# Patient Record
Sex: FEMALE | Race: WHITE | NOT HISPANIC OR LATINO | Employment: OTHER | URBAN - METROPOLITAN AREA
[De-identification: names, ages, dates, MRNs, and addresses within clinical notes are randomized per-mention and may not be internally consistent; named-entity substitution may affect disease eponyms.]

---

## 2017-01-04 ENCOUNTER — TELEPHONE (OUTPATIENT)
Dept: NEUROLOGY | Facility: CLINIC | Age: 62
End: 2017-01-04

## 2017-01-04 NOTE — TELEPHONE ENCOUNTER
Returned call and spoke to Chuy and they have the correct dose of the medication which was order by Mary Alice and no further question were answered.

## 2017-01-04 NOTE — TELEPHONE ENCOUNTER
----- Message from Erik Anne sent at 1/3/2017  8:48 AM CST -----  Contact: Self [phone]  Patient is calling to speak to the nurse about medication ( Topamax ), she lives in a nursing facility, she is not getting her correct dosage, pls call or contact Alycia (nurse) @ [phone] to update the medication dosage

## 2017-01-10 ENCOUNTER — SURGERY (OUTPATIENT)
Age: 62
End: 2017-01-10

## 2017-01-10 ENCOUNTER — ANESTHESIA (OUTPATIENT)
Dept: ENDOSCOPY | Facility: HOSPITAL | Age: 62
End: 2017-01-10
Payer: MEDICARE

## 2017-01-10 ENCOUNTER — ANESTHESIA EVENT (OUTPATIENT)
Dept: ENDOSCOPY | Facility: HOSPITAL | Age: 62
End: 2017-01-10
Payer: MEDICARE

## 2017-01-10 VITALS — RESPIRATION RATE: 17 BRPM

## 2017-01-10 PROBLEM — R10.13 EPIGASTRIC PAIN: Status: ACTIVE | Noted: 2017-01-10

## 2017-01-10 PROCEDURE — 63600175 PHARM REV CODE 636 W HCPCS: Performed by: NURSE ANESTHETIST, CERTIFIED REGISTERED

## 2017-01-10 PROCEDURE — D9220A PRA ANESTHESIA: Mod: ANES,,, | Performed by: ANESTHESIOLOGY

## 2017-01-10 PROCEDURE — 25000003 PHARM REV CODE 250: Performed by: NURSE ANESTHETIST, CERTIFIED REGISTERED

## 2017-01-10 PROCEDURE — D9220A PRA ANESTHESIA: Mod: CRNA,,, | Performed by: NURSE ANESTHETIST, CERTIFIED REGISTERED

## 2017-01-10 RX ORDER — PROPOFOL 10 MG/ML
VIAL (ML) INTRAVENOUS
Status: DISCONTINUED | OUTPATIENT
Start: 2017-01-10 | End: 2017-01-10

## 2017-01-10 RX ORDER — LIDOCAINE HCL/PF 100 MG/5ML
SYRINGE (ML) INTRAVENOUS
Status: DISCONTINUED | OUTPATIENT
Start: 2017-01-10 | End: 2017-01-10

## 2017-01-10 RX ORDER — PROPOFOL 10 MG/ML
VIAL (ML) INTRAVENOUS CONTINUOUS PRN
Status: DISCONTINUED | OUTPATIENT
Start: 2017-01-10 | End: 2017-01-10

## 2017-01-10 RX ADMIN — LIDOCAINE HYDROCHLORIDE 50 MG: 20 INJECTION, SOLUTION INTRAVENOUS at 10:01

## 2017-01-10 RX ADMIN — PROPOFOL 80 MG: 10 INJECTION, EMULSION INTRAVENOUS at 10:01

## 2017-01-10 RX ADMIN — PROPOFOL 150 MCG/KG/MIN: 10 INJECTION, EMULSION INTRAVENOUS at 10:01

## 2017-01-10 NOTE — TRANSFER OF CARE
"Anesthesia Transfer of Care Note    Patient: Joseluis Triplett    Procedure(s) Performed: Procedure(s) (LRB):  ESOPHAGOGASTRODUODENOSCOPY (EGD) (N/A)    Patient location: PACU    Anesthesia Type: general    Transport from OR: Transported from OR on 2-3 L/min O2 by NC with adequate spontaneous ventilation    Post pain: adequate analgesia    Post assessment: no apparent anesthetic complications    Post vital signs: stable    Level of consciousness: responds to stimulation and sedated    Nausea/Vomiting: no nausea/vomiting    Complications: none          Last vitals:   Visit Vitals    BP (!) 85/51 (BP Location: Left arm, Patient Position: Lying, BP Method: Automatic)    Pulse (!) 44    Temp 36.7 °C (98 °F) (Oral)    Resp 18    Ht 5' 5" (1.651 m)    Wt 133.4 kg (294 lb)    SpO2 97%    Breastfeeding No    BMI 48.92 kg/m2     "

## 2017-01-10 NOTE — ANESTHESIA POSTPROCEDURE EVALUATION
"Anesthesia Post Evaluation    Patient: Joseluis Triplett    Procedure(s) Performed: Procedure(s) (LRB):  ESOPHAGOGASTRODUODENOSCOPY (EGD) (N/A)    Final Anesthesia Type: general  Patient location during evaluation: PACU  Patient participation: Yes- Able to Participate  Level of consciousness: awake and alert  Post-procedure vital signs: reviewed and stable  Pain management: adequate  Airway patency: patent  PONV status at discharge: No PONV  Anesthetic complications: no      Cardiovascular status: hemodynamically stable  Respiratory status: unassisted  Hydration status: euvolemic  Follow-up not needed.        Visit Vitals    /63 (BP Location: Left arm, Patient Position: Lying, BP Method: Automatic)    Pulse (!) 48    Temp 36.7 °C (98 °F) (Oral)    Resp 18    Ht 5' 5" (1.651 m)    Wt 133.4 kg (294 lb)    SpO2 98%    Breastfeeding No    BMI 48.92 kg/m2       Pain/Genny Score: Pain Assessment Performed: Yes (1/10/2017 11:05 AM)  Presence of Pain: denies (1/10/2017 11:05 AM)  Genny Score: 10 (1/10/2017 11:05 AM)      "

## 2017-01-10 NOTE — ANESTHESIA PREPROCEDURE EVALUATION
01/10/2017  Joseluis Triplett is a 61 y.o., female.    [facility] Anesthesia Evaluation         Review of Systems         Anesthesia Plan  Type of Anesthesia, risks & benefits discussed:  Anesthesia Type:  general  Patient's Preference: General  Intra-op Monitoring Plan: standard ASA monitors  Intra-op Monitoring Plan Comments:   Post Op Pain Control Plan:   Post Op Pain Control Plan Comments: Per primary service.   Induction:   IV  Beta Blocker:  Patient is not currently on a Beta-Blocker (No further documentation required).       Informed Consent: Patient understands risks and agrees with Anesthesia plan.  Questions answered. Anesthesia consent signed with patient.  ASA Score: 3     Day of Surgery Review of History & Physical:    H&P update referred to the surgeon.     Anesthesia Plan Notes: Monitoring and airway management plans reviewed.         Ready For Surgery From Anesthesia Perspective.     Patient Active Problem List   Diagnosis    Schizo affective schizophrenia    Bipolar disorder    Tobacco abuse    Edema    COPD (chronic obstructive pulmonary disease)    Morbid obesity    Hypothyroidism    Hyperlipemia    Seizure disorder    Parkinson disease    History of anorexia nervosa    History of bulimia nervosa    GERD (gastroesophageal reflux disease)    Chronic rhinitis    Chronic constipation    Osteoarthritis    CLARI (obstructive sleep apnea)    Drug-induced tremor    Claudication    Migraine headache    Tension headache    Neuropathy    Hypotension    Status post abdominal surgery, follow-up exam    Perforated duodenal ulcer    Convulsion    Altered mental state    Transient alteration of awareness    Congenital hypothyroidism without goiter    Gastroesophageal reflux disease without esophagitis    Cognitive disorder    C. difficile diarrhea    Elevated transaminase level     Delirium    Clostridium difficile infection    Ventral incisional hernia without obstruction or gangrene

## 2017-01-13 DIAGNOSIS — Z12.31 OTHER SCREENING MAMMOGRAM: ICD-10-CM

## 2017-01-17 ENCOUNTER — TELEPHONE (OUTPATIENT)
Dept: ENDOSCOPY | Facility: HOSPITAL | Age: 62
End: 2017-01-17

## 2017-01-18 ENCOUNTER — OFFICE VISIT (OUTPATIENT)
Dept: NEUROLOGY | Facility: CLINIC | Age: 62
End: 2017-01-18
Payer: MEDICARE

## 2017-01-18 ENCOUNTER — TELEPHONE (OUTPATIENT)
Dept: NEUROLOGY | Facility: CLINIC | Age: 62
End: 2017-01-18

## 2017-01-18 VITALS
DIASTOLIC BLOOD PRESSURE: 66 MMHG | HEART RATE: 61 BPM | WEIGHT: 244 LBS | HEIGHT: 65 IN | BODY MASS INDEX: 40.65 KG/M2 | SYSTOLIC BLOOD PRESSURE: 101 MMHG

## 2017-01-18 DIAGNOSIS — R41.82 ALTERED MENTAL STATUS, UNSPECIFIED ALTERED MENTAL STATUS TYPE: ICD-10-CM

## 2017-01-18 DIAGNOSIS — R27.8 ASTERIXIS: ICD-10-CM

## 2017-01-18 DIAGNOSIS — G25.1 DRUG-INDUCED TREMOR: Primary | ICD-10-CM

## 2017-01-18 PROCEDURE — 3078F DIAST BP <80 MM HG: CPT | Mod: S$GLB,,, | Performed by: PSYCHIATRY & NEUROLOGY

## 2017-01-18 PROCEDURE — 99215 OFFICE O/P EST HI 40 MIN: CPT | Mod: S$GLB,,, | Performed by: PSYCHIATRY & NEUROLOGY

## 2017-01-18 PROCEDURE — 99999 PR PBB SHADOW E&M-EST. PATIENT-LVL V: CPT | Mod: PBBFAC,,, | Performed by: PSYCHIATRY & NEUROLOGY

## 2017-01-18 PROCEDURE — 1159F MED LIST DOCD IN RCRD: CPT | Mod: S$GLB,,, | Performed by: PSYCHIATRY & NEUROLOGY

## 2017-01-18 PROCEDURE — 3074F SYST BP LT 130 MM HG: CPT | Mod: S$GLB,,, | Performed by: PSYCHIATRY & NEUROLOGY

## 2017-01-18 RX ORDER — BUSPIRONE HYDROCHLORIDE 10 MG/1
10 TABLET ORAL 3 TIMES DAILY
COMMUNITY
Start: 2017-01-09 | End: 2018-05-31

## 2017-01-18 RX ORDER — TOPIRAMATE 100 MG/1
100 TABLET, FILM COATED ORAL NIGHTLY
COMMUNITY
End: 2017-01-26 | Stop reason: SDUPTHER

## 2017-01-18 RX ORDER — TOPIRAMATE 25 MG/1
25 TABLET ORAL DAILY
COMMUNITY
Start: 2017-01-16 | End: 2017-01-26 | Stop reason: DRUGHIGH

## 2017-01-18 RX ORDER — LACTULOSE 10 G/15ML
SOLUTION ORAL; RECTAL
COMMUNITY
Start: 2016-12-14 | End: 2017-01-18

## 2017-01-18 RX ORDER — QUETIAPINE FUMARATE 100 MG/1
200 TABLET, FILM COATED ORAL NIGHTLY
COMMUNITY
End: 2017-09-21

## 2017-01-18 RX ORDER — AZITHROMYCIN 250 MG/1
TABLET, FILM COATED ORAL
COMMUNITY
Start: 2016-12-29 | End: 2017-01-18 | Stop reason: ALTCHOICE

## 2017-01-18 RX ORDER — DOXYCYCLINE 100 MG/1
TABLET ORAL
COMMUNITY
Start: 2016-12-13 | End: 2017-01-18

## 2017-01-18 RX ORDER — QUETIAPINE FUMARATE 300 MG/1
TABLET, FILM COATED ORAL
COMMUNITY
Start: 2016-12-06 | End: 2017-01-18
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
## 2017-01-18 NOTE — MR AVS SNAPSHOT
[facility] Neurology  [address] [place] [address]  Phone: [phone]  Fax: [phone]                  Joseluis Triplett   2017 9:20 AM   Office Visit    Description:  Female : 1955   Provider:  Dorothea Sanchez MD   Department:  [facility] - Neurology           Reason for Visit     Consult           Diagnoses this Visit        Comments    Drug-induced tremor    -  Primary     Asterixis         Altered mental status, unspecified altered mental status type                To Do List           Future Appointments        Provider Department Dept Phone    2017 1:30 PM Lyle Briggs PA-C [facility] [phone]    3/28/2017 1:40 PM Dorothea Sanchez MD [facility] [phone]      Goals (5 Years of Data)     Eat more fruits and vegetables       [facility]     [facility] Nurse Care Line - / Assistance  Registered nurses in the [facility] provide clinical advisement, health education, appointment booking, and other advisory services.  Call for this free service at [phone].             Medications           Message regarding Medications     Verify the changes and/or additions to your medication regime listed below are the same as discussed with your clinician today.  If any of these changes or additions are incorrect, please notify your healthcare provider.        STOP taking these medications     azithromycin (Z-MARY) 250 MG tablet     paliperidone (INVEGA) 9 MG TR24 Take 1 tablet (9 mg total) by mouth once daily.    peg 3350-electrolytes-vit C (MOVIPREP) 100-7.5-2.691 gram PwPk Take 1 packet by mouth as directed.    polyethylene glycol (GLYCOLAX) 17 gram PwPk Take 17 g by mouth 2 (two) times daily as needed.    polyethylene glycol (GLYCOLAX) 17 gram/dose powder     nicotine polacrilex (COMMIT) 4 MG Lozg Take 4 mg by mouth as needed.    lactulose (CEPHULAC) 20 gram Pack Take 1 packet (20 g total) by mouth once daily.    lactulose  (CHRONULAC) 10 gram/15 mL solution     doxycycline monohydrate 100 mg Tab            Verify that the below list of medications is an accurate representation of the medications you are currently taking.  If none reported, the list may be blank. If incorrect, please contact your healthcare provider. Carry this list with you in case of emergency.           Current Medications     acetaminophen (TYLENOL) 500 MG tablet Take 500 mg by mouth every 6 (six) hours as needed for Pain (give 2 tabs).    aspirin (ECOTRIN) 81 MG EC tablet Take 81 mg by mouth once daily.    atorvastatin (LIPITOR) 40 MG tablet Take 1 tablet (40 mg total) by mouth once daily.    benzocaine-menthol 15-2.6 mg Lozg 1 capsule by Mucous Membrane route every 6 (six) hours as needed (for cough).    bisacodyl (DULCOLAX) 10 mg Supp Place 10 mg rectally daily as needed.    busPIRone (BUSPAR) 10 MG tablet 10 mg 3 (three) times daily.     chlorhexidine (PERIDEX) 0.12 % solution     clotrimazole-betamethasone 1-0.05% (LOTRISONE) cream Apply to the affected area 2 times per daily    divalproex (DEPAKOTE) 500 MG TbEC 1 in AM and 2 at bedtime    docusate sodium (COLACE) 100 MG capsule Take 1 capsule (100 mg total) by mouth 2 (two) times daily.    esomeprazole (NEXIUM) 40 MG capsule Take 1 capsule (40 mg total) by mouth once daily.    fluoxetine (PROZAC) 20 MG capsule Take 60 mg by mouth once daily.     fluphenazine (PROLIXIN) 5 MG tablet 5 mg every evening.     fluticasone (FLONASE) 50 mcg/actuation nasal spray USE TWO SPRAYS EACH NOSTRIL EVERY DAY    furosemide (LASIX) 20 MG tablet TAKE ONE TABLET BY MOUTH TWICE DAILY    gabapentin (NEURONTIN) 800 MG tablet 800 mg 3 (three) times daily.     levothyroxine (SYNTHROID) 150 MCG tablet Take 1 tablet (150 mcg total) by mouth once daily.    linaclotide (LINZESS) 145 mcg Cap capsule Take 1 capsule (145 mcg total) by mouth once daily.    MAGNESIUM HYDROXIDE (MILK OF MAGNESIA ORAL) Take by mouth once daily.     "nitrofurantoin, macrocrystal-monohydrate, (MACROBID) 100 MG capsule     omega-3 acid ethyl esters (LOVAZA) 1 gram capsule Take 2 capsules (2 g total) by mouth 2 (two) times daily.    potassium chloride SA (K-DUR,KLOR-CON) 20 MEQ tablet TAKE ONE TABLET BY MOUTH EVERY DAY    propranolol (INDERAL) 40 MG tablet Take 1 tablet (40 mg total) by mouth 3 (three) times daily.    psyllium 0.52 gram capsule Take 0.52 g by mouth once daily.    quetiapine (SEROQUEL) 100 MG Tab Take 100 mg by mouth once daily.    quetiapine (SEROQUEL) 400 MG tablet Take 400 mg by mouth every evening.     SPIRIVA WITH HANDIHALER 18 mcg inhalation capsule INHALE CONTENTS ONE CAPSULE BY MOUTH ONCE DAILY    topiramate (TOPAMAX) 100 MG tablet Take 100 mg by mouth every evening.     topiramate (TOPAMAX) 25 MG tablet 25 mg once daily.     VENTOLIN HFA 90 mcg/actuation inhaler INHALE TWO PUFFS BY MOUTH EVERY SIX HOURS AS NEEDED FOR SHORTNESS OF BREATH    quetiapine (SEROQUEL) 50 MG tablet            Clinical Reference Information           Vital Signs - Last Recorded  Most recent update: 1/18/2017  9:09 AM by Fay Abdul MA    BP Pulse Ht Wt BMI    101/66 (BP Location: Left arm, Patient Position: Sitting, BP Method: Automatic) 61 5' 5" (1.651 m) 110.7 kg (244 lb) 40.6 kg/m2      Blood Pressure          Most Recent Value    BP  101/66      Allergies as of 1/18/2017     Nsaids (Non-steroidal Anti-inflammatory Drug)    Penicillins      Immunizations Administered on Date of Encounter - 1/18/2017     None      Orders Placed During Today's Visit     Future Labs/Procedures Expected by Expires    Ammonia  1/18/2017 3/19/2018    Hepatic function panel  1/18/2017 3/19/2018    Valproic Acid  1/18/2017 3/19/2018      [facility] Sign-Up     Activating your [facility] account is as easy as 1-2-3!     1) Visit [email or web address], select Sign Up Now, enter this activation code and your date of birth, then select Next.  [email or web address]  Expires: 2/4/2017 10:23 AM      2) " Create a username and password to use when you visit [facility] in the future and select a security question in case you lose your password and select Next.    3) Enter your e-mail address and click Sign Up!    Additional Information  If you have questions, please e-mail [email or web address] or call [phone] to talk to our [facility] staff. Remember, [facility] is NOT to be used for urgent needs. For medical emergencies, dial 911.

## 2017-01-18 NOTE — LETTER
January 20, 2017      Mary Alice Lynn, NP  [address]  [facility] [address]           [facility]  [address]  [facility] [address]  Phone: [phone]  Fax: [phone]          Patient: Joseluis Triplett   MR Number: [id number]   YOB: 1955   Date of Visit: 1/18/2017       Dear Mary Alice Lynn:    Thank you for referring Joseluis Triplett to me for evaluation. Attached you will find relevant portions of my assessment and plan of care.    If you have questions, please do not hesitate to call me. I look forward to following Joseluis Triplett along with you.    Sincerely,    Dorothea Sanchez MD    Enclosure  CC:  No Recipients    If you would like to receive this communication electronically, please contact [email or web address] or [phone] to request more information on [employer] access.    For providers and/or their staff who would like to refer a patient to [facility], please contact us through our one-stop-shop provider referral line, [facility], at [phone].    If you feel you have received this communication in error or would no longer like to receive these types of communications, please e-mail [email or web address]

## 2017-01-18 NOTE — TELEPHONE ENCOUNTER
Ammonia and VPA levels both a little high.  Possibly causing the asterixis.  Her depakote is 1000mg bid currently.    Unable to reach patient on her cell.    Spoke to nurse at [facility].  Advised to reduce her vpa to 500mg qhs.  Will fax order to [phone]  She'll also need recheck of level and ammonia Friday.  I ordered.  Will route note to Dr. Collins and her psychiatrist.        Lab Visit on 01/18/2017   Component Date Value Ref Range Status    Ammonia 01/18/2017 82* 10 - 50 umol/L Final    Total Protein 01/18/2017 6.6  6.0 - 8.4 g/dL Final    Albumin 01/18/2017 3.2* 3.5 - 5.2 g/dL Final    Total Bilirubin 01/18/2017 0.3  0.1 - 1.0 mg/dL Final    Comment: For infants and newborns, interpretation of results should be based  on gestational age, weight and in agreement with clinical  observations.  Premature Infant recommended reference ranges:  Up to 24 hours.............<8.0 mg/dL  Up to 48 hours............<12.0 mg/dL  3-5 days..................<15.0 mg/dL  6-29 days.................<15.0 mg/dL      Bilirubin, Direct 01/18/2017 0.1  0.1 - 0.3 mg/dL Final    AST 01/18/2017 25  10 - 40 U/L Final    ALT 01/18/2017 33  10 - 44 U/L Final    Alkaline Phosphatase 01/18/2017 84  55 - 135 U/L Final    Valproic Acid Lvl 01/18/2017 114.1* 50.0 - 100.0 ug/mL Final    Comment: Valproic Acid (ug/ml)  Toxic:   >100.0 ug/ml

## 2017-01-19 ENCOUNTER — TELEPHONE (OUTPATIENT)
Dept: NEUROLOGY | Facility: CLINIC | Age: 62
End: 2017-01-19

## 2017-01-19 NOTE — TELEPHONE ENCOUNTER
----- Message from Anjana Browne sent at 1/19/2017  8:37 AM CST -----  Contact: Patient  Patient returning Dr. Sanchez's call from yesterday, [phone]

## 2017-01-19 NOTE — TELEPHONE ENCOUNTER
----- Message from Yenifer Lucas sent at 1/19/2017  8:03 AM CST -----  Contact: pt [phone]   Pt states she is returning Dr.[email or web address] call

## 2017-01-26 ENCOUNTER — OFFICE VISIT (OUTPATIENT)
Dept: NEUROLOGY | Facility: CLINIC | Age: 62
End: 2017-01-26
Payer: MEDICARE

## 2017-01-26 VITALS — HEART RATE: 62 BPM | DIASTOLIC BLOOD PRESSURE: 76 MMHG | SYSTOLIC BLOOD PRESSURE: 116 MMHG | HEIGHT: 65 IN

## 2017-01-26 DIAGNOSIS — R27.8 ASTERIXIS: Primary | ICD-10-CM

## 2017-01-26 PROCEDURE — 3078F DIAST BP <80 MM HG: CPT | Mod: S$GLB,,, | Performed by: PSYCHIATRY & NEUROLOGY

## 2017-01-26 PROCEDURE — 99214 OFFICE O/P EST MOD 30 MIN: CPT | Mod: S$GLB,,, | Performed by: PSYCHIATRY & NEUROLOGY

## 2017-01-26 PROCEDURE — 3074F SYST BP LT 130 MM HG: CPT | Mod: S$GLB,,, | Performed by: PSYCHIATRY & NEUROLOGY

## 2017-01-26 PROCEDURE — 99999 PR PBB SHADOW E&M-EST. PATIENT-LVL II: CPT | Mod: PBBFAC,,, | Performed by: PSYCHIATRY & NEUROLOGY

## 2017-01-26 PROCEDURE — 1159F MED LIST DOCD IN RCRD: CPT | Mod: S$GLB,,, | Performed by: PSYCHIATRY & NEUROLOGY

## 2017-01-26 RX ORDER — TOPIRAMATE 100 MG/1
100 TABLET, FILM COATED ORAL 2 TIMES DAILY
Qty: 60 TABLET | Refills: 11 | Status: SHIPPED | OUTPATIENT
Start: 2017-01-26 | End: 2017-01-26 | Stop reason: SDUPTHER

## 2017-01-26 RX ORDER — DIVALPROEX SODIUM 500 MG/1
500 TABLET, DELAYED RELEASE ORAL EVERY 12 HOURS
Qty: 60 TABLET | Refills: 11 | Status: SHIPPED | OUTPATIENT
Start: 2017-01-26 | End: 2017-01-26 | Stop reason: SDUPTHER
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
## 2017-01-26 NOTE — TELEPHONE ENCOUNTER
----- Message from Yenifer Lucas sent at 1/26/2017  2:39 PM CST -----  Contact: pt [phone]  Pt states she needs a prescription for divalproex (DEPAKOTE) 500 MG TbEC and topiramate (TOPAMAX faxed to her nursing home.pls call       Pt nursing home [phone]

## 2017-01-26 NOTE — TELEPHONE ENCOUNTER
----- Message from Belkis Maciel sent at 1/26/2017  2:13 PM CST -----  Contact: [place] ([facility] ) [phone]  [place] is calling to get a prescription for medication, for the recommendations the doctor made today, during her visit pls fax to [phone]

## 2017-01-26 NOTE — LETTER
January 26, 2017      Jez Worthy MD  [address] [facility]  [facility] [place]  [address]  [facility] [address]           [facility] Neurology  [address]  [place] [address]  Phone: [phone]  Fax: [phone]          Patient: Joseluis Triplett   MR Number: [id number]   YOB: 1955   Date of Visit: 1/26/2017       Dear Dr. Worthy:    I have made a couple changes to patient's depakote and topamax because of recent elevated ammonia levels.  I've reduced her depakote to 500mg bid and increased her topamax to 100mg bid.      Sincerely,     Dorothea Sanchez MD   Director, Movement Disorders and DBS Program  Department of Neurology      Enclosure  CC:  No Recipients    If you would like to receive this communication electronically, please contact [email or web address] or [phone] to request more information on [facility] Link access.    For providers and/or their staff who would like to refer a patient to [facility], please contact us through our one-stop-shop provider referral line, [facility], at [phone].    If you feel you have received this communication in error or would no longer like to receive these types of communications, please e-mail [email or web address]

## 2017-01-27 RX ORDER — DIVALPROEX SODIUM 500 MG/1
500 TABLET, DELAYED RELEASE ORAL EVERY 12 HOURS
Qty: 60 TABLET | Refills: 11 | Status: SHIPPED | OUTPATIENT
Start: 2017-01-27 | End: 2018-05-31 | Stop reason: DRUGHIGH

## 2017-01-27 RX ORDER — TOPIRAMATE 100 MG/1
100 TABLET, FILM COATED ORAL 2 TIMES DAILY
Qty: 60 TABLET | Refills: 11 | Status: ON HOLD | OUTPATIENT
Start: 2017-01-27 | End: 2019-02-06 | Stop reason: HOSPADM

## 2017-02-06 ENCOUNTER — TELEPHONE (OUTPATIENT)
Dept: NEUROLOGY | Facility: CLINIC | Age: 62
End: 2017-02-06

## 2017-02-06 NOTE — TELEPHONE ENCOUNTER
----- Message from Felicita Mujica sent at 2/2/2017  3:44 PM CST -----  Contact: self @ [phone]  Pt is calling to inform dr pradhan that her hands and lower arms are still shaking.  Pt is asking if she should see dr pradhan.  pls call.

## 2017-02-06 NOTE — TELEPHONE ENCOUNTER
----- Message from Franklin Mcelroy sent at 2/2/2017  2:52 PM CST -----  Contact: PT  Would like to know if physician want to see her again.    Call: [phone]

## 2017-02-06 NOTE — TELEPHONE ENCOUNTER
Returned call to pt and she states in the morning just her lower arms and hands are shaking. She wants to know if she should come in for another visit or when should she come back in for a visit.

## 2017-02-14 ENCOUNTER — OFFICE VISIT (OUTPATIENT)
Dept: NEUROLOGY | Facility: CLINIC | Age: 62
End: 2017-02-14
Payer: MEDICARE

## 2017-02-14 VITALS — WEIGHT: 201.25 LBS | BODY MASS INDEX: 33.49 KG/M2

## 2017-02-14 DIAGNOSIS — R51.9 CHRONIC INTRACTABLE HEADACHE, UNSPECIFIED HEADACHE TYPE: Primary | ICD-10-CM

## 2017-02-14 DIAGNOSIS — R51.9 OCCIPITAL PAIN: ICD-10-CM

## 2017-02-14 DIAGNOSIS — G89.29 CHRONIC INTRACTABLE HEADACHE, UNSPECIFIED HEADACHE TYPE: Primary | ICD-10-CM

## 2017-02-14 PROCEDURE — 64400 NJX AA&/STRD TRIGEMINAL NRV: CPT | Mod: 50,S$GLB,, | Performed by: PHYSICIAN ASSISTANT

## 2017-02-14 PROCEDURE — 99999 PR PBB SHADOW E&M-EST. PATIENT-LVL III: CPT | Mod: PBBFAC,,, | Performed by: PHYSICIAN ASSISTANT

## 2017-02-14 PROCEDURE — 99499 UNLISTED E&M SERVICE: CPT | Mod: S$GLB,,, | Performed by: PHYSICIAN ASSISTANT

## 2017-02-14 PROCEDURE — 64405 NJX AA&/STRD GR OCPL NRV: CPT | Mod: 50,51,S$GLB, | Performed by: PHYSICIAN ASSISTANT

## 2017-02-14 RX ORDER — LIDOCAINE HYDROCHLORIDE 20 MG/ML
6 INJECTION, SOLUTION INFILTRATION; PERINEURAL
Status: COMPLETED | OUTPATIENT
Start: 2017-02-14 | End: 2017-02-14

## 2017-02-14 RX ORDER — METHYLPREDNISOLONE ACETATE 40 MG/ML
80 INJECTION, SUSPENSION INTRA-ARTICULAR; INTRALESIONAL; INTRAMUSCULAR; SOFT TISSUE
Status: COMPLETED | OUTPATIENT
Start: 2017-02-14 | End: 2017-02-14

## 2017-02-14 RX ADMIN — LIDOCAINE HYDROCHLORIDE 6 ML: 20 INJECTION, SOLUTION INFILTRATION; PERINEURAL at 01:02

## 2017-02-14 RX ADMIN — METHYLPREDNISOLONE ACETATE 80 MG: 40 INJECTION, SUSPENSION INTRA-ARTICULAR; INTRALESIONAL; INTRAMUSCULAR; SOFT TISSUE at 01:02

## 2017-02-14 NOTE — MR AVS SNAPSHOT
[facility]  [address]  [facility] [address]  Phone: [phone]  Fax: [phone]                  Joseluis Triplett   2017 1:30 PM   Office Visit    Description:  Female : 1955   Provider:  Lyle Briggs PA-C   Department:  [facility]           Reason for Visit     Follow-up                To Do List           Future Appointments        Provider Department Dept Phone    3/28/2017 1:40 PM Dorothea Sanchez MD [facility] [phone]    2017 1:30 PM Lyle Briggs PA-C [facility] [phone]      Goals (5 Years of Data)     Eat more fruits and vegetables       [facility]     [facility] Nurse Care [facility] -  Assistance  Registered nurses in the [facility] provide clinical advisement, health education, appointment booking, and other advisory services.  Call for this free service at [phone].             Medications           Message regarding Medications     Verify the changes and/or additions to your medication regime listed below are the same as discussed with your clinician today.  If any of these changes or additions are incorrect, please notify your healthcare provider.             Verify that the below list of medications is an accurate representation of the medications you are currently taking.  If none reported, the list may be blank. If incorrect, please contact your healthcare provider. Carry this list with you in case of emergency.           Current Medications     acetaminophen (TYLENOL) 500 MG tablet Take 1,000 mg by mouth 3 (three) times daily.     aspirin (ECOTRIN) 81 MG EC tablet Take 81 mg by mouth once daily.    atorvastatin (LIPITOR) 40 MG tablet Take 1 tablet (40 mg total) by mouth once daily.    benzocaine-menthol 15-2.6 mg Lozg 1 capsule by Mucous Membrane route every 6 (six) hours as needed (for cough).    bisacodyl (DULCOLAX) 10 mg Supp Place 10 mg rectally daily as needed.     busPIRone (BUSPAR) 10 MG tablet 10 mg 3 (three) times daily.     chlorhexidine (PERIDEX) 0.12 % solution     clotrimazole-betamethasone 1-0.05% (LOTRISONE) cream Apply to the affected area 2 times per daily    divalproex (DEPAKOTE) 500 MG TbEC Take 1 tablet (500 mg total) by mouth every 12 (twelve) hours.    docusate sodium (COLACE) 100 MG capsule Take 1 capsule (100 mg total) by mouth 2 (two) times daily.    esomeprazole (NEXIUM) 40 MG capsule Take 1 capsule (40 mg total) by mouth once daily.    fluoxetine (PROZAC) 20 MG capsule Take 60 mg by mouth once daily.     fluphenazine (PROLIXIN) 5 MG tablet 5 mg every evening.     fluticasone (FLONASE) 50 mcg/actuation nasal spray USE TWO SPRAYS EACH NOSTRIL EVERY DAY    furosemide (LASIX) 20 MG tablet TAKE ONE TABLET BY MOUTH TWICE DAILY    gabapentin (NEURONTIN) 800 MG tablet 800 mg 3 (three) times daily.     levothyroxine (SYNTHROID) 150 MCG tablet Take 1 tablet (150 mcg total) by mouth once daily.    linaclotide (LINZESS) 145 mcg Cap capsule Take 1 capsule (145 mcg total) by mouth once daily.    MAGNESIUM HYDROXIDE (MILK OF MAGNESIA ORAL) Take by mouth once daily.    nitrofurantoin, macrocrystal-monohydrate, (MACROBID) 100 MG capsule     omega-3 acid ethyl esters (LOVAZA) 1 gram capsule Take 2 capsules (2 g total) by mouth 2 (two) times daily.    potassium chloride SA (K-DUR,KLOR-CON) 20 MEQ tablet TAKE ONE TABLET BY MOUTH EVERY DAY    propranolol (INDERAL) 40 MG tablet Take 1 tablet (40 mg total) by mouth 3 (three) times daily.    psyllium 0.52 gram capsule Take 0.52 g by mouth once daily.    quetiapine (SEROQUEL) 100 MG Tab Take 100 mg by mouth once daily.    quetiapine (SEROQUEL) 400 MG tablet Take 400 mg by mouth every evening.     quetiapine (SEROQUEL) 50 MG tablet     SPIRIVA WITH HANDIHALER 18 mcg inhalation capsule INHALE CONTENTS ONE CAPSULE BY MOUTH ONCE DAILY    topiramate (TOPAMAX) 100 MG tablet Take 1 tablet (100 mg total) by mouth 2 (two) times daily.     VENTOLIN HFA 90 mcg/actuation inhaler INHALE TWO PUFFS BY MOUTH EVERY SIX HOURS AS NEEDED FOR SHORTNESS OF BREATH           Clinical Reference Information           Your Vitals Were     Weight BMI             91.3 kg (201 lb 4.5 oz) 33.49 kg/m2         Allergies as of 2/14/2017     Nsaids (Non-steroidal Anti-inflammatory Drug)    Penicillins      Immunizations Administered on Date of Encounter - 2/14/2017     None      Language Assistance Services     ATTENTION: Language assistance services are available, free of charge. Please call [phone].      ATENCIÓN: Si habla español, tiene a jacobs disposición servicios gratuitos de asistencia lingüística. Llame al [phone].     SMOOTH Ý: N?u b?n nói Ti?ng Vi?t, có các d?ch v? h? tr? ngôn ng? mi?n phí dành cho b?n. G?i s? [phone].         [facility] complies with applicable Federal civil rights laws and does not discriminate on the basis of race, color, national origin, age, disability, or sex.

## 2017-02-14 NOTE — PROGRESS NOTES
"[facility], Department of Neurology   [address] [place] [address]  Phone:[phone]  Fax: [phone]    Chief Complaint   Patient presents with    Follow-up     nerve block    Chaperone: MICHELLE Crenshaw       A/P:      ICD-10-CM ICD-9-CM   1. Chronic intractable headache, unspecified headache type R51 784.0   2. Occipital pain R51 784.0   3. Rebound HA, discussed cutting back on her acetaminophen use   TNB/GONB    Patient agreed to above plan along with voiced understanding of medications.  F/U: 2 months     S/O: presents alone, states blocks worked for 3-5 weeks. States "worst HA ever" currently. Can take up to 3 gram of acetaminophen in a day. Noted smiling, lights on, doesn't appear in distress.   Asks for repeat blocks as she feels this helped.       Procedure note:   GONB (Greater Occipital Nerve Block): After informed consent was obtained (a copy was given to office staff to scan into the EMR), the patient was asked to remain in a sitting position with her head resting prone on her chest. The area was prepped using alcohol swabs. 2% lidocaine (2 mL x2 sides of neck=4 mL total) and 40 mg (1 bottle per sitex2 for total of 80 mg)depomedrol was drawn up utilizing a 21 gauge needle. The occipital trigger points were palpated utilizing latex gloves, a 27 gauge needle and aspiration occured to ensure no medication was introduced into the blood stream during the technique. The medication was delivered bilateral (all of the above was duplicated for the opposite side) in sites 1) midway between the inion and mastoid along the occipital ridge, 2) 2 finger breaths superior lateral to the first injection and 3) 2 finger breaths superior medial to the first injection. The patient tolerated the procedure well with no active bleeding, erythema, or discharge.  The patient was assessed and allowed to leave after ten minutes.      Procedure note:   Nerve Block ( Trigeminal Nerve/Temporal Auricular " Nerve CPT: 39782): After informed consent was obtained (asked office staff to scan into EMR), the patient was asked to remain in a sitting position.The area was prepped using alcohol swabs. 2% lidocaine (2.0 cc)  was drawn up utilizing a 22 gauge needle. A 30 gauge needle was used for the procedure. Three Temperoauricular locations were palpated on the RIGHT side of the head and 0.2 ccs injected into 3 separate sites (1 near the top of the ear and 2 along the parietal region of the head). 2 supraglabellar areas were palpated on the RIGHT, approx 5-6 mm above the orbital ridge and then 5-6 mm lateral along the orbital ridge. 0.2 cc per site for a total of 0.4 cc given. This was repeated all on the LEFT for a total of 1 full cc (5 injections, 0.2 cc a piece). The patient tolerated the procedure well with no active bleeding, erythema, or discharge.  The patient was assessed and allowed to leave after ten minutes.  Findings from repeat exam (10 mins after procedure) showed:    Gen: NAD  Derm: no drainage  Neuro: AOx3, stand without assistance   Attending available         Lyle Briggs MPA PALAYA     02/14/2017 1:47 PM    CC: Clayton Rainey MD

## 2017-02-15 ENCOUNTER — TELEPHONE (OUTPATIENT)
Dept: NEUROLOGY | Facility: CLINIC | Age: 62
End: 2017-02-15

## 2017-02-15 RX ORDER — HYDROXYZINE PAMOATE 25 MG/1
CAPSULE ORAL
Qty: 20 CAPSULE | Refills: 1 | Status: SHIPPED | OUTPATIENT
Start: 2017-02-15 | End: 2017-02-15 | Stop reason: SDUPTHER

## 2017-02-15 RX ORDER — HYDROXYZINE PAMOATE 25 MG/1
CAPSULE ORAL
Qty: 20 CAPSULE | Refills: 1 | Status: ON HOLD | OUTPATIENT
Start: 2017-02-15 | End: 2018-09-14 | Stop reason: HOSPADM

## 2017-02-15 NOTE — TELEPHONE ENCOUNTER
I am writing a short course of vistaril for her, 1 pill 3x a day for 2 days, rest left over every 8 hours as needed (not meant to be taken daily). The goal is to make her sleepy. If she continues to take tylenol daily, I will not be able to help her. Please let her know/document.     PERLA LANCE  02/15/2017

## 2017-02-15 NOTE — TELEPHONE ENCOUNTER
Spoke to Patient and gave her orders from Maximiliano Briggs concerning her Vistaril-and to avoid Tylenol.Instructions given to her directly as provider stated.She then asked if the Vistaril does not work what to do-instructed her to then notify clinic and Provider would be made aware.

## 2017-02-15 NOTE — TELEPHONE ENCOUNTER
----- Message from Elaine Crenshaw RN sent at 2/15/2017 12:43 PM CST -----  Contact: self @ [phone]      ----- Message -----     From: Felicita Mujica     Sent: 2/15/2017  11:23 AM       To: Chester Alva[facility] Staff    Pt says her prescription needs to be sent to the nursing home.  pls fax to [phone].

## 2017-02-15 NOTE — TELEPHONE ENCOUNTER
----- Message from Felicita Mujica sent at 2/15/2017  9:40 AM CST -----  Contact: self @ [phone]  Pt says she was in on yesterday for injections.  Pt says the 1st time she had the injections they worked but the injection from yesterday did not work at all.  Pt says she had to acetaminophen 1000 mg.  Pt says that is bad for her liver and kidneys.  Pt would like an alternative.  pls call to discuss.

## 2017-02-20 ENCOUNTER — TELEPHONE (OUTPATIENT)
Dept: NEUROLOGY | Facility: CLINIC | Age: 62
End: 2017-02-20

## 2017-02-20 NOTE — TELEPHONE ENCOUNTER
Spoke to Pt she states the shots did not work she would like to try something else Pt also states the vistaril is no longer working for her as well Please advise          Message from Erik Anne sent at 2/20/2017  8:41 AM CST -----  Contact: Self [phone]  Patient is returning a missed call about rescheduling the 4-17 appt and the medication is not working, pls call

## 2017-02-21 ENCOUNTER — TELEPHONE (OUTPATIENT)
Dept: NEUROLOGY | Facility: CLINIC | Age: 62
End: 2017-02-21

## 2017-02-21 RX ORDER — TIZANIDINE 2 MG/1
TABLET ORAL
Qty: 60 TABLET | Refills: 5 | Status: SHIPPED | OUTPATIENT
Start: 2017-02-21 | End: 2017-03-28 | Stop reason: SDUPTHER

## 2017-02-21 NOTE — TELEPHONE ENCOUNTER
I am writing her a muscle relaxant. Can you please fax to her nursing home and let her know that I am sending it?  It can make her sleepy/tired.       PERLA LANCE  02/21/2017

## 2017-02-21 NOTE — TELEPHONE ENCOUNTER
Talked to my staff, seemed to have complication gaining fax number, I personally called, spoke to halle, fax number is: [phone]      PERLA LANCE  02/21/2017

## 2017-02-22 ENCOUNTER — TELEPHONE (OUTPATIENT)
Dept: NEUROLOGY | Facility: CLINIC | Age: 62
End: 2017-02-22

## 2017-02-22 NOTE — TELEPHONE ENCOUNTER
The medication I wrote her is a PRN to help with her headaches as the other medications are not helping (hence the phone calls). She has had injections as well, and continues to call to say she is in pain. The tizanadine will not adversely interact with her other medications. Please let them know/document.    PERLA LANCE  02/22/2017

## 2017-02-22 NOTE — TELEPHONE ENCOUNTER
----- Message from Julian Lanza sent at 2/22/2017  2:58 PM CST -----  Contact: pt  The pt called to get an appointment for the first week of March. Please call the pt at [phone]

## 2017-02-22 NOTE — TELEPHONE ENCOUNTER
----- Message from Corky Carrera sent at 2/21/2017 12:18 PM CST -----  Contact: [facility]/Corie Fontenot called in regarding the attached patient.  Corie stated that a Rx was faxed over for Zanaflex and Corie stated that patient is already on a Rx for her headaches (Vistarel and Topamax)     Corie would like to know if patient is changing meds?    Corie's call back number is [phone]

## 2017-02-22 NOTE — TELEPHONE ENCOUNTER
----- Message from Elaine Crenshaw RN sent at 2/21/2017 12:35 PM CST -----  Contact: [facility]/Corie      ----- Message -----     From: Corky Carrera     Sent: 2/21/2017  12:18 PM       To: Chester Alvarenga Staff    Corie called in regarding the attached patient.  Corie stated that a Rx was faxed over for Zanaflex and Corie stated that patient is already on a Rx for her headaches (Vistarel and Topamax)     Corie would like to know if patient is changing meds?    Corie's call back number is [phone]

## 2017-02-22 NOTE — TELEPHONE ENCOUNTER
Spoke with Burton at [facility] concerning meds and she was given message per Maximiliano Briggs concerning her medication regimen.Burton states that Patient does not ask for anything for pain,but states Patient stays out all day smoking and visiting with other Residents.Informed Burton that Patient states that she takes Tylenol 5-6 times daily-and questioned if Patient does not have her own supply.

## 2017-02-23 NOTE — TELEPHONE ENCOUNTER
----- Message from Erik Anne sent at 2/23/2017 11:37 AM CST -----  Contact: [facility] @  [phone]  Patient is calling to get blood work for Kidneys and Liver, pls call

## 2017-02-23 NOTE — TELEPHONE ENCOUNTER
Returned call to pt and would like for Dr. Sanchez to order her a kidney and liver blood. She states she has asked the provider at the nursing home, but they will not order. She would like to know if Dr. Sanchez can order the test for her.

## 2017-02-24 ENCOUNTER — TELEPHONE (OUTPATIENT)
Dept: NEUROLOGY | Facility: CLINIC | Age: 62
End: 2017-02-24

## 2017-02-24 NOTE — TELEPHONE ENCOUNTER
Doesn't look like my staff called her as I requested 2/6.  LM:  Advised simply to f/u in March as planned as last labs ok.

## 2017-02-24 NOTE — TELEPHONE ENCOUNTER
----- Message from Felicita Mujica sent at 2/24/2017 10:10 AM CST -----  Contact: self @ [phone]  Pt says she is waiting on a return call concerning her lab orders being forwarded to there nursing home she is in.  pls call.

## 2017-03-06 ENCOUNTER — TELEPHONE (OUTPATIENT)
Dept: NEUROLOGY | Facility: CLINIC | Age: 62
End: 2017-03-06

## 2017-03-06 NOTE — TELEPHONE ENCOUNTER
Called and spoke to Patient.She states pain is at a 9,10-12 at this time.She is asking about increasing her pill to 3 times daily-that she was prescribed at last visit.Informed her that Maximiliano Briggs will be notified.

## 2017-03-06 NOTE — TELEPHONE ENCOUNTER
----- Message from Yenifer Lucas sent at 3/6/2017 10:01 AM CST -----  Contact: pt [phone]  Pt is calling to speak with nurse regarding her medication she is taking for her head pain.pls call

## 2017-03-06 NOTE — TELEPHONE ENCOUNTER
----- Message from Franklin Mcelroy sent at 3/6/2017 10:10 AM CST -----  Contact: PT  Would like someone to call her, regarding her blood levels.     Call: [phone]

## 2017-03-06 NOTE — TELEPHONE ENCOUNTER
Spoke to Maximiliano Briggs PA-C who states if Patient rates pain as a 10 plus that she needs to go to the ED to be evaluated.Message left for Patient with these instructions.

## 2017-03-07 ENCOUNTER — TELEPHONE (OUTPATIENT)
Dept: NEUROLOGY | Facility: CLINIC | Age: 62
End: 2017-03-07

## 2017-03-07 NOTE — TELEPHONE ENCOUNTER
Pt called and gave information below per Dr. Sanchez     Note      Doesn't look like my staff called her as I requested 2/6.  LM: Advised simply to f/u in March as planned as last labs ok.        She still want to know if you liver and kidneys were tested.

## 2017-03-08 NOTE — TELEPHONE ENCOUNTER
I did not test kidney as not effected by depakote and it was fine when tested by Dr. Moraes in December.    Her depakote level, ammonia level and liver enzymes were all fine on re-check 1/26.  See me 3/28

## 2017-03-09 ENCOUNTER — CLINICAL SUPPORT (OUTPATIENT)
Dept: SMOKING CESSATION | Facility: CLINIC | Age: 62
End: 2017-03-09
Payer: COMMERCIAL

## 2017-03-09 VITALS — DIASTOLIC BLOOD PRESSURE: 72 MMHG | HEART RATE: 64 BPM | SYSTOLIC BLOOD PRESSURE: 118 MMHG

## 2017-03-09 DIAGNOSIS — F17.200 SMOKES AND MOTIVATED TO QUIT: Primary | ICD-10-CM

## 2017-03-09 PROCEDURE — 99404 PREV MED CNSL INDIV APPRX 60: CPT | Mod: S$GLB,,,

## 2017-03-09 RX ORDER — ALBUTEROL SULFATE 2.5 MG/.5ML
2.5 SOLUTION RESPIRATORY (INHALATION) EVERY 6 HOURS PRN
COMMUNITY
End: 2017-08-30 | Stop reason: SDUPTHER

## 2017-03-10 NOTE — PROGRESS NOTES
3/9/17    See Smoking Cessation Smart Form    Additional Interventions:  · Recommended patient participate in Smoking Cessation Group .  · Discussed triggers and planning for quit date.  · Given patient education handouts from [employer] Tool Kit #3  · Educated patient about and gave patient education handouts from  [employer] Drug Information on: NRT  · Provided phone number to reach [facility] CTTS (Certified Tobacco Treatment Specialist) for future assistance and numbers to 24/7 Quit lines.    Patient is to sign a release of information for [facility] in [place] and have her  Miryam Mathias fax this to Smoking [facility]essation [facility] so I can talk with the nursing staff and  about recommendations, pharmacy related issues given she is in a nursing home and how to apply the Trust benefits appropriately according to nursing home protocols.  She will need support from the nursing home staff to help her quit.

## 2017-03-13 DIAGNOSIS — F17.200 SMOKES AND MOTIVATED TO QUIT: ICD-10-CM

## 2017-03-13 DIAGNOSIS — F17.200 SMOKES AND MOTIVATED TO QUIT: Primary | ICD-10-CM

## 2017-03-13 RX ORDER — ASPIRIN/CALCIUM CARB/MAGNESIUM 325 MG
TABLET ORAL
Qty: 168 LOZENGE | Refills: 0 | Status: SHIPPED | OUTPATIENT
Start: 2017-03-13 | End: 2017-04-03 | Stop reason: SDUPTHER

## 2017-03-13 RX ORDER — IBUPROFEN 200 MG
1 TABLET ORAL DAILY
Qty: 28 PATCH | Refills: 0 | Status: SHIPPED | OUTPATIENT
Start: 2017-03-13 | End: 2017-04-03 | Stop reason: SDUPTHER

## 2017-03-15 ENCOUNTER — TELEPHONE (OUTPATIENT)
Dept: NEUROLOGY | Facility: CLINIC | Age: 62
End: 2017-03-15

## 2017-03-15 NOTE — TELEPHONE ENCOUNTER
----- Message from Deloris Morfin sent at 3/8/2017  4:22 PM CST -----  Contact: Babar Thornton    Pt is requesting to speak with you regarding her liver test results    Pt contact number [phone]  Thanks

## 2017-03-15 NOTE — TELEPHONE ENCOUNTER
Returned call to pt and gave information below per Dr. Sanchez. She had no further questions    Dorothea Sanchez MD        8:19 AM   Note      I did not test kidney as not effected by depakote and it was fine when tested by Dr. Moraes in December.   Her depakote level, ammonia level and liver enzymes were all fine on re-check 1/26.

## 2017-03-16 ENCOUNTER — CLINICAL SUPPORT (OUTPATIENT)
Dept: SMOKING CESSATION | Facility: CLINIC | Age: 62
End: 2017-03-16
Payer: COMMERCIAL

## 2017-03-16 DIAGNOSIS — F17.200 SMOKES AND MOTIVATED TO QUIT: Primary | ICD-10-CM

## 2017-03-16 PROCEDURE — 99404 PREV MED CNSL INDIV APPRX 60: CPT | Mod: S$GLB,,,

## 2017-03-23 ENCOUNTER — CLINICAL SUPPORT (OUTPATIENT)
Dept: SMOKING CESSATION | Facility: CLINIC | Age: 62
End: 2017-03-23
Payer: COMMERCIAL

## 2017-03-23 DIAGNOSIS — F17.200 SMOKES AND MOTIVATED TO QUIT: Primary | ICD-10-CM

## 2017-03-23 PROCEDURE — 99403 PREV MED CNSL INDIV APPRX 45: CPT | Mod: S$GLB,,,

## 2017-03-24 NOTE — PROGRESS NOTES
"Individual Follow-Up Form    3/23/2017    Quit Date:  Patient said today was her quit date since she had not had any cigarettes since she got up this a.m.    Clinical Status of Patient: Outpatient    Length of Service:   45 min    Continuing Medication:   Nicotine patch & lozenges  Target Symptoms: Withdrawal and medication side effects. The following were  rated moderate (3) to severe (4) on TCRS:  · Moderate (3):  Urges/cravings, anxiety, restlessness  · Severe (4): none    Comments:   Patient reportedly struggled during the past week and wasn't able to reduce number of cigarettes smoked per day to under 10.  In fact, she said at time she was "forcing" herself to smoke as an intentional self-sabotage.  Now, she said she has a sore throat and respiratory infection so she decided to use this as an opportunity to start her quit process.  She still has some cigarettes but she was at least refusing to go out with others from the nursing home when they would go out to smoke.  She demonstrated mood swings while in visit and said her mental health medication was being adjusted.  She continued to voice a strong desire to quit.  I validated her struggle during the last week and helped her focus on the actions she was taking that were likely to help her quit.  We discussed stress management and I provided a guided imagery CD to help her with stress.  She is using her nicotine patches and lozenges as directed.    Diagnosis: F17.200    Next Visit: 1 week  "

## 2017-03-28 ENCOUNTER — OFFICE VISIT (OUTPATIENT)
Dept: NEUROLOGY | Facility: CLINIC | Age: 62
End: 2017-03-28
Payer: MEDICARE

## 2017-03-28 VITALS
DIASTOLIC BLOOD PRESSURE: 65 MMHG | BODY MASS INDEX: 33.16 KG/M2 | HEART RATE: 57 BPM | HEIGHT: 65 IN | SYSTOLIC BLOOD PRESSURE: 93 MMHG | WEIGHT: 199.06 LBS

## 2017-03-28 DIAGNOSIS — Z79.899 POLYPHARMACY: ICD-10-CM

## 2017-03-28 DIAGNOSIS — R56.9 CONVULSIONS, UNSPECIFIED CONVULSION TYPE: ICD-10-CM

## 2017-03-28 DIAGNOSIS — M62.830 BACK MUSCLE SPASM: ICD-10-CM

## 2017-03-28 DIAGNOSIS — G43.109 MIGRAINE WITH AURA AND WITHOUT STATUS MIGRAINOSUS, NOT INTRACTABLE: Primary | ICD-10-CM

## 2017-03-28 DIAGNOSIS — G25.1 DRUG-INDUCED TREMOR: ICD-10-CM

## 2017-03-28 PROCEDURE — 99999 PR PBB SHADOW E&M-EST. PATIENT-LVL IV: CPT | Mod: PBBFAC,,, | Performed by: PSYCHIATRY & NEUROLOGY

## 2017-03-28 PROCEDURE — 99214 OFFICE O/P EST MOD 30 MIN: CPT | Mod: S$GLB,,, | Performed by: PSYCHIATRY & NEUROLOGY

## 2017-03-28 PROCEDURE — 3074F SYST BP LT 130 MM HG: CPT | Mod: S$GLB,,, | Performed by: PSYCHIATRY & NEUROLOGY

## 2017-03-28 PROCEDURE — 3078F DIAST BP <80 MM HG: CPT | Mod: S$GLB,,, | Performed by: PSYCHIATRY & NEUROLOGY

## 2017-03-28 PROCEDURE — 1160F RVW MEDS BY RX/DR IN RCRD: CPT | Mod: S$GLB,,, | Performed by: PSYCHIATRY & NEUROLOGY

## 2017-03-28 RX ORDER — TIZANIDINE 4 MG/1
TABLET ORAL
Qty: 60 TABLET | Refills: 11 | Status: SHIPPED | OUTPATIENT
Start: 2017-03-28 | End: 2017-03-28 | Stop reason: SDUPTHER

## 2017-03-28 RX ORDER — TIZANIDINE 4 MG/1
TABLET ORAL
Qty: 60 TABLET | Refills: 11 | Status: SHIPPED | OUTPATIENT
Start: 2017-03-28 | End: 2017-07-24
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
## 2017-03-28 NOTE — ASSESSMENT & PLAN NOTE
Doing better in this regard.  I'd like to go up on her topamax, but she really wants to increase the zanaflex today.  She still has some negative myoclonus (asterixis) on exam that I suspect may be from polypharmacy.   -> consider next visit up on topamax.

## 2017-03-28 NOTE — ASSESSMENT & PLAN NOTE
She is on SO MANY medications, but continues to ask for more.  I am sure that some of her cognition and tremor are from interactions.   -> I declined increasing topamax AND tizanidine today for these reasons.  May need better effort in future to reduce any meds that are not providing benefit.

## 2017-03-28 NOTE — ASSESSMENT & PLAN NOTE
Seizure history not discussed today, but she is on topamax and depakote for tremor, migraines and would also treat seizure.   -> no changes.

## 2017-03-28 NOTE — LETTER
March 28, 2017      Mary Alice Lynn, NP  [address]  [facility] [address]           [facility]  [address]  [facility] [address]  Phone: [phone]  Fax: [phone]          Patient: Joseluis Triplett   MR Number: [id number]   YOB: 1955   Date of Visit: 3/28/2017       Dear Mary Alice Lynn:    Thank you for referring Joseluis Triplett to me for evaluation. Attached you will find relevant portions of my assessment and plan of care.    If you have questions, please do not hesitate to call me. I look forward to following Joseluis Triplett along with you.    Sincerely,    Dorothea Sanchez MD    Enclosure  CC:  No Recipients    If you would like to receive this communication electronically, please contact [email or web address] or [phone] to request more information on [employer] Link access.    For providers and/or their staff who would like to refer a patient to [facility], please contact us through our one-stop-shop provider referral line, [facility] [place], at [phone].    If you feel you have received this communication in error or would no longer like to receive these types of communications, please e-mail [email or web address]

## 2017-03-30 ENCOUNTER — CLINICAL SUPPORT (OUTPATIENT)
Dept: SMOKING CESSATION | Facility: CLINIC | Age: 62
End: 2017-03-30
Payer: COMMERCIAL

## 2017-03-30 DIAGNOSIS — F17.200 SMOKES AND MOTIVATED TO QUIT: Primary | ICD-10-CM

## 2017-03-30 PROCEDURE — 99402 PREV MED CNSL INDIV APPRX 30: CPT | Mod: S$GLB,,,

## 2017-03-30 NOTE — PROGRESS NOTES
Individual Follow-Up Form    3/30/2017    Quit Date:  Not completely quit yet.    Clinical Status of Patient: Outpatient    Length of Service: 30 minutes    Continuing Medication:   Nicotine patch and lozenges    Target Symptoms: Withdrawal and medication side effects. The following were  rated moderate (3) to severe (4) on TCRS:  · Moderate (3): urges  · Severe (4): none    Comments:   Patient still smoking 1 -2 cigarettes/day and has significantly cut down on cigarettes/day.  She was proud of her accomplishment and determined to quit completely quit.   We processed what was happening at the times she was experiencing urges.  I assisted patient to re-frame thoughts associated with urges, to accept them and move past them.  We made a plan that she will use to completely stop smoking during the next week.     Diagnosis: F17.200    Next Visit: 1 week

## 2017-04-03 DIAGNOSIS — F17.200 SMOKES AND MOTIVATED TO QUIT: ICD-10-CM

## 2017-04-04 ENCOUNTER — TELEPHONE (OUTPATIENT)
Dept: SMOKING CESSATION | Facility: CLINIC | Age: 62
End: 2017-04-04

## 2017-04-04 RX ORDER — IBUPROFEN 200 MG
1 TABLET ORAL DAILY
Qty: 28 PATCH | Refills: 0 | Status: SHIPPED | OUTPATIENT
Start: 2017-04-04 | End: 2017-05-01 | Stop reason: SDUPTHER

## 2017-04-04 RX ORDER — ASPIRIN/CALCIUM CARB/MAGNESIUM 325 MG
TABLET ORAL
Qty: 168 LOZENGE | Refills: 0 | Status: SHIPPED | OUTPATIENT
Start: 2017-04-04 | End: 2017-05-02 | Stop reason: SDUPTHER

## 2017-04-04 NOTE — TELEPHONE ENCOUNTER
4/4/17   9:05 am    Returned patient's voice mail message regarding re-order of NRT.  Left a message for patient to leave a message on my voice mail as to the pharmacy she would like to get the Rx filled at.  Will await this information.

## 2017-04-06 ENCOUNTER — CLINICAL SUPPORT (OUTPATIENT)
Dept: SMOKING CESSATION | Facility: CLINIC | Age: 62
End: 2017-04-06
Payer: COMMERCIAL

## 2017-04-06 DIAGNOSIS — F17.200 SMOKES AND MOTIVATED TO QUIT: Primary | ICD-10-CM

## 2017-04-06 PROCEDURE — 99402 PREV MED CNSL INDIV APPRX 30: CPT | Mod: S$GLB,,,

## 2017-04-06 NOTE — PROGRESS NOTES
Individual Follow-Up Form    4/6/2017    Quit Date:  No quit date yet    Clinical Status of Patient: Outpatient    Length of Service: 30 minutes    Continuing Medication:   Nicotine patches and lozenges    Target Symptoms: Withdrawal and medication side effects. The following were  rated moderate (3) to severe (4) on TCRS:  · Moderate (3): urges/cravings  · Severe (4): none    Comments:   Patient reported she is still smoking 4 cigarettes a day and doesn't think she is ready to completely quit.  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  The only reason she could identify for wanting to quit right now was that she wanted to be able to sing which is problematic when she smokes.  We processed her readiness and what she wanted to do.  She would like to continue to take the medication to keep her from increasing the number of cigarettes she is smoking and eventually start working on a complete quit again.    Diagnosis: F17.200    Next Visit:   2 weeks on 4/20/17

## 2017-04-12 ENCOUNTER — CLINICAL SUPPORT (OUTPATIENT)
Dept: SMOKING CESSATION | Facility: CLINIC | Age: 62
End: 2017-04-12
Payer: MEDICARE

## 2017-04-12 DIAGNOSIS — F17.210 CIGARETTE NICOTINE DEPENDENCE WITHOUT COMPLICATION: Primary | ICD-10-CM

## 2017-04-12 PROCEDURE — 99407 BEHAV CHNG SMOKING > 10 MIN: CPT | Mod: S$GLB,,,

## 2017-04-18 ENCOUNTER — TELEPHONE (OUTPATIENT)
Dept: NEUROLOGY | Facility: CLINIC | Age: 62
End: 2017-04-18

## 2017-04-18 ENCOUNTER — OFFICE VISIT (OUTPATIENT)
Dept: NEUROLOGY | Facility: CLINIC | Age: 62
End: 2017-04-18
Payer: MEDICARE

## 2017-04-18 VITALS
SYSTOLIC BLOOD PRESSURE: 110 MMHG | BODY MASS INDEX: 32.51 KG/M2 | DIASTOLIC BLOOD PRESSURE: 77 MMHG | HEIGHT: 65 IN | HEART RATE: 78 BPM | WEIGHT: 195.13 LBS

## 2017-04-18 DIAGNOSIS — G43.109 MIGRAINE WITH AURA AND WITHOUT STATUS MIGRAINOSUS, NOT INTRACTABLE: Primary | ICD-10-CM

## 2017-04-18 DIAGNOSIS — M54.2 NECK PAIN: ICD-10-CM

## 2017-04-18 DIAGNOSIS — G44.40 MEDICATION OVERUSE HEADACHE: ICD-10-CM

## 2017-04-18 PROCEDURE — 99999 PR PBB SHADOW E&M-EST. PATIENT-LVL V: CPT | Mod: PBBFAC,,, | Performed by: PHYSICIAN ASSISTANT

## 2017-04-18 PROCEDURE — 3074F SYST BP LT 130 MM HG: CPT | Mod: S$GLB,,, | Performed by: PHYSICIAN ASSISTANT

## 2017-04-18 PROCEDURE — 99213 OFFICE O/P EST LOW 20 MIN: CPT | Mod: S$GLB,,, | Performed by: PHYSICIAN ASSISTANT

## 2017-04-18 PROCEDURE — 3078F DIAST BP <80 MM HG: CPT | Mod: S$GLB,,, | Performed by: PHYSICIAN ASSISTANT

## 2017-04-18 PROCEDURE — 1160F RVW MEDS BY RX/DR IN RCRD: CPT | Mod: S$GLB,,, | Performed by: PHYSICIAN ASSISTANT

## 2017-04-18 NOTE — PROGRESS NOTES
"[facility], Department of Neurology   [address] [place] [address]  Phone:[phone]  Fax: [phone]    Chief Complaint   Patient presents with    Headache   Chaperone: MICHELLE Crenshaw       A/P:        ICD-10-CM ICD-9-CM   1. Migraine with aura and without status migrainosus, not intractable G43.109 346.00   2. Neck pain M54.2 723.1   3. Medication overuse headache G44.40 339.3     She presents today to discuss arm pain (no weakness). She has been followed by my colleagues for movement disorders. Reaffirmed this was HA clinic to which she states, "my HA are doing okay, its my arm and neck. It hurts." First points to the RIGHT arm, at times tremor present, states "my pain goes from the back of my head, down my shoulders, into my arm and hand, just on the RIGHT." Discussed I didn't have a good explanation for that...  (I also examined her, noted some inconsistencies, however, no stroke symptoms mentioned nor did my exam show any concerning findings). She then complained that she needed PT (I gave her a hand written order at her request)< and she wasn't getting the tizanadine that my colleague wrote her, since the pharmacy is "taking too long." I discussed multiple times she needs to see a pain management specialist as she is taking acetaminophen 4+ times daily. I discussed this is NOT a safe practice. I suggested she call her insurance company and find the names of local providers that accept her insurance, if she is able to get names, we can certainly put in a referral. Noted, it seemed she did not understand this direction. I told her I would put it in her notes, as asked. I did write a compounding pain cream to help with her neck (which could factor into some of her HA---?), and gave her lit to read about it. If her insurance doesn't cover/cost is too high, would suggest NOT getting it.     Noted at first tells my staff, "I am 100 pain"---when staff stated "its only out of 10, and if " "you are in that much pain, we will bring you to the ED" she then stated "8/10" (noted in office, smiling, not appearing in distress.     Noted repeating most of what she discussed, despite redirection that she was indeed in HA clinic that I do not treat arm pain. She stated "my headaches are fine."     Discussed my departure from the practice, if her HA return, she can see my colleagues in HA clinic. She agreed.     Patient agreed to above plan along with voiced understanding of medications.  F/U:in HA clinic, PRN          Exam:    /77  Pulse 78  Ht 5' 5" (1.651 m)  Wt 88.5 kg (195 lb 1.7 oz)  BMI 32.47 kg/m2   GEN:  NAD  HEENT: NC/AT  EXTREM:  2+ radial pulse bilat, no edema present.    NEURO:  Mental Status:  Awake, alert and appropriately oriented to time, place, and person.  Decreased attention and concentration, has to be redirected at times.  Speech is fluent and appropriate language function.    Cranial Nerves:     Extraocular movements are intact and without nystagmus.  Visual fields are full to confrontation testing.  Facial movement is symmetric, no facial weakness or flattening of NLF  Facial sensation is intact.  Hearing is normal.     Motor:  RUE: 5-/5, perceived embellishment at times               LUE: 5-/5   strong bilat  Tremor at times during rest then on motion RUE, present with distraction   Rapid alternating movements including hand wave, movement of fingers (Pinching), WNL  Opponens strength strong bilat   Mild weakness finger extension RUE, however, inconsistent   Sensory:  RUE  intact light touch, proprioception and temperature  LUE intact light touch, proprioception and temperature  DTR's:                                            R              L  biceps 2+ 2+        brachioradialis 2+ 2+     Bergman was negative bilat      Coordination:  FTN-WNL.      Gait and Stance: ambulates with rollator     CC:  Clayton Rainey MD      This document has been electronically signed by " Lyle Briggs MPA, PA-C on 4/18/2017, 2:58 PM.  I have personally typed this message using the EMR.      Attending, Dr. Harini MD was available during today's encounter. Any change to plan along with cosign to appear in the EMR.         Lyle Briggs MPA, PA-C     04/18/2017 2:04 PM    CC: Clayton Rainey MD    Approx 23 minutes was spent face to face with patient to discuss treatment options, give guidance and formulate treatment plan.

## 2017-04-18 NOTE — LETTER
April 18, 2017      Clayton Rainey MD  [address]  [address]  [place] [address]           [facility]  [address] [address]  Phone: [phone]  Fax: [phone]          Patient: Joseluis Triplett   MR Number: [id number]   YOB: 1955   Date of Visit: 4/18/2017       Dear Dr. Clayton Rainey:    Thank you for referring Joseluis Triplett to me for evaluation. Attached you will find relevant portions of my assessment and plan of care.    If you have questions, please do not hesitate to call me. I look forward to following Joseluis Triplett along with you.    Sincerely,    Lyle Briggs PA-C    Enclosure  CC:  No Recipients    If you would like to receive this communication electronically, please contact [email or web address] or [phone] to request more information on [facility] Link access.    For providers and/or their staff who would like to refer a patient to [facility], please contact us through our one-stop-shop provider referral line, [facility], at [phone].    If you feel you have received this communication in error or would no longer like to receive these types of communications, please e-mail [email or web address]

## 2017-04-19 ENCOUNTER — TELEPHONE (OUTPATIENT)
Dept: NEUROLOGY | Facility: CLINIC | Age: 62
End: 2017-04-19

## 2017-04-19 NOTE — TELEPHONE ENCOUNTER
As we discussed, should not be taking pain medication for her HA more than 3 days use in a week.     PERLA LANCE  04/19/2017

## 2017-04-19 NOTE — TELEPHONE ENCOUNTER
----- Message from Elaine Crenshaw, RN sent at 4/18/2017  3:48 PM CDT -----  Contact: PT      ----- Message -----     From: Franklin Mcelroy     Sent: 4/18/2017   2:59 PM       To: [facility] Staff    Would like a call today, [phone]     She would like to know if she is able to take advil?

## 2017-04-20 ENCOUNTER — TELEPHONE (OUTPATIENT)
Dept: SMOKING CESSATION | Facility: CLINIC | Age: 62
End: 2017-04-20

## 2017-04-21 NOTE — TELEPHONE ENCOUNTER
4/20/17    Telephone call to patient in answer to her voice mail messages.  Unable to reach her.  Left a voice mail message saying I would call her again on Monday, 4/24/17.

## 2017-04-24 ENCOUNTER — TELEPHONE (OUTPATIENT)
Dept: SMOKING CESSATION | Facility: CLINIC | Age: 62
End: 2017-04-24

## 2017-04-24 NOTE — TELEPHONE ENCOUNTER
4/24/17   12:50 pm    Telephone call to patient to follow up on smoking cessation and schedule an appointment.  Left a voice mail message that I would try again later..

## 2017-04-25 ENCOUNTER — TELEPHONE (OUTPATIENT)
Dept: RHEUMATOLOGY | Facility: CLINIC | Age: 62
End: 2017-04-25

## 2017-04-25 ENCOUNTER — TELEPHONE (OUTPATIENT)
Dept: NEUROLOGY | Facility: CLINIC | Age: 62
End: 2017-04-25

## 2017-04-25 DIAGNOSIS — M54.2 NECK PAIN: Primary | ICD-10-CM

## 2017-04-25 NOTE — TELEPHONE ENCOUNTER
----- Message from Nicolas Kumari MA sent at 4/24/2017  2:38 PM CDT -----      ----- Message -----     From: Carolyn Burr     Sent: 4/24/2017   2:27 PM       To: [facility] Staff    Can you please place a internal order in the system for the above mentioned patient. There is a external order in but I can not schedule from an external order.      Thanks

## 2017-05-01 DIAGNOSIS — F17.200 SMOKES AND MOTIVATED TO QUIT: ICD-10-CM

## 2017-05-01 RX ORDER — IBUPROFEN 200 MG
1 TABLET ORAL DAILY
Qty: 28 PATCH | Refills: 0 | Status: SHIPPED | OUTPATIENT
Start: 2017-05-01 | End: 2017-11-12

## 2017-05-02 ENCOUNTER — TELEPHONE (OUTPATIENT)
Dept: SMOKING CESSATION | Facility: CLINIC | Age: 62
End: 2017-05-02

## 2017-05-02 DIAGNOSIS — F17.200 SMOKES AND MOTIVATED TO QUIT: ICD-10-CM

## 2017-05-02 RX ORDER — ASPIRIN/CALCIUM CARB/MAGNESIUM 325 MG
TABLET ORAL
Qty: 168 LOZENGE | Refills: 0 | Status: SHIPPED | OUTPATIENT
Start: 2017-05-02 | End: 2017-05-02 | Stop reason: SDUPTHER

## 2017-05-02 RX ORDER — ASPIRIN/CALCIUM CARB/MAGNESIUM 325 MG
TABLET ORAL
Qty: 168 LOZENGE | Refills: 0 | Status: SHIPPED | OUTPATIENT
Start: 2017-05-02 | End: 2017-11-12

## 2017-05-02 NOTE — TELEPHONE ENCOUNTER
5/2/17    3:40 pm    Returned patient's call.  Patient calls and leaves an excessive number of voice mail messages i.e yesterday afternoon left 7 voice mail messages for me.  I left a message reminding her I requested a re-order on her nicotine patches, verifying our appointment for 5/9/17,  and requested she refrain from leaving more than one message in a day that I will call her back as soon as possible.

## 2017-05-03 ENCOUNTER — CLINICAL SUPPORT (OUTPATIENT)
Dept: REHABILITATION | Facility: HOSPITAL | Age: 62
End: 2017-05-03
Attending: PSYCHIATRY & NEUROLOGY
Payer: MEDICARE

## 2017-05-03 DIAGNOSIS — R52 PAIN: ICD-10-CM

## 2017-05-03 PROCEDURE — G8978 MOBILITY CURRENT STATUS: HCPCS | Mod: CK | Performed by: PHYSICAL THERAPIST

## 2017-05-03 PROCEDURE — G8979 MOBILITY GOAL STATUS: HCPCS | Mod: CK | Performed by: PHYSICAL THERAPIST

## 2017-05-03 PROCEDURE — 97162 PT EVAL MOD COMPLEX 30 MIN: CPT | Performed by: PHYSICAL THERAPIST

## 2017-05-03 NOTE — PROGRESS NOTES
Physician:Gabriel Schuler MD  Diagnosis:  Neck pain  Encounter Diagnosis   Name Primary?    Pain       Orders:  Physical Therapy evaluate and treat  Treatment start time: 10:00  Treatment end time: 10:50    Subjective:  Pt c/o R sided neck/upper trap pain that began 2 months ago after receiving pain injections for chronic HA.  She rates pain as 6/10.  States R UE sensation feels different from the L.  States having resting hand tremors secondary to MEDS for other health issues.  States no prior PT.  States she lives in a nursing home.    Chief complaint:  Neck pain  Radicular symptoms:  R UE  Aggravating factors:   movement  Easing factors:  rest     Current functional status:   Independent with ADL    Patients structured exercise routine:    none  Exercise routine prior to onset :     none    Special tests:    MRI:    none  Xray:    none    Past treatment includes:  none    Work:     na                             Pts goals:  Decrease pain    OBJECTIVE:  Postural examination:  FH     Functional assessment:   - walking:   Ambulates with a rolling walker             AROM:  WNL B UE; neck flex/ext WNL, B rotation WFL, B SB decreased approximately 60% with pain     MMT:   Grossly 4+/5 neck    Tone:  normal    Flexibility testing:   Tight upper traps B    Special tests:   Neg C-compression/distraction    Palpation:   TTP R upper trap    Joint mobility: fair    Swelling:  Mild distal-medial humerus from IV per patient     Other: sensation decreased to light touch R UE vs L      Patient History Examination Clinical Presentation Clinical Decision Making   Comorbidities:  Schizophrenia  Bipolar disorder    Personal Factors:  Transportation issues       Activity and Participation Restriction:      Body Systems:  musculoskeletal      Body Regions: neck     Evolving clinical presentation with changing clinical characteristics        Mod       FOTO:  59% limitation (CK)         TREATMENT:    Today's treatment:   HEP    Pt was provided with a written copy of exercises to perform as tolerated, including: scap retractions, chin tucks and upper trap stretch.    Exercises were reviewed and pt was able to demonstrate them prior to the end of the session.     Pt was provided educational information, including: correct posture.    Discussed insurance limitations with pt.     ASSESSMENT:  PT diagnosis: neck/UE pain with weakness and tightness.  Difficult to EVAL as pt having problems relaying hx and sx areas.   Patient can benefit from outpatient physical therapy and a home program  Prognosis is Guarded.    No cultural, environmental or spiritual barriers identified to treatment or learning.     Medical necessity is demonstrated by the following  IMPAIRMENTS:  Pain limits function of effected part for some activities, Unable to participate fully in daily activities and Weakness    GOALS:    4_   weeks. Pt agrees with goals set.  1. Independent with HEP.  2. Report decreased    Neck     pain  <   / =  4/10 with adls such as self-care  3. Increased MMT  for  Neck to 5/5 with ADL    4. Increased arom  for  Neck to WFL-WNL with ADL    PLAN:  Outpatient physical therapy 1-2 times weekly to include: pt ed, hep, therapeutic exercises, neuromuscular re-education/ balance exercises, joint mobilizations, modalities prn, and aquatic therapy.    Cont PT for  4         weeks.   I certify the need for these services   furnished under this plan of treatment and while under my   care.____________________________________ Physician/Referring Practitioner Date   of Signature

## 2017-05-10 ENCOUNTER — CLINICAL SUPPORT (OUTPATIENT)
Dept: SMOKING CESSATION | Facility: CLINIC | Age: 62
End: 2017-05-10
Payer: MEDICARE

## 2017-05-10 DIAGNOSIS — F17.200 SMOKES AND MOTIVATED TO QUIT: Primary | ICD-10-CM

## 2017-05-10 PROCEDURE — 99407 BEHAV CHNG SMOKING > 10 MIN: CPT | Mod: S$GLB,,,

## 2017-05-17 ENCOUNTER — TELEPHONE (OUTPATIENT)
Dept: NEUROLOGY | Facility: CLINIC | Age: 62
End: 2017-05-17

## 2017-05-17 NOTE — TELEPHONE ENCOUNTER
Returned call to pt and she does not want me to call home to confirmed appointment. She is very adman about me calling. She took appointment for July and she will let them know.

## 2017-05-17 NOTE — TELEPHONE ENCOUNTER
----- Message from Erik Anne sent at 5/17/2017  3:49 PM CDT -----  Contact: Self [phone]  Patient is calling to schedule a f/u visit, pls call

## 2017-05-22 ENCOUNTER — TELEPHONE (OUTPATIENT)
Dept: SMOKING CESSATION | Facility: CLINIC | Age: 62
End: 2017-05-22

## 2017-05-22 NOTE — TELEPHONE ENCOUNTER
"5/22/17     2:25 pm    Patient left several messages saying her nicotine patches and lozenges were taken away.  I called nurse "Corie" at [facility] to find out what was needed.  Corie said they needed an order that patient could self-administer her nicotine patches and lozenges..  There was already an order written 3/23/17 by Dr. Garner for this but I Fax'ed over another order written today from Suresh Garcaí DNP.  "

## 2017-05-23 ENCOUNTER — TELEPHONE (OUTPATIENT)
Dept: SMOKING CESSATION | Facility: CLINIC | Age: 62
End: 2017-05-23

## 2017-05-23 NOTE — TELEPHONE ENCOUNTER
"5/23/17   2:25 pm    Attempted multiple times to reach the nurse working with patient as I received a message saying they needed clarification of order that patient could self-administer nicotine replacement.  I was put on hold for very lengthy periods of time and no one came to the phone.  I called patient to tell her this..  Patient said a was in today and apparently wrote order to ok patient to self-administer but patient still has not been given access to her patches reportedly because she has to wait until the nurse "puts it in the computer and they are taking their sweet time."  "

## 2017-05-25 ENCOUNTER — CLINICAL SUPPORT (OUTPATIENT)
Dept: SMOKING CESSATION | Facility: CLINIC | Age: 62
End: 2017-05-25
Payer: COMMERCIAL

## 2017-05-25 DIAGNOSIS — F17.210 NICOTINE DEPENDENCE, CIGARETTES, UNCOMPLICATED: Primary | ICD-10-CM

## 2017-05-25 PROCEDURE — 99402 PREV MED CNSL INDIV APPRX 30: CPT | Mod: S$GLB,,,

## 2017-05-25 NOTE — PROGRESS NOTES
Individual Follow-Up Form    5/25/2017    Quit Date: 5/22/17    Clinical Status of Patient: Outpatient    Length of Service: 30 minutes    Continuing Medication:   Nicotine patches & lozenges    Target Symptoms: Withdrawal and medication side effects. The following were  rated moderate (3) to severe (4) on TCRS:  · Moderate (3): urges  · Severe (4): none    Comments:   Patient proudly reported she has been smoke free since her quit date without any slips. One thing that helped was that the lady she bought cigarettes from is no longer there.  Also, her headache pain is more under control. She is avoiding the other smokers and going outside with them.  Her ernst is a strong support and she tries to keep busy.  We talked about potential risky situations for relapse and what she might do to avoid this.  She has made a new friend who does not smoke and is supportive of her.    Diagnosis: F17.210    Next Visit: 2 weeks

## 2017-05-29 ENCOUNTER — TELEPHONE (OUTPATIENT)
Dept: NEUROLOGY | Facility: CLINIC | Age: 62
End: 2017-05-29

## 2017-05-29 ENCOUNTER — HOSPITAL ENCOUNTER (EMERGENCY)
Facility: HOSPITAL | Age: 62
Discharge: PSYCHIATRIC OR SUBSTANCE USE FACILITY | End: 2017-05-29
Attending: EMERGENCY MEDICINE | Admitting: EMERGENCY MEDICINE
Payer: MEDICARE

## 2017-05-29 VITALS
WEIGHT: 200 LBS | DIASTOLIC BLOOD PRESSURE: 83 MMHG | SYSTOLIC BLOOD PRESSURE: 157 MMHG | OXYGEN SATURATION: 98 % | BODY MASS INDEX: 33.28 KG/M2 | TEMPERATURE: 99 F | RESPIRATION RATE: 18 BRPM | HEART RATE: 69 BPM

## 2017-05-29 DIAGNOSIS — F22 DELUSIONS: Primary | ICD-10-CM

## 2017-05-29 DIAGNOSIS — F29 PSYCHOSIS, UNSPECIFIED PSYCHOSIS TYPE: ICD-10-CM

## 2017-05-29 DIAGNOSIS — R52 PAIN: ICD-10-CM

## 2017-05-29 LAB
ALBUMIN SERPL BCP-MCNC: 3.4 G/DL
ALP SERPL-CCNC: 79 U/L
ALT SERPL W/O P-5'-P-CCNC: 26 U/L
AMPHET+METHAMPHET UR QL: NEGATIVE
ANION GAP SERPL CALC-SCNC: 10 MMOL/L
APAP SERPL-MCNC: <3 UG/ML
AST SERPL-CCNC: 19 U/L
BACTERIA #/AREA URNS AUTO: NORMAL /HPF
BARBITURATES UR QL SCN>200 NG/ML: NEGATIVE
BASOPHILS # BLD AUTO: 0.02 K/UL
BASOPHILS NFR BLD: 0.3 %
BENZODIAZ UR QL SCN>200 NG/ML: NEGATIVE
BILIRUB SERPL-MCNC: 0.2 MG/DL
BILIRUB UR QL STRIP: NEGATIVE
BUN SERPL-MCNC: 25 MG/DL
BZE UR QL SCN: NEGATIVE
CALCIUM SERPL-MCNC: 9 MG/DL
CANNABINOIDS UR QL SCN: NEGATIVE
CHLORIDE SERPL-SCNC: 106 MMOL/L
CLARITY UR REFRACT.AUTO: ABNORMAL
CO2 SERPL-SCNC: 25 MMOL/L
COLOR UR AUTO: YELLOW
CREAT SERPL-MCNC: 1 MG/DL
CREAT UR-MCNC: 102 MG/DL
DIFFERENTIAL METHOD: ABNORMAL
EOSINOPHIL # BLD AUTO: 0.2 K/UL
EOSINOPHIL NFR BLD: 2.2 %
ERYTHROCYTE [DISTWIDTH] IN BLOOD BY AUTOMATED COUNT: 13.3 %
EST. GFR  (AFRICAN AMERICAN): >60 ML/MIN/1.73 M^2
EST. GFR  (NON AFRICAN AMERICAN): >60 ML/MIN/1.73 M^2
ETHANOL SERPL-MCNC: <10 MG/DL
GLUCOSE SERPL-MCNC: 89 MG/DL
GLUCOSE UR QL STRIP: NEGATIVE
HCT VFR BLD AUTO: 39.9 %
HGB BLD-MCNC: 13.6 G/DL
HGB UR QL STRIP: NEGATIVE
KETONES UR QL STRIP: NEGATIVE
LEUKOCYTE ESTERASE UR QL STRIP: ABNORMAL
LYMPHOCYTES # BLD AUTO: 4.7 K/UL
LYMPHOCYTES NFR BLD: 60.6 %
MCH RBC QN AUTO: 30.6 PG
MCHC RBC AUTO-ENTMCNC: 34.1 %
MCV RBC AUTO: 90 FL
METHADONE UR QL SCN>300 NG/ML: NEGATIVE
MICROSCOPIC COMMENT: NORMAL
MONOCYTES # BLD AUTO: 0.6 K/UL
MONOCYTES NFR BLD: 8.3 %
NEUTROPHILS # BLD AUTO: 2.2 K/UL
NEUTROPHILS NFR BLD: 28.5 %
NITRITE UR QL STRIP: NEGATIVE
OPIATES UR QL SCN: NEGATIVE
PCP UR QL SCN>25 NG/ML: NEGATIVE
PH UR STRIP: 6 [PH] (ref 5–8)
PLATELET # BLD AUTO: 181 K/UL
PMV BLD AUTO: 9.7 FL
POTASSIUM SERPL-SCNC: 3.8 MMOL/L
PROT SERPL-MCNC: 6.7 G/DL
PROT UR QL STRIP: NEGATIVE
RBC # BLD AUTO: 4.44 M/UL
SODIUM SERPL-SCNC: 141 MMOL/L
SP GR UR STRIP: 1.02 (ref 1–1.03)
SQUAMOUS #/AREA URNS AUTO: 8 /HPF
T4 FREE SERPL-MCNC: 0.52 NG/DL
TOXICOLOGY INFORMATION: NORMAL
TSH SERPL DL<=0.005 MIU/L-ACNC: 5.63 UIU/ML
URN SPEC COLLECT METH UR: ABNORMAL
UROBILINOGEN UR STRIP-ACNC: NEGATIVE EU/DL
WBC # BLD AUTO: 7.67 K/UL
WBC #/AREA URNS AUTO: 3 /HPF (ref 0–5)

## 2017-05-29 PROCEDURE — 99285 EMERGENCY DEPT VISIT HI MDM: CPT | Mod: ,,, | Performed by: EMERGENCY MEDICINE

## 2017-05-29 PROCEDURE — 84439 ASSAY OF FREE THYROXINE: CPT

## 2017-05-29 PROCEDURE — 80307 DRUG TEST PRSMV CHEM ANLYZR: CPT

## 2017-05-29 PROCEDURE — 85025 COMPLETE CBC W/AUTO DIFF WBC: CPT

## 2017-05-29 PROCEDURE — 99285 EMERGENCY DEPT VISIT HI MDM: CPT

## 2017-05-29 PROCEDURE — 80053 COMPREHEN METABOLIC PANEL: CPT

## 2017-05-29 PROCEDURE — 80329 ANALGESICS NON-OPIOID 1 OR 2: CPT

## 2017-05-29 PROCEDURE — 84443 ASSAY THYROID STIM HORMONE: CPT

## 2017-05-29 PROCEDURE — 81001 URINALYSIS AUTO W/SCOPE: CPT

## 2017-05-29 PROCEDURE — 80320 DRUG SCREEN QUANTALCOHOLS: CPT

## 2017-05-29 NOTE — ED NOTES
Patient changed into paper scrubs, personal belongings removed from room, labeled and locked in EMS room. All cords, wires, and garbage cans have been removed from room for patient safety. Patient's mental health rights have been signed and placed in chart. Sitter at bedside to monitor and record patient activities at least every 15 minutes, while maintaining direct visual contact with patient. The sitter has no personal belongings inside the room.

## 2017-05-29 NOTE — ED NOTES
Patient accepted to [facility] in [place]. Accepting Md is Dr. Cherry. Call report to [phone].

## 2017-05-29 NOTE — ED PROVIDER NOTES
Encounter Date: 5/28/2017    SCRIBE #1 NOTE: I, Ana Lange, am scribing for, and in the presence of, Dr. Mackenzie.       History     Chief Complaint   Patient presents with    Psychiatric Evaluation     pt thinks people are out to get her      Review of patient's allergies indicates:   Allergen Reactions    Nsaids (non-steroidal anti-inflammatory drug) Other (See Comments)     History of perforated duodenal ulcer    Penicillins Rash and Other (See Comments)     Yeast infections     60 yo F with hx of schizophrenia presents with hallucinations and delusions. Pt endorses worsening hallucinations of people trying to stab her and concern that people are out to get her for the past several days. Denies HI, SI. Pt is compliant with medications but feels she requires psych admission for management given these worsening symptoms. No headahce, vision change, weakness, or paresthesia. No other complaints. Symptoms similar to prior episodes of symptoms related to schizophrenia.       The history is provided by the patient.     Past Medical History:   Diagnosis Date    Anxiety     Asthma     Bipolar disorder     Chronic constipation     Chronic kidney disease     History of dialysis secondary to overdose    COPD (chronic obstructive pulmonary disease)     Depression     DVT (deep venous thrombosis)     Dysphagia     GERD (gastroesophageal reflux disease)     Hyperlipidemia     Migraine headache 8/26/2014    Neuropathy 8/26/2014    CLARI (obstructive sleep apnea) 11/11/2013    Parkinson disease     Perforated duodenal ulcer 5/28/2015    Recurrent upper respiratory infection (URI)     Schizo affective schizophrenia     Seizures     Thyroid disease      Past Surgical History:   Procedure Laterality Date    COLONOSCOPY N/A 5/17/2016    Procedure: COLONOSCOPY;  Surgeon: Akbar Tsang MD;  Location: [address];  Service: Endoscopy;  Laterality: N/A;    TONSILLECTOMY      TYMPANOSTOMY TUBE  PLACEMENT       Family History   Problem Relation Age of Onset    Alcohol abuse Mother     Bipolar disorder Mother     Dementia Mother     Cancer Maternal Grandmother 60     colon ca    Allergic rhinitis Neg Hx     Allergies Neg Hx     Angioedema Neg Hx     Asthma Neg Hx     Atopy Neg Hx     Eczema Neg Hx     Immunodeficiency Neg Hx     Rhinitis Neg Hx     Urticaria Neg Hx      Social History   Substance Use Topics    Smoking status: Former Smoker     Packs/day: 1.50     Years: 36.00     Types: Cigarettes     Quit date: 5/22/2017    Smokeless tobacco: Former User     Quit date: 1/3/2013    Alcohol use No     Review of Systems   Constitutional: Negative for fever.   HENT: Negative for nosebleeds.    Eyes: Negative for visual disturbance.   Respiratory: Negative for shortness of breath.    Cardiovascular: Negative for chest pain.   Gastrointestinal: Negative for abdominal pain.   Genitourinary: Negative for hematuria.   Musculoskeletal: Negative for neck stiffness.   Skin: Negative for rash.   Neurological: Negative for weakness and headaches.   Psychiatric/Behavioral: Positive for hallucinations. Negative for suicidal ideas.        Delusions. No HI       Physical Exam     Initial Vitals [05/29/17 0013]   BP Pulse Resp Temp SpO2   122/64 75 18 98.7 °F (37.1 °C) 100 %     Physical Exam    Nursing note and vitals reviewed.  Constitutional: She appears well-developed and well-nourished. She is not diaphoretic. No distress.   HENT:   Head: Normocephalic and atraumatic.   Eyes: EOM are normal.   Neck: Normal range of motion. Neck supple.   Cardiovascular: Normal rate, regular rhythm, normal heart sounds and intact distal pulses.   Pulmonary/Chest: Breath sounds normal. No stridor. No respiratory distress. She has no wheezes. She has no rhonchi. She has no rales.   Abdominal: Soft. Bowel sounds are normal. There is no tenderness. There is no rebound and no guarding.   Well healed laparotomy scar w/o obvious  sign of hernia around the incision   Musculoskeletal: Normal range of motion. She exhibits no edema.   Neurological: She is alert and oriented to person, place, and time.   Skin: Skin is warm and dry.   Psychiatric:   Abnormal thought content and judgement         ED Course   Procedures  Labs Reviewed   CBC W/ AUTO DIFFERENTIAL - Abnormal; Notable for the following:        Result Value    Gran% 28.5 (*)     Lymph% 60.6 (*)     All other components within normal limits   COMPREHENSIVE METABOLIC PANEL - Abnormal; Notable for the following:     BUN, Bld 25 (*)     Albumin 3.4 (*)     All other components within normal limits   TSH - Abnormal; Notable for the following:     TSH 5.629 (*)     All other components within normal limits   URINALYSIS - Abnormal; Notable for the following:     Appearance, UA Hazy (*)     Leukocytes, UA Trace (*)     All other components within normal limits   ACETAMINOPHEN LEVEL - Abnormal; Notable for the following:     Acetaminophen (Tylenol), Serum <3.0 (*)     All other components within normal limits   T4, FREE - Abnormal; Notable for the following:     Free T4 0.52 (*)     All other components within normal limits   DRUG SCREEN PANEL, URINE EMERGENCY   ALCOHOL,MEDICAL (ETHANOL)   URINALYSIS MICROSCOPIC          X-Rays:   Independently Interpreted Readings:   Other Readings:  KUB-No acute process    Medical Decision Making:   History:   Old Medical Records: I decided to obtain old medical records.  Initial Assessment:   The pt is a 61 y.o. female that presents with delusional thoughts and hallucinations. Pt is gravely disabled, will PEC. My initial differential diagnoses include, but are not limited to: psychosis, depression, drug induced mood disorder, thyroid dysfunction. Will medically screen and consult psych when medically cleared.    Pt asking if she can get a plain film of her abdomen as she feels she has incisional hernia. This was not appreciated on exam. She has no tenderness  or complaint of abdominal pain. Will obtain plain film per her request.  Independently Interpreted Test(s):   I have ordered and independently interpreted X-rays - see prior notes.  Clinical Tests:   Lab Tests: Ordered and Reviewed  Radiological Study: Ordered and Reviewed            Scribe Attestation:   Scribe #1: I performed the above scribed service and the documentation accurately describes the services I performed. I attest to the accuracy of the note.    Attending Attestation:           Physician Attestation for Scribe:  Physician Attestation Statement for Scribe #1: I, Dr. Mackenzie, reviewed documentation, as scribed by Ana Lange in my presence, and it is both accurate and complete.         Attending ED Notes:   Pt is medically cleared for psychiatric placement. Will consult psych and plan to transfer to other psych facility given lack of bed availability here.          ED Course     Clinical Impression:   The primary encounter diagnosis was Delusions. Diagnoses of Pain and Psychosis, unspecified psychosis type were also pertinent to this visit.    Disposition:   Disposition: Transferred       Regan Mackenzie MD  05/29/17 2055

## 2017-05-29 NOTE — ED NOTES
Packet faxed to psychiatric hospitals for placement. Awaiting call back will continue to seek placement.

## 2017-05-29 NOTE — ED NOTES
Pt remains in paper scrubs, resting in stretcher comfortably. No signs of distress noted. Sitter remains at bedside in direct visual contact, charting per protocol every 15 minutes. No equipment or belongings are in the patients room. Will continue to monitor.

## 2017-05-29 NOTE — TELEPHONE ENCOUNTER
Called patient in regards to 5/31 appointment with Mary Alice Lynn, which states that patient is being seen for follow up and headaches.  Need to ask patient if she is being seen for headaches; if so, needs to be seen in headache clinic. Left voicemail instructing to call back.

## 2017-05-29 NOTE — ED TRIAGE NOTES
Pt presents to ED c/o hallucinations that started tonight. Pt stated that she keeps seeing a dagger coming towards her and she is afraid that it will hurt her. Pt denies SI/HI at this time but stated that she has a hx of SI.     LOC: Patient name and date of birth verified.  The patient is awake, alert and aware of environment with an appropriate affect, the patient is oriented x 3 and speaking appropriately.  Pt in NAD.    APPEARANCE: Patient resting comfortably and in no acute distress, patient is clean and well groomed, patient's clothing is properly fastened.  SKIN: The skin is warm and dry, color consistent with ethnicity, patient has normal skin turgor and moist mucus membranes, skin intact, no breakdown or brusing noted.  MUSCULOSKELETAL: Patient moving all extremities well, no obvious swelling or deformities noted.  RESPIRATORY: Airway is open and patent, respirations are spontaneous, patient has a normal effort and rate, no accessory muscle use noted.  CARDIAC: Patient has a normal rate and rhythm, no periphreal edema noted, capillary refill < 3 seconds.  ABDOMEN: Soft and non tender to palpation, no distention noted. Bowel sounds present in all four quadrants.  NEUROLOGIC: Eyes open spontaneously, behavior appropriate to situation, follows commands, facial expression symmetrical, bilateral hand grasp equal and even, purposeful motor response noted, normal sensation in all extremities when touched with a finger.

## 2017-05-30 ENCOUNTER — TELEPHONE (OUTPATIENT)
Dept: NEUROLOGY | Facility: CLINIC | Age: 62
End: 2017-05-30

## 2017-05-30 ENCOUNTER — TELEPHONE (OUTPATIENT)
Dept: SMOKING CESSATION | Facility: CLINIC | Age: 62
End: 2017-05-30

## 2017-05-30 NOTE — TELEPHONE ENCOUNTER
Called patient for second time to clarify if appointment is for Headaches or Tremors.  Left voicemail instructing to call back

## 2017-05-31 ENCOUNTER — TELEPHONE (OUTPATIENT)
Dept: SMOKING CESSATION | Facility: CLINIC | Age: 62
End: 2017-05-31

## 2017-05-31 NOTE — TELEPHONE ENCOUNTER
5/31/17   9:05 a.m.    Telephone call to patient to schedule follow up.  Left message for a return call and I will try again at another time.

## 2017-06-14 ENCOUNTER — TELEPHONE (OUTPATIENT)
Dept: SMOKING CESSATION | Facility: CLINIC | Age: 62
End: 2017-06-14

## 2017-06-14 NOTE — TELEPHONE ENCOUNTER
Called pt back, after pt had left a message for MORAIMA Hermosillo. Explained to pt Deborah was out the office this week, but that my name was Rosa and I was covering for Jacque. Pt stated she is tobacco free, and doesn't want to continue with the smoking cessation program at this time. She stated she is busy now that she is in a choir and is using that to keep her distracted and busy. She is still using patches and lozenges, but plans to wean herself off them. I told her I would let Deborah know as soon as she returns to work, but to also call us if she changes her minds and wants to continue with program.

## 2017-07-24 ENCOUNTER — OFFICE VISIT (OUTPATIENT)
Dept: NEUROLOGY | Facility: CLINIC | Age: 62
End: 2017-07-24
Payer: MEDICARE

## 2017-07-24 VITALS
SYSTOLIC BLOOD PRESSURE: 100 MMHG | DIASTOLIC BLOOD PRESSURE: 78 MMHG | HEIGHT: 65 IN | WEIGHT: 196 LBS | BODY MASS INDEX: 32.65 KG/M2 | HEART RATE: 79 BPM

## 2017-07-24 DIAGNOSIS — Z79.899 POLYPHARMACY: ICD-10-CM

## 2017-07-24 DIAGNOSIS — G21.19 DRUG-INDUCED PARKINSONISM: Primary | ICD-10-CM

## 2017-07-24 DIAGNOSIS — F25.9 SCHIZO AFFECTIVE SCHIZOPHRENIA: ICD-10-CM

## 2017-07-24 DIAGNOSIS — G43.109 MIGRAINE WITH AURA AND WITHOUT STATUS MIGRAINOSUS, NOT INTRACTABLE: ICD-10-CM

## 2017-07-24 PROCEDURE — 99999 PR PBB SHADOW E&M-EST. PATIENT-LVL III: CPT | Mod: PBBFAC,,, | Performed by: PSYCHIATRY & NEUROLOGY

## 2017-07-24 PROCEDURE — 99214 OFFICE O/P EST MOD 30 MIN: CPT | Mod: S$GLB,,, | Performed by: PSYCHIATRY & NEUROLOGY

## 2017-07-24 RX ORDER — GABAPENTIN 600 MG/1
600 TABLET ORAL 3 TIMES DAILY
Qty: 90 TABLET | Refills: 4 | Status: SHIPPED | OUTPATIENT
Start: 2017-07-24 | End: 2021-09-07 | Stop reason: SDUPTHER

## 2017-07-24 RX ORDER — CARBIDOPA AND LEVODOPA 25; 100 MG/1; MG/1
0.5 TABLET ORAL 3 TIMES DAILY
Qty: 135 TABLET | Refills: 3 | Status: ON HOLD | OUTPATIENT
Start: 2017-07-24 | End: 2018-11-05 | Stop reason: HOSPADM

## 2017-07-24 NOTE — LETTER
July 24, 2017      Jez Worthy MD  [address]  [address]  [place] [facility] [place] [address]           [facility] - Neurology  [address]  [place] [address]  Phone: [phone]  Fax: [phone]          Patient: Joseluis Triplett   MR Number: [id number]   YOB: 1955   Date of Visit: 7/24/2017       Dear  Dr. Worthy    Ms. Triplett continues to have some myoclonic jerks, tremors and muscle tightness.  I suspect some (or all) of this is from her polypharmacy.  I am not sure if there is anything you can do about this, but wanted to bring it to your attention.    Sincerely,       Dorothea Sanchez MD    Enclosure  CC:  Clayton Rainey MD    If you would like to receive this communication electronically, please contact [email or web address] or [phone] to request more information on [facility] Link access.    For providers and/or their staff who would like to refer a patient to [facility], please contact us through our one-stop-shop provider referral line, [facility] [place], at [phone].    If you feel you have received this communication in error or would no longer like to receive these types of communications, please e-mail [email or web address]
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
## 2017-07-24 NOTE — ASSESSMENT & PLAN NOTE
She is on SO MANY medications and gives confusing accounts of her doses.  [facility] administers these, so an actual MAR is required on future visits.

## 2017-07-28 NOTE — ASSESSMENT & PLAN NOTE
Patient told us she was on lower dose of seroquel (MA asked her and I asked her twice during my visit) than in chart, but when she was walking out, she asked me why I had lowered her seroquel.  I was, needless to say, VERY confused and re-iterated that I was not changing her seroquel in any way.  I told her that we were trying to document what she tells US she takes.  She could not confirm how much she takes and seemed to perseverate on the printout that we handed her.   -> patient advised to speak with her psychiatrist if any questions about her seroquel dosing and NOT do rely on [facility] record as this is only based on what she tells us.

## 2017-08-11 ENCOUNTER — TELEPHONE (OUTPATIENT)
Dept: NEUROLOGY | Facility: CLINIC | Age: 62
End: 2017-08-11

## 2017-08-11 NOTE — TELEPHONE ENCOUNTER
Called and left a message for  regarding her parkinson. I stated to give our office a call back regarding this matter

## 2017-08-11 NOTE — TELEPHONE ENCOUNTER
----- Message from Ade Regan sent at 8/11/2017  1:07 PM CDT -----  Contact: self  Pt is calling in regards to her Parkinson's is acting up and she is experiencing severe headaches.    Pt can be reached at [phone].    Thank you.

## 2017-08-23 ENCOUNTER — DOCUMENTATION ONLY (OUTPATIENT)
Dept: REHABILITATION | Facility: HOSPITAL | Age: 62
End: 2017-08-23

## 2017-08-30 ENCOUNTER — OFFICE VISIT (OUTPATIENT)
Dept: PULMONOLOGY | Facility: CLINIC | Age: 62
End: 2017-08-30
Payer: MEDICARE

## 2017-08-30 ENCOUNTER — TELEPHONE (OUTPATIENT)
Dept: SURGERY | Facility: CLINIC | Age: 62
End: 2017-08-30

## 2017-08-30 ENCOUNTER — HOSPITAL ENCOUNTER (OUTPATIENT)
Dept: RADIOLOGY | Facility: HOSPITAL | Age: 62
Discharge: HOME | End: 2017-08-30
Attending: INTERNAL MEDICINE
Payer: MEDICARE

## 2017-08-30 VITALS
BODY MASS INDEX: 34.23 KG/M2 | OXYGEN SATURATION: 98 % | SYSTOLIC BLOOD PRESSURE: 114 MMHG | HEART RATE: 81 BPM | DIASTOLIC BLOOD PRESSURE: 68 MMHG | WEIGHT: 205.69 LBS

## 2017-08-30 DIAGNOSIS — J44.0 COPD WITH ACUTE BRONCHITIS: Primary | ICD-10-CM

## 2017-08-30 DIAGNOSIS — R05.9 COUGH: Primary | ICD-10-CM

## 2017-08-30 DIAGNOSIS — J20.9 COPD WITH ACUTE BRONCHITIS: Primary | ICD-10-CM

## 2017-08-30 DIAGNOSIS — R05.9 COUGH: ICD-10-CM

## 2017-08-30 PROCEDURE — 99204 OFFICE O/P NEW MOD 45 MIN: CPT | Mod: 25,S$GLB,, | Performed by: INTERNAL MEDICINE

## 2017-08-30 PROCEDURE — 3074F SYST BP LT 130 MM HG: CPT | Mod: S$GLB,,, | Performed by: INTERNAL MEDICINE

## 2017-08-30 PROCEDURE — 3078F DIAST BP <80 MM HG: CPT | Mod: S$GLB,,, | Performed by: INTERNAL MEDICINE

## 2017-08-30 PROCEDURE — 99999 PR PBB SHADOW E&M-EST. PATIENT-LVL IV: CPT | Mod: PBBFAC,,, | Performed by: INTERNAL MEDICINE

## 2017-08-30 PROCEDURE — 71020 XR CHEST PA AND LATERAL: CPT | Mod: TC

## 2017-08-30 PROCEDURE — 3008F BODY MASS INDEX DOCD: CPT | Mod: S$GLB,,, | Performed by: INTERNAL MEDICINE

## 2017-08-30 PROCEDURE — 71020 XR CHEST PA AND LATERAL: CPT | Mod: 26,,, | Performed by: RADIOLOGY

## 2017-08-30 RX ORDER — IBUPROFEN 400 MG/1
TABLET ORAL
Status: ON HOLD | COMMUNITY
Start: 2017-08-14 | End: 2018-09-14 | Stop reason: HOSPADM

## 2017-08-30 RX ORDER — QUETIAPINE FUMARATE 300 MG/1
TABLET, FILM COATED ORAL
COMMUNITY
Start: 2017-08-17 | End: 2018-04-24 | Stop reason: DRUGHIGH

## 2017-08-30 RX ORDER — ALBUTEROL SULFATE 2.5 MG/.5ML
2.5 SOLUTION RESPIRATORY (INHALATION) EVERY 6 HOURS PRN
Qty: 30 EACH | Refills: 3 | Status: SHIPPED | OUTPATIENT
Start: 2017-08-30 | End: 2017-11-12

## 2017-08-30 RX ORDER — DOXYCYCLINE 100 MG/1
100 CAPSULE ORAL 2 TIMES DAILY
Qty: 14 CAPSULE | Refills: 0 | Status: SHIPPED | OUTPATIENT
Start: 2017-08-30 | End: 2017-11-12

## 2017-08-30 RX ORDER — ALBUTEROL SULFATE 2.5 MG/.5ML
2.5 SOLUTION RESPIRATORY (INHALATION) EVERY 6 HOURS PRN
Qty: 30 EACH | Refills: 3 | Status: SHIPPED | OUTPATIENT
Start: 2017-08-30 | End: 2017-08-30 | Stop reason: SDUPTHER

## 2017-08-30 RX ORDER — TRAZODONE HYDROCHLORIDE 50 MG/1
TABLET ORAL
COMMUNITY
Start: 2017-08-04 | End: 2018-04-24 | Stop reason: DRUGHIGH

## 2017-08-30 NOTE — PROGRESS NOTES
"Subjective:       Patient ID: Joseluis Triplett is a 62 y.o. female.    Chief Complaint: No chief complaint on file.    62 year old female with depression, anxiety and bipolar disorder who resides at [facility]. Patient has had sore throat, ear aches and cough for several months. Patient has brown phlegm. Patient states her PCP gave her Abx. Patient states she has COPD.Patient relates Spiriva doesn't work. She has been on "a couple" of Abx but doesn't remember the names. Denies fever, chills, night sweats.   Patient is a current smoker. Down to 3 cigs a day.       Review of Systems   Constitutional: Negative for chills, activity change, appetite change, fatigue and weakness.   HENT: Negative for postnasal drip and congestion.    Eyes: Negative for itching.   Respiratory: Positive for cough and sputum production. Negative for asthma nighttime symptoms and dyspnea on extertion.    Cardiovascular: Negative for chest pain and leg swelling.   Endocrine: Negative for cold intolerance.    Musculoskeletal: Negative for back pain and gait problem.   Skin: Negative for rash.   Neurological: Negative for dizziness.   Psychiatric/Behavioral: Negative for confusion. The patient is nervous/anxious.        Past Medical History:   Diagnosis Date    Anxiety     Asthma     Bipolar disorder     Chronic constipation     Chronic kidney disease     History of dialysis secondary to overdose    COPD (chronic obstructive pulmonary disease)     Depression     DVT (deep venous thrombosis)     Dysphagia     GERD (gastroesophageal reflux disease)     Hyperlipidemia     Migraine headache 8/26/2014    Neuropathy 8/26/2014    CLARI (obstructive sleep apnea) 11/11/2013    Parkinson disease     Perforated duodenal ulcer 5/28/2015    Recurrent upper respiratory infection (URI)     Schizo affective schizophrenia     Seizures     Thyroid disease      Past Surgical History:   Procedure Laterality Date    COLONOSCOPY N/A " 5/17/2016    Procedure: COLONOSCOPY;  Surgeon: Akbar Tsang MD;  Location: [facility] ([address] [place] [address]);  Service: Endoscopy;  Laterality: N/A;    TONSILLECTOMY      TYMPANOSTOMY TUBE PLACEMENT       Social History     Social History    Marital status: Single     Spouse name: N/A    Number of children: N/A    Years of education: N/A     Occupational History    Disabled      Social History Main Topics    Smoking status: Former Smoker     Packs/day: 1.50     Years: 36.00     Types: Cigarettes     Quit date: 5/22/2017    Smokeless tobacco: Former User     Quit date: 1/3/2013    Alcohol use No    Drug use: No    Sexual activity: No     Other Topics Concern    Not on file     Social History Narrative    No narrative on file     Family History   Problem Relation Age of Onset    Alcohol abuse Mother     Bipolar disorder Mother     Dementia Mother     Cancer Maternal Grandmother 60     colon ca    Allergic rhinitis Neg Hx     Allergies Neg Hx     Angioedema Neg Hx     Asthma Neg Hx     Atopy Neg Hx     Eczema Neg Hx     Immunodeficiency Neg Hx     Rhinitis Neg Hx     Urticaria Neg Hx        Objective:      Physical Exam   Constitutional: She is oriented to person, place, and time. She appears well-developed and well-nourished. No distress.   HENT:   Head: Normocephalic.   Nose: Nose normal.   Pulmonary/Chest: Normal expansion. She has wheezes. She has no rhonchi.   Abdominal: Soft. Bowel sounds are normal.   Musculoskeletal: Normal range of motion.   Neurological: She is alert and oriented to person, place, and time.   Skin: Skin is warm and dry. She is not diaphoretic.   Psychiatric: She has a normal mood and affect. Her behavior is normal.   Nursing note and vitals reviewed.    Personal Diagnostic Review  none pertinent  No flowsheet data found.      Assessment:       1. COPD with acute bronchitis        Outpatient Encounter Prescriptions as of 8/30/2017   Medication Sig Dispense Refill     acetaminophen (TYLENOL) 500 MG tablet Take 1,000 mg by mouth 3 (three) times daily.       albuterol sulfate 2.5 mg/0.5 mL Nebu Take 2.5 mg by nebulization every 6 (six) hours as needed. Rescue 30 each 3    aspirin (ECOTRIN) 81 MG EC tablet Take 81 mg by mouth once daily.      atorvastatin (LIPITOR) 40 MG tablet Take 1 tablet (40 mg total) by mouth once daily. 90 tablet 3    benzocaine-menthol 15-2.6 mg Lozg 1 capsule by Mucous Membrane route every 6 (six) hours as needed (for cough).      bisacodyl (DULCOLAX) 10 mg Supp Place 10 mg rectally daily as needed.      busPIRone (BUSPAR) 10 MG tablet 10 mg 3 (three) times daily.       carbidopa-levodopa  mg (SINEMET)  mg per tablet Take 0.5 tablets by mouth 3 (three) times daily. 135 tablet 3    chlorhexidine (PERIDEX) 0.12 % solution       clotrimazole-betamethasone 1-0.05% (LOTRISONE) cream Apply to the affected area 2 times per daily 45 g 3    divalproex (DEPAKOTE) 500 MG TbEC Take 1 tablet (500 mg total) by mouth every 12 (twelve) hours. 60 tablet 11    docusate sodium (COLACE) 100 MG capsule Take 1 capsule (100 mg total) by mouth 2 (two) times daily.  0    esomeprazole (NEXIUM) 40 MG capsule Take 1 capsule (40 mg total) by mouth once daily. 30 capsule 11    fluoxetine (PROZAC) 20 MG capsule Take 60 mg by mouth once daily.       fluphenazine (PROLIXIN) 5 MG tablet 5 mg every evening.       fluticasone (FLONASE) 50 mcg/actuation nasal spray USE TWO SPRAYS EACH NOSTRIL EVERY DAY 16 g 9    furosemide (LASIX) 20 MG tablet TAKE ONE TABLET BY MOUTH TWICE DAILY 60 tablet 11    gabapentin (NEURONTIN) 600 MG tablet Take 1 tablet (600 mg total) by mouth 3 (three) times daily. 90 tablet 4    hydrOXYzine pamoate (VISTARIL) 25 MG Cap 1 pill 3x a day for 2 days, rest left over every 8 hours as needed for headaches 20 capsule 1    ibuprofen (ADVIL,MOTRIN) 400 MG tablet       levothyroxine (SYNTHROID) 150 MCG tablet Take 1 tablet (150 mcg total) by  mouth once daily. (Patient taking differently: Take 175 mcg by mouth once daily. ) 90 tablet 3    linaclotide (LINZESS) 145 mcg Cap capsule Take 1 capsule (145 mcg total) by mouth once daily. 30 capsule 3    MAGNESIUM HYDROXIDE (MILK OF MAGNESIA ORAL) Take by mouth once daily.      nicotine (NICODERM CQ) 21 mg/24 hr Place 1 patch onto the skin once daily. Generic preferred. 28 patch 0    nicotine polacrilex 4 MG Lozg Nicorette Lozenge -One 4 mg lozenge by mouth as needed, max 5 in 6 hour period. Generic preferred. 168 lozenge 0    nitrofurantoin, macrocrystal-monohydrate, (MACROBID) 100 MG capsule       omega-3 acid ethyl esters (LOVAZA) 1 gram capsule Take 2 capsules (2 g total) by mouth 2 (two) times daily. 120 capsule 11    potassium chloride SA (K-DUR,KLOR-CON) 20 MEQ tablet TAKE ONE TABLET BY MOUTH EVERY DAY 30 tablet 9    propranolol (INDERAL) 40 MG tablet Take 1 tablet (40 mg total) by mouth 3 (three) times daily. 90 tablet 1    psyllium 0.52 gram capsule Take 0.52 g by mouth once daily.      quetiapine (SEROQUEL) 100 MG Tab Take 200 mg by mouth every evening.       quetiapine (SEROQUEL) 300 MG Tab       topiramate (TOPAMAX) 100 MG tablet Take 1 tablet (100 mg total) by mouth 2 (two) times daily. 60 tablet 11    trazodone (DESYREL) 50 MG tablet       VENTOLIN HFA 90 mcg/actuation inhaler INHALE TWO PUFFS BY MOUTH EVERY SIX HOURS AS NEEDED FOR SHORTNESS OF BREATH 18 g 10    [DISCONTINUED] albuterol sulfate 2.5 mg/0.5 mL Nebu Take 2.5 mg by nebulization every 6 (six) hours as needed. Rescue      [DISCONTINUED] albuterol sulfate 2.5 mg/0.5 mL Nebu Take 2.5 mg by nebulization every 6 (six) hours as needed. Rescue 30 each 3    [DISCONTINUED] SPIRIVA WITH HANDIHALER 18 mcg inhalation capsule INHALE CONTENTS ONE CAPSULE BY MOUTH ONCE DAILY 30 capsule 11    doxycycline (MONODOX) 100 MG capsule Take 1 capsule (100 mg total) by mouth 2 (two) times daily. 14 capsule 0    quetiapine (SEROQUEL) 50 MG  tablet 50 mg once daily.       umeclidinium (INCRUSE ELLIPTA) 62.5 mcg/actuation DsDv Inhale 62.5 mcg into the lungs once daily. Controller 1 each 5    [DISCONTINUED] umeclidinium (INCRUSE ELLIPTA) 62.5 mcg/actuation DsDv Inhale 62.5 mcg into the lungs once daily. Controller 1 each 5     No facility-administered encounter medications on file as of 8/30/2017.      No orders of the defined types were placed in this encounter.    Plan:       62 year old female with    1) Acute/chronic bronchitis- patient had PFTs performed several years ago that were not consistent with COPD. However, has been given that diagnosis. I suspect she has acute on chronic bronchitis vs COPD with chronic bronchitis.   -Doxycycline for 1 week.   -Albuterol vials for nebulizer  -Change Spiriva to incruse  2)COPD- PFTs to be ordered after patient's acute illness. PFTs several years ago did not show obstruction.   -PFTs with next visit  3)Tobacco abuse- Smoking cessation.     Follow up in 3 months.     Jez Worthy MD

## 2017-09-01 ENCOUNTER — OFFICE VISIT (OUTPATIENT)
Dept: NEUROLOGY | Facility: CLINIC | Age: 62
End: 2017-09-01
Payer: MEDICARE

## 2017-09-01 VITALS
BODY MASS INDEX: 33.79 KG/M2 | HEIGHT: 65 IN | SYSTOLIC BLOOD PRESSURE: 116 MMHG | DIASTOLIC BLOOD PRESSURE: 67 MMHG | HEART RATE: 58 BPM | WEIGHT: 202.81 LBS

## 2017-09-01 DIAGNOSIS — G44.86 CERVICOGENIC HEADACHE: ICD-10-CM

## 2017-09-01 DIAGNOSIS — R51.9 CHRONIC INTRACTABLE HEADACHE, UNSPECIFIED HEADACHE TYPE: ICD-10-CM

## 2017-09-01 DIAGNOSIS — R51.9 CHRONIC DAILY HEADACHE: Primary | ICD-10-CM

## 2017-09-01 DIAGNOSIS — G89.29 CHRONIC INTRACTABLE HEADACHE, UNSPECIFIED HEADACHE TYPE: ICD-10-CM

## 2017-09-01 PROCEDURE — 64400 NJX AA&/STRD TRIGEMINAL NRV: CPT | Mod: 50,S$GLB,, | Performed by: NURSE PRACTITIONER

## 2017-09-01 PROCEDURE — 3074F SYST BP LT 130 MM HG: CPT | Mod: S$GLB,,, | Performed by: NURSE PRACTITIONER

## 2017-09-01 PROCEDURE — 99999 PR PBB SHADOW E&M-EST. PATIENT-LVL III: CPT | Mod: PBBFAC,,, | Performed by: NURSE PRACTITIONER

## 2017-09-01 PROCEDURE — 99214 OFFICE O/P EST MOD 30 MIN: CPT | Mod: 25,S$GLB,, | Performed by: NURSE PRACTITIONER

## 2017-09-01 PROCEDURE — 64405 NJX AA&/STRD GR OCPL NRV: CPT | Mod: 50,51,S$GLB, | Performed by: NURSE PRACTITIONER

## 2017-09-01 PROCEDURE — 3008F BODY MASS INDEX DOCD: CPT | Mod: S$GLB,,, | Performed by: NURSE PRACTITIONER

## 2017-09-01 PROCEDURE — 3078F DIAST BP <80 MM HG: CPT | Mod: S$GLB,,, | Performed by: NURSE PRACTITIONER

## 2017-09-01 RX ORDER — METHYLPREDNISOLONE ACETATE 40 MG/ML
40 INJECTION, SUSPENSION INTRA-ARTICULAR; INTRALESIONAL; INTRAMUSCULAR; SOFT TISSUE
Status: COMPLETED | OUTPATIENT
Start: 2017-09-01 | End: 2017-09-01

## 2017-09-01 RX ORDER — LIDOCAINE HYDROCHLORIDE 20 MG/ML
6 INJECTION, SOLUTION INFILTRATION; PERINEURAL
Status: COMPLETED | OUTPATIENT
Start: 2017-09-01 | End: 2017-09-01

## 2017-09-01 RX ADMIN — LIDOCAINE HYDROCHLORIDE 6 ML: 20 INJECTION, SOLUTION INFILTRATION; PERINEURAL at 03:09

## 2017-09-01 RX ADMIN — METHYLPREDNISOLONE ACETATE 40 MG: 40 INJECTION, SUSPENSION INTRA-ARTICULAR; INTRALESIONAL; INTRAMUSCULAR; SOFT TISSUE at 03:09
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
## 2017-09-05 ENCOUNTER — TELEPHONE (OUTPATIENT)
Dept: PULMONOLOGY | Facility: CLINIC | Age: 62
End: 2017-09-05

## 2017-09-05 NOTE — TELEPHONE ENCOUNTER
----- Message from Joselyn Carter sent at 9/5/2017  3:00 PM CDT -----  Contact: Self [phone]  PT called for results from XRAY

## 2017-09-06 ENCOUNTER — TELEPHONE (OUTPATIENT)
Dept: VASCULAR SURGERY | Facility: CLINIC | Age: 62
End: 2017-09-06

## 2017-09-06 NOTE — TELEPHONE ENCOUNTER
----- Message from Erik Basilio sent at 9/6/2017  8:30 AM CDT -----  Patient states that (s)he needs to speak with nurse in ref to scheduling an appt for some problems she's having with her stomach//please call back at [phone]//thank you

## 2017-09-06 NOTE — TELEPHONE ENCOUNTER
Patient called at the request of Dr Jez Worthy.  And I had to leave a message on answering machine stating that her xray report from Dr Worthy was normal, and if she had any question to please call our office back and I gave her our number [phone], stating my name Danyell with Dr Jez Worthy in Pulmonary at [facility].

## 2017-09-11 ENCOUNTER — TELEPHONE (OUTPATIENT)
Dept: PULMONOLOGY | Facility: CLINIC | Age: 62
End: 2017-09-11

## 2017-09-11 NOTE — TELEPHONE ENCOUNTER
----- Message from Joselyn Carter sent at 9/8/2017  8:53 AM CDT -----  Contact: Self [phone]  PT is requesting a prescription/ fax for mucinex regular.

## 2017-09-12 DIAGNOSIS — J43.0 UNILATERAL EMPHYSEMA: Primary | ICD-10-CM

## 2017-09-12 RX ORDER — GUAIFENESIN 600 MG/1
600 TABLET, EXTENDED RELEASE ORAL 2 TIMES DAILY
Qty: 40 TABLET | Refills: 0 | Status: SHIPPED | OUTPATIENT
Start: 2017-09-12 | End: 2017-10-02

## 2017-09-18 ENCOUNTER — OFFICE VISIT (OUTPATIENT)
Dept: NEUROLOGY | Facility: CLINIC | Age: 62
End: 2017-09-18
Payer: MEDICARE

## 2017-09-18 VITALS
HEIGHT: 65 IN | SYSTOLIC BLOOD PRESSURE: 122 MMHG | HEART RATE: 86 BPM | DIASTOLIC BLOOD PRESSURE: 79 MMHG | BODY MASS INDEX: 33.82 KG/M2 | WEIGHT: 203 LBS

## 2017-09-18 DIAGNOSIS — G47.33 OSA (OBSTRUCTIVE SLEEP APNEA): ICD-10-CM

## 2017-09-18 DIAGNOSIS — M54.2 CERVICALGIA: ICD-10-CM

## 2017-09-18 DIAGNOSIS — G89.29 CHRONIC INTRACTABLE HEADACHE, UNSPECIFIED HEADACHE TYPE: ICD-10-CM

## 2017-09-18 DIAGNOSIS — G47.9 TROUBLE IN SLEEPING: ICD-10-CM

## 2017-09-18 DIAGNOSIS — R51.9 CHRONIC INTRACTABLE HEADACHE, UNSPECIFIED HEADACHE TYPE: ICD-10-CM

## 2017-09-18 DIAGNOSIS — G44.86 CERVICOGENIC HEADACHE: ICD-10-CM

## 2017-09-18 DIAGNOSIS — E66.9 OBESITY, UNSPECIFIED OBESITY SEVERITY, UNSPECIFIED OBESITY TYPE: ICD-10-CM

## 2017-09-18 DIAGNOSIS — M79.18 MYOFASCIAL PAIN: ICD-10-CM

## 2017-09-18 DIAGNOSIS — R51.9 CHRONIC DAILY HEADACHE: Primary | ICD-10-CM

## 2017-09-18 PROCEDURE — 99999 PR PBB SHADOW E&M-EST. PATIENT-LVL III: CPT | Mod: PBBFAC,,, | Performed by: NURSE PRACTITIONER

## 2017-09-18 PROCEDURE — 3074F SYST BP LT 130 MM HG: CPT | Mod: S$GLB,,, | Performed by: NURSE PRACTITIONER

## 2017-09-18 PROCEDURE — 3078F DIAST BP <80 MM HG: CPT | Mod: S$GLB,,, | Performed by: NURSE PRACTITIONER

## 2017-09-18 PROCEDURE — 3008F BODY MASS INDEX DOCD: CPT | Mod: S$GLB,,, | Performed by: NURSE PRACTITIONER

## 2017-09-18 PROCEDURE — 99214 OFFICE O/P EST MOD 30 MIN: CPT | Mod: S$GLB,,, | Performed by: NURSE PRACTITIONER

## 2017-09-18 RX ORDER — TIZANIDINE 2 MG/1
2 TABLET ORAL 2 TIMES DAILY
Qty: 60 TABLET | Refills: 5 | Status: ON HOLD | OUTPATIENT
Start: 2017-09-18 | End: 2018-09-14 | Stop reason: HOSPADM

## 2017-09-18 NOTE — LETTER
September 19, 2017      Clayton aRiney MD  [address]  [address]  [facility] [address]           [facility]  [address]  [place] [address]  Phone: [phone]  Fax: [phone]          Patient: Joseluis Triplett   MR Number: [id number]   YOB: 1955   Date of Visit: 9/18/2017       Dear Dr. Clayton Rainey:    Thank you for referring Joseluis Triplett to me for evaluation. Attached you will find relevant portions of my assessment and plan of care.    If you have questions, please do not hesitate to call me. I look forward to following Joseluis Triplett along with you.    Sincerely,    Tanisha Weiss, [facility]    Enclosure  CC:  No Recipients    If you would like to receive this communication electronically, please contact [email or web address] or [phone] to request more information on [employer] access.    For providers and/or their staff who would like to refer a patient to [facility], please contact us through our one-stop-shop provider referral line, [facility], at [phone].    If you feel you have received this communication in error or would no longer like to receive these types of communications, please e-mail [email or web address]
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
## 2017-09-21 ENCOUNTER — OFFICE VISIT (OUTPATIENT)
Dept: GASTROENTEROLOGY | Facility: CLINIC | Age: 62
End: 2017-09-21
Payer: MEDICAID

## 2017-09-21 VITALS
BODY MASS INDEX: 33.79 KG/M2 | HEIGHT: 65 IN | DIASTOLIC BLOOD PRESSURE: 72 MMHG | HEART RATE: 61 BPM | SYSTOLIC BLOOD PRESSURE: 132 MMHG | WEIGHT: 202.81 LBS

## 2017-09-21 DIAGNOSIS — R10.84 GENERALIZED ABDOMINAL PAIN: Primary | ICD-10-CM

## 2017-09-21 PROCEDURE — 99214 OFFICE O/P EST MOD 30 MIN: CPT | Mod: S$GLB,,, | Performed by: NURSE PRACTITIONER

## 2017-09-21 PROCEDURE — 99999 PR PBB SHADOW E&M-EST. PATIENT-LVL V: CPT | Mod: PBBFAC,,, | Performed by: NURSE PRACTITIONER

## 2017-09-21 PROCEDURE — 3078F DIAST BP <80 MM HG: CPT | Mod: S$GLB,,, | Performed by: NURSE PRACTITIONER

## 2017-09-21 PROCEDURE — 3008F BODY MASS INDEX DOCD: CPT | Mod: S$GLB,,, | Performed by: NURSE PRACTITIONER

## 2017-09-21 PROCEDURE — 3075F SYST BP GE 130 - 139MM HG: CPT | Mod: S$GLB,,, | Performed by: NURSE PRACTITIONER

## 2017-09-22 ENCOUNTER — TELEPHONE (OUTPATIENT)
Dept: NEUROLOGY | Facility: CLINIC | Age: 62
End: 2017-09-22

## 2017-09-22 NOTE — TELEPHONE ENCOUNTER
----- Message from Avinash PABON Haley sent at 9/22/2017  3:51 PM CDT -----  Contact: Self @ cell: [phone]  Pt states she's having a bad bout of parkinson's and would like to schedule an appt. Pt states after taking carbidopa-levodopa  mg (SINEMET)  mg per tablet, her condition became worse--pt states that she can barely walk and today she fell (and pt is repeating herself throughout the conversation, as though she was confused). Pt also said she's unable to eat food, as well. Pls call asap.

## 2017-09-23 ENCOUNTER — HOSPITAL ENCOUNTER (EMERGENCY)
Facility: HOSPITAL | Age: 62
Discharge: HOME | End: 2017-09-23
Attending: EMERGENCY MEDICINE
Payer: MEDICARE

## 2017-09-23 VITALS
HEART RATE: 47 BPM | DIASTOLIC BLOOD PRESSURE: 61 MMHG | SYSTOLIC BLOOD PRESSURE: 124 MMHG | OXYGEN SATURATION: 100 % | BODY MASS INDEX: 26.63 KG/M2 | WEIGHT: 160 LBS | TEMPERATURE: 98 F | RESPIRATION RATE: 14 BRPM

## 2017-09-23 DIAGNOSIS — M54.2 NECK PAIN: ICD-10-CM

## 2017-09-23 DIAGNOSIS — M54.9 BACK PAIN: ICD-10-CM
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
## 2017-09-23 LAB
ALBUMIN SERPL BCP-MCNC: 3 G/DL
ALP SERPL-CCNC: 70 U/L
ALT SERPL W/O P-5'-P-CCNC: 15 U/L
AMORPH CRY UR QL COMP ASSIST: NORMAL
AMPHET+METHAMPHET UR QL: NEGATIVE
ANION GAP SERPL CALC-SCNC: 7 MMOL/L
AST SERPL-CCNC: 16 U/L
BACTERIA #/AREA URNS AUTO: NORMAL /HPF
BARBITURATES UR QL SCN>200 NG/ML: NEGATIVE
BASOPHILS # BLD AUTO: 0.02 K/UL
BASOPHILS NFR BLD: 0.3 %
BENZODIAZ UR QL SCN>200 NG/ML: NEGATIVE
BILIRUB SERPL-MCNC: 0.2 MG/DL
BILIRUB UR QL STRIP: NEGATIVE
BNP SERPL-MCNC: 144 PG/ML
BUN SERPL-MCNC: 20 MG/DL
BZE UR QL SCN: NEGATIVE
CALCIUM SERPL-MCNC: 8.7 MG/DL
CANNABINOIDS UR QL SCN: NEGATIVE
CHLORIDE SERPL-SCNC: 107 MMOL/L
CLARITY UR REFRACT.AUTO: ABNORMAL
CO2 SERPL-SCNC: 27 MMOL/L
COLOR UR AUTO: YELLOW
CREAT SERPL-MCNC: 1.1 MG/DL
CREAT UR-MCNC: 64 MG/DL
DIFFERENTIAL METHOD: ABNORMAL
EOSINOPHIL # BLD AUTO: 0.2 K/UL
EOSINOPHIL NFR BLD: 2.6 %
ERYTHROCYTE [DISTWIDTH] IN BLOOD BY AUTOMATED COUNT: 12.8 %
EST. GFR  (AFRICAN AMERICAN): >60 ML/MIN/1.73 M^2
EST. GFR  (NON AFRICAN AMERICAN): 54.3 ML/MIN/1.73 M^2
ETHANOL SERPL-MCNC: <10 MG/DL
GLUCOSE SERPL-MCNC: 94 MG/DL
GLUCOSE UR QL STRIP: NEGATIVE
HCT VFR BLD AUTO: 39.1 %
HGB BLD-MCNC: 12.8 G/DL
HGB UR QL STRIP: NEGATIVE
KETONES UR QL STRIP: NEGATIVE
LEUKOCYTE ESTERASE UR QL STRIP: NEGATIVE
LYMPHOCYTES # BLD AUTO: 3.6 K/UL
LYMPHOCYTES NFR BLD: 56.3 %
MCH RBC QN AUTO: 29.6 PG
MCHC RBC AUTO-ENTMCNC: 32.7 G/DL
MCV RBC AUTO: 90 FL
METHADONE UR QL SCN>300 NG/ML: NEGATIVE
MICROSCOPIC COMMENT: NORMAL
MONOCYTES # BLD AUTO: 0.5 K/UL
MONOCYTES NFR BLD: 7.8 %
NEUTROPHILS # BLD AUTO: 2.1 K/UL
NEUTROPHILS NFR BLD: 32.7 %
NITRITE UR QL STRIP: NEGATIVE
OPIATES UR QL SCN: NEGATIVE
PCP UR QL SCN>25 NG/ML: NEGATIVE
PH UR STRIP: 7 [PH] (ref 5–8)
PLATELET # BLD AUTO: 175 K/UL
PMV BLD AUTO: 9.8 FL
POTASSIUM SERPL-SCNC: 4 MMOL/L
PROT SERPL-MCNC: 6 G/DL
PROT UR QL STRIP: NEGATIVE
RBC # BLD AUTO: 4.33 M/UL
SODIUM SERPL-SCNC: 141 MMOL/L
SP GR UR STRIP: 1.01 (ref 1–1.03)
SQUAMOUS #/AREA URNS AUTO: 1 /HPF
TOXICOLOGY INFORMATION: NORMAL
TROPONIN I SERPL DL<=0.01 NG/ML-MCNC: <0.006 NG/ML
URN SPEC COLLECT METH UR: ABNORMAL
UROBILINOGEN UR STRIP-ACNC: NEGATIVE EU/DL
WBC # BLD AUTO: 6.45 K/UL
WBC #/AREA URNS AUTO: 1 /HPF (ref 0–5)

## 2017-09-23 PROCEDURE — 93010 ELECTROCARDIOGRAM REPORT: CPT | Mod: ,,, | Performed by: INTERNAL MEDICINE

## 2017-09-23 PROCEDURE — 83880 ASSAY OF NATRIURETIC PEPTIDE: CPT

## 2017-09-23 PROCEDURE — 80053 COMPREHEN METABOLIC PANEL: CPT

## 2017-09-23 PROCEDURE — 81001 URINALYSIS AUTO W/SCOPE: CPT

## 2017-09-23 PROCEDURE — 80307 DRUG TEST PRSMV CHEM ANLYZR: CPT

## 2017-09-23 PROCEDURE — 99284 EMERGENCY DEPT VISIT MOD MDM: CPT | Mod: ,,, | Performed by: EMERGENCY MEDICINE

## 2017-09-23 PROCEDURE — 96360 HYDRATION IV INFUSION INIT: CPT

## 2017-09-23 PROCEDURE — 93005 ELECTROCARDIOGRAM TRACING: CPT

## 2017-09-23 PROCEDURE — 84484 ASSAY OF TROPONIN QUANT: CPT

## 2017-09-23 PROCEDURE — 80320 DRUG SCREEN QUANTALCOHOLS: CPT

## 2017-09-23 PROCEDURE — 99285 EMERGENCY DEPT VISIT HI MDM: CPT | Mod: 25

## 2017-09-23 PROCEDURE — 25000003 PHARM REV CODE 250: Performed by: EMERGENCY MEDICINE

## 2017-09-23 PROCEDURE — 85025 COMPLETE CBC W/AUTO DIFF WBC: CPT

## 2017-09-23 RX ADMIN — SODIUM CHLORIDE 500 ML: 900 INJECTION, SOLUTION INTRAVENOUS at 03:09

## 2017-09-23 NOTE — PROVIDER PROGRESS NOTES - EMERGENCY DEPT.
Encounter Date: 9/23/2017    ED Physician Progress Notes       SCRIBE NOTE: I, Alley Dick, am scribing for, and in the presence of,  Dr. Reed.  Physician Statement: I, Dr. Reed, personally performed the services described in this documentation as scribed by Alley Dick in my presence, and it is both accurate and complete.      EKG - STEMI Decision  Initial Reading: No STEMI present.

## 2017-09-23 NOTE — ED PROVIDER NOTES
"Encounter Date: 9/23/2017    SCRIBE #1 NOTE: I, Alley Dick, am scribing for, and in the presence of, Dr. Reed.       History     Chief Complaint   Patient presents with    Fall     pt reports she lost her balance fell backward and hit her head. pt denies loc but reports neck pain. pt states " i always have neck pain and i dont know if its worse."        09/23/2017 3:31 AM     Chief complaint: Fall      Joseluis Triplett is a 62 y.o. female with a history of schizophrenia, bipolar disorder, COPD, and HLD who presents to the ED with complaints of neck pain and head pain secondary to a recent fall 3 hours ago. She endorses typically having neck pain, but reports taking a multitude of psychiatric medications and is unsure if neck pain is worse than baseline. She denies LOC, dizziness, chest tightness, abdominal pain, back pain, and n/v/d. She has no other medical concerns or complaints at the moment. NSAIDS and penicillin allergy noted.       The history is provided by the patient.     Review of patient's allergies indicates:   Allergen Reactions    Nsaids (non-steroidal anti-inflammatory drug) Other (See Comments)     History of perforated duodenal ulcer    Penicillins Rash and Other (See Comments)     Yeast infections     Past Medical History:   Diagnosis Date    Anxiety     Asthma     Bipolar disorder     Chronic constipation     Chronic kidney disease     History of dialysis secondary to overdose    COPD (chronic obstructive pulmonary disease)     Depression     DVT (deep venous thrombosis)     Dysphagia     GERD (gastroesophageal reflux disease)     Hyperlipidemia     Migraine headache 8/26/2014    Neuropathy 8/26/2014    CLARI (obstructive sleep apnea) 11/11/2013    Parkinson disease     Perforated duodenal ulcer 5/28/2015    Recurrent upper respiratory infection (URI)     Schizo affective schizophrenia     Seizures     Thyroid disease      Past Surgical History:   Procedure Laterality Date "    COLONOSCOPY N/A 5/17/2016    Procedure: COLONOSCOPY;  Surgeon: Akbar Tsang MD;  Location: [facility] ([address] [place] [address]);  Service: Endoscopy;  Laterality: N/A;    TONSILLECTOMY      TYMPANOSTOMY TUBE PLACEMENT       Family History   Problem Relation Age of Onset    Alcohol abuse Mother     Bipolar disorder Mother     Dementia Mother     Cancer Maternal Grandmother 60     colon ca    Allergic rhinitis Neg Hx     Allergies Neg Hx     Angioedema Neg Hx     Asthma Neg Hx     Atopy Neg Hx     Eczema Neg Hx     Immunodeficiency Neg Hx     Rhinitis Neg Hx     Urticaria Neg Hx      Social History   Substance Use Topics    Smoking status: Former Smoker     Packs/day: 1.50     Years: 36.00     Types: Cigarettes     Quit date: 5/22/2017    Smokeless tobacco: Former User     Quit date: 1/3/2013    Alcohol use No     Review of Systems   Constitutional: Negative for diaphoresis, fatigue and fever.   HENT: Negative for facial swelling and sore throat.    Respiratory: Negative for cough, shortness of breath and wheezing.    Cardiovascular: Negative for chest pain and palpitations.   Gastrointestinal: Negative for abdominal distention, abdominal pain, diarrhea, nausea and vomiting.   Genitourinary: Negative for difficulty urinating, dysuria, hematuria and urgency.   Musculoskeletal: Positive for neck pain. Negative for back pain and myalgias.   Skin: Negative for rash.   Neurological: Positive for headaches. Negative for dizziness, syncope and weakness.   Hematological: Does not bruise/bleed easily.     All systems were reviewed/examined and were negative except as noted in the HPI.    Physical Exam     Initial Vitals [09/23/17 0249]   BP Pulse Resp Temp SpO2   101/60 (!) 48 18 98.2 °F (36.8 °C) 100 %      MAP       73.67         Physical Exam    Nursing note and vitals reviewed.  Constitutional: She appears well-developed and well-nourished. She is not diaphoretic. No distress.   HENT:   Head: Normocephalic and  atraumatic.   Mouth/Throat: Oropharynx is clear and moist.   Eyes: Conjunctivae and EOM are normal. Pupils are equal, round, and reactive to light.   Neck: Normal range of motion. Neck supple.   Cardiovascular: Normal rate, regular rhythm, normal heart sounds and intact distal pulses. Exam reveals no gallop and no friction rub.    No murmur heard.  Pulses:       Dorsalis pedis pulses are 2+ on the right side, and 2+ on the left side.        Posterior tibial pulses are 2+ on the right side, and 2+ on the left side.   Pulmonary/Chest: Breath sounds normal. No respiratory distress. She has no wheezes. She has no rhonchi. She has no rales.   Abdominal: Soft. She exhibits no distension.   Musculoskeletal: Normal range of motion. She exhibits no edema or tenderness.   Neurological: She is alert and oriented to person, place, and time. She has normal strength. She displays normal reflexes. No cranial nerve deficit or sensory deficit.   Skin: Capillary refill takes less than 2 seconds. No rash noted. No erythema.   Psychiatric: She has a normal mood and affect. Her speech is normal and behavior is normal. Judgment and thought content normal.         ED Course   Procedures  Labs Reviewed - No data to display     Imaging reviewed by me personally and prelims if available.  No acute/emergent findings noted on radiologic studies ordered.        Medical Decision Making:    This is an emergent evaluation of a patient presenting to the ED.  Nursing notes were reviewed.  I personally reviewed, read, and interpreted the ECG and any monitoring strips.  I reviewed radiology images personally along with interpretations.  I personally reviewed and interpreted the laboratory results.  Shahriar Reed MD, LEA    Medical Decision Making:   Clinical Tests:   Lab Tests: Ordered and Reviewed  Radiological Study: Ordered and Reviewed  Medical Tests: Ordered and Reviewed  Other:   I have discussed this case with another health care provider.             Scribe Attestation:   Scribe #1: I performed the above scribed service and the documentation accurately describes the services I performed. I attest to the accuracy of the note.    Attending Attestation:           Physician Attestation for Scribe:  Physician Attestation Statement for Scribe #1: I, Dr. Reed, reviewed documentation, as scribed by Alley Dick in my presence, and it is both accurate and complete.                 ED Course      Clinical Impression:   There were no encounter diagnoses.          Discharged to home in stable condition, return to ED warnings given, follow up and patient care instructions given.      hSahriar Reed MD, LEA, CPE, FACEP  Department of Emergency Medicine  , [employer]                     Yogehs Reed MD  09/26/17 1019       Yogesh Reed MD  09/26/17 1020

## 2017-10-06 ENCOUNTER — HOSPITAL ENCOUNTER (EMERGENCY)
Facility: HOSPITAL | Age: 62
Discharge: PSYCHIATRIC OR SUBSTANCE USE FACILITY | End: 2017-10-06
Attending: EMERGENCY MEDICINE
Payer: MEDICARE

## 2017-10-06 ENCOUNTER — TELEPHONE (OUTPATIENT)
Dept: NEUROLOGY | Facility: CLINIC | Age: 62
End: 2017-10-06

## 2017-10-06 VITALS
TEMPERATURE: 98 F | DIASTOLIC BLOOD PRESSURE: 72 MMHG | BODY MASS INDEX: 33.66 KG/M2 | OXYGEN SATURATION: 98 % | SYSTOLIC BLOOD PRESSURE: 116 MMHG | HEIGHT: 65 IN | HEART RATE: 62 BPM | RESPIRATION RATE: 20 BRPM | WEIGHT: 202 LBS

## 2017-10-06 DIAGNOSIS — R41.0 CONFUSION: ICD-10-CM

## 2017-10-06 DIAGNOSIS — R44.3 HALLUCINATIONS: Primary | ICD-10-CM

## 2017-10-06 LAB
ALBUMIN SERPL BCP-MCNC: 2.9 G/DL
ALP SERPL-CCNC: 102 U/L
ALT SERPL W/O P-5'-P-CCNC: 9 U/L
AMPHET+METHAMPHET UR QL: NEGATIVE
ANION GAP SERPL CALC-SCNC: 8 MMOL/L
APAP SERPL-MCNC: <3 UG/ML
AST SERPL-CCNC: 15 U/L
BARBITURATES UR QL SCN>200 NG/ML: NEGATIVE
BASOPHILS # BLD AUTO: 0.01 K/UL
BASOPHILS NFR BLD: 0.2 %
BENZODIAZ UR QL SCN>200 NG/ML: NEGATIVE
BILIRUB SERPL-MCNC: 0.2 MG/DL
BILIRUB UR QL STRIP: NEGATIVE
BNP SERPL-MCNC: 64 PG/ML
BUN SERPL-MCNC: 22 MG/DL
BZE UR QL SCN: NEGATIVE
CALCIUM SERPL-MCNC: 9 MG/DL
CANNABINOIDS UR QL SCN: NEGATIVE
CHLORIDE SERPL-SCNC: 106 MMOL/L
CLARITY UR REFRACT.AUTO: CLEAR
CO2 SERPL-SCNC: 25 MMOL/L
COLOR UR AUTO: NORMAL
CREAT SERPL-MCNC: 1 MG/DL
CREAT UR-MCNC: 42 MG/DL
DIFFERENTIAL METHOD: ABNORMAL
EOSINOPHIL # BLD AUTO: 0.2 K/UL
EOSINOPHIL NFR BLD: 2.9 %
ERYTHROCYTE [DISTWIDTH] IN BLOOD BY AUTOMATED COUNT: 13.2 %
EST. GFR  (AFRICAN AMERICAN): >60 ML/MIN/1.73 M^2
EST. GFR  (NON AFRICAN AMERICAN): >60 ML/MIN/1.73 M^2
ETHANOL SERPL-MCNC: <10 MG/DL
GLUCOSE SERPL-MCNC: 98 MG/DL
GLUCOSE UR QL STRIP: NEGATIVE
HCT VFR BLD AUTO: 37.8 %
HGB BLD-MCNC: 12.4 G/DL
HGB UR QL STRIP: NEGATIVE
KETONES UR QL STRIP: NEGATIVE
LEUKOCYTE ESTERASE UR QL STRIP: NEGATIVE
LYMPHOCYTES # BLD AUTO: 3.1 K/UL
LYMPHOCYTES NFR BLD: 53.6 %
MCH RBC QN AUTO: 29.7 PG
MCHC RBC AUTO-ENTMCNC: 32.8 G/DL
MCV RBC AUTO: 91 FL
METHADONE UR QL SCN>300 NG/ML: NEGATIVE
MONOCYTES # BLD AUTO: 0.6 K/UL
MONOCYTES NFR BLD: 11.1 %
NEUTROPHILS # BLD AUTO: 1.8 K/UL
NEUTROPHILS NFR BLD: 32.2 %
NITRITE UR QL STRIP: NEGATIVE
OPIATES UR QL SCN: NEGATIVE
PCP UR QL SCN>25 NG/ML: NEGATIVE
PH UR STRIP: 7 [PH] (ref 5–8)
PLATELET # BLD AUTO: 226 K/UL
PMV BLD AUTO: 9.6 FL
POTASSIUM SERPL-SCNC: 4.1 MMOL/L
PROT SERPL-MCNC: 6.2 G/DL
PROT UR QL STRIP: NEGATIVE
RBC # BLD AUTO: 4.17 M/UL
SALICYLATES SERPL-MCNC: <5 MG/DL
SODIUM SERPL-SCNC: 139 MMOL/L
SP GR UR STRIP: 1.01 (ref 1–1.03)
TOXICOLOGY INFORMATION: NORMAL
TROPONIN I SERPL DL<=0.01 NG/ML-MCNC: <0.006 NG/ML
TSH SERPL DL<=0.005 MIU/L-ACNC: 1.41 UIU/ML
URN SPEC COLLECT METH UR: NORMAL
UROBILINOGEN UR STRIP-ACNC: NEGATIVE EU/DL
WBC # BLD AUTO: 5.78 K/UL

## 2017-10-06 PROCEDURE — 80307 DRUG TEST PRSMV CHEM ANLYZR: CPT

## 2017-10-06 PROCEDURE — 80329 ANALGESICS NON-OPIOID 1 OR 2: CPT

## 2017-10-06 PROCEDURE — 85025 COMPLETE CBC W/AUTO DIFF WBC: CPT

## 2017-10-06 PROCEDURE — 84443 ASSAY THYROID STIM HORMONE: CPT

## 2017-10-06 PROCEDURE — 93010 ELECTROCARDIOGRAM REPORT: CPT | Mod: ,,, | Performed by: INTERNAL MEDICINE

## 2017-10-06 PROCEDURE — 99285 EMERGENCY DEPT VISIT HI MDM: CPT | Mod: 25

## 2017-10-06 PROCEDURE — 80320 DRUG SCREEN QUANTALCOHOLS: CPT

## 2017-10-06 PROCEDURE — 93005 ELECTROCARDIOGRAM TRACING: CPT

## 2017-10-06 PROCEDURE — 81003 URINALYSIS AUTO W/O SCOPE: CPT

## 2017-10-06 PROCEDURE — 83880 ASSAY OF NATRIURETIC PEPTIDE: CPT

## 2017-10-06 PROCEDURE — 99285 EMERGENCY DEPT VISIT HI MDM: CPT | Mod: ,,, | Performed by: PHYSICIAN ASSISTANT

## 2017-10-06 PROCEDURE — 84484 ASSAY OF TROPONIN QUANT: CPT

## 2017-10-06 PROCEDURE — 80053 COMPREHEN METABOLIC PANEL: CPT

## 2017-10-06 NOTE — TELEPHONE ENCOUNTER
----- Message from Avinash Haley sent at 10/5/2017 11:34 AM CDT -----  Contact: Self @ [phone]  Pt is requesting to reschedule appt from no show on 09/28. Pt states she was doing better but now she's doing worse. Pls call.

## 2017-10-06 NOTE — ED NOTES
Bed: [facility] 01  Expected date: 10/6/17  Expected time: 8:50 AM  Means of arrival:   Comments:

## 2017-10-06 NOTE — ED TRIAGE NOTES
Pt sent to ER by Octavio for evaluation of confusion, shaking and crying.  Pt to be medically cleared before they will admit her to the psychiatric unit there.

## 2017-10-06 NOTE — ED NOTES
Pt placed in psych safe room 20/sitter (Ed tech [facility]) with q15 min visual checks.

## 2017-10-06 NOTE — ED NOTES
Spoke to Stacie at [facility] who reports that she will call Isabel to ensure that a room is still available for pt.

## 2017-10-06 NOTE — ED PROVIDER NOTES
"Encounter Date: 10/6/2017       History     Chief Complaint   Patient presents with    Psychiatric Evaluation     brought in by [place] [employer] with PEC     62-year-old white female with past medical history of bipolar disorder, schizophrenia, COPD, depression, hyperlipidemia, asthma, migraines, GERD, seizures presents to the ED from a her psychiatrist's office at [facility] with a PEC for medical clearance.  The patient reports that she has been having a lot of falls at home with head trauma.  She is unable to say when her last fall was but states that it was "recently".  She ambulates with a walker at baseline and is currently living at [facility].  She reports chest pain and shortness of breath for "a long time".  She endorses constipation and is unsure when her last bowel movement was.  She denies any suicidal or homicidal ideation.  She reports hallucinations. She denies nausea, vomiting, dysuria.  She denies tobacco, alcohol, or drug use.       The history is provided by the patient.     Review of patient's allergies indicates:   Allergen Reactions    Nsaids (non-steroidal anti-inflammatory drug) Other (See Comments)     History of perforated duodenal ulcer    Penicillins Rash and Other (See Comments)     Yeast infections     Past Medical History:   Diagnosis Date    Anxiety     Asthma     Bipolar disorder     Chronic constipation     Chronic kidney disease     History of dialysis secondary to overdose    COPD (chronic obstructive pulmonary disease)     Depression     DVT (deep venous thrombosis)     Dysphagia     GERD (gastroesophageal reflux disease)     Hyperlipidemia     Migraine headache 8/26/2014    Neuropathy 8/26/2014    CLARI (obstructive sleep apnea) 11/11/2013    Parkinson disease     Perforated duodenal ulcer 5/28/2015    Recurrent upper respiratory infection (URI)     Schizo affective schizophrenia     Seizures     Thyroid disease      Past Surgical History:   Procedure " Laterality Date    COLONOSCOPY N/A 5/17/2016    Procedure: COLONOSCOPY;  Surgeon: Akbar Tsang MD;  [facility] ([address] [place] [address]);  Service: Endoscopy;  Laterality: N/A;    TONSILLECTOMY      TYMPANOSTOMY TUBE PLACEMENT       Family History   Problem Relation Age of Onset    Alcohol abuse Mother     Bipolar disorder Mother     Dementia Mother     Cancer Maternal Grandmother 60     colon ca    Allergic rhinitis Neg Hx     Allergies Neg Hx     Angioedema Neg Hx     Asthma Neg Hx     Atopy Neg Hx     Eczema Neg Hx     Immunodeficiency Neg Hx     Rhinitis Neg Hx     Urticaria Neg Hx      Social History   Substance Use Topics    Smoking status: Former Smoker     Packs/day: 1.50     Years: 36.00     Types: Cigarettes     Quit date: 5/22/2017    Smokeless tobacco: Former User     Quit date: 1/3/2013    Alcohol use No     Review of Systems   Constitutional: Negative for chills and fever.   HENT: Negative for congestion, rhinorrhea and sore throat.    Eyes: Negative for photophobia and visual disturbance.   Respiratory: Positive for shortness of breath. Negative for cough and wheezing.    Cardiovascular: Positive for chest pain. Negative for palpitations.   Gastrointestinal: Positive for abdominal pain and constipation. Negative for diarrhea, nausea and vomiting.   Genitourinary: Negative for dysuria, hematuria and vaginal discharge.   Musculoskeletal: Negative for back pain and myalgias.   Skin: Negative for rash and wound.   Neurological: Negative for weakness, numbness and headaches.   Psychiatric/Behavioral: Positive for hallucinations. Negative for confusion.       Physical Exam     Initial Vitals [10/06/17 1403]   BP Pulse Resp Temp SpO2   (!) 105/56 71 16 99.3 °F (37.4 °C) 95 %      MAP       72.33         Physical Exam    Nursing note and vitals reviewed.  Constitutional: She appears well-developed and well-nourished. She is not diaphoretic. No distress.   HENT:   Head: Normocephalic and  atraumatic.   Neck: Normal range of motion. Neck supple.   Cardiovascular: Normal rate, regular rhythm, normal heart sounds and intact distal pulses. Exam reveals no gallop and no friction rub.    No murmur heard.  Pulmonary/Chest: Breath sounds normal. She has no wheezes. She has no rhonchi. She has no rales.   Abdominal: Soft. Bowel sounds are normal. There is tenderness. There is no rebound (generalized) and no guarding.   Musculoskeletal: Normal range of motion.   Neurological: She is alert and oriented to person, place, and time.   Skin: Skin is warm and dry. No rash noted. No erythema.   Psychiatric: She has a normal mood and affect. Her affect is not angry. She is slowed. She is not actively hallucinating. She expresses no homicidal and no suicidal ideation. She expresses no suicidal plans and no homicidal plans.   Speech slowed She is attentive.         ED Course   Procedures  Labs Reviewed   CBC W/ AUTO DIFFERENTIAL - Abnormal; Notable for the following:        Result Value    Gran% 32.2 (*)     Lymph% 53.6 (*)     All other components within normal limits    Narrative:     YELENA SHARED   COMPREHENSIVE METABOLIC PANEL - Abnormal; Notable for the following:     Albumin 2.9 (*)     ALT 9 (*)     All other components within normal limits    Narrative:     LAVENDER SHARED   ACETAMINOPHEN LEVEL - Abnormal; Notable for the following:     Acetaminophen (Tylenol), Serum <3.0 (*)     All other components within normal limits    Narrative:     LAVENDER SHARED   SALICYLATE LEVEL - Abnormal; Notable for the following:     Salicylate Lvl <5.0 (*)     All other components within normal limits    Narrative:     LAVENDER SHARED   TSH    Narrative:     LAVENDER SHARED   URINALYSIS   DRUG SCREEN PANEL, URINE EMERGENCY   ALCOHOL,MEDICAL (ETHANOL)    Narrative:     LAVENDER SHARED   TROPONIN I    Narrative:     LAVENDER SHARED   B-TYPE NATRIURETIC PEPTIDE    Narrative:     LAVENDER SHARED        Imaging Results           X-Ray Abdomen Flat And Erect (Final result)  Result time 10/06/17 16:24:22    Final result by Selvin Brown III, MD (10/06/17 16:24:22)                 Narrative:    2 views: No obstruction, ileus, or perforation seen.      Electronically signed by: SELVIN BROWN  Date:     10/06/17  Time:    16:24                              X-Ray Chest 1 View (Final result)  Result time 10/06/17 16:37:00   Procedure changed from X-Ray Chest PA And Lateral     Final result by Izabella Beckman MD (10/06/17 16:37:00)                 Impression:      No acute disease identified in this patient with a history of chest pain.      Electronically signed by: Izabella Beckman MD  Date:     10/06/17  Time:    16:37              Narrative:    Time of Procedure: 10/06/17 16:20:00  Accession # [id number]    Comparison: 9/23/2017.    Number of views: 1.    Findings:  Lung volumes are normal and symmetric.  Mediastinal structures are midline.    I detect no convincing evidence of pulmonary disease, pleural fluid, cardiac decompensation, pneumothorax, pneumomediastinum, pneumoperitoneum or significant osseous abnormality.                             CT Head Without Contrast (Final result)  Result time 10/06/17 16:00:38    Final result by Lolita Nicole MD (10/06/17 16:00:38)                 Impression:         Stable exam. No evidence of acute intracranial pathology.      Electronically signed by: LOLITA NICOLE MD  Date:     10/06/17  Time:    16:00              Narrative:    CT head without contrast    10/06/17 15:45:35    Accession# [id number]    CLINICAL INDICATION: 62 year old F with Altered Mental Status     TECHNIQUE: Axial CT images obtained throughout the region of the head without the use of intravenous contrast. Axial, sagittal and coronal reconstructions were performed.    COMPARISON: 09/23/2017    FINDINGS:    The ventricles are normal in size without evidence of hydrocephalus.    The brain appears unchanged. No parenchymal mass,  hemorrhage, edema or major vascular distribution infarct.    No new extra-axial blood or fluid collections.    The cranium appears intact. Mastoid air cells and paranasal sinuses are essentially clear.                                 Medical Decision Making:   History:   Old Medical Records: I decided to obtain old medical records.  Clinical Tests:   Lab Tests: Ordered and Reviewed  Radiological Study: Ordered and Reviewed  Medical Tests: Ordered and Reviewed       APC / Resident Notes:   62-year-old white female with past medical history of bipolar disorder, schizophrenia, COPD, depression, hyperlipidemia, asthma, migraines, GERD, seizures presents to the ED from a her psychiatrist's office at [facility] with a PEC for medical clearance.  Vital signs stable.  Regular rate and rhythm.  Lungs are clear.  Abdomen is soft with generalized tenderness.  Patient has slowed speech but answers all questions appropriately.  Alert and oriented.  Patient arrived from outpatient [facility] with a PEC from her psychiatrist. She was sent to the ED for medical clearance and has a bed at [facility]. Labs, CXR, abdominal xray, CT head ordered.    CBC, CMP, TSH unremarkable. Ethanol <10. Troponin and BNP WNL. Drug screen negative. UA with no infection.     Xray abdomen with no obstruction, ileus, perforation. CXR with no acute disease.    CT head with no acute intracranial pathology.    Patient is medically cleared for inpatient psych evaluation.          Attending Attestation:     Physician Attestation Statement for NP/PA:   I discussed this assessment and plan of this patient with the NP/PA, but I did not personally examine the patient. The face to face encounter was performed by the NP/PA.                  ED Course      Clinical Impression:   The primary encounter diagnosis was Hallucinations. A diagnosis of Confusion was also pertinent to this visit.    Disposition:   Disposition: Transferred  Condition:  Fair  Psych                        Jacquie Mendoza PA-C  10/06/17 1658       Rodney Wright MD  10/07/17 0000

## 2017-10-06 NOTE — ED NOTES
LOC: The patient is awake, alert and aware of environment with an appropriate affect, the patient is oriented to  and speaking appropriately.  APPEARANCE: Patient resting comfortably and in no acute distress, patient is clean and well groomed, patient's clothing is properly fastened.  SKIN: The skin is warm and dry, color consistent with ethnicity, patient has normal skin turgor and moist mucus membranes, skin intact, no breakdown.  Bruise noted to Left forearm  MUSCULOSKELETAL: Patient moving all extremities spontaneously, + swelling to bilat lower extremities .  RESPIRATORY: Airway is open and patent, respirations are spontaneous, patient has a normal effort and rate, no accessory muscle use noted.  ABDOMEN: Soft and non tender to palpation, no distention noted.  NEUROLOGIC:  facial expression is symmetrical, patient moving all extremities spontaneously, normal sensation in all extremities when touched with a finger.  Follows all commands appropriately.

## 2017-10-06 NOTE — ED NOTES
Attempted to call [facility] with report, unable to reach nurse.  ere nurseStacie reports that she will call the nurse and transfer the call to me.

## 2017-10-06 NOTE — TELEPHONE ENCOUNTER
Attempted callback but only rang at this time. No show appt r/s for 10/16/17 @ 0920am. Appt letter to be mailed out today.

## 2017-10-06 NOTE — ED NOTES
Pt has been accepted to [facility] via [facility]. Accepting MD is Dr. Valdez. Call report to [phone]. Ask to speak to charge nurse.

## 2017-10-07 NOTE — ED NOTES
Ekron and drink provided per pt request.  Pt calm and cooperative.  Sitter remains at bedside with q 15 min checks

## 2017-10-12 ENCOUNTER — TELEPHONE (OUTPATIENT)
Dept: NEUROLOGY | Facility: CLINIC | Age: 62
End: 2017-10-12

## 2017-10-12 NOTE — TELEPHONE ENCOUNTER
Called and left a message for Ms. Triplett regardingher appt with  on Oct 16 which is next Tuesday. I stated that  will not be able to see her that day so we have to move her appt to the next Tuesday after that which is Oct 24 at 10AM. I stated to give us a call back.

## 2017-11-12 ENCOUNTER — HOSPITAL ENCOUNTER (EMERGENCY)
Facility: HOSPITAL | Age: 62
End: 2017-11-12
Attending: FAMILY MEDICINE
Payer: MEDICARE

## 2017-11-12 VITALS
SYSTOLIC BLOOD PRESSURE: 115 MMHG | OXYGEN SATURATION: 100 % | TEMPERATURE: 99 F | BODY MASS INDEX: 31.34 KG/M2 | HEIGHT: 66 IN | WEIGHT: 195 LBS | HEART RATE: 70 BPM | DIASTOLIC BLOOD PRESSURE: 72 MMHG | RESPIRATION RATE: 18 BRPM

## 2017-11-12 DIAGNOSIS — L01.00 IMPETIGO: Primary | ICD-10-CM

## 2017-11-12 PROCEDURE — 99283 EMERGENCY DEPT VISIT LOW MDM: CPT

## 2017-11-12 PROCEDURE — 99283 EMERGENCY DEPT VISIT LOW MDM: CPT | Mod: ,,, | Performed by: FAMILY MEDICINE

## 2017-11-12 RX ORDER — MUPIROCIN 20 MG/G
OINTMENT TOPICAL 2 TIMES DAILY
Qty: 30 G | Refills: 1 | Status: SHIPPED | OUTPATIENT
Start: 2017-11-12 | End: 2018-04-06 | Stop reason: SDUPTHER

## 2017-11-12 RX ORDER — CEPHALEXIN 500 MG/1
500 CAPSULE ORAL EVERY 12 HOURS
Qty: 20 CAPSULE | Refills: 0 | Status: SHIPPED | OUTPATIENT
Start: 2017-11-12 | End: 2017-11-22

## 2017-11-12 NOTE — ED NOTES
LOC: The patient is awake, alert, and oriented to place, time, situation. Affect is appropriate.  Speech is appropriate and clear.     APPEARANCE: Patient resting comfortably in no acute distress.  Patient is clean and well groomed.    SKIN: The skin is warm and dry; color consistent with ethnicity.  Patient has normal skin turgor and moist mucus membranes.  Skin on hands and lower arms with scabs from scratching insect bites.     MUSCULOSKELETAL: Patient moving upper and lower extremities without difficulty.  Denies.weakness. Ambulates with walker at nursing home.    RESPIRATORY: Airway is open and patent. Respirations spontaneous, even, easy, and non-labored.  Patient has a normal effort and rate.  No accessory muscle use noted. Denies cough.     CARDIAC.  No peripheral edema noted. No complaints of chest pain.      ABDOMEN: Soft and non tender to palpation.  No distention noted.     NEUROLOGIC: Eyes open spontaneously.  Behavior appropriate to situation.  Follows commands; facial expression symmetrical.  Purposeful motor response noted; normal sensation in all extremities.

## 2017-11-12 NOTE — DISCHARGE INSTRUCTIONS
Use Mupirocin twice daily x 10 days and take Cephalexin twice daily x 10 days.     Follow up w/ your PCP if infections not resolving.

## 2017-11-12 NOTE — ED NOTES
Hand off given to Sophie Rn and transportation needs to be arranged for patient back to nursing home.

## 2017-11-12 NOTE — ED PROVIDER NOTES
Encounter Date: 11/12/2017    SCRIBE #1 NOTE: I, Tarsha Preston, am scribing for, and in the presence of, Dr. Leslie. Other sections scribed: HPI, ROS, PE.       History     Chief Complaint   Patient presents with    hand sores     Sores to hands x 6 months.  Pt c/o itching      62 y.o. female with PMHx of schizophrenia, bipolar disorder, anxiety, and depression presents to the ED complaining of possibly infected mosquitoes and spider bites to the dorsum of both hands. She endorses swelling, pain, and erythema surrounding the bites which is worsened by scratching. Although she has applied various topicals, her symptoms are not improved. In addition to her chief complaint, she reports a possible nasal infection with scabbing and swelling of her gland/nodes on the left side of her neck.        The history is provided by medical records and the patient.     Review of patient's allergies indicates:   Allergen Reactions    Nsaids (non-steroidal anti-inflammatory drug) Other (See Comments)     History of perforated duodenal ulcer    Penicillins Rash and Other (See Comments)     Yeast infections     Past Medical History:   Diagnosis Date    Anxiety     Asthma     Bipolar disorder     Chronic constipation     Chronic kidney disease     History of dialysis secondary to overdose    COPD (chronic obstructive pulmonary disease)     Depression     DVT (deep venous thrombosis)     Dysphagia     GERD (gastroesophageal reflux disease)     Hyperlipidemia     Migraine headache 8/26/2014    Neuropathy 8/26/2014    CLARI (obstructive sleep apnea) 11/11/2013    Parkinson disease     Perforated duodenal ulcer 5/28/2015    Recurrent upper respiratory infection (URI)     Schizo affective schizophrenia     Seizures     Thyroid disease      Past Surgical History:   Procedure Laterality Date    COLONOSCOPY N/A 5/17/2016    Procedure: COLONOSCOPY;  Surgeon: Akbar Tsang MD;  Location: [facility] ([address] [place] [address]);  Service:  Endoscopy;  Laterality: N/A;    TONSILLECTOMY      TYMPANOSTOMY TUBE PLACEMENT       Family History   Problem Relation Age of Onset    Alcohol abuse Mother     Bipolar disorder Mother     Dementia Mother     Cancer Maternal Grandmother 60     colon ca    Allergic rhinitis Neg Hx     Allergies Neg Hx     Angioedema Neg Hx     Asthma Neg Hx     Atopy Neg Hx     Eczema Neg Hx     Immunodeficiency Neg Hx     Rhinitis Neg Hx     Urticaria Neg Hx      Social History   Substance Use Topics    Smoking status: Former Smoker     Packs/day: 1.50     Years: 36.00     Types: Cigarettes     Quit date: 5/22/2017    Smokeless tobacco: Former User     Quit date: 1/3/2013    Alcohol use No     Review of Systems   HENT: Positive for facial swelling (Under left mandible.) .         (+) Crusting inside nostrils.   Skin: Positive for wound (Pest bites to dorsa of both hands.).   Hematological: Positive for adenopathy (Left side of throat.).   Psychiatric/Behavioral: Positive for agitation.       Physical Exam     Initial Vitals [11/12/17 1356]   BP Pulse Resp Temp SpO2   115/63 80 16 98.8 °F (37.1 °C) 96 %      MAP       80.33         Physical Exam    Nursing note and vitals reviewed.  Constitutional: No distress.   HENT:   Head: Atraumatic.   Crusts in nostril consistent with skin infection.    Cardiovascular:   Normal cardiovascular exam.   Neurological: She is alert and oriented to person, place, and time.   No acute focal deficits.  Normal neurological exam.    Skin:   Mild induration and inflammation under left mandible and chin without fluctuance. Erythematous legions to the dorsum of both hands consistent with strep impetigo.          ED Course   Procedures  Labs Reviewed - No data to display          Medical Decision Making:   History:   Old Medical Records: I decided to obtain old medical records.  Patient's original rash appears to be due to the current scratching.  However, she appears to have some  erythematous areas consistent with impetigo secondarily.  We will place her on mupirocin topically cephalexin orally and have her PCP recheck the areas in 5-7 days            Scribe Attestation:   Scribe #1: I performed the above scribed service and the documentation accurately describes the services I performed. I attest to the accuracy of the note.            ED Course      Clinical Impression:   There were no encounter diagnoses.    Disposition:   Disposition: Discharged  Condition: Stable                        Steve Leslie MD  11/12/17 0528

## 2017-11-12 NOTE — ED TRIAGE NOTES
Comes to the ED from [facility] for c/o bites on her hands that are scabbed, infected and itchy.  Swelling to left jaw.

## 2018-01-16 ENCOUNTER — TELEPHONE (OUTPATIENT)
Dept: PULMONOLOGY | Facility: CLINIC | Age: 63
End: 2018-01-16

## 2018-01-16 NOTE — TELEPHONE ENCOUNTER
----- Message from Joselyn Carter sent at 1/16/2018  2:00 PM CST -----  Contact: Self [phone]  PT called to check status of request for inhalers. She is requesting a call at [phone].

## 2018-01-16 NOTE — TELEPHONE ENCOUNTER
----- Message from Jennifer Robles sent at 1/16/2018  2:58 PM CST -----  Contact: Miss. Huggins tel:   [phone]  Returning your call. Pls. Call again.

## 2018-01-19 RX ORDER — FLUTICASONE FUROATE AND VILANTEROL 200; 25 UG/1; UG/1
1 POWDER RESPIRATORY (INHALATION) DAILY
Qty: 1 EACH | Refills: 5 | Status: SHIPPED | OUTPATIENT
Start: 2018-01-19 | End: 2018-01-22 | Stop reason: SDUPTHER

## 2018-01-19 RX ORDER — ALBUTEROL SULFATE 90 UG/1
2 AEROSOL, METERED RESPIRATORY (INHALATION) EVERY 6 HOURS PRN
Qty: 18 G | Refills: 0 | Status: SHIPPED | OUTPATIENT
Start: 2018-01-19 | End: 2018-01-22 | Stop reason: SDUPTHER

## 2018-01-19 NOTE — TELEPHONE ENCOUNTER
----- Message from Joselyn Carter sent at 1/19/2018  3:52 PM CST -----  Contact: Self [phone]  PT returned call.

## 2018-01-21 ENCOUNTER — NURSE TRIAGE (OUTPATIENT)
Dept: ADMINISTRATIVE | Facility: CLINIC | Age: 63
End: 2018-01-21

## 2018-01-22 ENCOUNTER — TELEPHONE (OUTPATIENT)
Dept: GASTROENTEROLOGY | Facility: CLINIC | Age: 63
End: 2018-01-22

## 2018-01-22 ENCOUNTER — TELEPHONE (OUTPATIENT)
Dept: PULMONOLOGY | Facility: CLINIC | Age: 63
End: 2018-01-22

## 2018-01-22 RX ORDER — FLUTICASONE FUROATE AND VILANTEROL 200; 25 UG/1; UG/1
1 POWDER RESPIRATORY (INHALATION) DAILY
Qty: 1 EACH | Refills: 5 | Status: SHIPPED | OUTPATIENT
Start: 2018-01-22 | End: 2019-05-09

## 2018-01-22 RX ORDER — ALBUTEROL SULFATE 90 UG/1
2 AEROSOL, METERED RESPIRATORY (INHALATION) EVERY 6 HOURS PRN
Qty: 18 G | Refills: 0 | Status: SHIPPED | OUTPATIENT
Start: 2018-01-22 | End: 2019-01-22

## 2018-01-22 RX ORDER — FLUTICASONE FUROATE AND VILANTEROL 200; 25 UG/1; UG/1
1 POWDER RESPIRATORY (INHALATION) DAILY
Qty: 1 EACH | Refills: 5 | Status: SHIPPED | OUTPATIENT
Start: 2018-01-22 | End: 2018-01-22 | Stop reason: SDUPTHER

## 2018-01-22 RX ORDER — ALBUTEROL SULFATE 90 UG/1
2 AEROSOL, METERED RESPIRATORY (INHALATION) EVERY 6 HOURS PRN
Qty: 18 G | Refills: 0 | Status: SHIPPED | OUTPATIENT
Start: 2018-01-22 | End: 2018-01-22 | Stop reason: SDUPTHER

## 2018-01-22 NOTE — TELEPHONE ENCOUNTER
"Pt called re saw GI- was dr liu. Had stomach operation- pt at [facility]. On seroquel. Hx schizo paranoid. Pt told that colon weak and not working properly. Stool gets stuck midway. Had a lot of burning. Using stool softener/MOM.     Reason for Disposition   Patient sounds very sick or weak to the triager    Answer Assessment - Initial Assessment Questions  1. STOOL PATTERN OR FREQUENCY: "How often do you pass bowel movements (BMs)?"  (Normal range: tid to q 3 days)  "When was the last BM passed?"      BM today hard , hard this week   2. STRAINING: "Do you have to strain to have a BM?"      Pt feels like she hasnt completely evacuated bowel. Pt states that cant get the meds she needs at the nursing home. Passing some flatus. Pt states she is feeling like stool is stuck. Using medicated dulcolax. On toilet now. Pt states seroquel causes constipation. Pt states that she has obstruction. Offered to call staff to help pt. Pt requests message to GI MD to call her brian. Call back with questions.    Protocols used:  CONSTIPATION-PEPPER-KAILA  spoke with Izabella at [facility] at 1052pm - staff states that she will go check on pt.     "

## 2018-01-23 ENCOUNTER — TELEPHONE (OUTPATIENT)
Dept: ENDOSCOPY | Facility: HOSPITAL | Age: 63
End: 2018-01-23

## 2018-01-24 ENCOUNTER — HOSPITAL ENCOUNTER (EMERGENCY)
Facility: HOSPITAL | Age: 63
Discharge: PSYCHIATRIC OR SUBSTANCE USE FACILITY | End: 2018-01-25
Attending: EMERGENCY MEDICINE
Payer: MEDICARE

## 2018-01-24 ENCOUNTER — TELEPHONE (OUTPATIENT)
Dept: GASTROENTEROLOGY | Facility: CLINIC | Age: 63
End: 2018-01-24

## 2018-01-24 DIAGNOSIS — F25.9 SCHIZOAFFECTIVE DISORDER, UNSPECIFIED TYPE: Primary | ICD-10-CM

## 2018-01-24 DIAGNOSIS — F23 ACUTE PSYCHOSIS: ICD-10-CM

## 2018-01-24 LAB
BASOPHILS # BLD AUTO: 0.04 K/UL
BASOPHILS NFR BLD: 0.6 %
DIFFERENTIAL METHOD: ABNORMAL
EOSINOPHIL # BLD AUTO: 0.1 K/UL
EOSINOPHIL NFR BLD: 1.6 %
ERYTHROCYTE [DISTWIDTH] IN BLOOD BY AUTOMATED COUNT: 13.1 %
HCT VFR BLD AUTO: 41.2 %
HGB BLD-MCNC: 13.3 G/DL
IMM GRANULOCYTES # BLD AUTO: 0 K/UL
IMM GRANULOCYTES NFR BLD AUTO: 0 %
LYMPHOCYTES # BLD AUTO: 3.6 K/UL
LYMPHOCYTES NFR BLD: 56.9 %
MCH RBC QN AUTO: 29.1 PG
MCHC RBC AUTO-ENTMCNC: 32.3 G/DL
MCV RBC AUTO: 90 FL
MONOCYTES # BLD AUTO: 0.8 K/UL
MONOCYTES NFR BLD: 12.4 %
NEUTROPHILS # BLD AUTO: 1.8 K/UL
NEUTROPHILS NFR BLD: 28.5 %
NRBC BLD-RTO: 0 /100 WBC
PLATELET # BLD AUTO: 245 K/UL
PMV BLD AUTO: 9.9 FL
RBC # BLD AUTO: 4.57 M/UL
WBC # BLD AUTO: 6.38 K/UL

## 2018-01-24 PROCEDURE — 63600175 PHARM REV CODE 636 W HCPCS: Performed by: EMERGENCY MEDICINE

## 2018-01-24 PROCEDURE — 96374 THER/PROPH/DIAG INJ IV PUSH: CPT

## 2018-01-24 PROCEDURE — 96375 TX/PRO/DX INJ NEW DRUG ADDON: CPT

## 2018-01-24 PROCEDURE — 99285 EMERGENCY DEPT VISIT HI MDM: CPT | Mod: 25

## 2018-01-24 PROCEDURE — 85025 COMPLETE CBC W/AUTO DIFF WBC: CPT

## 2018-01-24 RX ORDER — HALOPERIDOL 5 MG/ML
5 INJECTION INTRAMUSCULAR
Status: COMPLETED | OUTPATIENT
Start: 2018-01-24 | End: 2018-01-24

## 2018-01-24 RX ADMIN — HALOPERIDOL LACTATE 5 MG: 5 INJECTION, SOLUTION INTRAMUSCULAR at 11:01

## 2018-01-24 NOTE — TELEPHONE ENCOUNTER
Returned pt's call. Left voicemail to call office to schedule an appointment to further discuss her symptoms

## 2018-01-25 VITALS
RESPIRATION RATE: 16 BRPM | SYSTOLIC BLOOD PRESSURE: 138 MMHG | TEMPERATURE: 99 F | OXYGEN SATURATION: 99 % | DIASTOLIC BLOOD PRESSURE: 67 MMHG | HEART RATE: 68 BPM

## 2018-01-25 LAB
ALBUMIN SERPL BCP-MCNC: 3 G/DL
ALP SERPL-CCNC: 83 U/L
ALT SERPL W/O P-5'-P-CCNC: 16 U/L
AMPHET+METHAMPHET UR QL: NEGATIVE
ANION GAP SERPL CALC-SCNC: 8 MMOL/L
APAP SERPL-MCNC: <3 UG/ML
AST SERPL-CCNC: 22 U/L
BARBITURATES UR QL SCN>200 NG/ML: NEGATIVE
BENZODIAZ UR QL SCN>200 NG/ML: NEGATIVE
BILIRUB SERPL-MCNC: 0.3 MG/DL
BILIRUB UR QL STRIP: NEGATIVE
BUN SERPL-MCNC: 10 MG/DL
BZE UR QL SCN: NEGATIVE
CALCIUM SERPL-MCNC: 9.1 MG/DL
CANNABINOIDS UR QL SCN: NEGATIVE
CHLORIDE SERPL-SCNC: 110 MMOL/L
CLARITY UR REFRACT.AUTO: CLEAR
CO2 SERPL-SCNC: 22 MMOL/L
COLOR UR AUTO: YELLOW
CREAT SERPL-MCNC: 0.8 MG/DL
CREAT UR-MCNC: 57 MG/DL
EST. GFR  (AFRICAN AMERICAN): >60 ML/MIN/1.73 M^2
EST. GFR  (NON AFRICAN AMERICAN): >60 ML/MIN/1.73 M^2
GLUCOSE SERPL-MCNC: 109 MG/DL
GLUCOSE UR QL STRIP: NEGATIVE
HGB UR QL STRIP: NEGATIVE
KETONES UR QL STRIP: NEGATIVE
LEUKOCYTE ESTERASE UR QL STRIP: NEGATIVE
METHADONE UR QL SCN>300 NG/ML: NEGATIVE
NITRITE UR QL STRIP: NEGATIVE
OPIATES UR QL SCN: NEGATIVE
PCP UR QL SCN>25 NG/ML: NEGATIVE
PH UR STRIP: 8 [PH] (ref 5–8)
POTASSIUM SERPL-SCNC: 4 MMOL/L
PROT SERPL-MCNC: 6.2 G/DL
PROT UR QL STRIP: NEGATIVE
SODIUM SERPL-SCNC: 140 MMOL/L
SP GR UR STRIP: 1.01 (ref 1–1.03)
T4 FREE SERPL-MCNC: 0.59 NG/DL
TOXICOLOGY INFORMATION: NORMAL
TSH SERPL DL<=0.005 MIU/L-ACNC: 16.07 UIU/ML
URN SPEC COLLECT METH UR: NORMAL
UROBILINOGEN UR STRIP-ACNC: NEGATIVE EU/DL

## 2018-01-25 PROCEDURE — 84443 ASSAY THYROID STIM HORMONE: CPT

## 2018-01-25 PROCEDURE — 81003 URINALYSIS AUTO W/O SCOPE: CPT

## 2018-01-25 PROCEDURE — 80307 DRUG TEST PRSMV CHEM ANLYZR: CPT

## 2018-01-25 PROCEDURE — 80329 ANALGESICS NON-OPIOID 1 OR 2: CPT

## 2018-01-25 PROCEDURE — 63600175 PHARM REV CODE 636 W HCPCS: Performed by: EMERGENCY MEDICINE

## 2018-01-25 PROCEDURE — 80053 COMPREHEN METABOLIC PANEL: CPT

## 2018-01-25 PROCEDURE — 84439 ASSAY OF FREE THYROXINE: CPT

## 2018-01-25 RX ORDER — LEVOTHYROXINE SODIUM 50 UG/1
50 TABLET ORAL
Status: DISCONTINUED | OUTPATIENT
Start: 2018-01-25 | End: 2018-01-25 | Stop reason: HOSPADM

## 2018-01-25 RX ORDER — LORAZEPAM 2 MG/ML
2 INJECTION INTRAMUSCULAR
Status: COMPLETED | OUTPATIENT
Start: 2018-01-25 | End: 2018-01-25

## 2018-01-25 RX ADMIN — LORAZEPAM 2 MG: 2 INJECTION, SOLUTION INTRAMUSCULAR; INTRAVENOUS at 12:01

## 2018-01-25 NOTE — ED NOTES
Pt resting comfortably and independently repositioned in stretcher with bed locked in lowest position for safety. NAD noted at this time. Respirations even and unlabored and visible chest rise noted.  Patient offered bathroom assistance and denies need at this time. Pt instructed to call if assistance is needed. Informed we are still waiting for a room to place her for MD to see her. Verbalized understanding. No needs at this time. Will continue to monitor.

## 2018-01-25 NOTE — ED NOTES
Patient accepted by Stacie at [facility] ([address], [place]) for the service of Dr. Renteria.  Report to be called to [phone].

## 2018-01-25 NOTE — ED PROVIDER NOTES
This patient was signed out to me by Dr. Medley.   This patient is medically cleared for placement to a psych facility.   She has known hypothyroidism and is supposed to be on Synthroid but compliance is unknown.   TSH is elevated, free T4 is low.   We will start the patient on 50 mg of Synthroid daily.  This can be followed up with routine testing.  The patient is medically cleared and can be sent to a psych facility     Tavon Moreno PA-C  01/25/18 0431

## 2018-01-25 NOTE — ED PROVIDER NOTES
"Encounter Date: 1/24/2018    SCRIBE #1 NOTE: I, Dulce Burch, am scribing for, and in the presence of, Henrik Medley MD.       History     Chief Complaint   Patient presents with    Aggressive Behavior     Sent from [facility] for combative behavior and being uncooperative. Arrives per [facility]- EMS states pt uncooperative with them also. Appears emotional on ems hallway. Pt cooperative with me on hallway. Denies SI or HI. Reports needs to see her psychiatrist.      Time patient was seen by the provider: 10:43 PM      The patient is a 62 y.o. female with co-morbidities including: schizo affective schizophrenia, bipolar disorder, depression, COPD, and kidney disease who was sent to the ED from [facility] for evaluation of combative behavior. The nursing staff reports that the pt was screaming and using profane language.  Pt claims that she was, "left in the alberts wall for too long" and says she "feels like she's dying". Pt denies any SI/HI, hallucinations, and hearing voices. Pt insists on being admitted to [facility].        The history is provided by the patient and medical records.     Review of patient's allergies indicates:   Allergen Reactions    Nsaids (non-steroidal anti-inflammatory drug) Other (See Comments)     History of perforated duodenal ulcer    Penicillins Rash and Other (See Comments)     Yeast infections     Past Medical History:   Diagnosis Date    Anxiety     Asthma     Bipolar disorder     Chronic constipation     Chronic kidney disease     History of dialysis secondary to overdose    COPD (chronic obstructive pulmonary disease)     Depression     DVT (deep venous thrombosis)     Dysphagia     GERD (gastroesophageal reflux disease)     Hyperlipidemia     Migraine headache 8/26/2014    Neuropathy 8/26/2014    CLARI (obstructive sleep apnea) 11/11/2013    Parkinson disease     Perforated duodenal ulcer 5/28/2015    Recurrent upper respiratory infection (URI)  "    Schizo affective schizophrenia     Seizures     Thyroid disease      Past Surgical History:   Procedure Laterality Date    COLONOSCOPY N/A 5/17/2016    Procedure: COLONOSCOPY;  Surgeon: Akbar Tsang MD;  Location: [facility] ([address] [place] [address]);  Service: Endoscopy;  Laterality: N/A;    TONSILLECTOMY      TYMPANOSTOMY TUBE PLACEMENT       Family History   Problem Relation Age of Onset    Alcohol abuse Mother     Bipolar disorder Mother     Dementia Mother     Cancer Maternal Grandmother 60     colon ca    Allergic rhinitis Neg Hx     Allergies Neg Hx     Angioedema Neg Hx     Asthma Neg Hx     Atopy Neg Hx     Eczema Neg Hx     Immunodeficiency Neg Hx     Rhinitis Neg Hx     Urticaria Neg Hx      Social History   Substance Use Topics    Smoking status: Former Smoker     Packs/day: 1.50     Years: 36.00     Types: Cigarettes     Quit date: 5/22/2017    Smokeless tobacco: Former User     Quit date: 1/3/2013    Alcohol use No     Review of Systems   Unable to perform ROS: Other (acute psychosis)   Psychiatric/Behavioral: Negative for hallucinations and suicidal ideas.       Physical Exam     Initial Vitals [01/24/18 1746]   BP Pulse Resp Temp SpO2   (!) 186/89 82 18 98.5 °F (36.9 °C) 95 %      MAP       121.33         Physical Exam    Nursing note and vitals reviewed.  Constitutional: She appears well-developed and well-nourished.   HENT:   Head: Normocephalic and atraumatic.   Eyes: EOM are normal. Pupils are equal, round, and reactive to light.   Neck: Normal range of motion.   Pulmonary/Chest: Breath sounds normal. No respiratory distress.   Abdominal: There is no tenderness. There is no rebound and no guarding.   Musculoskeletal: Normal range of motion. She exhibits no edema.   Skin: Skin is warm and dry.   Psychiatric:   Acutely psychotic. Flight of ideas. Hyperreligious, denies si/hi or hallucinations           ED Course   Procedures  Labs Reviewed   CBC W/ AUTO DIFFERENTIAL - Abnormal; Notable  for the following:        Result Value    Gran% 28.5 (*)     Lymph% 56.9 (*)     All other components within normal limits   COMPREHENSIVE METABOLIC PANEL - Abnormal; Notable for the following:     CO2 22 (*)     Albumin 3.0 (*)     All other components within normal limits   TSH - Abnormal; Notable for the following:     TSH 16.074 (*)     All other components within normal limits   URINALYSIS   DRUG SCREEN PANEL, URINE EMERGENCY   ACETAMINOPHEN LEVEL   T4, FREE        Imaging Results          CT Head Without Contrast (Final result)  Result time 01/25/18 00:19:45    Final result by Gato Walters MD (01/25/18 00:19:45)                 Impression:         No acute intracranial findings.  ______________________________________     Electronically signed by resident: MYRNA MA MD  Date:     01/25/18  Time:    00:07            As the supervising and teaching physician, I personally reviewed the images and resident's interpretation and I agree with the findings.          Electronically signed by: Gato Walters  Date:     01/25/18  Time:    00:19              Narrative:    CT head without contrast    01/24/18 23:46:13    Accession# [id number]    CLINICAL INDICATION: 62 year old F with ams     TECHNIQUE: Axial CT images obtained throughout the region of the head without the use of intravenous contrast. Axial, sagittal and coronal reconstructions were performed.    COMPARISON: CT head 10/6/2017.    FINDINGS:    The ventricles are normal in size without evidence of hydrocephalus.    The brain appears within normal limits. No parenchymal mass, hemorrhage, edema or major vascular distribution infarct.    No extra-axial blood or fluid collections.    The cranium appears intact. Mastoid air cells and paranasal sinuses are essentially clear.                                 Medical Decision Making:   History:   Old Medical Records: I decided to obtain old medical records.  Initial Assessment:   62 Y.O. female pt with acute  psychosis and a hx of schizo affective disorder. Pt will be placed on PEC.  I spoke with the psychiatry resident on call and there. No inpatient beds available. Requesting pt be transferred to another facility. Will perform a full workup and evaluation including CT head and labs. Apparently, the pt was here yesterday and received a full workup but was sent back to nursing home.   Clinical Tests:   Lab Tests: Ordered and Reviewed  Radiological Study: Ordered and Reviewed  ED Management:  Patient here is moderately ER.  She received some Haldol and Ativan much more calm and cooperative here to the ER at this time.  Initial set of blood work was hemolyzed and had to be redrawn.  Lab work is currently pending at this time.  Care be transferred over to Tavon KRAUSE and also Dr. Natacha Tsang follow-up final disposition of this patient's.  She still pending medical clearance as were waiting for labs to come back.  Case was discussed and they accepted this transfer care.  Other:   I have discussed this case with another health care provider.       <> Summary of the Discussion: Psychiatry            Scribe Attestation:   Scribe #1: I performed the above scribed service and the documentation accurately describes the services I performed. I attest to the accuracy of the note.            ED Course      Clinical Impression:   The primary encounter diagnosis was Schizoaffective disorder, unspecified type. A diagnosis of Acute psychosis was also pertinent to this visit.                I, Henrik Medley MD, personally performed the services described in this documentation. All medical record entries made by the scribe were at my direction and in my presence.   I have reviewed the chart and agree that the record reflects my personal performance and is accurate and complete.     Henrik Medley MD  01/25/18 0300

## 2018-01-25 NOTE — ED NOTES
[facility] called to arrange patient to be transferred to [facility] - spoke with Rand

## 2018-02-21 ENCOUNTER — TELEPHONE (OUTPATIENT)
Dept: INTERNAL MEDICINE | Facility: CLINIC | Age: 63
End: 2018-02-21

## 2018-02-21 NOTE — TELEPHONE ENCOUNTER
----- Message from Courtney Shaver sent at 2/21/2018 10:38 AM CST -----  Contact: patient on cell [phone]  Patient called to ask if  would accept her back as a patient . She is living at [facility] on [address] and said that the van can bring her to her appointments. Pt doesn't feel she is getting enough attention and has only seen a doctor once in the last 5 years. I wasn't sure if you discharged her as a patient or not.

## 2018-02-21 NOTE — TELEPHONE ENCOUNTER
----- Message from Claudia Pereira sent at 2/21/2018 12:55 PM CST -----  Lab Orders Needed    I have scheduled the above patients annual physical and lab appointments. Lab orders need to be placed and linked.    Date of Annual Physical: 03/27/2018    Date of Lab appt: 03/22/2018    Thank You

## 2018-02-21 NOTE — TELEPHONE ENCOUNTER
I have cancelled physical & lab appt bc pt is in a nursing home & needs to be seen by those Dr's.  Pt has already been informed.

## 2018-02-21 NOTE — TELEPHONE ENCOUNTER
Patient is in the nursing home which is covered by Dr. Rainey and the [facility] group. MD sees patient regularly at the nursing home.  Thank you.

## 2018-02-21 NOTE — TELEPHONE ENCOUNTER
Spoke with the pt re: seeing a dr outside of the nursing home.   I explained to the pt that she needs to see the Dr. Group that goes to that nursing home. Told pt to check with the nurse if she can go to an outside Dr.     Pt verbalized understanding

## 2018-03-06 ENCOUNTER — TELEPHONE (OUTPATIENT)
Dept: SMOKING CESSATION | Facility: CLINIC | Age: 63
End: 2018-03-06

## 2018-03-06 DIAGNOSIS — R52 PAIN: ICD-10-CM

## 2018-03-13 ENCOUNTER — TELEPHONE (OUTPATIENT)
Dept: SMOKING CESSATION | Facility: CLINIC | Age: 63
End: 2018-03-13

## 2018-03-14 ENCOUNTER — TELEPHONE (OUTPATIENT)
Dept: SMOKING CESSATION | Facility: CLINIC | Age: 63
End: 2018-03-14

## 2018-03-19 ENCOUNTER — TELEPHONE (OUTPATIENT)
Dept: SMOKING CESSATION | Facility: CLINIC | Age: 63
End: 2018-03-19

## 2018-03-29 ENCOUNTER — NURSE TRIAGE (OUTPATIENT)
Dept: ADMINISTRATIVE | Facility: CLINIC | Age: 63
End: 2018-03-29
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
## 2018-04-04 ENCOUNTER — TELEPHONE (OUTPATIENT)
Dept: INTERNAL MEDICINE | Facility: CLINIC | Age: 63
End: 2018-04-04

## 2018-04-04 NOTE — TELEPHONE ENCOUNTER
Attempted to call pt , phone number given is non working. After further investigation noted that pt is in [facility] and is under phychiatric care and often calls different MD offices for appts

## 2018-04-04 NOTE — TELEPHONE ENCOUNTER
Can we check with this pt and determine if ok to wait until Friday? I see she was a Spider Bite with infection and is also new to me. I know I have spots today and I am sure many people have UC spots sooner than Friday.   Her PCP is not even at our clinic. That seems too long to wait.

## 2018-04-06 ENCOUNTER — OFFICE VISIT (OUTPATIENT)
Dept: INTERNAL MEDICINE | Facility: CLINIC | Age: 63
End: 2018-04-06
Payer: MEDICARE

## 2018-04-06 VITALS
BODY MASS INDEX: 30.37 KG/M2 | SYSTOLIC BLOOD PRESSURE: 108 MMHG | DIASTOLIC BLOOD PRESSURE: 56 MMHG | HEART RATE: 79 BPM | WEIGHT: 211.63 LBS | OXYGEN SATURATION: 97 %

## 2018-04-06 DIAGNOSIS — R21 RASH: Primary | ICD-10-CM

## 2018-04-06 DIAGNOSIS — J44.9 CHRONIC OBSTRUCTIVE PULMONARY DISEASE, UNSPECIFIED COPD TYPE: ICD-10-CM

## 2018-04-06 DIAGNOSIS — F31.62 BIPOLAR DISORDER, CURRENT EPISODE MIXED, MODERATE: ICD-10-CM

## 2018-04-06 DIAGNOSIS — F25.9 SCHIZO AFFECTIVE SCHIZOPHRENIA: ICD-10-CM
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
## 2018-04-06 PROCEDURE — 3074F SYST BP LT 130 MM HG: CPT | Mod: CPTII,S$GLB,, | Performed by: INTERNAL MEDICINE

## 2018-04-06 PROCEDURE — 3078F DIAST BP <80 MM HG: CPT | Mod: CPTII,S$GLB,, | Performed by: INTERNAL MEDICINE

## 2018-04-06 PROCEDURE — 99213 OFFICE O/P EST LOW 20 MIN: CPT | Mod: S$GLB,,, | Performed by: INTERNAL MEDICINE

## 2018-04-06 PROCEDURE — 99999 PR PBB SHADOW E&M-EST. PATIENT-LVL V: CPT | Mod: PBBFAC,,, | Performed by: INTERNAL MEDICINE

## 2018-04-06 RX ORDER — MUPIROCIN 20 MG/G
OINTMENT TOPICAL 2 TIMES DAILY
Qty: 30 G | Refills: 0 | Status: ON HOLD | OUTPATIENT
Start: 2018-04-06 | End: 2018-09-14 | Stop reason: HOSPADM

## 2018-04-06 NOTE — PROGRESS NOTES
Subjective:       Patient ID: Joseluis Triplett is a 62 y.o. female.    Chief Complaint: Insect Bite     HPI: 62-year-old female who says she lives in a nursing home.  She complains of spider bites and states she can no longer see her dermatologist.  She claims that nursing home room had insects.  She says it has been cleaned fumigated.  She essentially shows me for a 5 small crusted lesions on areas including the right hand, the forehead, left upper chest and left thigh.  They all seem to be in late stages of healing.  They have dry crust but no erythema.  She does seem to scratch them regularly even during the visit.  She said she could not afford to see her dermatologist anymore and she is establishing with a new PCP.  She apparently has tried mupirocin in the past and would like a new prescription.  None of these have fluid filled blisters or drainage or tenderness.  According to the records she has a history of schizophrenia and bipolar disorder.      Insect Bite   Associated symptoms include a rash. Pertinent negatives include no fever.     Review of Systems   Constitutional: Negative for fever.   Skin: Positive for color change, rash and wound.        Spider bites per patient   Psychiatric/Behavioral: The patient is nervous/anxious.        Objective:      Physical Exam   Constitutional: She appears well-developed and well-nourished.   HENT:   Head: Normocephalic and atraumatic.   Mouth/Throat: Oropharynx is clear and moist.   Cardiovascular: Normal rate and regular rhythm.    Pulmonary/Chest: Effort normal and breath sounds normal.   Skin: Rash (see photos) noted. No erythema.   Several small crusted lesions, possibly insect bites including right hand left upper chest left thigh and right side of forehead.  Non or draining or tender or fluid-filled.   Psychiatric:   Patient appears to be scratching the crusted lesions on a regular basis             Forehead      Right hand  Assessment:       1. Rash    2.  Insect bite, initial encounter    3. Schizo affective schizophrenia    4. Chronic obstructive pulmonary disease, unspecified COPD type    5. Bipolar disorder, current episode mixed, moderate        Plan:       Joseluis was seen today for insect bite.    Diagnoses and all orders for this visit:    Rash    Insect bite, initial encounter    Schizo affective schizophrenia  Comments:  Continue medications, continue to see PCP, Psychiatry as needed.     Chronic obstructive pulmonary disease, unspecified COPD type  Comments:  Continue inhaler.     Bipolar disorder, current episode mixed, moderate  Comments:  Continue medications, continue to see PCP, Psychiatry as needed.     Other orders  -     mupirocin (BACTROBAN) 2 % ointment; Apply topically 2 (two) times daily.        Follow up with PCP, monitor for fever

## 2018-04-20 ENCOUNTER — OFFICE VISIT (OUTPATIENT)
Dept: URGENT CARE | Facility: CLINIC | Age: 63
End: 2018-04-20
Payer: MEDICARE

## 2018-04-20 VITALS
WEIGHT: 200 LBS | HEART RATE: 78 BPM | RESPIRATION RATE: 20 BRPM | TEMPERATURE: 99 F | DIASTOLIC BLOOD PRESSURE: 77 MMHG | HEIGHT: 65 IN | OXYGEN SATURATION: 96 % | BODY MASS INDEX: 33.32 KG/M2 | SYSTOLIC BLOOD PRESSURE: 136 MMHG

## 2018-04-20 DIAGNOSIS — K21.9 GASTROESOPHAGEAL REFLUX DISEASE, ESOPHAGITIS PRESENCE NOT SPECIFIED: ICD-10-CM

## 2018-04-20 DIAGNOSIS — R10.84 GENERALIZED ABDOMINAL PAIN: Primary | ICD-10-CM

## 2018-04-20 PROCEDURE — 3078F DIAST BP <80 MM HG: CPT | Mod: CPTII,S$GLB,, | Performed by: NURSE PRACTITIONER

## 2018-04-20 PROCEDURE — 3075F SYST BP GE 130 - 139MM HG: CPT | Mod: CPTII,S$GLB,, | Performed by: NURSE PRACTITIONER

## 2018-04-20 PROCEDURE — 99214 OFFICE O/P EST MOD 30 MIN: CPT | Mod: S$GLB,,, | Performed by: NURSE PRACTITIONER

## 2018-04-20 RX ORDER — ONDANSETRON 4 MG/1
4 TABLET, ORALLY DISINTEGRATING ORAL EVERY 8 HOURS PRN
Qty: 12 TABLET | Refills: 0 | Status: SHIPPED | OUTPATIENT
Start: 2018-04-20 | End: 2018-04-20 | Stop reason: SDUPTHER

## 2018-04-20 RX ORDER — ONDANSETRON 4 MG/1
4 TABLET, ORALLY DISINTEGRATING ORAL EVERY 8 HOURS PRN
Qty: 12 TABLET | Refills: 0 | Status: SHIPPED | OUTPATIENT
Start: 2018-04-20 | End: 2018-04-24 | Stop reason: SDUPTHER

## 2018-04-20 RX ORDER — OMEPRAZOLE 20 MG/1
20 CAPSULE, DELAYED RELEASE ORAL DAILY
Qty: 30 CAPSULE | Refills: 0 | Status: SHIPPED | OUTPATIENT
Start: 2018-04-20 | End: 2018-04-24 | Stop reason: SDUPTHER

## 2018-04-20 NOTE — PATIENT INSTRUCTIONS
FOLLOW UP WITH PCP ON 4/24/18 AS SCHEDULED  Abdominal Pain    Abdominal pain is pain in the stomach or belly area. Everyone has this pain from time to time. In many cases it goes away on its own. But abdominal pain can sometimes be due to a serious problem, such as appendicitis. So its important to know when to seek help.  Causes of abdominal pain  There are many possible causes of abdominal pain. Common causes in adults include:  · Constipation, diarrhea, or gas  · Stomach acid flowing back up into the esophagus (acid reflux or heartburn)  · Severe acid reflux, called GERD (gastroesophageal reflux disease)  · A sore in the lining of the stomach or small intestine (peptic ulcer)  · Inflammation of the gallbladder, liver, or pancreas  · Gallstones or kidney stones  · Appendicitis   · Intestinal blockage   · An internal organ pushing through a muscle or other tissue (hernia)  · Urinary tract infections  · In women, menstrual cramps, fibroids, or endometriosis  · Inflammation or infection of the intestines  Diagnosing the cause of abdominal pain  Your healthcare provider will do a physical exam help find the cause of your pain. If needed, tests will be ordered. Belly pain has many possible causes. So it can be hard to find the reason for your pain. Giving details about your pain can help. Tell your provider where and when you feel the pain, and what makes it better or worse. Also let your provider know if you have other symptoms such as:  · Fever  · Tiredness  · Upset stomach (nausea)  · Vomiting  · Changes in bathroom habits  Treating abdominal pain  Some causes of pain need emergency medical treatment right away. These include appendicitis or a bowel blockage. Other problems can be treated with rest, fluids, or medicines. Your healthcare provider can give you specific instructions for treatment or self-care based on what is causing your pain.  If you have vomiting or diarrhea, sip water or other clear fluids. When  you are ready to eat solid foods again, start with small amounts of easy-to-digest, low-fat foods. These include apple sauce, toast, or crackers.   When to seek medical care  Call 911 or go to the hospital right away if you:  · Cant pass stool and are vomiting  · Are vomiting blood or have bloody diarrhea or black, tarry diarrhea  · Have chest, neck, or shoulder pain  · Feel like you might pass out  · Have pain in your shoulder blades with nausea  · Have sudden, severe belly pain  · Have new, severe pain unlike any you have felt before  · Have a belly that is rigid, hard, and tender to touch  Call your healthcare provider if you have:  · Pain for more than 5 days  · Bloating for more than 2 days  · Diarrhea for more than 5 days  · A fever of 100.4°F (38.0°C) or higher, or as directed by your provider  · Pain that gets worse  · Weight loss for no reason  · Continued lack of appetite  · Blood in your stool  How to prevent abdominal pain  Here are some tips to help prevent abdominal pain:  · Eat smaller amounts of food at one time.  · Avoid greasy, fried, or other high-fat foods.  · Avoid foods that give you gas.  · Exercise regularly.  · Drink plenty of fluids.  To help prevent GERD symptoms:  · Quit smoking.  · Reduce alcohol and certain foods that increase stomach acid.  · Avoid aspirin and over-the-counter pain and fever medicines (NSAIDS or nonsteroidal anti-inflammatory drugs), if possible  · Lose extra weight.  · Finish eating at least 2 hours before you go to bed or lie down.  · Raise the head of your bed.  Date Last Reviewed: 7/1/2016  © 3629-2357 [employer]. [address] [place] [address]. All rights reserved. This information is not intended as a substitute for professional medical care. Always follow your healthcare professional's instructions.        GERD (Adult)    The esophagus is a tube that carries food from the mouth to the stomach. A valve at the lower end of the esophagus  "prevents stomach acid from flowing upward. When this valve doesn't work properly, stomach contents may repeatedly flow back up (reflux) into the esophagus. This is called gastroesophageal reflux disease (GERD). GERD can irritate the esophagus. It can cause problems with swallowing or breathing. In severe cases, GERD can cause recurrent pneumonia or other serious problems.  Symptoms of reflux include burning, pressure or sharp pain in the upper abdomen or mid to lower chest. The pain can spread to the neck, back, or shoulder. There may be belching, an acid taste in the back of the throat, chronic cough, or sore throat or hoarseness. GERD symptoms often occur during the day after a big meal. They can also occur at night when lying down.   Home care  Lifestyle changes can help reduce symptoms. If needed, medicines may be prescribed. Symptoms often improve with treatment, but if treatment is stopped, the symptoms often return after a few months. So most persons with GERD will need to continue treatment.  Lifestyle changes  · Limit or avoid fatty, fried, and spicy foods, as well as coffee, chocolate, mint, and foods with high acid content such as tomatoes and citrus fruit and juices (orange, grapefruit, lemon).  · Dont eat large meals, especially at night. Frequent, smaller meals are best. Do not lie down right after eating. And dont eat anything 3 hours before going to bed.  · Avoid drinking alcohol and smoking. As much as possible, stay away from second hand smoke.  · If you are overweight, losing weight will reduce symptoms.   · Avoid wearing tight clothing around your stomach area.  · If your symptoms occur during sleep, use a foam wedge to elevate your upper body (not just your head.) Or, place 4" blocks under the head of your bed.  Medicines  If needed, medicines can help relieve the symptoms of GERD and prevent damage to the esophagus. Discuss a medicine plan with your healthcare provider. This may include one " or more of the following medicines:  · Antacids to help neutralize the normal acids in your stomach.  · Acid blockers (H2 blockers) to decrease acid production.  · Acid inhibitors (PPIs) to decrease acid production in a different way than the blockers. They may work better, but can take a little longer to take effect.  Take an antacid 30-60 minutes after eating and at bedtime, but not at the same time as an acid blocker.  Try not to take medicines such as ibuprofen and aspirin. If you are taking aspirin for your heart or other medical reasons, talk to your healthcare provider about stopping it.  Follow-up care  Follow up with your healthcare provider or as advised by our staff.  When to seek medical advice  Call your healthcare provider if any of the following occur:  · Stomach pain gets worse or moves to the lower right abdomen (appendix area)  · Chest pain appears or gets worse, or spreads to the back, neck, shoulder, or arm  · Frequent vomiting (cant keep down liquids)  · Blood in the stool or vomit (red or black in color)  · Feeling weak or dizzy  · Fever of 100.4ºF (38ºC) or higher, or as directed by your healthcare provider  Date Last Reviewed: 6/23/2015  © 7733-4795 [employer]. [address] [place] [address], [place] [address]. All rights reserved. This information is not intended as a substitute for professional medical care. Always follow your healthcare professional's instructions.

## 2018-04-20 NOTE — PROGRESS NOTES
"Subjective:       Patient ID: Joseluis Triplett is a 62 y.o. female.    Vitals:  height is 5' 5" (1.651 m) and weight is 90.7 kg (200 lb). Her oral temperature is 99 °F (37.2 °C). Her blood pressure is 136/77 and her pulse is 78. Her respiration is 20 and oxygen saturation is 96%.     Chief Complaint: Abdominal Pain (For one month)    PATIENT PRESENTS TO CLINIC TODAY WITH C/O ABDOMINAL PAIN X 1 MONTH. SHE STATES THAT HER PCP GAVE HER 2 ROUNDS OF ANTIBIOTICS AND SHE HAS BEEN HAVING STOMACH PAIN SINCE THEN. PAIN IS DESCRIBED AS BURNING. DENIES N/V/D. SHE STATES THAT THE PAIN IS WORSE SOME DAYS THAN OTHERS. SHE HAD A "GOOD LUNCH" TODAY. HX OF C. DIFF AND PERFORATED DUODENAL ULCER IN 2015. SHE IS HERE FOR AN X-RAY AND STATES "I NEED SURGERY"    PATIENT WITH HX OF SCHIZOPHRENIA, BIPOLAR D/O. PATIENT IS ACCOMPANIED BY HER MOTHER, THEY ARE ARGUING IN THE EXAM ROOM. PATIENT STATED SEVERAL TIMES THAT SHE IS NEGLECTED IN THE NURSING HOME AND THEY DON'T CARE ABOUT HER STOMACH PAIN. SHE STATES THAT SHE HAS BEEN IN THE NURSING HOME FOR 6 YEARS AND HAS ONLY SEEN THE DOCTOR 1 TIME. SHE WAS GIVEN MAALOX AND IT DID NOT HELP HER. SHE STATES THAT "PEOPLE TALKING AND THEIR MOVEMENT" MAKES HER STOMACH HURT WORSE. SHE REPORTS THAT HER SKIN HURTS TO TOUCH. SHE BEGAN CRYING WHEN TALKING ABOUT HER DUODENAL PERFORATION.     PATIENT WALKED TO DESK AND ASKED IF SHE COULD SMOKE A CIGARETTE BECAUSE SHE WAS IN SO MUCH PAIN.       Abdominal Pain   This is a new problem. The current episode started 1 to 4 weeks ago. The problem occurs constantly. Pertinent negatives include no constipation, diarrhea, dysuria, fever, hematochezia, melena, nausea or vomiting.     Review of Systems   Constitution: Negative for chills and fever.   Cardiovascular: Negative for chest pain.   Respiratory: Negative for shortness of breath.    Musculoskeletal: Negative for back pain.   Gastrointestinal: Positive for abdominal pain. Negative for constipation, diarrhea, " hematochezia, melena, nausea and vomiting.   Genitourinary: Negative for dysuria.       Objective:      Physical Exam   Constitutional: She is oriented to person, place, and time. She appears well-developed and well-nourished.   HENT:   Head: Normocephalic and atraumatic.   Right Ear: External ear normal.   Left Ear: External ear normal.   Nose: Nose normal.   Mouth/Throat: Mucous membranes are normal.   Eyes: Conjunctivae and lids are normal.   Neck: Trachea normal and full passive range of motion without pain. Neck supple.   Cardiovascular: Normal rate, regular rhythm and normal heart sounds.    Pulmonary/Chest: Effort normal and breath sounds normal. No respiratory distress.   Abdominal: Soft. Normal appearance and bowel sounds are normal. She exhibits no distension, no abdominal bruit, no pulsatile midline mass and no mass. There is no tenderness.   NO OBVIOUS PAIN ON PALPATION.   Musculoskeletal: Normal range of motion. She exhibits no edema.   Neurological: She is alert and oriented to person, place, and time. She has normal strength.   Skin: Skin is warm, dry and intact. She is not diaphoretic. No pallor.   Psychiatric: Her speech is normal. Judgment and thought content normal. Her affect is inappropriate. She is agitated. Cognition and memory are normal.   Nursing note and vitals reviewed.      X-Ray Abdomen Flat And Erect 04/20/2018 abdominal pain             RESULTS:  EXAMINATION:  XR ABDOMEN FLAT AND ERECT     CLINICAL HISTORY:  abdominal pain;Generalized abdominal pain     TECHNIQUE:  Flat and erect AP views of the abdomen were performed.     COMPARISON:  01/24/2018     FINDINGS:  Scattered gas-filled loops of bowel within the abdomen and pelvis.  No significant air-fluid level or evidence for free air on upright views.  Overall nonspecific bowel gas pattern.  Ovoid opacity in the pelvis likely represents distended urinary bladder.  Continued subcentimeter hyperdensities scattered in the pelvis  suggestive for phleboliths.  Degenerative change in the visualized lumbar spine and hip joints.  Further evaluation as warranted clinically..     IMPRESSION:      Please see above        Electronically signed by: Luis Felipe Almonte DO  Date:                                            04/20/2018  Time:                                           14:17       Assessment:       1. Generalized abdominal pain    2. Gastroesophageal reflux disease, esophagitis presence not specified        Plan:         Generalized abdominal pain  -     X-Ray Abdomen Flat And Erect; Future; Expected date: 04/20/2018  -     Discontinue: ondansetron (ZOFRAN-ODT) 4 MG TbDL; Take 1 tablet (4 mg total) by mouth every 8 (eight) hours as needed (NAUSEA).  Dispense: 12 tablet; Refill: 0  -     ondansetron (ZOFRAN-ODT) 4 MG TbDL; Take 1 tablet (4 mg total) by mouth every 8 (eight) hours as needed (NAUSEA).  Dispense: 12 tablet; Refill: 0    Gastroesophageal reflux disease, esophagitis presence not specified  -     omeprazole (PRILOSEC) 20 MG capsule; Take 1 capsule (20 mg total) by mouth once daily.  Dispense: 30 capsule; Refill: 0      Patient Instructions     FOLLOW UP WITH PCP ON 4/24/18 AS SCHEDULED  Abdominal Pain    Abdominal pain is pain in the stomach or belly area. Everyone has this pain from time to time. In many cases it goes away on its own. But abdominal pain can sometimes be due to a serious problem, such as appendicitis. So its important to know when to seek help.  Causes of abdominal pain  There are many possible causes of abdominal pain. Common causes in adults include:  · Constipation, diarrhea, or gas  · Stomach acid flowing back up into the esophagus (acid reflux or heartburn)  · Severe acid reflux, called GERD (gastroesophageal reflux disease)  · A sore in the lining of the stomach or small intestine (peptic ulcer)  · Inflammation of the gallbladder, liver, or pancreas  · Gallstones or kidney stones  · Appendicitis   · Intestinal  blockage   · An internal organ pushing through a muscle or other tissue (hernia)  · Urinary tract infections  · In women, menstrual cramps, fibroids, or endometriosis  · Inflammation or infection of the intestines  Diagnosing the cause of abdominal pain  Your healthcare provider will do a physical exam help find the cause of your pain. If needed, tests will be ordered. Belly pain has many possible causes. So it can be hard to find the reason for your pain. Giving details about your pain can help. Tell your provider where and when you feel the pain, and what makes it better or worse. Also let your provider know if you have other symptoms such as:  · Fever  · Tiredness  · Upset stomach (nausea)  · Vomiting  · Changes in bathroom habits  Treating abdominal pain  Some causes of pain need emergency medical treatment right away. These include appendicitis or a bowel blockage. Other problems can be treated with rest, fluids, or medicines. Your healthcare provider can give you specific instructions for treatment or self-care based on what is causing your pain.  If you have vomiting or diarrhea, sip water or other clear fluids. When you are ready to eat solid foods again, start with small amounts of easy-to-digest, low-fat foods. These include apple sauce, toast, or crackers.   When to seek medical care  Call 911 or go to the hospital right away if you:  · Cant pass stool and are vomiting  · Are vomiting blood or have bloody diarrhea or black, tarry diarrhea  · Have chest, neck, or shoulder pain  · Feel like you might pass out  · Have pain in your shoulder blades with nausea  · Have sudden, severe belly pain  · Have new, severe pain unlike any you have felt before  · Have a belly that is rigid, hard, and tender to touch  Call your healthcare provider if you have:  · Pain for more than 5 days  · Bloating for more than 2 days  · Diarrhea for more than 5 days  · A fever of 100.4°F (38.0°C) or higher, or as directed by your  provider  · Pain that gets worse  · Weight loss for no reason  · Continued lack of appetite  · Blood in your stool  How to prevent abdominal pain  Here are some tips to help prevent abdominal pain:  · Eat smaller amounts of food at one time.  · Avoid greasy, fried, or other high-fat foods.  · Avoid foods that give you gas.  · Exercise regularly.  · Drink plenty of fluids.  To help prevent GERD symptoms:  · Quit smoking.  · Reduce alcohol and certain foods that increase stomach acid.  · Avoid aspirin and over-the-counter pain and fever medicines (NSAIDS or nonsteroidal anti-inflammatory drugs), if possible  · Lose extra weight.  · Finish eating at least 2 hours before you go to bed or lie down.  · Raise the head of your bed.  Date Last Reviewed: 7/1/2016 © 2000-2017 [employer]. [address] [place] [address], [place] [address]. All rights reserved. This information is not intended as a substitute for professional medical care. Always follow your healthcare professional's instructions.        GERD (Adult)    The esophagus is a tube that carries food from the mouth to the stomach. A valve at the lower end of the esophagus prevents stomach acid from flowing upward. When this valve doesn't work properly, stomach contents may repeatedly flow back up (reflux) into the esophagus. This is called gastroesophageal reflux disease (GERD). GERD can irritate the esophagus. It can cause problems with swallowing or breathing. In severe cases, GERD can cause recurrent pneumonia or other serious problems.  Symptoms of reflux include burning, pressure or sharp pain in the upper abdomen or mid to lower chest. The pain can spread to the neck, back, or shoulder. There may be belching, an acid taste in the back of the throat, chronic cough, or sore throat or hoarseness. GERD symptoms often occur during the day after a big meal. They can also occur at night when lying down.   Home care  Lifestyle changes can help reduce symptoms. If  "needed, medicines may be prescribed. Symptoms often improve with treatment, but if treatment is stopped, the symptoms often return after a few months. So most persons with GERD will need to continue treatment.  Lifestyle changes  · Limit or avoid fatty, fried, and spicy foods, as well as coffee, chocolate, mint, and foods with high acid content such as tomatoes and citrus fruit and juices (orange, grapefruit, lemon).  · Dont eat large meals, especially at night. Frequent, smaller meals are best. Do not lie down right after eating. And dont eat anything 3 hours before going to bed.  · Avoid drinking alcohol and smoking. As much as possible, stay away from second hand smoke.  · If you are overweight, losing weight will reduce symptoms.   · Avoid wearing tight clothing around your stomach area.  · If your symptoms occur during sleep, use a foam wedge to elevate your upper body (not just your head.) Or, place 4" blocks under the head of your bed.  Medicines  If needed, medicines can help relieve the symptoms of GERD and prevent damage to the esophagus. Discuss a medicine plan with your healthcare provider. This may include one or more of the following medicines:  · Antacids to help neutralize the normal acids in your stomach.  · Acid blockers (H2 blockers) to decrease acid production.  · Acid inhibitors (PPIs) to decrease acid production in a different way than the blockers. They may work better, but can take a little longer to take effect.  Take an antacid 30-60 minutes after eating and at bedtime, but not at the same time as an acid blocker.  Try not to take medicines such as ibuprofen and aspirin. If you are taking aspirin for your heart or other medical reasons, talk to your healthcare provider about stopping it.  Follow-up care  Follow up with your healthcare provider or as advised by our staff.  When to seek medical advice  Call your healthcare provider if any of the following occur:  · Stomach pain gets worse " or moves to the lower right abdomen (appendix area)  · Chest pain appears or gets worse, or spreads to the back, neck, shoulder, or arm  · Frequent vomiting (cant keep down liquids)  · Blood in the stool or vomit (red or black in color)  · Feeling weak or dizzy  · Fever of 100.4ºF (38ºC) or higher, or as directed by your healthcare provider  Date Last Reviewed: 6/23/2015  © 2305-5319 [employer]. [address] [place] [address]. All rights reserved. This information is not intended as a substitute for professional medical care. Always follow your healthcare professional's instructions.

## 2018-04-24 ENCOUNTER — LAB VISIT (OUTPATIENT)
Dept: LAB | Facility: HOSPITAL | Age: 63
End: 2018-04-24
Payer: MEDICARE

## 2018-04-24 ENCOUNTER — OFFICE VISIT (OUTPATIENT)
Dept: INTERNAL MEDICINE | Facility: CLINIC | Age: 63
End: 2018-04-24
Payer: MEDICARE

## 2018-04-24 VITALS
SYSTOLIC BLOOD PRESSURE: 112 MMHG | OXYGEN SATURATION: 96 % | HEART RATE: 63 BPM | HEIGHT: 65 IN | BODY MASS INDEX: 35.59 KG/M2 | WEIGHT: 213.63 LBS | DIASTOLIC BLOOD PRESSURE: 68 MMHG

## 2018-04-24 DIAGNOSIS — J31.0 CHRONIC RHINITIS: ICD-10-CM

## 2018-04-24 DIAGNOSIS — E03.1 CONGENITAL HYPOTHYROIDISM WITHOUT GOITER: ICD-10-CM

## 2018-04-24 DIAGNOSIS — E78.5 HYPERLIPIDEMIA, UNSPECIFIED HYPERLIPIDEMIA TYPE: ICD-10-CM

## 2018-04-24 DIAGNOSIS — K43.2 VENTRAL INCISIONAL HERNIA WITHOUT OBSTRUCTION OR GANGRENE: ICD-10-CM

## 2018-04-24 DIAGNOSIS — R10.84 GENERALIZED ABDOMINAL PAIN: ICD-10-CM

## 2018-04-24 DIAGNOSIS — F31.62 BIPOLAR DISORDER, CURRENT EPISODE MIXED, MODERATE: ICD-10-CM

## 2018-04-24 DIAGNOSIS — Z72.0 TOBACCO ABUSE: ICD-10-CM

## 2018-04-24 DIAGNOSIS — G21.19 DRUG-INDUCED PARKINSONISM: ICD-10-CM

## 2018-04-24 DIAGNOSIS — J44.9 CHRONIC OBSTRUCTIVE PULMONARY DISEASE, UNSPECIFIED COPD TYPE: ICD-10-CM

## 2018-04-24 DIAGNOSIS — K21.9 GASTROESOPHAGEAL REFLUX DISEASE, ESOPHAGITIS PRESENCE NOT SPECIFIED: ICD-10-CM

## 2018-04-24 DIAGNOSIS — F25.9 SCHIZO AFFECTIVE SCHIZOPHRENIA: Primary | ICD-10-CM

## 2018-04-24 DIAGNOSIS — G47.33 OSA (OBSTRUCTIVE SLEEP APNEA): ICD-10-CM

## 2018-04-24 DIAGNOSIS — R56.9 CONVULSIONS, UNSPECIFIED CONVULSION TYPE: ICD-10-CM

## 2018-04-24 DIAGNOSIS — G43.109 MIGRAINE WITH AURA AND WITHOUT STATUS MIGRAINOSUS, NOT INTRACTABLE: ICD-10-CM

## 2018-04-24 LAB
ALBUMIN SERPL BCP-MCNC: 3.5 G/DL
ALP SERPL-CCNC: 100 U/L
ALT SERPL W/O P-5'-P-CCNC: 23 U/L
ANION GAP SERPL CALC-SCNC: 8 MMOL/L
AST SERPL-CCNC: 21 U/L
BASOPHILS # BLD AUTO: 0.02 K/UL
BASOPHILS NFR BLD: 0.2 %
BILIRUB SERPL-MCNC: 0.3 MG/DL
BILIRUB UR QL STRIP: NEGATIVE
BUN SERPL-MCNC: 17 MG/DL
CALCIUM SERPL-MCNC: 9.8 MG/DL
CHLORIDE SERPL-SCNC: 97 MMOL/L
CHOLEST SERPL-MCNC: 242 MG/DL
CHOLEST/HDLC SERPL: 4 {RATIO}
CLARITY UR REFRACT.AUTO: CLEAR
CO2 SERPL-SCNC: 28 MMOL/L
COLOR UR AUTO: NORMAL
CREAT SERPL-MCNC: 1 MG/DL
DIFFERENTIAL METHOD: ABNORMAL
EOSINOPHIL # BLD AUTO: 0.2 K/UL
EOSINOPHIL NFR BLD: 1.8 %
ERYTHROCYTE [DISTWIDTH] IN BLOOD BY AUTOMATED COUNT: 14.1 %
EST. GFR  (AFRICAN AMERICAN): >60 ML/MIN/1.73 M^2
EST. GFR  (NON AFRICAN AMERICAN): >60 ML/MIN/1.73 M^2
GLUCOSE SERPL-MCNC: 100 MG/DL
GLUCOSE UR QL STRIP: NEGATIVE
HCT VFR BLD AUTO: 39.4 %
HDLC SERPL-MCNC: 61 MG/DL
HDLC SERPL: 25.2 %
HGB BLD-MCNC: 13.2 G/DL
HGB UR QL STRIP: NEGATIVE
KETONES UR QL STRIP: NEGATIVE
LDLC SERPL CALC-MCNC: 148.6 MG/DL
LEUKOCYTE ESTERASE UR QL STRIP: NEGATIVE
LYMPHOCYTES # BLD AUTO: 1.7 K/UL
LYMPHOCYTES NFR BLD: 19.8 %
MCH RBC QN AUTO: 30.4 PG
MCHC RBC AUTO-ENTMCNC: 33.5 G/DL
MCV RBC AUTO: 91 FL
MONOCYTES # BLD AUTO: 1.3 K/UL
MONOCYTES NFR BLD: 15.1 %
NEUTROPHILS # BLD AUTO: 5.4 K/UL
NEUTROPHILS NFR BLD: 62.6 %
NITRITE UR QL STRIP: NEGATIVE
NONHDLC SERPL-MCNC: 181 MG/DL
PH UR STRIP: 6 [PH] (ref 5–8)
PLATELET # BLD AUTO: 325 K/UL
PMV BLD AUTO: 9.3 FL
POTASSIUM SERPL-SCNC: 5 MMOL/L
PROT SERPL-MCNC: 7.2 G/DL
PROT UR QL STRIP: NEGATIVE
RBC # BLD AUTO: 4.34 M/UL
SODIUM SERPL-SCNC: 133 MMOL/L
SP GR UR STRIP: 1.01 (ref 1–1.03)
T4 FREE SERPL-MCNC: 1.03 NG/DL
TRIGL SERPL-MCNC: 162 MG/DL
TSH SERPL DL<=0.005 MIU/L-ACNC: 1.7 UIU/ML
URN SPEC COLLECT METH UR: NORMAL
UROBILINOGEN UR STRIP-ACNC: NEGATIVE EU/DL
WBC # BLD AUTO: 8.68 K/UL

## 2018-04-24 PROCEDURE — 84443 ASSAY THYROID STIM HORMONE: CPT

## 2018-04-24 PROCEDURE — 81003 URINALYSIS AUTO W/O SCOPE: CPT

## 2018-04-24 PROCEDURE — 99499 UNLISTED E&M SERVICE: CPT | Mod: S$GLB,,, | Performed by: NURSE PRACTITIONER

## 2018-04-24 PROCEDURE — 36415 COLL VENOUS BLD VENIPUNCTURE: CPT

## 2018-04-24 PROCEDURE — 85025 COMPLETE CBC W/AUTO DIFF WBC: CPT

## 2018-04-24 PROCEDURE — 84439 ASSAY OF FREE THYROXINE: CPT

## 2018-04-24 PROCEDURE — 99999 PR PBB SHADOW E&M-EST. PATIENT-LVL V: CPT | Mod: PBBFAC,,, | Performed by: NURSE PRACTITIONER

## 2018-04-24 PROCEDURE — 3078F DIAST BP <80 MM HG: CPT | Mod: CPTII,S$GLB,, | Performed by: NURSE PRACTITIONER

## 2018-04-24 PROCEDURE — 80061 LIPID PANEL: CPT

## 2018-04-24 PROCEDURE — 99214 OFFICE O/P EST MOD 30 MIN: CPT | Mod: S$GLB,,, | Performed by: NURSE PRACTITIONER

## 2018-04-24 PROCEDURE — 3074F SYST BP LT 130 MM HG: CPT | Mod: CPTII,S$GLB,, | Performed by: NURSE PRACTITIONER

## 2018-04-24 PROCEDURE — 80053 COMPREHEN METABOLIC PANEL: CPT

## 2018-04-24 RX ORDER — ONDANSETRON 4 MG/1
4 TABLET, ORALLY DISINTEGRATING ORAL EVERY 8 HOURS PRN
Qty: 30 TABLET | Refills: 0 | Status: SHIPPED | OUTPATIENT
Start: 2018-04-24 | End: 2018-05-02 | Stop reason: SDUPTHER

## 2018-04-24 RX ORDER — OMEPRAZOLE 20 MG/1
20 CAPSULE, DELAYED RELEASE ORAL DAILY
Qty: 30 CAPSULE | Refills: 0 | Status: SHIPPED | OUTPATIENT
Start: 2018-04-24 | End: 2018-05-02 | Stop reason: SDUPTHER

## 2018-04-24 RX ORDER — TRAZODONE HYDROCHLORIDE 100 MG/1
TABLET ORAL
COMMUNITY
Start: 2018-04-16 | End: 2018-05-31 | Stop reason: SDUPTHER

## 2018-04-24 RX ORDER — QUETIAPINE 400 MG/1
TABLET, FILM COATED, EXTENDED RELEASE ORAL
COMMUNITY
Start: 2018-04-17 | End: 2018-05-31 | Stop reason: SDUPTHER

## 2018-04-24 NOTE — PATIENT INSTRUCTIONS
Do not lay down for at least 2 hours after eating. Eat smaller, more frequent meals. Avoid trigger foods such as red sauce, caffeine, fatty foods, carbonated beverages, alcohol. Avoid NSAIDs. Elevated HOB 30 degrees, using books or blocks.

## 2018-04-24 NOTE — PROGRESS NOTES
Patient, Joseluis Triplett (MRN #[id number]), presented with a recorded BMI of 35.55 kg/m^2 and a documented comorbidity(s):  - Obstructive Sleep Apnea  - Hyperlipidemia  to which the severe obesity is a contributing factor. This is consistent with the definition of severe obesity (BMI 35.0-35.9) with comorbidity (ICD-10 E66.01, Z68.35). The patient's severe obesity was monitored, evaluated, addressed and/or treated. This addendum to the medical record is made on 04/24/2018.

## 2018-04-24 NOTE — PROGRESS NOTES
Internal Medicine Annual Exam       CHIEF COMPLAINT     The patient, Joseluis Triplett, who is a 62 y.o. female presents for an annual exam.    HPI   62 year old female with bipolar disease, schizophrenia, HLD, CLARI, COPD, hypothyroidism, migraines, GERD and tobacco use presents for annual physical.     Needs new PCP.     Was seen at [facility]- 4/20/2018 for abd pain mid-epigastric pain. Started on omeprazole 20mg daily and zofran as needed. Feeling better after prilosec x 4 days.     Schizophrenia/bipolar- compliant with depakote, prozac, buspar, trazadone and seroquel. Followed by psychiatry - Dr. Rupert Yañez with [facility].        Drug induced parkinson- compliant with carbidopa-levadopa- follow up with neuro 7/16/2018. Still with shaking and tremors in the morning.     Convulsions- compliant with depakote and topamax. Followed by neurology.     Copd- followed by pulmonary Dr. Gabriel/Deacon. Compliant with breo, floanse and albuterol for rescue.     Hypothyroidism- compliant with synthroid     HTN- compliant with propranolol and lasix     CLARI- followed by sleep medicine.     HLD- has been  Off lipitor x 2 years.     HM-  MMG- 4/2018 repeat in 1 year - done at [facility]   DEXA- osteopenia 4/2018 started vitamin d          Past Medical History:  Past Medical History:   Diagnosis Date    Anxiety     Asthma     Bipolar disorder     Chronic constipation     Chronic kidney disease     History of dialysis secondary to overdose    COPD (chronic obstructive pulmonary disease)     Depression     DVT (deep venous thrombosis)     Dysphagia     GERD (gastroesophageal reflux disease)     Hyperlipidemia     Migraine headache 8/26/2014    Neuropathy 8/26/2014    CLARI (obstructive sleep apnea) 11/11/2013    Parkinson disease     Perforated duodenal ulcer 5/28/2015    Recurrent upper respiratory infection (URI)     Schizo affective schizophrenia     Seizures     Thyroid disease        Past Surgical  History:   Procedure Laterality Date    COLONOSCOPY N/A 5/17/2016    Procedure: COLONOSCOPY;  Surgeon: Akbar Tsang MD;  Location: [facility] ([address] [place] [address]);  Service: Endoscopy;  Laterality: N/A;    TONSILLECTOMY      TYMPANOSTOMY TUBE PLACEMENT          Family History   Problem Relation Age of Onset    Alcohol abuse Mother     Bipolar disorder Mother     Dementia Mother     Cancer Maternal Grandmother 60     colon ca    Allergic rhinitis Neg Hx     Allergies Neg Hx     Angioedema Neg Hx     Asthma Neg Hx     Atopy Neg Hx     Eczema Neg Hx     Immunodeficiency Neg Hx     Rhinitis Neg Hx     Urticaria Neg Hx         Social History     Social History    Marital status: Single     Spouse name: N/A    Number of children: N/A    Years of education: N/A     Occupational History    Disabled      Social History Main Topics    Smoking status: Former Smoker     Packs/day: 1.50     Years: 36.00     Types: Cigarettes     Quit date: 5/22/2017    Smokeless tobacco: Former User     Quit date: 1/3/2013    Alcohol use No    Drug use: No    Sexual activity: No     Other Topics Concern    Not on file     Social History Narrative    No narrative on file        History   Smoking Status    Former Smoker    Packs/day: 1.50    Years: 36.00    Types: Cigarettes    Quit date: 5/22/2017   Smokeless Tobacco    Former User    Quit date: 1/3/2013        Allergies as of 04/24/2018 - Reviewed 04/24/2018   Allergen Reaction Noted    Nsaids (non-steroidal anti-inflammatory drug) Other (See Comments) 06/22/2015    Penicillins Rash and Other (See Comments) 01/17/2013          Home Medications:  Prior to Admission medications    Medication Sig Start Date End Date Taking? Authorizing Provider   acetaminophen (TYLENOL) 500 MG tablet Take 1,000 mg by mouth 3 (three) times daily.    Yes Historical Provider, MD   albuterol 90 mcg/actuation inhaler Inhale 2 puffs into the lungs every 6 (six) hours as needed for Wheezing.  Rescue 1/22/18 1/22/19 Yes Jez Worthy MD   aspirin (ECOTRIN) 81 MG EC tablet Take 81 mg by mouth once daily.   Yes Historical Provider, MD   atorvastatin (LIPITOR) 40 MG tablet Take 1 tablet (40 mg total) by mouth once daily. 9/25/14  Yes Gabriel Collins MD   benzocaine-menthol 15-2.6 mg Lozg 1 capsule by Mucous Membrane route every 6 (six) hours as needed (for cough).   Yes Historical Provider, MD   bisacodyl (DULCOLAX) 10 mg Supp Place 10 mg rectally daily as needed.   Yes Historical Provider, MD   busPIRone (BUSPAR) 10 MG tablet 10 mg 3 (three) times daily.  1/9/17  Yes Historical Provider, MD   carbidopa-levodopa  mg (SINEMET)  mg per tablet Take 0.5 tablets by mouth 3 (three) times daily. 7/24/17  Yes Dorothea Sanchez MD   chlorhexidine (PERIDEX) 0.12 % solution  4/1/16  Yes Historical Provider, MD   clotrimazole-betamethasone 1-0.05% (LOTRISONE) cream Apply to the affected area 2 times per daily 12/19/14  Yes Gabriel Collins MD   divalproex (DEPAKOTE) 500 MG TbEC Take 1 tablet (500 mg total) by mouth every 12 (twelve) hours. 1/27/17  Yes Dorothea Sanchez MD   docusate sodium (COLACE) 100 MG capsule Take 1 capsule (100 mg total) by mouth 2 (two) times daily. 7/30/15  Yes Danielle Aldridge MD   fluoxetine (PROZAC) 20 MG capsule Take 60 mg by mouth once daily.    Yes Historical Provider, MD   fluphenazine (PROLIXIN) 5 MG tablet 5 mg every evening.  11/4/16  Yes Historical Provider, MD   fluticasone-vilanterol (BREO ELLIPTA) 200-25 mcg/dose DsDv diskus inhaler Inhale 1 puff into the lungs once daily. Controller 1/22/18  Yes Jez Worthy MD   furosemide (LASIX) 20 MG tablet TAKE ONE TABLET BY MOUTH TWICE DAILY 11/21/14  Yes Gabriel Collins MD   gabapentin (NEURONTIN) 600 MG tablet Take 1 tablet (600 mg total) by mouth 3 (three) times daily. 7/24/17  Yes Dorothea Sanchez MD   hydrOXYzine pamoate (VISTARIL) 25 MG Cap 1 pill 3x a day for 2 days, rest left over every 8 hours as  needed for headaches 2/15/17  Yes Lyle Briggs PA-C   ibuprofen (ADVIL,MOTRIN) 400 MG tablet  8/14/17  Yes Historical Provider, MD   levothyroxine (SYNTHROID) 150 MCG tablet Take 1 tablet (150 mcg total) by mouth once daily.  Patient taking differently: Take 175 mcg by mouth once daily.  9/25/14  Yes Gabriel Collins MD   MAGNESIUM HYDROXIDE (MILK OF MAGNESIA ORAL) Take by mouth once daily.   Yes Historical Provider, MD   mupirocin (BACTROBAN) 2 % ointment Apply topically 2 (two) times daily. 4/6/18  Yes Corey Castillo MD   nitrofurantoin, macrocrystal-monohydrate, (MACROBID) 100 MG capsule  5/31/16  Yes Historical Provider, MD   omega-3 acid ethyl esters (LOVAZA) 1 gram capsule Take 2 capsules (2 g total) by mouth 2 (two) times daily. 1/20/15  Yes Gabriel Collins MD   omeprazole (PRILOSEC) 20 MG capsule Take 1 capsule (20 mg total) by mouth once daily. 4/20/18  Yes Heena Reed NP   ondansetron (ZOFRAN-ODT) 4 MG TbDL Take 1 tablet (4 mg total) by mouth every 8 (eight) hours as needed (NAUSEA). 4/20/18  Yes Heena Reed NP   potassium chloride SA (K-DUR,KLOR-CON) 20 MEQ tablet TAKE ONE TABLET BY MOUTH EVERY DAY 5/20/15  Yes Gabriel Collins MD   propranolol (INDERAL) 40 MG tablet Take 1 tablet (40 mg total) by mouth 3 (three) times daily. 6/25/13  Yes Curtis Tong MD   psyllium 0.52 gram capsule Take 0.52 g by mouth once daily.   Yes Historical Provider, MD   QUEtiapine (SEROQUEL XR) 400 MG Tb24  4/17/18  Yes Historical Provider, MD   tizanidine (ZANAFLEX) 2 MG tablet Take 1 tablet (2 mg total) by mouth 2 (two) times daily. 9/18/17  Yes ELBA Chaves   topiramate (TOPAMAX) 100 MG tablet Take 1 tablet (100 mg total) by mouth 2 (two) times daily. 1/27/17  Yes Dorothea Sanchez MD   traZODone (DESYREL) 100 MG tablet  4/16/18  Yes Historical Provider, MD   quetiapine (SEROQUEL) 300 MG Tab  8/17/17 4/24/18 Yes Historical Provider, MD   trazodone (DESYREL) 50 MG tablet  8/4/17 4/24/18  "Yes Historical Provider, MD   lactulose (CHRONULAC) 20 gram/30 mL Soln Take 30 mLs (20 g total) by mouth once daily. 1/24/18 2/23/18  Clayton Clarke MD       Review of Systems:  Review of Systems   Constitutional: Negative for chills, fatigue and fever.   HENT: Positive for sore throat and voice change. Negative for congestion, postnasal drip, rhinorrhea, sinus pain and sinus pressure.    Respiratory: Negative for cough, shortness of breath and wheezing.    Cardiovascular: Negative for chest pain and palpitations.   Gastrointestinal: Positive for abdominal distention. Negative for blood in stool, constipation, diarrhea, nausea and vomiting.   Musculoskeletal: Negative for arthralgias and myalgias.   Skin: Negative for rash.   Neurological: Positive for headaches. Negative for dizziness, weakness and light-headedness.   Psychiatric/Behavioral: Negative for agitation, dysphoric mood, self-injury and suicidal ideas. The patient is not nervous/anxious.        Health Maintainence:   Immunizations:  Health Maintenance       Date Due Completion Date    TETANUS VACCINE 07/04/1974 ---    Mammogram 05/14/2016 5/14/2014 (Done)    Override on 5/14/2014: Done    Zoster Vaccine 07/04/2016 ---    Influenza Vaccine 08/01/2017 10/24/2014    Pap Smear with HPV Cotest 11/21/2017 11/21/2014 (Not Clinical)    Override on 11/21/2014: Not Clinically Appropriate    Lipid Panel 02/10/2020 2/10/2015    Colonoscopy 05/17/2026 5/17/2016    Override on 11/11/2008: Done           PHYSICAL EXAM     /68 (BP Location: Left arm, Patient Position: Sitting, BP Method: Large (Manual))   Pulse 63   Ht 5' 5" (1.651 m)   Wt 96.9 kg (213 lb 10 oz)   LMP  (LMP Unknown)   SpO2 96%   BMI 35.55 kg/m²  Body mass index is 35.55 kg/m².    Physical Exam   Constitutional: She is oriented to person, place, and time. She appears well-developed and well-nourished.   HENT:   Head: Normocephalic.   Right Ear: External ear normal. Tympanic membrane is " injected. A middle ear effusion is present.   Left Ear: External ear normal. Tympanic membrane is injected. A middle ear effusion is present.   Nose: Mucosal edema present.   Mouth/Throat: Oropharynx is clear and moist. No oropharyngeal exudate.   Eyes: Pupils are equal, round, and reactive to light.   Neck: Neck supple. No JVD present. No tracheal deviation present. No thyromegaly present.   Cardiovascular: Normal rate, regular rhythm, normal heart sounds and intact distal pulses.  Exam reveals no gallop and no friction rub.    No murmur heard.  Pulmonary/Chest: Effort normal and breath sounds normal. No respiratory distress. She has no decreased breath sounds. She has no wheezes. She has no rales.   Abdominal: Soft. Bowel sounds are normal. She exhibits no distension. There is no tenderness.   Musculoskeletal: Normal range of motion. She exhibits edema (2+ lower legs ). She exhibits no tenderness.   Lymphadenopathy:     She has no cervical adenopathy.   Neurological: She is alert and oriented to person, place, and time.   Skin: Skin is warm and dry. Capillary refill takes less than 2 seconds. No rash noted.   Psychiatric: She has a normal mood and affect. Her behavior is normal.   Vitals reviewed.      LABS     Lab Results   Component Value Date    HGBA1C 6.5 (H) 06/05/2015     CMP  Sodium   Date Value Ref Range Status   01/25/2018 140 136 - 145 mmol/L Final     Potassium   Date Value Ref Range Status   01/25/2018 4.0 3.5 - 5.1 mmol/L Final     Chloride   Date Value Ref Range Status   01/25/2018 110 95 - 110 mmol/L Final     CO2   Date Value Ref Range Status   01/25/2018 22 (L) 23 - 29 mmol/L Final     Glucose   Date Value Ref Range Status   01/25/2018 109 70 - 110 mg/dL Final     BUN, Bld   Date Value Ref Range Status   01/25/2018 10 8 - 23 mg/dL Final     Creatinine   Date Value Ref Range Status   01/25/2018 0.8 0.5 - 1.4 mg/dL Final     Calcium   Date Value Ref Range Status   01/25/2018 9.1 8.7 - 10.5 mg/dL  Final     Total Protein   Date Value Ref Range Status   01/25/2018 6.2 6.0 - 8.4 g/dL Final     Albumin   Date Value Ref Range Status   01/25/2018 3.0 (L) 3.5 - 5.2 g/dL Final     Total Bilirubin   Date Value Ref Range Status   01/25/2018 0.3 0.1 - 1.0 mg/dL Final     Comment:     For infants and newborns, interpretation of results should be based  on gestational age, weight and in agreement with clinical  observations.  Premature Infant recommended reference ranges:  Up to 24 hours.............<8.0 mg/dL  Up to 48 hours............<12.0 mg/dL  3-5 days..................<15.0 mg/dL  6-29 days.................<15.0 mg/dL       Alkaline Phosphatase   Date Value Ref Range Status   01/25/2018 83 55 - 135 U/L Final     AST   Date Value Ref Range Status   01/25/2018 22 10 - 40 U/L Final     Comment:     *Result may be interfered by visible hemolysis     ALT   Date Value Ref Range Status   01/25/2018 16 10 - 44 U/L Final     Anion Gap   Date Value Ref Range Status   01/25/2018 8 8 - 16 mmol/L Final     eGFR if    Date Value Ref Range Status   01/25/2018 >60.0 >60 mL/min/1.73 m^2 Final     eGFR if non    Date Value Ref Range Status   01/25/2018 >60.0 >60 mL/min/1.73 m^2 Final     Comment:     Calculation used to obtain the estimated glomerular filtration  rate (eGFR) is the CKD-EPI equation.        Lab Results   Component Value Date    WBC 6.38 01/24/2018    HGB 13.3 01/24/2018    HCT 41.2 01/24/2018    MCV 90 01/24/2018     01/24/2018     Lab Results   Component Value Date    CHOL 191 02/10/2015    CHOL 213 (H) 01/10/2014    CHOL 217 (H) 01/05/2013     Lab Results   Component Value Date    HDL 52 02/10/2015    HDL 49 01/10/2014    HDL 53 01/05/2013     Lab Results   Component Value Date    LDLCALC 92.0 02/10/2015    LDLCALC 108.8 01/10/2014    LDLCALC 113.0 01/05/2013     Lab Results   Component Value Date    TRIG 235 (H) 02/10/2015    TRIG 276 (H) 01/10/2014    TRIG 256 (H)  01/05/2013     Lab Results   Component Value Date    CHOLHDL 27.2 02/10/2015    CHOLHDL 23.0 01/10/2014    CHOLHDL 24.4 01/05/2013     Lab Results   Component Value Date    TSH 16.074 (H) 01/25/2018    Z6NVYIC 64.65 03/14/2012       ASSESSMENT/PLAN     Joseluis Triplett is a 62 y.o. female with  Past Medical History:   Diagnosis Date    Anxiety     Asthma     Bipolar disorder     Chronic constipation     Chronic kidney disease     History of dialysis secondary to overdose    COPD (chronic obstructive pulmonary disease)     Depression     DVT (deep venous thrombosis)     Dysphagia     GERD (gastroesophageal reflux disease)     Hyperlipidemia     Migraine headache 8/26/2014    Neuropathy 8/26/2014    CLARI (obstructive sleep apnea) 11/11/2013    Parkinson disease     Perforated duodenal ulcer 5/28/2015    Recurrent upper respiratory infection (URI)     Schizo affective schizophrenia     Seizures     Thyroid disease      Schizo affective schizophrenia/Bipolar disorder, current episode mixed, moderate- stable. Cont current meds. Will follow up with psychiatry      Drug-induced Parkinsonism- ill follow up with neurology Dr Villanueva. Cont current meds.     Migraine with aura and without status migrainosus, not intractable- stable. Cont f/u with neurology     Convulsions, unspecified convulsion type- no new seizure like activity     Chronic rhinitis- will cont flonase and start nasal saline rinses.   -     sodium chloride (OCEAN NASAL) 0.65 % nasal spray; 2 sprays by Nasal route 2 (two) times daily.; Refill: 12    Chronic obstructive pulmonary disease, unspecified COPD type- cont breo and albuterol. May use nebs as needed     Hyperlipidemia, unspecified hyperlipidemia type- will check FLP today and restart lipitor based on results.   -     CBC auto differential; Future; Expected date: 04/24/2018  -     Comprehensive metabolic panel; Future; Expected date: 04/24/2018  -     Lipid panel; Future; Expected  date: 04/24/2018    Congenital hypothyroidism without goiter- thyroid studies today. Cont synthroid   -     TSH; Future; Expected date: 04/24/2018  -     T4, free; Future; Expected date: 04/24/2018    Gastroesophageal reflux disease, esophagitis presence not specified- follow up with GI. Cont prilosec daily x 21 days. Zofran as needed   -     omeprazole (PRILOSEC) 20 MG capsule; Take 1 capsule (20 mg total) by mouth once daily.  Dispense: 30 capsule; Refill: 0  -     Urinalysis    Ventral incisional hernia without obstruction or gangrene    Tobacco abuse- advised smoking cessaiton, pt is not ready to quit     CLARI (obstructive sleep apnea)- has f/u apt with Sleep med.             Follow up with New PCP in 3 months     Adriana PEREZ, YONY, FNP-c   Department of Internal Medicine - [facility]  9:22 AM

## 2018-04-25 ENCOUNTER — TELEPHONE (OUTPATIENT)
Dept: INTERNAL MEDICINE | Facility: CLINIC | Age: 63
End: 2018-04-25

## 2018-04-25 DIAGNOSIS — E78.5 HYPERLIPIDEMIA, UNSPECIFIED HYPERLIPIDEMIA TYPE: Primary | ICD-10-CM

## 2018-04-25 RX ORDER — ATORVASTATIN CALCIUM 10 MG/1
10 TABLET, FILM COATED ORAL DAILY
Qty: 90 TABLET | Refills: 3 | Status: SHIPPED | OUTPATIENT
Start: 2018-04-25 | End: 2018-05-01 | Stop reason: SDUPTHER

## 2018-04-25 NOTE — TELEPHONE ENCOUNTER
----- Message from Rajani Tidwell sent at 4/25/2018  1:30 PM CDT -----  Contact: self/  .1 Patient would like to get medical advice.  Symptoms (please be specific): brown fleam, cough got worse  How long has patient had these symptoms: after patient left the clinic  Pharmacy name and phone#:  Any drug allergies: penicillin   Comments: patient stated that she does not have a phone, therefor she will call back. Patient would like to get medical advice.

## 2018-04-26 ENCOUNTER — TELEPHONE (OUTPATIENT)
Dept: INTERNAL MEDICINE | Facility: CLINIC | Age: 63
End: 2018-04-26

## 2018-04-26 DIAGNOSIS — J44.9 CHRONIC OBSTRUCTIVE PULMONARY DISEASE, UNSPECIFIED COPD TYPE: ICD-10-CM

## 2018-04-26 DIAGNOSIS — J44.9 CHRONIC OBSTRUCTIVE PULMONARY DISEASE, UNSPECIFIED COPD TYPE: Primary | ICD-10-CM

## 2018-04-26 RX ORDER — AZITHROMYCIN 250 MG/1
TABLET, FILM COATED ORAL
Qty: 6 TABLET | Refills: 0 | Status: SHIPPED | OUTPATIENT
Start: 2018-04-26 | End: 2018-05-01

## 2018-04-26 RX ORDER — AZITHROMYCIN 250 MG/1
TABLET, FILM COATED ORAL
Qty: 6 TABLET | Refills: 0 | Status: SHIPPED | OUTPATIENT
Start: 2018-04-26 | End: 2018-04-26 | Stop reason: SDUPTHER

## 2018-04-26 NOTE — TELEPHONE ENCOUNTER
----- Message from Justin Vega sent at 4/26/2018  2:14 PM CDT -----  Contact: Patient  She said to fax this New Prescription, azithromycin (Z-MARY) 250 MG tablet, to the nursing home pharmacy at [phone] because, she can't leave the nursing home to get it from [facility]    Thank you

## 2018-04-26 NOTE — TELEPHONE ENCOUNTER
Spoke to Tomasz at the nursing home and informed her that a zpack was sent into the pharmacy for the pt.

## 2018-04-26 NOTE — TELEPHONE ENCOUNTER
Pt called to inform me that her nurse Tomasz stated that it is okay for us to fax over the RX to the nursing home, prescription has been printed and faxed per pt's request.     Called nursing home to get fax number [phone]

## 2018-04-26 NOTE — TELEPHONE ENCOUNTER
----- Message from Justin Vega sent at 4/26/2018 10:34 AM CDT -----  Contact: Patient [phone]  Medical advice    She said you can call her at the nursing home and that her symptoms are getting worse.    Thank you

## 2018-04-26 NOTE — TELEPHONE ENCOUNTER
Called nursing home to get clarification of policy with faxing over prescriptions, called twice to speak with a nurse and was on hold for a long time. Will try again tomorrow

## 2018-04-27 ENCOUNTER — TELEPHONE (OUTPATIENT)
Dept: INTERNAL MEDICINE | Facility: CLINIC | Age: 63
End: 2018-04-27

## 2018-04-27 NOTE — TELEPHONE ENCOUNTER
Pt called to try to get nurse on the line to speak with NP in regards to pt receiving zpack, multiple  Attempts to contact nurse with no success. need clarification on process for pt to receive medications; sending to pharmacy vs faxing over prescription.     Spoke with Coni and she confirmed policy of medication process and that they did not receive the faxed RX, I do have confirmation of fax; verified fax number and prescription re-faxed.

## 2018-04-30 ENCOUNTER — TELEPHONE (OUTPATIENT)
Dept: INTERNAL MEDICINE | Facility: CLINIC | Age: 63
End: 2018-04-30

## 2018-04-30 DIAGNOSIS — E78.5 HYPERLIPIDEMIA, UNSPECIFIED HYPERLIPIDEMIA TYPE: ICD-10-CM

## 2018-04-30 NOTE — LETTER
May 1, 2018    Joseluis Triplett  [address]  [place] [address]             [facility] - Internal Medicine  [address]  [facility] [address]  Phone: [phone]  Fax: [phone] Dear Ms. Triplett:    Below are the results from your recent visit:    Labs stable. Cholesterol at goal. Thyroid, kidney and liver normal your results are acceptable.  Please do not hesitate to call the office if you have any questions or concerns.     Sincerely,        Adriana Ortega NP

## 2018-04-30 NOTE — TELEPHONE ENCOUNTER
----- Message from Courtney Huggins sent at 4/30/2018 12:49 PM CDT -----  Contact: self/[phone] 1 lizzeth/ nurse jaron Eckert called in regards to getting her lab results that was done on last Tuesday.      Please advise

## 2018-05-01 ENCOUNTER — TELEPHONE (OUTPATIENT)
Dept: INTERNAL MEDICINE | Facility: CLINIC | Age: 63
End: 2018-05-01

## 2018-05-01 RX ORDER — ATORVASTATIN CALCIUM 10 MG/1
10 TABLET, FILM COATED ORAL DAILY
Qty: 90 TABLET | Refills: 3 | Status: SHIPPED | OUTPATIENT
Start: 2018-05-01 | End: 2020-05-27

## 2018-05-01 NOTE — TELEPHONE ENCOUNTER
Please print RX for Lipitor so that it can be faxed to nursing home, will send fax showing that ocean nasal spray can be purchased OTC. Will also fax results, multiple attempts to contact nursing home on hold for unreasonable wait.    Spoke to nurse [employer] after 8 minutes of waiting results informed will also send fax

## 2018-05-01 NOTE — TELEPHONE ENCOUNTER
"----- Message from Marielle Uribe sent at 5/1/2018 12:58 PM CDT -----  Contact: Self .... Call [phone]  Patient wanted all of her medications to go to [facility].  Fax  Atorvastatin (LIPITOR) 10 MG tablet to  [phone]. Please add pharmacy to her chart. Only this pharmacy.       ## Call her about labs you never called her about.      RX request - refill or new RX.  Is this a refill or new RX:  Refill  RX name and strength: Sodium chloride (OCEAN NASAL) 0.65 % nasal spray  Directions:   Is this a 30 day or 90 day RX:  30  Pharmacy name and phone # (DON'T enter "on file" or "in chart"): [facility]   Comments:        "

## 2018-05-01 NOTE — TELEPHONE ENCOUNTER
Pt called, informed her that [employer] is aware of everything also a copy was faxed over to the nursing home.

## 2018-05-02 ENCOUNTER — TELEPHONE (OUTPATIENT)
Dept: ENDOSCOPY | Facility: HOSPITAL | Age: 63
End: 2018-05-02

## 2018-05-02 ENCOUNTER — OFFICE VISIT (OUTPATIENT)
Dept: GASTROENTEROLOGY | Facility: CLINIC | Age: 63
End: 2018-05-02
Payer: MEDICARE

## 2018-05-02 VITALS
WEIGHT: 218.94 LBS | SYSTOLIC BLOOD PRESSURE: 139 MMHG | BODY MASS INDEX: 36.48 KG/M2 | DIASTOLIC BLOOD PRESSURE: 83 MMHG | HEIGHT: 65 IN | HEART RATE: 59 BPM

## 2018-05-02 DIAGNOSIS — R11.0 NAUSEA: ICD-10-CM

## 2018-05-02 DIAGNOSIS — R10.13 EPIGASTRIC PAIN: Primary | ICD-10-CM

## 2018-05-02 DIAGNOSIS — K59.09 CHRONIC CONSTIPATION: ICD-10-CM

## 2018-05-02 DIAGNOSIS — K21.9 GASTROESOPHAGEAL REFLUX DISEASE WITHOUT ESOPHAGITIS: ICD-10-CM

## 2018-05-02 PROCEDURE — 3079F DIAST BP 80-89 MM HG: CPT | Mod: CPTII,S$GLB,, | Performed by: NURSE PRACTITIONER

## 2018-05-02 PROCEDURE — 3075F SYST BP GE 130 - 139MM HG: CPT | Mod: CPTII,S$GLB,, | Performed by: NURSE PRACTITIONER

## 2018-05-02 PROCEDURE — 99214 OFFICE O/P EST MOD 30 MIN: CPT | Mod: S$GLB,,, | Performed by: NURSE PRACTITIONER

## 2018-05-02 PROCEDURE — 99999 PR PBB SHADOW E&M-EST. PATIENT-LVL V: CPT | Mod: PBBFAC,,, | Performed by: NURSE PRACTITIONER

## 2018-05-02 RX ORDER — ONDANSETRON 4 MG/1
4 TABLET, ORALLY DISINTEGRATING ORAL EVERY 8 HOURS PRN
Qty: 90 TABLET | Refills: 0 | Status: ON HOLD | OUTPATIENT
Start: 2018-05-02 | End: 2018-09-14 | Stop reason: HOSPADM

## 2018-05-02 RX ORDER — OMEPRAZOLE 40 MG/1
40 CAPSULE, DELAYED RELEASE ORAL DAILY
Qty: 30 CAPSULE | Refills: 2 | Status: SHIPPED | OUTPATIENT
Start: 2018-05-02 | End: 2018-05-02 | Stop reason: SDUPTHER

## 2018-05-02 RX ORDER — OMEPRAZOLE 40 MG/1
40 CAPSULE, DELAYED RELEASE ORAL DAILY
Qty: 30 CAPSULE | Refills: 2 | Status: SHIPPED | OUTPATIENT
Start: 2018-05-02 | End: 2019-05-09

## 2018-05-02 RX ORDER — ONDANSETRON 4 MG/1
4 TABLET, ORALLY DISINTEGRATING ORAL EVERY 8 HOURS PRN
Qty: 90 TABLET | Refills: 0 | Status: SHIPPED | OUTPATIENT
Start: 2018-05-02 | End: 2018-05-02 | Stop reason: SDUPTHER

## 2018-05-02 NOTE — PROGRESS NOTES
[facility] Note    Reason for Visit:  The primary encounter diagnosis was Epigastric pain. Diagnoses of Nausea, Gastroesophageal reflux disease without esophagitis, and Chronic constipation were also pertinent to this visit.    PCP:   Clayton Rainey       Referring MD:  No referring provider defined for this encounter.    HPI:  This is a 62 y.o. female here for evaluation of abdominal pain.  Resident of [facility].  Last visit with me in 9/2017.  Previously seen in GI by NELIDA Carcamo (12/2016) and in fellows clinic (2016) for constipation, abd pain.    Abdominal pain   ONSET:few weeks ago s/p abx use per ENT  LOCATION: epigastric s/p abx use four weeks ago. Some improvement s/p Prilosec once daily   DURATION:daily; intermittent.  CHARACTER: dull burning.  ASSOCIATED/ALLEVIATING/AGGRAVAITING:+ nausea. Some improvement with Zofran PRN. No vomiting. No fevers. Worse with meals  RADIATION:none  TEMPORAL:lasts for few hours  SEVERITY:3/10    Reflux - +hx GERD. Retrosternal pyrosis few days weekly. Currently taking omeprazole 20 mg once daily  Dysphagia/odynophagia - no   Bowel habits - +hx chronic constipation. Currently with 1 loose BM 4-5 days weekly. Takes Miralax once daily, and MOM once daily. H/o  lactulose and colace daily. H/o Dulcolax supp PRN as well. Hx linzess   GI bleeding - denies hematochezia, hematemesis, melena, BRBPR, black/tarry stools, and coffee ground emesis  NSAID usage - 81 mg ASA daily.     ROS:  Constitutional: No fevers, no chills, No unintentional weight loss, no fatigue,   ENT: No allergies  CV: No chest pain, no palpitations, no perif. edema, no sob on exertion  Pulm: No cough, No shortness of breath, no wheezes, no sputum  Ophtho: No vision changes  GI: see HPI; also no nausea, no vomiting, no change in appetite  Derm: No rash  Heme: No lymphadenopathy, No bruising  MSK: No arthritis, no muscle pain, no muscle weakness  : No dysuria, No  hematuria  Endo: No hot or cold intolerance  Neuro: No syncope, No seizure,       Medical History:  has a past medical history of Anxiety; Asthma; Bipolar disorder; Chronic constipation; Chronic kidney disease; COPD (chronic obstructive pulmonary disease); Depression; DVT (deep venous thrombosis); Dysphagia; GERD (gastroesophageal reflux disease); Hyperlipidemia; Migraine headache (8/26/2014); Neuropathy (8/26/2014); CLARI (obstructive sleep apnea) (11/11/2013); Parkinson disease; Perforated duodenal ulcer (5/28/2015); Recurrent upper respiratory infection (URI); Schizo affective schizophrenia; Seizures; and Thyroid disease.    Surgical History:  has a past surgical history that includes Tonsillectomy; Tympanostomy tube placement; and Colonoscopy (N/A, 5/17/2016).    Family History: family history includes Alcohol abuse in her mother; Bipolar disorder in her mother; Cancer (age of onset: 60) in her maternal grandmother; Dementia in her mother..     Social History:  reports that she quit smoking about a year ago. Her smoking use included Cigarettes. She has a 54.00 pack-year smoking history. She quit smokeless tobacco use about 5 years ago. She reports that she does not drink alcohol or use drugs.    Review of patient's allergies indicates:   Allergen Reactions    Nsaids (non-steroidal anti-inflammatory drug) Other (See Comments)     History of perforated duodenal ulcer    Penicillins Rash and Other (See Comments)     Yeast infections       Current Outpatient Prescriptions   Medication Sig    acetaminophen (TYLENOL) 500 MG tablet Take 1,000 mg by mouth 3 (three) times daily.     albuterol 90 mcg/actuation inhaler Inhale 2 puffs into the lungs every 6 (six) hours as needed for Wheezing. Rescue    aspirin (ECOTRIN) 81 MG EC tablet Take 81 mg by mouth once daily.    atorvastatin (LIPITOR) 10 MG tablet Take 1 tablet (10 mg total) by mouth once daily.    benzocaine-menthol 15-2.6 mg Lozg 1 capsule by Mucous Membrane  route every 6 (six) hours as needed (for cough).    bisacodyl (DULCOLAX) 10 mg Supp Place 10 mg rectally daily as needed.    busPIRone (BUSPAR) 10 MG tablet 10 mg 3 (three) times daily.     carbidopa-levodopa  mg (SINEMET)  mg per tablet Take 0.5 tablets by mouth 3 (three) times daily.    chlorhexidine (PERIDEX) 0.12 % solution     clotrimazole-betamethasone 1-0.05% (LOTRISONE) cream Apply to the affected area 2 times per daily    divalproex (DEPAKOTE) 500 MG TbEC Take 1 tablet (500 mg total) by mouth every 12 (twelve) hours.    docusate sodium (COLACE) 100 MG capsule Take 1 capsule (100 mg total) by mouth 2 (two) times daily.    fluoxetine (PROZAC) 20 MG capsule Take 60 mg by mouth once daily.     fluphenazine (PROLIXIN) 5 MG tablet 5 mg every evening.     fluticasone-vilanterol (BREO ELLIPTA) 200-25 mcg/dose DsDv diskus inhaler Inhale 1 puff into the lungs once daily. Controller    furosemide (LASIX) 20 MG tablet TAKE ONE TABLET BY MOUTH TWICE DAILY    gabapentin (NEURONTIN) 600 MG tablet Take 1 tablet (600 mg total) by mouth 3 (three) times daily.    hydrOXYzine pamoate (VISTARIL) 25 MG Cap 1 pill 3x a day for 2 days, rest left over every 8 hours as needed for headaches    levothyroxine (SYNTHROID) 150 MCG tablet Take 1 tablet (150 mcg total) by mouth once daily. (Patient taking differently: Take 175 mcg by mouth once daily. )    MAGNESIUM HYDROXIDE (MILK OF MAGNESIA ORAL) Take by mouth once daily.    mupirocin (BACTROBAN) 2 % ointment Apply topically 2 (two) times daily.    nitrofurantoin, macrocrystal-monohydrate, (MACROBID) 100 MG capsule     omega-3 acid ethyl esters (LOVAZA) 1 gram capsule Take 2 capsules (2 g total) by mouth 2 (two) times daily.    omeprazole (PRILOSEC) 40 MG capsule Take 1 capsule (40 mg total) by mouth once daily.    ondansetron (ZOFRAN-ODT) 4 MG TbDL Take 1 tablet (4 mg total) by mouth every 8 (eight) hours as needed (NAUSEA).    potassium chloride SA  "(K-DUR,KLOR-CON) 20 MEQ tablet TAKE ONE TABLET BY MOUTH EVERY DAY    propranolol (INDERAL) 40 MG tablet Take 1 tablet (40 mg total) by mouth 3 (three) times daily.    psyllium 0.52 gram capsule Take 0.52 g by mouth once daily.    QUEtiapine (SEROQUEL XR) 400 MG Tb24     sodium chloride (OCEAN NASAL) 0.65 % nasal spray 2 sprays by Nasal route 2 (two) times daily.    tizanidine (ZANAFLEX) 2 MG tablet Take 1 tablet (2 mg total) by mouth 2 (two) times daily.    topiramate (TOPAMAX) 100 MG tablet Take 1 tablet (100 mg total) by mouth 2 (two) times daily.    traZODone (DESYREL) 100 MG tablet     ibuprofen (ADVIL,MOTRIN) 400 MG tablet     lactulose (CHRONULAC) 20 gram/30 mL Soln Take 30 mLs (20 g total) by mouth once daily.     No current facility-administered medications for this visit.        Objective Findings:    Vital Signs:  /83   Pulse (!) 59   Ht 5' 5" (1.651 m)   Wt 99.3 kg (218 lb 14.7 oz)   LMP  (LMP Unknown)   BMI 36.43 kg/m²   Body mass index is 36.43 kg/m².    Physical Exam:  General Appearance: Well appearing in no acute distress  Head:   Normocephalic, without obvious abnormality  Eyes:    No scleral icterus, EOMI  ENT: Neck supple, Lips, mucosa, and tongue normal; teeth and gums normal  Lungs: CTA bilaterally in anterior and posterior fields, no wheezes, no crackles.  Heart:  Regular rate and rhythm, S1, S2 normal, no murmurs heard  Abdomen: Soft, non tender, non distended with positive bowel sounds in all four quadrants. No hepatosplenomegaly, ascites, or mass. + rectus diastasis  Extremities: 2+ radial pulses, no clubbing, cyanosis or edema  Skin: No rash to exposed areas  Neurologic: A&Ox4      Labs:  Lab Results   Component Value Date    WBC 8.68 04/24/2018    HGB 13.2 04/24/2018    HCT 39.4 04/24/2018     04/24/2018    CHOL 242 (H) 04/24/2018    TRIG 162 (H) 04/24/2018    HDL 61 04/24/2018    ALT 23 04/24/2018    AST 21 04/24/2018     (L) 04/24/2018    K 5.0 04/24/2018 "    CL 97 2018    CREATININE 1.0 2018    BUN 17 2018    CO2 28 2018    TSH 1.704 2018    INR 1.1 2015    HGBA1C 6.5 (H) 2015       Imagin18 abd xray: scattered gas filled loops of bowel. No significant air-fluid level.  2017 abd xray-NL  2016 BE-redundant colon. No definite mass, fixed filing defect nor stricture.  3/2016 CT abd/pelvis-hepatomegaly w/ fatty liver. Punctate liver and spleen calcifications c/w remote granulomatous process. Small umbilical hernia containg fat and small bowel-no obstruction.   Endoscopy:    1/10/2017 EGD Dr. Jain[facility]. Gastritis. bx-h.pylori negative.  2016 colon Dr. Tsang-colonic looping. Incomplete exam up to transverse colon. BE. Repeat in 3 years.    colon Dr. Tsang-two colon polyps. Complete to cecum. Fair prep. repeat in 1 year.  Assessment:  1. Epigastric pain    2. Nausea    3. Gastroesophageal reflux disease without esophagitis    4. Chronic constipation           Recommendations:  1. Epigastric abd pain-EGD, abd us, lipase. Increase omeprazole to 40 mg daily. Possible CT pending results.   2. Nausea- EGD, abd us, lipase. Continue Zofran 4 mg q8 hrs prn.  3. GERD-PPI as above  4. Constipation, chronic- improved. Continue miralax. Can increase up to QID PRN.     RX printed per pt request.          Order summary:  Orders Placed This Encounter    US Abdomen Complete    Lipase    omeprazole (PRILOSEC) 40 MG capsule    ondansetron (ZOFRAN-ODT) 4 MG TbDL    Case request GI: ESOPHAGOGASTRODUODENOSCOPY (EGD)         Thank you so much for allowing me to participate in the care of Joseluis Dickrodríguez Colvin, APRN, FNP-C

## 2018-05-09 ENCOUNTER — TELEPHONE (OUTPATIENT)
Dept: INTERNAL MEDICINE | Facility: CLINIC | Age: 63
End: 2018-05-09

## 2018-05-09 ENCOUNTER — NURSE TRIAGE (OUTPATIENT)
Dept: ADMINISTRATIVE | Facility: CLINIC | Age: 63
End: 2018-05-09

## 2018-05-09 NOTE — TELEPHONE ENCOUNTER
Reason for Disposition   Nursing judgment    Protocols used: ST NO PROTOCOL CALL: INFORMATION ONLY-[facility]-[place]    Joseluis called scheduling and relayed that she is having difficulty breathing.  Joseluis was transferred to [facility] by Margot from scheduling.  In beginning of call Joseluis did not want to provide spelling of her name and just kept stating that Margot was sending a message to her nurse.  Asked Joseluis if she was having difficulty breathing and would like me to triage her symptoms.  She stated no she just wants a message to the nurse to refill her Z-pack.  Informed her I will send message over.  She then starts listing symptoms.  Asked her again if she wanted triage of her symptoms and she stated she did not but wants a refill of her z-pack.  Informed her I will send message over.  She then stated ok but you need my number.  I provided number we have on file and she said that is not correct and provided [phone].  I then reiterated that message would be sent requesting refill of z-pack and that office staff would contact her.  She then stated you need the fax number to refill the medication.  I advised her I would not refill the medication myself.  Message would be sent to office staff and they would contact her to advise regarding refill.  She then stated I have to go now and hung up the phone.  Please contact Joseluis at [phone].

## 2018-05-09 NOTE — TELEPHONE ENCOUNTER
Spoke to the nurse Layla pt did not complain of any symptoms to nurse at nursing home. Pt informed her around 12 that she was having a pounding feeling in her head, chest congestion and trouble breathing. Layla attempted to assess her symptoms and pt denied stating that she only needs medication

## 2018-05-09 NOTE — TELEPHONE ENCOUNTER
I do not feel comfortable treating with zpack again. If she is having cough, headache, chest congestion she needs to make an urgent care appointment to be evaluated. If she is having SOB, this is a medical emergency - she should notify the staff at the nursing home immediately.

## 2018-05-09 NOTE — TELEPHONE ENCOUNTER
Left message with , unable to speak with the nurse or pt. Informed that NP will not be providing a refill pt needs to see someone for current health concerns, Layla was informed this afternoon as well as information was faxed to number provided by Nurse [phone]

## 2018-05-09 NOTE — TELEPHONE ENCOUNTER
"Pt called to add some more symptoms to her previous message- she refused triage for her symptoms again. States that she spoke with a triage nurse earlier and told her to send a message to her PCP and tell her what symptoms she was having but she wanted to add that she was having "heartbeat bounding in my head".Please contact patient at [phone] and ask for Shell- patient's nurse.     Reason for Disposition   Nursing judgment    Protocols used: ST NO PROTOCOL CALL: INFORMATION ONLY-[facility]-[place]      "

## 2018-05-11 ENCOUNTER — OFFICE VISIT (OUTPATIENT)
Dept: INTERNAL MEDICINE | Facility: CLINIC | Age: 63
End: 2018-05-11
Payer: MEDICARE

## 2018-05-11 VITALS
HEIGHT: 65 IN | SYSTOLIC BLOOD PRESSURE: 118 MMHG | DIASTOLIC BLOOD PRESSURE: 64 MMHG | WEIGHT: 214.5 LBS | OXYGEN SATURATION: 96 % | HEART RATE: 70 BPM | BODY MASS INDEX: 35.74 KG/M2

## 2018-05-11 DIAGNOSIS — J31.0 CHRONIC RHINITIS: ICD-10-CM

## 2018-05-11 DIAGNOSIS — F25.9 SCHIZO AFFECTIVE SCHIZOPHRENIA: ICD-10-CM

## 2018-05-11 DIAGNOSIS — Z72.0 TOBACCO ABUSE: ICD-10-CM

## 2018-05-11 DIAGNOSIS — I73.9 CLAUDICATION: ICD-10-CM

## 2018-05-11 DIAGNOSIS — F31.62 BIPOLAR DISORDER, CURRENT EPISODE MIXED, MODERATE: ICD-10-CM

## 2018-05-11 DIAGNOSIS — J44.9 CHRONIC OBSTRUCTIVE PULMONARY DISEASE, UNSPECIFIED COPD TYPE: Primary | ICD-10-CM

## 2018-05-11 PROCEDURE — 3074F SYST BP LT 130 MM HG: CPT | Mod: CPTII,S$GLB,, | Performed by: NURSE PRACTITIONER

## 2018-05-11 PROCEDURE — 99999 PR PBB SHADOW E&M-EST. PATIENT-LVL III: CPT | Mod: PBBFAC,,, | Performed by: NURSE PRACTITIONER

## 2018-05-11 PROCEDURE — 99214 OFFICE O/P EST MOD 30 MIN: CPT | Mod: S$GLB,,, | Performed by: NURSE PRACTITIONER

## 2018-05-11 PROCEDURE — 3078F DIAST BP <80 MM HG: CPT | Mod: CPTII,S$GLB,, | Performed by: NURSE PRACTITIONER

## 2018-05-11 PROCEDURE — 3008F BODY MASS INDEX DOCD: CPT | Mod: CPTII,S$GLB,, | Performed by: NURSE PRACTITIONER

## 2018-05-11 RX ORDER — BUSPIRONE HYDROCHLORIDE 15 MG/1
TABLET ORAL
COMMUNITY
Start: 2018-05-03 | End: 2018-05-31

## 2018-05-11 RX ORDER — ALBUTEROL SULFATE 1.25 MG/3ML
1.25 SOLUTION RESPIRATORY (INHALATION) EVERY 6 HOURS PRN
Qty: 1 BOX | Refills: 0 | Status: ON HOLD | OUTPATIENT
Start: 2018-05-11 | End: 2019-02-06 | Stop reason: HOSPADM

## 2018-05-11 RX ORDER — DOXYCYCLINE 100 MG/1
100 CAPSULE ORAL 2 TIMES DAILY
Qty: 14 CAPSULE | Refills: 0 | Status: ON HOLD | OUTPATIENT
Start: 2018-05-11 | End: 2018-09-14 | Stop reason: HOSPADM

## 2018-05-11 RX ORDER — DIVALPROEX SODIUM 250 MG/1
TABLET, FILM COATED, EXTENDED RELEASE ORAL
COMMUNITY
Start: 2018-05-02 | End: 2018-05-31 | Stop reason: DRUGHIGH

## 2018-05-11 RX ORDER — BENZONATATE 100 MG/1
100 CAPSULE ORAL 3 TIMES DAILY PRN
Qty: 30 CAPSULE | Refills: 0 | Status: SHIPPED | OUTPATIENT
Start: 2018-05-11 | End: 2018-05-16

## 2018-05-11 RX ORDER — DIVALPROEX SODIUM 500 MG/1
TABLET, FILM COATED, EXTENDED RELEASE ORAL
COMMUNITY
Start: 2018-05-08 | End: 2018-05-31 | Stop reason: DRUGHIGH

## 2018-05-11 RX ORDER — CELECOXIB 200 MG/1
CAPSULE ORAL
Status: ON HOLD | COMMUNITY
Start: 2018-05-04 | End: 2018-09-14 | Stop reason: HOSPADM

## 2018-05-11 NOTE — PATIENT INSTRUCTIONS
COPD exacerbation- will start doxycycline twice daily x 1 week. Continue tessalon pearls as needed. Continue mucinex as needed. Cont breo daily and albuterol inh or nebs every 4-6 hours as needed. Increase fluids. Stop smoking. Stay inside at night until air quality improves.

## 2018-05-11 NOTE — PROGRESS NOTES
"INTERNAL MEDICINE URGENT CARE NOTE    CHIEF COMPLAINT     Chief Complaint   Patient presents with    Chest Congestion     dark green mucus    Cough       HPI     Joseluis Triplett is a 62 y.o. female withAnxiety; Asthma; Bipolar disorder; Chronic constipation; Chronic kidney disease; COPD (chronic obstructive pulmonary disease); Depression; DVT (deep venous thrombosis); Dysphagia; GERD (gastroesophageal reflux disease); Hyperlipidemia; Migraine headache (8/26/2014); Neuropathy (8/26/2014); CLARI (obstructive sleep apnea) (11/11/2013); Parkinson disease; Perforated duodenal ulcer (5/28/2015); Recurrent upper respiratory infection (URI); Schizo affective schizophrenia; Seizures; and Thyroid disease who presents for an urgent visit today.    Here with c/o chest congestion with dark green mucous and cough.   Was treated on 4/26/2018 with azithromycin. Reports s/s worsened since then.   Cough with shortness of breath- worse when going to sleep.   Lymph node swelling.   Runny nose.   Right ear draining- one episode.   Headaches  Subjective fevers       COPD- followed by pulmonary Dr. Gabriel/Deacon. Compliant with breo, floanse and albuterol for rescue.     Bipolar/schitzophrenia- reports "I feel like my sickness is getting worse" and that she has "been in a very bad mood for a very long time". Followed by psychiatry. Recent dose adjustment of depakote     Past Medical History:  Past Medical History:   Diagnosis Date    Anxiety     Asthma     Bipolar disorder     Chronic constipation     Chronic kidney disease     History of dialysis secondary to overdose    COPD (chronic obstructive pulmonary disease)     Depression     DVT (deep venous thrombosis)     Dysphagia     GERD (gastroesophageal reflux disease)     Hyperlipidemia     Migraine headache 8/26/2014    Neuropathy 8/26/2014    CLARI (obstructive sleep apnea) 11/11/2013    Parkinson disease     Perforated duodenal ulcer 5/28/2015    Recurrent upper " "respiratory infection (URI)     Schizo affective schizophrenia     Seizures     last seizure "yesterday"    Thyroid disease        Home Medications:  Prior to Admission medications    Medication Sig Start Date End Date Taking? Authorizing Provider   acetaminophen (TYLENOL) 500 MG tablet Take 1,000 mg by mouth 3 (three) times daily.     Historical Provider, MD   albuterol 90 mcg/actuation inhaler Inhale 2 puffs into the lungs every 6 (six) hours as needed for Wheezing. Rescue 1/22/18 1/22/19  Jez Worthy MD   aspirin (ECOTRIN) 81 MG EC tablet Take 81 mg by mouth once daily.    Historical Provider, MD   atorvastatin (LIPITOR) 10 MG tablet Take 1 tablet (10 mg total) by mouth once daily. 5/1/18 5/1/19  Adriana Ortega NP   benzocaine-menthol 15-2.6 mg Lozg 1 capsule by Mucous Membrane route every 6 (six) hours as needed (for cough).    Historical Provider, MD   bisacodyl (DULCOLAX) 10 mg Supp Place 10 mg rectally daily as needed.    Historical Provider, MD   busPIRone (BUSPAR) 10 MG tablet 10 mg 3 (three) times daily.  1/9/17   Historical Provider, MD   carbidopa-levodopa  mg (SINEMET)  mg per tablet Take 0.5 tablets by mouth 3 (three) times daily. 7/24/17   Dorothea Sanchez MD   chlorhexidine (PERIDEX) 0.12 % solution  4/1/16   Historical Provider, MD   clotrimazole-betamethasone 1-0.05% (LOTRISONE) cream Apply to the affected area 2 times per daily 12/19/14   Gabriel Collins MD   divalproex (DEPAKOTE) 500 MG TbEC Take 1 tablet (500 mg total) by mouth every 12 (twelve) hours. 1/27/17   Dorothea Sanchez MD   docusate sodium (COLACE) 100 MG capsule Take 1 capsule (100 mg total) by mouth 2 (two) times daily. 7/30/15   Danielle Aldridge MD   fluoxetine (PROZAC) 20 MG capsule Take 60 mg by mouth once daily.     Historical Provider, MD   fluphenazine (PROLIXIN) 5 MG tablet 5 mg every evening.  11/4/16   Historical Provider, MD   fluticasone-vilanterol (BREO ELLIPTA) 200-25 mcg/dose DsDv " diskus inhaler Inhale 1 puff into the lungs once daily. Controller 1/22/18   Jez Worthy MD   furosemide (LASIX) 20 MG tablet TAKE ONE TABLET BY MOUTH TWICE DAILY 11/21/14   Gabriel Collins MD   gabapentin (NEURONTIN) 600 MG tablet Take 1 tablet (600 mg total) by mouth 3 (three) times daily. 7/24/17   Dorothea Sanchez MD   hydrOXYzine pamoate (VISTARIL) 25 MG Cap 1 pill 3x a day for 2 days, rest left over every 8 hours as needed for headaches 2/15/17   Lyle Briggs PA-C   ibuprofen (ADVIL,MOTRIN) 400 MG tablet  8/14/17   Historical Provider, MD   lactulose (CHRONULAC) 20 gram/30 mL Soln Take 30 mLs (20 g total) by mouth once daily. 1/24/18 2/23/18  Clayton Clarke MD   levothyroxine (SYNTHROID) 150 MCG tablet Take 1 tablet (150 mcg total) by mouth once daily.  Patient taking differently: Take 175 mcg by mouth once daily.  9/25/14   Gabriel Collins MD   MAGNESIUM HYDROXIDE (MILK OF MAGNESIA ORAL) Take by mouth once daily.    Historical Provider, MD   mupirocin (BACTROBAN) 2 % ointment Apply topically 2 (two) times daily. 4/6/18   Corey Castillo MD   nitrofurantoin, macrocrystal-monohydrate, (MACROBID) 100 MG capsule  5/31/16   Historical Provider, MD   omega-3 acid ethyl esters (LOVAZA) 1 gram capsule Take 2 capsules (2 g total) by mouth 2 (two) times daily. 1/20/15   Gabriel Collins MD   omeprazole (PRILOSEC) 40 MG capsule Take 1 capsule (40 mg total) by mouth once daily. 5/2/18   Ju Colvin NP   ondansetron (ZOFRAN-ODT) 4 MG TbDL Take 1 tablet (4 mg total) by mouth every 8 (eight) hours as needed (NAUSEA). 5/2/18   Ju Colvin NP   potassium chloride SA (K-DUR,KLOR-CON) 20 MEQ tablet TAKE ONE TABLET BY MOUTH EVERY DAY 5/20/15   Gabriel Collins MD   propranolol (INDERAL) 40 MG tablet Take 1 tablet (40 mg total) by mouth 3 (three) times daily. 6/25/13   Curtis Tong MD   psyllium 0.52 gram capsule Take 0.52 g by mouth once daily.    Historical Provider, MD   QUEtiapine  "(SEROQUEL XR) 400 MG Tb24  4/17/18   Historical Provider, MD   sodium chloride (OCEAN NASAL) 0.65 % nasal spray 2 sprays by Nasal route 2 (two) times daily. 4/24/18   Adriana Ortega, NP   tizanidine (ZANAFLEX) 2 MG tablet Take 1 tablet (2 mg total) by mouth 2 (two) times daily. 9/18/17   ELBA Chaves   topiramate (TOPAMAX) 100 MG tablet Take 1 tablet (100 mg total) by mouth 2 (two) times daily. 1/27/17   Dorothea Sanchez MD   traZODone (DESYREL) 100 MG tablet  4/16/18   Historical Provider, MD       Review of Systems:  Review of Systems   Constitutional: Positive for chills and fever (subjective).   HENT: Positive for postnasal drip, rhinorrhea, sinus pain and sinus pressure. Negative for congestion and sore throat.    Respiratory: Positive for cough, shortness of breath and wheezing (worse at night ).    Cardiovascular: Negative for chest pain and palpitations.   Gastrointestinal: Positive for abdominal pain, constipation, diarrhea and nausea.   Skin: Negative for rash.   Neurological: Negative for dizziness, light-headedness and headaches.   Psychiatric/Behavioral: Positive for dysphoric mood and sleep disturbance. The patient is nervous/anxious.        Health Maintainence:   Immunizations:  Health Maintenance       Date Due Completion Date    TETANUS VACCINE 07[birth year]04/1974 ---    Mammogram 05/14/2016 5/14/2014 (Done)    Override on 5/14/2014: Done    Zoster Vaccine 07/04/2016 ---    Pap Smear with HPV Cotest 11/21/2017 11/21/2014 (ClinicallyNA)    Override on 11/21/2014: Not Clinically Appropriate    Influenza Vaccine 08/01/2018 10/24/2014    Lipid Panel 04/24/2023 4/24/2018    Colonoscopy 05/17/2026 5/17/2016    Override on 11/11/2008: Done           PHYSICAL EXAM     Ht 5' 5" (1.651 m)   Wt 97.3 kg (214 lb 8.1 oz)   LMP  (LMP Unknown)   BMI 35.70 kg/m²     Physical Exam   Constitutional: She is oriented to person, place, and time. She appears well-developed and well-nourished.   HENT:   Head: " Normocephalic.   Right Ear: External ear normal.   Left Ear: External ear normal.   Nose: Nose normal.   Mouth/Throat: Oropharynx is clear and moist. No oropharyngeal exudate.   Eyes: Pupils are equal, round, and reactive to light.   Neck: Neck supple. No JVD present. No tracheal deviation present. No thyromegaly present.   Cardiovascular: Normal rate, regular rhythm, normal heart sounds and intact distal pulses.  Exam reveals no gallop and no friction rub.    No murmur heard.  Pulmonary/Chest: Effort normal. No respiratory distress. She has wheezes. She has no rales.   Abdominal: Soft. Bowel sounds are normal. She exhibits no distension. There is no tenderness.   Musculoskeletal: Normal range of motion. She exhibits no edema or tenderness.   Lymphadenopathy:     She has no cervical adenopathy.   Neurological: She is alert and oriented to person, place, and time.   Skin: Skin is warm and dry. No rash noted.   Psychiatric: She has a normal mood and affect. Her behavior is normal.   Vitals reviewed.      LABS     Lab Results   Component Value Date    HGBA1C 6.5 (H) 06/05/2015     CMP  Sodium   Date Value Ref Range Status   04/24/2018 133 (L) 136 - 145 mmol/L Final     Potassium   Date Value Ref Range Status   04/24/2018 5.0 3.5 - 5.1 mmol/L Final     Chloride   Date Value Ref Range Status   04/24/2018 97 95 - 110 mmol/L Final     CO2   Date Value Ref Range Status   04/24/2018 28 23 - 29 mmol/L Final     Glucose   Date Value Ref Range Status   04/24/2018 100 70 - 110 mg/dL Final     BUN, Bld   Date Value Ref Range Status   04/24/2018 17 8 - 23 mg/dL Final     Creatinine   Date Value Ref Range Status   04/24/2018 1.0 0.5 - 1.4 mg/dL Final     Calcium   Date Value Ref Range Status   04/24/2018 9.8 8.7 - 10.5 mg/dL Final     Total Protein   Date Value Ref Range Status   04/24/2018 7.2 6.0 - 8.4 g/dL Final     Albumin   Date Value Ref Range Status   04/24/2018 3.5 3.5 - 5.2 g/dL Final     Total Bilirubin   Date Value Ref  Range Status   04/24/2018 0.3 0.1 - 1.0 mg/dL Final     Comment:     For infants and newborns, interpretation of results should be based  on gestational age, weight and in agreement with clinical  observations.  Premature Infant recommended reference ranges:  Up to 24 hours.............<8.0 mg/dL  Up to 48 hours............<12.0 mg/dL  3-5 days..................<15.0 mg/dL  6-29 days.................<15.0 mg/dL       Alkaline Phosphatase   Date Value Ref Range Status   04/24/2018 100 55 - 135 U/L Final     AST   Date Value Ref Range Status   04/24/2018 21 10 - 40 U/L Final     ALT   Date Value Ref Range Status   04/24/2018 23 10 - 44 U/L Final     Anion Gap   Date Value Ref Range Status   04/24/2018 8 8 - 16 mmol/L Final     eGFR if    Date Value Ref Range Status   04/24/2018 >60 >60 mL/min/1.73 m^2 Final     eGFR if non    Date Value Ref Range Status   04/24/2018 >60 >60 mL/min/1.73 m^2 Final     Comment:     Calculation used to obtain the estimated glomerular filtration  rate (eGFR) is the CKD-EPI equation.        Lab Results   Component Value Date    WBC 8.68 04/24/2018    HGB 13.2 04/24/2018    HCT 39.4 04/24/2018    MCV 91 04/24/2018     04/24/2018     Lab Results   Component Value Date    CHOL 242 (H) 04/24/2018    CHOL 191 02/10/2015    CHOL 213 (H) 01/10/2014     Lab Results   Component Value Date    HDL 61 04/24/2018    HDL 52 02/10/2015    HDL 49 01/10/2014     Lab Results   Component Value Date    LDLCALC 148.6 04/24/2018    LDLCALC 92.0 02/10/2015    LDLCALC 108.8 01/10/2014     Lab Results   Component Value Date    TRIG 162 (H) 04/24/2018    TRIG 235 (H) 02/10/2015    TRIG 276 (H) 01/10/2014     Lab Results   Component Value Date    CHOLHDL 25.2 04/24/2018    CHOLHDL 27.2 02/10/2015    CHOLHDL 23.0 01/10/2014     Lab Results   Component Value Date    TSH 1.704 04/24/2018    V1QHFSM 64.65 03/14/2012       ASSESSMENT/PLAN     Joseluis Triplett is a 62 y.o. female  "with  Past Medical History:   Diagnosis Date    Anxiety     Asthma     Bipolar disorder     Chronic constipation     Chronic kidney disease     History of dialysis secondary to overdose    COPD (chronic obstructive pulmonary disease)     Depression     DVT (deep venous thrombosis)     Dysphagia     GERD (gastroesophageal reflux disease)     Hyperlipidemia     Migraine headache 8/26/2014    Neuropathy 8/26/2014    CLARI (obstructive sleep apnea) 11/11/2013    Parkinson disease     Perforated duodenal ulcer 5/28/2015    Recurrent upper respiratory infection (URI)     Schizo affective schizophrenia     Seizures     last seizure "yesterday"    Thyroid disease      Chronic obstructive pulmonary disease, unspecified COPD type- will start doxycycline as directed. Will cont inhalers and nebulizer. May take mucinex as needed.   -     albuterol (ACCUNEB) 1.25 mg/3 mL Nebu; Take 3 mLs (1.25 mg total) by nebulization every 6 (six) hours as needed. Rescue  Dispense: 1 Box; Refill: 0  -     Discontinue: benzonatate (TESSALON) 100 MG capsule; Take 1 capsule (100 mg total) by mouth 3 (three) times daily as needed.  Dispense: 30 capsule; Refill: 0  -     doxycycline (VIBRAMYCIN) 100 MG Cap; Take 1 capsule (100 mg total) by mouth 2 (two) times daily.  Dispense: 14 capsule; Refill: 0    Chronic rhinitis- saline rinses bid and floanse 2 squirts daily   -     sodium chloride (SALINE NASAL MIST) 0.65 % nasal spray; 1 spray by Nasal route as needed for Congestion.  Dispense: 30 mL; Refill: 12    Schizo affective schizophrenia- stable. F/u with psych.     Bipolar disorder, current episode mixed, moderate- stable. F/u with psych     Tobacco abuse- counseled on need to quit, pt is not ready at this time.     Claudication- on statin and asa.     Obesity, Class II, BMI 35-39.9, with comorbidity- discussed diet and exercise.       Follow up with PCP in 2 months     Patient education provided from Patrick. Patient was counseled " on when and how to seek emergent care.       Adriana PEREZ, YONY, FNP-c   Department of Internal Medicine - [facility]  4:11 PM

## 2018-05-14 ENCOUNTER — CLINICAL SUPPORT (OUTPATIENT)
Dept: SMOKING CESSATION | Facility: CLINIC | Age: 63
End: 2018-05-14
Payer: COMMERCIAL

## 2018-05-14 DIAGNOSIS — F17.200 NICOTINE DEPENDENCE: Primary | ICD-10-CM

## 2018-05-14 PROCEDURE — 99407 BEHAV CHNG SMOKING > 10 MIN: CPT | Mod: S$GLB,,,

## 2018-05-14 NOTE — PROGRESS NOTES
Successful contact with patient regarding quit #1. Pt states, she continues to smoke and unable to schedule an appointment, because she currently have an infection. Pt provided with her benefit status and contact information to schedule an appointment when she's ready. Will resolve this episode.

## 2018-05-15 ENCOUNTER — HOSPITAL ENCOUNTER (OUTPATIENT)
Dept: RADIOLOGY | Facility: HOSPITAL | Age: 63
Discharge: HOME | End: 2018-05-15
Attending: NURSE PRACTITIONER
Payer: MEDICARE

## 2018-05-15 DIAGNOSIS — R10.84 GENERALIZED ABDOMINAL PAIN: ICD-10-CM

## 2018-05-15 PROCEDURE — 76700 US EXAM ABDOM COMPLETE: CPT | Mod: TC

## 2018-05-15 PROCEDURE — 76700 US EXAM ABDOM COMPLETE: CPT | Mod: 26,,, | Performed by: RADIOLOGY

## 2018-05-16 ENCOUNTER — OFFICE VISIT (OUTPATIENT)
Dept: SLEEP MEDICINE | Facility: CLINIC | Age: 63
End: 2018-05-16
Payer: MEDICARE

## 2018-05-16 ENCOUNTER — TELEPHONE (OUTPATIENT)
Dept: INTERNAL MEDICINE | Facility: CLINIC | Age: 63
End: 2018-05-16

## 2018-05-16 ENCOUNTER — DOCUMENTATION ONLY (OUTPATIENT)
Dept: INTERNAL MEDICINE | Facility: CLINIC | Age: 63
End: 2018-05-16

## 2018-05-16 ENCOUNTER — TELEPHONE (OUTPATIENT)
Dept: GASTROENTEROLOGY | Facility: CLINIC | Age: 63
End: 2018-05-16

## 2018-05-16 DIAGNOSIS — R05.9 COUGH: Primary | ICD-10-CM

## 2018-05-16 DIAGNOSIS — G47.33 OSA (OBSTRUCTIVE SLEEP APNEA): Primary | ICD-10-CM

## 2018-05-16 PROCEDURE — 99999 PR PBB SHADOW E&M-EST. PATIENT-LVL IV: CPT | Mod: PBBFAC,,, | Performed by: PSYCHIATRY & NEUROLOGY

## 2018-05-16 PROCEDURE — 99204 OFFICE O/P NEW MOD 45 MIN: CPT | Mod: S$GLB,,, | Performed by: PSYCHIATRY & NEUROLOGY

## 2018-05-16 RX ORDER — PROMETHAZINE HYDROCHLORIDE AND DEXTROMETHORPHAN HYDROBROMIDE 6.25; 15 MG/5ML; MG/5ML
5 SYRUP ORAL EVERY 12 HOURS PRN
Qty: 118 ML | Refills: 0 | Status: SHIPPED | OUTPATIENT
Start: 2018-05-16 | End: 2018-05-26

## 2018-05-16 NOTE — TELEPHONE ENCOUNTER
RX miralax can be sent to pharmacy, but we do not have a pharmacy selected for her. Cannot write RX for milk of magnesia. Recommendations were written and sent for nursing home staff. Please have them refer to that note.    Thanks  Tiff, NP

## 2018-05-16 NOTE — PROGRESS NOTES
Rx for promethazine-dm 6.25-15mg/5 mL Syrp faxed to [facility] ATTN Ioana Harrison

## 2018-05-16 NOTE — TELEPHONE ENCOUNTER
Aspirin and nasal spray on med profile already.   Will stop benzonatate and start phenergan-DM as needed x 1 week

## 2018-05-16 NOTE — PATIENT INSTRUCTIONS
1. BPAP machine    DME (durable medical equipment  ) [facility]:  [phone] - [facility]:  or [phone] (ext [phone])- [place]    They will call you about your new BPAP machine and schedule an appointment to  a new Machine (your choice - [employer] or [facility]).    Please wait till your cough gets better before picking up the machine.  ----------------------------------------------------------------    FOr your insurance to keep paying for the machine, you need to demonstrate 30 days in a row of consistent usage of 4 hrs or more every day. You have 90 days to meet compliance.     BUT for the mask, you only have 30 days from the time you get it to exchange it in case you are uncomfortable with the mask you get. IF YOU NEED TO REPLACE YOUR MASK PLEASE CALL DME [facility]:  [phone] -  [phone] (ext [phone])-       --------------------------------------------------------------------    2. So-Clean machine is the cleaning machine. It is not 100% necessary, but convenient if you are concerned with the cleanliness of the sink at your facility.     You can get it:    1, Either online CPAP.com or Amazon.com   OR    2. You can get Zel[employer] at an actual physical address in [facility] at another durable medical equipment company     Access phone number: [phone]  Access address: [address] [facility] [address]

## 2018-05-16 NOTE — TELEPHONE ENCOUNTER
----- Message from Amee Mayer sent at 5/16/2018 10:42 AM CDT -----  Contact: self - [phone]  ivet - wants her miralax called in up to 4 times a day and milk of magnesia for twice a day - wants you to call her  andie burgess at the home to enforce the order for this -  please call patient at  ask for andie burgess

## 2018-05-16 NOTE — PROGRESS NOTES
"Kimi Vargas:  INTERVAL HISTORY:      2/3/14: Compliance Summary  8/1/2014 - 1/27/2015 (180 days)  Days with Device Usage 95 days  Days without Device Usage 85 days  Percent Days with Device Usage 52.8%  Cumulative Usage 26 days 4 hrs. 48 mins. 44 secs.  Maximum Usage (1 Day) 10 hrs. 54 mins. 43 secs.  Average Usage (All Days) 3 hrs. 29 mins. 36 secs.  Average Usage (Days Used) 6 hrs. 37 mins. 8 secs.  Minimum Usage (1 Day) 21 mins. 57 secs.  Percent of Days with Usage >= 4 Hours 39.4%  Percent of Days with Usage < 4 Hours 60.6%    05/16/2018:  The patient has not presented any new complaints since the previous visit.   Ms. Tamayo returns today regarding the management of obstructive sleep apnea.  Since last seen, she has continued to struggle withusing her cpap. Having chest pain and pressure intolerance. Headaches worsening with use of cpap 15cm, :too forecful, too great". bipap switch was cost-prohibitive. She is motivated to continue using therapy to get better sleep again.  She likes her current F10 mask.  She had acclimated well to it after initial setup. When using therapy, no recurrent snoring, air gasping. She denies oral drying. Denies nasal drying. She does not feel well today    Cardiologist mario suggests MSK in origin.     Interrogation:  30d avg 2:12h/night. 7/30days>4h. F10 mask fits well upon demo "10cm is more comfortable".     Previous ESS=8      05/16/2018: Her old CPAP is now due for replacement - repo. She would like to try BPAP like previously discussed with Kimi. Has [employer] Medicare and Medicaid.Feels like she also needs so CLEAN machine - concerned with cleanliness in her NH.   ESS 11/24. Switched NH since last time. Still reports snoring, interrupted sleep and daytime sleepiness and morning headaches.       PSG 6/13/11:  AHI 4.6, low 85%, wt 236 lbs (did not meet criteria for CLARI at this time).  1/6/14: CLARI, AHI 11.0, low 84%; This study was required to define presence of CLARI, since " "prior testing in 2011 was negative. This most recent study was not split, because patient did not meet criteria for split. (247#)  4/7/14 Titration: effective pressure CPAP 15cm     Past Medical History:   Diagnosis Date    Anxiety     Asthma     Bipolar disorder     Chronic constipation     Chronic kidney disease     History of dialysis secondary to overdose    COPD (chronic obstructive pulmonary disease)     Depression     DVT (deep venous thrombosis)     Dysphagia     GERD (gastroesophageal reflux disease)     Hyperlipidemia     Migraine headache 8/26/2014    Neuropathy 8/26/2014    CLARI (obstructive sleep apnea) 11/11/2013    Parkinson disease     Perforated duodenal ulcer 5/28/2015    Recurrent upper respiratory infection (URI)     Schizo affective schizophrenia     Seizures     last seizure "yesterday"    Thyroid disease        Past Surgical History:   Procedure Laterality Date    COLONOSCOPY N/A 5/17/2016    Procedure: COLONOSCOPY;  Surgeon: Akbar Tsang MD;  Location: [facility] ([address] [place] [address]);  Service: Endoscopy;  Laterality: N/A;    TONSILLECTOMY      TYMPANOSTOMY TUBE PLACEMENT         PHYSICAL EXAM:  LMP  (LMP Unknown)   GENERAL: Well groomed; Normally developed; obese      ASSESSMENT:    1. Obstructive Sleep Apnea, mild, remains CPAP adherent, limited recently due to persistent worsening HA, pressure intolerance.   Medical co-morbidities of hypertension, COPD, and obesity. Prior compliance and has been   benefiting from its use in terms of sleep continuity.         PLAN:    Treatment: prescription  for BPAP 15/8 cm due to prior intolerance of CPAP with the full face mask of a patient's choice was given to the patient thorough DME [facility]:  [phone] - [place]:  or [phone] (ext [phone])- [place]    .  Education: During our discussion today, we talked about the etiology of obstructive sleep apnea as well as the potential ramifications of untreated sleep apnea, which could " include daytime sleepiness, hypertension, heart disease and/or stroke.      We discussed potential treatment options, which could include weight loss, body positioning, continuous positive airway pressure (CPAP), or referral for surgical consideration. The patient preferred CPAP option.     Discussed purpose of PAP therapy, health benefits of CPAP, as well as the potential ramifications of untreated sleep apnea, which could include daytime sleepiness, hypertension, heart disease and/or stroke. An AHI of 15 is associated with increased risk CVD.     The patient should avoid ETOH and sedatives at night, as it tends to aggravate CLARI. Regular replacement of CPAP mask, tubing and filter was recommended.    Precautions: The patient was advised to abstain from driving should he feel sleepy or drowsy.    Follow up: MD/NP after 1 month of PAP use.    Thank you for allowing me the opportunity to participate in the care of your patient.

## 2018-05-16 NOTE — TELEPHONE ENCOUNTER
----- Message from Robyn Puga sent at 5/16/2018 10:50 AM CDT -----  Contact: self/ [phone] [address]  Please add the aspirn 81mg low dose to her med profile and the saline spray for her sinus. Patient states that her cough wont go away. Please call something in for her cough. Please fax to the nursing home.

## 2018-05-17 ENCOUNTER — TELEPHONE (OUTPATIENT)
Dept: SLEEP MEDICINE | Facility: CLINIC | Age: 63
End: 2018-05-17

## 2018-05-17 NOTE — TELEPHONE ENCOUNTER
Spoke with Liana, patient's , who wanted to know the process of getting the machine sent to the patient.   Looked in patient's chart and there was not DME assigned to the order.     Will consult [place], and return call to the .

## 2018-05-18 ENCOUNTER — TELEPHONE (OUTPATIENT)
Dept: GASTROENTEROLOGY | Facility: CLINIC | Age: 63
End: 2018-05-18

## 2018-05-18 ENCOUNTER — TELEPHONE (OUTPATIENT)
Dept: SLEEP MEDICINE | Facility: CLINIC | Age: 63
End: 2018-05-18

## 2018-05-18 NOTE — TELEPHONE ENCOUNTER
----- Message from Ronda Kim sent at 5/18/2018 11:37 AM CDT -----  Needs Medical Advice    Who Called: pt  Symptoms (please be specific):    How long has patient had these symptoms:    Pharmacy name and phone # if needed:    Communication Preference (MyChart response to Pt. (or) Call Back # and timeframe):  Head nurse Mary Ann at [facility] [phone]  Additional Information:  Pt needs a order for mirolax up to 4 times a day. Please call Nurse Reese [phone]

## 2018-05-18 NOTE — TELEPHONE ENCOUNTER
Contacted patient's social on 05/17/18 and was advised that she needed to know the process of getting a machine to the patient. Informed  that I would find out the status of the order and return her call.  Was advised by [place] that the DME is [facility] and they should be giving the patient a call to schedule an appointment to come in for equipment set up.    call

## 2018-05-18 NOTE — TELEPHONE ENCOUNTER
Left message for the  to call back in to the office to discuss process for obtaining machine for patient.     Please advise.

## 2018-05-18 NOTE — TELEPHONE ENCOUNTER
----- Message from Debra Cordero MA sent at 5/17/2018  3:53 PM CDT -----  Contact: [phone] Shaun pineda/ [facility]   Needing to verify how often pt can take Miralax    [phone] Shaun pineda/ [facility]

## 2018-05-18 NOTE — TELEPHONE ENCOUNTER
----- Message from Ijeoma Green sent at 5/18/2018 11:32 AM CDT -----  Contact: pt            Name of Who is Calling: CHINO CAMARILLO [[id number]]    What is the request in detail: pt calling to follow up on call that wasn't returned to .. Please advise      Can the clinic reply by [facility]: no      [facility] [phone]

## 2018-05-18 NOTE — TELEPHONE ENCOUNTER
----- Message from Ijeoma Green sent at 5/18/2018 11:32 AM CDT -----  Contact: pt            Name of Who is Calling: CHINO CAMARILLO [[id number]]    What is the request in detail: pt calling to follow up on call that wasn't returned to .. Please advise      Can the clinic reply by [facility]: no      What Number [facility] Back [facility] [phone]

## 2018-05-18 NOTE — TELEPHONE ENCOUNTER
Spoke with [facility] in regards to the patient's new order for a BIPAP.  Was advised that because the patient is in a skilled service nursing home, the equipment would have to be paid for out of pocket. The patient's insurance will not cover the cost of the equipment.     Will notify  of the status and determination.   Spoke with Tavon

## 2018-05-18 NOTE — TELEPHONE ENCOUNTER
----- Message from Ijeoma Green sent at 5/18/2018 11:32 AM CDT -----  Contact: pt            Name of Who is Calling: CHINO CAMARILLO [[id number]]    What is the request in detail: pt calling to follow up on call that wasn't returned to .. Please advise      Can the clinic reply by [facility]: no      [facility] [phone]

## 2018-05-21 ENCOUNTER — TELEPHONE (OUTPATIENT)
Dept: SLEEP MEDICINE | Facility: CLINIC | Age: 63
End: 2018-05-21

## 2018-05-21 NOTE — TELEPHONE ENCOUNTER
----- Message from Joanne Manriquez sent at 5/21/2018  2:58 PM CDT -----  Name of Who is Calling: Miryam Witt ()      What is the request in detail: Wants to speak with staff about the pts care       Can the clinic reply by [facility]:   No       What Number to Call Back if not in [facility]: [phone]

## 2018-05-21 NOTE — TELEPHONE ENCOUNTER
Returned phone call to patient's , Miryam Witt in reference to the patient's care.   Informed care taker of the status of the order.   Miryam Witt stated that the titration study is ok and they would comply.  Will have India put order for through to the sleep lab.     When lab calls, they should ask for Judi.

## 2018-05-22 ENCOUNTER — TELEPHONE (OUTPATIENT)
Dept: GASTROENTEROLOGY | Facility: CLINIC | Age: 63
End: 2018-05-22

## 2018-05-22 DIAGNOSIS — K59.00 CONSTIPATION, UNSPECIFIED CONSTIPATION TYPE: Primary | ICD-10-CM

## 2018-05-22 NOTE — TELEPHONE ENCOUNTER
----- Message from Amee Mayer sent at 5/22/2018  2:02 PM CDT -----  Contact: self - [phone]  tha/ivet - wants you to call gianna - please call patient at [phone]

## 2018-05-22 NOTE — TELEPHONE ENCOUNTER
----- Message from Amee Mayer sent at 5/22/2018  1:19 PM CDT -----  Contact: gianna brandon/[facility] - [phone]  ivet - calling re her miralax order - please call gianna brandon/[facility] - [phone]

## 2018-05-23 ENCOUNTER — TELEPHONE (OUTPATIENT)
Dept: SLEEP MEDICINE | Facility: CLINIC | Age: 63
End: 2018-05-23

## 2018-05-23 DIAGNOSIS — G47.33 OSA (OBSTRUCTIVE SLEEP APNEA): Primary | ICD-10-CM

## 2018-05-23 RX ORDER — POLYETHYLENE GLYCOL 3350 17 G/17G
17 POWDER, FOR SOLUTION ORAL 4 TIMES DAILY
Qty: 1700 G | Refills: 11 | Status: ON HOLD | OUTPATIENT
Start: 2018-05-23 | End: 2019-02-06 | Stop reason: HOSPADM

## 2018-05-23 NOTE — TELEPHONE ENCOUNTER
Pt state this fax was never received and to please send again.  She did not have the fax number but said you can call Dom at [phone]

## 2018-05-23 NOTE — TELEPHONE ENCOUNTER
----- Message from Vivian Glaser MA sent at 5/22/2018  7:51 AM CDT -----  Well since they are not going through the insurance in order to get the machine, and paying out of pocket instead, they just need to have which ever one will tell them the setting of the machine.  ----- Message -----  From: Chani Osborne MD  Sent: 5/21/2018   5:42 PM  To: India Wilde Staff     Vivian,    Could you please check with NH again - do we just need repeat titration - or also the baseline - since the baseline we have on file was before she got Medicare, so regular (NOT Medicare criteria) were used for scoring.    Please let me know.      Thanks!    ----- Message -----  From: Stacey Vanegas  Sent: 5/21/2018   4:33 PM  To: Chani Osborne MD    We actually can't set her up.  The nursing home has to set her up with one of their companies they are contracted with.  ----- Message -----  From: Chani Osborne MD  Sent: 5/21/2018   4:28 PM  To: Stacey Ibarra,    I referred this patient to you for APAP - my medical assistant (Vivian) told me that a new titration study is required first.    Could you please check if she also needs a new baseline study?    BTW (if it matters) FYI she is a nursing home patient.    Thanks,    L

## 2018-05-23 NOTE — TELEPHONE ENCOUNTER
Left message for caregiver to give the office a call back to be notified of orders sent in by Dr. Osborne for the sleep study.

## 2018-05-23 NOTE — TELEPHONE ENCOUNTER
----- Message from Chani Osborne MD sent at 5/23/2018  3:21 PM CDT -----  Thanks, I will order CPAP titration then.    SLEEP LAB ([facility]) will contact you to schedulethe sleep study. Their number is [phone] (ext 2). Please call them if you do not hear from them in 10 business days from now.  The [facility] Lab is located on 7th floor of the [facility]; [facility] is located in [facility].    SLEEP CLINIC (my assistant) will call you when the sleep study results are ready - if you have not heard from us by 2 weeks from the date of the study, please call [phone] (ext 1) or you can use [facility] to contact me.    You are advised to abstain from driving should you feel sleepy or drowsy.    ----- Message -----  From: Vivian Glaser MA  Sent: 5/22/2018   7:51 AM  To: Chani Osborne MD    Well since they are not going through the insurance in order to get the machine, and paying out of pocket instead, they just need to have which ever one will tell them the setting of the machine.  ----- Message -----  From: Chani Osborne MD  Sent: 5/21/2018   5:42 PM  To: India Wilde Staff     Vivian,    Could you please check with NH again - do we just need repeat titration - or also the baseline - since the baseline we have on file was before she got Medicare, so regular (NOT Medicare criteria) were used for scoring.    Please let me know.      Thanks!    ----- Message -----  From: Stacey Vanegas  Sent: 5/21/2018   4:33 PM  To: Chani Osborne MD    We actually can't set her up.  The nursing home has to set her up with one of their companies they are contracted with.  ----- Message -----  From: Chani Osborne MD  Sent: 5/21/2018   4:28 PM  To: Stacey Ibarra,    I referred this patient to you for APAP - my medical assistant (Vivian) told me that a new titration study is required first.    Could you please check if she also needs a new baseline study?    BTW (if it  matters) TAMIKO she is a nursing home patient.    Thanks,    L

## 2018-05-24 ENCOUNTER — SURGERY (OUTPATIENT)
Age: 63
End: 2018-05-24

## 2018-05-24 ENCOUNTER — ANESTHESIA (OUTPATIENT)
Dept: ENDOSCOPY | Facility: HOSPITAL | Age: 63
End: 2018-05-24
Payer: MEDICARE

## 2018-05-24 ENCOUNTER — ANESTHESIA EVENT (OUTPATIENT)
Dept: ENDOSCOPY | Facility: HOSPITAL | Age: 63
End: 2018-05-24
Payer: MEDICARE

## 2018-05-24 ENCOUNTER — HOSPITAL ENCOUNTER (OUTPATIENT)
Facility: HOSPITAL | Age: 63
Discharge: HOME | End: 2018-05-24
Attending: INTERNAL MEDICINE | Admitting: INTERNAL MEDICINE
Payer: MEDICARE

## 2018-05-24 VITALS
HEART RATE: 53 BPM | SYSTOLIC BLOOD PRESSURE: 140 MMHG | TEMPERATURE: 98 F | RESPIRATION RATE: 22 BRPM | OXYGEN SATURATION: 96 % | DIASTOLIC BLOOD PRESSURE: 67 MMHG | HEIGHT: 65 IN | WEIGHT: 213 LBS | BODY MASS INDEX: 35.49 KG/M2

## 2018-05-24 DIAGNOSIS — K21.9 GASTROESOPHAGEAL REFLUX DISEASE WITHOUT ESOPHAGITIS: ICD-10-CM

## 2018-05-24 DIAGNOSIS — R10.13 EPIGASTRIC PAIN: Primary | ICD-10-CM

## 2018-05-24 DIAGNOSIS — R10.9 ABDOMINAL PAIN, UNSPECIFIED ABDOMINAL LOCATION: ICD-10-CM

## 2018-05-24 PROCEDURE — 88305 TISSUE EXAM BY PATHOLOGIST: CPT | Performed by: PATHOLOGY

## 2018-05-24 PROCEDURE — 88305 TISSUE EXAM BY PATHOLOGIST: CPT | Mod: 26,,, | Performed by: PATHOLOGY

## 2018-05-24 PROCEDURE — 43239 EGD BIOPSY SINGLE/MULTIPLE: CPT | Performed by: INTERNAL MEDICINE

## 2018-05-24 PROCEDURE — 37000009 HC ANESTHESIA EA ADD 15 MINS: Performed by: INTERNAL MEDICINE

## 2018-05-24 PROCEDURE — 63600175 PHARM REV CODE 636 W HCPCS: Performed by: NURSE ANESTHETIST, CERTIFIED REGISTERED

## 2018-05-24 PROCEDURE — 37000008 HC ANESTHESIA 1ST 15 MINUTES: Performed by: INTERNAL MEDICINE

## 2018-05-24 PROCEDURE — 43239 EGD BIOPSY SINGLE/MULTIPLE: CPT | Mod: ,,, | Performed by: INTERNAL MEDICINE

## 2018-05-24 PROCEDURE — E9220 PRA ENDO ANESTHESIA: HCPCS | Mod: ,,, | Performed by: NURSE ANESTHETIST, CERTIFIED REGISTERED

## 2018-05-24 PROCEDURE — 25000003 PHARM REV CODE 250: Performed by: INTERNAL MEDICINE

## 2018-05-24 PROCEDURE — 25000003 PHARM REV CODE 250: Performed by: NURSE ANESTHETIST, CERTIFIED REGISTERED

## 2018-05-24 PROCEDURE — 27201012 HC FORCEPS, HOT/COLD, DISP: Performed by: INTERNAL MEDICINE

## 2018-05-24 RX ORDER — BENZONATATE 100 MG/1
100 CAPSULE ORAL 3 TIMES DAILY PRN
Status: ON HOLD | COMMUNITY
End: 2019-02-06 | Stop reason: HOSPADM

## 2018-05-24 RX ORDER — LIDOCAINE HCL/PF 100 MG/5ML
SYRINGE (ML) INTRAVENOUS
Status: DISCONTINUED | OUTPATIENT
Start: 2018-05-24 | End: 2018-05-24

## 2018-05-24 RX ORDER — PROPOFOL 10 MG/ML
VIAL (ML) INTRAVENOUS
Status: DISCONTINUED | OUTPATIENT
Start: 2018-05-24 | End: 2018-05-24

## 2018-05-24 RX ORDER — PROPOFOL 10 MG/ML
VIAL (ML) INTRAVENOUS CONTINUOUS PRN
Status: DISCONTINUED | OUTPATIENT
Start: 2018-05-24 | End: 2018-05-24

## 2018-05-24 RX ORDER — GUAIFENESIN 600 MG/1
1200 TABLET, EXTENDED RELEASE ORAL 2 TIMES DAILY
Status: ON HOLD | COMMUNITY
End: 2019-02-06 | Stop reason: HOSPADM

## 2018-05-24 RX ORDER — LORATADINE 10 MG/1
10 TABLET ORAL DAILY
Status: ON HOLD | COMMUNITY
End: 2019-02-06 | Stop reason: HOSPADM

## 2018-05-24 RX ORDER — FLUTICASONE PROPIONATE 50 MCG
1 SPRAY, SUSPENSION (ML) NASAL DAILY
Status: ON HOLD | COMMUNITY
End: 2019-02-06 | Stop reason: HOSPADM

## 2018-05-24 RX ORDER — SODIUM CHLORIDE 9 MG/ML
INJECTION, SOLUTION INTRAVENOUS CONTINUOUS
Status: DISCONTINUED | OUTPATIENT
Start: 2018-05-24 | End: 2018-05-24 | Stop reason: HOSPADM

## 2018-05-24 RX ORDER — GLYCOPYRROLATE 0.2 MG/ML
INJECTION INTRAMUSCULAR; INTRAVENOUS
Status: DISCONTINUED | OUTPATIENT
Start: 2018-05-24 | End: 2018-05-24

## 2018-05-24 RX ADMIN — PROPOFOL 150 MCG/KG/MIN: 10 INJECTION, EMULSION INTRAVENOUS at 11:05

## 2018-05-24 RX ADMIN — LIDOCAINE HYDROCHLORIDE 70 MG: 20 INJECTION, SOLUTION INTRAVENOUS at 11:05

## 2018-05-24 RX ADMIN — SODIUM CHLORIDE: 9 INJECTION, SOLUTION INTRAVENOUS at 10:05

## 2018-05-24 RX ADMIN — GLYCOPYRROLATE 0.2 MG: 0.2 INJECTION, SOLUTION INTRAMUSCULAR; INTRAVENOUS at 11:05

## 2018-05-24 RX ADMIN — PROPOFOL 80 MG: 10 INJECTION, EMULSION INTRAVENOUS at 11:05

## 2018-05-24 NOTE — PLAN OF CARE
Per Patients mother, patient took van here from nursing home, but mother driving home. Mother in a wheelchair also , with a cane.

## 2018-05-24 NOTE — [employer]OVATION PATIENT INSTRUCTIONS
Discharge Summary/Instructions after an Endoscopic Procedure  Patient Name: Joseluis Triplett  Patient MRN: [id number]  Patient YOB: 1955  Thursday, May 24, 2018  Hannah Suero MD  RESTRICTIONS:  During your procedure today, you received medications for sedation.  These   medications may affect your judgment, balance and coordination.  Therefore,   for 24 hours, you have the following restrictions:   - DO NOT drive a car, operate machinery, make legal/financial decisions,   sign important papers or drink alcohol.    ACTIVITY:  The following day: return to full activity including work, except no heavy   lifting, straining or running for 3 days if polyps were removed.  DIET:  Eat and drink normally unless instructed otherwise.     TREATMENT FOR COMMON SIDE EFFECTS:  - Mild abdominal pain, nausea, belching, bloating or excessive gas:  rest,   eat lightly and use a heating pad.  - Sore Throat: treat with throat lozenges and/or gargle with warm salt   water.  - Because air was used during the procedure, expelling large amounts of air   from your rectum or belching is normal.  - If a bowel prep was taken, you may not have a bowel movement for 1-3 days.    This is normal.  SYMPTOMS TO WATCH FOR AND REPORT TO YOUR PHYSICIAN:  1. Abdominal pain or bloating, other than gas cramps.  2. Chest pain.  3. Back pain.  4. Signs of infection such as: chills or fever occurring within 24 hours   after the procedure.  5. Rectal bleeding, which would show as bright red, maroon, or black stools.   (A tablespoon of blood from the rectum is not serious, especially if   hemorrhoids are present.)  6. Vomiting.  7. Weakness or dizziness.  GO DIRECTLY TO THE NEAREST EMERGENCY ROOM IF YOU HAVE ANY OF THE FOLLOWING:      Difficulty breathing              Chills and/or fever over 101 F   Persistent vomiting and/or vomiting blood   Severe abdominal pain   Severe chest pain   Black, tarry stools   Bleeding- more than one tablespoon   Any  other symptom or condition that you feel may need urgent attention  Your doctor recommends these additional instructions:  If any biopsies were taken, your doctors clinic will contact you in 1 to 2   weeks with any results.  - Await pathology results.   - Discharge patient to home (with escort).   - Resume previous diet.   - Continue present medications.   - Resume aspirin at prior dose today.   - Return to referring physician as previously scheduled.   - The findings and recommendations were discussed with the patient.   - Patient has a contact number available for emergencies.  The signs and   symptoms of potential delayed complications were discussed with the   patient.  Return to normal activities tomorrow.  Written discharge   instructions were provided to the patient.   For questions, problems or results please call your physician - Hannah Suero MD at Work:  [phone].  [facility], EMERGENCY ROOM PHONE NUMBER: [phone]  IF A COMPLICATION OR EMERGENCY SITUATION ARISES AND YOU ARE UNABLE TO REACH   YOUR PHYSICIAN - GO DIRECTLY TO THE EMERGENCY ROOM.  Hannah Suero MD  5/24/2018 11:32:12 AM  This report has been verified and signed electronically.  [employer]

## 2018-05-24 NOTE — ANESTHESIA POSTPROCEDURE EVALUATION
"Anesthesia Post Evaluation    Patient: Joseluis Triplett    Procedure(s) Performed: Procedure(s) (LRB):  ESOPHAGOGASTRODUODENOSCOPY (EGD) (N/A)    Final Anesthesia Type: general  Patient location during evaluation: GI PACU  Patient participation: Yes- Able to Participate  Level of consciousness: awake and alert and oriented  Post-procedure vital signs: reviewed and stable  Pain management: adequate  Airway patency: patent  PONV status at discharge: No PONV  Anesthetic complications: no      Cardiovascular status: blood pressure returned to baseline and hemodynamically stable  Respiratory status: unassisted, spontaneous ventilation and room air  Hydration status: euvolemic  Follow-up not needed.        Visit Vitals  BP (!) 140/67   Pulse (!) 53   Temp 36.5 °C (97.7 °F) (Temporal)   Resp (!) 22   Ht 5' 5" (1.651 m)   Wt 96.6 kg (213 lb)   LMP  (LMP Unknown)   SpO2 96%   Breastfeeding? No   BMI 35.45 kg/m²       Pain/Genny Score: Pain Assessment Performed: Yes (5/24/2018 12:05 PM)  Presence of Pain: denies (5/24/2018 12:05 PM)  Pain Rating Prior to Med Admin: 0 (5/24/2018 11:35 AM)  Genny Score: 9 (5/24/2018 12:05 PM)      "

## 2018-05-24 NOTE — ANESTHESIA PREPROCEDURE EVALUATION
05/24/2018  Joseluis Triplett is a 62 y.o., female.    Pre-op Assessment    I have reviewed the Patient Summary Reports.      I have reviewed the Medications.     Review of Systems  Anesthesia Hx:  No problems with previous Anesthesia Denies Hx of Anesthetic complications  History of prior surgery of interest to airway management or planning:  Denies Personal Hx of Anesthesia complications.   Social:  Smoker, No Alcohol Use    Hematology/Oncology:  Hematology Normal   Oncology Normal     Cardiovascular:    Denies Angina. hyperlipidemia ECG has been reviewed. H/o DVT   Pulmonary:   COPD Asthma Sleep Apnea    Renal/:   Chronic Renal Disease, CRI    Hepatic/GI:   PUD, Hiatal Hernia, GERD, well controlled    Musculoskeletal:   Arthritis     Neurological:   Headaches Seizures, well controlled    Psych:   Psychiatric History          Physical Exam  General:  Obesity    Airway/Jaw/Neck:  Airway Findings: Mouth Opening: Normal Tongue: Normal  General Airway Assessment: Adult  Mallampati: II  Improves to I with phonation.  TM Distance: Normal, at least 6 cm      Dental:  Dental Findings: In tact   Chest/Lungs:  Chest/Lungs Findings: Normal Respiratory Rate, Expiratory Wheezes, Mod.     Heart/Vascular:  Heart Findings: Rate: Normal  Rhythm: Regular Rhythm  Sounds: Normal        Mental Status:  Mental Status Findings:  Cooperative         Anesthesia Plan  Type of Anesthesia, risks & benefits discussed:  Anesthesia Type:  general  Patient's Preference: General  Intra-op Monitoring Plan: standard ASA monitors  Intra-op Monitoring Plan Comments:   Post Op Pain Control Plan:   Post Op Pain Control Plan Comments:   Induction:   IV  Beta Blocker:  Patient is not currently on a Beta-Blocker (No further documentation required).       Informed Consent: Patient understands risks and agrees with Anesthesia plan.  Questions  answered. Anesthesia consent signed with patient.  ASA Score: 3     Day of Surgery Review of History & Physical:    H&P update referred to the provider.         Ready For Surgery From Anesthesia Perspective.     Patient Active Problem List   Diagnosis    Polypharmacy    Schizo affective schizophrenia    Bipolar disorder    Tobacco abuse    Edema    COPD (chronic obstructive pulmonary disease)    Hypothyroidism    Hyperlipemia    History of anorexia nervosa    History of bulimia nervosa    GERD (gastroesophageal reflux disease)    Chronic rhinitis    Chronic constipation    Osteoarthritis    CLARI (obstructive sleep apnea)    Drug-induced Parkinsonism    Claudication    Migraine headache    Neuropathy    Hypotension    Convulsion    Congenital hypothyroidism without goiter    Gastroesophageal reflux disease without esophagitis    Cognitive disorder    Ventral incisional hernia without obstruction or gangrene    Epigastric pain    Back muscle spasm    Pain

## 2018-05-24 NOTE — H&P (VIEW-ONLY)
[facility] Note    Reason for Visit:  The primary encounter diagnosis was Epigastric pain. Diagnoses of Nausea, Gastroesophageal reflux disease without esophagitis, and Chronic constipation were also pertinent to this visit.    PCP:   Clayton Rainey       Referring MD:  No referring provider defined for this encounter.    HPI:  This is a 62 y.o. female here for evaluation of abdominal pain.  Resident of [facility].  Last visit with me in 9/2017.  Previously seen in GI by NELIDA Carcamo (12/2016) and in fellows clinic (2016) for constipation, abd pain.    Abdominal pain   ONSET:few weeks ago s/p abx use per ENT  LOCATION: epigastric s/p abx use four weeks ago. Some improvement s/p Prilosec once daily   DURATION:daily; intermittent.  CHARACTER: dull burning.  ASSOCIATED/ALLEVIATING/AGGRAVAITING:+ nausea. Some improvement with Zofran PRN. No vomiting. No fevers. Worse with meals  RADIATION:none  TEMPORAL:lasts for few hours  SEVERITY:3/10    Reflux - +hx GERD. Retrosternal pyrosis few days weekly. Currently taking omeprazole 20 mg once daily  Dysphagia/odynophagia - no   Bowel habits - +hx chronic constipation. Currently with 1 loose BM 4-5 days weekly. Takes Miralax once daily, and MOM once daily. H/o  lactulose and colace daily. H/o Dulcolax supp PRN as well. Hx linzess   GI bleeding - denies hematochezia, hematemesis, melena, BRBPR, black/tarry stools, and coffee ground emesis  NSAID usage - 81 mg ASA daily.     ROS:  Constitutional: No fevers, no chills, No unintentional weight loss, no fatigue,   ENT: No allergies  CV: No chest pain, no palpitations, no perif. edema, no sob on exertion  Pulm: No cough, No shortness of breath, no wheezes, no sputum  Ophtho: No vision changes  GI: see HPI; also no nausea, no vomiting, no change in appetite  Derm: No rash  Heme: No lymphadenopathy, No bruising  MSK: No arthritis, no muscle pain, no muscle weakness  : No dysuria, No  hematuria  Endo: No hot or cold intolerance  Neuro: No syncope, No seizure,       Medical History:  has a past medical history of Anxiety; Asthma; Bipolar disorder; Chronic constipation; Chronic kidney disease; COPD (chronic obstructive pulmonary disease); Depression; DVT (deep venous thrombosis); Dysphagia; GERD (gastroesophageal reflux disease); Hyperlipidemia; Migraine headache (8/26/2014); Neuropathy (8/26/2014); CLARI (obstructive sleep apnea) (11/11/2013); Parkinson disease; Perforated duodenal ulcer (5/28/2015); Recurrent upper respiratory infection (URI); Schizo affective schizophrenia; Seizures; and Thyroid disease.    Surgical History:  has a past surgical history that includes Tonsillectomy; Tympanostomy tube placement; and Colonoscopy (N/A, 5/17/2016).    Family History: family history includes Alcohol abuse in her mother; Bipolar disorder in her mother; Cancer (age of onset: 60) in her maternal grandmother; Dementia in her mother..     Social History:  reports that she quit smoking about a year ago. Her smoking use included Cigarettes. She has a 54.00 pack-year smoking history. She quit smokeless tobacco use about 5 years ago. She reports that she does not drink alcohol or use drugs.    Review of patient's allergies indicates:   Allergen Reactions    Nsaids (non-steroidal anti-inflammatory drug) Other (See Comments)     History of perforated duodenal ulcer    Penicillins Rash and Other (See Comments)     Yeast infections       Current Outpatient Prescriptions   Medication Sig    acetaminophen (TYLENOL) 500 MG tablet Take 1,000 mg by mouth 3 (three) times daily.     albuterol 90 mcg/actuation inhaler Inhale 2 puffs into the lungs every 6 (six) hours as needed for Wheezing. Rescue    aspirin (ECOTRIN) 81 MG EC tablet Take 81 mg by mouth once daily.    atorvastatin (LIPITOR) 10 MG tablet Take 1 tablet (10 mg total) by mouth once daily.    benzocaine-menthol 15-2.6 mg Lozg 1 capsule by Mucous Membrane  route every 6 (six) hours as needed (for cough).    bisacodyl (DULCOLAX) 10 mg Supp Place 10 mg rectally daily as needed.    busPIRone (BUSPAR) 10 MG tablet 10 mg 3 (three) times daily.     carbidopa-levodopa  mg (SINEMET)  mg per tablet Take 0.5 tablets by mouth 3 (three) times daily.    chlorhexidine (PERIDEX) 0.12 % solution     clotrimazole-betamethasone 1-0.05% (LOTRISONE) cream Apply to the affected area 2 times per daily    divalproex (DEPAKOTE) 500 MG TbEC Take 1 tablet (500 mg total) by mouth every 12 (twelve) hours.    docusate sodium (COLACE) 100 MG capsule Take 1 capsule (100 mg total) by mouth 2 (two) times daily.    fluoxetine (PROZAC) 20 MG capsule Take 60 mg by mouth once daily.     fluphenazine (PROLIXIN) 5 MG tablet 5 mg every evening.     fluticasone-vilanterol (BREO ELLIPTA) 200-25 mcg/dose DsDv diskus inhaler Inhale 1 puff into the lungs once daily. Controller    furosemide (LASIX) 20 MG tablet TAKE ONE TABLET BY MOUTH TWICE DAILY    gabapentin (NEURONTIN) 600 MG tablet Take 1 tablet (600 mg total) by mouth 3 (three) times daily.    hydrOXYzine pamoate (VISTARIL) 25 MG Cap 1 pill 3x a day for 2 days, rest left over every 8 hours as needed for headaches    levothyroxine (SYNTHROID) 150 MCG tablet Take 1 tablet (150 mcg total) by mouth once daily. (Patient taking differently: Take 175 mcg by mouth once daily. )    MAGNESIUM HYDROXIDE (MILK OF MAGNESIA ORAL) Take by mouth once daily.    mupirocin (BACTROBAN) 2 % ointment Apply topically 2 (two) times daily.    nitrofurantoin, macrocrystal-monohydrate, (MACROBID) 100 MG capsule     omega-3 acid ethyl esters (LOVAZA) 1 gram capsule Take 2 capsules (2 g total) by mouth 2 (two) times daily.    omeprazole (PRILOSEC) 40 MG capsule Take 1 capsule (40 mg total) by mouth once daily.    ondansetron (ZOFRAN-ODT) 4 MG TbDL Take 1 tablet (4 mg total) by mouth every 8 (eight) hours as needed (NAUSEA).    potassium chloride SA  "(K-DUR,KLOR-CON) 20 MEQ tablet TAKE ONE TABLET BY MOUTH EVERY DAY    propranolol (INDERAL) 40 MG tablet Take 1 tablet (40 mg total) by mouth 3 (three) times daily.    psyllium 0.52 gram capsule Take 0.52 g by mouth once daily.    QUEtiapine (SEROQUEL XR) 400 MG Tb24     sodium chloride (OCEAN NASAL) 0.65 % nasal spray 2 sprays by Nasal route 2 (two) times daily.    tizanidine (ZANAFLEX) 2 MG tablet Take 1 tablet (2 mg total) by mouth 2 (two) times daily.    topiramate (TOPAMAX) 100 MG tablet Take 1 tablet (100 mg total) by mouth 2 (two) times daily.    traZODone (DESYREL) 100 MG tablet     ibuprofen (ADVIL,MOTRIN) 400 MG tablet     lactulose (CHRONULAC) 20 gram/30 mL Soln Take 30 mLs (20 g total) by mouth once daily.     No current facility-administered medications for this visit.        Objective Findings:    Vital Signs:  /83   Pulse (!) 59   Ht 5' 5" (1.651 m)   Wt 99.3 kg (218 lb 14.7 oz)   LMP  (LMP Unknown)   BMI 36.43 kg/m²   Body mass index is 36.43 kg/m².    Physical Exam:  General Appearance: Well appearing in no acute distress  Head:   Normocephalic, without obvious abnormality  Eyes:    No scleral icterus, EOMI  ENT: Neck supple, Lips, mucosa, and tongue normal; teeth and gums normal  Lungs: CTA bilaterally in anterior and posterior fields, no wheezes, no crackles.  Heart:  Regular rate and rhythm, S1, S2 normal, no murmurs heard  Abdomen: Soft, non tender, non distended with positive bowel sounds in all four quadrants. No hepatosplenomegaly, ascites, or mass. + rectus diastasis  Extremities: 2+ radial pulses, no clubbing, cyanosis or edema  Skin: No rash to exposed areas  Neurologic: A&Ox4      Labs:  Lab Results   Component Value Date    WBC 8.68 04/24/2018    HGB 13.2 04/24/2018    HCT 39.4 04/24/2018     04/24/2018    CHOL 242 (H) 04/24/2018    TRIG 162 (H) 04/24/2018    HDL 61 04/24/2018    ALT 23 04/24/2018    AST 21 04/24/2018     (L) 04/24/2018    K 5.0 04/24/2018 "    CL 97 2018    CREATININE 1.0 2018    BUN 17 2018    CO2 28 2018    TSH 1.704 2018    INR 1.1 2015    HGBA1C 6.5 (H) 2015       Imagin18 abd xray: scattered gas filled loops of bowel. No significant air-fluid level.  2017 abd xray-NL  2016 BE-redundant colon. No definite mass, fixed filing defect nor stricture.  3/2016 CT abd/pelvis-hepatomegaly w/ fatty liver. Punctate liver and spleen calcifications c/w remote granulomatous process. Small umbilical hernia containg fat and small bowel-no obstruction.   Endoscopy:    1/10/2017 EGD Dr. Jain[facility]. Gastritis. bx-h.pylori negative.  2016 colon Dr. Tsang-colonic looping. Incomplete exam up to transverse colon. BE. Repeat in 3 years.    colon Dr. Tsang-two colon polyps. Complete to cecum. Fair prep. repeat in 1 year.  Assessment:  1. Epigastric pain    2. Nausea    3. Gastroesophageal reflux disease without esophagitis    4. Chronic constipation           Recommendations:  1. Epigastric abd pain-EGD, abd us, lipase. Increase omeprazole to 40 mg daily. Possible CT pending results.   2. Nausea- EGD, abd us, lipase. Continue Zofran 4 mg q8 hrs prn.  3. GERD-PPI as above  4. Constipation, chronic- improved. Continue miralax. Can increase up to QID PRN.     RX printed per pt request.          Order summary:  Orders Placed This Encounter    US Abdomen Complete    Lipase    omeprazole (PRILOSEC) 40 MG capsule    ondansetron (ZOFRAN-ODT) 4 MG TbDL    Case request GI: ESOPHAGOGASTRODUODENOSCOPY (EGD)         Thank you so much for allowing me to participate in the care of Joseluis Dickrodríguez Colvin, APRN, FNP-C

## 2018-05-24 NOTE — INTERVAL H&P NOTE
The patient has been examined and the H&P has been reviewed:    I concur with the findings and no changes have occurred since H&P was written.    Anesthesia/Surgery risks, benefits and alternative options discussed and understood by patient/family.    Pre-Procedure H and P Addendum    Patient seen and examined.  History and exam unchanged from prior history and physical.      Procedure: EGD  Indication: abd pain, GERD, nausea   ASA Class: per anesthesiology  Airway: per anesthesia  Neck Mobility: full range of motion  Mallampatti score: per anesthesia  History of anesthesia problems: no  Family history of anesthesia problems: no  Anesthesia Plan: per anesthesia          Active Hospital Problems    Diagnosis  POA    Epigastric pain [R10.13]  Yes      Resolved Hospital Problems    Diagnosis Date Resolved POA   No resolved problems to display.

## 2018-05-24 NOTE — TRANSFER OF CARE
"Anesthesia Transfer of Care Note    Patient: Joseluis Triplett    Procedure(s) Performed: Procedure(s) (LRB):  ESOPHAGOGASTRODUODENOSCOPY (EGD) (N/A)    Patient location: PACU    Anesthesia Type: general    Transport from OR: Transported from OR on room air with adequate spontaneous ventilation    Post pain: adequate analgesia    Post assessment: no apparent anesthetic complications and tolerated procedure well    Post vital signs: stable    Level of consciousness: awake and alert    Nausea/Vomiting: no nausea/vomiting    Complications: none    Transfer of care protocol was followed      Last vitals:   Visit Vitals  /60 (BP Location: Left arm, Patient Position: Lying)   Pulse (!) 59   Temp 36.5 °C (97.7 °F) (Temporal)   Resp 17   Ht 5' 5" (1.651 m)   Wt 96.6 kg (213 lb)   LMP  (LMP Unknown)   SpO2 (!) 92%   Breastfeeding? No   BMI 35.45 kg/m²     "

## 2018-05-30 ENCOUNTER — TELEPHONE (OUTPATIENT)
Dept: GASTROENTEROLOGY | Facility: CLINIC | Age: 63
End: 2018-05-30

## 2018-05-30 NOTE — TELEPHONE ENCOUNTER
----- Message from Ronda Kim sent at 5/30/2018  3:34 PM CDT -----  Patient Requesting Order    Order Needed: suppositories   Communication Preference: [phone]  Additional Information:  Pt states she does not know the name of her suppositories and is almost out. Pt asked that a order be faxed to her nursing home for them to supply her suppositories. Pt notify pt once request has been completed.    [facility]   [facility]  Pt states Ju already has the fax number.

## 2018-05-31 ENCOUNTER — OFFICE VISIT (OUTPATIENT)
Dept: PSYCHIATRY | Facility: CLINIC | Age: 63
End: 2018-05-31
Payer: MEDICARE

## 2018-05-31 ENCOUNTER — TELEPHONE (OUTPATIENT)
Dept: ENDOSCOPY | Facility: HOSPITAL | Age: 63
End: 2018-05-31

## 2018-05-31 ENCOUNTER — TELEPHONE (OUTPATIENT)
Dept: GASTROENTEROLOGY | Facility: CLINIC | Age: 63
End: 2018-05-31

## 2018-05-31 ENCOUNTER — NURSE TRIAGE (OUTPATIENT)
Dept: ADMINISTRATIVE | Facility: CLINIC | Age: 63
End: 2018-05-31

## 2018-05-31 VITALS
HEIGHT: 65 IN | WEIGHT: 218.06 LBS | BODY MASS INDEX: 36.33 KG/M2 | DIASTOLIC BLOOD PRESSURE: 62 MMHG | SYSTOLIC BLOOD PRESSURE: 120 MMHG | HEART RATE: 55 BPM

## 2018-05-31 DIAGNOSIS — Z62.819 HISTORY OF ABUSE IN CHILDHOOD: ICD-10-CM

## 2018-05-31 DIAGNOSIS — B37.81 CANDIDA ESOPHAGITIS: Primary | ICD-10-CM

## 2018-05-31 DIAGNOSIS — F41.9 ANXIETY: ICD-10-CM

## 2018-05-31 DIAGNOSIS — G47.00 INSOMNIA DISORDER WITH NON-SLEEP DISORDER MENTAL COMORBIDITY: ICD-10-CM

## 2018-05-31 DIAGNOSIS — F25.1 SCHIZOAFFECTIVE DISORDER, DEPRESSIVE TYPE: Primary | ICD-10-CM

## 2018-05-31 PROCEDURE — 99999 PR PBB SHADOW E&M-EST. PATIENT-LVL III: CPT | Mod: PBBFAC,,, | Performed by: NURSE PRACTITIONER

## 2018-05-31 PROCEDURE — 3074F SYST BP LT 130 MM HG: CPT | Mod: CPTII,S$GLB,, | Performed by: NURSE PRACTITIONER

## 2018-05-31 PROCEDURE — 3078F DIAST BP <80 MM HG: CPT | Mod: CPTII,S$GLB,, | Performed by: NURSE PRACTITIONER

## 2018-05-31 PROCEDURE — 3008F BODY MASS INDEX DOCD: CPT | Mod: CPTII,S$GLB,, | Performed by: NURSE PRACTITIONER

## 2018-05-31 PROCEDURE — 99215 OFFICE O/P EST HI 40 MIN: CPT | Mod: S$GLB,,, | Performed by: NURSE PRACTITIONER

## 2018-05-31 RX ORDER — DIVALPROEX SODIUM 500 MG/1
1500 TABLET, FILM COATED, EXTENDED RELEASE ORAL DAILY
Qty: 90 TABLET | Refills: 5 | Status: SHIPPED | OUTPATIENT
Start: 2018-05-31 | End: 2018-07-31 | Stop reason: SDUPTHER

## 2018-05-31 RX ORDER — QUETIAPINE 400 MG/1
800 TABLET, FILM COATED, EXTENDED RELEASE ORAL NIGHTLY
Qty: 30 TABLET | Refills: 5 | Status: ON HOLD | OUTPATIENT
Start: 2018-05-31 | End: 2018-09-14 | Stop reason: HOSPADM

## 2018-05-31 RX ORDER — FLUCONAZOLE 200 MG/1
200 TABLET ORAL DAILY
Qty: 14 TABLET | Refills: 0 | Status: SHIPPED | OUTPATIENT
Start: 2018-05-31 | End: 2018-06-14

## 2018-05-31 RX ORDER — TRAZODONE HYDROCHLORIDE 100 MG/1
100 TABLET ORAL NIGHTLY PRN
Qty: 30 TABLET | Refills: 5 | Status: SHIPPED | OUTPATIENT
Start: 2018-05-31 | End: 2018-07-31 | Stop reason: SDUPTHER

## 2018-05-31 RX ORDER — FLUOXETINE HYDROCHLORIDE 40 MG/1
40 CAPSULE ORAL DAILY
Qty: 30 CAPSULE | Refills: 5 | Status: SHIPPED | OUTPATIENT
Start: 2018-05-31 | End: 2018-07-31 | Stop reason: SDUPTHER

## 2018-05-31 NOTE — TELEPHONE ENCOUNTER
Spoke with patient and let her know the biopsy are still in process.     Patient verbalizes understanding.

## 2018-05-31 NOTE — TELEPHONE ENCOUNTER
PATH:    Please let the pt know that pathology from recent procedure shows:    Candida infection in esophagus. No other significant abnormality    We will need to treat her w diflucan 200mg daily for 14 days      Endoscopy and colonoscopy is not a perfect exam of the colon. Rarely a significant polyp or colon cancer can be missed. He/she should not ignore symptoms such as blood with bowel movements or abdominal pain and report these symptoms to the doctor if they occur.     Should follow up as previously requested

## 2018-05-31 NOTE — TELEPHONE ENCOUNTER
----- Message from Althea Ling sent at 5/30/2018  4:45 PM CDT -----  Contact: Self- [phone]  Nimo- pt called to determine if her biopsy results were in yet- please contact pt at [phone]
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
## 2018-06-01 ENCOUNTER — TELEPHONE (OUTPATIENT)
Dept: PSYCHIATRY | Facility: HOSPITAL | Age: 63
End: 2018-06-01

## 2018-06-01 NOTE — TELEPHONE ENCOUNTER
Dulcolax suppositories not recommended. Can take miralax up to four times daily as prescribed.    Thanks,  JASPER Dodd

## 2018-06-01 NOTE — TELEPHONE ENCOUNTER
Returned pt phone call.  Pt wanted to inform me that her nursing home performed her labs this morning and they will be faxing me her Depakote level. ----- Message from Zia Kim MA sent at 6/1/2018 12:54 PM CDT -----  Contact: [phone]  The above pt is requesting a call back regarding some blood work, please call and advice    Thanks

## 2018-06-02 ENCOUNTER — NURSE TRIAGE (OUTPATIENT)
Dept: ADMINISTRATIVE | Facility: CLINIC | Age: 63
End: 2018-06-02

## 2018-06-03 NOTE — TELEPHONE ENCOUNTER
"  Reason for Disposition   [1] Caller requesting NON-URGENT health information AND [2] PCP's office is the best resource    Answer Assessment - Initial Assessment Questions  1. REASON FOR CALL or QUESTION: "What is your reason for calling today?" or "How can I best help you?" or "What question do you have that I can help answer?"      Wants a message given to Dr. Yañez.    Protocols used:  INFORMATION ONLY CALL-A-    Patient called to state that she is in [facility] and has complaints that she would like Dr. Yañez to be aware of. She states that they have been getting her meds to her late and sometimes like this evening they can't find her meds. Also, they won't assist her with going outside to smoke. Ms. Triplett was informed that she would need to address her concerns with the facility and she said that she needs this message to go to Dr. Yañez. Then the patient stated: "I have to go." The phone call ended.   "

## 2018-06-04 ENCOUNTER — TELEPHONE (OUTPATIENT)
Dept: SLEEP MEDICINE | Facility: CLINIC | Age: 63
End: 2018-06-04

## 2018-06-04 ENCOUNTER — TELEPHONE (OUTPATIENT)
Dept: GASTROENTEROLOGY | Facility: CLINIC | Age: 63
End: 2018-06-04

## 2018-06-04 NOTE — TELEPHONE ENCOUNTER
----- Message from Rajanialesha Balderrama sent at 6/4/2018  4:07 PM CDT -----  Contact: CHINO CAMARILLO [[id number]]            Name of Who is Calling: CHINO CAMARILLO [[id number]]    What is the request in detail: Patient would like to speak with someone about her Bi-pap machine. Please call her.       Can the clinic reply by [facility]: No      What [facility] Back [facility] [phone]

## 2018-06-04 NOTE — TELEPHONE ENCOUNTER
Returned pt phone call and spoke with caregiver. Caregiver stated that she was not aware of any problems with the patient's machine. \  Caregiver informed me that if she found out what the patient called for, she would call back.

## 2018-06-04 NOTE — TELEPHONE ENCOUNTER
----- Message from Erika Tierney MA sent at 6/4/2018  4:15 PM CDT -----  Contact: self  [phone]  Test Results    Type of Test:  Endoscopy proceedure  Date of Test:  5-24-18  Communication Preference:  Phone# above  Additional Information:  na

## 2018-06-05 ENCOUNTER — TELEPHONE (OUTPATIENT)
Dept: GASTROENTEROLOGY | Facility: CLINIC | Age: 63
End: 2018-06-05

## 2018-06-05 NOTE — TELEPHONE ENCOUNTER
----- Message from Debra Cordero MA sent at 6/5/2018  1:24 PM CDT -----  Test Results    Type of Test: Endoscopy procedure  Date of Test: 5/24/18  Communication Preference: Mobile: [phone]   Additional Information: please call w/ test results

## 2018-06-05 NOTE — TELEPHONE ENCOUNTER
Spoke with patient and reviewed the results.    Patient would like more clarification on what infection is it and what is the esophagus.    Spoke with nurse at the nursing home where patient lives and she would need a copy faxed to get the medication filled.

## 2018-06-05 NOTE — TELEPHONE ENCOUNTER
Candida esophagitis.    Please let pt know that she has an fungan infection in the food pipe that connects the mouth with the stomach.  This type of infection is similar to oral thrush or diaper rush that babies often get.  Her case is very mild.      We need to treat it with diflucan 200mg daily for 14 days.      Please radha/fax this note to nursing home w copy of Rx

## 2018-06-06 ENCOUNTER — TELEPHONE (OUTPATIENT)
Dept: SMOKING CESSATION | Facility: CLINIC | Age: 63
End: 2018-06-06

## 2018-06-06 ENCOUNTER — TELEPHONE (OUTPATIENT)
Dept: GASTROENTEROLOGY | Facility: CLINIC | Age: 63
End: 2018-06-06

## 2018-06-06 ENCOUNTER — CLINICAL SUPPORT (OUTPATIENT)
Dept: SMOKING CESSATION | Facility: CLINIC | Age: 63
End: 2018-06-06
Payer: COMMERCIAL

## 2018-06-06 VITALS
WEIGHT: 217.13 LBS | SYSTOLIC BLOOD PRESSURE: 103 MMHG | DIASTOLIC BLOOD PRESSURE: 60 MMHG | HEART RATE: 66 BPM | BODY MASS INDEX: 36.14 KG/M2

## 2018-06-06 DIAGNOSIS — F17.200 SMOKES: Primary | ICD-10-CM

## 2018-06-06 PROCEDURE — 99404 PREV MED CNSL INDIV APPRX 60: CPT | Mod: S$GLB,,,

## 2018-06-06 RX ORDER — IBUPROFEN 200 MG
1 TABLET ORAL DAILY
Qty: 28 PATCH | Refills: 0 | Status: SHIPPED | OUTPATIENT
Start: 2018-06-06 | End: 2018-07-03 | Stop reason: SDUPTHER

## 2018-06-06 RX ORDER — ASPIRIN/CALCIUM CARB/MAGNESIUM 325 MG
TABLET ORAL
Qty: 168 LOZENGE | Refills: 0 | Status: SHIPPED | OUTPATIENT
Start: 2018-06-06 | End: 2018-07-03 | Stop reason: SDUPTHER
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
## 2018-06-06 NOTE — TELEPHONE ENCOUNTER
----- Message from Debra Cordero MA sent at 6/6/2018 11:51 AM CDT -----  Contact: Mobile: [phone]   Needs Advice    Reason for call:    Pt said when woke up she had 2 messages to call Yesika but she does not know what its regarding and it making her worry .       Mobile: [phone]

## 2018-06-06 NOTE — TELEPHONE ENCOUNTER
Spoke with patient.    Let patient know the information about infection and let her know the prescription has been sent to the pharmacy.    Patient reports she over eaten three days ago and took milk of magnesia now she is going to the bathroom once a day for the past 3 days. Patient feels she may have diarrhea.    Before getting more information, she hung up the phone.

## 2018-06-06 NOTE — PROGRESS NOTES
6/6/18     See Smoking Cessation Smart Form    Additional Interventions:  · Discussed triggers and planning for quit date.  · Given patient education handouts from [employer] Tool Kit #3  · Educated patient about and gave patient education handouts from  [employer] Drug Information on: NRT, Wellbutrin, Chantix  · Provided phone number to reach [facility] CTTS (Certified Tobacco Treatment Specialist) for future assistance and numbers to 24/7 Quit lines.

## 2018-06-11 ENCOUNTER — TELEPHONE (OUTPATIENT)
Dept: GASTROENTEROLOGY | Facility: CLINIC | Age: 63
End: 2018-06-11

## 2018-06-11 ENCOUNTER — TELEPHONE (OUTPATIENT)
Dept: SLEEP MEDICINE | Facility: CLINIC | Age: 63
End: 2018-06-11

## 2018-06-11 NOTE — TELEPHONE ENCOUNTER
Escalated call received from phone staff, pt complaining of pain with respirations     Spoke to pt who reports the following:  -4-5/10 chest pain with inspiration that has been going on for over a month  -Pt reports feeling like when she swallows liquids that it is going down the wrong tube and she feels the need to cough it up.  -Pt reports worsening headaches over the past 4 days, but states she has an upcoming appt with neuro to address this.  -Pt denies fever, n/v at this time    Pt currently living in nursing home, but states she does not have MD that rounds on her.     Let pt know my recommendation is for her to be evaluated by urgent care.  Pt states she does not feel like getting out at this moment, but may consider calling her mother later, once she wakes up to take her to urgent care.     Reiterated to pt importance of being evaluated asap.  Pt verbalizes understanding of importance and states she will call her mom in a little bit.      Let pt know I will send message to JASPER Dodd, to see if she has any further recommendations at this time.  Pt verbalizes understanding and appreciates information.

## 2018-06-13 ENCOUNTER — TELEPHONE (OUTPATIENT)
Dept: URGENT CARE | Facility: CLINIC | Age: 63
End: 2018-06-13

## 2018-06-13 ENCOUNTER — CLINICAL SUPPORT (OUTPATIENT)
Dept: SMOKING CESSATION | Facility: CLINIC | Age: 63
End: 2018-06-13
Payer: COMMERCIAL

## 2018-06-13 ENCOUNTER — OFFICE VISIT (OUTPATIENT)
Dept: URGENT CARE | Facility: CLINIC | Age: 63
End: 2018-06-13
Payer: MEDICARE

## 2018-06-13 VITALS
HEART RATE: 58 BPM | SYSTOLIC BLOOD PRESSURE: 113 MMHG | WEIGHT: 217 LBS | HEIGHT: 65 IN | TEMPERATURE: 100 F | DIASTOLIC BLOOD PRESSURE: 72 MMHG | BODY MASS INDEX: 36.15 KG/M2 | RESPIRATION RATE: 20 BRPM | OXYGEN SATURATION: 96 %

## 2018-06-13 DIAGNOSIS — F17.210 NICOTINE DEPENDENCE, CIGARETTES, UNCOMPLICATED: Primary | ICD-10-CM

## 2018-06-13 DIAGNOSIS — N39.0 URINARY TRACT INFECTION WITHOUT HEMATURIA, SITE UNSPECIFIED: Primary | ICD-10-CM

## 2018-06-13 LAB
BILIRUB UR QL STRIP: NEGATIVE
GLUCOSE UR QL STRIP: NEGATIVE
KETONES UR QL STRIP: NEGATIVE
LEUKOCYTE ESTERASE UR QL STRIP: POSITIVE
PH, POC UA: 7 (ref 5–8)
POC BLOOD, URINE: NEGATIVE
POC NITRATES, URINE: NEGATIVE
PROT UR QL STRIP: NEGATIVE
SP GR UR STRIP: 1.01 (ref 1–1.03)
UROBILINOGEN UR STRIP-ACNC: NORMAL (ref 0.1–1.1)

## 2018-06-13 PROCEDURE — 99403 PREV MED CNSL INDIV APPRX 45: CPT | Mod: S$GLB,,,

## 2018-06-13 PROCEDURE — 99213 OFFICE O/P EST LOW 20 MIN: CPT | Mod: 25,S$GLB,, | Performed by: FAMILY MEDICINE

## 2018-06-13 PROCEDURE — 81003 URINALYSIS AUTO W/O SCOPE: CPT | Mod: QW,S$GLB,, | Performed by: FAMILY MEDICINE

## 2018-06-13 PROCEDURE — 3078F DIAST BP <80 MM HG: CPT | Mod: CPTII,S$GLB,, | Performed by: FAMILY MEDICINE

## 2018-06-13 PROCEDURE — 3074F SYST BP LT 130 MM HG: CPT | Mod: CPTII,S$GLB,, | Performed by: FAMILY MEDICINE

## 2018-06-13 RX ORDER — NITROFURANTOIN 25; 75 MG/1; MG/1
100 CAPSULE ORAL 2 TIMES DAILY
Qty: 14 CAPSULE | Refills: 0 | Status: SHIPPED | OUTPATIENT
Start: 2018-06-13 | End: 2018-06-20

## 2018-06-13 RX ORDER — NITROFURANTOIN 25; 75 MG/1; MG/1
100 CAPSULE ORAL 2 TIMES DAILY
Qty: 14 CAPSULE | Refills: 0 | Status: SHIPPED | OUTPATIENT
Start: 2018-06-13 | End: 2018-06-13 | Stop reason: SDUPTHER

## 2018-06-13 RX ORDER — FLUTICASONE FUROATE AND VILANTEROL TRIFENATATE 100; 25 UG/1; UG/1
POWDER RESPIRATORY (INHALATION)
Status: ON HOLD | COMMUNITY
Start: 2018-05-15 | End: 2019-02-06 | Stop reason: HOSPADM

## 2018-06-13 NOTE — PROGRESS NOTES
Individual Follow-Up Form    6/13/2018    Quit Date:   6/7/18    Clinical Status of Patient: Outpatient    Length of Service: 45 minutes    Continuing Medication:   Nicotine patches & lozenges    Target Symptoms: Withdrawal and medication side effects. The following were  rated moderate (3) to severe (4) on TCRS:  · Moderate (3):   irritability  · Severe (4): none    Comments:   Patient proudly reported she quit smoking a week ago.  I congratulated her on this achievement.  She is using the nicotine patches without problems.  She has been permitted to keep the nicotine lozenges with her so she can use them as needed.  She engages in many positive coping activities, journaling, reading, praying etc.  She has been coping with many physical health problems. She talked about feeling hungry a lot and I informed her that this was likely a withdrawal symptom.  We talked about how she might modify her diet so she is less hungry and eating healthier snacks.  I suggested she ask to talk to the dietician to get ideas about healthy snacks for her.  She was using the nicotine lozenges a little too frequently and we talked about what she would notice if she were having an urge, only to use them at that time.  She said she was using them at times when she was hungry too.  I told her that would likely cause her to feel nauseated and sick.  She said she realized now and would cut down the use of the lozenges and increase intake of healthy snacks.  She requires frequent repetition of messages but she was also recording in her notebook important things we were going over.  Apparently special arrangements need to be made for her transportation to these appointments.  With patient present, I spoke with the  to ask why patient was not allowed to request transportation to these appointments for herself.  The coordinator said because the arrangements had to be made a week ahead of time and for other appointments  patient was waiting until the last minute.  The coordinator said she didnt have her book with her to schedule and would call me back.     Diagnosis: F17.210    Next Visit: 1 week

## 2018-06-13 NOTE — PROGRESS NOTES
"Subjective:       Patient ID: Joseluis Triplett is a 62 y.o. female.    Vitals:  height is 5' 5" (1.651 m) and weight is 98.4 kg (217 lb). Her oral temperature is 99.9 °F (37.7 °C). Her blood pressure is 113/72 and her pulse is 58 (abnormal). Her respiration is 20 and oxygen saturation is 96%.     Chief Complaint: Gastroesophageal Reflux    This is a 62 y.o. female with   Past Surgical History:  5/17/2016: COLONOSCOPY N/A      Comment: Procedure: COLONOSCOPY;  Surgeon: Akbar Tsang MD;  [facility]                 Service: Endoscopy;  Laterality: N/A;  No date: DIALYSIS FISTULA CREATION      Comment: right arm, but not used  No date: ESOPHAGOGASTRODUODENOSCOPY  5/24/2018: ESOPHAGOGASTRODUODENOSCOPY N/A      Comment: Procedure: ESOPHAGOGASTRODUODENOSCOPY (EGD);                 Surgeon: Hannah Suero MD;  [facility]  Service: Endoscopy;                 Laterality: N/A;  No date: TONSILLECTOMY  No date: TYMPANOSTOMY TUBE PLACEMEN  who presents today with a chief complaint of esophageal burning since last week.  Patient states she started taking Nicotine Lozenges last Wed.  She takes approx 10 lozenges a day.   She had an EGD done on May 24.   She states she was DX'd with a infection of the esophagus.   She states she was put on Diflucan 200mg once daily for 14 days at that time.   She states when she breathes out she has a "horrible feeling".   She states when he drinks or eats soup she coughs.   Patient states she recd a phone call last Friday from a MA who told her to go to the ER or Urgent Care due to having a "horrible feeling" when she breathes out.  When asked why she didn't come then, she states she could not get a ride and "ambulances upset me".  She states she takes Tylenol 1GM TID.  She started talking about her life in the nursing home, stating "dionicio walks around the nursing home.  They are very prejudiced there".   She said the " aides were prejudiced to her.  She stated no one checks on her at the nursing home.        Gastroesophageal Reflux   She complains of coughing and heartburn. She reports no abdominal pain, no chest pain or no nausea. This is a recurrent problem. The current episode started 1 to 4 weeks ago. The problem has been unchanged. The heartburn duration is less than a minute. The heartburn is of moderate intensity. The heartburn does not wake her from sleep. The heartburn does not limit her activity. The heartburn doesn't change with position. Nothing aggravates the symptoms. Pertinent negatives include no melena. Risk factors include smoking/tobacco exposure.     Review of Systems   Constitution: Negative for chills and fever.   Cardiovascular: Negative for chest pain.   Respiratory: Positive for cough. Negative for shortness of breath.    Musculoskeletal: Negative for back pain.   Gastrointestinal: Positive for heartburn. Negative for abdominal pain, constipation, diarrhea, hematochezia, melena, nausea and vomiting.   Genitourinary: Negative for dysuria.       Objective:      Physical Exam   Constitutional: She appears well-developed and well-nourished.   HENT:   Head: Normocephalic and atraumatic.   Eyes: EOM are normal. Pupils are equal, round, and reactive to light.   Neck: Normal range of motion. Neck supple.   Cardiovascular: Normal rate, regular rhythm and normal heart sounds.    Pulmonary/Chest: Effort normal and breath sounds normal.   Abdominal: Soft. Bowel sounds are normal. She exhibits distension. She exhibits no mass. There is tenderness (mild diffuse tenderness). There is no rebound and no guarding. No hernia.   Nursing note and vitals reviewed.      Assessment:       1. Urinary tract infection without hematuria, site unspecified        Plan:         Urinary tract infection without hematuria, site unspecified  -     POCT Urinalysis, Dipstick, Automated, W/O Scope  -     Discontinue: nitrofurantoin,  macrocrystal-monohydrate, (MACROBID) 100 MG capsule; Take 1 capsule (100 mg total) by mouth 2 (two) times daily.  Dispense: 14 capsule; Refill: 0  -     Urine culture  -     nitrofurantoin, macrocrystal-monohydrate, (MACROBID) 100 MG capsule; Take 1 capsule (100 mg total) by mouth 2 (two) times daily.  Dispense: 14 capsule; Refill: 0

## 2018-06-13 NOTE — PATIENT INSTRUCTIONS

## 2018-06-16 ENCOUNTER — TELEPHONE (OUTPATIENT)
Dept: URGENT CARE | Facility: CLINIC | Age: 63
End: 2018-06-16

## 2018-06-17 LAB
BACTERIA UR CULT: NORMAL
BACTERIA UR CULT: NORMAL

## 2018-06-18 ENCOUNTER — TELEPHONE (OUTPATIENT)
Dept: SMOKING CESSATION | Facility: CLINIC | Age: 63
End: 2018-06-18

## 2018-06-18 ENCOUNTER — TELEPHONE (OUTPATIENT)
Dept: GASTROENTEROLOGY | Facility: CLINIC | Age: 63
End: 2018-06-18

## 2018-06-18 NOTE — TELEPHONE ENCOUNTER
6/18/18   1:25 pm    Returned patient's call and scheduled a follow up appointment for 6/27/18 at 11:30 a.m.  After making this appointment, called , Miryam Mathias at [phone] to request she assist with scheduling patient's ride for this appointment.  She agreed to do this.

## 2018-06-18 NOTE — TELEPHONE ENCOUNTER
----- Message from Erika Tierney MA sent at 6/18/2018  2:32 PM CDT -----  Contact: self  [phone]  call after 4:-00 pm  Needs Advice    Reason for call:    I did go to the urgent care like you told me too (my mother drove me their) and they said I had an uti and I started the antibiotics and stopped they ones you gave me.  Communication Preference:  Phone# above   additional Information:  I fell it in my month, neck and chest.  Laying down last night her chest hurt again.  States she did quit smoking over a week ago and states the symptoms are getting worse

## 2018-06-19 ENCOUNTER — TELEPHONE (OUTPATIENT)
Dept: GASTROENTEROLOGY | Facility: CLINIC | Age: 63
End: 2018-06-19

## 2018-06-19 NOTE — TELEPHONE ENCOUNTER
[phone]     pt had terrible itching all over her body and had a hard time sleeping, she just woke up and saw that  she missed Yesika's call          [phone]

## 2018-06-19 NOTE — TELEPHONE ENCOUNTER
----- Message from Princess Kumari sent at 6/19/2018  2:51 PM CDT -----  Contact: Pt:[phone]  .Patient Returning Call from [facility]    Who Left Message for Patient:Yesika  Communication Preference:Telephone  Additional Information:

## 2018-06-20 ENCOUNTER — NURSE TRIAGE (OUTPATIENT)
Dept: ADMINISTRATIVE | Facility: CLINIC | Age: 63
End: 2018-06-20

## 2018-06-20 NOTE — TELEPHONE ENCOUNTER
Pt states that she is in a nursing home and was advised to consult with nurse at the facility. Call ended call abruptly    Reason for Disposition   Caller hangs up   Caller hangs up    Protocols used: ST NO CONTACT OR DUPLICATE CONTACT CALL-A-AH, ST DIFFICULT CALLER-A-AH

## 2018-06-25 ENCOUNTER — LAB VISIT (OUTPATIENT)
Dept: LAB | Facility: HOSPITAL | Age: 63
End: 2018-06-25
Payer: MEDICARE

## 2018-06-25 DIAGNOSIS — F25.1 SCHIZOAFFECTIVE DISORDER, DEPRESSIVE TYPE: ICD-10-CM

## 2018-06-25 LAB — VALPROATE SERPL-MCNC: 74.3 UG/ML

## 2018-06-25 PROCEDURE — 80164 ASSAY DIPROPYLACETIC ACD TOT: CPT

## 2018-06-25 PROCEDURE — 36415 COLL VENOUS BLD VENIPUNCTURE: CPT

## 2018-06-26 ENCOUNTER — OFFICE VISIT (OUTPATIENT)
Dept: PSYCHIATRY | Facility: CLINIC | Age: 63
End: 2018-06-26
Payer: MEDICARE

## 2018-06-26 DIAGNOSIS — F25.1 SCHIZOAFFECTIVE DISORDER, DEPRESSIVE TYPE: Primary | ICD-10-CM

## 2018-06-26 PROCEDURE — 99999 PR PBB SHADOW E&M-EST. PATIENT-LVL II: CPT | Mod: PBBFAC,,, | Performed by: SOCIAL WORKER

## 2018-06-26 PROCEDURE — 90791 PSYCH DIAGNOSTIC EVALUATION: CPT | Mod: S$GLB,,, | Performed by: SOCIAL WORKER
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
## 2018-06-28 ENCOUNTER — CLINICAL SUPPORT (OUTPATIENT)
Dept: SMOKING CESSATION | Facility: CLINIC | Age: 63
End: 2018-06-28
Payer: COMMERCIAL

## 2018-06-28 DIAGNOSIS — F17.210 NICOTINE DEPENDENCE, CIGARETTES, UNCOMPLICATED: Primary | ICD-10-CM

## 2018-06-28 PROCEDURE — 99406 BEHAV CHNG SMOKING 3-10 MIN: CPT | Mod: S$GLB,,,

## 2018-06-30 ENCOUNTER — NURSE TRIAGE (OUTPATIENT)
Dept: ADMINISTRATIVE | Facility: CLINIC | Age: 63
End: 2018-06-30

## 2018-07-01 NOTE — TELEPHONE ENCOUNTER
LM on pt VM at 804pm at [phone]. Another triager got call coming in     Reason for Disposition   Caller has already spoken with another triager and has no further questions.    Protocols used: ST NO CONTACT OR DUPLICATE CONTACT CALL-A-AH

## 2018-07-01 NOTE — TELEPHONE ENCOUNTER
Reason for Disposition   General information question, no triage required and triager able to answer question    Protocols used: ST INFORMATION ONLY CALL-A-AH    Patient called to leave Mr. Lobo a message and to tell him she does not want him to be her  any more.

## 2018-07-02 ENCOUNTER — TELEPHONE (OUTPATIENT)
Dept: PSYCHIATRY | Facility: CLINIC | Age: 63
End: 2018-07-02

## 2018-07-02 ENCOUNTER — DOCUMENTATION ONLY (OUTPATIENT)
Dept: PSYCHIATRY | Facility: CLINIC | Age: 63
End: 2018-07-02

## 2018-07-02 NOTE — TELEPHONE ENCOUNTER
Patient was calling here several times to the point of being frustrating for the receptionists so they told me what she was doing, I called her back and let her know to please only call once leave a message and one of us would get back to her, and I scheduled her an appointment also. Then she called on 6/30/18 and said she wanted a new . I have canceled all her appointments and asked for her not to be scheduled with me again. Dr. Lobo.

## 2018-07-03 RX ORDER — IBUPROFEN 200 MG
1 TABLET ORAL DAILY
Qty: 28 PATCH | Refills: 0 | Status: SHIPPED | OUTPATIENT
Start: 2018-07-03 | End: 2018-08-06 | Stop reason: SDUPTHER

## 2018-07-03 RX ORDER — ASPIRIN/CALCIUM CARB/MAGNESIUM 325 MG
TABLET ORAL
Qty: 168 LOZENGE | Refills: 0 | Status: SHIPPED | OUTPATIENT
Start: 2018-07-03 | End: 2018-08-06 | Stop reason: SDUPTHER

## 2018-07-05 ENCOUNTER — TELEPHONE (OUTPATIENT)
Dept: SLEEP MEDICINE | Facility: CLINIC | Age: 63
End: 2018-07-05

## 2018-07-10 ENCOUNTER — TELEPHONE (OUTPATIENT)
Dept: GASTROENTEROLOGY | Facility: CLINIC | Age: 63
End: 2018-07-10

## 2018-07-10 ENCOUNTER — OFFICE VISIT (OUTPATIENT)
Dept: PSYCHIATRY | Facility: CLINIC | Age: 63
End: 2018-07-10
Payer: MEDICARE

## 2018-07-10 DIAGNOSIS — F25.1 SCHIZOAFFECTIVE DISORDER, DEPRESSIVE TYPE: Primary | ICD-10-CM

## 2018-07-10 PROCEDURE — 90832 PSYTX W PT 30 MINUTES: CPT | Mod: S$GLB,,, | Performed by: SOCIAL WORKER

## 2018-07-10 NOTE — TELEPHONE ENCOUNTER
----- Message from Amee Mayer sent at 7/10/2018  1:42 PM CDT -----  Contact: [phone]  ivet - has abdominal pain - please call patient at [phone]

## 2018-07-10 NOTE — PROGRESS NOTES
Individual Psychotherapy (PhD/LCSW)    7/10/2018    Site:  [facility]         Therapeutic Intervention: Met with patient.  Outpatient - Insight oriented psychotherapy 30 min - CPT code 86459    Chief complaint/reason for encounter: depression, mood swings, anger, anxiety, sleep, appetite, behavior, cognition, somatic and interpersonal     Interval history and content of current session: discussed issues about not liking her nursing home, [facility] and has been there for three years, and today present with anxiety, moodiness, physical symptoms of stomach, pain and feels like she needs to see her MD Dr. Ju Ash, and see her soon due to the stomach pain, and symptoms, and hard to eat, skips, meals, pain comes and goes in her stomach but consistent for about one week, and having hard time with this today and with her emotions, I called the department of  gastrointology to see about her getting an appointment.; patient has a hard time focusing, keeps sending me cards and apologies, and wants to keep working with me which is fine if she does not act out too much.    Treatment plan:  · Target symptoms: depression, recurrent depression, anxiety , mood swings, mood disorder, adjustment, grief  · Why chosen therapy is appropriate versus another modality: relevant to diagnosis, patient responds to this modality, evidence based practice  · Outcome monitoring methods: self-report, observation  · Therapeutic intervention type: insight oriented psychotherapy, behavior modifying psychotherapy, supportive psychotherapy    Risk parameters:  Patient reports no suicidal ideation  Patient reports no homicidal ideation  Patient reports no self-injurious behavior  Patient reports no violent behavior    Verbal deficits: None    Patient's response to intervention:  The patient's response to intervention is accepting.    Progress toward goals and other mental status changes:  The patient's progress toward goals is  limited.    Diagnosis:     ICD-10-CM ICD-9-CM   1. Schizoaffective disorder, depressive type F25.1 295.70       Plan:  individual psychotherapy, consult psychiatrist for medication evaluation and medication management by physician    Return to clinic: 2 weeks    Length of Service (minutes): 30

## 2018-07-12 ENCOUNTER — CLINICAL SUPPORT (OUTPATIENT)
Dept: SMOKING CESSATION | Facility: CLINIC | Age: 63
End: 2018-07-12
Payer: COMMERCIAL

## 2018-07-12 DIAGNOSIS — F17.210 NICOTINE DEPENDENCE, CIGARETTES, UNCOMPLICATED: Primary | ICD-10-CM

## 2018-07-12 PROCEDURE — 99406 BEHAV CHNG SMOKING 3-10 MIN: CPT | Mod: S$GLB,,,

## 2018-07-16 ENCOUNTER — LAB VISIT (OUTPATIENT)
Dept: LAB | Facility: HOSPITAL | Age: 63
End: 2018-07-16
Payer: MEDICARE

## 2018-07-16 ENCOUNTER — OFFICE VISIT (OUTPATIENT)
Dept: GASTROENTEROLOGY | Facility: CLINIC | Age: 63
End: 2018-07-16
Payer: MEDICARE

## 2018-07-16 VITALS — HEIGHT: 65 IN | BODY MASS INDEX: 36.55 KG/M2 | WEIGHT: 219.38 LBS

## 2018-07-16 DIAGNOSIS — Z12.11 COLON CANCER SCREENING: ICD-10-CM

## 2018-07-16 DIAGNOSIS — R10.9 ABDOMINAL PAIN OF MULTIPLE SITES: ICD-10-CM

## 2018-07-16 DIAGNOSIS — R10.31 RLQ ABDOMINAL PAIN: ICD-10-CM

## 2018-07-16 DIAGNOSIS — K59.01 SLOW TRANSIT CONSTIPATION: ICD-10-CM

## 2018-07-16 DIAGNOSIS — R13.12 OROPHARYNGEAL DYSPHAGIA: Primary | ICD-10-CM

## 2018-07-16 LAB
ANION GAP SERPL CALC-SCNC: 7 MMOL/L
BUN SERPL-MCNC: 25 MG/DL
CALCIUM SERPL-MCNC: 9.7 MG/DL
CHLORIDE SERPL-SCNC: 98 MMOL/L
CO2 SERPL-SCNC: 31 MMOL/L
CREAT SERPL-MCNC: 1.2 MG/DL
EST. GFR  (AFRICAN AMERICAN): 56 ML/MIN/1.73 M^2
EST. GFR  (NON AFRICAN AMERICAN): 48.6 ML/MIN/1.73 M^2
GLUCOSE SERPL-MCNC: 99 MG/DL
POTASSIUM SERPL-SCNC: 5.2 MMOL/L
SODIUM SERPL-SCNC: 136 MMOL/L

## 2018-07-16 PROCEDURE — 80048 BASIC METABOLIC PNL TOTAL CA: CPT

## 2018-07-16 PROCEDURE — 99999 PR PBB SHADOW E&M-EST. PATIENT-LVL V: CPT | Mod: PBBFAC,,, | Performed by: PHYSICIAN ASSISTANT

## 2018-07-16 PROCEDURE — 99214 OFFICE O/P EST MOD 30 MIN: CPT | Mod: S$GLB,,, | Performed by: PHYSICIAN ASSISTANT

## 2018-07-16 PROCEDURE — 36415 COLL VENOUS BLD VENIPUNCTURE: CPT

## 2018-07-16 PROCEDURE — 3008F BODY MASS INDEX DOCD: CPT | Mod: CPTII,S$GLB,, | Performed by: PHYSICIAN ASSISTANT
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
## 2018-07-17 ENCOUNTER — CLINICAL SUPPORT (OUTPATIENT)
Dept: SMOKING CESSATION | Facility: CLINIC | Age: 63
End: 2018-07-17
Payer: COMMERCIAL

## 2018-07-17 DIAGNOSIS — F17.210 NICOTINE DEPENDENCE, CIGARETTES, UNCOMPLICATED: Primary | ICD-10-CM

## 2018-07-17 PROCEDURE — 99402 PREV MED CNSL INDIV APPRX 30: CPT | Mod: S$GLB,,,

## 2018-07-17 NOTE — PROGRESS NOTES
Individual Follow-Up Form    7/17/2018    Quit Date:  6/29/18    Clinical Status of Patient: Outpatient    Length of Service: 30 minutes    Continuing Medication:   Nicotine patch & lozenges    Target Symptoms: Withdrawal and medication side effects. The following were  rated moderate (3) to severe (4) on TCRS:  · Moderate (3): none  · Severe (4): none    Comments:   Patient reported she has not smoked since before her quit date and has not had any slips.  I acknowledged her accomplishment and provided positive reinforcement.  When I asked her how she was managing to stay quit she said she perfected the process with the help of God.  She said she had to work it out for herself and frequently reviewed her reasons for quitting to help stay focused.  She described thinking differently and said that helped.  She seemed more confident and her affect had brightened somewhat. She reported she is sleeping better as well.  She engages in pleasurable activities to distract herself:  loves to sing and play the piano.  I assisted patient to recognize strengths she is using to accomplish her goal.    Diagnosis: F17.210    Next Visit:    7/31/18

## 2018-07-19 ENCOUNTER — TELEPHONE (OUTPATIENT)
Dept: INTERNAL MEDICINE | Facility: CLINIC | Age: 63
End: 2018-07-19

## 2018-07-19 ENCOUNTER — TELEPHONE (OUTPATIENT)
Dept: GASTROENTEROLOGY | Facility: CLINIC | Age: 63
End: 2018-07-19

## 2018-07-19 NOTE — TELEPHONE ENCOUNTER
Spoke with Dawson at [facility] in regards to CT Scan. Appointment scheduled and instructions for patient given to DAWSON.      Also spoke with Marielle in regards to MBSS for patient, she will contact DAWSON to schedule the swallow test.

## 2018-07-19 NOTE — TELEPHONE ENCOUNTER
Advised nursing home to hold potassium x 4 days and resume on Monday. Has f/u with Dr Reddy in 1 week for repeat K+.

## 2018-07-19 NOTE — TELEPHONE ENCOUNTER
Spoke to Iqra Triplett's new nurse, she stated that pt has not been taking potassium for over a year now she double checked the pt's meds.

## 2018-07-19 NOTE — TELEPHONE ENCOUNTER
----- Message from Caren Cuellar sent at 7/19/2018 10:45 AM CDT -----  Contact: Iqra/ Nurse/[facility]/[phone]  Iqra MARTINEZ at [facility] is requesting a call back from Evelina in regard to a order that was faxed over to them about holding potassium for patient.  Please advise.  Thank you.

## 2018-07-20 ENCOUNTER — TELEPHONE (OUTPATIENT)
Dept: GASTROENTEROLOGY | Facility: CLINIC | Age: 63
End: 2018-07-20

## 2018-07-20 NOTE — TELEPHONE ENCOUNTER
Spoke with patients nurse Grace in regards to results.    They will schedule a follow up with patients PCP.

## 2018-07-25 ENCOUNTER — TELEPHONE (OUTPATIENT)
Dept: GASTROENTEROLOGY | Facility: CLINIC | Age: 63
End: 2018-07-25

## 2018-07-25 ENCOUNTER — OFFICE VISIT (OUTPATIENT)
Dept: PSYCHIATRY | Facility: CLINIC | Age: 63
End: 2018-07-25
Payer: MEDICARE

## 2018-07-25 DIAGNOSIS — F25.9 SCHIZOAFFECTIVE DISORDER, CHRONIC CONDITION: Primary | ICD-10-CM

## 2018-07-25 PROCEDURE — 90832 PSYTX W PT 30 MINUTES: CPT | Mod: S$GLB,,, | Performed by: SOCIAL WORKER
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
## 2018-07-26 ENCOUNTER — TELEPHONE (OUTPATIENT)
Dept: GASTROENTEROLOGY | Facility: CLINIC | Age: 63
End: 2018-07-26

## 2018-07-26 NOTE — TELEPHONE ENCOUNTER
Please advise    Attempted to contact patient with no success.  Left message for patient to call clinic.    ----- Message from Althea Ling sent at 7/25/2018  4:22 PM CDT -----  Contact: Self- [phone]  [place]- pt called to speak with staff regarding her being constipated for 6 days- wanted to know if Miralax can be increased- please contact pt at [phone]

## 2018-07-26 NOTE — TELEPHONE ENCOUNTER
Spoke with patient , she states she did an enema yesterday with no relief. She states she is severely bloated, with abdominal pain, and constipation.    Please advise

## 2018-07-26 NOTE — TELEPHONE ENCOUNTER
Spoke with pt. She has been constipated for 4 days. No relief with linzess.     Will advise magnesium citrate and miralax 34g twice daily and MOM 30ml   Twice daily    Gave orders to pt's nurse Yi ordered the above    Also repeated BMP to check on BUN/Cr. May need to lowwer dose of MOM if BUN,Cr still elevated

## 2018-07-27 ENCOUNTER — TELEPHONE (OUTPATIENT)
Dept: GASTROENTEROLOGY | Facility: CLINIC | Age: 63
End: 2018-07-27

## 2018-07-27 NOTE — TELEPHONE ENCOUNTER
----- Message from Althea Sushil sent at 7/27/2018  8:31 AM CDT -----  Contact: Self- [phone]  [place]- pt called to speak with Chasidy regarding her constipation- asks can her nursing home be called [phone] with an update about the Linzess- please contact pt at [phone]

## 2018-07-31 ENCOUNTER — TELEPHONE (OUTPATIENT)
Dept: PSYCHIATRY | Facility: CLINIC | Age: 63
End: 2018-07-31

## 2018-07-31 ENCOUNTER — OFFICE VISIT (OUTPATIENT)
Dept: PSYCHIATRY | Facility: CLINIC | Age: 63
End: 2018-07-31
Payer: MEDICARE

## 2018-07-31 VITALS
SYSTOLIC BLOOD PRESSURE: 135 MMHG | HEART RATE: 62 BPM | WEIGHT: 219.38 LBS | DIASTOLIC BLOOD PRESSURE: 64 MMHG | HEIGHT: 65 IN | BODY MASS INDEX: 36.55 KG/M2

## 2018-07-31 DIAGNOSIS — F41.9 ANXIETY: ICD-10-CM

## 2018-07-31 DIAGNOSIS — Z62.819 HISTORY OF ABUSE IN CHILDHOOD: ICD-10-CM

## 2018-07-31 DIAGNOSIS — F25.1 SCHIZOAFFECTIVE DISORDER, DEPRESSIVE TYPE: Primary | ICD-10-CM

## 2018-07-31 DIAGNOSIS — G47.00 INSOMNIA DISORDER WITH NON-SLEEP DISORDER MENTAL COMORBIDITY: ICD-10-CM

## 2018-07-31 PROCEDURE — 3008F BODY MASS INDEX DOCD: CPT | Mod: CPTII,S$GLB,, | Performed by: NURSE PRACTITIONER

## 2018-07-31 PROCEDURE — 3078F DIAST BP <80 MM HG: CPT | Mod: CPTII,S$GLB,, | Performed by: NURSE PRACTITIONER

## 2018-07-31 PROCEDURE — 99213 OFFICE O/P EST LOW 20 MIN: CPT | Mod: S$GLB,,, | Performed by: NURSE PRACTITIONER

## 2018-07-31 PROCEDURE — 90833 PSYTX W PT W E/M 30 MIN: CPT | Mod: S$GLB,,, | Performed by: NURSE PRACTITIONER

## 2018-07-31 PROCEDURE — 3075F SYST BP GE 130 - 139MM HG: CPT | Mod: CPTII,S$GLB,, | Performed by: NURSE PRACTITIONER

## 2018-07-31 PROCEDURE — 99999 PR PBB SHADOW E&M-EST. PATIENT-LVL III: CPT | Mod: PBBFAC,,, | Performed by: NURSE PRACTITIONER

## 2018-07-31 RX ORDER — FLUOXETINE HYDROCHLORIDE 20 MG/1
20 CAPSULE ORAL DAILY
Qty: 30 CAPSULE | Refills: 5 | Status: SHIPPED | OUTPATIENT
Start: 2018-07-31 | End: 2018-10-25 | Stop reason: SDUPTHER

## 2018-07-31 RX ORDER — DIVALPROEX SODIUM 500 MG/1
2000 TABLET, FILM COATED, EXTENDED RELEASE ORAL DAILY
Qty: 120 TABLET | Refills: 5 | Status: SHIPPED | OUTPATIENT
Start: 2018-07-31 | End: 2018-10-25 | Stop reason: SDUPTHER

## 2018-07-31 RX ORDER — TRAZODONE HYDROCHLORIDE 150 MG/1
150 TABLET ORAL NIGHTLY PRN
Qty: 30 TABLET | Refills: 5 | Status: ON HOLD | OUTPATIENT
Start: 2018-07-31 | End: 2018-09-14 | Stop reason: HOSPADM
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
## 2018-08-06 ENCOUNTER — CLINICAL SUPPORT (OUTPATIENT)
Dept: SMOKING CESSATION | Facility: CLINIC | Age: 63
End: 2018-08-06
Payer: COMMERCIAL

## 2018-08-06 DIAGNOSIS — F17.210 NICOTINE DEPENDENCE, CIGARETTES, UNCOMPLICATED: Primary | ICD-10-CM

## 2018-08-06 PROCEDURE — 99407 BEHAV CHNG SMOKING > 10 MIN: CPT | Mod: S$GLB,,,

## 2018-08-06 RX ORDER — IBUPROFEN 200 MG
1 TABLET ORAL DAILY
Qty: 28 PATCH | Refills: 0 | Status: ON HOLD | OUTPATIENT
Start: 2018-08-06 | End: 2018-09-14 | Stop reason: HOSPADM

## 2018-08-06 RX ORDER — ASPIRIN/CALCIUM CARB/MAGNESIUM 325 MG
TABLET ORAL
Qty: 168 LOZENGE | Refills: 0 | Status: ON HOLD | OUTPATIENT
Start: 2018-08-06 | End: 2018-09-14 | Stop reason: HOSPADM

## 2018-08-07 ENCOUNTER — TELEPHONE (OUTPATIENT)
Dept: GASTROENTEROLOGY | Facility: CLINIC | Age: 63
End: 2018-08-07

## 2018-08-07 ENCOUNTER — HOSPITAL ENCOUNTER (OUTPATIENT)
Dept: RADIOLOGY | Facility: HOSPITAL | Age: 63
Discharge: HOME | End: 2018-08-07
Attending: PHYSICIAN ASSISTANT
Payer: MEDICARE

## 2018-08-07 DIAGNOSIS — R10.9 ABDOMINAL PAIN OF MULTIPLE SITES: ICD-10-CM

## 2018-08-07 LAB
CREAT SERPL-MCNC: 1 MG/DL (ref 0.5–1.4)
SAMPLE: NORMAL

## 2018-08-07 PROCEDURE — 74177 CT ABD & PELVIS W/CONTRAST: CPT | Mod: TC

## 2018-08-07 PROCEDURE — 74177 CT ABD & PELVIS W/CONTRAST: CPT | Mod: 26,,, | Performed by: RADIOLOGY

## 2018-08-07 PROCEDURE — 25500020 PHARM REV CODE 255: Performed by: PHYSICIAN ASSISTANT

## 2018-08-07 RX ADMIN — IOHEXOL 100 ML: 350 INJECTION, SOLUTION INTRAVENOUS at 11:08

## 2018-08-08 ENCOUNTER — TELEPHONE (OUTPATIENT)
Dept: GASTROENTEROLOGY | Facility: CLINIC | Age: 63
End: 2018-08-08

## 2018-08-08 ENCOUNTER — OFFICE VISIT (OUTPATIENT)
Dept: PSYCHIATRY | Facility: CLINIC | Age: 63
End: 2018-08-08
Payer: MEDICARE

## 2018-08-08 DIAGNOSIS — F25.9 SCHIZOAFFECTIVE DISORDER, CHRONIC CONDITION: Primary | ICD-10-CM

## 2018-08-08 DIAGNOSIS — R93.5 ABNORMAL ABDOMINAL CT SCAN: Primary | ICD-10-CM

## 2018-08-08 PROCEDURE — 90834 PSYTX W PT 45 MINUTES: CPT | Mod: S$GLB,,, | Performed by: SOCIAL WORKER
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
## 2018-08-14 ENCOUNTER — TELEPHONE (OUTPATIENT)
Dept: GASTROENTEROLOGY | Facility: CLINIC | Age: 63
End: 2018-08-14

## 2018-08-14 ENCOUNTER — TELEPHONE (OUTPATIENT)
Dept: RADIOLOGY | Facility: HOSPITAL | Age: 63
End: 2018-08-14

## 2018-08-14 NOTE — TELEPHONE ENCOUNTER
----- Message from Althea Ling sent at 8/14/2018  2:18 PM CDT -----  Contact: Self- [phone]  [place]- pt called to speak with Chasidy states she is still waiting on test results- please contact pt at the [facility] [phone]

## 2018-08-15 ENCOUNTER — CLINICAL SUPPORT (OUTPATIENT)
Dept: SPEECH THERAPY | Facility: HOSPITAL | Age: 63
End: 2018-08-15
Payer: MEDICARE

## 2018-08-15 ENCOUNTER — HOSPITAL ENCOUNTER (OUTPATIENT)
Dept: RADIOLOGY | Facility: HOSPITAL | Age: 63
Discharge: HOME | End: 2018-08-15
Attending: PHYSICIAN ASSISTANT
Payer: MEDICARE

## 2018-08-15 ENCOUNTER — TELEPHONE (OUTPATIENT)
Dept: GASTROENTEROLOGY | Facility: CLINIC | Age: 63
End: 2018-08-15

## 2018-08-15 DIAGNOSIS — R13.12 OROPHARYNGEAL DYSPHAGIA: ICD-10-CM

## 2018-08-15 DIAGNOSIS — R13.10 ODYNOPHAGIA: ICD-10-CM

## 2018-08-15 DIAGNOSIS — R13.12 OROPHARYNGEAL DYSPHAGIA: Primary | ICD-10-CM

## 2018-08-15 PROCEDURE — 92611 MOTION FLUOROSCOPY/SWALLOW: CPT | Mod: GN | Performed by: SPEECH-LANGUAGE PATHOLOGIST

## 2018-08-15 PROCEDURE — G8996 SWALLOW CURRENT STATUS: HCPCS | Mod: CH | Performed by: SPEECH-LANGUAGE PATHOLOGIST

## 2018-08-15 PROCEDURE — 74230 X-RAY XM SWLNG FUNCJ C+: CPT | Mod: TC

## 2018-08-15 PROCEDURE — G8997 SWALLOW GOAL STATUS: HCPCS | Mod: CH | Performed by: SPEECH-LANGUAGE PATHOLOGIST

## 2018-08-15 PROCEDURE — 74230 X-RAY XM SWLNG FUNCJ C+: CPT | Mod: 26,,, | Performed by: RADIOLOGY

## 2018-08-15 PROCEDURE — G8998 SWALLOW D/C STATUS: HCPCS | Mod: CH | Performed by: SPEECH-LANGUAGE PATHOLOGIST

## 2018-08-15 NOTE — TELEPHONE ENCOUNTER
Spoke with patients nurse at [facility]. Copy of CT Scan and results faxed over to the office.

## 2018-08-16 NOTE — PLAN OF CARE
IMPRESSIONS:   This 63 y.o. woman appears to present with  1.  Odynophagia with all swallows.  Pain reported at level of cricoid and in ears with swallowing.  2.  Oropharyngeal swallowing functioning appeared within normal limits for thin liquids, all solids, and the barium tablet.  3.  Pt report of swollen gland in R neck/submental area.  4.  Complicated medical history including COPD, GERD, HH, PD, and thyroid disease.  5.  History smoking; may still be smoking currently.  6.  History multiple mental health issues/diagnoses.      Swallowing  Current status:  FCM:  LEVEL 7: The individual's ability to eat independently is not limited by swallow function.  Swallowing would be safe and efficient for all consistencies. Compensatory strategies are effectively used when needed.  [address] [facility]  Projected status:  FCM:   LEVEL 7: The individual's ability to eat independently is not limited by swallow function.  Swallowing would be safe and efficient for all consistencies. Compensatory strategies are effectively used when needed.  8997 -   Discharge status:  FCM:   LEVEL 7: The individual's ability to eat independently is not limited by swallow function.  Swallowing would be safe and efficient for all consistencies. Compensatory strategies are effectively used when needed.  [address] [facility]       RECOMMENDATIONS/PLAN OF CARE:   It is felt that Ms. Triplett would benefit from  1.  Consultation with physician re: odynophagia.  She may f/u with Dr. Winter at  (as she is established with her) or with ENT at [facility] if she wishes to change providers as she mentioned. Suggested beginning with Dr. Rafael Jackson (provided ENT dept contact #) with subsequent referral to an ENT specialist as needed per his (or Dr. Winter's) assessment.  2.  Continuation of her current regular consistency diet with thin liquids using the following strategies and common aspiration precautions, including, but not limited to   A.  Appropriate upright  seating for all eating and drinking.   B.  No straws if she finds coughing/choking is increased with their use.   C.  Monitoring for any signs/symptoms of aspiration (such as wet/gurgly voice that does not clear with coughing, inability to make any voice sounds, any persistent coughing with oral intake, otherwise unexplained fever, unexplained increased or new difficulty or discomfort breathing, unexplained increase in sleepiness/lethargy/significant fatigue, unexplained increase or new onset confusion or change in cognitive functioning, or any other unexplained change in health or well-being that could be related to swallowing).  3.  Repeat MBSS as needed.  4.  Continued f/u with NELIDA Golden as directed.
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
## 2018-08-18 ENCOUNTER — NURSE TRIAGE (OUTPATIENT)
Dept: ADMINISTRATIVE | Facility: CLINIC | Age: 63
End: 2018-08-18

## 2018-08-19 ENCOUNTER — NURSE TRIAGE (OUTPATIENT)
Dept: ADMINISTRATIVE | Facility: CLINIC | Age: 63
End: 2018-08-19

## 2018-08-19 NOTE — TELEPHONE ENCOUNTER
Patient would like to have dosage of seroquel to 800mg at night. She believes she is not getting the correct dose.

## 2018-08-19 NOTE — TELEPHONE ENCOUNTER
"Attempted to reach patient by phone.  Left message on voicemail.    Reason for Disposition   Caller has NON-URGENT medication question about med that PCP prescribed and triager unable to answer question    Answer Assessment - Initial Assessment Questions  1. SYMPTOMS: "Do you have any symptoms?"     Per patient Needs medications are not correct.  2. SEVERITY: If symptoms are present, ask "Are they mild, moderate or severe?"      Patient states she needs her medication  She is being treated unfairly by witholding her medications. She got 150mg Trazodone and 400mg Seroquel.    Protocols used: ST MEDICATION QUESTION CALL-A-AH    "

## 2018-08-19 NOTE — TELEPHONE ENCOUNTER
Reason for Disposition   Caller requesting a NON-URGENT new prescription or refill and triager unable to refill per unit policy    Protocols used: ST MEDICATION QUESTION CALL-A-  pt states that she is concerned that she did not receive her proper dose of seroquel this evening. Pt states that she will not be able to sleep. spoke with crystal at [facility] re pt meds. Please check to see if pt received proper dose of Seroquel.

## 2018-08-21 ENCOUNTER — TELEPHONE (OUTPATIENT)
Dept: PSYCHIATRY | Facility: HOSPITAL | Age: 63
End: 2018-08-21

## 2018-08-21 ENCOUNTER — TELEPHONE (OUTPATIENT)
Dept: SMOKING CESSATION | Facility: CLINIC | Age: 63
End: 2018-08-21

## 2018-08-21 NOTE — TELEPHONE ENCOUNTER
Returned pt phone call and clarified her medications----- Message from Virginia Wang sent at 8/21/2018  4:08 PM CDT -----  Contact: pt  Pt is calling re:her meds  QUEtiapine (SEROQUEL XR) 400 MG Tb24.   She needs your advice.  Pt cb #    [phone]

## 2018-08-21 NOTE — TELEPHONE ENCOUNTER
8/21/18        Received a message from patient that was left on 8/20/18 in which patient says she has decided to go back to smoking.  She stated, Its my decision and I want to smoke.  Feeling too much stress and not feeling good about herself.  She said she would call back whenever she decided she was ready to try to quit again.  She asked me not to call her back after hearing this message.

## 2018-08-23 ENCOUNTER — OFFICE VISIT (OUTPATIENT)
Dept: PSYCHIATRY | Facility: CLINIC | Age: 63
End: 2018-08-23
Payer: MEDICARE

## 2018-08-23 ENCOUNTER — TELEPHONE (OUTPATIENT)
Dept: PSYCHIATRY | Facility: HOSPITAL | Age: 63
End: 2018-08-23

## 2018-08-23 DIAGNOSIS — F31.32 BIPOLAR AFFECTIVE DISORDER, CURRENTLY DEPRESSED, MODERATE: Primary | ICD-10-CM

## 2018-08-23 DIAGNOSIS — F41.9 ANXIETY: Primary | ICD-10-CM

## 2018-08-23 PROCEDURE — 99999 PR PBB SHADOW E&M-EST. PATIENT-LVL I: CPT | Mod: PBBFAC,,, | Performed by: SOCIAL WORKER

## 2018-08-23 PROCEDURE — 90832 PSYTX W PT 30 MINUTES: CPT | Mod: S$GLB,,, | Performed by: SOCIAL WORKER

## 2018-08-23 RX ORDER — CLONAZEPAM 0.5 MG/1
0.5 TABLET ORAL 2 TIMES DAILY PRN
Qty: 60 TABLET | Refills: 3 | Status: ON HOLD | OUTPATIENT
Start: 2018-08-23 | End: 2018-09-14 | Stop reason: HOSPADM

## 2018-08-23 NOTE — TELEPHONE ENCOUNTER
Will send order for Klonopin 0.5 mg po BID PRN----- Message from Brittany Burr MA sent at 8/23/2018 10:52 AM CDT -----  Contact: pt  [phone]    Pt called said she would like to add to her previous mgs she sent.     Also been paranoid lately since I last saw you
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
## 2018-08-30 ENCOUNTER — TELEPHONE (OUTPATIENT)
Dept: GASTROENTEROLOGY | Facility: CLINIC | Age: 63
End: 2018-08-30

## 2018-08-30 NOTE — TELEPHONE ENCOUNTER
Attempted to return patients phone call with no success.    Left message for patient to call clinic.

## 2018-08-30 NOTE — TELEPHONE ENCOUNTER
----- Message from Yenni Reed sent at 8/30/2018 10:22 AM CDT -----  Contact: self  Pt would like to speak with the nurse regarding tests that she is unsure of.    Please call pt at [phone]

## 2018-09-04 ENCOUNTER — TELEPHONE (OUTPATIENT)
Dept: GASTROENTEROLOGY | Facility: CLINIC | Age: 63
End: 2018-09-04

## 2018-09-05 ENCOUNTER — TELEPHONE (OUTPATIENT)
Dept: GASTROENTEROLOGY | Facility: CLINIC | Age: 63
End: 2018-09-05

## 2018-09-05 NOTE — TELEPHONE ENCOUNTER
Appointment slip also faxed to Nursing home. [facility]

## 2018-09-05 NOTE — TELEPHONE ENCOUNTER
----- Message from Erika Dillard sent at 9/5/2018  9:30 AM CDT -----  Contact: self [phone]  Patient Returning Call from [facility]    Who Left Message for Patient: Inez  Communication Preference: self [phone]  Additional Information:

## 2018-09-05 NOTE — TELEPHONE ENCOUNTER
Spoke with patient. She cancelled Ct Scan originally scheduled for 8/20. She states that she is having pain on her left and right side. Also in her stomach near her belly button.    Please advise

## 2018-09-05 NOTE — TELEPHONE ENCOUNTER
Spoke with patient. She tended to be very rude and demanding. Reasoned with patient. Managed to get CT Scan. Appointment slip with instructions mailed out to patient.

## 2018-09-05 NOTE — TELEPHONE ENCOUNTER
Attempted to call patient back with no success.    Left message on patients voicemail for her to call clinic.

## 2018-09-05 NOTE — TELEPHONE ENCOUNTER
----- Message from Althea Ling sent at 9/5/2018 10:15 AM CDT -----  Contact: Self- [phone]  [place]- pt called to speak with staff- please contact pt at [phone]

## 2018-09-06 ENCOUNTER — OFFICE VISIT (OUTPATIENT)
Dept: PSYCHIATRY | Facility: CLINIC | Age: 63
End: 2018-09-06
Payer: MEDICARE

## 2018-09-06 ENCOUNTER — HOSPITAL ENCOUNTER (EMERGENCY)
Facility: HOSPITAL | Age: 63
Discharge: PSYCHIATRIC OR SUBSTANCE USE FACILITY | End: 2018-09-06
Attending: EMERGENCY MEDICINE
Payer: MEDICARE

## 2018-09-06 VITALS
WEIGHT: 225 LBS | BODY MASS INDEX: 37.49 KG/M2 | HEART RATE: 58 BPM | SYSTOLIC BLOOD PRESSURE: 110 MMHG | OXYGEN SATURATION: 98 % | HEIGHT: 65 IN | TEMPERATURE: 97 F | RESPIRATION RATE: 15 BRPM | DIASTOLIC BLOOD PRESSURE: 55 MMHG

## 2018-09-06 DIAGNOSIS — F23 ACUTE PSYCHOSIS: Primary | ICD-10-CM

## 2018-09-06 DIAGNOSIS — R14.0 ABDOMINAL DISTENSION: ICD-10-CM

## 2018-09-06 DIAGNOSIS — F25.9 SCHIZOAFFECTIVE DISORDER, CHRONIC CONDITION: Primary | ICD-10-CM

## 2018-09-06 PROBLEM — F29 PSYCHOSIS: Status: ACTIVE | Noted: 2018-09-06

## 2018-09-06 LAB
ALBUMIN SERPL BCP-MCNC: 3.3 G/DL
ALP SERPL-CCNC: 101 U/L
ALT SERPL W/O P-5'-P-CCNC: 14 U/L
AMPHET+METHAMPHET UR QL: NEGATIVE
ANION GAP SERPL CALC-SCNC: 9 MMOL/L
APAP SERPL-MCNC: 5 UG/ML
AST SERPL-CCNC: 14 U/L
BARBITURATES UR QL SCN>200 NG/ML: NEGATIVE
BASOPHILS # BLD AUTO: 0.03 K/UL
BASOPHILS NFR BLD: 0.5 %
BENZODIAZ UR QL SCN>200 NG/ML: NEGATIVE
BILIRUB SERPL-MCNC: 0.2 MG/DL
BILIRUB UR QL STRIP: NEGATIVE
BUN SERPL-MCNC: 16 MG/DL
BZE UR QL SCN: NEGATIVE
CALCIUM SERPL-MCNC: 9.8 MG/DL
CANNABINOIDS UR QL SCN: NEGATIVE
CHLORIDE SERPL-SCNC: 102 MMOL/L
CLARITY UR REFRACT.AUTO: CLEAR
CO2 SERPL-SCNC: 27 MMOL/L
COLOR UR AUTO: COLORLESS
CREAT SERPL-MCNC: 1.1 MG/DL
CREAT UR-MCNC: 13 MG/DL
DIFFERENTIAL METHOD: ABNORMAL
EOSINOPHIL # BLD AUTO: 0.1 K/UL
EOSINOPHIL NFR BLD: 1.4 %
ERYTHROCYTE [DISTWIDTH] IN BLOOD BY AUTOMATED COUNT: 14.2 %
EST. GFR  (AFRICAN AMERICAN): >60 ML/MIN/1.73 M^2
EST. GFR  (NON AFRICAN AMERICAN): 53.9 ML/MIN/1.73 M^2
ETHANOL SERPL-MCNC: <10 MG/DL
GLUCOSE SERPL-MCNC: 98 MG/DL
GLUCOSE UR QL STRIP: NEGATIVE
HCT VFR BLD AUTO: 38.8 %
HGB BLD-MCNC: 12.5 G/DL
HGB UR QL STRIP: NEGATIVE
IMM GRANULOCYTES # BLD AUTO: 0.06 K/UL
IMM GRANULOCYTES NFR BLD AUTO: 0.9 %
KETONES UR QL STRIP: NEGATIVE
LEUKOCYTE ESTERASE UR QL STRIP: NEGATIVE
LYMPHOCYTES # BLD AUTO: 3.4 K/UL
LYMPHOCYTES NFR BLD: 52 %
MCH RBC QN AUTO: 29.6 PG
MCHC RBC AUTO-ENTMCNC: 32.2 G/DL
MCV RBC AUTO: 92 FL
METHADONE UR QL SCN>300 NG/ML: NEGATIVE
MONOCYTES # BLD AUTO: 0.9 K/UL
MONOCYTES NFR BLD: 14 %
NEUTROPHILS # BLD AUTO: 2.1 K/UL
NEUTROPHILS NFR BLD: 31.2 %
NITRITE UR QL STRIP: NEGATIVE
NRBC BLD-RTO: 0 /100 WBC
OPIATES UR QL SCN: NEGATIVE
PCP UR QL SCN>25 NG/ML: NEGATIVE
PH UR STRIP: 6 [PH] (ref 5–8)
PLATELET # BLD AUTO: 204 K/UL
PMV BLD AUTO: 9.7 FL
POTASSIUM SERPL-SCNC: 4.2 MMOL/L
PROT SERPL-MCNC: 6.9 G/DL
PROT UR QL STRIP: NEGATIVE
RBC # BLD AUTO: 4.23 M/UL
SODIUM SERPL-SCNC: 138 MMOL/L
SP GR UR STRIP: 1 (ref 1–1.03)
TOXICOLOGY INFORMATION: ABNORMAL
TSH SERPL DL<=0.005 MIU/L-ACNC: 2.26 UIU/ML
URN SPEC COLLECT METH UR: ABNORMAL
UROBILINOGEN UR STRIP-ACNC: NEGATIVE EU/DL
WBC # BLD AUTO: 6.58 K/UL

## 2018-09-06 PROCEDURE — 80053 COMPREHEN METABOLIC PANEL: CPT

## 2018-09-06 PROCEDURE — 90832 PSYTX W PT 30 MINUTES: CPT | Mod: S$PBB,,, | Performed by: SOCIAL WORKER

## 2018-09-06 PROCEDURE — 84443 ASSAY THYROID STIM HORMONE: CPT

## 2018-09-06 PROCEDURE — 80329 ANALGESICS NON-OPIOID 1 OR 2: CPT

## 2018-09-06 PROCEDURE — 80307 DRUG TEST PRSMV CHEM ANLYZR: CPT

## 2018-09-06 PROCEDURE — 81003 URINALYSIS AUTO W/O SCOPE: CPT | Mod: 59

## 2018-09-06 PROCEDURE — 85025 COMPLETE CBC W/AUTO DIFF WBC: CPT

## 2018-09-06 PROCEDURE — 99211 OFF/OP EST MAY X REQ PHY/QHP: CPT | Mod: PBBFAC,25 | Performed by: SOCIAL WORKER

## 2018-09-06 PROCEDURE — 90832 PSYTX W PT 30 MINUTES: CPT | Mod: PBBFAC | Performed by: SOCIAL WORKER

## 2018-09-06 PROCEDURE — 99999 PR PBB SHADOW E&M-EST. PATIENT-LVL I: CPT | Mod: PBBFAC,,, | Performed by: SOCIAL WORKER

## 2018-09-06 PROCEDURE — 99284 EMERGENCY DEPT VISIT MOD MDM: CPT | Mod: ,,, | Performed by: EMERGENCY MEDICINE

## 2018-09-06 PROCEDURE — 99285 EMERGENCY DEPT VISIT HI MDM: CPT | Mod: 25,27

## 2018-09-06 PROCEDURE — 80320 DRUG SCREEN QUANTALCOHOLS: CPT

## 2018-09-06 NOTE — ED NOTES
[facility] called [facility]. Informed CPT that the PEC document did not include the physician's name on the top left corner. [facility] contacted staff at [facility] at [facility] [address] and informed them about it. [facility] will get physician to redo the PEC document. [facility] contacted afterwards. Will resend PEC to [facility] again.

## 2018-09-06 NOTE — ED NOTES
Pt sleeping on stretcher in NAD, respirations even and unlabored. Stretcher locked and in lowest position, side rails x 2. Sitter at the bedside, Q 15 minute assessment sheet provided. Will continue to monitor and update pt on plan of care.

## 2018-09-06 NOTE — ED NOTES
Patient has been accepted by [facility] ([address] [facility], [place], [place]) for the services of Lashae Roca NP. Please call report to [phone].

## 2018-09-06 NOTE — ED NOTES
"Pt identifiers checked and accurate with Joseluis Triplett    Pt reports "I am just not in reality right now". Pt reports stressors at nursing home, pt states "I just don't have time or space to myself and it's really crowded". Pt reports auditory hallucinations, pt states "I can see the faces in my head and they are saying things to me but I am not ready to talk about it at this time, I need a psychiatrist". Pt denies SI, HI    LOC: The patient is awake, alert and aware of environment with an appropriate affect, the patient is oriented x 3 and speaking appropriately.  APPEARANCE: Patient resting comfortably and in no acute distress, patient is clean and well groomed  SKIN: The skin is warm and dry, color consistent with ethnicity, skin intact, no breakdown or bruising noted.  MUSCULOSKELETAL: Patient moving all extremities well, no obvious swelling or deformities noted. Pt presents with walker for assistance ambulating.   RESPIRATORY: Airway is open and patent; respirations are spontaneous, patient has a normal effort and rate, no accessory muscle use noted.   ABDOMEN: Soft and non tender to palpation, no distention noted. Bowel sounds present x 4  NEUROLOGIC: PERRL, 3 mm bilaterally, eyes open spontaneously, behavior appropriate to situation, follows commands    "

## 2018-09-06 NOTE — ED NOTES
Admit packet faxed to [facility], [facility], [facility], [facility], [facility], [facility], [facility], and [facility]. Waiting for response.

## 2018-09-06 NOTE — PROGRESS NOTES
Individual Psychotherapy (PhD/LCSW)    9/6/2018    Site:  [facility]         Therapeutic Intervention: Met with patient.  Outpatient - Insight oriented psychotherapy 30 min - CPT code 78546    Chief complaint/reason for encounter: depression, mood swings, anxiety, sleep, appetite, psychosis, behavior, cognition, somatic and interpersonal     Interval history and content of current session: discussed symptoms that feel like psychotic type issues, and paranoid, and overly labile and hallucinations and can not focus and wants to go to the hospital and I agree so I made arrangements for her to go to the ER and get screened for psychiatric admission. And she feel like she will blow up and or burst her words any minute. Sent her to the ER with a attendant for assessment and possible admission.  Treatment plan:  · Target symptoms: depression, recurrent depression, anxiety , mood swings, mood disorder, adjustment, grief  · Why chosen therapy is appropriate versus another modality: relevant to diagnosis, patient responds to this modality, evidence based practice  · Outcome monitoring methods: self-report, observation  · Therapeutic intervention type: insight oriented psychotherapy, behavior modifying psychotherapy, supportive psychotherapy    Risk parameters:  Patient reports no suicidal ideation  Patient reports no homicidal ideation  Patient reports no self-injurious behavior  Patient reports no violent behavior    Verbal deficits: None    Patient's response to intervention:  The patient's response to intervention is accepting, guarded, reluctant.    Progress toward goals and other mental status changes:  The patient's progress toward goals is not progressing.    Diagnosis:     ICD-10-CM ICD-9-CM   1. Schizoaffective disorder, chronic condition F25.8 295.72       Plan:  APU    Return to clinic: as needed    Length of Service (minutes): 30

## 2018-09-06 NOTE — ED NOTES
Pt arrived to unit with RN and two security guards. Pt was checked by RN to make sure nothing was on person. Pt documentation, PEC and pt belongings given to RN. Pt belongings are in 2 bin. Pt hearing aids are in security envelope. Pt is calm and cooperative.  called. Will continue to monitor.

## 2018-09-06 NOTE — ED NOTES
Admit packet faxed to [facility], [facility], [facility], and [facility]. Waiting for response.
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
## 2018-09-07 PROBLEM — G20.A1 PARKINSONS: Status: ACTIVE | Noted: 2018-09-07

## 2018-09-07 NOTE — ED NOTES
Patient escorted to [facility] via security and EMT. Vitals stable at time of transfer (/55; MAP 76; HR 58; respirations 15). Patient escorted to bathroom prior to transfer. Original PEC included in paper work given to [facility] EMT. Family notified of patient's transfer. Belongings sent with patient.

## 2018-09-17 ENCOUNTER — TELEPHONE (OUTPATIENT)
Dept: PULMONOLOGY | Facility: CLINIC | Age: 63
End: 2018-09-17

## 2018-09-17 NOTE — TELEPHONE ENCOUNTER
Patient would like to switch back to Spiriva as she feels it worked better for her than the Breo. Will need written Rx to send to nursing home pharmacy. Thanks

## 2018-09-17 NOTE — TELEPHONE ENCOUNTER
----- Message from Pema Khan sent at 9/17/2018  9:26 AM CDT -----  Contact: Self  .Rx Refill/Request     Is this a Refill or New Rx:  Pt would like to change from Breo to Spiriva  Rx Name and Strength:  Spiriva  Preferred Pharmacy with phone number: [facility] call [phone] to get fax #  Communication Preference: [phone]  Additional Information:  Says it works better for her.

## 2018-09-18 ENCOUNTER — TELEPHONE (OUTPATIENT)
Dept: ENDOSCOPY | Facility: HOSPITAL | Age: 63
End: 2018-09-18

## 2018-09-18 ENCOUNTER — TELEPHONE (OUTPATIENT)
Dept: PULMONOLOGY | Facility: CLINIC | Age: 63
End: 2018-09-18

## 2018-09-18 NOTE — TELEPHONE ENCOUNTER
----- Message from Althea Ling sent at 9/18/2018  8:23 AM CDT -----  Contact: Self- [phone]  Sachin Golden- pt called to speak with Chasidy- states she was admitted to a mental facility for the last 7 days- states all of her medications that helped her have bowel movements were taken away- would like to discuss having them reissued- Miralax and Milk of Magnesia- please contact pt at [phone]

## 2018-09-18 NOTE — TELEPHONE ENCOUNTER
Will you change patient to Spiriva? If so I will need a written rx so that I can fax to the [facility] center. Thanks, Skylar

## 2018-09-18 NOTE — TELEPHONE ENCOUNTER
Spoke with pt who requested order for miralax 34g with dinner and MOM 60ml with dinner. I gave verbal order to  Her nurse at [phone]

## 2018-09-19 ENCOUNTER — OFFICE VISIT (OUTPATIENT)
Dept: PSYCHIATRY | Facility: CLINIC | Age: 63
End: 2018-09-19
Payer: MEDICARE

## 2018-09-19 DIAGNOSIS — F20.89 OTHER SCHIZOPHRENIA: Primary | ICD-10-CM

## 2018-09-19 PROCEDURE — 99211 OFF/OP EST MAY X REQ PHY/QHP: CPT | Mod: PBBFAC | Performed by: SOCIAL WORKER

## 2018-09-19 PROCEDURE — 90834 PSYTX W PT 45 MINUTES: CPT | Mod: PBBFAC | Performed by: SOCIAL WORKER

## 2018-09-19 PROCEDURE — 90834 PSYTX W PT 45 MINUTES: CPT | Mod: S$PBB,,, | Performed by: SOCIAL WORKER

## 2018-09-19 PROCEDURE — 99999 PR PBB SHADOW E&M-EST. PATIENT-LVL I: CPT | Mod: PBBFAC,,, | Performed by: SOCIAL WORKER

## 2018-09-19 NOTE — PROGRESS NOTES
Individual Psychotherapy (PhD/LCSW)    9/19/2018    Site:  [facility]         Therapeutic Intervention: Met with patient.  Outpatient - Insight oriented psychotherapy 45 min - CPT code 69874    Chief complaint/reason for encounter: depression, mood swings, mood elevation, anxiety, sleep, appetite, psychosis, behavior, cognition, somatic and interpersonal     Interval history and content of current session: just got out of the hospital, coping skills, how to calm down, communications, is still hearing voices and overly labile and can not focus, needs a med check, all over the place today, discussed her mother, the past, and thinking and hearing voices, and medications and issues about her hospitalization and she has a vivid memory, also noted. Hard to follow today and she was almost manic today as well.    Treatment plan:  · Target symptoms: depression, recurrent depression, anxiety , mood swings, mood disorder, adjustment  · Why chosen therapy is appropriate versus another modality: relevant to diagnosis, patient responds to this modality, evidence based practice  · Outcome monitoring methods: self-report, observation  · Therapeutic intervention type: insight oriented psychotherapy, behavior modifying psychotherapy, supportive psychotherapy    Risk parameters:  Patient reports no suicidal ideation  Patient reports no homicidal ideation  Patient reports no self-injurious behavior  Patient reports no violent behavior    Verbal deficits: None    Patient's response to intervention:  The patient's response to intervention is accepting.    Progress toward goals and other mental status changes:  The patient's progress toward goals is limited.    Diagnosis:     ICD-10-CM ICD-9-CM   1. Other schizophrenia F20.89 295.80       Plan:  individual psychotherapy, consult psychiatrist for medication evaluation and medication management by physician    Return to clinic: 1 week    Length of Service (minutes): 45

## 2018-09-20 ENCOUNTER — TELEPHONE (OUTPATIENT)
Dept: PULMONOLOGY | Facility: CLINIC | Age: 63
End: 2018-09-20

## 2018-09-20 ENCOUNTER — TELEPHONE (OUTPATIENT)
Dept: PSYCHIATRY | Facility: HOSPITAL | Age: 63
End: 2018-09-20

## 2018-09-20 RX ORDER — TRAZODONE HYDROCHLORIDE 150 MG/1
150 TABLET ORAL NIGHTLY PRN
Qty: 30 TABLET | Refills: 11 | Status: SHIPPED | OUTPATIENT
Start: 2018-09-20 | End: 2018-10-25 | Stop reason: SDUPTHER

## 2018-09-20 RX ORDER — TIOTROPIUM BROMIDE 18 UG/1
18 CAPSULE ORAL; RESPIRATORY (INHALATION) DAILY
Qty: 30 CAPSULE | Refills: 6 | Status: ON HOLD | OUTPATIENT
Start: 2018-09-20 | End: 2019-02-06 | Stop reason: HOSPADM

## 2018-09-20 RX ORDER — TIOTROPIUM BROMIDE 18 UG/1
18 CAPSULE ORAL; RESPIRATORY (INHALATION) DAILY
Qty: 30 CAPSULE | Refills: 6 | OUTPATIENT
Start: 2018-09-20 | End: 2018-09-20

## 2018-09-20 NOTE — TELEPHONE ENCOUNTER
----- Message from Yesi Orona sent at 9/20/2018 12:33 PM CDT -----  Contact: Pt mother Curry Zapien asked if she can please get a return call at [phone] says she just missed a call from office    Thanks

## 2018-09-20 NOTE — TELEPHONE ENCOUNTER
----- Message from Mackenzie Tsang sent at 9/20/2018  9:53 AM CDT -----  Contact:    Pt  [phone]  Needs Advice    Reason for call:   In regards to an fax for medication change         Communication Preference:   Phone     Additional Information:   Fax number [phone]

## 2018-09-20 NOTE — TELEPHONE ENCOUNTER
----- Message from Mackenzie Tsang sent at 9/20/2018  3:08 PM CDT -----  Contact:   Pt   [phone]  Needs Advice    Reason for call:  Pt requesting to speak with the nurse , no additional information was provided         Communication Preference:  Phone     Additional Information:

## 2018-09-23 ENCOUNTER — NURSE TRIAGE (OUTPATIENT)
Dept: ADMINISTRATIVE | Facility: CLINIC | Age: 63
End: 2018-09-23

## 2018-09-23 NOTE — TELEPHONE ENCOUNTER
Reason for Disposition   Caller has medication question, adult has minor symptoms, caller declines triage, and triager answers question    Protocols used:  MEDICATION QUESTION CALL-A-    Patient is concerned because the [facility] nurses will not administer her night time meds if she is asleep when they round. Ms. Triplett states she will get up a little while later and cannot sleep for the remainder of the night. Advised patient to speak with her nurse team to explain the problem to them so that she can get a reminder or awaken when it time for her night meds so that she get the meds and sleep easier at night. Ms. Triplett verbalized understanding.

## 2018-09-24 ENCOUNTER — TELEPHONE (OUTPATIENT)
Dept: GASTROENTEROLOGY | Facility: CLINIC | Age: 63
End: 2018-09-24

## 2018-09-24 NOTE — TELEPHONE ENCOUNTER
----- Message from Althea Ling sent at 9/24/2018  8:38 AM CDT -----  Contact: Self- [phone]  [place]- pt is returning a missed call from staff- please contact pt at [phone]

## 2018-09-24 NOTE — TELEPHONE ENCOUNTER
----- Message from Althea Sushil sent at 9/24/2018  8:04 AM CDT -----  Contact: Self- [phone]  [place]- pt called and stated she has had a decrease in appetite- not feeling well- changes in rxs from mental facility believes that may be the issue- please contact pt at [phone]

## 2018-09-26 ENCOUNTER — TELEPHONE (OUTPATIENT)
Dept: GASTROENTEROLOGY | Facility: CLINIC | Age: 63
End: 2018-09-26

## 2018-09-26 DIAGNOSIS — R68.81 EARLY SATIETY: ICD-10-CM

## 2018-09-26 DIAGNOSIS — R11.0 NAUSEA: ICD-10-CM

## 2018-09-26 DIAGNOSIS — R10.13 EPIGASTRIC PAIN: Primary | ICD-10-CM

## 2018-09-26 NOTE — TELEPHONE ENCOUNTER
Spoke with nurse Argueta at [facility] in regards to patients upcoming appointment on Friday.    Appointment slip faxed to the number provided.

## 2018-09-26 NOTE — TELEPHONE ENCOUNTER
----- Message from Erika Tierney MA sent at 9/25/2018  4:39 PM CDT -----  Contact: self  [phone]  Needs Advice    Reason for call:  I need something due to the stomach ache I am having.            Communication Preference:  Phone# above    Additional Information:  We have a pharmacy here at the facility and you can call the nursing home and they will take care of the script.

## 2018-09-26 NOTE — TELEPHONE ENCOUNTER
Diarrhea and nausea  Having epigastric pain and abdominal pain, poor appetite  Duration 3 weeks  Will have her see family medicine or urgent care

## 2018-09-26 NOTE — TELEPHONE ENCOUNTER
----- Message from Althea Sushil sent at 9/26/2018  8:24 AM CDT -----  Contact: Self- [phone]  [place]- pt called to speak with Chasidy- states she had a cup of coffee and now has abdominal pain- please contact pt at [phone]

## 2018-09-26 NOTE — TELEPHONE ENCOUNTER
Patient is requesting something for stomach pain, states it can be sent to the pharmacy on site at the nursing home.

## 2018-09-26 NOTE — TELEPHONE ENCOUNTER
Appointment scheduled with [facility].    Attempted to contact nursing home, I was placed on hold and no one ever picked up the phone. Will try again later.

## 2018-09-27 ENCOUNTER — TELEPHONE (OUTPATIENT)
Dept: GASTROENTEROLOGY | Facility: CLINIC | Age: 63
End: 2018-09-27

## 2018-09-27 NOTE — TELEPHONE ENCOUNTER
Spoke with patient in regards to medications. Patient states that she needs an antibiotic sent to the pharmacy for her stomach pain.    Please advise.

## 2018-09-28 ENCOUNTER — TELEPHONE (OUTPATIENT)
Dept: GASTROENTEROLOGY | Facility: CLINIC | Age: 63
End: 2018-09-28

## 2018-09-28 NOTE — TELEPHONE ENCOUNTER
----- Message from Althea Ling sent at 9/28/2018 12:56 PM CDT -----  Contact: Self- [phone]  [place]- pt called to speak with Chasidy- trying to determine if the antibiotic has been called in yet- please contact pt at [phone]

## 2018-10-25 ENCOUNTER — OFFICE VISIT (OUTPATIENT)
Dept: PSYCHIATRY | Facility: CLINIC | Age: 63
End: 2018-10-25
Payer: MEDICARE

## 2018-10-25 VITALS
WEIGHT: 215 LBS | HEIGHT: 64 IN | DIASTOLIC BLOOD PRESSURE: 63 MMHG | SYSTOLIC BLOOD PRESSURE: 104 MMHG | HEART RATE: 78 BPM | BODY MASS INDEX: 36.7 KG/M2

## 2018-10-25 DIAGNOSIS — Z62.819 HISTORY OF ABUSE IN CHILDHOOD: ICD-10-CM

## 2018-10-25 DIAGNOSIS — G47.00 INSOMNIA DISORDER WITH NON-SLEEP DISORDER MENTAL COMORBIDITY: ICD-10-CM

## 2018-10-25 DIAGNOSIS — F25.1 SCHIZOAFFECTIVE DISORDER, DEPRESSIVE TYPE: Primary | ICD-10-CM

## 2018-10-25 PROCEDURE — 3074F SYST BP LT 130 MM HG: CPT | Mod: CPTII,,, | Performed by: NURSE PRACTITIONER

## 2018-10-25 PROCEDURE — 3008F BODY MASS INDEX DOCD: CPT | Mod: CPTII,,, | Performed by: NURSE PRACTITIONER

## 2018-10-25 PROCEDURE — 90833 PSYTX W PT W E/M 30 MIN: CPT | Mod: S$PBB,,, | Performed by: NURSE PRACTITIONER

## 2018-10-25 PROCEDURE — 99213 OFFICE O/P EST LOW 20 MIN: CPT | Mod: PBBFAC | Performed by: NURSE PRACTITIONER

## 2018-10-25 PROCEDURE — 99999 PR PBB SHADOW E&M-EST. PATIENT-LVL III: CPT | Mod: PBBFAC,,, | Performed by: NURSE PRACTITIONER

## 2018-10-25 PROCEDURE — 3078F DIAST BP <80 MM HG: CPT | Mod: CPTII,,, | Performed by: NURSE PRACTITIONER

## 2018-10-25 PROCEDURE — 90833 PSYTX W PT W E/M 30 MIN: CPT | Mod: PBBFAC | Performed by: NURSE PRACTITIONER

## 2018-10-25 PROCEDURE — 99213 OFFICE O/P EST LOW 20 MIN: CPT | Mod: S$PBB,,, | Performed by: NURSE PRACTITIONER

## 2018-10-25 RX ORDER — QUETIAPINE FUMARATE 300 MG/1
300 TABLET, FILM COATED ORAL 2 TIMES DAILY
Qty: 60 TABLET | Refills: 5 | Status: SHIPPED | OUTPATIENT
Start: 2018-10-25 | End: 2019-01-04

## 2018-10-25 RX ORDER — CLONAZEPAM 0.5 MG/1
0.5 TABLET ORAL 2 TIMES DAILY PRN
Qty: 60 TABLET | Refills: 5 | Status: ON HOLD | OUTPATIENT
Start: 2018-10-25 | End: 2018-11-05 | Stop reason: HOSPADM

## 2018-10-25 RX ORDER — DIVALPROEX SODIUM 500 MG/1
2000 TABLET, FILM COATED, EXTENDED RELEASE ORAL DAILY
Qty: 120 TABLET | Refills: 5 | Status: ON HOLD | OUTPATIENT
Start: 2018-10-25 | End: 2018-11-05 | Stop reason: HOSPADM

## 2018-10-25 RX ORDER — TRAZODONE HYDROCHLORIDE 100 MG/1
100 TABLET ORAL NIGHTLY PRN
Qty: 30 TABLET | Refills: 5 | Status: SHIPPED | OUTPATIENT
Start: 2018-10-25 | End: 2019-01-04

## 2018-10-25 RX ORDER — PROPRANOLOL HYDROCHLORIDE 40 MG/1
40 TABLET ORAL 2 TIMES DAILY
Qty: 60 TABLET | Refills: 5 | Status: SHIPPED | OUTPATIENT
Start: 2018-10-25 | End: 2019-01-04 | Stop reason: SDUPTHER

## 2018-10-25 RX ORDER — FLUOXETINE HYDROCHLORIDE 20 MG/1
20 CAPSULE ORAL DAILY
Qty: 30 CAPSULE | Refills: 5 | Status: SHIPPED | OUTPATIENT
Start: 2018-10-25 | End: 2019-01-04 | Stop reason: DRUGHIGH
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
## 2018-10-29 ENCOUNTER — TELEPHONE (OUTPATIENT)
Dept: NEUROLOGY | Facility: CLINIC | Age: 63
End: 2018-10-29

## 2018-10-29 ENCOUNTER — TELEPHONE (OUTPATIENT)
Dept: PSYCHIATRY | Facility: CLINIC | Age: 63
End: 2018-10-29

## 2018-10-29 ENCOUNTER — HOSPITAL ENCOUNTER (EMERGENCY)
Facility: HOSPITAL | Age: 63
Discharge: PSYCHIATRIC OR SUBSTANCE USE FACILITY | End: 2018-10-29
Attending: EMERGENCY MEDICINE
Payer: MEDICARE

## 2018-10-29 ENCOUNTER — HOSPITAL ENCOUNTER (INPATIENT)
Facility: HOSPITAL | Age: 63
LOS: 7 days | Discharge: HOME | DRG: 885 | End: 2018-11-05
Attending: PSYCHIATRY & NEUROLOGY | Admitting: PSYCHIATRY & NEUROLOGY
Payer: MEDICARE

## 2018-10-29 VITALS
RESPIRATION RATE: 18 BRPM | HEIGHT: 63 IN | BODY MASS INDEX: 39.87 KG/M2 | OXYGEN SATURATION: 98 % | TEMPERATURE: 98 F | DIASTOLIC BLOOD PRESSURE: 67 MMHG | SYSTOLIC BLOOD PRESSURE: 131 MMHG | WEIGHT: 225 LBS | HEART RATE: 56 BPM

## 2018-10-29 DIAGNOSIS — F25.1 SCHIZOAFFECTIVE DISORDER, DEPRESSIVE TYPE: Primary | ICD-10-CM

## 2018-10-29 DIAGNOSIS — F25.9 SCHIZOAFFECTIVE DISORDER: ICD-10-CM

## 2018-10-29 DIAGNOSIS — K59.09 CHRONIC CONSTIPATION: ICD-10-CM

## 2018-10-29 DIAGNOSIS — E03.4 HYPOTHYROIDISM DUE TO ACQUIRED ATROPHY OF THYROID: ICD-10-CM

## 2018-10-29 DIAGNOSIS — R45.1 AGITATION: Primary | ICD-10-CM
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
## 2018-10-29 LAB
ALBUMIN SERPL BCP-MCNC: 3.5 G/DL
ALP SERPL-CCNC: 85 U/L
ALT SERPL W/O P-5'-P-CCNC: <5 U/L
ANION GAP SERPL CALC-SCNC: 7 MMOL/L
AST SERPL-CCNC: 21 U/L
BASOPHILS # BLD AUTO: 0.03 K/UL
BASOPHILS NFR BLD: 0.5 %
BILIRUB SERPL-MCNC: 0.2 MG/DL
BILIRUB UR QL STRIP: NEGATIVE
BUN SERPL-MCNC: 21 MG/DL
CALCIUM SERPL-MCNC: 9.9 MG/DL
CHLORIDE SERPL-SCNC: 108 MMOL/L
CLARITY UR REFRACT.AUTO: CLEAR
CO2 SERPL-SCNC: 26 MMOL/L
COLOR UR AUTO: COLORLESS
CREAT SERPL-MCNC: 1.2 MG/DL
DIFFERENTIAL METHOD: ABNORMAL
EOSINOPHIL # BLD AUTO: 0.1 K/UL
EOSINOPHIL NFR BLD: 2.2 %
ERYTHROCYTE [DISTWIDTH] IN BLOOD BY AUTOMATED COUNT: 13.6 %
EST. GFR  (AFRICAN AMERICAN): 56 ML/MIN/1.73 M^2
EST. GFR  (NON AFRICAN AMERICAN): 48.6 ML/MIN/1.73 M^2
GLUCOSE SERPL-MCNC: 99 MG/DL
GLUCOSE UR QL STRIP: NEGATIVE
HCT VFR BLD AUTO: 45.5 %
HGB BLD-MCNC: 14.6 G/DL
HGB UR QL STRIP: NEGATIVE
IMM GRANULOCYTES # BLD AUTO: 0.02 K/UL
IMM GRANULOCYTES NFR BLD AUTO: 0.3 %
KETONES UR QL STRIP: NEGATIVE
LACTATE SERPL-SCNC: 1.2 MMOL/L
LEUKOCYTE ESTERASE UR QL STRIP: NEGATIVE
LYMPHOCYTES # BLD AUTO: 3.4 K/UL
LYMPHOCYTES NFR BLD: 57.1 %
MAGNESIUM SERPL-MCNC: 2.4 MG/DL
MCH RBC QN AUTO: 30.8 PG
MCHC RBC AUTO-ENTMCNC: 32.1 G/DL
MCV RBC AUTO: 96 FL
MONOCYTES # BLD AUTO: 0.6 K/UL
MONOCYTES NFR BLD: 10.3 %
NEUTROPHILS # BLD AUTO: 1.8 K/UL
NEUTROPHILS NFR BLD: 29.6 %
NITRITE UR QL STRIP: NEGATIVE
NRBC BLD-RTO: 0 /100 WBC
PH UR STRIP: 7 [PH] (ref 5–8)
PLATELET # BLD AUTO: 211 K/UL
PMV BLD AUTO: 9.9 FL
POTASSIUM SERPL-SCNC: 4.5 MMOL/L
PROT SERPL-MCNC: 6.8 G/DL
PROT UR QL STRIP: NEGATIVE
RBC # BLD AUTO: 4.74 M/UL
SODIUM SERPL-SCNC: 141 MMOL/L
SP GR UR STRIP: 1 (ref 1–1.03)
TSH SERPL DL<=0.005 MIU/L-ACNC: 1.4 UIU/ML
URN SPEC COLLECT METH UR: ABNORMAL
VALPROATE SERPL-MCNC: 78.5 UG/ML
WBC # BLD AUTO: 6.01 K/UL

## 2018-10-29 PROCEDURE — 25000003 PHARM REV CODE 250: Performed by: STUDENT IN AN ORGANIZED HEALTH CARE EDUCATION/TRAINING PROGRAM

## 2018-10-29 PROCEDURE — 99285 EMERGENCY DEPT VISIT HI MDM: CPT

## 2018-10-29 PROCEDURE — 12400001 HC PSYCH SEMI-PRIVATE ROOM

## 2018-10-29 PROCEDURE — 84443 ASSAY THYROID STIM HORMONE: CPT

## 2018-10-29 PROCEDURE — 25000003 PHARM REV CODE 250: Performed by: EMERGENCY MEDICINE

## 2018-10-29 PROCEDURE — 80053 COMPREHEN METABOLIC PANEL: CPT

## 2018-10-29 PROCEDURE — 85025 COMPLETE CBC W/AUTO DIFF WBC: CPT

## 2018-10-29 PROCEDURE — 81003 URINALYSIS AUTO W/O SCOPE: CPT

## 2018-10-29 PROCEDURE — 80164 ASSAY DIPROPYLACETIC ACD TOT: CPT

## 2018-10-29 PROCEDURE — 99284 EMERGENCY DEPT VISIT MOD MDM: CPT | Mod: ,,, | Performed by: EMERGENCY MEDICINE

## 2018-10-29 PROCEDURE — 83735 ASSAY OF MAGNESIUM: CPT

## 2018-10-29 PROCEDURE — 83605 ASSAY OF LACTIC ACID: CPT

## 2018-10-29 RX ORDER — TIOTROPIUM BROMIDE 18 UG/1
18 CAPSULE ORAL; RESPIRATORY (INHALATION) DAILY
Status: DISCONTINUED | OUTPATIENT
Start: 2018-10-30 | End: 2018-11-05 | Stop reason: HOSPADM

## 2018-10-29 RX ORDER — FLUTICASONE FUROATE AND VILANTEROL 200; 25 UG/1; UG/1
1 POWDER RESPIRATORY (INHALATION) DAILY
Status: DISCONTINUED | OUTPATIENT
Start: 2018-10-30 | End: 2018-10-29

## 2018-10-29 RX ORDER — OLANZAPINE 10 MG/2ML
5 INJECTION, POWDER, FOR SOLUTION INTRAMUSCULAR 3 TIMES DAILY PRN
Status: DISCONTINUED | OUTPATIENT
Start: 2018-10-29 | End: 2018-11-05 | Stop reason: HOSPADM

## 2018-10-29 RX ORDER — TOPIRAMATE 25 MG/1
100 TABLET ORAL 2 TIMES DAILY
Status: DISCONTINUED | OUTPATIENT
Start: 2018-10-29 | End: 2018-11-05 | Stop reason: HOSPADM

## 2018-10-29 RX ORDER — GABAPENTIN 300 MG/1
600 CAPSULE ORAL DAILY
Status: DISCONTINUED | OUTPATIENT
Start: 2018-10-30 | End: 2018-11-03

## 2018-10-29 RX ORDER — PROPRANOLOL HYDROCHLORIDE 10 MG/1
40 TABLET ORAL 2 TIMES DAILY
Status: DISCONTINUED | OUTPATIENT
Start: 2018-10-29 | End: 2018-11-05 | Stop reason: HOSPADM

## 2018-10-29 RX ORDER — FLUTICASONE FUROATE AND VILANTEROL 100; 25 UG/1; UG/1
1 POWDER RESPIRATORY (INHALATION) DAILY
Status: DISCONTINUED | OUTPATIENT
Start: 2018-10-30 | End: 2018-11-05 | Stop reason: HOSPADM

## 2018-10-29 RX ORDER — FUROSEMIDE 20 MG/1
20 TABLET ORAL 2 TIMES DAILY
Status: DISCONTINUED | OUTPATIENT
Start: 2018-10-30 | End: 2018-11-05 | Stop reason: HOSPADM

## 2018-10-29 RX ORDER — ACETAMINOPHEN 325 MG/1
650 TABLET ORAL
Status: COMPLETED | OUTPATIENT
Start: 2018-10-29 | End: 2018-10-29

## 2018-10-29 RX ORDER — LOPERAMIDE HYDROCHLORIDE 2 MG/1
2 CAPSULE ORAL
Status: DISCONTINUED | OUTPATIENT
Start: 2018-10-29 | End: 2018-11-05 | Stop reason: HOSPADM

## 2018-10-29 RX ORDER — FLUOXETINE HYDROCHLORIDE 20 MG/1
20 CAPSULE ORAL DAILY
Status: DISCONTINUED | OUTPATIENT
Start: 2018-10-30 | End: 2018-11-05 | Stop reason: HOSPADM

## 2018-10-29 RX ORDER — QUETIAPINE FUMARATE 300 MG/1
300 TABLET, FILM COATED ORAL 2 TIMES DAILY
Status: DISCONTINUED | OUTPATIENT
Start: 2018-10-29 | End: 2018-11-05 | Stop reason: HOSPADM

## 2018-10-29 RX ORDER — PANTOPRAZOLE SODIUM 40 MG/1
40 TABLET, DELAYED RELEASE ORAL DAILY
Status: DISCONTINUED | OUTPATIENT
Start: 2018-10-30 | End: 2018-11-05 | Stop reason: HOSPADM

## 2018-10-29 RX ORDER — DOCUSATE SODIUM 100 MG/1
100 CAPSULE, LIQUID FILLED ORAL DAILY PRN
Status: DISCONTINUED | OUTPATIENT
Start: 2018-10-29 | End: 2018-11-02

## 2018-10-29 RX ORDER — ALBUTEROL SULFATE 90 UG/1
2 AEROSOL, METERED RESPIRATORY (INHALATION) EVERY 4 HOURS PRN
Status: DISCONTINUED | OUTPATIENT
Start: 2018-10-29 | End: 2018-11-05 | Stop reason: HOSPADM

## 2018-10-29 RX ORDER — LORAZEPAM 1 MG/1
1 TABLET ORAL EVERY 4 HOURS PRN
Status: DISCONTINUED | OUTPATIENT
Start: 2018-10-29 | End: 2018-11-05 | Stop reason: HOSPADM

## 2018-10-29 RX ORDER — FOLIC ACID 1 MG/1
1 TABLET ORAL DAILY
Status: DISCONTINUED | OUTPATIENT
Start: 2018-10-30 | End: 2018-11-05 | Stop reason: HOSPADM

## 2018-10-29 RX ORDER — OLANZAPINE 5 MG/1
5 TABLET ORAL 3 TIMES DAILY PRN
Status: DISCONTINUED | OUTPATIENT
Start: 2018-10-29 | End: 2018-11-05 | Stop reason: HOSPADM

## 2018-10-29 RX ORDER — DIVALPROEX SODIUM 500 MG/1
2000 TABLET, FILM COATED, EXTENDED RELEASE ORAL DAILY
Status: DISCONTINUED | OUTPATIENT
Start: 2018-10-30 | End: 2018-10-30

## 2018-10-29 RX ORDER — TRAZODONE HYDROCHLORIDE 100 MG/1
100 TABLET ORAL NIGHTLY PRN
Status: DISCONTINUED | OUTPATIENT
Start: 2018-10-29 | End: 2018-11-05 | Stop reason: HOSPADM

## 2018-10-29 RX ORDER — POTASSIUM CHLORIDE 20 MEQ/1
20 TABLET, EXTENDED RELEASE ORAL DAILY
Status: DISCONTINUED | OUTPATIENT
Start: 2018-10-30 | End: 2018-11-05 | Stop reason: HOSPADM

## 2018-10-29 RX ORDER — ATORVASTATIN CALCIUM 10 MG/1
10 TABLET, FILM COATED ORAL DAILY
Status: DISCONTINUED | OUTPATIENT
Start: 2018-10-30 | End: 2018-11-05 | Stop reason: HOSPADM

## 2018-10-29 RX ADMIN — ACETAMINOPHEN 650 MG: 325 TABLET ORAL at 07:10

## 2018-10-29 NOTE — ED TRIAGE NOTES
Sent from [facility] for psych evaluation; facility reports pt was aggressive with staff; EMS reports pt has been calm and cooperative with staff; pt reports she was upset because foul language was used at the facility; pt A&Ox4; calm and cooperative at this time; pt endorses HA s/p reported fall to ground from recliner; pt denies LOC, change in vision; pt denies CP, SOB, N/V

## 2018-10-29 NOTE — ED NOTES
"Pt growing more agitated, states "You said 15 mins, you lied.  The doctor is never coming!!! I want to go out side and smoke. Why did the other doctor lie??"; primary nurse paged psych resident listed on call; spoke with Dr. Mckeon; was informed she was not on-call any more and directed to speak with a Dr. Tyler; paged; awaiting return call    "

## 2018-10-29 NOTE — ED NOTES
LOC: The patient is awake, alert, and aware of environment. The patient is oriented x 3 and speaking appropriately.   APPEARANCE: No acute distress noted.   PSYCHOSOCIAL: Patient is calm and cooperative.   SKIN: The skin is warm, dry.   RESPIRATORY: Airway is open and patent. Bilateral chest rise and fall. Respirations are spontaneous, even and unlabored. Normal effort and rate noted. No accessory muscle use noted.   CARDIAC: Patient has a normal rate and rhythm. Denies chest pain or SOB.   ABDOMEN: Soft and non tender to palpation. No distention noted.   URINARY:  Voids independently.   EXTREMITIES: WNL  NEUROLOGIC: Eyes open spontaneously. Speech clear. Tolerating saliva secretions well. Able to follow commands, demonstrating ability to actively and appropriately communicate within context of current conversation. Symmetrical facial muscles. Moving all extremities well. Movement is purposeful. Pt reports HA  MUSCULOSKELETAL: No obvious deformities noted.

## 2018-10-29 NOTE — ED NOTES
Spoke with Ward Tyler; was told he will be to see the patient in approx 15mins; pt continues to grow more agitated; security called at this time; awaiting further orders; will continue to monitor and provide care

## 2018-10-29 NOTE — TELEPHONE ENCOUNTER
----- Message from Avinash Haley sent at 10/29/2018  4:29 PM CDT -----  Needs Advice    Reason for call: Pt is asking the staff call [facility] at  to schedule an appt ma[place]         Communication Preference: [phone]    Additional Information:

## 2018-10-29 NOTE — ED NOTES
Psychiatry at bedside to assess pt; awaiting further orders; will continue to monitor and provide care

## 2018-10-29 NOTE — ED NOTES
Pt. Resting in bed in NAD. RR e/u. Continuous cardiac, BP, and O2 monitoring in progress. VS being monitored per MD orders. Pt. Offered bathroom assistance, ambulated with steady gait to bathroom with nurse at pt's side. Explanation of care/wait provided. Bed in low, locked position with rails up and call bell in reach. Will continue to monitor.

## 2018-10-29 NOTE — ED NOTES
Psychology resident, Tanisha Mckeon, at bedside to meet pt; physician informed pt that co-resident will be by to see pt in 15 mins; pt understanding, calm and cooperative at this time; meal provided to pt by nurse; awaiting psych consult and further orders; will continue to monitor and provide care

## 2018-10-29 NOTE — ED NOTES
Pt growing more agitated with staff; refusing to allow nurse to establish PIV, will allow blood draw; ED provider made aware; awaiting further orders; will continue to monitor and provide care
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
## 2018-10-29 NOTE — ED NOTES
Pt refusing to stay in bed, insists on sitting in chair; pt sitting in chair, located against the wall within view of nurses station; provider aware of situation; awaiting psych consult; will continue to monitor and provide care

## 2018-10-30 LAB
FOLATE SERPL-MCNC: >40 NG/ML
T3 SERPL-MCNC: 57 NG/DL
T4 FREE SERPL-MCNC: 0.78 NG/DL
TSH SERPL DL<=0.005 MIU/L-ACNC: 1.11 UIU/ML
VIT B12 SERPL-MCNC: 376 PG/ML

## 2018-10-30 PROCEDURE — 84439 ASSAY OF FREE THYROXINE: CPT

## 2018-10-30 PROCEDURE — 84443 ASSAY THYROID STIM HORMONE: CPT

## 2018-10-30 PROCEDURE — 25000242 PHARM REV CODE 250 ALT 637 W/ HCPCS: Performed by: STUDENT IN AN ORGANIZED HEALTH CARE EDUCATION/TRAINING PROGRAM

## 2018-10-30 PROCEDURE — 25000003 PHARM REV CODE 250: Performed by: PSYCHIATRY & NEUROLOGY

## 2018-10-30 PROCEDURE — 36415 COLL VENOUS BLD VENIPUNCTURE: CPT

## 2018-10-30 PROCEDURE — 84480 ASSAY TRIIODOTHYRONINE (T3): CPT

## 2018-10-30 PROCEDURE — 25000003 PHARM REV CODE 250: Performed by: STUDENT IN AN ORGANIZED HEALTH CARE EDUCATION/TRAINING PROGRAM

## 2018-10-30 PROCEDURE — 12400001 HC PSYCH SEMI-PRIVATE ROOM

## 2018-10-30 PROCEDURE — 84425 ASSAY OF VITAMIN B-1: CPT

## 2018-10-30 PROCEDURE — 82607 VITAMIN B-12: CPT

## 2018-10-30 PROCEDURE — 63600175 PHARM REV CODE 636 W HCPCS: Performed by: PSYCHIATRY & NEUROLOGY

## 2018-10-30 PROCEDURE — 82746 ASSAY OF FOLIC ACID SERUM: CPT

## 2018-10-30 PROCEDURE — 86592 SYPHILIS TEST NON-TREP QUAL: CPT

## 2018-10-30 PROCEDURE — 99223 1ST HOSP IP/OBS HIGH 75: CPT | Mod: AI,GC,, | Performed by: PSYCHIATRY & NEUROLOGY

## 2018-10-30 PROCEDURE — 90853 GROUP PSYCHOTHERAPY: CPT | Mod: ,,, | Performed by: PSYCHOLOGIST

## 2018-10-30 RX ORDER — DIVALPROEX SODIUM 500 MG/1
2000 TABLET, FILM COATED, EXTENDED RELEASE ORAL NIGHTLY
Status: DISCONTINUED | OUTPATIENT
Start: 2018-10-31 | End: 2018-11-05 | Stop reason: HOSPADM

## 2018-10-30 RX ORDER — ADHESIVE BANDAGE
30 BANDAGE TOPICAL DAILY PRN
Status: DISCONTINUED | OUTPATIENT
Start: 2018-10-30 | End: 2018-11-05 | Stop reason: HOSPADM

## 2018-10-30 RX ORDER — ACETAMINOPHEN 325 MG/1
650 TABLET ORAL EVERY 6 HOURS PRN
Status: DISCONTINUED | OUTPATIENT
Start: 2018-10-30 | End: 2018-11-05 | Stop reason: HOSPADM

## 2018-10-30 RX ADMIN — ACETAMINOPHEN 650 MG: 325 TABLET ORAL at 12:10

## 2018-10-30 RX ADMIN — ACETAMINOPHEN 650 MG: 325 TABLET ORAL at 08:10

## 2018-10-30 RX ADMIN — FUROSEMIDE 20 MG: 20 TABLET ORAL at 05:10

## 2018-10-30 RX ADMIN — TOPIRAMATE 100 MG: 25 TABLET, FILM COATED ORAL at 08:10

## 2018-10-30 RX ADMIN — QUETIAPINE FUMARATE 300 MG: 300 TABLET ORAL at 08:10

## 2018-10-30 RX ADMIN — ATORVASTATIN CALCIUM 10 MG: 10 TABLET, FILM COATED ORAL at 08:10

## 2018-10-30 RX ADMIN — PROPRANOLOL HYDROCHLORIDE 40 MG: 10 TABLET ORAL at 08:10

## 2018-10-30 RX ADMIN — POTASSIUM CHLORIDE 20 MEQ: 20 TABLET, EXTENDED RELEASE ORAL at 08:10

## 2018-10-30 RX ADMIN — LEVOTHYROXINE SODIUM 175 MCG: 25 TABLET ORAL at 05:10

## 2018-10-30 RX ADMIN — TIOTROPIUM BROMIDE 18 MCG: 18 CAPSULE ORAL; RESPIRATORY (INHALATION) at 01:10

## 2018-10-30 RX ADMIN — FUROSEMIDE 20 MG: 20 TABLET ORAL at 08:10

## 2018-10-30 RX ADMIN — FOLIC ACID 1 MG: 1 TABLET ORAL at 08:10

## 2018-10-30 RX ADMIN — MAGNESIUM HYDROXIDE 2400 MG: 400 SUSPENSION ORAL at 05:10

## 2018-10-30 RX ADMIN — PSYLLIUM HUSK 1 PACKET: 3.4 POWDER ORAL at 05:10

## 2018-10-30 RX ADMIN — CARBIDOPA AND LEVODOPA 1 SPLIT TABLET: 25; 100 TABLET ORAL at 08:10

## 2018-10-30 RX ADMIN — FLUOXETINE 20 MG: 20 CAPSULE ORAL at 08:10

## 2018-10-30 RX ADMIN — DIVALPROEX SODIUM 2000 MG: 500 TABLET, EXTENDED RELEASE ORAL at 08:10

## 2018-10-30 RX ADMIN — THERA TABS 1 TABLET: TAB at 08:10

## 2018-10-30 RX ADMIN — PANTOPRAZOLE SODIUM 40 MG: 40 TABLET, DELAYED RELEASE ORAL at 08:10

## 2018-10-30 RX ADMIN — GABAPENTIN 600 MG: 300 CAPSULE ORAL at 08:10

## 2018-10-30 RX ADMIN — FLUTICASONE FUROATE AND VILANTEROL TRIFENATATE 1 PUFF: 100; 25 POWDER RESPIRATORY (INHALATION) at 01:10

## 2018-10-30 NOTE — PLAN OF CARE
10/30/18 1330   [facility] Therapy   Group Name Education   Specific Interventions Coping Skills Training   Participation Level Supportive;Appropriate;Attentive;Sharing   Participation Quality Cooperative;Social   Insight/Motivation Good   Affect/Mood Display Appropriate   Cognition Oriented   Psychomotor WNL

## 2018-10-30 NOTE — CONSULTS
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 days.................<15.0 mg/dL       BUN, Bld   21     Calcium   9.9     Chloride   108     CO2   26     Color, UA Colorless       Creatinine   1.2     Differential Method   Automated     eGFR if African American   56.0     eGFR if non    48.6  Comment:  Calculation used to obtain the estimated glomerular filtration  rate (eGFR) is the CKD-EPI equation.        Eos #   0.1     Eosinophil%   2.2     Glucose   99     Glucose, UA Negative       Gran # (ANC)   1.8     Gran%   29.6     Hematocrit   45.5     Hemoglobin   14.6     Ketones, UA Negative       Lactate, Ruiz   1.2  Comment:  Falsely low lactic acid results can be found in samples   containing >=13.0 mg/dL total bilirubin and/or >=3.5 mg/dL   direct bilirubin.       Leukocytes, UA Negative       Lymph #   3.4     Lymph%   57.1     Magnesium   2.4     MCH   30.8     MCHC   32.1     MCV   96     Mono #   0.6     Mono%   10.3     MPV   9.9     Nitrite, UA Negative       nRBC   0     Occult Blood UA Negative       pH, UA 7.0       Platelets   211     Potassium   4.5     Total Protein   6.8     Protein, UA Negative  Comment:  Recommend a 24 hour urine protein or a urine    protein/creatinine ratio if globulin induced proteinuria is  clinically suspected.         RBC   4.74     RDW   13.6     Sodium   141     Specific Gravity, UA 1.005       Specimen UA Urine, Clean Catch       TSH   1.402     Valproic Acid Lvl   78.5  Comment:  Valproic Acid (ug/ml)  Toxic:   >100.0 ug/ml       WBC   6.01           Significant Imaging: CT: I have reviewed all pertinent results/findings within the past 24 hours. CT head WNL, no acute bleeds.    Assessment/Plan:     Insomnia disorder, with non-sleep disorder mental comorbidity    Assessment:  Pt has well documented insomnia, which was exacerbated by the death of her old roommate and the addition of her new roommate.      Plan:  Will continue her Trazodone 100 mg po PRN for insomnia.     Schizoaffective disorder, depressive type    Assessment:  Pt recently lost her roommate and gained a new one, mother reports she has had trouble sleeping.  NH and pt did not mention this information, pt is a poor historian.  Pt has a longtime outpatient psychiatrist who saw her on 10/25/18, I will continue her psychiatric medications as prescribed last week.  Pt will be monitored for aggressive behavior while inpatient, her behavior should improve with sleep and a different roommate.    Plan:  Seroquel 300 mg po BID  Prozac 20 mg po daily  Depakote 2000 mg po daily  Inderal 40 mg po BID  Trazodone 100 po qhs PRN            Total Time:  60 minutes      Ward Tyler MD   Psychiatry  [facility]
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
## 2018-10-30 NOTE — ED NOTES
Pt. Resting in bed in NAD. RR e/u. Continuous cardiac, BP, and O2 monitoring in progress. VS being monitored per MD orders. Pt. Offered bathroom assistance and denies need at this time. Explanation of care/wait provided. Bed in low, locked position with rails up and call bell in reach. Will continue to monitor.
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
## 2018-10-30 NOTE — ASSESSMENT & PLAN NOTE
Assessment:  Pt recently lost her roommate and gained a new one, mother reports she has had trouble sleeping.  NH and pt did not mention this information, pt is a poor historian.  Pt has a longtime outpatient psychiatrist who saw her on 10/25/18, I will continue her psychiatric medications as prescribed last week.  Pt will be monitored for aggressive behavior while inpatient, her behavior should improve with sleep and a different roommate.    Plan:  Seroquel 300 mg po BID  Prozac 20 mg po daily  Depakote 2000 mg po daily  Inderal 40 mg po BID  Trazodone 100 po qhs PRN

## 2018-10-30 NOTE — PLAN OF CARE
10/30/18 1330   [facility] [place] Group Therapy   Group Name Education   Specific Interventions Coping Skills Training   Participation Level None   Participation Quality Refused
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
## 2018-10-30 NOTE — PROGRESS NOTES
"Group Psychotherapy (PhD/LCSW)    Site: [facility]    Clinical status of patient: Inpatient    Date: 10/30/2018    Group Focus: Life Skills    Length of service: [id number] - 35-40 minutes    Number of patients in attendance: 6    Referred by: Acute Psychiatry Unit Treatment Team    Target symptoms: Depression, Mood Disorder and Psychosis    Patient's response to treatment: Active Listening and Self-disclosure    Progress toward goals: Progressing slowly    Interval History: Pt appeared alert and attentive in group. Pt participated when prompted in a group discussion of the way "recovery" can be a more helpful concept than "cure" in coping with mental illness and substance problems.. Pt was at times tangential in her responses but was easily redirectable.  .   Diagnosis: Schizoaffective d/o, depressive type    Plan: Continue treatment on [facility]        "

## 2018-10-30 NOTE — H&P
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     Glucose   99     Glucose, UA Negative       Gran # (ANC)   1.8     Gran%   29.6     Hematocrit   45.5     Hemoglobin   14.6     Ketones, UA Negative       Lactate, Ruiz   1.2  Comment:  Falsely low lactic acid results can be found in samples   containing >=13.0 mg/dL total bilirubin and/or >=3.5 mg/dL   direct bilirubin.       Leukocytes, UA Negative       Lymph #   3.4     Lymph%   57.1     Magnesium   2.4     MCH   30.8     MCHC   32.1     MCV   96     Mono #   0.6     Mono%   10.3     MPV   9.9     Nitrite, UA Negative       nRBC   0     Occult Blood UA Negative       pH, UA 7.0       Platelets   211     Potassium   4.5     Total Protein   6.8     Protein, UA Negative  Comment:  Recommend a 24 hour urine protein or a urine   protein/creatinine ratio if globulin induced proteinuria is  clinically suspected.         RBC   4.74     RDW   13.6     Sodium   141     Specific Gravity, UA 1.005       Specimen UA Urine, Clean Catch       TSH   1.402     Valproic Acid Lvl   78.5  Comment:  Valproic Acid (ug/ml)  Toxic:   >100.0 ug/ml       WBC    6.01           Significant Imaging: I have reviewed all pertinent imaging results/findings within the past 24 hours.    Assessment/Plan:     Insomnia disorder, with non-sleep disorder mental comorbidity    Assessment:  Pt has well documented insomnia, which was exacerbated by the death of her old roommate and the addition of her new roommate.       Plan:  Will continue her Trazodone 100 mg po PRN for insomnia.     Schizoaffective disorder, depressive type    Assessment:  Pt recently lost her roommate and gained a new one, mother reports she has had trouble sleeping.  NH and pt did not mention this information, pt is a poor historian.  Pt has a longtime outpatient psychiatrist who saw her on 10/25/18, I will continue her psychiatric medications as prescribed last week.  Pt will be monitored for aggressive behavior while inpatient, her behavior should improve with sleep and a different roommate.     Plan:  Seroquel 300 mg po BID  Prozac 20 mg po daily  Depakote 2000 mg po daily  Inderal 40 mg po BID  Trazodone 100 po qhs PRN        Will hold off on the klonopin 0.5 mg po BID PRN at this point in light of her mother's concerns for overmedication/falls and I didn't see where Dr. Wills continued it.  Pt does not give a consistent history as to whether she takes it or not.  I will order Ativan 1 mg prn with parameters should she go into withdrawal.        Estimated Discharge Date:   Initial Discharge Plan: Other: back to [facility]    Prognosis: Fair    Need for Continued Hospitalization:   Pt is gravely disabled beyond her baseline.    Psychiatric illness continues to pose a potential threat to life or bodily function, of self or others, thereby requiring the need for continued inpatient psychiatric hospitalization.    Total Time: 70 with greater than 50% of time spent in counseling and/or coordination of care.     Ward Tyler MD   Psychiatry  [facility]

## 2018-10-30 NOTE — ED NOTES
Meal tray provided to pt; pt reports she is not hungry; awaiting further orders and room assignment; will continue to monitor and provide care

## 2018-10-30 NOTE — ASSESSMENT & PLAN NOTE
Assessment:  Pt has well documented insomnia, which was exacerbated by the death of her old roommate and the addition of her new roommate.       Plan:  Will continue her Trazodone 100 mg po PRN for insomnia.

## 2018-10-30 NOTE — PROGRESS NOTES
10/30/18 0900 10/30/18 1000 10/30/18 1100   [facility] Group Therapy   Group Name Community Reintegration Mental Awareness Education   Specific Interventions Current Events Cognitive Stimulation Training Guided Imagery/Relaxation   Participation Level Active;Appropriate Active Active   Participation Quality Cooperative Cooperative Cooperative   Insight/Motivation Limited Limited Limited   Affect/Mood Display Labile Appropriate Appropriate   Cognition Alert Alert Alert       10/30/18 1300   [facility] Group Therapy   Group Name Therapeutic Recreation   Specific Interventions Skilled Activity Crafts   Participation Level Active   Participation Quality Cooperative   Insight/Motivation Limited   Affect/Mood Display Appropriate   Cognition Alert

## 2018-10-30 NOTE — NURSING
Patient walks with a walker and gait is steady.  She reports that she is depressed.  She is tearful when she was talking about it.  She reports that she does not want to go back to the nursing home.  She states they are mean to her.  She is irritable at time and is passive aggressive on the unit.  She is medication compliant.  Attending groups.  Social with select peers.

## 2018-10-30 NOTE — PLAN OF CARE
Problem: Patient Care Overview (Adult)  Goal: Plan of Care Review  Outcome: Ongoing (interventions implemented as appropriate)  Patient is walking with a walker.  Gait steady.  Mood is labile.  Periods of crying.  Reports that she does not want to live at the nursing home.  Reports that they are mean to her.  Patient is irritable and at time passive aggressive on the unit.  She is medication compliant.  Attending some groups.      Problem: Overarching Goals (Adult)  Goal: Adheres to Safety Considerations for Self and Others  Outcome: Ongoing (interventions implemented as appropriate)  Adheres to safety rules and routines.   Goal: Optimized Coping Skills in Response to Life Stressors  Outcome: Ongoing (interventions implemented as appropriate)  Poor understanding of coping skills  Goal: Develops/Participates in Therapeutic [place] to Support Successful Transition  Outcome: Ongoing (interventions implemented as appropriate)  Does not participate in the therapeutic alliance.     Problem: Fall Risk (Adult)  Goal: Absence of Falls  Patient will demonstrate the desired outcomes by discharge/transition of care.  Outcome: Ongoing (interventions implemented as appropriate)  No fall or injuries noted on the unit.     Problem: Mood Impairment (Depressive Signs/Symptoms) (Adult)  Goal: Improved Mood Symptoms  Outcome: Ongoing (interventions implemented as appropriate)  Mood currently labile.  Reports depression crying occasionally.
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
## 2018-10-31 LAB — RPR SER QL: NORMAL

## 2018-10-31 PROCEDURE — 99232 SBSQ HOSP IP/OBS MODERATE 35: CPT | Mod: GC,,, | Performed by: PSYCHIATRY & NEUROLOGY

## 2018-10-31 PROCEDURE — 25000003 PHARM REV CODE 250: Performed by: STUDENT IN AN ORGANIZED HEALTH CARE EDUCATION/TRAINING PROGRAM

## 2018-10-31 PROCEDURE — 97150 GROUP THERAPEUTIC PROCEDURES: CPT

## 2018-10-31 PROCEDURE — 25000242 PHARM REV CODE 250 ALT 637 W/ HCPCS: Performed by: STUDENT IN AN ORGANIZED HEALTH CARE EDUCATION/TRAINING PROGRAM

## 2018-10-31 PROCEDURE — 25000003 PHARM REV CODE 250: Performed by: PSYCHIATRY & NEUROLOGY

## 2018-10-31 PROCEDURE — 97166 OT EVAL MOD COMPLEX 45 MIN: CPT

## 2018-10-31 PROCEDURE — 12400001 HC PSYCH SEMI-PRIVATE ROOM

## 2018-10-31 PROCEDURE — 90853 GROUP PSYCHOTHERAPY: CPT | Mod: ,,, | Performed by: PSYCHOLOGIST

## 2018-10-31 RX ORDER — LIOTHYRONINE SODIUM 5 UG/1
15 TABLET ORAL DAILY
Status: DISCONTINUED | OUTPATIENT
Start: 2018-10-31 | End: 2018-11-05 | Stop reason: HOSPADM

## 2018-10-31 RX ORDER — CYANOCOBALAMIN (VITAMIN B-12) 250 MCG
1000 TABLET ORAL DAILY
Status: DISCONTINUED | OUTPATIENT
Start: 2018-10-31 | End: 2018-11-05 | Stop reason: HOSPADM

## 2018-10-31 RX ORDER — LEVOTHYROXINE SODIUM 50 UG/1
100 TABLET ORAL
Status: DISCONTINUED | OUTPATIENT
Start: 2018-11-01 | End: 2018-11-05 | Stop reason: HOSPADM

## 2018-10-31 RX ADMIN — ATORVASTATIN CALCIUM 10 MG: 10 TABLET, FILM COATED ORAL at 08:10

## 2018-10-31 RX ADMIN — CYANOCOBALAMIN TAB 250 MCG 1000 MCG: 250 TAB at 12:10

## 2018-10-31 RX ADMIN — QUETIAPINE FUMARATE 300 MG: 300 TABLET ORAL at 08:10

## 2018-10-31 RX ADMIN — LIOTHYRONINE SODIUM 15 MCG: 5 TABLET ORAL at 12:10

## 2018-10-31 RX ADMIN — TOPIRAMATE 100 MG: 25 TABLET, FILM COATED ORAL at 08:10

## 2018-10-31 RX ADMIN — GABAPENTIN 600 MG: 300 CAPSULE ORAL at 08:10

## 2018-10-31 RX ADMIN — CARBIDOPA AND LEVODOPA 1 SPLIT TABLET: 25; 100 TABLET ORAL at 08:10

## 2018-10-31 RX ADMIN — POTASSIUM CHLORIDE 20 MEQ: 20 TABLET, EXTENDED RELEASE ORAL at 08:10

## 2018-10-31 RX ADMIN — FOLIC ACID 1 MG: 1 TABLET ORAL at 08:10

## 2018-10-31 RX ADMIN — LEVOTHYROXINE SODIUM 175 MCG: 25 TABLET ORAL at 06:10

## 2018-10-31 RX ADMIN — FUROSEMIDE 20 MG: 20 TABLET ORAL at 08:10

## 2018-10-31 RX ADMIN — PANTOPRAZOLE SODIUM 40 MG: 40 TABLET, DELAYED RELEASE ORAL at 08:10

## 2018-10-31 RX ADMIN — DIVALPROEX SODIUM 2000 MG: 500 TABLET, EXTENDED RELEASE ORAL at 08:10

## 2018-10-31 RX ADMIN — FLUTICASONE FUROATE AND VILANTEROL TRIFENATATE 1 PUFF: 100; 25 POWDER RESPIRATORY (INHALATION) at 08:10

## 2018-10-31 RX ADMIN — THERA TABS 1 TABLET: TAB at 08:10

## 2018-10-31 RX ADMIN — ACETAMINOPHEN 650 MG: 325 TABLET ORAL at 08:10

## 2018-10-31 RX ADMIN — PROPRANOLOL HYDROCHLORIDE 40 MG: 10 TABLET ORAL at 08:10

## 2018-10-31 RX ADMIN — FLUOXETINE 20 MG: 20 CAPSULE ORAL at 08:10

## 2018-10-31 RX ADMIN — TIOTROPIUM BROMIDE 18 MCG: 18 CAPSULE ORAL; RESPIRATORY (INHALATION) at 08:10

## 2018-10-31 NOTE — PROGRESS NOTES
Group Psychotherapy (PhD/LCSW)    Site: [facility]    Clinical status of patient: Inpatient    Date: 10/31/2018    Group Focus: Life Skills    Length of service: [id number] - 35-40 minutes    Number of patients in attendance: 7    Referred by: Acute Psychiatry Unit Treatment Team    Target symptoms: Depression, Mood Disorder and Psychosis    Patient's response to treatment: Active Listening and Self-disclosure    Progress toward goals: Progressing slowly    Interval History: Pt appeared alert and attentive in group. She participated in a group focused on relationship dynamics that emphasized strategies for ameliorating dysfunctional relationship patterns by changing one's own part in the pattern. Pt's affect was depressed. Comments tended to be tangential to subject matter.   .   Diagnosis: Schizoaffective d/o, depressive type    Plan: Continue treatment on [facility]

## 2018-10-31 NOTE — PLAN OF CARE
Problem: Patient Care Overview  Goal: Plan of Care Review  Outcome: Ongoing (interventions implemented as appropriate)  The patient did not participate on the unit this evening. She did not attend group and refused to get up for medications. The patient displayed attention seeking behavior and frequently sought out staff attention. Multiple somatic complaints made (headache, her teeth hurting for months). Patient repeatedly requesting ambien. The patient continues to be irritable with certain staff members and peers. MVC maintained. Frequent safety rounds performed. Will continue to monitor.     Problem: Fall Risk (Adult)  Goal: Absence of Falls  Patient will demonstrate the desired outcomes by discharge/transition of care.  Outcome: Ongoing (interventions implemented as appropriate)  No falls overnight. Patient using walker on the unit. Environmental rounds performed for patient safety.     Problem: Mood Impairment (Depressive Signs/Symptoms) (Adult)  Goal: Improved Mood Symptoms  Outcome: Ongoing (interventions implemented as appropriate)  Patient affect labile and irritable throughout the evening. She is demanding with staff and frequently seeks out staff attention. The patient visibly staff splits and favors certain peers.

## 2018-10-31 NOTE — ASSESSMENT & PLAN NOTE
Assessment:  Pt recently lost her roommate and gained a new one, mother reports she has had trouble sleeping.  NH and pt did not mention this information, pt is a poor historian.  Pt has a longtime outpatient psychiatrist who saw her on 10/25/18.  Pt will be monitored for aggressive behavior while inpatient.       Plan:  Seroquel 300 mg po BID  Prozac 20 mg po daily  Depakote 2000 mg po daily  Inderal 40 mg po BID  Trazodone 100 po qhs PRN    Pt with previous diagnosis of Parkinson's disease, no evidence of Parkinsonian s/sx on interview/exam, no change evident after decreased dose.  Will discontinue Sinemet at this time and monitor closely.

## 2018-10-31 NOTE — PLAN OF CARE
10/30/18 2000   [facility] Therapy   Group Name Community Reintegration   Specific Interventions Other (see comments)   Participation Level None   Participation Quality Refused
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
## 2018-10-31 NOTE — PROGRESS NOTES
10/31/18 0900 10/31/18 1100 10/31/18 1200   [facility] Therapy   Group [facility] Therapeutic [facility]creation   Specific Interventions Current Events Guided Imagery/Relaxation Skilled Activity [employer]   Participation Level Active;Appropriate Active;Minimal Active;Appropriate   Participation Quality Cooperative;Social Cooperative Cooperative   Insight/Motivation Good Good;Limited Limited;Good   Affect/Mood Display Appropriate Appropriate Appropriate   Cognition Alert Alert Alert        10/31/18 0900 10/31/18 1100 10/31/18 1200   [facility]   Group [facility] Therapeutic Recreation   Specific Interventions Current Events Guided Imagery/Relaxation Skilled Activity [employer]   Participation Level Active;Appropriate Active;Minimal Active;Appropriate   Participation Quality Cooperative;Social Cooperative Cooperative   Insight/Motivation Good Good;Limited Limited;Good   Affect/Mood Display Appropriate Appropriate Appropriate   Cognition Alert Alert Alert

## 2018-10-31 NOTE — PROGRESS NOTES
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 Easily distracted   Thought Process:  tangential  Thought Content: +paranoia, denies SI/HI  Orientation: grossly intact  Insight:  poor  Judgment: poor          Significant Labs:   Last 24 Hours:   Recent Lab Results       10/30/18  1028   10/30/18  1027        Folate   >40.0     Free T4 0.78       T3, Total 57       TSH 1.108       Vitamin B-12   376         Significant Imaging: I have reviewed all pertinent imaging results/findings within the past 24 hours.    Assessment/Plan:     * Schizoaffective disorder, depressive type    Assessment:  Pt recently lost her roommate and gained a new one, mother reports she has had trouble sleeping.  NH and pt did not mention this information, pt is a poor historian.  Pt has a longtime outpatient psychiatrist who saw her on 10/25/18.  Pt will be monitored for aggressive behavior while inpatient.       Plan:  Seroquel 300 mg po BID  Prozac 20 mg po daily  Depakote 2000 mg po daily  Inderal 40 mg po BID  Trazodone 100 po qhs PRN    Pt with previous diagnosis of Parkinson's disease, no evidence of " Parkinsonian s/sx on interview/exam, no change evident after decreased dose.  Will discontinue Sinemet at this time and monitor closely.        Insomnia disorder, with non-sleep disorder mental comorbidity    Assessment:  Pt has well documented insomnia, which was exacerbated by the death of her old roommate and the addition of her new roommate.       Plan:  Will continue her Trazodone 100 mg po PRN for insomnia.     Chronic constipation    - Currently receiving miralax, denies benefit  - Will adjust current PRN regimen     GERD (gastroesophageal reflux disease)    - Continue protonix     Hyperlipemia    - Continue atorvastatin     Hypothyroidism    - Decrease synthroid to 100mcg daily  - Add liothyronine 15mg daily     COPD (chronic obstructive pulmonary disease)    - Continue tiotropium at current dose/regimen  - Continue albuterol PRN        Need for Continued Hospitalization:   Psychiatric illness continues to pose a potential threat to life or bodily function, of self or others, thereby requiring the need for continued inpatient psychiatric hospitalization.    Anticipated Disposition: Nursing Facility     Total time:  15 with greater than 50% of this time spent in counseling and/or coordination of care.     Henrik Fields MD   Psychiatry  [facility]
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
## 2018-10-31 NOTE — PLAN OF CARE
10/31/18 1500   [facility] Group Therapy   Group Name Education   Specific Interventions Coping Skills Training   Participation Level None   Participation Quality Refused

## 2018-10-31 NOTE — PROGRESS NOTES
Sw made contact with patient's nursing home.     Sw was informed that the nurse provided the doctors with an account of what transpired on the day of patient's apu admission, and the nurse in question nurse Jeanne works the  7a to  3p shift.      Patient's  was not in ([facility])    Informed Ms. Luna that Ms. Briseno will reach out to the  on 10-.
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
## 2018-10-31 NOTE — PLAN OF CARE
Problem: Patient Care Overview (Adult)  Goal: Plan of Care Review  Outcome: Ongoing (interventions implemented as appropriate)  Patient alert and oriented. Patient ambulating with walker. Patient denies any complaints of pain or discomfort at this time. Safety maintained. Will continue to monitor.

## 2018-11-01 PROCEDURE — 99232 SBSQ HOSP IP/OBS MODERATE 35: CPT | Mod: GC,,, | Performed by: PSYCHIATRY & NEUROLOGY

## 2018-11-01 PROCEDURE — 25000242 PHARM REV CODE 250 ALT 637 W/ HCPCS: Performed by: STUDENT IN AN ORGANIZED HEALTH CARE EDUCATION/TRAINING PROGRAM

## 2018-11-01 PROCEDURE — 25000003 PHARM REV CODE 250: Performed by: PSYCHIATRY & NEUROLOGY

## 2018-11-01 PROCEDURE — 25000003 PHARM REV CODE 250: Performed by: STUDENT IN AN ORGANIZED HEALTH CARE EDUCATION/TRAINING PROGRAM

## 2018-11-01 PROCEDURE — 12400001 HC PSYCH SEMI-PRIVATE ROOM

## 2018-11-01 RX ADMIN — QUETIAPINE FUMARATE 300 MG: 300 TABLET ORAL at 08:11

## 2018-11-01 RX ADMIN — DIVALPROEX SODIUM 2000 MG: 500 TABLET, EXTENDED RELEASE ORAL at 08:11

## 2018-11-01 RX ADMIN — TOPIRAMATE 100 MG: 25 TABLET, FILM COATED ORAL at 08:11

## 2018-11-01 RX ADMIN — PROPRANOLOL HYDROCHLORIDE 40 MG: 10 TABLET ORAL at 08:11

## 2018-11-01 RX ADMIN — CYANOCOBALAMIN TAB 250 MCG 1000 MCG: 250 TAB at 08:11

## 2018-11-01 RX ADMIN — LEVOTHYROXINE SODIUM 100 MCG: 50 TABLET ORAL at 05:11

## 2018-11-01 RX ADMIN — FOLIC ACID 1 MG: 1 TABLET ORAL at 08:11

## 2018-11-01 RX ADMIN — THERA TABS 1 TABLET: TAB at 08:11

## 2018-11-01 RX ADMIN — PANTOPRAZOLE SODIUM 40 MG: 40 TABLET, DELAYED RELEASE ORAL at 08:11

## 2018-11-01 RX ADMIN — FLUOXETINE 20 MG: 20 CAPSULE ORAL at 08:11

## 2018-11-01 RX ADMIN — TIOTROPIUM BROMIDE 18 MCG: 18 CAPSULE ORAL; RESPIRATORY (INHALATION) at 08:11

## 2018-11-01 RX ADMIN — ATORVASTATIN CALCIUM 10 MG: 10 TABLET, FILM COATED ORAL at 08:11

## 2018-11-01 RX ADMIN — POTASSIUM CHLORIDE 20 MEQ: 20 TABLET, EXTENDED RELEASE ORAL at 08:11

## 2018-11-01 RX ADMIN — FUROSEMIDE 20 MG: 20 TABLET ORAL at 08:11

## 2018-11-01 RX ADMIN — FUROSEMIDE 20 MG: 20 TABLET ORAL at 06:11

## 2018-11-01 RX ADMIN — LIOTHYRONINE SODIUM 15 MCG: 5 TABLET ORAL at 08:11

## 2018-11-01 RX ADMIN — GABAPENTIN 600 MG: 300 CAPSULE ORAL at 08:11

## 2018-11-01 RX ADMIN — FLUTICASONE FUROATE AND VILANTEROL TRIFENATATE 1 PUFF: 100; 25 POWDER RESPIRATORY (INHALATION) at 08:11
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
## 2018-11-01 NOTE — PLAN OF CARE
Problem: Patient Care Overview  Goal: Plan of Care Review  Outcome: Ongoing (interventions implemented as appropriate)  No management issues overnight. The patient did not attend group this evening, though she was less irritable and more friendly towards peers and staff. She remains disorganized and distracted in conversation, as well as forgetful. The patient is compliant with her medication regimen at this time. MVC maintained. Frequent safety rounds performed. Will continue to monitor.     Problem: Fall Risk (Adult)  Goal: Absence of Falls  Patient will demonstrate the desired outcomes by discharge/transition of care.  Outcome: Ongoing (interventions implemented as appropriate)  The patient remained free of falls overnight. Walker used when ambulating. Environmental rounds performed for patient safety.     Problem: Mood Impairment (Depressive Signs/Symptoms) (Adult)  Goal: Improved Mood Symptoms  Outcome: Ongoing (interventions implemented as appropriate)  The patient's mood was much more polite, friendly and bright this evening. She was very thankful towards staff and engaging. However she still refused to attend group. The patient displayed concern over her roommate and had no irritable outbursts toward staff. She denies SI/HI at this time.

## 2018-11-01 NOTE — PLAN OF CARE
11/01/18 1400   [facility] Group Therapy   Group Name Education   Specific Interventions Coping Skills Training   Participation Level None   Participation Quality Refused

## 2018-11-01 NOTE — PROGRESS NOTES
11/01/18 0900 11/01/18 1000 11/01/18 1100   [facility] Group Therapy   Group Name Community Reintegration Education Education   Specific Interventions Current Events Relapse Prevention Guided Imagery/Relaxation   Participation Level Minimal;Active Minimal;Active Minimal;Active   Participation Quality Cooperative Cooperative Cooperative   Insight/Motivation Limited Limited Limited   Affect/Mood Display Appropriate Appropriate;Bizarre Appropriate   Cognition Alert Alert Alert       11/01/18 1300   [facility] Group Therapy   Group Name Therapeutic Recreation   Specific Interventions Skilled Activity Crafts   Participation Level --    Participation Quality Refused;Drowsy   Insight/Motivation --    Affect/Mood Display --    Cognition --

## 2018-11-01 NOTE — PLAN OF CARE
Problem: Patient Care Overview (Adult)  Goal: Plan of Care Review  Outcome: Ongoing (interventions implemented as appropriate)  Patient ambulating in hallway with walker. Patient denies any auditory or visual hallucinations. Patient safety maintained. Will continue to monitor.

## 2018-11-01 NOTE — SUBJECTIVE & OBJECTIVE
"Interval History: See hospital course.     Family History     Problem Relation (Age of Onset)    Alcohol abuse Mother    Bipolar disorder Mother    Cancer Maternal Grandmother (60)    Dementia Mother        Tobacco Use    Smoking status: Former Smoker     Packs/day: 1.50     Years: 40.00     Pack years: 60.00     Types: Cigars     Last attempt to quit: 2018     Years since quittin.3    Smokeless tobacco: Former User     Quit date: 1/3/2013    Tobacco comment: stopped smoking    Substance and Sexual Activity    Alcohol use: No    Drug use: No    Sexual activity: No     Psychotherapeutics (From admission, onward)    Start     Stop Route Frequency Ordered    10/30/18 0900  FLUoxetine capsule 20 mg      -- Oral Daily 10/29/18 2241    10/29/18 2345  QUEtiapine tablet 300 mg      -- Oral 2 times daily 10/29/18 2241    10/29/18 2338  traZODone tablet 100 mg      -- Oral Nightly PRN 10/29/18 2241    10/29/18 2322  OLANZapine tablet 5 mg  (Olanzapine)      -- Oral 3 times daily PRN 10/29/18 2322    10/29/18 2322  OLANZapine injection 5 mg  (Olanzapine)      -- IM 3 times daily PRN 10/29/18 2322    10/29/18 2322  LORazepam tablet 1 mg      -- Oral Every 4 hours PRN 10/29/18 2322           Review of Systems  Objective:     Vital Signs (Most Recent):  Temp: 98.8 °F (37.1 °C) (18 0812)  Pulse: 60 (18 0815)  Resp: 17 (18 0815)  BP: (!) 147/82 (18 0812) Vital Signs (24h Range):  Temp:  [98.8 °F (37.1 °C)] 98.8 °F (37.1 °C)  Pulse:  [60] 60  Resp:  [17-18] 17  BP: (127-147)/(68-82) 147/82     Height: 5' 5" (165.1 cm)  Weight: 94.2 kg (207 lb 10.8 oz)  Body mass index is 34.56 kg/m².    No intake or output data in the 24 hours ending 18 1103    Physical Exam      Mental Status Exam:  Appearance: unremarkable, age appropriate  Behavior/Cooperation: limited/ appropriate cooperative  Speech: appropriate rate, volume and tone normal tone, normal rate, normal pitch, normal volume  Language: " uses words appropriately; NO aphasia or dysarthria  Mood: steady  Affect:  congruent with mood and appropriate to situation/content   Thought Process: concrete  Thought Content: poverty of content  Level of Consciousness: Alert and Oriented x3  Memory:  Limited  Attention/concentration: appropriate for age/education.   Fund of Knowledge: appears adequate  Insight:  Impaired  Judgment: Impaired    Significant Labs:   Last 24 Hours:   Recent Lab Results     None          Significant Imaging: I have reviewed all pertinent imaging results/findings within the past 24 hours.

## 2018-11-02 LAB — VIT B1 SERPL-MCNC: 53 UG/L (ref 38–122)

## 2018-11-02 PROCEDURE — 25000003 PHARM REV CODE 250: Performed by: STUDENT IN AN ORGANIZED HEALTH CARE EDUCATION/TRAINING PROGRAM

## 2018-11-02 PROCEDURE — 25000003 PHARM REV CODE 250: Performed by: PSYCHIATRY & NEUROLOGY

## 2018-11-02 PROCEDURE — 12400001 HC PSYCH SEMI-PRIVATE ROOM

## 2018-11-02 PROCEDURE — 99232 SBSQ HOSP IP/OBS MODERATE 35: CPT | Mod: GC,,, | Performed by: PSYCHIATRY & NEUROLOGY

## 2018-11-02 PROCEDURE — 25000242 PHARM REV CODE 250 ALT 637 W/ HCPCS: Performed by: STUDENT IN AN ORGANIZED HEALTH CARE EDUCATION/TRAINING PROGRAM

## 2018-11-02 PROCEDURE — 90853 GROUP PSYCHOTHERAPY: CPT | Mod: ,,, | Performed by: PSYCHOLOGIST

## 2018-11-02 RX ORDER — DOCUSATE SODIUM 100 MG/1
100 CAPSULE, LIQUID FILLED ORAL DAILY
Status: DISCONTINUED | OUTPATIENT
Start: 2018-11-02 | End: 2018-11-03

## 2018-11-02 RX ORDER — SYRING-NEEDL,DISP,INSUL,0.3 ML 29 G X1/2"
296 SYRINGE, EMPTY DISPOSABLE MISCELLANEOUS ONCE AS NEEDED
Status: DISCONTINUED | OUTPATIENT
Start: 2018-11-02 | End: 2018-11-02

## 2018-11-02 RX ORDER — SYRING-NEEDL,DISP,INSUL,0.3 ML 29 G X1/2"
296 SYRINGE, EMPTY DISPOSABLE MISCELLANEOUS DAILY PRN
Status: DISCONTINUED | OUTPATIENT
Start: 2018-11-02 | End: 2018-11-05 | Stop reason: HOSPADM

## 2018-11-02 RX ADMIN — LEVOTHYROXINE SODIUM 100 MCG: 50 TABLET ORAL at 06:11

## 2018-11-02 RX ADMIN — LIOTHYRONINE SODIUM 15 MCG: 5 TABLET ORAL at 08:11

## 2018-11-02 RX ADMIN — DOCUSATE SODIUM 100 MG: 100 CAPSULE, LIQUID FILLED ORAL at 02:11

## 2018-11-02 RX ADMIN — QUETIAPINE FUMARATE 300 MG: 300 TABLET ORAL at 08:11

## 2018-11-02 RX ADMIN — CYANOCOBALAMIN TAB 250 MCG 1000 MCG: 250 TAB at 08:11

## 2018-11-02 RX ADMIN — PROPRANOLOL HYDROCHLORIDE 40 MG: 10 TABLET ORAL at 08:11

## 2018-11-02 RX ADMIN — FUROSEMIDE 20 MG: 20 TABLET ORAL at 08:11

## 2018-11-02 RX ADMIN — FLUTICASONE FUROATE AND VILANTEROL TRIFENATATE 1 PUFF: 100; 25 POWDER RESPIRATORY (INHALATION) at 08:11

## 2018-11-02 RX ADMIN — PANTOPRAZOLE SODIUM 40 MG: 40 TABLET, DELAYED RELEASE ORAL at 08:11

## 2018-11-02 RX ADMIN — DIVALPROEX SODIUM 2000 MG: 500 TABLET, EXTENDED RELEASE ORAL at 08:11

## 2018-11-02 RX ADMIN — FUROSEMIDE 20 MG: 20 TABLET ORAL at 05:11

## 2018-11-02 RX ADMIN — FLUOXETINE 20 MG: 20 CAPSULE ORAL at 08:11

## 2018-11-02 RX ADMIN — GABAPENTIN 600 MG: 300 CAPSULE ORAL at 08:11

## 2018-11-02 RX ADMIN — TOPIRAMATE 100 MG: 25 TABLET, FILM COATED ORAL at 08:11

## 2018-11-02 RX ADMIN — POTASSIUM CHLORIDE 20 MEQ: 20 TABLET, EXTENDED RELEASE ORAL at 08:11

## 2018-11-02 RX ADMIN — ATORVASTATIN CALCIUM 10 MG: 10 TABLET, FILM COATED ORAL at 08:11

## 2018-11-02 RX ADMIN — THERA TABS 1 TABLET: TAB at 08:11

## 2018-11-02 RX ADMIN — TIOTROPIUM BROMIDE 18 MCG: 18 CAPSULE ORAL; RESPIRATORY (INHALATION) at 08:11

## 2018-11-02 RX ADMIN — FOLIC ACID 1 MG: 1 TABLET ORAL at 08:11
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
## 2018-11-02 NOTE — PLAN OF CARE
Problem: Patient Care Overview  Goal: Plan of Care Review  Outcome: Ongoing (interventions implemented as appropriate)  Refused scheduled group and snack; compliant with HS medication with coaxing.  Hyperverbal, asking many questions repeatedly after being answered.  Retired to bed early.  No acute physical distress.  Safety maintained.

## 2018-11-02 NOTE — PROGRESS NOTES
Group Psychotherapy (PhD/LCSW)    Site: [facility]    Clinical status of patient: Inpatient    Date: 11/2/2018    Group Focus: Life Skills    Length of service: 61202 - 35-40 minutes    Number of patients in attendance: 8    Referred by: Acute Psychiatry Unit Treatment Team    Target symptoms: Depression, Mood Disorder and Psychosis    Patient's response to treatment: Active Listening and Self-disclosure    Progress toward goals: Progressing slowly    Interval History: Pt appeared alert and attentive in group. She participated briefly in a group discussion of relaxation strategies.   .   Diagnosis: Schizoaffective d/o, depressive type    Plan: Continue treatment on [facility]

## 2018-11-02 NOTE — ASSESSMENT & PLAN NOTE
Assessment:  Pt recently lost her roommate and gained a new one, mother reports she has had trouble sleeping.  NH and pt did not mention this information, pt is a poor historian.  Pt has a longtime outpatient psychiatrist who saw her on 10/25/18.  Pt will be monitored for aggressive behavior while inpatient.       Plan:  Seroquel 300 mg po BID  Prozac 20 mg po daily  Depakote 2000 mg po daily  Inderal 40 mg po BID  Trazodone 100 po qhs PRN    Pt with previous diagnosis of Parkinson's disease, no evidence of Parkinsonian s/sx on interview/exam, no change evident after decreased dose.  Discontinue Sinemet at this time and monitor closely.

## 2018-11-02 NOTE — PLAN OF CARE
Problem: Patient Care Overview (Adult)  Goal: Plan of Care Review  Outcome: Ongoing (interventions implemented as appropriate)  Patient sitting in day room. Patient alert and oriented. Patient ambulatory with walker. Patient denies any auditory or visual hallucinations. Patient safety maintained. Will continue to monitor.

## 2018-11-02 NOTE — NURSING
Appears to have slept approximately 7 hours thus far this shift.  Safety maintained throughout shift.  See Observation Checklist.  Will continue to monitor.
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
## 2018-11-02 NOTE — SUBJECTIVE & OBJECTIVE
"Interval History: See hospital course     Family History     Problem Relation (Age of Onset)    Alcohol abuse Mother    Bipolar disorder Mother    Cancer Maternal Grandmother (60)    Dementia Mother        Tobacco Use    Smoking status: Former Smoker     Packs/day: 1.50     Years: 40.00     Pack years: 60.00     Types: Cigars     Last attempt to quit: 2018     Years since quittin.3    Smokeless tobacco: Former User     Quit date: 1/3/2013    Tobacco comment: stopped smoking    Substance and Sexual Activity    Alcohol use: No    Drug use: No    Sexual activity: No     Psychotherapeutics (From admission, onward)    Start     Stop Route Frequency Ordered    10/30/18 0900  FLUoxetine capsule 20 mg      -- Oral Daily 10/29/18 2241    10/29/18 2345  QUEtiapine tablet 300 mg      -- Oral 2 times daily 10/29/18 2241    10/29/18 2338  traZODone tablet 100 mg      -- Oral Nightly PRN 10/29/18 2241    10/29/18 2322  OLANZapine tablet 5 mg  (Olanzapine)      -- Oral 3 times daily PRN 10/29/18 2322    10/29/18 2322  OLANZapine injection 5 mg  (Olanzapine)      -- IM 3 times daily PRN 10/29/18 2322    10/29/18 2322  LORazepam tablet 1 mg      -- Oral Every 4 hours PRN 10/29/18 2322           Review of Systems  Objective:     Vital Signs (Most Recent):  Temp: 98.7 °F (37.1 °C) (18 0744)  Pulse: 81 (18 0831)  Resp: 17 (18 0831)  BP: 126/71 (18 0744) Vital Signs (24h Range):  Temp:  [98.5 °F (36.9 °C)-98.7 °F (37.1 °C)] 98.7 °F (37.1 °C)  Pulse:  [50-81] 81  Resp:  [17-20] 17  BP: (126-132)/(60-71) 126/71     Height: 5' 5" (165.1 cm)  Weight: 94.2 kg (207 lb 10.8 oz)  Body mass index is 34.56 kg/m².    No intake or output data in the 24 hours ending 18 1030    Physical Exam      Mental Status Exam:  Appearance: age appropriate  Behavior/Cooperation: limited, cooperative  Speech:normal tone, normal rate, normal pitch, normal volume  Language: uses words appropriately; NO aphasia or " dysarthria  Mood: steady  Affect:  congruent with mood and appropriate to situation/content   Thought Process: illogical  Thought Content: delusions: yes  Level of Consciousness: Alert and Oriented x3  Memory:  Limited   Attention/concentration: appropriate for age/education.   Fund of Knowledge: Limited  Insight:   Limited  Judgment: Limited    Significant Labs:   Last 24 Hours:   Recent Lab Results     None          Significant Imaging: I have reviewed all pertinent imaging results/findings within the past 24 hours.

## 2018-11-03 LAB
ALBUMIN SERPL BCP-MCNC: 3.6 G/DL
ALP SERPL-CCNC: 87 U/L
ALT SERPL W/O P-5'-P-CCNC: 26 U/L
AMYLASE SERPL-CCNC: 33 U/L
ANION GAP SERPL CALC-SCNC: 13 MMOL/L
AST SERPL-CCNC: 21 U/L
BASOPHILS # BLD AUTO: 0.05 K/UL
BASOPHILS NFR BLD: 0.8 %
BILIRUB SERPL-MCNC: 0.4 MG/DL
BUN SERPL-MCNC: 32 MG/DL
CALCIUM SERPL-MCNC: 9.7 MG/DL
CHLORIDE SERPL-SCNC: 104 MMOL/L
CO2 SERPL-SCNC: 22 MMOL/L
CREAT SERPL-MCNC: 1.3 MG/DL
DIFFERENTIAL METHOD: ABNORMAL
EOSINOPHIL # BLD AUTO: 0.1 K/UL
EOSINOPHIL NFR BLD: 2.1 %
ERYTHROCYTE [DISTWIDTH] IN BLOOD BY AUTOMATED COUNT: 13.2 %
EST. GFR  (AFRICAN AMERICAN): 50.8 ML/MIN/1.73 M^2
EST. GFR  (NON AFRICAN AMERICAN): 44.1 ML/MIN/1.73 M^2
GLUCOSE SERPL-MCNC: 163 MG/DL
HCT VFR BLD AUTO: 48 %
HGB BLD-MCNC: 15.1 G/DL
IMM GRANULOCYTES # BLD AUTO: 0.02 K/UL
IMM GRANULOCYTES NFR BLD AUTO: 0.3 %
LIPASE SERPL-CCNC: 33 U/L
LYMPHOCYTES # BLD AUTO: 3.8 K/UL
LYMPHOCYTES NFR BLD: 57.6 %
MCH RBC QN AUTO: 29.3 PG
MCHC RBC AUTO-ENTMCNC: 31.5 G/DL
MCV RBC AUTO: 93 FL
MONOCYTES # BLD AUTO: 0.6 K/UL
MONOCYTES NFR BLD: 9.3 %
NEUTROPHILS # BLD AUTO: 2 K/UL
NEUTROPHILS NFR BLD: 29.9 %
NRBC BLD-RTO: 0 /100 WBC
PLATELET # BLD AUTO: 221 K/UL
PMV BLD AUTO: 10.1 FL
POTASSIUM SERPL-SCNC: 4.1 MMOL/L
PROT SERPL-MCNC: 7.3 G/DL
RBC # BLD AUTO: 5.15 M/UL
SODIUM SERPL-SCNC: 139 MMOL/L
VALPROATE SERPL-MCNC: 97.5 UG/ML
WBC # BLD AUTO: 6.55 K/UL

## 2018-11-03 PROCEDURE — 80053 COMPREHEN METABOLIC PANEL: CPT

## 2018-11-03 PROCEDURE — 12400001 HC PSYCH SEMI-PRIVATE ROOM

## 2018-11-03 PROCEDURE — 36415 COLL VENOUS BLD VENIPUNCTURE: CPT

## 2018-11-03 PROCEDURE — 80164 ASSAY DIPROPYLACETIC ACD TOT: CPT

## 2018-11-03 PROCEDURE — 25000003 PHARM REV CODE 250: Performed by: PSYCHIATRY & NEUROLOGY

## 2018-11-03 PROCEDURE — 85025 COMPLETE CBC W/AUTO DIFF WBC: CPT

## 2018-11-03 PROCEDURE — 99232 SBSQ HOSP IP/OBS MODERATE 35: CPT | Mod: ,,, | Performed by: PSYCHIATRY & NEUROLOGY

## 2018-11-03 PROCEDURE — 82150 ASSAY OF AMYLASE: CPT

## 2018-11-03 PROCEDURE — 83690 ASSAY OF LIPASE: CPT

## 2018-11-03 PROCEDURE — 25000242 PHARM REV CODE 250 ALT 637 W/ HCPCS: Performed by: STUDENT IN AN ORGANIZED HEALTH CARE EDUCATION/TRAINING PROGRAM

## 2018-11-03 PROCEDURE — 25000003 PHARM REV CODE 250: Performed by: STUDENT IN AN ORGANIZED HEALTH CARE EDUCATION/TRAINING PROGRAM

## 2018-11-03 RX ORDER — SYRING-NEEDL,DISP,INSUL,0.3 ML 29 G X1/2"
296 SYRINGE, EMPTY DISPOSABLE MISCELLANEOUS ONCE
Status: COMPLETED | OUTPATIENT
Start: 2018-11-03 | End: 2018-11-03

## 2018-11-03 RX ORDER — DOCUSATE SODIUM 100 MG/1
100 CAPSULE, LIQUID FILLED ORAL 2 TIMES DAILY
Status: DISCONTINUED | OUTPATIENT
Start: 2018-11-03 | End: 2018-11-05 | Stop reason: HOSPADM

## 2018-11-03 RX ORDER — GABAPENTIN 300 MG/1
600 CAPSULE ORAL NIGHTLY
Status: DISCONTINUED | OUTPATIENT
Start: 2018-11-04 | End: 2018-11-05 | Stop reason: HOSPADM

## 2018-11-03 RX ADMIN — DOCUSATE SODIUM 100 MG: 100 CAPSULE, LIQUID FILLED ORAL at 10:11

## 2018-11-03 RX ADMIN — CYANOCOBALAMIN TAB 250 MCG 1000 MCG: 250 TAB at 10:11

## 2018-11-03 RX ADMIN — LIOTHYRONINE SODIUM 15 MCG: 5 TABLET ORAL at 10:11

## 2018-11-03 RX ADMIN — TIOTROPIUM BROMIDE 18 MCG: 18 CAPSULE ORAL; RESPIRATORY (INHALATION) at 10:11

## 2018-11-03 RX ADMIN — POTASSIUM CHLORIDE 20 MEQ: 20 TABLET, EXTENDED RELEASE ORAL at 10:11

## 2018-11-03 RX ADMIN — FOLIC ACID 1 MG: 1 TABLET ORAL at 10:11

## 2018-11-03 RX ADMIN — PANTOPRAZOLE SODIUM 40 MG: 40 TABLET, DELAYED RELEASE ORAL at 10:11

## 2018-11-03 RX ADMIN — LEVOTHYROXINE SODIUM 100 MCG: 50 TABLET ORAL at 06:11

## 2018-11-03 RX ADMIN — ACETAMINOPHEN 650 MG: 325 TABLET ORAL at 08:11

## 2018-11-03 RX ADMIN — QUETIAPINE FUMARATE 300 MG: 300 TABLET ORAL at 10:11

## 2018-11-03 RX ADMIN — TOPIRAMATE 100 MG: 25 TABLET, FILM COATED ORAL at 08:11

## 2018-11-03 RX ADMIN — FUROSEMIDE 20 MG: 20 TABLET ORAL at 05:11

## 2018-11-03 RX ADMIN — FLUOXETINE 20 MG: 20 CAPSULE ORAL at 10:11

## 2018-11-03 RX ADMIN — PROPRANOLOL HYDROCHLORIDE 40 MG: 10 TABLET ORAL at 10:11

## 2018-11-03 RX ADMIN — GABAPENTIN 600 MG: 300 CAPSULE ORAL at 10:11

## 2018-11-03 RX ADMIN — FLUTICASONE FUROATE AND VILANTEROL TRIFENATATE 1 PUFF: 100; 25 POWDER RESPIRATORY (INHALATION) at 10:11

## 2018-11-03 RX ADMIN — PROPRANOLOL HYDROCHLORIDE 40 MG: 10 TABLET ORAL at 08:11

## 2018-11-03 RX ADMIN — DOCUSATE SODIUM 100 MG: 100 CAPSULE, LIQUID FILLED ORAL at 08:11

## 2018-11-03 RX ADMIN — ACETAMINOPHEN 650 MG: 325 TABLET ORAL at 10:11

## 2018-11-03 RX ADMIN — TOPIRAMATE 100 MG: 25 TABLET, FILM COATED ORAL at 10:11

## 2018-11-03 RX ADMIN — FUROSEMIDE 20 MG: 20 TABLET ORAL at 10:11

## 2018-11-03 RX ADMIN — MAGESIUM CITRATE 296 ML: 1.75 LIQUID ORAL at 03:11

## 2018-11-03 RX ADMIN — QUETIAPINE FUMARATE 300 MG: 300 TABLET ORAL at 08:11

## 2018-11-03 RX ADMIN — ATORVASTATIN CALCIUM 10 MG: 10 TABLET, FILM COATED ORAL at 10:11

## 2018-11-03 RX ADMIN — DIVALPROEX SODIUM 2000 MG: 500 TABLET, EXTENDED RELEASE ORAL at 08:11

## 2018-11-03 RX ADMIN — THERA TABS 1 TABLET: TAB at 10:11
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
## 2018-11-03 NOTE — PROGRESS NOTES
11/03/18 0900 11/03/18 1000 11/03/18 1100   [facility] Group Therapy   Group Name Community Reintegration Process Education   Specific Interventions Current Events (on my trip) Other (see comments)  (Insomnia)   Participation Level None None None   Participation Quality Refused Refused Refused       11/03/18 1400   [facility] Group Therapy   Group Name Therapeutic Recreation   Specific Interventions Skilled Activity Mild Exercises  (bean bag toss)   Participation Level None   Participation Quality Refused

## 2018-11-03 NOTE — ASSESSMENT & PLAN NOTE
- Tried miralax, denies benefit  - Started on colace 100mg qdaily, will titrate colace to 100mg BID and offer a one time dose of mag citrate on 11/3  -Continue to monitor
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
## 2018-11-03 NOTE — PLAN OF CARE
Pt isolative and withdrawn throughout most of shift. Pleasant and appreciative upon approach.  Pt often repeats herself. Requires assistance at times with staying focused on a task. Medication compliant. Did not attend unit activities.  Ambulated with walker. Denies SI/HI. Denies AH/VH. Falls precautions maintained. NAD observed. Will cont to monitor.

## 2018-11-03 NOTE — ASSESSMENT & PLAN NOTE
Assessment:  Pt recently lost her roommate and gained a new one, mother reports she has had trouble sleeping.  NH and pt did not mention this information, pt is a poor historian.  Pt has a longtime outpatient psychiatrist who saw her on 10/25/18.  Pt will be monitored for aggressive behavior while inpatient.      Patient continues with thought blocking and disorganized on interview 11/3.      Plan:  Seroquel 300 mg po BID  Prozac 20 mg po daily  Depakote 2000mg qHS  Inderal 40 mg po BID  Trazodone 100 po qhs PRN    -Will offer gabapentin in the evening to help with restlessness at night   -Would consider neurology consult on Monday, will continue to monitor     Pt with previous diagnosis of Parkinson's disease, no evidence of Parkinsonian s/sx on interview/exam, no change evident after decreased dose.  Discontinued Sinemet and continuing to monitor closely.

## 2018-11-03 NOTE — PLAN OF CARE
11/03/18 1100   [facility] Group Therapy   Group Name Education   Specific Interventions Other (see comments)  (Insomnia)   Participation Level None   Participation Quality Refused

## 2018-11-03 NOTE — SUBJECTIVE & OBJECTIVE
"Interval History: See hospital course     Family History     Problem Relation (Age of Onset)    Alcohol abuse Mother    Bipolar disorder Mother    Cancer Maternal Grandmother (60)    Dementia Mother        Tobacco Use    Smoking status: Former Smoker     Packs/day: 1.50     Years: 40.00     Pack years: 60.00     Types: Cigars     Last attempt to quit: 2018     Years since quittin.3    Smokeless tobacco: Former User     Quit date: 1/3/2013    Tobacco comment: stopped smoking    Substance and Sexual Activity    Alcohol use: No    Drug use: No    Sexual activity: No     Psychotherapeutics (From admission, onward)    Start     Stop Route Frequency Ordered    10/30/18 0900  FLUoxetine capsule 20 mg      -- Oral Daily 10/29/18 2241    10/29/18 2345  QUEtiapine tablet 300 mg      -- Oral 2 times daily 10/29/18 2241    10/29/18 2338  traZODone tablet 100 mg      -- Oral Nightly PRN 10/29/18 2241    10/29/18 2322  OLANZapine tablet 5 mg  (Olanzapine)      -- Oral 3 times daily PRN 10/29/18 2322    10/29/18 232  OLANZapine injection 5 mg  (Olanzapine)      -- IM 3 times daily PRN 10/29/18 2322    10/29/18 2322  LORazepam tablet 1 mg      -- Oral Every 4 hours PRN 10/29/18 2322           Review of Systems  Objective:     Vital Signs (Most Recent):  Temp: 98.8 °F (37.1 °C) (18 0740)  Pulse: (!) 53 (18 1014)  Resp: 16 (18 1014)  BP: 116/61 (18 0740) Vital Signs (24h Range):  Temp:  [98.8 °F (37.1 °C)-99.2 °F (37.3 °C)] 98.8 °F (37.1 °C)  Pulse:  [53-63] 53  Resp:  [16] 16  BP: (116-132)/(61-77) 116/61     Height: 5' 5" (165.1 cm)  Weight: 94.2 kg (207 lb 10.8 oz)  Body mass index is 34.56 kg/m².    No intake or output data in the 24 hours ending 18 1138    Physical Exam      Mental Status Exam:  Appearance: age appropriate  Behavior/Cooperation: limited, cooperative   Orientation: Patient oriented to person, date of birth, age, location [facility], disoriented to date, does not " "attempt to guess despite prompting   Speech:normal tone, normal rate, normal pitch, normal volume  Language: uses words appropriately; NO aphasia or dysarthria  Mood: "ok"  Affect:  Depressed, constricted   Thought Process: illogical, disorganized, thought blocking, perseveration  Thought Content: delusions: yes, paranoid; denies SI/HI, denies VH  Level of Consciousness: Alert and Oriented x3  Memory:  Limited   Attention/concentration: appropriate for age/education.   Fund of Knowledge: Did not assess   Insight:   Limited  Judgment: Limited    Significant Labs:   Last 24 Hours:   Recent Lab Results       11/03/18  0859        Immature Granulocytes 0.3     Immature Grans (Abs) 0.02  Comment:  Mild elevation in immature granulocytes is non specific and   can be seen in a variety of conditions including stress response,   acute inflammation, trauma and pregnancy. Correlation with other   laboratory and clinical findings is essential.       Albumin 3.6     Alkaline Phosphatase 87     ALT 26     Amylase 33     Anion Gap 13     AST 21     Baso # 0.05     Basophil% 0.8     Total Bilirubin 0.4  Comment:  For infants and newborns, interpretation of results should be based  on gestational age, weight and in agreement with clinical  observations.  Premature Infant recommended reference ranges:  Up to 24 hours.............<8.0 mg/dL  Up to 48 hours............<12.0 mg/dL  3-5 days..................<15.0 mg/dL  6-29 days.................<15.0 mg/dL       BUN, Bld 32     Calcium 9.7     Chloride 104     CO2 22     Creatinine 1.3     Differential Method Automated     eGFR if African American 50.8     eGFR if non  44.1  Comment:  Calculation used to obtain the estimated glomerular filtration  rate (eGFR) is the CKD-EPI equation.        Eos # 0.1     Eosinophil% 2.1     Glucose 163     Gran # (ANC) 2.0     Gran% 29.9     Hematocrit 48.0     Hemoglobin 15.1     Lipase 33     Lymph # 3.8     Lymph% 57.6     MCH 29.3  "    MCHC 31.5     MCV 93     Mono # 0.6     Mono% 9.3     MPV 10.1     nRBC 0     Platelets 221     Potassium 4.1     Total Protein 7.3     RBC 5.15     RDW 13.2     Sodium 139     Valproic Acid Lvl 97.5  Comment:  Valproic Acid (ug/ml)  Toxic:   >100.0 ug/ml       WBC 6.55           Significant Imaging: I have reviewed all pertinent imaging results/findings within the past 24 hours.

## 2018-11-03 NOTE — PSYCH
The patient is observed in her bed at this time. She appears asleep. Respirations even and unlabored. No distress noted. Will continue to monitor.

## 2018-11-04 PROCEDURE — 25000242 PHARM REV CODE 250 ALT 637 W/ HCPCS: Performed by: STUDENT IN AN ORGANIZED HEALTH CARE EDUCATION/TRAINING PROGRAM

## 2018-11-04 PROCEDURE — 25000003 PHARM REV CODE 250: Performed by: PSYCHIATRY & NEUROLOGY

## 2018-11-04 PROCEDURE — 12400001 HC PSYCH SEMI-PRIVATE ROOM

## 2018-11-04 PROCEDURE — 99232 SBSQ HOSP IP/OBS MODERATE 35: CPT | Mod: ,,, | Performed by: PSYCHIATRY & NEUROLOGY

## 2018-11-04 PROCEDURE — 25000003 PHARM REV CODE 250: Performed by: STUDENT IN AN ORGANIZED HEALTH CARE EDUCATION/TRAINING PROGRAM

## 2018-11-04 RX ADMIN — THERA TABS 1 TABLET: TAB at 10:11

## 2018-11-04 RX ADMIN — FLUOXETINE 20 MG: 20 CAPSULE ORAL at 10:11

## 2018-11-04 RX ADMIN — TOPIRAMATE 100 MG: 25 TABLET, FILM COATED ORAL at 08:11

## 2018-11-04 RX ADMIN — DIVALPROEX SODIUM 2000 MG: 500 TABLET, EXTENDED RELEASE ORAL at 08:11

## 2018-11-04 RX ADMIN — PROPRANOLOL HYDROCHLORIDE 40 MG: 10 TABLET ORAL at 08:11

## 2018-11-04 RX ADMIN — QUETIAPINE FUMARATE 300 MG: 300 TABLET ORAL at 10:11

## 2018-11-04 RX ADMIN — CYANOCOBALAMIN TAB 250 MCG 1000 MCG: 250 TAB at 10:11

## 2018-11-04 RX ADMIN — ATORVASTATIN CALCIUM 10 MG: 10 TABLET, FILM COATED ORAL at 10:11

## 2018-11-04 RX ADMIN — TIOTROPIUM BROMIDE 18 MCG: 18 CAPSULE ORAL; RESPIRATORY (INHALATION) at 09:11

## 2018-11-04 RX ADMIN — ACETAMINOPHEN 650 MG: 325 TABLET ORAL at 10:11

## 2018-11-04 RX ADMIN — FLUTICASONE FUROATE AND VILANTEROL TRIFENATATE 1 PUFF: 100; 25 POWDER RESPIRATORY (INHALATION) at 10:11

## 2018-11-04 RX ADMIN — PANTOPRAZOLE SODIUM 40 MG: 40 TABLET, DELAYED RELEASE ORAL at 10:11

## 2018-11-04 RX ADMIN — LIOTHYRONINE SODIUM 15 MCG: 5 TABLET ORAL at 10:11

## 2018-11-04 RX ADMIN — GABAPENTIN 600 MG: 300 CAPSULE ORAL at 08:11

## 2018-11-04 RX ADMIN — FUROSEMIDE 20 MG: 20 TABLET ORAL at 10:11

## 2018-11-04 RX ADMIN — QUETIAPINE FUMARATE 300 MG: 300 TABLET ORAL at 08:11

## 2018-11-04 RX ADMIN — LEVOTHYROXINE SODIUM 100 MCG: 50 TABLET ORAL at 06:11

## 2018-11-04 RX ADMIN — PROPRANOLOL HYDROCHLORIDE 40 MG: 10 TABLET ORAL at 10:11

## 2018-11-04 RX ADMIN — FUROSEMIDE 20 MG: 20 TABLET ORAL at 05:11

## 2018-11-04 RX ADMIN — DOCUSATE SODIUM 100 MG: 100 CAPSULE, LIQUID FILLED ORAL at 08:11

## 2018-11-04 RX ADMIN — FOLIC ACID 1 MG: 1 TABLET ORAL at 10:11

## 2018-11-04 RX ADMIN — TOPIRAMATE 100 MG: 25 TABLET, FILM COATED ORAL at 10:11

## 2018-11-04 RX ADMIN — POTASSIUM CHLORIDE 20 MEQ: 20 TABLET, EXTENDED RELEASE ORAL at 10:11

## 2018-11-04 NOTE — SUBJECTIVE & OBJECTIVE
"Interval History: see hospital course    Family History     Problem Relation (Age of Onset)    Alcohol abuse Mother    Bipolar disorder Mother    Cancer Maternal Grandmother (60)    Dementia Mother        Tobacco Use    Smoking status: Former Smoker     Packs/day: 1.50     Years: 40.00     Pack years: 60.00     Types: Cigars     Last attempt to quit: 2018     Years since quittin.3    Smokeless tobacco: Former User     Quit date: 1/3/2013    Tobacco comment: stopped smoking    Substance and Sexual Activity    Alcohol use: No    Drug use: No    Sexual activity: No     Psychotherapeutics (From admission, onward)    Start     Stop Route Frequency Ordered    10/30/18 0900  FLUoxetine capsule 20 mg      -- Oral Daily 10/29/18 2241    10/29/18 2345  QUEtiapine tablet 300 mg      -- Oral 2 times daily 10/29/18 2241    10/29/18 2338  traZODone tablet 100 mg      -- Oral Nightly PRN 10/29/18 2241    10/29/18 2322  OLANZapine tablet 5 mg  (Olanzapine)      -- Oral 3 times daily PRN 10/29/18 2322    10/29/18 232  OLANZapine injection 5 mg  (Olanzapine)      -- IM 3 times daily PRN 10/29/18 2322    10/29/18 2322  LORazepam tablet 1 mg      -- Oral Every 4 hours PRN 10/29/18 2322           Review of Systems   Cardiovascular: Negative for chest pain.   Gastrointestinal: Negative for abdominal pain.     Objective:     Vital Signs (Most Recent):  Temp: 98.8 °F (37.1 °C) (18 0858)  Pulse: 61 (18 1023)  Resp: 18 (18 1023)  BP: 135/63 (18 0858)  SpO2: 95 % (18 1023) Vital Signs (24h Range):  Temp:  [98.6 °F (37 °C)-98.8 °F (37.1 °C)] 98.8 °F (37.1 °C)  Pulse:  [61-66] 61  Resp:  [16-18] 18  SpO2:  [95 %-96 %] 95 %  BP: (118-135)/(63-74) 135/63     Height: 5' 5" (165.1 cm)  Weight: 94.2 kg (207 lb 10.8 oz)  Body mass index is 34.56 kg/m².    No intake or output data in the 24 hours ending 18 1210    Physical Exam   Constitutional: She appears well-developed and well-nourished. No " "distress.   HENT:   Head: Normocephalic and atraumatic.   Neck: Normal range of motion.   Pulmonary/Chest: Effort normal. No respiratory distress.   Skin: Skin is dry. She is not diaphoretic.        CONSTITUTIONAL  General Appearance: unremarkable, age appropriate     MUSCULOSKELETAL  Muscle Strength and Tone: WNL  Abnormal Involuntary Movements: none noted  Gait and Station: normal gait     PSYCHIATRIC   Appearance: unremarkable, age appropriate  Grooming: fair  Arousal: alert, awake  Behavior/Cooperation: normal, cooperative, friendly  Speech: monotonous, slow rate, normal pitch, normal volume, spontaneous  Language: appropriate   Mood: "ok"  Affect: flat  Thought Process: disorganized  Thought Content: denies SI/HI/AVH  Associations: some loose associations noted  Orientation: grossly intact  Memory: Grossly intact  Fund of Knowledge: appropriate for education level  Attention Span/Concentration: poor  Cognition: grossly intact  Insight: poor  Judgment: fair        Significant Labs:   Last 24 Hours:   Recent Lab Results     None          Significant Imaging: I have reviewed all pertinent imaging results/findings within the past 24 hours.  "

## 2018-11-04 NOTE — PSYCH
Patient states she feels very happy this evening and is very excited to see her aunt and mother early next week. Patient affect bright and friendly. She continues to frequently repeat herself and struggle with recalling information in conversation. The patient denies SI at this time. MVC maintained. Walker at bedside. Will continue to monitor.

## 2018-11-04 NOTE — PSYCH
The patient appears to be asleep at this time. Respirations even and unlabored. No distress noted. Will continue to monitor.

## 2018-11-04 NOTE — ASSESSMENT & PLAN NOTE
Assessment:  Pt recently lost her roommate and gained a new one, mother reports she has had trouble sleeping.  NH and pt did not mention this information, pt is a poor historian.  Pt has a longtime outpatient psychiatrist who saw her on 10/25/18.  Pt will be monitored for aggressive behavior while inpatient.      Patient continues with thought blocking and disorganized on interview 11/4.      Plan:  Seroquel 300 mg po BID  Prozac 20 mg po daily  Depakote 2000mg qHS  Inderal 40 mg po BID  Trazodone 100 po qhs PRN    -Will offer gabapentin in the evening to help with restlessness at night   -Would consider neurology consult on Monday, will continue to monitor     Pt with previous diagnosis of Parkinson's disease, no evidence of Parkinsonian s/sx on interview/exam, no change evident after decreased dose.  Discontinued Sinemet and continuing to monitor closely.

## 2018-11-04 NOTE — PROGRESS NOTES
11/04/18 0900 11/04/18 1000 11/04/18 1130   [facility] Therapy   Group Name Community Reintegration Mental Awareness Community Reintegration   Specific Interventions Current Events Skilled Activity Self-Expression  (emotion exercise) Social Skills Training   Participation Level None Active;Appropriate Active;Supportive   Participation Quality Refused Cooperative Cooperative   Insight/Motivation --  Good Improved   Affect/Mood Display --  Appropriate Appropriate   Cognition --  Alert;Oriented Alert;Oriented       11/04/18 1330   [facility] Therapy   Group Name Therapeutic Recreation   Specific Interventions Skilled Activity Crafts   Participation Level None   Participation Quality Refused   Insight/Motivation --    Affect/Mood Display --    Cognition --

## 2018-11-04 NOTE — PLAN OF CARE
11/04/18 1130   [facility] Therapy   Group Name Community Reintegration   Specific Interventions Social Skills Training   Participation Level Active;Supportive   Participation Quality Cooperative   Insight/Motivation Improved   Affect/Mood Display Appropriate   Cognition Alert;Oriented
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
## 2018-11-05 VITALS
RESPIRATION RATE: 17 BRPM | OXYGEN SATURATION: 95 % | HEIGHT: 65 IN | HEART RATE: 81 BPM | BODY MASS INDEX: 34.6 KG/M2 | DIASTOLIC BLOOD PRESSURE: 76 MMHG | TEMPERATURE: 98 F | SYSTOLIC BLOOD PRESSURE: 149 MMHG | WEIGHT: 207.69 LBS

## 2018-11-05 PROCEDURE — 90853 GROUP PSYCHOTHERAPY: CPT | Mod: ,,, | Performed by: PSYCHOLOGIST

## 2018-11-05 PROCEDURE — 25000242 PHARM REV CODE 250 ALT 637 W/ HCPCS: Performed by: STUDENT IN AN ORGANIZED HEALTH CARE EDUCATION/TRAINING PROGRAM

## 2018-11-05 PROCEDURE — 25000003 PHARM REV CODE 250: Performed by: PSYCHIATRY & NEUROLOGY

## 2018-11-05 PROCEDURE — 25000003 PHARM REV CODE 250: Performed by: STUDENT IN AN ORGANIZED HEALTH CARE EDUCATION/TRAINING PROGRAM

## 2018-11-05 PROCEDURE — 99239 HOSP IP/OBS DSCHRG MGMT >30: CPT | Mod: GC,,, | Performed by: PSYCHIATRY & NEUROLOGY

## 2018-11-05 RX ORDER — QUETIAPINE FUMARATE 300 MG/1
300 TABLET, FILM COATED ORAL 2 TIMES DAILY
Qty: 60 TABLET | Refills: 0 | Status: SHIPPED | OUTPATIENT
Start: 2018-11-05 | End: 2019-01-04 | Stop reason: SDUPTHER

## 2018-11-05 RX ORDER — LIOTHYRONINE SODIUM 5 UG/1
15 TABLET ORAL DAILY
Qty: 90 TABLET | Refills: 0 | Status: SHIPPED | OUTPATIENT
Start: 2018-11-06 | End: 2019-11-06

## 2018-11-05 RX ORDER — DIVALPROEX SODIUM 500 MG/1
2000 TABLET, FILM COATED, EXTENDED RELEASE ORAL NIGHTLY
Qty: 120 TABLET | Refills: 0 | Status: SHIPPED | OUTPATIENT
Start: 2018-11-05 | End: 2019-01-04 | Stop reason: SDUPTHER

## 2018-11-05 RX ORDER — FLUOXETINE HYDROCHLORIDE 20 MG/1
20 CAPSULE ORAL DAILY
Qty: 30 CAPSULE | Refills: 0 | Status: SHIPPED | OUTPATIENT
Start: 2018-11-06 | End: 2019-01-04 | Stop reason: DRUGHIGH

## 2018-11-05 RX ORDER — TRAZODONE HYDROCHLORIDE 100 MG/1
100 TABLET ORAL NIGHTLY PRN
Qty: 30 TABLET | Refills: 0 | Status: SHIPPED | OUTPATIENT
Start: 2018-11-05 | End: 2019-01-04

## 2018-11-05 RX ADMIN — PROPRANOLOL HYDROCHLORIDE 40 MG: 10 TABLET ORAL at 08:11

## 2018-11-05 RX ADMIN — TIOTROPIUM BROMIDE 18 MCG: 18 CAPSULE ORAL; RESPIRATORY (INHALATION) at 08:11

## 2018-11-05 RX ADMIN — THERA TABS 1 TABLET: TAB at 08:11

## 2018-11-05 RX ADMIN — PANTOPRAZOLE SODIUM 40 MG: 40 TABLET, DELAYED RELEASE ORAL at 08:11

## 2018-11-05 RX ADMIN — POTASSIUM CHLORIDE 20 MEQ: 20 TABLET, EXTENDED RELEASE ORAL at 08:11

## 2018-11-05 RX ADMIN — LIOTHYRONINE SODIUM 15 MCG: 5 TABLET ORAL at 08:11

## 2018-11-05 RX ADMIN — QUETIAPINE FUMARATE 300 MG: 300 TABLET ORAL at 08:11

## 2018-11-05 RX ADMIN — FUROSEMIDE 20 MG: 20 TABLET ORAL at 08:11

## 2018-11-05 RX ADMIN — FLUOXETINE 20 MG: 20 CAPSULE ORAL at 08:11

## 2018-11-05 RX ADMIN — ATORVASTATIN CALCIUM 10 MG: 10 TABLET, FILM COATED ORAL at 08:11

## 2018-11-05 RX ADMIN — FLUTICASONE FUROATE AND VILANTEROL TRIFENATATE 1 PUFF: 100; 25 POWDER RESPIRATORY (INHALATION) at 08:11

## 2018-11-05 RX ADMIN — DOCUSATE SODIUM 100 MG: 100 CAPSULE, LIQUID FILLED ORAL at 08:11

## 2018-11-05 RX ADMIN — CYANOCOBALAMIN TAB 250 MCG 1000 MCG: 250 TAB at 08:11

## 2018-11-05 RX ADMIN — LEVOTHYROXINE SODIUM 100 MCG: 50 TABLET ORAL at 05:11

## 2018-11-05 RX ADMIN — FOLIC ACID 1 MG: 1 TABLET ORAL at 08:11

## 2018-11-05 RX ADMIN — TOPIRAMATE 100 MG: 25 TABLET, FILM COATED ORAL at 08:11
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
## 2018-11-05 NOTE — NURSING
[facility] ([phone])will pick patient up from [facility] and transport patient back to [place].     Per Marielle at [facility], they will be here within 45-1 hour. There was no confirmation number.     will call Mani/RN station upon arrival to hospital/[facility].
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
## 2018-11-05 NOTE — NURSING
Spoke with Yenni in admission. Yenni stated that she need discharge order, H+P, progress notes, current medication list and d/c summary to review before they could tell me who to call report to.    She stated that they used [employer] or [employer] transportation company.
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
## 2018-11-05 NOTE — NURSING
"Faxed information requested. Spoke with Madi in  who told me nurses could call report to [facility]. Was transferred to admission who are "still reviewing discharge". Yenni took my number and stated she would call me back.  "

## 2018-11-05 NOTE — NURSING
Called Miryam Witt at [facility] to let her know that patient was being discharged today and to schedule transport. She transferred me to Brittnee in admission. Brittnee was with a pt's family member, so left message with Ashutosh at [facility] for Brittnee to call back about the readmission. Awaiting call back.

## 2018-11-05 NOTE — PROGRESS NOTES
"Group Psychotherapy (PhD/LCSW)    Site: [facility]    Clinical status of patient: Inpatient    Date: 11/5/2018    Group Focus: Life Skills    Length of service: 29504 - 35-40 minutes    Number of patients in attendance: 10    Referred by: Acute Psychiatry Unit Treatment Team    Target symptoms: Depression, Mood Disorder and Psychosis    Patient's response to treatment: Active Listening     Progress toward goals: Progressing slowly    Interval History Pt appeared alert and attentive in group. Pt responded briefly, appropriately when prompted in a discussion of the danger of negative "self-fulfilling prophecies" and how to cope with them.  .   Diagnosis: Schizoaffective d/o, depressive type    Plan: Continue treatment on [facility]        "

## 2018-11-05 NOTE — PLAN OF CARE
11/05/18 1500   [facility] Group Therapy   Group Name Education   Specific Interventions Coping Skills Training   Participation Level None   Participation Quality Refused

## 2018-11-05 NOTE — NURSING
Spoke with Brittnee in admissions. She stated that nurse should call report and pt would be going to [id number].
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
## 2018-11-05 NOTE — PLAN OF CARE
Problem: Patient Care Overview (Adult)  Goal: Plan of Care Review  Outcome: Ongoing (interventions implemented as appropriate)  Patient ambulating in hallway with walker. Patient alert and oriented. Patient calm and cooperative. Patient denies any complaints of pain or discomfort at this time. Safety maintained. Will continue to monitor.

## 2018-11-08 ENCOUNTER — CLINICAL SUPPORT (OUTPATIENT)
Dept: SMOKING CESSATION | Facility: CLINIC | Age: 63
End: 2018-11-08
Payer: COMMERCIAL

## 2018-11-08 DIAGNOSIS — F17.200 NICOTINE DEPENDENCE: Primary | ICD-10-CM

## 2018-11-08 PROCEDURE — 99406 BEHAV CHNG SMOKING 3-10 MIN: CPT | Mod: S$GLB,,, | Performed by: INTERNAL MEDICINE

## 2018-11-08 NOTE — PROGRESS NOTES
Spoke with patient today in regard to smoking cessation progress for 3/6 month follow up, she states not tobacco free. Patient states not interested at this time. Informed patient of benefit period and contact information if any further help or support is needed. Will complete smart form for 3 and 6 month follow up on Quit attempt #2.

## 2018-11-18 ENCOUNTER — HOSPITAL ENCOUNTER (EMERGENCY)
Facility: HOSPITAL | Age: 63
Discharge: HOME | End: 2018-11-18
Attending: EMERGENCY MEDICINE
Payer: MEDICARE

## 2018-11-18 VITALS
SYSTOLIC BLOOD PRESSURE: 138 MMHG | DIASTOLIC BLOOD PRESSURE: 72 MMHG | OXYGEN SATURATION: 96 % | RESPIRATION RATE: 18 BRPM | WEIGHT: 207 LBS | TEMPERATURE: 99 F | HEART RATE: 53 BPM | BODY MASS INDEX: 34.45 KG/M2

## 2018-11-18 DIAGNOSIS — R07.9 CHEST PAIN: ICD-10-CM
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
## 2018-11-18 LAB
ALBUMIN SERPL BCP-MCNC: 3.3 G/DL
ALP SERPL-CCNC: 73 U/L
ALT SERPL W/O P-5'-P-CCNC: 25 U/L
AMPHET+METHAMPHET UR QL: NEGATIVE
ANION GAP SERPL CALC-SCNC: 10 MMOL/L
AST SERPL-CCNC: 33 U/L
BARBITURATES UR QL SCN>200 NG/ML: NEGATIVE
BASOPHILS # BLD AUTO: 0.02 K/UL
BASOPHILS NFR BLD: 0.4 %
BENZODIAZ UR QL SCN>200 NG/ML: NEGATIVE
BILIRUB SERPL-MCNC: 0.2 MG/DL
BILIRUB UR QL STRIP: NEGATIVE
BUN SERPL-MCNC: 22 MG/DL
BZE UR QL SCN: NEGATIVE
CALCIUM SERPL-MCNC: 9.5 MG/DL
CANNABINOIDS UR QL SCN: NEGATIVE
CHLORIDE SERPL-SCNC: 106 MMOL/L
CLARITY UR REFRACT.AUTO: CLEAR
CO2 SERPL-SCNC: 25 MMOL/L
COLOR UR AUTO: NORMAL
CREAT SERPL-MCNC: 1 MG/DL
CREAT UR-MCNC: 24 MG/DL
DIFFERENTIAL METHOD: ABNORMAL
EOSINOPHIL # BLD AUTO: 0.1 K/UL
EOSINOPHIL NFR BLD: 2 %
ERYTHROCYTE [DISTWIDTH] IN BLOOD BY AUTOMATED COUNT: 13.5 %
EST. GFR  (AFRICAN AMERICAN): >60 ML/MIN/1.73 M^2
EST. GFR  (NON AFRICAN AMERICAN): >60 ML/MIN/1.73 M^2
ETHANOL UR-MCNC: <10 MG/DL
GLUCOSE SERPL-MCNC: 99 MG/DL
GLUCOSE UR QL STRIP: NEGATIVE
HCT VFR BLD AUTO: 42.6 %
HGB BLD-MCNC: 13.7 G/DL
HGB UR QL STRIP: NEGATIVE
IMM GRANULOCYTES # BLD AUTO: 0.01 K/UL
IMM GRANULOCYTES NFR BLD AUTO: 0.2 %
KETONES UR QL STRIP: NEGATIVE
LEUKOCYTE ESTERASE UR QL STRIP: NEGATIVE
LYMPHOCYTES # BLD AUTO: 2.4 K/UL
LYMPHOCYTES NFR BLD: 49.4 %
MAGNESIUM SERPL-MCNC: 3.1 MG/DL
MCH RBC QN AUTO: 30.5 PG
MCHC RBC AUTO-ENTMCNC: 32.2 G/DL
MCV RBC AUTO: 95 FL
METHADONE UR QL SCN>300 NG/ML: NEGATIVE
MONOCYTES # BLD AUTO: 0.8 K/UL
MONOCYTES NFR BLD: 16.8 %
NEUTROPHILS # BLD AUTO: 1.5 K/UL
NEUTROPHILS NFR BLD: 31.2 %
NITRITE UR QL STRIP: NEGATIVE
NRBC BLD-RTO: 0 /100 WBC
OPIATES UR QL SCN: NEGATIVE
PCP UR QL SCN>25 NG/ML: NEGATIVE
PH UR STRIP: 8 [PH] (ref 5–8)
PLATELET # BLD AUTO: 153 K/UL
PMV BLD AUTO: 10.7 FL
POTASSIUM SERPL-SCNC: 4.3 MMOL/L
PROT SERPL-MCNC: 6.5 G/DL
PROT UR QL STRIP: NEGATIVE
RBC # BLD AUTO: 4.49 M/UL
SODIUM SERPL-SCNC: 141 MMOL/L
SP GR UR STRIP: 1 (ref 1–1.03)
TOXICOLOGY INFORMATION: NORMAL
TSH SERPL DL<=0.005 MIU/L-ACNC: 0.8 UIU/ML
URN SPEC COLLECT METH UR: NORMAL
VALPROATE SERPL-MCNC: 106.3 UG/ML
WBC # BLD AUTO: 4.94 K/UL

## 2018-11-18 PROCEDURE — 93010 ELECTROCARDIOGRAM REPORT: CPT | Mod: ,,, | Performed by: INTERNAL MEDICINE

## 2018-11-18 PROCEDURE — 99284 EMERGENCY DEPT VISIT MOD MDM: CPT | Mod: ,,, | Performed by: EMERGENCY MEDICINE

## 2018-11-18 PROCEDURE — 80164 ASSAY DIPROPYLACETIC ACD TOT: CPT

## 2018-11-18 PROCEDURE — 93010 ELECTROCARDIOGRAM REPORT: CPT | Mod: 76,,, | Performed by: INTERNAL MEDICINE

## 2018-11-18 PROCEDURE — 93005 ELECTROCARDIOGRAM TRACING: CPT

## 2018-11-18 PROCEDURE — 80053 COMPREHEN METABOLIC PANEL: CPT

## 2018-11-18 PROCEDURE — 85025 COMPLETE CBC W/AUTO DIFF WBC: CPT

## 2018-11-18 PROCEDURE — 81003 URINALYSIS AUTO W/O SCOPE: CPT

## 2018-11-18 PROCEDURE — 84443 ASSAY THYROID STIM HORMONE: CPT

## 2018-11-18 PROCEDURE — 80307 DRUG TEST PRSMV CHEM ANLYZR: CPT

## 2018-11-18 PROCEDURE — 83735 ASSAY OF MAGNESIUM: CPT

## 2018-11-18 PROCEDURE — 99284 EMERGENCY DEPT VISIT MOD MDM: CPT | Mod: 25

## 2018-11-18 RX ORDER — NAPROXEN 500 MG/1
500 TABLET ORAL 3 TIMES DAILY PRN
Status: ON HOLD | COMMUNITY
End: 2019-02-06 | Stop reason: HOSPADM

## 2018-11-18 RX ORDER — CHOLECALCIFEROL (VITAMIN D3) 25 MCG
1000 TABLET ORAL DAILY
Status: ON HOLD | COMMUNITY
End: 2019-02-06 | Stop reason: HOSPADM

## 2018-11-18 RX ORDER — CLONAZEPAM 0.5 MG/1
0.5 TABLET ORAL 2 TIMES DAILY PRN
COMMUNITY
End: 2019-01-04 | Stop reason: SDUPTHER

## 2018-11-18 NOTE — ED TRIAGE NOTES
Arrives per MED from [facility] after unwitnessed fall this am. Pt c/o neck pain and c/o of left sided  abdomen pain. Staff unsure of LOC. Pt was outside facility smoking  Per report of other patients and pt fell. Arrives in hard cx collar. Moving all extremities. Awake and alert. Staff states pt has been known to fabricate events .  Unsure if pt fell during the night. Pt has not received any meds or eaten today. Pt uses a walker

## 2018-11-18 NOTE — ED NOTES
LOC: The patient is awake and alert; oriented x 3 and speaking appropriately.  APPEARANCE: Patient resting comfortably, patient is clean and well groomed. Wearing a hard cx collar  SKIN: warm and dry, normal skin turgor & moist mucus membranes, skin intact, no breakdown noted.  MUSCULOSKELETAL: Patient moving all extremities well, no obvious swelling or deformities noted, c/o pain in neck and head.  RESPIRATORY: Airway is open and patent,  respirations are spontaneous, normal effort and rate  CARDIAC: Patient has a normal rate,  peripheral edema noted, capillary refill < 3 seconds; No complaints of chest pain   ABDOMEN: Soft and non tender to palpation, no distention noted. Bowel sounds present x 4. C/o lefts soham abd pain w/ bm

## 2018-11-18 NOTE — ED NOTES
Pt expels urine and watery beige stool in bed pan. States she took multiple laxatives last night for constipation. Pt cleansed and fresh underpad applied.
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
## 2018-11-19 NOTE — PROGRESS NOTES
"Name: Joseluis Triplett  MRN: [id number]   CSN: [id number]      Date: 11-20-18      Referring physician:  No referring provider defined for this encounter.    Subjective:      Chief Complaint: PD    History of Present Illness (HPI):    Joseluis Triplett is a 63 y.o.  female who presents today for a follow-up evaluation of drug-indcued parkinsonism wtih schizo affective schizophrenia and is alone. Last seen by Dr. Sanchez 7-24-17. At that time, l-dopa started at 0.5 tabs TID. She has had at least 2 inpatient psychiatric admissions since then. Her med list does not include carbidopa / levodopa. Not sure when or why this was discontinued as she has not been here since July 2017.       She says she is having bleeding in anus and vagina. She says she has notified the nurse.     [facility] asked [facility]   She is noting tremors with eating and drinking.   She says she wishes her family would buy her more soft drinks so she can drink out of can.     She has had some recent falls. She had CT head 11-18-18.   She talks about her sister's (Mray) back issues and need for surgery.   She wonders if her back pain is related to her sister's in some way. She says smoking helps relieve her back pain.   She also feels that cd/ld helped her back pain in the past.         Review of Systems   Constitutional: Positive for appetite change.   HENT: Negative for trouble swallowing.    Gastrointestinal: Positive for constipation.   Musculoskeletal: Positive for back pain and gait problem.   Neurological: Positive for tremors.   Psychiatric/Behavioral: Positive for hallucinations ("few" voices and frog in doorway). Negative for sleep disturbance.        "happy, so happy"       Past Medical History: The patient  has a past medical history of Anxiety, Asthma, Bipolar disorder, Chronic constipation, Chronic kidney disease, COPD (chronic obstructive pulmonary disease), Depression, DVT (deep venous thrombosis), Dysphagia, GERD " (gastroesophageal reflux disease), GERD (gastroesophageal reflux disease), Hard of hearing, Hearing aid worn, Hiatal hernia, History of psychiatric hospitalization, psychiatric care, Hyperlipidemia, Katty, Migraine headache (8/26/2014), Neuropathy (8/26/2014), CLARI (obstructive sleep apnea) (11/11/2013), CLARI (obstructive sleep apnea), Parkinson disease, Perforated duodenal ulcer (5/28/2015), Psychiatric problem, Recurrent upper respiratory infection (URI), Schizo affective schizophrenia, Seizures (2018), Therapy, Thyroid disease, Traumatic injury, and Use of cane as ambulatory aid.    Social History: The patient  reports that she quit smoking about 4 months ago. Her smoking use included cigars. She has a 60.00 pack-year smoking history. She quit smokeless tobacco use about 5 years ago. She reports that she does not drink alcohol or use drugs.    Family History: Their family history includes Alcohol abuse in her mother; Bipolar disorder in her mother; Cancer (age of onset: 60) in her maternal grandmother; Dementia in her mother.    Allergies: Nsaids (non-steroidal anti-inflammatory drug) and Penicillins     Meds:   Current Outpatient Medications on File Prior to Visit   Medication Sig Dispense Refill    albuterol 90 mcg/actuation inhaler Inhale 2 puffs into the lungs every 6 (six) hours as needed for Wheezing. Rescue 18 g 0    aspirin (ECOTRIN) 81 MG EC tablet Take 81 mg by mouth once daily.      atorvastatin (LIPITOR) 10 MG tablet Take 1 tablet (10 mg total) by mouth once daily. 90 tablet 3    clonazePAM (KLONOPIN) 0.5 MG tablet Take 0.5 mg by mouth 2 (two) times daily as needed for Anxiety.      albuterol (ACCUNEB) 1.25 mg/3 mL Nebu Take 3 mLs (1.25 mg total) by nebulization every 6 (six) hours as needed. Rescue 1 Box 0    benzonatate (TESSALON) 100 MG capsule Take 100 mg by mouth 3 (three) times daily as needed for Cough.      BREO ELLIPTA 100-25 mcg/dose diskus inhaler       divalproex ER (DEPAKOTE) 500 MG  Tb24 Take 4 tablets (2,000 mg total) by mouth every evening. 120 tablet 0    docusate sodium (COLACE) 100 MG capsule Take 1 capsule (100 mg total) by mouth 2 (two) times daily.  0    FLUoxetine (PROZAC) 20 MG capsule Take 1 capsule (20 mg total) by mouth once daily. 30 capsule 5    FLUoxetine (PROZAC) 20 MG capsule Take 1 capsule (20 mg total) by mouth once daily. 30 capsule 0    fluticasone (FLONASE) 50 mcg/actuation nasal spray 1 spray by Each Nare route once daily.      fluticasone-vilanterol (BREO ELLIPTA) 200-25 mcg/dose DsDv diskus inhaler Inhale 1 puff into the lungs once daily. Controller 1 each 5    furosemide (LASIX) 20 MG tablet TAKE ONE TABLET BY MOUTH TWICE DAILY 60 tablet 11    gabapentin (NEURONTIN) 600 MG tablet Take 1 tablet (600 mg total) by mouth 3 (three) times daily. 90 tablet 4    guaiFENesin (MUCINEX) 600 mg 12 hr tablet Take 1,200 mg by mouth 2 (two) times daily.      levothyroxine (SYNTHROID) 150 MCG tablet Take 1 tablet (150 mcg total) by mouth once daily. (Patient taking differently: Take 175 mcg by mouth once daily. ) 90 tablet 3    liothyronine (CYTOMEL) 5 MCG Tab Take 3 tablets (15 mcg total) by mouth once daily. 90 tablet 0    loratadine (CLARITIN) 10 mg tablet Take 10 mg by mouth once daily.      naproxen (NAPROSYN) 500 MG tablet Take 500 mg by mouth 3 (three) times daily as needed.      omeprazole (PRILOSEC) 40 MG capsule Take 1 capsule (40 mg total) by mouth once daily. 30 capsule 2    polyethylene glycol (GLYCOLAX) 17 gram/dose powder Take 17 g by mouth 4 (four) times daily. 1-4 times daily as needed for constipation 1700 g 11    potassium chloride SA (K-DUR,KLOR-CON) 20 MEQ tablet TAKE ONE TABLET BY MOUTH EVERY DAY 30 tablet 9    propranolol (INDERAL) 40 MG tablet Take 1 tablet (40 mg total) by mouth 2 (two) times daily. 60 tablet 5    QUEtiapine (SEROQUEL) 300 MG Tab Take 1 tablet (300 mg total) by mouth 2 (two) times daily. 60 tablet 5    QUEtiapine (SEROQUEL)  "300 MG Tab Take 1 tablet (300 mg total) by mouth 2 (two) times daily. 60 tablet 0    tiotropium (SPIRIVA) 18 mcg inhalation capsule Inhale 1 capsule (18 mcg total) into the lungs once daily. Controller 30 capsule 6    topiramate (TOPAMAX) 100 MG tablet Take 1 tablet (100 mg total) by mouth 2 (two) times daily. 60 tablet 11    traZODone (DESYREL) 100 MG tablet Take 1 tablet (100 mg total) by mouth nightly as needed for Insomnia. 30 tablet 5    traZODone (DESYREL) 100 MG tablet Take 1 tablet (100 mg total) by mouth nightly as needed for Insomnia. 30 tablet 0    vitamin D (VITAMIN D3) 1000 units Tab Take 1,000 Units by mouth once daily.      [DISCONTINUED] fluphenazine (PROLIXIN) 5 MG tablet 5 mg every evening.        No current facility-administered medications on file prior to visit.        Objective:     Physical Exam:    Vitals:    11/20/18 1034   BP: 115/72   Pulse: 65   Weight: 94 kg (207 lb 3.7 oz)   Height: 5' 5" (1.651 m)     Body mass index is 34.49 kg/m².    Constitutional  Well-developed, well-nourished, appears stated age   Cardiovascular  Radial pulses 2+ and symmetric, no LE edema bilaterally     ..III.  MOTOR EXAMINATION        Speech  1 - Slight loss of expression, dictation, and/or volumn.   Facial Expression  1 - Minimal Hypomimia, could be normal "Poker Face" but does have times with parted lips, more on concentration   Tremor at Rest:      Face, lips, chin 0 - Absent.    Hands:      right 1 - Slight and infrequently present.    left 1 - Slight and infrequently present.    Feet:      right 0 - Absent.    left 0 - Absent.    Action or Postural Tremor of Hands      right 1 - Slight; present with action.   left 2 - Moderate in amplitude, present with action.   Rigidity      Neck 0 - Absent.   Upper Extremity: Right 0 - Absent.   Upper Extremity: Left 0 - Absent.   Lower Extremity: Right 0 - Absent.   Lower Extremity: Left 0 - Absent.   Finger Taps      right 1 - Mild slowing and/or reduction in " amplitude.   left 2 - Moderately impaired. Definite and early fatiguing. May have occasional arrests in movement.   Hand Movements      right Mild lupis but no hypokinesia   left Mild lupis but no hypokinesia   Rapid Alternating Movements of Hands      right 1 - Mild slowing and/or reduction in amplitude.   left 2 - Moderately impaired. Definite and early fatiguing. May have occasional arrests in movement.   Leg Agility      right 2 - Moderately impaired. Definite and early fatiguing. May have occasional arrests in movement.   left 2+- Moderately impaired. Definite and early fatiguing. May have occasional arrests in movement.   Arising from Chair  WC today   Body Bradykinesia and Hypokinesia (Combining slowness, hesitancy, decreased armswing, small amplitude, and poverty of movement in general)  3 - Moderate slowness, poverty or small amplitude of movement.         Laboratory Results:  Admission on 11/18/2018, Discharged on 11/18/2018   Component Date Value Ref Range Status    WBC 11/18/2018 4.94  3.90 - 12.70 K/uL Final    RBC 11/18/2018 4.49  4.00 - 5.40 M/uL Final    Hemoglobin 11/18/2018 13.7  12.0 - 16.0 g/dL Final    Hematocrit 11/18/2018 42.6  37.0 - 48.5 % Final    MCV 11/18/2018 95  82 - 98 fL Final    MCH 11/18/2018 30.5  27.0 - 31.0 pg Final    MCHC 11/18/2018 32.2  32.0 - 36.0 g/dL Final    RDW 11/18/2018 13.5  11.5 - 14.5 % Final    Platelets 11/18/2018 153  150 - 350 K/uL Final    MPV 11/18/2018 10.7  9.2 - 12.9 fL Final    Immature Granulocytes 11/18/2018 0.2  0.0 - 0.5 % Final    Gran # (ANC) 11/18/2018 1.5* 1.8 - 7.7 K/uL Final    Immature Grans (Abs) 11/18/2018 0.01  0.00 - 0.04 K/uL Final    Lymph # 11/18/2018 2.4  1.0 - 4.8 K/uL Final    Mono # 11/18/2018 0.8  0.3 - 1.0 K/uL Final    Eos # 11/18/2018 0.1  0.0 - 0.5 K/uL Final    Baso # 11/18/2018 0.02  0.00 - 0.20 K/uL Final    nRBC 11/18/2018 0  0 /100 WBC Final    Gran% 11/18/2018 31.2* 38.0 - 73.0 % Final    Lymph%  11/18/2018 49.4* 18.0 - 48.0 % Final    Mono% 11/18/2018 16.8* 4.0 - 15.0 % Final    Eosinophil% 11/18/2018 2.0  0.0 - 8.0 % Final    Basophil% 11/18/2018 0.4  0.0 - 1.9 % Final    Differential Method 11/18/2018 Automated   Final    Sodium 11/18/2018 141  136 - 145 mmol/L Final    Potassium 11/18/2018 4.3  3.5 - 5.1 mmol/L Final    Chloride 11/18/2018 106  95 - 110 mmol/L Final    CO2 11/18/2018 25  23 - 29 mmol/L Final    Glucose 11/18/2018 99  70 - 110 mg/dL Final    BUN, Bld 11/18/2018 22  8 - 23 mg/dL Final    Creatinine 11/18/2018 1.0  0.5 - 1.4 mg/dL Final    Calcium 11/18/2018 9.5  8.7 - 10.5 mg/dL Final    Total Protein 11/18/2018 6.5  6.0 - 8.4 g/dL Final    Albumin 11/18/2018 3.3* 3.5 - 5.2 g/dL Final    Total Bilirubin 11/18/2018 0.2  0.1 - 1.0 mg/dL Final    Alkaline Phosphatase 11/18/2018 73  55 - 135 U/L Final    AST 11/18/2018 33  10 - 40 U/L Final    ALT 11/18/2018 25  10 - 44 U/L Final    Anion Gap 11/18/2018 10  8 - 16 mmol/L Final    eGFR if African American 11/18/2018 >60.0  >60 mL/min/1.73 m^2 Final    eGFR if non African American 11/18/2018 >60.0  >60 mL/min/1.73 m^2 Final    Magnesium 11/18/2018 3.1* 1.6 - 2.6 mg/dL Final    TSH 11/18/2018 0.802  0.400 - 4.000 uIU/mL Final    Alcohol, Urine 11/18/2018 <10  <10 mg/dL Final    Benzodiazepines 11/18/2018 Negative   Final    Methadone metabolites 11/18/2018 Negative   Final    Cocaine (Metab.) 11/18/2018 Negative   Final    Opiate Scrn, Ur 11/18/2018 Negative   Final    Barbiturate Screen, Ur 11/18/2018 Negative   Final    Amphetamine Screen, Ur 11/18/2018 Negative   Final    THC 11/18/2018 Negative   Final    Phencyclidine 11/18/2018 Negative   Final    Creatinine, Random Ur 11/18/2018 24.0  15.0 - 325.0 mg/dL Final    Toxicology Information 11/18/2018 SEE COMMENT   Final    Specimen UA 11/18/2018 Urine, Clean Catch   Final    Color, UA 11/18/2018 Straw  Yellow, Straw, Jaimee Final    Appearance, UA 11/18/2018  Clear  Clear Final    pH, UA 11/18/2018 8.0  5.0 - 8.0 Final    Specific Gravity, UA 11/18/2018 1.005  1.005 - 1.030 Final    Protein, UA 11/18/2018 Negative  Negative Final    Glucose, UA 11/18/2018 Negative  Negative Final    Ketones, UA 11/18/2018 Negative  Negative Final    Bilirubin (UA) 11/18/2018 Negative  Negative Final    Occult Blood UA 11/18/2018 Negative  Negative Final    Nitrite, UA 11/18/2018 Negative  Negative Final    Leukocytes, UA 11/18/2018 Negative  Negative Final    Valproic Acid Lvl 11/18/2018 106.3* 50.0 - 100.0 ug/mL Final   Admission on 10/29/2018, Discharged on 10/29/2018   Component Date Value Ref Range Status    WBC 10/29/2018 6.01  3.90 - 12.70 K/uL Final    RBC 10/29/2018 4.74  4.00 - 5.40 M/uL Final    Hemoglobin 10/29/2018 14.6  12.0 - 16.0 g/dL Final    Hematocrit 10/29/2018 45.5  37.0 - 48.5 % Final    MCV 10/29/2018 96  82 - 98 fL Final    MCH 10/29/2018 30.8  27.0 - 31.0 pg Final    MCHC 10/29/2018 32.1  32.0 - 36.0 g/dL Final    RDW 10/29/2018 13.6  11.5 - 14.5 % Final    Platelets 10/29/2018 211  150 - 350 K/uL Final    MPV 10/29/2018 9.9  9.2 - 12.9 fL Final    Immature Granulocytes 10/29/2018 0.3  0.0 - 0.5 % Final    Gran # (ANC) 10/29/2018 1.8  1.8 - 7.7 K/uL Final    Immature Grans (Abs) 10/29/2018 0.02  0.00 - 0.04 K/uL Final    Lymph # 10/29/2018 3.4  1.0 - 4.8 K/uL Final    Mono # 10/29/2018 0.6  0.3 - 1.0 K/uL Final    Eos # 10/29/2018 0.1  0.0 - 0.5 K/uL Final    Baso # 10/29/2018 0.03  0.00 - 0.20 K/uL Final    nRBC 10/29/2018 0  0 /100 WBC Final    Gran% 10/29/2018 29.6* 38.0 - 73.0 % Final    Lymph% 10/29/2018 57.1* 18.0 - 48.0 % Final    Mono% 10/29/2018 10.3  4.0 - 15.0 % Final    Eosinophil% 10/29/2018 2.2  0.0 - 8.0 % Final    Basophil% 10/29/2018 0.5  0.0 - 1.9 % Final    Differential Method 10/29/2018 Automated   Final    Sodium 10/29/2018 141  136 - 145 mmol/L Final    Potassium 10/29/2018 4.5  3.5 - 5.1 mmol/L Final     Chloride 10/29/2018 108  95 - 110 mmol/L Final    CO2 10/29/2018 26  23 - 29 mmol/L Final    Glucose 10/29/2018 99  70 - 110 mg/dL Final    BUN, Bld 10/29/2018 21  8 - 23 mg/dL Final    Creatinine 10/29/2018 1.2  0.5 - 1.4 mg/dL Final    Calcium 10/29/2018 9.9  8.7 - 10.5 mg/dL Final    Total Protein 10/29/2018 6.8  6.0 - 8.4 g/dL Final    Albumin 10/29/2018 3.5  3.5 - 5.2 g/dL Final    Total Bilirubin 10/29/2018 0.2  0.1 - 1.0 mg/dL Final    Alkaline Phosphatase 10/29/2018 85  55 - 135 U/L Final    AST 10/29/2018 21  10 - 40 U/L Final    ALT 10/29/2018 <5* 10 - 44 U/L Final    Anion Gap 10/29/2018 7* 8 - 16 mmol/L Final    eGFR if  10/29/2018 56.0* >60 mL/min/1.73 m^2 Final    eGFR if non African American 10/29/2018 48.6* >60 mL/min/1.73 m^2 Final    Lactate (Lactic Acid) 10/29/2018 1.2  0.5 - 2.2 mmol/L Final    Magnesium 10/29/2018 2.4  1.6 - 2.6 mg/dL Final    TSH 10/29/2018 1.402  0.400 - 4.000 uIU/mL Final    Specimen UA 10/29/2018 Urine, Clean Catch   Final    Color, UA 10/29/2018 Colorless* Yellow, Straw, Jaimee Final    Appearance, UA 10/29/2018 Clear  Clear Final    pH, UA 10/29/2018 7.0  5.0 - 8.0 Final    Specific Gravity, UA 10/29/2018 1.005  1.005 - 1.030 Final    Protein, UA 10/29/2018 Negative  Negative Final    Glucose, UA 10/29/2018 Negative  Negative Final    Ketones, UA 10/29/2018 Negative  Negative Final    Bilirubin (UA) 10/29/2018 Negative  Negative Final    Occult Blood UA 10/29/2018 Negative  Negative Final    Nitrite, UA 10/29/2018 Negative  Negative Final    Leukocytes, UA 10/29/2018 Negative  Negative Final    Valproic Acid Lvl 10/29/2018 78.5  50.0 - 100.0 ug/mL Final   Admission on 09/06/2018, Discharged on 09/14/2018   Component Date Value Ref Range Status    Hemoglobin A1C 09/08/2018 5.7* 4.0 - 5.6 % Final    Estimated Avg Glucose 09/08/2018 117  68 - 131 mg/dL Final    Valproic Acid Lvl 09/13/2018 46.9* 50.0 - 100.0 ug/mL Final        Imaging:   Independent review of images:             Impression       Motion limited exam.  No evidence of acute traumatic injury identified.      Electronically signed by: Lashae Lawson MD  Date: 11/18/2018       Assessment and Plan     Drug-induced Parkinsonism    Schizoaffective disorder, depressive type    Back pain, unspecified back location, unspecified back pain laterality, unspecified chronicity    Bleeding  Comments:  reports anus and vagina        Medical Decision Making:          Follow up 6 months with Dr. Sanchez.       I spent 30 minutes face-to-face with the patient with >50% of the time spent with counseling and education regarding:  - results of data, diagnosis, and recommendations stated above  - the prognosis of PD  - risks and benefits of cd/ld  - importance of diet and exercise    Mary Alice Lynn, SHAQ, NP-C  Division of Movement and Memory Disorders  [facility]  [phone]

## 2018-11-19 NOTE — PROVIDER PROGRESS NOTES - EMERGENCY DEPT.
Encounter Date: 11/18/2018    ED Physician Progress Notes         Depakote level was 106.  Gilma Peña at nursing [place] was made aware of findings.  We advised to hold Depakote for today and follow up with Dr. Yañez in the next day or two for a repeat Depakote level. Gilma verbally understood and will call Dr. Yañez's office.

## 2018-11-20 ENCOUNTER — OFFICE VISIT (OUTPATIENT)
Dept: NEUROLOGY | Facility: CLINIC | Age: 63
End: 2018-11-20
Payer: MEDICARE

## 2018-11-20 VITALS
SYSTOLIC BLOOD PRESSURE: 115 MMHG | HEIGHT: 65 IN | WEIGHT: 207.25 LBS | HEART RATE: 65 BPM | DIASTOLIC BLOOD PRESSURE: 72 MMHG | BODY MASS INDEX: 34.53 KG/M2

## 2018-11-20 DIAGNOSIS — M54.9 BACK PAIN, UNSPECIFIED BACK LOCATION, UNSPECIFIED BACK PAIN LATERALITY, UNSPECIFIED CHRONICITY: ICD-10-CM

## 2018-11-20 DIAGNOSIS — R58 BLEEDING: ICD-10-CM

## 2018-11-20 DIAGNOSIS — F25.1 SCHIZOAFFECTIVE DISORDER, DEPRESSIVE TYPE: ICD-10-CM

## 2018-11-20 DIAGNOSIS — G21.19 DRUG-INDUCED PARKINSONISM: Primary | ICD-10-CM

## 2018-11-20 PROCEDURE — 99214 OFFICE O/P EST MOD 30 MIN: CPT | Mod: S$GLB,,, | Performed by: NURSE PRACTITIONER

## 2018-11-20 PROCEDURE — 3074F SYST BP LT 130 MM HG: CPT | Mod: CPTII,S$GLB,, | Performed by: NURSE PRACTITIONER

## 2018-11-20 PROCEDURE — 99999 PR PBB SHADOW E&M-EST. PATIENT-LVL III: CPT | Mod: PBBFAC,,, | Performed by: NURSE PRACTITIONER

## 2018-11-20 PROCEDURE — 3078F DIAST BP <80 MM HG: CPT | Mod: CPTII,S$GLB,, | Performed by: NURSE PRACTITIONER

## 2018-11-20 PROCEDURE — 3008F BODY MASS INDEX DOCD: CPT | Mod: CPTII,S$GLB,, | Performed by: NURSE PRACTITIONER

## 2018-11-26 ENCOUNTER — TELEPHONE (OUTPATIENT)
Dept: NEUROLOGY | Facility: CLINIC | Age: 63
End: 2018-11-26

## 2018-11-26 NOTE — TELEPHONE ENCOUNTER
"Patient/ psychiatry called in reference to medication. Left [phone] to call back with name of "Cheng"  "

## 2018-11-29 ENCOUNTER — OFFICE VISIT (OUTPATIENT)
Dept: PSYCHIATRY | Facility: CLINIC | Age: 63
End: 2018-11-29
Payer: MEDICARE

## 2018-11-29 DIAGNOSIS — F25.9 SCHIZOAFFECTIVE DISORDER, CHRONIC CONDITION: Primary | ICD-10-CM

## 2018-11-29 DIAGNOSIS — F20.89 OTHER SCHIZOPHRENIA: ICD-10-CM

## 2018-11-29 PROCEDURE — 90832 PSYTX W PT 30 MINUTES: CPT | Mod: S$GLB,,, | Performed by: SOCIAL WORKER

## 2018-11-29 PROCEDURE — 99999 PR PBB SHADOW E&M-EST. PATIENT-LVL I: CPT | Mod: PBBFAC,,, | Performed by: SOCIAL WORKER

## 2018-11-29 NOTE — PROGRESS NOTES
Individual Psychotherapy (PhD/LCSW)    11/29/2018    Site:  [facility]         Therapeutic Intervention: Met with patient.  Outpatient - Insight oriented psychotherapy 30 min - CPT code 52339    Chief complaint/reason for encounter: depression, mood swings, anxiety, sleep, appetite, psychosis, behavior, cognition, somatic and interpersonal     Interval history and content of current session: first time to see her in several months, doing a little better than in the past, not as depressed, and more coherent, and less psychotic however still some process noted of psychosis, and went over her family and said her mother is mean to her, and her health is not as good, she also thinks she has parkinsons and also falls a lot, still sees the PNP here, and how to cope, take care of herself and her coping, and stress skills all focused on and attempts to be in reality encouraged, and said she has been hospitalized several times since I saw her and she says she is doing better, mentally and still has medical problems, how to calm down and relax addressed, still in the same nursing home.    Treatment plan:  · Target symptoms: depression, recurrent depression, anxiety , mood swings, mood disorder, adjustment, grief  · Why chosen therapy is appropriate versus another modality: relevant to diagnosis, patient responds to this modality, evidence based practice  · Outcome monitoring methods: self-report, observation  · Therapeutic intervention type: insight oriented psychotherapy, behavior modifying psychotherapy, supportive psychotherapy    Risk parameters:  Patient reports no suicidal ideation  Patient reports no homicidal ideation  Patient reports no self-injurious behavior  Patient reports no violent behavior    Verbal deficits: None    Patient's response to intervention:  The patient's response to intervention is accepting.    Progress toward goals and other mental status changes:  The patient's progress toward goals is  limited.    Diagnosis:     ICD-10-CM ICD-9-CM   1. Schizoaffective disorder, chronic condition F25.8 295.72   2. Other schizophrenia F20.89 295.80       Plan:  individual psychotherapy    Return to clinic: 2 weeks    Length of Service (minutes): 30   Says she has a headache and is worried about her mother today.

## 2018-12-04 ENCOUNTER — TELEPHONE (OUTPATIENT)
Dept: NEUROLOGY | Facility: CLINIC | Age: 63
End: 2018-12-04

## 2018-12-04 NOTE — TELEPHONE ENCOUNTER
----- Message from Felicita Mujica sent at 12/3/2018  3:52 PM CST -----  Contact: self @ [phone]  Pt is calling to see if Mary Alice sent in her parkinson's medication.  Pt says she does not know the name of the medication.  Pls call.

## 2018-12-04 NOTE — TELEPHONE ENCOUNTER
"Patient returned call stating that her psychiatrist's name is Joseph Yañez, and requesting correspondence for "ok" to start CD/LD       "

## 2018-12-04 NOTE — TELEPHONE ENCOUNTER
Patient called and spoke to phone staff saying someone from our office was supposed to call patient back in 15 minutes, and never called her back.  This was not the case. Several attempts have been

## 2018-12-06 ENCOUNTER — OFFICE VISIT (OUTPATIENT)
Dept: PSYCHIATRY | Facility: CLINIC | Age: 63
End: 2018-12-06
Payer: MEDICARE

## 2018-12-06 DIAGNOSIS — F31.30 BIPOLAR AFFECTIVE DISORDER, CURRENT EPISODE DEPRESSED, CURRENT EPISODE SEVERITY UNSPECIFIED: Primary | ICD-10-CM

## 2018-12-06 PROCEDURE — 99999 PR PBB SHADOW E&M-EST. PATIENT-LVL I: CPT | Mod: PBBFAC,,, | Performed by: SOCIAL WORKER

## 2018-12-06 PROCEDURE — 90834 PSYTX W PT 45 MINUTES: CPT | Mod: S$GLB,,, | Performed by: SOCIAL WORKER

## 2018-12-06 NOTE — PROGRESS NOTES
Individual Psychotherapy (PhD/LCSW)    12/6/2018    Site:  [facility]         Therapeutic Intervention: Met with patient.  Outpatient - Interactive psychotherapy 45 min - CPT code 41716    Chief complaint/reason for encounter: depression, mood swings, anxiety, sleep, appetite, psychosis, behavior, cognition, somatic and interpersonal     Interval history and content of current session: discussed sleep is good, holiday issues, coping skills, mood, meds, appetite, and ways to improve being in a better mood, not doing as well as last time and has a cold, boundaries, being spiritual all addressed, and how to calm down, improve her behavior and stay in the moment all focused on. And take better care of herself addressed, some psychosis noted today, only mild.     Treatment plan:  · Target symptoms: depression, recurrent depression, anxiety , mood swings, mood disorder, psychosis, adjustment  · Why chosen therapy is appropriate versus another modality: relevant to diagnosis, patient responds to this modality, evidence based practice  · Outcome monitoring methods: self-report, observation  · Therapeutic intervention type: insight oriented psychotherapy, behavior modifying psychotherapy, supportive psychotherapy    Risk parameters:  Patient reports no suicidal ideation  Patient reports no homicidal ideation  Patient reports no self-injurious behavior  Patient reports no violent behavior    Verbal deficits: None    Patient's response to intervention:  The patient's response to intervention is accepting.    Progress toward goals and other mental status changes:  The patient's progress toward goals is limited.    Diagnosis:     ICD-10-CM ICD-9-CM   1. Bipolar affective disorder, current episode depressed, current episode severity unspecified F31.30 296.50       Plan:  individual psychotherapy and consult psychiatrist for medication evaluation    Return to clinic: 1 week    Length of Service (minutes): 45    Going off on  verbal tangents today, not focused, regressing since last time. Talking very softly.

## 2018-12-10 ENCOUNTER — NURSE TRIAGE (OUTPATIENT)
Dept: ADMINISTRATIVE | Facility: CLINIC | Age: 63
End: 2018-12-10

## 2018-12-11 NOTE — TELEPHONE ENCOUNTER
Reason for Disposition   [1] Caller requesting NON-URGENT health information AND [2] PCP's office is the best resource    Protocols used:  INFORMATION ONLY CALL-A-    Pt states the MD at [facility] is changing her psychiatric medication. Pt states MD is trying to decrease pt's trazodone and klonopin. Pt advised per protocol and pt verbalizes understanding.

## 2018-12-13 ENCOUNTER — TELEPHONE (OUTPATIENT)
Dept: NEUROLOGY | Facility: CLINIC | Age: 63
End: 2018-12-13

## 2018-12-13 NOTE — TELEPHONE ENCOUNTER
Called informed patient that we have not heard from Dr. Yañez, who we were supposed to communicate with to see if patient is able to take carbidopa-levodopa. Please advise

## 2018-12-13 NOTE — TELEPHONE ENCOUNTER
----- Message from Avinash Haley sent at 12/13/2018  3:14 PM CST -----  Needs Advice    Reason for call: Pt is asking to speak w/ the nurse to confirm Dr. Yañez confirmed she can take parkinson's medication        Communication Preference: [phone]    Additional Information:

## 2018-12-18 ENCOUNTER — TELEPHONE (OUTPATIENT)
Dept: NEUROLOGY | Facility: CLINIC | Age: 63
End: 2018-12-18

## 2018-12-18 NOTE — TELEPHONE ENCOUNTER
Called patient to inform dr called several times to nursing home in attempt to contact nurse to give orders for medication.  Patient kept going back and forth with another nurse in the room with her. Explained that provider will call when she is able, but has full day of clinic both today and tomorrow.  Patient provided fax number in case meds were to be faxed. [phone]. Please advise

## 2018-12-18 NOTE — TELEPHONE ENCOUNTER
----- Message from Avinash Haley sent at 12/18/2018 11:45 AM CST -----  Needs Advice    Reason for call: Pt wanted to let the staff know to please contact Erin at [facility], when calling to fill parkinson's medication        Communication Preference: [facility] @ [phone]     Additional Information:

## 2018-12-18 NOTE — TELEPHONE ENCOUNTER
Called stating need to speak to Erin at nursing home when calling to fill in parkinson's prescription [facility] number [phone] please advise

## 2018-12-18 NOTE — TELEPHONE ENCOUNTER
Spoke to Cheli, the nurse covering for tonight.   When I saw Ms. Triplett on 11-20-18, my order specifically said that I recommended trial of l-dopa and instructions were given. I also said that this should be cleared with psychiatry before this is started.   She will look into this. A copy of what I sent to the [facility] is included in her note from that visit.     The nursing home should be the one to get psychiatry approval. This should not be the charge of this office.

## 2018-12-18 NOTE — TELEPHONE ENCOUNTER
----- Message from Ijeoma Carter sent at 12/18/2018  4:13 PM CST -----  Needs Advice    Reason for call:asking to speak with Dr. Lynn regarding what she was told by her psychiatrist. Please call.        Communication Preference:pt @ [phone] or  [phone]    Additional Information:says she has been waiting all day for a returned call.

## 2018-12-20 ENCOUNTER — TELEPHONE (OUTPATIENT)
Dept: NEUROLOGY | Facility: CLINIC | Age: 63
End: 2018-12-20

## 2018-12-20 ENCOUNTER — TELEPHONE (OUTPATIENT)
Dept: PSYCHIATRY | Facility: CLINIC | Age: 63
End: 2018-12-20

## 2018-12-20 NOTE — TELEPHONE ENCOUNTER
Returned call to nurse at nursing home and staff message sent to neurology recommending hat we hold carbidpa/levodopa since she remains asymptomatic and has potential for adverse effects for psychosis. Medication was discontinued while she was inpatient for psychiatry.     ----- Message from Nancy Amezcua sent at 12/20/2018  8:50 AM CST -----  Contact: Self [phone]  Pt ask if you can call the Nursing Home to okay the Parkinson's Disease Medication. Speak to the Nurse at [facility] [phone].

## 2018-12-20 NOTE — TELEPHONE ENCOUNTER
Returned call to nursing home, informed nurse, Iqra, that [facility] only sees patient for Parkinsonism.  For her headaches, she will need to contact her PCP.  Patient and nurse noted during last visit to clinic that patient does fall frequently, and this was also addressed with [facility] in previous visits.  Asked that Patient be taken to ER if symptoms become unbearable.

## 2018-12-20 NOTE — TELEPHONE ENCOUNTER
----- Message from Ijeoam Carter sent at 12/20/2018  9:45 AM CST -----  Asking to have Dr. Lynn please call her nurse @ Iqra@ [phone] regarding her headaches. Please call say she has been falling hitting her head.

## 2018-12-21 ENCOUNTER — TELEPHONE (OUTPATIENT)
Dept: NEUROLOGY | Facility: CLINIC | Age: 63
End: 2018-12-21

## 2018-12-21 NOTE — TELEPHONE ENCOUNTER
----- Message from Elaine Crenshaw RN sent at 12/21/2018  9:21 AM CST -----      ----- Message -----  From: Erik Anne  Sent: 12/21/2018   9:17 AM  To: [facility] Staff    Patient requesting to someone from [facility] staff contact  Judi Rosa on the 2nd floor @ [phone] to schedule a f/u visit

## 2018-12-21 NOTE — TELEPHONE ENCOUNTER
----- Message from Avinash Haley sent at 12/21/2018  3:33 PM CST -----  Contact: pt @ [phone]   Pt said she's returning your call

## 2018-12-21 NOTE — TELEPHONE ENCOUNTER
----- Message from ELBA Chaves sent at 12/20/2018  3:16 PM CST -----  Contact: Judi pineda [facility] @ [phone]  Please call to schedule her an appointment, after the new year.    Cynthia     ----- Message -----  From: Elaine Crenshaw RN  Sent: 12/20/2018   2:53 PM  To: ELBA Chaves        ----- Message -----  From: Erik Anne  Sent: 12/20/2018   2:28 PM  To: [facility] Staff    Patient requesting [facility] staff return call to [facility] to schedule a f/u visit, pt is requesting to have shots for headaches instead of the pills, pls call

## 2018-12-21 NOTE — TELEPHONE ENCOUNTER
----- Message from Elaine Crenshaw RN sent at 12/21/2018  2:19 PM CST -----      ----- Message -----  From: Avinash Haley  Sent: 12/21/2018   2:13 PM  To: [facility] Staff    Needs Advice    Reason for call: Pt is asking the staff please call the nursing home and speak w/ Judi on the 2nd floor to schedule an appt maite Garay        Communication Preference: [phone]    Additional Information:

## 2018-12-22 NOTE — TELEPHONE ENCOUNTER
----- Message from Ijeoma Carter sent at 12/21/2018  2:49 PM CST -----  Patient Returning Call from [facility]    Who Left Message for Patient:Janelle  Communication Preference:pt@ [phone]  Additional Information:

## 2019-01-03 ENCOUNTER — OFFICE VISIT (OUTPATIENT)
Dept: PSYCHIATRY | Facility: CLINIC | Age: 64
End: 2019-01-03
Payer: MEDICARE

## 2019-01-03 DIAGNOSIS — F31.12 BIPOLAR AFFECTIVE DISORDER, CURRENTLY MANIC, MODERATE: Primary | ICD-10-CM

## 2019-01-03 PROBLEM — F31.30 BIPOLAR AFFECTIVE DISORDER, CURRENT EPISODE DEPRESSED: Status: RESOLVED | Noted: 2018-08-23 | Resolved: 2019-01-03

## 2019-01-03 PROCEDURE — 90834 PSYTX W PT 45 MINUTES: CPT | Mod: S$GLB,,, | Performed by: SOCIAL WORKER

## 2019-01-03 PROCEDURE — 99999 PR PBB SHADOW E&M-EST. PATIENT-LVL I: CPT | Mod: PBBFAC,,, | Performed by: SOCIAL WORKER

## 2019-01-03 PROCEDURE — 90834 PR PSYCHOTHERAPY W/PATIENT, 45 MIN: ICD-10-PCS | Mod: S$GLB,,, | Performed by: SOCIAL WORKER

## 2019-01-03 PROCEDURE — 99999 PR PBB SHADOW E&M-EST. PATIENT-LVL I: ICD-10-PCS | Mod: PBBFAC,,, | Performed by: SOCIAL WORKER

## 2019-01-03 NOTE — PROGRESS NOTES
Individual Psychotherapy (PhD/LCSW)    1/3/2019    Site:  [facility]         Therapeutic Intervention: Met with patient.  Outpatient - Insight oriented psychotherapy 45 min - CPT code 20312    Chief complaint/reason for encounter: depression, mood swings, anger, anxiety, sleep, appetite, psychosis, behavior, cognition, somatic and interpersonal     Interval history and content of current session: discussed coping skills, anger management skills, feelings, thoughts and fears, history in her family of psychosis, depression and alcoholism and people dying from suicide, she states she is not suicidal and not thinking of that, how to be in the moment, [place] was hard for her and some family members who said they would visit her did not so she was upset over this and discussed how to cope, and says things over and over many times and repeats the same phrase many times, sleep, mood and behavior and feelings, all addressed.    Treatment plan:  · Target symptoms: depression, recurrent depression, anxiety , mood swings, mood disorder, psychosis, adjustment, grief  · Why chosen therapy is appropriate versus another modality: relevant to diagnosis, patient responds to this modality, evidence based practice  · Outcome monitoring methods: self-report, observation  · Therapeutic intervention type: insight oriented psychotherapy, behavior modifying psychotherapy, supportive psychotherapy    Risk parameters:  Patient reports no suicidal ideation  Patient reports no homicidal ideation  Patient reports no self-injurious behavior  Patient reports no violent behavior    Verbal deficits: None    Patient's response to intervention:  The patient's response to intervention is accepting.    Progress toward goals and other mental status changes:  The patient's progress toward goals is limited.    Diagnosis:     ICD-10-CM ICD-9-CM   1. Bipolar affective disorder, currently manic, moderate F31.12 296.42       Plan:  individual  psychotherapy and consult psychiatrist for medication evaluation    Return to clinic: 2 weeks    Length of Service (minutes): 45

## 2019-01-04 ENCOUNTER — OFFICE VISIT (OUTPATIENT)
Dept: PSYCHIATRY | Facility: CLINIC | Age: 64
End: 2019-01-04
Payer: MEDICARE

## 2019-01-04 VITALS
DIASTOLIC BLOOD PRESSURE: 69 MMHG | HEART RATE: 70 BPM | WEIGHT: 207.69 LBS | HEIGHT: 65 IN | SYSTOLIC BLOOD PRESSURE: 139 MMHG | BODY MASS INDEX: 34.6 KG/M2

## 2019-01-04 DIAGNOSIS — F25.1 SCHIZOAFFECTIVE DISORDER, DEPRESSIVE TYPE: Primary | ICD-10-CM

## 2019-01-04 DIAGNOSIS — G47.00 INSOMNIA DISORDER WITH NON-SLEEP DISORDER MENTAL COMORBIDITY: ICD-10-CM

## 2019-01-04 PROCEDURE — 99999 PR PBB SHADOW E&M-EST. PATIENT-LVL III: CPT | Mod: PBBFAC,,, | Performed by: NURSE PRACTITIONER

## 2019-01-04 PROCEDURE — 99213 PR OFFICE/OUTPT VISIT, EST, LEVL III, 20-29 MIN: ICD-10-PCS | Mod: S$GLB,,, | Performed by: NURSE PRACTITIONER

## 2019-01-04 PROCEDURE — 3075F SYST BP GE 130 - 139MM HG: CPT | Mod: CPTII,S$GLB,, | Performed by: NURSE PRACTITIONER

## 2019-01-04 PROCEDURE — 3008F PR BODY MASS INDEX (BMI) DOCUMENTED: ICD-10-PCS | Mod: CPTII,S$GLB,, | Performed by: NURSE PRACTITIONER

## 2019-01-04 PROCEDURE — 3075F PR MOST RECENT SYSTOLIC BLOOD PRESS GE 130-139MM HG: ICD-10-PCS | Mod: CPTII,S$GLB,, | Performed by: NURSE PRACTITIONER

## 2019-01-04 PROCEDURE — 90833 PR PSYCHOTHERAPY W/PATIENT W/E&M, 30 MIN (ADD ON): ICD-10-PCS | Mod: S$GLB,,, | Performed by: NURSE PRACTITIONER

## 2019-01-04 PROCEDURE — 99999 PR PBB SHADOW E&M-EST. PATIENT-LVL III: ICD-10-PCS | Mod: PBBFAC,,, | Performed by: NURSE PRACTITIONER

## 2019-01-04 PROCEDURE — 90833 PSYTX W PT W E/M 30 MIN: CPT | Mod: S$GLB,,, | Performed by: NURSE PRACTITIONER

## 2019-01-04 PROCEDURE — 3078F PR MOST RECENT DIASTOLIC BLOOD PRESSURE < 80 MM HG: ICD-10-PCS | Mod: CPTII,S$GLB,, | Performed by: NURSE PRACTITIONER

## 2019-01-04 PROCEDURE — 99213 OFFICE O/P EST LOW 20 MIN: CPT | Mod: S$GLB,,, | Performed by: NURSE PRACTITIONER

## 2019-01-04 PROCEDURE — 3008F BODY MASS INDEX DOCD: CPT | Mod: CPTII,S$GLB,, | Performed by: NURSE PRACTITIONER

## 2019-01-04 PROCEDURE — 3078F DIAST BP <80 MM HG: CPT | Mod: CPTII,S$GLB,, | Performed by: NURSE PRACTITIONER

## 2019-01-04 RX ORDER — PROPRANOLOL HYDROCHLORIDE 40 MG/1
40 TABLET ORAL 2 TIMES DAILY
Qty: 60 TABLET | Refills: 5 | Status: SHIPPED | OUTPATIENT
Start: 2019-01-04 | End: 2020-03-09 | Stop reason: SDUPTHER

## 2019-01-04 RX ORDER — FLUOXETINE HYDROCHLORIDE 20 MG/1
20 CAPSULE ORAL DAILY
Qty: 30 CAPSULE | Refills: 5 | Status: SHIPPED | OUTPATIENT
Start: 2019-01-04 | End: 2020-01-03 | Stop reason: SDUPTHER

## 2019-01-04 RX ORDER — TRAZODONE HYDROCHLORIDE 100 MG/1
100 TABLET ORAL NIGHTLY PRN
Qty: 30 TABLET | Refills: 5 | Status: ON HOLD | OUTPATIENT
Start: 2019-01-04 | End: 2019-02-06 | Stop reason: HOSPADM

## 2019-01-04 RX ORDER — QUETIAPINE FUMARATE 300 MG/1
300 TABLET, FILM COATED ORAL 2 TIMES DAILY
Qty: 60 TABLET | Refills: 5 | Status: ON HOLD | OUTPATIENT
Start: 2019-01-04 | End: 2019-02-06 | Stop reason: HOSPADM

## 2019-01-04 RX ORDER — DIVALPROEX SODIUM 500 MG/1
2000 TABLET, FILM COATED, EXTENDED RELEASE ORAL NIGHTLY
Qty: 120 TABLET | Refills: 5 | Status: ON HOLD | OUTPATIENT
Start: 2019-01-04 | End: 2019-02-06 | Stop reason: HOSPADM

## 2019-01-04 RX ORDER — CLONAZEPAM 0.5 MG/1
0.5 TABLET ORAL 2 TIMES DAILY PRN
Qty: 60 TABLET | Refills: 3 | Status: SHIPPED | OUTPATIENT
Start: 2019-01-04 | End: 2019-05-09

## 2019-01-04 NOTE — PROGRESS NOTES
Outpatient Psychiatry Follow-Up Visit (MD/NP)    1/4/2019    Clinical Status of Patient:  Outpatient (Ambulatory)    Chief Complaint:  Joseluis Triplett is a 63 y.o. female who presents today for follow-up of mood disorder and anxiety.  Met with patient.      Last visit was: 10/25/18. Chart and  reviewed.     Interval History and Content of Current Session:  Current Psychiatric Medications/changes  · Depakote to ER 2,000 mg po daily ( 500 mg x 4 tabs)  · Seroquel 300 mg po BID  · Inderal 40 mg po BID  · Prozac 20 mg po daily  · Trazodone 100 mg po q hs PRN insomnia    Pt presents bright affect.  Appears pleasant and cooperative.  Pt had notebook of things she wanted to talk about.  Pt focused on interpersonal relationships with family and nursing home staff; persecutory themes.  Thought processes are organized but tangential. Appears at baseline.  Compliant with medications and denies side effects. No EPS/TD.  Denies SI/HI/AVH.     Psychotherapy:  · Target symptoms: anxiety , mood disorder  · Why chosen therapy is appropriate versus another modality: relevant to diagnosis  · Outcome monitoring methods: self-report  · Therapeutic intervention type: insight oriented psychotherapy  · Topics discussed/themes: building skills sets for symptom management, symptom recognition  · The patient's response to the intervention is accepting. The patient's progress toward treatment goals is limited.   · Duration of intervention: 18 minutes.    Review of Systems   · PSYCHIATRIC: Pertinant items are noted in the narrative.  · CONSTITUTIONAL: No weight gain or loss.   · MUSCULOSKELETAL: No pain or stiffness of the joints.  · NEUROLOGIC: No weakness, sensory changes, seizures, confusion, memory loss, tremor or other abnormal movements.  · ENDOCRINE: No polydipsia or polyuria.  · INTEGUMENTARY: No rashes or lacerations.  · EYES: No exophthalmos, jaundice or blindness.  · ENT: No dizziness, tinnitus or hearing loss.  · RESPIRATORY:  "No shortness of breath.  · CARDIOVASCULAR: No tachycardia or chest pain.  · GASTROINTESTINAL: No nausea, vomiting, pain, constipation or diarrhea.  · GENITOURINARY: No frequency, dysuria or sexual dysfunction.  · HEMATOLOGIC/LYMPHATIC: No excessive bleeding, prolonged or excessive bleeding after dental extraction/injury.  · ALLERGIC/IMMUNOLOGIC: No allergic response to materials, foods or animals at this time.    Past Medical, Family and Social History: The patient's past medical, family and social history have been reviewed and updated as appropriate within the electronic medical record - see encounter notes.    Compliance: yes    Side effects: None    Risk Parameters:  Patient reports no suicidal ideation  Patient reports no homicidal ideation  Patient reports no self-injurious behavior  Patient reports no violent behavior    Exam (detailed: at least 9 elements; comprehensive: all 15 elements)   Constitutional  Vitals:  Most recent vital signs, dated less than 90 days prior to this appointment, were reviewed.   Vitals:    01/04/19 1335   BP: 139/69   Pulse: 70   Weight: 94.2 kg (207 lb 10.8 oz)   Height: 5' 5" (1.651 m)        General:  unremarkable, age appropriate     Musculoskeletal  Muscle Strength/Tone:  no tremor, no tic   Gait & Station:  non-ataxic     Psychiatric  Speech:  no latency; no press   Mood & Affect:  euthymic  congruent and appropriate   Thought Process:  normal and logical   Associations:  tangential   Thought Content:  normal, no suicidality, no homicidality, delusions, or paranoia   Insight:  has awareness of illness   Judgement: behavior is adequate to circumstances   Orientation:  grossly intact   Memory: intact for content of interview   Language: grossly intact   Attention Span & Concentration:  able to focus   Fund of Knowledge:  intact and appropriate to age and level of education     Assessment and Diagnosis   Status/Progress: Based on the examination today, the patient's problem(s) " is/are improved and adequately but not ideally controlled.  New problems have not been presented today.   Co-morbidities and Lack of compliance are not complicating management of the primary condition.  There are no active rule-out diagnoses for this patient at this time.     General Impression:       ICD-10-CM ICD-9-CM   1. Schizoaffective disorder, depressive type F25.1 295.70   2. Insomnia disorder with non-sleep disorder mental comorbidity G47.00 780.52       Intervention/Counseling/Treatment Plan   · Medication Management: Continue current medications. The risks and benefits of medication were discussed with the patient.   · Reviewed labs:  Valproic level 46.9 (subtherapeutic). Will repeat.  · Depakote to ER 2,000 mg po daily ( 500 mg x 4 tabs)  · Seroquel 300 mg po BID  · Inderal 40 mg po BID  · Prozac 20 mg po daily  · Trazodone 100 mg po q hs PRN insomnia  · Completed AIMS scale    Return to Clinic: 6 months

## 2019-01-10 ENCOUNTER — HOSPITAL ENCOUNTER (EMERGENCY)
Facility: HOSPITAL | Age: 64
Discharge: HOME | End: 2019-01-11
Attending: EMERGENCY MEDICINE
Payer: MEDICARE
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
## 2019-01-10 PROCEDURE — 99284 EMERGENCY DEPT VISIT MOD MDM: CPT | Mod: ,,, | Performed by: EMERGENCY MEDICINE

## 2019-01-10 PROCEDURE — 99284 PR EMERGENCY DEPT VISIT,LEVEL IV: ICD-10-PCS | Mod: ,,, | Performed by: EMERGENCY MEDICINE

## 2019-01-10 PROCEDURE — 99284 EMERGENCY DEPT VISIT MOD MDM: CPT | Mod: 25

## 2019-01-11 VITALS
TEMPERATURE: 98 F | HEART RATE: 70 BPM | HEIGHT: 65 IN | BODY MASS INDEX: 34.6 KG/M2 | SYSTOLIC BLOOD PRESSURE: 123 MMHG | DIASTOLIC BLOOD PRESSURE: 72 MMHG | RESPIRATION RATE: 18 BRPM | OXYGEN SATURATION: 98 % | WEIGHT: 207.69 LBS

## 2019-01-11 LAB
ALBUMIN SERPL BCP-MCNC: 3.7 G/DL
ALP SERPL-CCNC: 127 U/L
ALT SERPL W/O P-5'-P-CCNC: 23 U/L
ANION GAP SERPL CALC-SCNC: 13 MMOL/L
AST SERPL-CCNC: 19 U/L
BASOPHILS # BLD AUTO: 0.01 K/UL
BASOPHILS NFR BLD: 0.1 %
BILIRUB SERPL-MCNC: 0.3 MG/DL
BUN SERPL-MCNC: 18 MG/DL
CALCIUM SERPL-MCNC: 9.8 MG/DL
CHLORIDE SERPL-SCNC: 110 MMOL/L
CO2 SERPL-SCNC: 18 MMOL/L
CREAT SERPL-MCNC: 1 MG/DL
DIFFERENTIAL METHOD: ABNORMAL
EOSINOPHIL # BLD AUTO: 0.1 K/UL
EOSINOPHIL NFR BLD: 1 %
ERYTHROCYTE [DISTWIDTH] IN BLOOD BY AUTOMATED COUNT: 13.1 %
EST. GFR  (AFRICAN AMERICAN): >60 ML/MIN/1.73 M^2
EST. GFR  (NON AFRICAN AMERICAN): >60 ML/MIN/1.73 M^2
GLUCOSE SERPL-MCNC: 102 MG/DL
HCT VFR BLD AUTO: 47.6 %
HGB BLD-MCNC: 15.1 G/DL
IMM GRANULOCYTES # BLD AUTO: 0.02 K/UL
IMM GRANULOCYTES NFR BLD AUTO: 0.2 %
LYMPHOCYTES # BLD AUTO: 2.4 K/UL
LYMPHOCYTES NFR BLD: 28.8 %
MCH RBC QN AUTO: 29.3 PG
MCHC RBC AUTO-ENTMCNC: 31.7 G/DL
MCV RBC AUTO: 92 FL
MONOCYTES # BLD AUTO: 0.6 K/UL
MONOCYTES NFR BLD: 7.2 %
NEUTROPHILS # BLD AUTO: 5.2 K/UL
NEUTROPHILS NFR BLD: 62.7 %
NRBC BLD-RTO: 0 /100 WBC
PLATELET # BLD AUTO: 249 K/UL
PMV BLD AUTO: 10.1 FL
POTASSIUM SERPL-SCNC: 3.9 MMOL/L
PROT SERPL-MCNC: 7 G/DL
RBC # BLD AUTO: 5.16 M/UL
SODIUM SERPL-SCNC: 141 MMOL/L
WBC # BLD AUTO: 8.33 K/UL

## 2019-01-11 PROCEDURE — 85025 COMPLETE CBC W/AUTO DIFF WBC: CPT

## 2019-01-11 PROCEDURE — 80053 COMPREHEN METABOLIC PANEL: CPT

## 2019-01-11 NOTE — ED NOTES
Psychosocial: Patient is calm and cooperative. Patients insight and judgement are appropriate to situation. Appears clean, well maintained, with clothing appropriate to environment. No evidence of delusions, hallucinations, or psychosis.    Neuro: Eyes open spontaneously. Awake, alert, oriented x 4. Speech clear and appropriate. Tolerating saliva secretions well. Able to follow commands, demonstrating ability to actively and appropriately communicate within context of current conversation. Symmetrical facial muscles. Moving all extremities well with no noted weakness. Adequate muscle tone present. Movement is purposeful. No evidence of impaired sensation. Responds to external stimuli with appropriate reflexes.     Airway: Bilateral chest rise and fall. RR regular and non-labored. Air entry patent and clear x 5 lobes of the lungs. No crepitus or subcutaneous emphysema noted on palpation.     Circulatory: Skin warm, dry, and pink. Apical and radial pulses strong and regular. Capillary refill/skin blanching less than 3 seconds to distal of 4 extremities. Placed on CM in NSR without ectopy.    Abdomen: Abdomen obese, soft and non-distended. Positive normo-active bowel sounds x 4 quadrants.     Urinary: Patient reports routine urination without pain, frequency, or urgency. Voids independently. Reports urine appears dominguez/yellow in color.    Extremities: No redness, heat, swelling, deformity, or pain.     Skin: Intact with no bruising/discolorations noted.
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
## 2019-01-11 NOTE — ED TRIAGE NOTES
63 yr old pt presents to the ed with complaints of a fall while walking at nursing home x 1 hour ago. Pt denies loc or head trauma. Pt is aox 3. Pt has no leg shortening and no wounds. Pt is lethargic and expresses an increase in psych med dosage.

## 2019-01-13 ENCOUNTER — NURSE TRIAGE (OUTPATIENT)
Dept: ADMINISTRATIVE | Facility: CLINIC | Age: 64
End: 2019-01-13

## 2019-01-13 NOTE — TELEPHONE ENCOUNTER
"    Reason for Disposition   Caller has NON-URGENT medication question about med that PCP prescribed and triager unable to answer question    Answer Assessment - Initial Assessment Questions  1. SYMPTOMS: "Do you have any symptoms?"      Patient feeling  2. SEVERITY: If symptoms are present, ask "Are they mild, moderate or severe?"      "Patient states it is time to go to bed now"  "Dr. Yañez gave me too much of medication"    Protocols used: ST MEDICATION QUESTION CALL-A-    Patient in Nursing home advised to call nursing home nurse for assistance.  Patient states she is going to sleep   "

## 2019-01-23 ENCOUNTER — NURSE TRIAGE (OUTPATIENT)
Dept: ADMINISTRATIVE | Facility: CLINIC | Age: 64
End: 2019-01-23

## 2019-01-23 NOTE — TELEPHONE ENCOUNTER
"    Reason for Disposition   Information only question and nurse able to answer    Protocols used: ST NO PROTOCOL AVAILABLE - INFORMATION ONLY-A-[place]    SpPatient states"ask Yamil to please stop billing me/"  I advised she must call to the billing department . Patient states she has called to billing.  "

## 2019-01-25 ENCOUNTER — HOSPITAL ENCOUNTER (EMERGENCY)
Facility: HOSPITAL | Age: 64
Discharge: PSYCHIATRIC OR SUBSTANCE USE FACILITY | End: 2019-01-26
Attending: EMERGENCY MEDICINE
Payer: MEDICARE

## 2019-01-25 VITALS
WEIGHT: 200 LBS | HEART RATE: 66 BPM | HEIGHT: 65 IN | BODY MASS INDEX: 33.32 KG/M2 | DIASTOLIC BLOOD PRESSURE: 69 MMHG | SYSTOLIC BLOOD PRESSURE: 119 MMHG | OXYGEN SATURATION: 100 % | RESPIRATION RATE: 17 BRPM | TEMPERATURE: 98 F

## 2019-01-25 DIAGNOSIS — F20.9 SCHIZOPHRENIA, UNSPECIFIED TYPE: Primary | ICD-10-CM

## 2019-01-25 DIAGNOSIS — Z00.8 MEDICAL CLEARANCE FOR PSYCHIATRIC ADMISSION: ICD-10-CM

## 2019-01-25 DIAGNOSIS — R44.3 HALLUCINATIONS, UNSPECIFIED: ICD-10-CM

## 2019-01-25 DIAGNOSIS — F22 DELUSIONS: ICD-10-CM

## 2019-01-25 LAB
ALBUMIN SERPL BCP-MCNC: 3.4 G/DL
ALP SERPL-CCNC: 85 U/L
ALT SERPL W/O P-5'-P-CCNC: 14 U/L
AMPHET+METHAMPHET UR QL: NEGATIVE
ANION GAP SERPL CALC-SCNC: 6 MMOL/L
APAP SERPL-MCNC: <3 UG/ML
AST SERPL-CCNC: 17 U/L
BACTERIA #/AREA URNS AUTO: NORMAL /HPF
BARBITURATES UR QL SCN>200 NG/ML: NEGATIVE
BASOPHILS # BLD AUTO: 0.03 K/UL
BASOPHILS NFR BLD: 0.4 %
BENZODIAZ UR QL SCN>200 NG/ML: NEGATIVE
BILIRUB SERPL-MCNC: 0.3 MG/DL
BILIRUB UR QL STRIP: NEGATIVE
BUN SERPL-MCNC: 17 MG/DL
BZE UR QL SCN: NEGATIVE
CALCIUM SERPL-MCNC: 9.7 MG/DL
CANNABINOIDS UR QL SCN: NEGATIVE
CHLORIDE SERPL-SCNC: 107 MMOL/L
CLARITY UR REFRACT.AUTO: CLEAR
CO2 SERPL-SCNC: 28 MMOL/L
COLOR UR AUTO: ABNORMAL
CREAT SERPL-MCNC: 1.1 MG/DL
CREAT UR-MCNC: 24 MG/DL
DIFFERENTIAL METHOD: ABNORMAL
EOSINOPHIL # BLD AUTO: 0.2 K/UL
EOSINOPHIL NFR BLD: 2.5 %
ERYTHROCYTE [DISTWIDTH] IN BLOOD BY AUTOMATED COUNT: 13 %
EST. GFR  (AFRICAN AMERICAN): >60 ML/MIN/1.73 M^2
EST. GFR  (NON AFRICAN AMERICAN): 53.9 ML/MIN/1.73 M^2
ETHANOL SERPL-MCNC: <10 MG/DL
GLUCOSE SERPL-MCNC: 114 MG/DL
GLUCOSE UR QL STRIP: NEGATIVE
HCT VFR BLD AUTO: 40.5 %
HGB BLD-MCNC: 12.8 G/DL
HGB UR QL STRIP: NEGATIVE
IMM GRANULOCYTES # BLD AUTO: 0.01 K/UL
IMM GRANULOCYTES NFR BLD AUTO: 0.1 %
KETONES UR QL STRIP: NEGATIVE
LEUKOCYTE ESTERASE UR QL STRIP: ABNORMAL
LYMPHOCYTES # BLD AUTO: 3.4 K/UL
LYMPHOCYTES NFR BLD: 49.6 %
MCH RBC QN AUTO: 29.7 PG
MCHC RBC AUTO-ENTMCNC: 31.6 G/DL
MCV RBC AUTO: 94 FL
METHADONE UR QL SCN>300 NG/ML: NEGATIVE
MICROSCOPIC COMMENT: NORMAL
MONOCYTES # BLD AUTO: 0.9 K/UL
MONOCYTES NFR BLD: 13.2 %
NEUTROPHILS # BLD AUTO: 2.4 K/UL
NEUTROPHILS NFR BLD: 34.2 %
NITRITE UR QL STRIP: NEGATIVE
NRBC BLD-RTO: 0 /100 WBC
OPIATES UR QL SCN: NEGATIVE
PCP UR QL SCN>25 NG/ML: NEGATIVE
PH UR STRIP: 7 [PH] (ref 5–8)
PLATELET # BLD AUTO: 302 K/UL
PMV BLD AUTO: 9.9 FL
POTASSIUM SERPL-SCNC: 4.5 MMOL/L
PROT SERPL-MCNC: 6.5 G/DL
PROT UR QL STRIP: NEGATIVE
RBC # BLD AUTO: 4.31 M/UL
SALICYLATES SERPL-MCNC: <5 MG/DL
SODIUM SERPL-SCNC: 141 MMOL/L
SP GR UR STRIP: 1 (ref 1–1.03)
SQUAMOUS #/AREA URNS AUTO: 1 /HPF
T4 FREE SERPL-MCNC: 0.86 NG/DL
TOXICOLOGY INFORMATION: NORMAL
TSH SERPL DL<=0.005 MIU/L-ACNC: 0.06 UIU/ML
URN SPEC COLLECT METH UR: ABNORMAL
VALPROATE SERPL-MCNC: 38.6 UG/ML
WBC # BLD AUTO: 6.88 K/UL
WBC #/AREA URNS AUTO: 1 /HPF (ref 0–5)

## 2019-01-25 PROCEDURE — 99285 EMERGENCY DEPT VISIT HI MDM: CPT | Mod: ,,, | Performed by: PHYSICIAN ASSISTANT

## 2019-01-25 PROCEDURE — 80320 DRUG SCREEN QUANTALCOHOLS: CPT

## 2019-01-25 PROCEDURE — 99285 PR EMERGENCY DEPT VISIT,LEVEL V: ICD-10-PCS | Mod: ,,, | Performed by: PHYSICIAN ASSISTANT

## 2019-01-25 PROCEDURE — 85025 COMPLETE CBC W/AUTO DIFF WBC: CPT

## 2019-01-25 PROCEDURE — 84443 ASSAY THYROID STIM HORMONE: CPT

## 2019-01-25 PROCEDURE — 80307 DRUG TEST PRSMV CHEM ANLYZR: CPT

## 2019-01-25 PROCEDURE — 80053 COMPREHEN METABOLIC PANEL: CPT

## 2019-01-25 PROCEDURE — 81001 URINALYSIS AUTO W/SCOPE: CPT | Mod: 59

## 2019-01-25 PROCEDURE — 80164 ASSAY DIPROPYLACETIC ACD TOT: CPT

## 2019-01-25 PROCEDURE — 80329 ANALGESICS NON-OPIOID 1 OR 2: CPT

## 2019-01-25 PROCEDURE — 99284 EMERGENCY DEPT VISIT MOD MDM: CPT

## 2019-01-25 PROCEDURE — 84439 ASSAY OF FREE THYROXINE: CPT

## 2019-01-25 RX ORDER — ACETAMINOPHEN 325 MG/1
650 TABLET ORAL EVERY 6 HOURS PRN
Status: DISCONTINUED | OUTPATIENT
Start: 2019-01-25 | End: 2019-01-26 | Stop reason: HOSPADM

## 2019-01-25 RX ORDER — LORAZEPAM 1 MG/1
2 TABLET ORAL EVERY 8 HOURS PRN
Status: DISCONTINUED | OUTPATIENT
Start: 2019-01-25 | End: 2019-01-26 | Stop reason: HOSPADM

## 2019-01-25 RX ORDER — MAG HYDROX/ALUMINUM HYD/SIMETH 200-200-20
30 SUSPENSION, ORAL (FINAL DOSE FORM) ORAL EVERY 6 HOURS PRN
Status: DISCONTINUED | OUTPATIENT
Start: 2019-01-25 | End: 2019-01-26 | Stop reason: HOSPADM

## 2019-01-26 PROBLEM — N18.9 CHRONIC KIDNEY DISEASE: Status: ACTIVE | Noted: 2019-01-26

## 2019-01-26 PROBLEM — I10 HTN (HYPERTENSION): Status: ACTIVE | Noted: 2019-01-26
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
## 2019-01-26 NOTE — ED NOTES
Patient accepted by [facility] ([address], [place], [place]) for the service of Dr. Cole. Report to be called to [phone].

## 2019-01-26 NOTE — ED TRIAGE NOTES
Pt arrived from [facility] Pt states they told her she needs her medications adjusted

## 2019-01-26 NOTE — ED NOTES
Pt's mother would like to be updated on pt's placement     [facility]: [phone]
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
## 2019-01-26 NOTE — ED NOTES
Admit packet faxed to [facility], [facility], and [facility].

## 2019-01-26 NOTE — ED NOTES
Admit packet faxed to [facility], [facility], [facility], and [facility].
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
## 2019-03-09 ENCOUNTER — HOSPITAL ENCOUNTER (EMERGENCY)
Facility: HOSPITAL | Age: 64
Discharge: NURSING, REHAB OR LONG-TERM CARE | End: 2019-03-10
Attending: EMERGENCY MEDICINE
Payer: MEDICARE

## 2019-03-09 DIAGNOSIS — L03.90 CELLULITIS, UNSPECIFIED CELLULITIS SITE: Primary | ICD-10-CM

## 2019-03-09 DIAGNOSIS — M79.89 RIGHT LEG SWELLING: ICD-10-CM

## 2019-03-09 PROCEDURE — 99284 EMERGENCY DEPT VISIT MOD MDM: CPT | Mod: ,,, | Performed by: EMERGENCY MEDICINE

## 2019-03-09 PROCEDURE — 99284 PR EMERGENCY DEPT VISIT,LEVEL IV: ICD-10-PCS | Mod: ,,, | Performed by: EMERGENCY MEDICINE

## 2019-03-09 PROCEDURE — 99283 EMERGENCY DEPT VISIT LOW MDM: CPT

## 2019-03-10 VITALS
TEMPERATURE: 98 F | BODY MASS INDEX: 32.96 KG/M2 | DIASTOLIC BLOOD PRESSURE: 62 MMHG | HEART RATE: 68 BPM | HEIGHT: 67 IN | WEIGHT: 210 LBS | SYSTOLIC BLOOD PRESSURE: 128 MMHG | RESPIRATION RATE: 20 BRPM | OXYGEN SATURATION: 98 %

## 2019-03-10 LAB
BASOPHILS # BLD AUTO: 0.04 K/UL
BASOPHILS NFR BLD: 0.6 %
BILIRUB UR QL STRIP: NEGATIVE
BNP SERPL-MCNC: 38 PG/ML
BUN SERPL-MCNC: 33 MG/DL (ref 6–30)
CHLORIDE SERPL-SCNC: 100 MMOL/L (ref 95–110)
CLARITY UR REFRACT.AUTO: CLEAR
COLOR UR AUTO: NORMAL
CREAT SERPL-MCNC: 0.8 MG/DL (ref 0.5–1.4)
DIFFERENTIAL METHOD: ABNORMAL
EOSINOPHIL # BLD AUTO: 0.1 K/UL
EOSINOPHIL NFR BLD: 1.6 %
ERYTHROCYTE [DISTWIDTH] IN BLOOD BY AUTOMATED COUNT: 13.6 %
GLUCOSE SERPL-MCNC: 121 MG/DL (ref 70–110)
GLUCOSE UR QL STRIP: NEGATIVE
HCT VFR BLD AUTO: 42.5 %
HCT VFR BLD CALC: 38 %PCV (ref 36–54)
HGB BLD-MCNC: 13.6 G/DL
HGB UR QL STRIP: NEGATIVE
IMM GRANULOCYTES # BLD AUTO: 0.01 K/UL
IMM GRANULOCYTES NFR BLD AUTO: 0.2 %
KETONES UR QL STRIP: NEGATIVE
LEUKOCYTE ESTERASE UR QL STRIP: NEGATIVE
LYMPHOCYTES # BLD AUTO: 3.8 K/UL
LYMPHOCYTES NFR BLD: 60.3 %
MCH RBC QN AUTO: 29.5 PG
MCHC RBC AUTO-ENTMCNC: 32 G/DL
MCV RBC AUTO: 92 FL
MONOCYTES # BLD AUTO: 0.8 K/UL
MONOCYTES NFR BLD: 12 %
NEUTROPHILS # BLD AUTO: 1.6 K/UL
NEUTROPHILS NFR BLD: 25.3 %
NITRITE UR QL STRIP: NEGATIVE
NRBC BLD-RTO: 0 /100 WBC
PH UR STRIP: 6 [PH] (ref 5–8)
PLATELET # BLD AUTO: 229 K/UL
PMV BLD AUTO: 10.1 FL
POC IONIZED CALCIUM: 1.24 MMOL/L (ref 1.06–1.42)
POC TCO2 (MEASURED): 32 MMOL/L (ref 23–29)
POTASSIUM BLD-SCNC: 5 MMOL/L (ref 3.5–5.1)
PROT UR QL STRIP: NEGATIVE
RBC # BLD AUTO: 4.61 M/UL
SAMPLE: ABNORMAL
SODIUM BLD-SCNC: 142 MMOL/L (ref 136–145)
SP GR UR STRIP: 1.01 (ref 1–1.03)
URN SPEC COLLECT METH UR: NORMAL
WBC # BLD AUTO: 6.34 K/UL

## 2019-03-10 PROCEDURE — 85025 COMPLETE CBC W/AUTO DIFF WBC: CPT

## 2019-03-10 PROCEDURE — 83880 ASSAY OF NATRIURETIC PEPTIDE: CPT

## 2019-03-10 PROCEDURE — 87040 BLOOD CULTURE FOR BACTERIA: CPT

## 2019-03-10 PROCEDURE — 81003 URINALYSIS AUTO W/O SCOPE: CPT

## 2019-03-10 PROCEDURE — 25000003 PHARM REV CODE 250: Performed by: EMERGENCY MEDICINE

## 2019-03-10 RX ORDER — SULFAMETHOXAZOLE AND TRIMETHOPRIM 800; 160 MG/1; MG/1
1 TABLET ORAL
Status: COMPLETED | OUTPATIENT
Start: 2019-03-10 | End: 2019-03-10

## 2019-03-10 RX ORDER — ACETAMINOPHEN 325 MG/1
650 TABLET ORAL
Status: COMPLETED | OUTPATIENT
Start: 2019-03-10 | End: 2019-03-10

## 2019-03-10 RX ORDER — SULFAMETHOXAZOLE AND TRIMETHOPRIM 800; 160 MG/1; MG/1
1 TABLET ORAL 2 TIMES DAILY
Qty: 14 TABLET | Refills: 0 | Status: SHIPPED | OUTPATIENT
Start: 2019-03-10 | End: 2019-03-17

## 2019-03-10 RX ADMIN — ACETAMINOPHEN 650 MG: 325 TABLET ORAL at 05:03

## 2019-03-10 RX ADMIN — SULFAMETHOXAZOLE AND TRIMETHOPRIM 1 TABLET: 800; 160 TABLET ORAL at 05:03

## 2019-03-10 NOTE — ED NOTES
Patient identifiers verified and correct for Lisanne Ioana.  LOC: The patient is awake, alert and aware of environment with an appropriate affect, the patient is oriented x 3 and speaking appropriately.   APPEARANCE: Patient appears comfortable and in no acute distress, patient is clean and well groomed.  SKIN: The skin is warm and dry, color consistent with ethnicity, patient has normal skin turgor and moist mucus membranes, skin intact, no breakdown or bruising noted.   MUSCULOSKELETAL: Patient moving all extremities spontaneously, redness and swelling noted to RLE.  RESPIRATORY: Airway is open and patent, respirations are spontaneous, patient has a normal effort and rate, no accessory muscle use noted, pt placed on continuous pulse ox with O2 sats noted at 96% on room air.  CARDIAC: Pt placed on cardiac monitor. Patient has a normal rate and regular rhythm, no edema noted, capillary refill < 3 seconds.   GASTRO: Soft and non tender to palpation, no distention noted, normoactive bowel sounds present in all four quadrants. Pt states bowel movements have been regular.  : Pt denies any pain or frequency with urination.  NEURO: Pt opens eyes spontaneously, behavior appropriate to situation, follows commands, facial expression symmetrical, bilateral hand grasp equal and even, purposeful motor response noted, normal sensation in all extremities when touched with a finger.
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
## 2019-03-15 LAB
BACTERIA BLD CULT: NORMAL
BACTERIA BLD CULT: NORMAL

## 2019-03-20 NOTE — TELEPHONE ENCOUNTER
Spoke with patient and let her know to call and see if she can see her pcp with the Sx she is complaining about.  Patient is having stomach ache's at night.   Will improve with insulin treatment.  Continue to monitor.

## 2019-04-23 ENCOUNTER — HOSPITAL ENCOUNTER (EMERGENCY)
Facility: HOSPITAL | Age: 64
Discharge: HOME | End: 2019-04-24
Attending: EMERGENCY MEDICINE
Payer: MEDICARE

## 2019-04-23 DIAGNOSIS — R46.89 AGGRESSIVE BEHAVIOR: Primary | ICD-10-CM

## 2019-04-23 DIAGNOSIS — F20.9 SCHIZOPHRENIA, UNSPECIFIED TYPE: ICD-10-CM

## 2019-04-23 PROCEDURE — 99283 EMERGENCY DEPT VISIT LOW MDM: CPT

## 2019-04-23 PROCEDURE — 99285 PR EMERGENCY DEPT VISIT,LEVEL V: ICD-10-PCS | Mod: ,,, | Performed by: PHYSICIAN ASSISTANT

## 2019-04-23 PROCEDURE — 99285 EMERGENCY DEPT VISIT HI MDM: CPT | Mod: ,,, | Performed by: PHYSICIAN ASSISTANT

## 2019-04-24 VITALS
BODY MASS INDEX: 32.58 KG/M2 | WEIGHT: 220 LBS | SYSTOLIC BLOOD PRESSURE: 128 MMHG | HEART RATE: 70 BPM | OXYGEN SATURATION: 98 % | TEMPERATURE: 98 F | RESPIRATION RATE: 16 BRPM | HEIGHT: 69 IN | DIASTOLIC BLOOD PRESSURE: 72 MMHG

## 2019-04-24 LAB
ALBUMIN SERPL BCP-MCNC: 3.6 G/DL (ref 3.5–5.2)
ALP SERPL-CCNC: 95 U/L (ref 55–135)
ALT SERPL W/O P-5'-P-CCNC: 19 U/L (ref 10–44)
ANION GAP SERPL CALC-SCNC: 12 MMOL/L (ref 8–16)
AST SERPL-CCNC: 21 U/L (ref 10–40)
BASOPHILS # BLD AUTO: 0.03 K/UL (ref 0–0.2)
BASOPHILS NFR BLD: 0.4 % (ref 0–1.9)
BILIRUB SERPL-MCNC: 0.2 MG/DL (ref 0.1–1)
BUN SERPL-MCNC: 18 MG/DL (ref 8–23)
CALCIUM SERPL-MCNC: 9.6 MG/DL (ref 8.7–10.5)
CHLORIDE SERPL-SCNC: 104 MMOL/L (ref 95–110)
CO2 SERPL-SCNC: 24 MMOL/L (ref 23–29)
CREAT SERPL-MCNC: 0.9 MG/DL (ref 0.5–1.4)
DIFFERENTIAL METHOD: ABNORMAL
EOSINOPHIL # BLD AUTO: 0.1 K/UL (ref 0–0.5)
EOSINOPHIL NFR BLD: 1.5 % (ref 0–8)
ERYTHROCYTE [DISTWIDTH] IN BLOOD BY AUTOMATED COUNT: 13.7 % (ref 11.5–14.5)
EST. GFR  (AFRICAN AMERICAN): >60 ML/MIN/1.73 M^2
EST. GFR  (NON AFRICAN AMERICAN): >60 ML/MIN/1.73 M^2
ETHANOL SERPL-MCNC: <10 MG/DL
GLUCOSE SERPL-MCNC: 110 MG/DL (ref 70–110)
HCT VFR BLD AUTO: 43.6 % (ref 37–48.5)
HGB BLD-MCNC: 13.9 G/DL (ref 12–16)
IMM GRANULOCYTES # BLD AUTO: 0.01 K/UL (ref 0–0.04)
IMM GRANULOCYTES NFR BLD AUTO: 0.1 % (ref 0–0.5)
LYMPHOCYTES # BLD AUTO: 3.4 K/UL (ref 1–4.8)
LYMPHOCYTES NFR BLD: 47.2 % (ref 18–48)
MAGNESIUM SERPL-MCNC: 2.5 MG/DL (ref 1.6–2.6)
MCH RBC QN AUTO: 28.8 PG (ref 27–31)
MCHC RBC AUTO-ENTMCNC: 31.9 G/DL (ref 32–36)
MCV RBC AUTO: 90 FL (ref 82–98)
MONOCYTES # BLD AUTO: 0.8 K/UL (ref 0.3–1)
MONOCYTES NFR BLD: 11.8 % (ref 4–15)
NEUTROPHILS # BLD AUTO: 2.8 K/UL (ref 1.8–7.7)
NEUTROPHILS NFR BLD: 39 % (ref 38–73)
NRBC BLD-RTO: 0 /100 WBC
PLATELET # BLD AUTO: 253 K/UL (ref 150–350)
PMV BLD AUTO: 10.2 FL (ref 9.2–12.9)
POTASSIUM SERPL-SCNC: 4.6 MMOL/L (ref 3.5–5.1)
PROT SERPL-MCNC: 7.1 G/DL (ref 6–8.4)
RBC # BLD AUTO: 4.83 M/UL (ref 4–5.4)
SODIUM SERPL-SCNC: 140 MMOL/L (ref 136–145)
T4 FREE SERPL-MCNC: 0.79 NG/DL (ref 0.71–1.51)
TSH SERPL DL<=0.005 MIU/L-ACNC: 0.15 UIU/ML (ref 0.4–4)
WBC # BLD AUTO: 7.14 K/UL (ref 3.9–12.7)

## 2019-04-24 PROCEDURE — 83735 ASSAY OF MAGNESIUM: CPT

## 2019-04-24 PROCEDURE — 84443 ASSAY THYROID STIM HORMONE: CPT

## 2019-04-24 PROCEDURE — 85025 COMPLETE CBC W/AUTO DIFF WBC: CPT

## 2019-04-24 PROCEDURE — 80053 COMPREHEN METABOLIC PANEL: CPT

## 2019-04-24 PROCEDURE — 80320 DRUG SCREEN QUANTALCOHOLS: CPT

## 2019-04-24 PROCEDURE — 84439 ASSAY OF FREE THYROXINE: CPT
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
## 2019-04-24 NOTE — ED NOTES
Patient refusing to urinate - being verbally threatening towards staff and uncooperative. Dr. Guerrero advised of same
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
## 2019-05-09 ENCOUNTER — HOSPITAL ENCOUNTER (EMERGENCY)
Facility: HOSPITAL | Age: 64
End: 2019-05-09
Attending: EMERGENCY MEDICINE
Payer: MEDICARE

## 2019-05-09 VITALS
HEIGHT: 66 IN | SYSTOLIC BLOOD PRESSURE: 112 MMHG | OXYGEN SATURATION: 96 % | RESPIRATION RATE: 18 BRPM | WEIGHT: 220 LBS | BODY MASS INDEX: 35.36 KG/M2 | TEMPERATURE: 98 F | HEART RATE: 66 BPM | DIASTOLIC BLOOD PRESSURE: 61 MMHG

## 2019-05-09 DIAGNOSIS — F20.9 SCHIZOPHRENIA, UNSPECIFIED TYPE: Primary | ICD-10-CM

## 2019-05-09 DIAGNOSIS — M79.673 FOOT PAIN: ICD-10-CM

## 2019-05-09 LAB
ALBUMIN SERPL BCP-MCNC: 3.9 G/DL (ref 3.5–5.2)
ALP SERPL-CCNC: 97 U/L (ref 55–135)
ALT SERPL W/O P-5'-P-CCNC: 18 U/L (ref 10–44)
AMPHET+METHAMPHET UR QL: NEGATIVE
ANION GAP SERPL CALC-SCNC: 12 MMOL/L (ref 8–16)
AST SERPL-CCNC: 18 U/L (ref 10–40)
BARBITURATES UR QL SCN>200 NG/ML: NEGATIVE
BASOPHILS # BLD AUTO: 0.01 K/UL (ref 0–0.2)
BASOPHILS NFR BLD: 0.2 % (ref 0–1.9)
BENZODIAZ UR QL SCN>200 NG/ML: NEGATIVE
BILIRUB SERPL-MCNC: 0.3 MG/DL (ref 0.1–1)
BILIRUB UR QL STRIP: NEGATIVE
BUN SERPL-MCNC: 23 MG/DL (ref 8–23)
BZE UR QL SCN: NEGATIVE
CALCIUM SERPL-MCNC: 10 MG/DL (ref 8.7–10.5)
CANNABINOIDS UR QL SCN: NEGATIVE
CHLORIDE SERPL-SCNC: 98 MMOL/L (ref 95–110)
CLARITY UR: CLEAR
CO2 SERPL-SCNC: 29 MMOL/L (ref 23–29)
COLOR UR: YELLOW
CREAT SERPL-MCNC: 0.9 MG/DL (ref 0.5–1.4)
CREAT UR-MCNC: 13.4 MG/DL (ref 15–325)
DIFFERENTIAL METHOD: NORMAL
EOSINOPHIL # BLD AUTO: 0.1 K/UL (ref 0–0.5)
EOSINOPHIL NFR BLD: 1.8 % (ref 0–8)
ERYTHROCYTE [DISTWIDTH] IN BLOOD BY AUTOMATED COUNT: 13.4 % (ref 11.5–14.5)
EST. GFR  (AFRICAN AMERICAN): >60 ML/MIN/1.73 M^2
EST. GFR  (NON AFRICAN AMERICAN): >60 ML/MIN/1.73 M^2
ETHANOL SERPL-MCNC: <10 MG/DL
GLUCOSE SERPL-MCNC: 114 MG/DL (ref 70–110)
GLUCOSE UR QL STRIP: NEGATIVE
HCT VFR BLD AUTO: 44.3 % (ref 37–48.5)
HGB BLD-MCNC: 14.7 G/DL (ref 12–16)
HGB UR QL STRIP: NEGATIVE
KETONES UR QL STRIP: NEGATIVE
LEUKOCYTE ESTERASE UR QL STRIP: NEGATIVE
LYMPHOCYTES # BLD AUTO: 2.6 K/UL (ref 1–4.8)
LYMPHOCYTES NFR BLD: 41.4 % (ref 18–48)
MAGNESIUM SERPL-MCNC: 2.4 MG/DL (ref 1.6–2.6)
MCH RBC QN AUTO: 29.3 PG (ref 27–31)
MCHC RBC AUTO-ENTMCNC: 33.2 G/DL (ref 32–36)
MCV RBC AUTO: 88 FL (ref 82–98)
METHADONE UR QL SCN>300 NG/ML: NEGATIVE
MONOCYTES # BLD AUTO: 0.5 K/UL (ref 0.3–1)
MONOCYTES NFR BLD: 8.3 % (ref 4–15)
NEUTROPHILS # BLD AUTO: 3 K/UL (ref 1.8–7.7)
NEUTROPHILS NFR BLD: 47.8 % (ref 38–73)
NITRITE UR QL STRIP: NEGATIVE
OPIATES UR QL SCN: NEGATIVE
PCP UR QL SCN>25 NG/ML: NEGATIVE
PH UR STRIP: 7 [PH] (ref 5–8)
PLATELET # BLD AUTO: 238 K/UL (ref 150–350)
PMV BLD AUTO: 9.7 FL (ref 9.2–12.9)
POTASSIUM SERPL-SCNC: 4.1 MMOL/L (ref 3.5–5.1)
PROT SERPL-MCNC: 7.6 G/DL (ref 6–8.4)
PROT UR QL STRIP: NEGATIVE
RBC # BLD AUTO: 5.01 M/UL (ref 4–5.4)
SODIUM SERPL-SCNC: 139 MMOL/L (ref 136–145)
SP GR UR STRIP: <=1.005 (ref 1–1.03)
T4 FREE SERPL-MCNC: 0.93 NG/DL (ref 0.71–1.51)
TOXICOLOGY INFORMATION: ABNORMAL
TSH SERPL DL<=0.005 MIU/L-ACNC: 0.05 UIU/ML (ref 0.4–4)
URN SPEC COLLECT METH UR: ABNORMAL
UROBILINOGEN UR STRIP-ACNC: NEGATIVE EU/DL
VALPROATE SERPL-MCNC: 92.9 UG/ML (ref 50–100)
WBC # BLD AUTO: 6.28 K/UL (ref 3.9–12.7)

## 2019-05-09 PROCEDURE — 99285 EMERGENCY DEPT VISIT HI MDM: CPT | Mod: 25

## 2019-05-09 PROCEDURE — 85025 COMPLETE CBC W/AUTO DIFF WBC: CPT

## 2019-05-09 PROCEDURE — 80320 DRUG SCREEN QUANTALCOHOLS: CPT

## 2019-05-09 PROCEDURE — 84439 ASSAY OF FREE THYROXINE: CPT

## 2019-05-09 PROCEDURE — 81003 URINALYSIS AUTO W/O SCOPE: CPT | Mod: 59

## 2019-05-09 PROCEDURE — 80164 ASSAY DIPROPYLACETIC ACD TOT: CPT

## 2019-05-09 PROCEDURE — 83735 ASSAY OF MAGNESIUM: CPT

## 2019-05-09 PROCEDURE — 25000003 PHARM REV CODE 250: Performed by: EMERGENCY MEDICINE

## 2019-05-09 PROCEDURE — 80053 COMPREHEN METABOLIC PANEL: CPT

## 2019-05-09 PROCEDURE — 80307 DRUG TEST PRSMV CHEM ANLYZR: CPT

## 2019-05-09 PROCEDURE — 84443 ASSAY THYROID STIM HORMONE: CPT

## 2019-05-09 RX ORDER — GABAPENTIN 300 MG/1
600 CAPSULE ORAL ONCE
Status: COMPLETED | OUTPATIENT
Start: 2019-05-09 | End: 2019-05-09

## 2019-05-09 RX ORDER — ACETAMINOPHEN 500 MG
1000 TABLET ORAL
Status: COMPLETED | OUTPATIENT
Start: 2019-05-09 | End: 2019-05-09

## 2019-05-09 RX ORDER — PANTOPRAZOLE SODIUM 40 MG/1
40 TABLET, DELAYED RELEASE ORAL DAILY
COMMUNITY
End: 2021-03-18 | Stop reason: SDUPTHER

## 2019-05-09 RX ORDER — SPIRONOLACTONE 25 MG/1
25 TABLET ORAL DAILY
COMMUNITY
End: 2022-01-26

## 2019-05-09 RX ADMIN — GABAPENTIN 600 MG: 300 CAPSULE ORAL at 11:05

## 2019-05-09 RX ADMIN — ACETAMINOPHEN 1000 MG: 500 TABLET ORAL at 11:05

## 2019-05-09 NOTE — PROVIDER PROGRESS NOTES - EMERGENCY DEPT.
Encounter Date: 5/9/2019    ED Physician Progress Notes        Physician Note:   The patient is medically cleared for psychiatric transfer/admission.

## 2019-05-09 NOTE — ED NOTES
Pt belongings including 2 bags, 1 suit case, and 1 walker secured in closet.     1 bag of meds secured in lock box.
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
## 2019-05-09 NOTE — ED NOTES
Faxed clinical packet to [[facility]] [facility], [facility] ([facility]), [facility], [facility] (Central Intake), [facility] (Central Intake), [[facility]] [facility], [facility]. Awaiting response at this time.

## 2019-05-09 NOTE — ED NOTES
Patient accepted by [facility] ([address] [place], [place]) for the service of Dr. Leon.  Report to be called to [phone], ext. [phone].      Call report 10 minutes from this note.
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
## 2019-05-09 NOTE — ED NOTES
Pt is walking out of room verbally demanding telephone, food, and medication. Pt redirected to room. Pt informed meal was ordered for her, provided pt with telephone, and pt requesting gabapentin and tylenol. Dr. Rivera informed.

## 2019-05-09 NOTE — ED NOTES
Pt sitting up eating meal tray. NAD noted. States she is sorry for her sickness.  Pt advised that  will be here to evaluate her in 1 hour. Pt verbalized understanding and request that  speak with her mother as well.
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
## 2019-05-24 NOTE — ED NOTES
CPT staff actively seeking psych placement. Faxed clinical packet to [facility], [facility], [facility] ([facility] & [facility] ([facility]). Awaiting response at this time.   I spoke with his wife, Love.  She is concerned that he wants to fall backwards in that his arm continues to hurt and swell.  Today the arm is better.  The tendency to fall backwards is also better today.  There have been no other focal neurological signs.  I have reassured her that I expect the arm to do better, particularly if he does not allow the arm to become too stiff.    If he has more neurological signs, she will let us know.    Nixon Caldera MD

## 2019-05-25 ENCOUNTER — NURSE TRIAGE (OUTPATIENT)
Dept: ADMINISTRATIVE | Facility: CLINIC | Age: 64
End: 2019-05-25

## 2019-05-25 NOTE — TELEPHONE ENCOUNTER
Reason for Disposition   Caller has already spoken with the PCP and has no further questions.    Protocols used: NO CONTACT OR DUPLICATE CONTACT CALL-A-AH

## 2019-07-16 ENCOUNTER — TELEPHONE (OUTPATIENT)
Dept: SMOKING CESSATION | Facility: CLINIC | Age: 64
End: 2019-07-16

## 2019-07-16 NOTE — TELEPHONE ENCOUNTER
1st attempt unable to leave a message regarding smoking cessation quit 2 episode. Call not able to be completed.

## 2019-09-06 ENCOUNTER — TELEPHONE (OUTPATIENT)
Dept: SMOKING CESSATION | Facility: CLINIC | Age: 64
End: 2019-09-06

## 2019-09-06 NOTE — TELEPHONE ENCOUNTER
2nd attempt unable to leave a message regarding smoking cessation quit 2 episode. Call can not be completed.

## 2019-09-19 ENCOUNTER — TELEPHONE (OUTPATIENT)
Dept: SMOKING CESSATION | Facility: CLINIC | Age: 64
End: 2019-09-19

## 2019-10-14 NOTE — TELEPHONE ENCOUNTER
----- Message from Felicita Mujica sent at 1/27/2017 10:17 AM CST -----  Contact: self @ [phone]  Pt would like to speak with dr pradhan concerning her prescriptions.  Would also like to discuss her out pt group therapy.  pls call.    LMTRC

## 2019-11-07 ENCOUNTER — TELEPHONE (OUTPATIENT)
Dept: NEUROLOGY | Facility: CLINIC | Age: 64
End: 2019-11-07

## 2019-11-07 NOTE — TELEPHONE ENCOUNTER
----- Message from Lee Ann Benitez MA sent at 11/6/2019  4:33 PM CST -----  Contact: Patient @ [phone] room [address]A  Can you schedule this pt with Dr. Sanchez     ----- Message -----  From: Mary Alice Lynn NP  Sent: 11/6/2019   4:27 PM CST  To: Lee Ann Benitez MA    She needs to see Dr. Sanchez- pls schedule follow up with her.   ----- Message -----  From: Lee Ann Benitez MA  Sent: 11/1/2019   1:51 PM CST  To: Mary Alice Lynn NP    Spoke with pt. Pt states she needs to be seen she feels her parkinson's is getting worse.     Your last note states for her to follow up in 6 months with Dr. Sanchez. It's been over 6 months do you want her to follow up with you then go to Dr. Sanchez or go straight to Dr. Sanchez?     ----- Message -----  From: Erik Anne  Sent: 11/1/2019  11:23 AM CDT  To: Shane BOONE Staff    Patient calling to schedule a f/u appt, pt states she needs to be seen before February ( which is next available ) pls call

## 2019-11-25 ENCOUNTER — OFFICE VISIT (OUTPATIENT)
Dept: PSYCHIATRY | Facility: CLINIC | Age: 64
End: 2019-11-25
Payer: MEDICARE

## 2019-11-25 DIAGNOSIS — F31.11 BIPOLAR AFFECTIVE DISORDER, CURRENTLY MANIC, MILD: Primary | ICD-10-CM

## 2019-11-25 PROCEDURE — 99999 PR PBB SHADOW E&M-EST. PATIENT-LVL I: CPT | Mod: PBBFAC,,, | Performed by: SOCIAL WORKER

## 2019-11-25 PROCEDURE — 90832 PR PSYCHOTHERAPY W/PATIENT, 30 MIN: ICD-10-PCS | Mod: ,,, | Performed by: SOCIAL WORKER

## 2019-11-25 PROCEDURE — 90832 PSYTX W PT 30 MINUTES: CPT | Mod: ,,, | Performed by: SOCIAL WORKER

## 2019-11-25 PROCEDURE — 99999 PR PBB SHADOW E&M-EST. PATIENT-LVL I: ICD-10-PCS | Mod: PBBFAC,,, | Performed by: SOCIAL WORKER

## 2019-11-25 PROCEDURE — 99211 OFF/OP EST MAY X REQ PHY/QHP: CPT | Mod: PBBFAC | Performed by: SOCIAL WORKER

## 2019-11-25 NOTE — PROGRESS NOTES
Individual Psychotherapy (PhD/LCSW)    11/25/2019    Site:  [facility]         Therapeutic Intervention: Met with patient.  Outpatient - Insight oriented psychotherapy 30 min - CPT code 65504    Chief complaint/reason for encounter: depression, mood swings, anxiety, sleep, appetite, behavior, cognition, somatic and interpersonal     Interval history and content of current session: returning client have not seen her since Jan., 2019. Is in a new living facility, likes it, talked about how to get along with her room mate, stable today, less anxiety, less depression, how to cope, family issues, and health and medical issues all focused on, and feelings and see me twice per months and keep seeing her psychiatric nurse Ph.D. Person, and continue to improve and talk life one day at a time, and volunteer for some activities at her living site encouraged.    Treatment plan:  · Target symptoms: depression, recurrent depression, anxiety , mood swings, mood disorder, adjustment, grief  · Why chosen therapy is appropriate versus another modality: relevant to diagnosis, patient responds to this modality, evidence based practice  · Outcome monitoring methods: self-report, observation  · Therapeutic intervention type: insight oriented psychotherapy, behavior modifying psychotherapy, supportive psychotherapy    Risk parameters:  Patient reports no suicidal ideation  Patient reports no homicidal ideation  Patient reports no self-injurious behavior  Patient reports no violent behavior    Verbal deficits: None    Patient's response to intervention:  The patient's response to intervention is accepting.    Progress toward goals and other mental status changes:  The patient's progress toward goals is fair .    Diagnosis:     ICD-10-CM ICD-9-CM   1. Bipolar affective disorder, currently manic, mild F31.11 296.41       Plan:  individual psychotherapy and consult psychiatrist for medication evaluation    Return to clinic: 2  weeks    Length of Service (minutes): 30

## 2019-12-17 ENCOUNTER — OFFICE VISIT (OUTPATIENT)
Dept: PSYCHIATRY | Facility: CLINIC | Age: 64
End: 2019-12-17
Payer: MEDICARE

## 2019-12-17 DIAGNOSIS — F31.11 BIPOLAR AFFECTIVE DISORDER, CURRENTLY MANIC, MILD: Primary | ICD-10-CM

## 2019-12-17 PROCEDURE — 99999 PR PBB SHADOW E&M-EST. PATIENT-LVL I: ICD-10-PCS | Mod: PBBFAC,,, | Performed by: SOCIAL WORKER

## 2019-12-17 PROCEDURE — 99999 PR PBB SHADOW E&M-EST. PATIENT-LVL I: CPT | Mod: PBBFAC,,, | Performed by: SOCIAL WORKER

## 2019-12-17 PROCEDURE — 90834 PSYTX W PT 45 MINUTES: CPT | Mod: ,,, | Performed by: SOCIAL WORKER

## 2019-12-17 PROCEDURE — 99211 OFF/OP EST MAY X REQ PHY/QHP: CPT | Mod: PBBFAC | Performed by: SOCIAL WORKER

## 2019-12-17 PROCEDURE — 90834 PR PSYCHOTHERAPY W/PATIENT, 45 MIN: ICD-10-PCS | Mod: ,,, | Performed by: SOCIAL WORKER

## 2019-12-17 NOTE — PROGRESS NOTES
Individual Psychotherapy (PhD/LCSW)    12/17/2019    Site:  [facility]         Therapeutic Intervention: Met with patient.  Outpatient - Insight oriented psychotherapy 45 min - CPT code 29375    Chief complaint/reason for encounter: depression, mood swings, anxiety, sleep, appetite, psychosis, behavior, cognition, somatic and interpersonal     Interval history and content of current session: discussed her room mate is hard to be around, and is loud and noisy and yells and screams, she wants to move to another room, I wrote this on her paper that she was having a hard time with the room mate and maybe a change would be good. How to cope and how to improve the situation all focused on. Claming down focused on, and how to improve her own behavior addressed, feelings, family, holidays stress, and protecting herself also addressed, mood was very manic and hard to focus today.    Treatment plan:  · Target symptoms: depression, recurrent depression, anxiety , mood swings, mood disorder, psychosis, adjustment  · Why chosen therapy is appropriate versus another modality: relevant to diagnosis, patient responds to this modality, evidence based practice  · Outcome monitoring methods: self-report, observation  · Therapeutic intervention type: insight oriented psychotherapy, behavior modifying psychotherapy    Risk parameters:  Patient reports no suicidal ideation  Patient reports no homicidal ideation  Patient reports no self-injurious behavior  Patient reports no violent behavior    Verbal deficits: None    Patient's response to intervention:  The patient's response to intervention is accepting, guarded.    Progress toward goals and other mental status changes:  The patient's progress toward goals is limited.    Diagnosis:     ICD-10-CM ICD-9-CM   1. Bipolar affective disorder, currently manic, mild F31.11 296.41       Plan:  individual psychotherapy, consult psychiatrist for medication evaluation and medication  management by physician    Return to clinic: 2 weeks    Length of Service (minutes): 45

## 2019-12-20 ENCOUNTER — TELEPHONE (OUTPATIENT)
Dept: SURGERY | Facility: CLINIC | Age: 64
End: 2019-12-20

## 2019-12-20 NOTE — TELEPHONE ENCOUNTER
Returned call to number given for patient.  Left message with Nursing Home Staff at [facility] that call was returned.  Unable to speak with patient directly.

## 2019-12-20 NOTE — TELEPHONE ENCOUNTER
----- Message from Janeen Nichols sent at 12/20/2019 10:24 AM CST -----  Contact: pt  Pt needs advice regarding ulcer on top of stomach in middle. Please give pt a call back at [phone] 3rd floor [address] due to burning sensation.

## 2020-01-03 ENCOUNTER — OFFICE VISIT (OUTPATIENT)
Dept: PSYCHIATRY | Facility: CLINIC | Age: 65
End: 2020-01-03
Payer: MEDICARE

## 2020-01-03 VITALS
DIASTOLIC BLOOD PRESSURE: 80 MMHG | WEIGHT: 246.69 LBS | HEART RATE: 70 BPM | BODY MASS INDEX: 39.82 KG/M2 | SYSTOLIC BLOOD PRESSURE: 127 MMHG

## 2020-01-03 DIAGNOSIS — F25.1 SCHIZOAFFECTIVE DISORDER, DEPRESSIVE TYPE: Primary | ICD-10-CM

## 2020-01-03 DIAGNOSIS — G47.00 INSOMNIA DISORDER WITH NON-SLEEP DISORDER MENTAL COMORBIDITY: ICD-10-CM

## 2020-01-03 DIAGNOSIS — F41.9 ANXIETY: ICD-10-CM

## 2020-01-03 PROCEDURE — 99999 PR PBB SHADOW E&M-EST. PATIENT-LVL III: ICD-10-PCS | Mod: PBBFAC,,, | Performed by: NURSE PRACTITIONER

## 2020-01-03 PROCEDURE — 99213 OFFICE O/P EST LOW 20 MIN: CPT | Mod: S$GLB,,, | Performed by: NURSE PRACTITIONER

## 2020-01-03 PROCEDURE — 99499 UNLISTED E&M SERVICE: CPT | Mod: S$PBB,HCNC,, | Performed by: NURSE PRACTITIONER

## 2020-01-03 PROCEDURE — 99999 PR PBB SHADOW E&M-EST. PATIENT-LVL III: CPT | Mod: PBBFAC,,, | Performed by: NURSE PRACTITIONER

## 2020-01-03 PROCEDURE — 99499 RISK ADDL DX/OHS AUDIT: ICD-10-PCS | Mod: S$PBB,HCNC,, | Performed by: NURSE PRACTITIONER

## 2020-01-03 PROCEDURE — 90833 PR PSYCHOTHERAPY W/PATIENT W/E&M, 30 MIN (ADD ON): ICD-10-PCS | Mod: S$GLB,,, | Performed by: NURSE PRACTITIONER

## 2020-01-03 PROCEDURE — 99213 PR OFFICE/OUTPT VISIT, EST, LEVL III, 20-29 MIN: ICD-10-PCS | Mod: S$GLB,,, | Performed by: NURSE PRACTITIONER

## 2020-01-03 PROCEDURE — 90833 PSYTX W PT W E/M 30 MIN: CPT | Mod: S$GLB,,, | Performed by: NURSE PRACTITIONER

## 2020-01-03 PROCEDURE — 99213 OFFICE O/P EST LOW 20 MIN: CPT | Mod: PBBFAC | Performed by: NURSE PRACTITIONER

## 2020-01-03 RX ORDER — DIVALPROEX SODIUM 500 MG/1
500 TABLET, FILM COATED, EXTENDED RELEASE ORAL 2 TIMES DAILY
Qty: 60 TABLET | Refills: 11 | OUTPATIENT
Start: 2020-01-03 | End: 2020-03-09 | Stop reason: SDUPTHER

## 2020-01-03 RX ORDER — QUETIAPINE FUMARATE 400 MG/1
400 TABLET, FILM COATED ORAL 2 TIMES DAILY
Qty: 60 TABLET | Refills: 11 | OUTPATIENT
Start: 2020-01-03 | End: 2020-03-09 | Stop reason: SDUPTHER

## 2020-01-03 RX ORDER — FLUOXETINE HYDROCHLORIDE 20 MG/1
20 CAPSULE ORAL DAILY
Qty: 30 CAPSULE | Refills: 11 | OUTPATIENT
Start: 2020-01-03 | End: 2020-03-09 | Stop reason: SDUPTHER

## 2020-01-03 RX ORDER — TRAZODONE HYDROCHLORIDE 100 MG/1
100 TABLET ORAL NIGHTLY PRN
Qty: 30 TABLET | Refills: 11 | OUTPATIENT
Start: 2020-01-03 | End: 2020-03-09 | Stop reason: SDUPTHER

## 2020-01-03 NOTE — PROGRESS NOTES
Outpatient Psychiatry Follow-Up Visit (MD/NP)    1/3/2020    Clinical Status of Patient:  Outpatient (Ambulatory)    Chief Complaint:  Joseluis Triplett is a 64 y.o. female who presents today for follow-up of mood disorder and anxiety.  Met with patient.      Last visit was: 1/04/19.  Chart and  reviewed.     Interval History and Content of Current Session:  Current Psychiatric Medications/changes  · Depakote to ER 2,000 mg po daily ( 500 mg x 4 tabs)  · Seroquel 300 mg po BID  · Inderal 40 mg po BID  · Prozac 20 mg po daily  · Trazodone 100 mg po q hs PRN insomnia    Pt reports that she moved into a new nursing home, [facility]. Affect appears bright with stable mood; pt appears to be at baseline.  Continues to focus on interpersonal relationships with family and nursing home staff; persecutory themes. Remains active in therapy with Dr. Lobo. Compliant with medications and denies side effects. No EPS/TD.  Denies SI/HI/AVH.     Psychotherapy:  · Target symptoms: anxiety , mood disorder  · Why chosen therapy is appropriate versus another modality: relevant to diagnosis  · Outcome monitoring methods: self-report  · Therapeutic intervention type: insight oriented psychotherapy  · Topics discussed/themes: building skills sets for symptom management, symptom recognition  · The patient's response to the intervention is accepting. The patient's progress toward treatment goals is limited.   · Duration of intervention: 18 minutes.    Review of Systems   · PSYCHIATRIC: Pertinant items are noted in the narrative.  · CONSTITUTIONAL: No weight gain or loss.   · MUSCULOSKELETAL: No pain or stiffness of the joints.  · NEUROLOGIC: No weakness, sensory changes, seizures, confusion, memory loss, tremor or other abnormal movements.  · ENDOCRINE: No polydipsia or polyuria.  · INTEGUMENTARY: No rashes or lacerations.  · EYES: No exophthalmos, jaundice or blindness.  · ENT: No dizziness, tinnitus or hearing  loss.  · RESPIRATORY: No shortness of breath.  · CARDIOVASCULAR: No tachycardia or chest pain.  · GASTROINTESTINAL: No nausea, vomiting, pain, constipation or diarrhea.  · GENITOURINARY: No frequency, dysuria or sexual dysfunction.  · HEMATOLOGIC/LYMPHATIC: No excessive bleeding, prolonged or excessive bleeding after dental extraction/injury.  · ALLERGIC/IMMUNOLOGIC: No allergic response to materials, foods or animals at this time.    Past Medical, Family and Social History: The patient's past medical, family and social history have been reviewed and updated as appropriate within the electronic medical record - see encounter notes.    Compliance: yes    Side effects: None    Risk Parameters:  Patient reports no suicidal ideation  Patient reports no homicidal ideation  Patient reports no self-injurious behavior  Patient reports no violent behavior    Exam (detailed: at least 9 elements; comprehensive: all 15 elements)   Constitutional  Vitals:  Most recent vital signs, dated less than 90 days prior to this appointment, were reviewed.   Vitals:    01/03/20 1401   BP: 127/80   Pulse: 70   Weight: 111.9 kg (246 lb 11.1 oz)        General:  unremarkable, age appropriate     Musculoskeletal  Muscle Strength/Tone:  no tremor, no tic   Gait & Station:  non-ataxic     Psychiatric  Speech:  no latency; no press   Mood & Affect:  euthymic  congruent and appropriate   Thought Process:  normal and logical   Associations:  tangential   Thought Content:  normal, no suicidality, no homicidality, delusions, or paranoia   Insight:  has awareness of illness   Judgement: behavior is adequate to circumstances   Orientation:  grossly intact   Memory: intact for content of interview   Language: grossly intact   Attention Span & Concentration:  able to focus   Fund of Knowledge:  intact and appropriate to age and level of education     Assessment and Diagnosis   Status/Progress: Based on the examination today, the patient's problem(s)  is/are adequately but not ideally controlled.  New problems have not been presented today.   Co-morbidities and Lack of compliance are not complicating management of the primary condition.  There are no active rule-out diagnoses for this patient at this time.     General Impression:       ICD-10-CM ICD-9-CM   1. Schizoaffective disorder, depressive type F25.1 295.70   2. Anxiety F41.9 300.00   3. Insomnia disorder with non-sleep disorder mental comorbidity G47.00 780.52       Intervention/Counseling/Treatment Plan   · Medication Management: Continue current medications. The risks and benefits of medication were discussed with the patient.   · Ordered labs:  Valproic level   · Depakote to  mg po BID  · Seroquel 400 mg po BID  · Inderal 40 mg po BID  · Prozac 20 mg po daily  · Trazodone 100 mg po q hs PRN insomnia  · Completed AIMS scale  · Continue therapy with Dr. Lobo    Return to Clinic: 6 months     Risks, benefits, side effects and alternative treatments discussed with patient. Patient agrees with the current plan as documented.  Encouraged Patient to keep future appointments.  Take medications as prescribed and abstain from substance abuse.  Pt to present to ED for thoughts to harm herself or others

## 2020-01-04 PROBLEM — F41.9 ANXIETY: Status: ACTIVE | Noted: 2020-01-04

## 2020-01-08 ENCOUNTER — TELEPHONE (OUTPATIENT)
Dept: SURGERY | Facility: CLINIC | Age: 65
End: 2020-01-08

## 2020-01-08 NOTE — TELEPHONE ENCOUNTER
----- Message from Gely Gillespie sent at 1/8/2020  4:22 PM CST -----  Contact: pt @[phone]  Pt states her stomach is swollen from hernia. Please call back

## 2020-01-08 NOTE — TELEPHONE ENCOUNTER
Returned call to number given for patient.  Number is for a Nursing Home, [facility]. Spoke with staff member who stated no one called our office. She requested for pt to come to phone. Pt did not answer phone. Unable to speak with patient directly.

## 2020-01-13 ENCOUNTER — OFFICE VISIT (OUTPATIENT)
Dept: NEUROLOGY | Facility: CLINIC | Age: 65
End: 2020-01-13
Payer: MEDICARE

## 2020-01-13 ENCOUNTER — LAB VISIT (OUTPATIENT)
Dept: LAB | Facility: HOSPITAL | Age: 65
End: 2020-01-13
Payer: MEDICAID

## 2020-01-13 VITALS
BODY MASS INDEX: 39.82 KG/M2 | HEART RATE: 64 BPM | SYSTOLIC BLOOD PRESSURE: 123 MMHG | DIASTOLIC BLOOD PRESSURE: 76 MMHG | HEIGHT: 66 IN

## 2020-01-13 DIAGNOSIS — R25.8 INVOLUNTARY JERKY MOVEMENTS: ICD-10-CM

## 2020-01-13 DIAGNOSIS — R26.9 GAIT DISORDER: ICD-10-CM

## 2020-01-13 DIAGNOSIS — F25.1 SCHIZOAFFECTIVE DISORDER, DEPRESSIVE TYPE: ICD-10-CM

## 2020-01-13 DIAGNOSIS — G21.19 DRUG-INDUCED PARKINSONISM: Primary | ICD-10-CM

## 2020-01-13 DIAGNOSIS — Z79.899 POLYPHARMACY: ICD-10-CM

## 2020-01-13 DIAGNOSIS — G62.9 NEUROPATHY: ICD-10-CM

## 2020-01-13 DIAGNOSIS — G21.19 DRUG-INDUCED PARKINSONISM: ICD-10-CM

## 2020-01-13 LAB
VALPROATE SERPL-MCNC: 67.2 UG/ML (ref 50–100)
VIT B12 SERPL-MCNC: 302 PG/ML (ref 210–950)

## 2020-01-13 PROCEDURE — 99999 PR PBB SHADOW E&M-EST. PATIENT-LVL IV: ICD-10-PCS | Mod: PBBFAC,,, | Performed by: PSYCHIATRY & NEUROLOGY

## 2020-01-13 PROCEDURE — 99214 OFFICE O/P EST MOD 30 MIN: CPT | Mod: S$GLB,,, | Performed by: PSYCHIATRY & NEUROLOGY

## 2020-01-13 PROCEDURE — 82607 VITAMIN B-12: CPT

## 2020-01-13 PROCEDURE — 80164 ASSAY DIPROPYLACETIC ACD TOT: CPT

## 2020-01-13 PROCEDURE — 99499 RISK ADDL DX/OHS AUDIT: ICD-10-PCS | Mod: S$PBB,HCNC,, | Performed by: PSYCHIATRY & NEUROLOGY

## 2020-01-13 PROCEDURE — 84165 PROTEIN E-PHORESIS SERUM: CPT

## 2020-01-13 PROCEDURE — 84425 ASSAY OF VITAMIN B-1: CPT

## 2020-01-13 PROCEDURE — 99999 PR PBB SHADOW E&M-EST. PATIENT-LVL IV: CPT | Mod: PBBFAC,,, | Performed by: PSYCHIATRY & NEUROLOGY

## 2020-01-13 PROCEDURE — 99214 PR OFFICE/OUTPT VISIT, EST, LEVL IV, 30-39 MIN: ICD-10-PCS | Mod: S$GLB,,, | Performed by: PSYCHIATRY & NEUROLOGY

## 2020-01-13 PROCEDURE — 84165 PROTEIN E-PHORESIS SERUM: CPT | Mod: 26,,, | Performed by: PATHOLOGY

## 2020-01-13 PROCEDURE — 84165 PATHOLOGIST INTERPRETATION SPE: ICD-10-PCS | Mod: 26,,, | Performed by: PATHOLOGY

## 2020-01-13 PROCEDURE — 36415 COLL VENOUS BLD VENIPUNCTURE: CPT

## 2020-01-13 PROCEDURE — 84446 ASSAY OF VITAMIN E: CPT

## 2020-01-13 PROCEDURE — 99499 UNLISTED E&M SERVICE: CPT | Mod: S$PBB,HCNC,, | Performed by: PSYCHIATRY & NEUROLOGY

## 2020-01-13 PROCEDURE — 99214 OFFICE O/P EST MOD 30 MIN: CPT | Mod: PBBFAC | Performed by: PSYCHIATRY & NEUROLOGY

## 2020-01-13 RX ORDER — BUPROPION HYDROCHLORIDE 150 MG/1
150 TABLET, EXTENDED RELEASE ORAL 2 TIMES DAILY
COMMUNITY
Start: 2019-09-14 | End: 2020-06-26

## 2020-01-13 RX ORDER — ONDANSETRON 4 MG/1
8 TABLET, FILM COATED ORAL EVERY 6 HOURS PRN
COMMUNITY

## 2020-01-13 RX ORDER — HALOPERIDOL 5 MG/1
5 TABLET ORAL EVERY 6 HOURS
COMMUNITY
End: 2020-06-26

## 2020-01-13 RX ORDER — CLONIDINE HYDROCHLORIDE 0.3 MG/1
0.3 TABLET ORAL 4 TIMES DAILY
COMMUNITY
End: 2022-01-26

## 2020-01-13 RX ORDER — HYDROXYZINE HYDROCHLORIDE 50 MG/1
50 TABLET, FILM COATED ORAL NIGHTLY
COMMUNITY
End: 2021-10-11

## 2020-01-13 RX ORDER — LEVOTHYROXINE SODIUM 175 UG/1
175 TABLET ORAL DAILY
COMMUNITY
Start: 2020-01-06 | End: 2021-03-18

## 2020-01-13 RX ORDER — ASPIRIN 81 MG/1
81 TABLET ORAL DAILY
COMMUNITY

## 2020-01-13 RX ORDER — ALBUTEROL SULFATE 90 UG/1
2 AEROSOL, METERED RESPIRATORY (INHALATION) EVERY 6 HOURS PRN
COMMUNITY
End: 2021-04-21 | Stop reason: SDUPTHER

## 2020-01-13 NOTE — ASSESSMENT & PLAN NOTE
Marginally controlled, in my opinion, as has possible delusions of persecution by the other residents, but maintains some insight.  Vpa low.   -> defer to psych
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
## 2020-01-13 NOTE — ASSESSMENT & PLAN NOTE
"Again, challenging historian, but my gestault is that her "inside tremors," intermittent jerks, and gait impairment are all from prior neuroleptic exposure.  By my exam, she is unchanged from 2017.   -> labs   -> neuropsych testing as I'm reluctant to empirically treat for MCI given her many current medications.   -> PT for balance  "

## 2020-01-14 LAB
ALBUMIN SERPL ELPH-MCNC: 4.09 G/DL (ref 3.35–5.55)
ALPHA1 GLOB SERPL ELPH-MCNC: 0.55 G/DL (ref 0.17–0.41)
ALPHA2 GLOB SERPL ELPH-MCNC: 0.55 G/DL (ref 0.43–0.99)
B-GLOBULIN SERPL ELPH-MCNC: 0.75 G/DL (ref 0.5–1.1)
GAMMA GLOB SERPL ELPH-MCNC: 0.97 G/DL (ref 0.67–1.58)
PROT SERPL-MCNC: 6.9 G/DL (ref 6–8.4)

## 2020-01-15 LAB — PATHOLOGIST INTERPRETATION SPE: NORMAL

## 2020-01-16 LAB — A-TOCOPHEROL VIT E SERPL-MCNC: 1713 UG/DL (ref 500–1800)

## 2020-01-17 LAB — VIT B1 BLD-MCNC: 104 UG/L (ref 38–122)

## 2020-01-31 ENCOUNTER — TELEPHONE (OUTPATIENT)
Dept: NEUROLOGY | Facility: CLINIC | Age: 65
End: 2020-01-31

## 2020-01-31 NOTE — TELEPHONE ENCOUNTER
----- Message from Mackenzie Tsang sent at 1/31/2020  1:12 PM CST -----  Contact:    Pt  [phone]  Pt has been falling a lot , pt thinks it cause of the medication.. Call the pt back to verify

## 2020-01-31 NOTE — TELEPHONE ENCOUNTER
Called and spoke to  staff ask for the nurse saurabh was caring for Ms.Ioana. I was hung up on twice and third call no one answered

## 2020-02-02 ENCOUNTER — NURSE TRIAGE (OUTPATIENT)
Dept: ADMINISTRATIVE | Facility: CLINIC | Age: 65
End: 2020-02-02

## 2020-02-02 NOTE — TELEPHONE ENCOUNTER
Pt urgently transferred to nurse from  due to reports of hallucinations and being alone. Once I spoke with pt, I asked where she was and she informed me that she is currently admitted to [facility] on the acute psychiatric unit room [address]B. Pt reports hallucinations, vomiting, and feeling like her stomach is going to explode. I called [facility] and spoke with nurse Deana who confirmed that pt was currently admitted. She had a view of the pt while we spoke and she stated pt was comfortably leaning against a post and smoking and showing no signs of distress.    Reason for Disposition   Health Information question, no triage required and triager able to answer question    Protocols used: INFORMATION ONLY CALL-A-

## 2020-02-03 ENCOUNTER — TELEPHONE (OUTPATIENT)
Dept: INTERNAL MEDICINE | Facility: CLINIC | Age: 65
End: 2020-02-03

## 2020-02-03 NOTE — TELEPHONE ENCOUNTER
----- Message from Michelle Nevarze sent at 2/1/2020  3:00 PM CST -----  Contact: pt  Pt would like to be called back regarding an appt  Pt can be reached at [phone]

## 2020-02-03 NOTE — TELEPHONE ENCOUNTER
M to inform patient Dr. Reddy is not accepting new patients and to call office back to schedule appointment with different provider.

## 2020-02-26 ENCOUNTER — OFFICE VISIT (OUTPATIENT)
Dept: PSYCHIATRY | Facility: CLINIC | Age: 65
End: 2020-02-26
Payer: MEDICARE

## 2020-02-26 DIAGNOSIS — F31.31 BIPOLAR AFFECTIVE DISORDER, CURRENTLY DEPRESSED, MILD: Primary | ICD-10-CM

## 2020-02-26 PROBLEM — F31.11 BIPOLAR AFFECTIVE DISORDER, CURRENTLY MANIC, MILD: Status: RESOLVED | Noted: 2019-11-25 | Resolved: 2020-02-26

## 2020-02-26 PROCEDURE — 90834 PSYTX W PT 45 MINUTES: CPT | Mod: HCNC,S$GLB,, | Performed by: SOCIAL WORKER

## 2020-02-26 PROCEDURE — 99999 PR PBB SHADOW E&M-EST. PATIENT-LVL I: ICD-10-PCS | Mod: PBBFAC,HCNC,, | Performed by: SOCIAL WORKER

## 2020-02-26 PROCEDURE — 90834 PR PSYCHOTHERAPY W/PATIENT, 45 MIN: ICD-10-PCS | Mod: HCNC,S$GLB,, | Performed by: SOCIAL WORKER

## 2020-02-26 PROCEDURE — 99999 PR PBB SHADOW E&M-EST. PATIENT-LVL I: CPT | Mod: PBBFAC,HCNC,, | Performed by: SOCIAL WORKER
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
## 2020-03-05 ENCOUNTER — TELEPHONE (OUTPATIENT)
Dept: SURGERY | Facility: CLINIC | Age: 65
End: 2020-03-05

## 2020-03-05 NOTE — TELEPHONE ENCOUNTER
Left message on patient's Mother's voicemail that call was returned and for her to call back. Direct phone number given for her to call back.

## 2020-03-05 NOTE — TELEPHONE ENCOUNTER
----- Message from Bobbi Lance sent at 3/5/2020 10:15 AM CST -----  Pt mother is calling because pt need a referral to a new dr and would like for someone to give her a call back [phone]

## 2020-03-09 ENCOUNTER — OFFICE VISIT (OUTPATIENT)
Dept: PSYCHIATRY | Facility: CLINIC | Age: 65
End: 2020-03-09
Payer: MEDICARE

## 2020-03-09 DIAGNOSIS — F25.1 SCHIZOAFFECTIVE DISORDER, DEPRESSIVE TYPE: ICD-10-CM

## 2020-03-09 PROCEDURE — 99499 RISK ADDL DX/OHS AUDIT: ICD-10-PCS | Mod: S$GLB,,, | Performed by: NURSE PRACTITIONER

## 2020-03-09 PROCEDURE — 99499 UNLISTED E&M SERVICE: CPT | Mod: S$GLB,,, | Performed by: NURSE PRACTITIONER

## 2020-03-09 PROCEDURE — 99999 PR PBB SHADOW E&M-EST. PATIENT-LVL III: ICD-10-PCS | Mod: PBBFAC,HCNC,, | Performed by: NURSE PRACTITIONER

## 2020-03-09 PROCEDURE — 3078F DIAST BP <80 MM HG: CPT | Mod: HCNC,CPTII,S$GLB, | Performed by: NURSE PRACTITIONER

## 2020-03-09 PROCEDURE — 3078F PR MOST RECENT DIASTOLIC BLOOD PRESSURE < 80 MM HG: ICD-10-PCS | Mod: HCNC,CPTII,S$GLB, | Performed by: NURSE PRACTITIONER

## 2020-03-09 PROCEDURE — 99213 PR OFFICE/OUTPT VISIT, EST, LEVL III, 20-29 MIN: ICD-10-PCS | Mod: HCNC,S$GLB,, | Performed by: NURSE PRACTITIONER

## 2020-03-09 PROCEDURE — 99213 OFFICE O/P EST LOW 20 MIN: CPT | Mod: HCNC,S$GLB,, | Performed by: NURSE PRACTITIONER

## 2020-03-09 PROCEDURE — 99999 PR PBB SHADOW E&M-EST. PATIENT-LVL III: CPT | Mod: PBBFAC,HCNC,, | Performed by: NURSE PRACTITIONER

## 2020-03-09 PROCEDURE — 3074F SYST BP LT 130 MM HG: CPT | Mod: HCNC,CPTII,S$GLB, | Performed by: NURSE PRACTITIONER

## 2020-03-09 PROCEDURE — 3074F PR MOST RECENT SYSTOLIC BLOOD PRESSURE < 130 MM HG: ICD-10-PCS | Mod: HCNC,CPTII,S$GLB, | Performed by: NURSE PRACTITIONER

## 2020-03-09 RX ORDER — PROPRANOLOL HYDROCHLORIDE 40 MG/1
40 TABLET ORAL 2 TIMES DAILY
Qty: 60 TABLET | Refills: 5 | OUTPATIENT
Start: 2020-03-09 | End: 2020-06-26 | Stop reason: SDUPTHER

## 2020-03-09 RX ORDER — PROPRANOLOL HYDROCHLORIDE 40 MG/1
40 TABLET ORAL 2 TIMES DAILY
Qty: 60 TABLET | Refills: 5 | OUTPATIENT
Start: 2020-03-09 | End: 2020-03-09 | Stop reason: SDUPTHER

## 2020-03-09 RX ORDER — DIVALPROEX SODIUM 500 MG/1
500 TABLET, FILM COATED, EXTENDED RELEASE ORAL 2 TIMES DAILY
Qty: 60 TABLET | Refills: 11 | OUTPATIENT
Start: 2020-03-09 | End: 2020-06-26 | Stop reason: SDUPTHER

## 2020-03-09 RX ORDER — FLUOXETINE 10 MG/1
30 CAPSULE ORAL DAILY
Qty: 90 CAPSULE | Refills: 11 | OUTPATIENT
Start: 2020-03-09 | End: 2020-06-26

## 2020-03-09 RX ORDER — QUETIAPINE FUMARATE 400 MG/1
400 TABLET, FILM COATED ORAL 2 TIMES DAILY
Qty: 60 TABLET | Refills: 11 | OUTPATIENT
Start: 2020-03-09 | End: 2020-03-11 | Stop reason: SDUPTHER

## 2020-03-09 RX ORDER — TRAZODONE HYDROCHLORIDE 100 MG/1
100 TABLET ORAL NIGHTLY PRN
Qty: 30 TABLET | Refills: 11 | OUTPATIENT
Start: 2020-03-09 | End: 2020-03-11 | Stop reason: SDUPTHER

## 2020-03-09 NOTE — PROGRESS NOTES
Outpatient Psychiatry Follow-Up Visit (MD/NP)    3/9/2020    Clinical Status of Patient:  Outpatient (Ambulatory)    Chief Complaint:  Joseluis Triplett is a 64 y.o. female who presents today for follow-up of mood disorder and anxiety.  Met with patient.      Last visit was: 1/03/2020  Chart and  reviewed.     Interval History and Content of Current Session:  Current Psychiatric Medications/changes  · Depakote to  mg po BID  · Seroquel 400 mg po BID  · Inderal 40 mg po BID  · Prozac 20 mg po daily  · Trazodone 100 mg po q hs PRN insomnia    Valproic Acid level is therapeutic (67.2).      Reports depression symptoms (sadness and crying).  Denies SI/HI.  Will increase to Prozac 30 mg daily. Compliant with medications and denies side effects. No EPS/TD.  Denies SI/HI/AVH.    Psychotherapy:  · Target symptoms: anxiety , mood disorder  · Why chosen therapy is appropriate versus another modality: relevant to diagnosis  · Outcome monitoring methods: self-report  · Therapeutic intervention type: insight oriented psychotherapy  · Topics discussed/themes: building skills sets for symptom management, symptom recognition  · The patient's response to the intervention is accepting. The patient's progress toward treatment goals is limited.   · Duration of intervention: 11 minutes.    Review of Systems   · PSYCHIATRIC: Pertinant items are noted in the narrative.  · CONSTITUTIONAL: No weight gain or loss.   · MUSCULOSKELETAL: No pain or stiffness of the joints.  · NEUROLOGIC: No weakness, sensory changes, seizures, confusion, memory loss, tremor or other abnormal movements.  · ENDOCRINE: No polydipsia or polyuria.  · INTEGUMENTARY: No rashes or lacerations.  · EYES: No exophthalmos, jaundice or blindness.  · ENT: No dizziness, tinnitus or hearing loss.  · RESPIRATORY: No shortness of breath.  · CARDIOVASCULAR: No tachycardia or chest pain.  · GASTROINTESTINAL: No nausea, vomiting, pain, constipation or  "diarrhea.  · GENITOURINARY: No frequency, dysuria or sexual dysfunction.  · HEMATOLOGIC/LYMPHATIC: No excessive bleeding, prolonged or excessive bleeding after dental extraction/injury.  · ALLERGIC/IMMUNOLOGIC: No allergic response to materials, foods or animals at this time.    Past Medical, Family and Social History: The patient's past medical, family and social history have been reviewed and updated as appropriate within the electronic medical record - see encounter notes.    Compliance: yes    Side effects: None    Risk Parameters:  Patient reports no suicidal ideation  Patient reports no homicidal ideation  Patient reports no self-injurious behavior  Patient reports no violent behavior    Exam (detailed: at least 9 elements; comprehensive: all 15 elements)   Constitutional  Vitals:  Most recent vital signs, dated less than 90 days prior to this appointment, were reviewed.   Vitals:    03/09/20 1425   BP: (!) (P) 144/76   Pulse: (P) 66   Weight: (P) 107.2 kg (236 lb 7.1 oz)   Height: (P) 5' 6" (1.676 m)        General:  unremarkable, age appropriate     Musculoskeletal  Muscle Strength/Tone:  no tremor, no tic   Gait & Station:  non-ataxic     Psychiatric  Speech:  no latency; no press   Mood & Affect:  euthymic  congruent and appropriate   Thought Process:  normal and logical   Associations:  tangential   Thought Content:  normal, no suicidality, no homicidality, delusions, or paranoia   Insight:  has awareness of illness   Judgement: behavior is adequate to circumstances   Orientation:  grossly intact   Memory: intact for content of interview   Language: grossly intact   Attention Span & Concentration:  able to focus   Fund of Knowledge:  intact and appropriate to age and level of education     Assessment and Diagnosis   Status/Progress: Based on the examination today, the patient's problem(s) is/are adequately but not ideally controlled.  New problems have not been presented today.   Co-morbidities and Lack " of compliance are not complicating management of the primary condition.  There are no active rule-out diagnoses for this patient at this time.     General Impression:       ICD-10-CM ICD-9-CM   1. Schizoaffective disorder, depressive type F25.1 295.70       Intervention/Counseling/Treatment Plan   · Medication Management: Continue current medications. The risks and benefits of medication were discussed with the patient.   · Ordered labs:  Valproic level   · Depakote to  mg po BID  · Seroquel 400 mg po BID  · Inderal 40 mg po BID  · Increase to Prozac 30 mg po daily  · Trazodone 100 mg po q hs PRN insomnia  · Completed AIMS scale  · Continue therapy with Dr. Lobo    Return to Clinic: 6 months     Risks, benefits, side effects and alternative treatments discussed with patient. Patient agrees with the current plan as documented.  Encouraged Patient to keep future appointments.  Take medications as prescribed and abstain from substance abuse.  Pt to present to ED for thoughts to harm herself or others

## 2020-03-11 RX ORDER — QUETIAPINE FUMARATE 400 MG/1
400 TABLET, FILM COATED ORAL 2 TIMES DAILY
Qty: 60 TABLET | Refills: 11 | Status: SHIPPED | OUTPATIENT
Start: 2020-03-11 | End: 2020-06-26 | Stop reason: SDUPTHER

## 2020-03-11 RX ORDER — TRAZODONE HYDROCHLORIDE 100 MG/1
100 TABLET ORAL NIGHTLY PRN
Qty: 30 TABLET | Refills: 11 | Status: SHIPPED | OUTPATIENT
Start: 2020-03-11 | End: 2020-06-26 | Stop reason: SDUPTHER

## 2020-05-27 ENCOUNTER — HOSPITAL ENCOUNTER (EMERGENCY)
Facility: HOSPITAL | Age: 65
Discharge: HOME | End: 2020-05-27
Attending: EMERGENCY MEDICINE
Payer: MEDICARE

## 2020-05-27 VITALS
BODY MASS INDEX: 39.99 KG/M2 | WEIGHT: 240 LBS | TEMPERATURE: 99 F | HEIGHT: 65 IN | OXYGEN SATURATION: 98 % | HEART RATE: 89 BPM | DIASTOLIC BLOOD PRESSURE: 88 MMHG | SYSTOLIC BLOOD PRESSURE: 141 MMHG | RESPIRATION RATE: 16 BRPM

## 2020-05-27 DIAGNOSIS — Z20.822 COVID-19 VIRUS NOT DETECTED: Primary | ICD-10-CM

## 2020-05-27 LAB — SARS-COV-2 RDRP RESP QL NAA+PROBE: NEGATIVE

## 2020-05-27 PROCEDURE — 99282 EMERGENCY DEPT VISIT SF MDM: CPT | Mod: HCNC

## 2020-05-27 PROCEDURE — U0002 COVID-19 LAB TEST NON-CDC: HCPCS | Mod: HCNC

## 2020-05-27 RX ORDER — LISINOPRIL 20 MG/1
20 TABLET ORAL DAILY
COMMUNITY
End: 2022-01-26

## 2020-05-27 RX ORDER — OLANZAPINE 10 MG/1
10 TABLET ORAL NIGHTLY
COMMUNITY
End: 2020-06-26

## 2020-05-27 RX ORDER — LORATADINE 10 MG/1
10 TABLET ORAL DAILY
COMMUNITY
End: 2022-01-26

## 2020-05-27 RX ORDER — OMEPRAZOLE 20 MG/1
20 CAPSULE, DELAYED RELEASE ORAL DAILY
COMMUNITY
End: 2021-03-18

## 2020-05-27 RX ORDER — FLUTICASONE FUROATE AND VILANTEROL 200; 25 UG/1; UG/1
1 POWDER RESPIRATORY (INHALATION) DAILY
COMMUNITY
End: 2021-05-03 | Stop reason: SDUPTHER
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
## 2020-05-28 NOTE — ED TRIAGE NOTES
COVID testing (per charge nurse from [facility], pt need to be tested for COVID in order to be placed at a nursing facility; pt denies having any symptoms)

## 2020-06-24 ENCOUNTER — LAB VISIT (OUTPATIENT)
Dept: LAB | Facility: OTHER | Age: 65
End: 2020-06-24
Payer: MEDICARE

## 2020-06-24 DIAGNOSIS — Z20.822 SUSPECTED COVID-19 VIRUS INFECTION: ICD-10-CM

## 2020-06-24 PROCEDURE — U0003 INFECTIOUS AGENT DETECTION BY NUCLEIC ACID (DNA OR RNA); SEVERE ACUTE RESPIRATORY SYNDROME CORONAVIRUS 2 (SARS-COV-2) (CORONAVIRUS DISEASE [COVID-19]), AMPLIFIED PROBE TECHNIQUE, MAKING USE OF HIGH THROUGHPUT TECHNOLOGIES AS DESCRIBED BY CMS-2020-01-R: HCPCS | Mod: ST72,HCNC

## 2020-06-26 ENCOUNTER — OFFICE VISIT (OUTPATIENT)
Dept: PSYCHIATRY | Facility: CLINIC | Age: 65
End: 2020-06-26
Payer: MEDICARE

## 2020-06-26 VITALS
DIASTOLIC BLOOD PRESSURE: 56 MMHG | HEART RATE: 81 BPM | WEIGHT: 234.25 LBS | SYSTOLIC BLOOD PRESSURE: 112 MMHG | BODY MASS INDEX: 38.98 KG/M2

## 2020-06-26 DIAGNOSIS — F41.9 ANXIETY: ICD-10-CM

## 2020-06-26 DIAGNOSIS — G47.00 INSOMNIA DISORDER WITH NON-SLEEP DISORDER MENTAL COMORBIDITY: ICD-10-CM

## 2020-06-26 DIAGNOSIS — F25.1 SCHIZOAFFECTIVE DISORDER, DEPRESSIVE TYPE: Primary | ICD-10-CM

## 2020-06-26 PROCEDURE — 3078F DIAST BP <80 MM HG: CPT | Mod: HCNC,CPTII,S$GLB, | Performed by: NURSE PRACTITIONER

## 2020-06-26 PROCEDURE — 99213 PR OFFICE/OUTPT VISIT, EST, LEVL III, 20-29 MIN: ICD-10-PCS | Mod: HCNC,S$GLB,, | Performed by: NURSE PRACTITIONER

## 2020-06-26 PROCEDURE — 99499 RISK ADDL DX/OHS AUDIT: ICD-10-PCS | Mod: HCNC,S$GLB,, | Performed by: NURSE PRACTITIONER

## 2020-06-26 PROCEDURE — 3008F PR BODY MASS INDEX (BMI) DOCUMENTED: ICD-10-PCS | Mod: HCNC,CPTII,S$GLB, | Performed by: NURSE PRACTITIONER

## 2020-06-26 PROCEDURE — 3078F PR MOST RECENT DIASTOLIC BLOOD PRESSURE < 80 MM HG: ICD-10-PCS | Mod: HCNC,CPTII,S$GLB, | Performed by: NURSE PRACTITIONER

## 2020-06-26 PROCEDURE — 99499 UNLISTED E&M SERVICE: CPT | Mod: HCNC,S$GLB,, | Performed by: NURSE PRACTITIONER

## 2020-06-26 PROCEDURE — 3074F PR MOST RECENT SYSTOLIC BLOOD PRESSURE < 130 MM HG: ICD-10-PCS | Mod: HCNC,CPTII,S$GLB, | Performed by: NURSE PRACTITIONER

## 2020-06-26 PROCEDURE — 3074F SYST BP LT 130 MM HG: CPT | Mod: HCNC,CPTII,S$GLB, | Performed by: NURSE PRACTITIONER

## 2020-06-26 PROCEDURE — 3008F BODY MASS INDEX DOCD: CPT | Mod: HCNC,CPTII,S$GLB, | Performed by: NURSE PRACTITIONER

## 2020-06-26 PROCEDURE — 99213 OFFICE O/P EST LOW 20 MIN: CPT | Mod: HCNC,S$GLB,, | Performed by: NURSE PRACTITIONER

## 2020-06-26 PROCEDURE — 99999 PR PBB SHADOW E&M-EST. PATIENT-LVL III: ICD-10-PCS | Mod: PBBFAC,HCNC,, | Performed by: NURSE PRACTITIONER

## 2020-06-26 PROCEDURE — 99999 PR PBB SHADOW E&M-EST. PATIENT-LVL III: CPT | Mod: PBBFAC,HCNC,, | Performed by: NURSE PRACTITIONER

## 2020-06-26 RX ORDER — DIVALPROEX SODIUM 500 MG/1
500 TABLET, FILM COATED, EXTENDED RELEASE ORAL 2 TIMES DAILY
Qty: 60 TABLET | Refills: 11 | Status: SHIPPED | OUTPATIENT
Start: 2020-06-26 | End: 2020-09-25 | Stop reason: SDUPTHER

## 2020-06-26 RX ORDER — PROPRANOLOL HYDROCHLORIDE 40 MG/1
40 TABLET ORAL 2 TIMES DAILY
Qty: 60 TABLET | Refills: 11 | Status: SHIPPED | OUTPATIENT
Start: 2020-06-26 | End: 2020-09-25 | Stop reason: SDUPTHER

## 2020-06-26 RX ORDER — TRAZODONE HYDROCHLORIDE 100 MG/1
100 TABLET ORAL NIGHTLY PRN
Qty: 30 TABLET | Refills: 11 | Status: SHIPPED | OUTPATIENT
Start: 2020-06-26 | End: 2020-09-25 | Stop reason: SDUPTHER

## 2020-06-26 RX ORDER — QUETIAPINE FUMARATE 400 MG/1
400 TABLET, FILM COATED ORAL 2 TIMES DAILY
Qty: 60 TABLET | Refills: 11 | Status: SHIPPED | OUTPATIENT
Start: 2020-06-26 | End: 2020-09-25 | Stop reason: SDUPTHER

## 2020-06-26 NOTE — PROGRESS NOTES
Outpatient Psychiatry Follow-Up Visit (MD/NP)    6/26/2020    Clinical Status of Patient:  Outpatient (Ambulatory)    Chief Complaint:  Joseluis Triplett is a 64 y.o. female who presents today for follow-up of mood disorder and anxiety.  Met with patient.      Last visit was: 3/09/2020  Chart and  reviewed.     Interval History and Content of Current Session:  Current Psychiatric Medications/changes  · Depakote to  mg po BID  · Seroquel 400 mg po BID  · Inderal 40 mg po BID  · Increase to Prozac 30 mg po daily  · Trazodone 100 mg po q hs PRN insomnia    Pt reports that she was admitted to [facility] since last visit but did not provide discharge instructions.  Also reports that she has had several COVID tests; positive 2 months ago  Appears to have stopped Prozac since last visit and started Remeron at bedtime.. Denies side effects. Reports improved sleep. Denies SI/HIAVH.     Last Valproic Acid level is therapeutic (67.2).      Psychotherapy:  · Target symptoms: anxiety , mood disorder  · Why chosen therapy is appropriate versus another modality: relevant to diagnosis  · Outcome monitoring methods: self-report  · Therapeutic intervention type: insight oriented psychotherapy  · Topics discussed/themes: building skills sets for symptom management, symptom recognition  · The patient's response to the intervention is accepting. The patient's progress toward treatment goals is limited.   · Duration of intervention: 11 minutes.    Review of Systems   · PSYCHIATRIC: Pertinant items are noted in the narrative.  · CONSTITUTIONAL: No weight gain or loss.   · MUSCULOSKELETAL: No pain or stiffness of the joints.  · NEUROLOGIC: No weakness, sensory changes, seizures, confusion, memory loss, tremor or other abnormal movements.  · ENDOCRINE: No polydipsia or polyuria.  · INTEGUMENTARY: No rashes or lacerations.  · EYES: No exophthalmos, jaundice or blindness.  · ENT: No dizziness, tinnitus or hearing  loss.  · RESPIRATORY: No shortness of breath.  · CARDIOVASCULAR: No tachycardia or chest pain.  · GASTROINTESTINAL: No nausea, vomiting, pain, constipation or diarrhea.  · GENITOURINARY: No frequency, dysuria or sexual dysfunction.  · HEMATOLOGIC/LYMPHATIC: No excessive bleeding, prolonged or excessive bleeding after dental extraction/injury.  · ALLERGIC/IMMUNOLOGIC: No allergic response to materials, foods or animals at this time.    Past Medical, Family and Social History: The patient's past medical, family and social history have been reviewed and updated as appropriate within the electronic medical record - see encounter notes.    Compliance: yes    Side effects: None    Risk Parameters:  Patient reports no suicidal ideation  Patient reports no homicidal ideation  Patient reports no self-injurious behavior  Patient reports no violent behavior    Exam (detailed: at least 9 elements; comprehensive: all 15 elements)   Constitutional  Vitals:  Most recent vital signs, dated less than 90 days prior to this appointment, were reviewed.   Vitals:    06/26/20 1435   BP: (!) 112/56   Pulse: 81   Weight: 106.3 kg (234 lb 3.8 oz)        General:  unremarkable, age appropriate     Musculoskeletal  Muscle Strength/Tone:  no tremor, no tic   Gait & Station:  non-ataxic     Psychiatric  Speech:  no latency; no press   Mood & Affect:  euthymic  congruent and appropriate   Thought Process:  normal and logical   Associations:  tangential   Thought Content:  normal, no suicidality, no homicidality, delusions, or paranoia   Insight:  has awareness of illness   Judgement: behavior is adequate to circumstances   Orientation:  grossly intact   Memory: intact for content of interview   Language: grossly intact   Attention Span & Concentration:  able to focus   Fund of Knowledge:  intact and appropriate to age and level of education     Assessment and Diagnosis   Status/Progress: Based on the examination today, the patient's problem(s)  is/are adequately but not ideally controlled.  New problems have not been presented today.   Co-morbidities and Lack of compliance are not complicating management of the primary condition.  There are no active rule-out diagnoses for this patient at this time.     General Impression:       ICD-10-CM ICD-9-CM   1. Schizoaffective disorder, depressive type  F25.1 295.70   2. Insomnia disorder with non-sleep disorder mental comorbidity  G47.00 780.52   3. Anxiety  F41.9 300.00       Intervention/Counseling/Treatment Plan   · Medication Management: Continue current medications. The risks and benefits of medication were discussed with the patient.   · Ordered labs:  Valproic level   · Depakote to  mg po BID  · Seroquel 400 mg po BID  · Inderal 40 mg po BID  · DC Prozac   · Trazodone 100 mg po q hs PRN insomnia  · Completed AIMS scale  · Continue therapy with Dr. Lobo    Return to Clinic: 6 months     Risks, benefits, side effects and alternative treatments discussed with patient. Patient agrees with the current plan as documented.  Encouraged Patient to keep future appointments.  Take medications as prescribed and abstain from substance abuse.  Pt to present to ED for thoughts to harm herself or others

## 2020-06-28 LAB — SARS-COV-2 RNA RESP QL NAA+PROBE: NOT DETECTED

## 2020-06-29 ENCOUNTER — NURSE TRIAGE (OUTPATIENT)
Dept: ADMINISTRATIVE | Facility: CLINIC | Age: 65
End: 2020-06-29

## 2020-06-30 NOTE — TELEPHONE ENCOUNTER
Pt states she rescheduled her appointment in the morning and then hung up    Reason for Disposition   Nursing judgment    Protocols used: ST NO GUIDELINE OR REFERENCE [id number]

## 2020-07-01 ENCOUNTER — LAB VISIT (OUTPATIENT)
Dept: LAB | Facility: OTHER | Age: 65
End: 2020-07-01
Attending: INTERNAL MEDICINE
Payer: MEDICARE

## 2020-07-01 DIAGNOSIS — Z20.822 SUSPECTED COVID-19 VIRUS INFECTION: ICD-10-CM

## 2020-07-01 PROCEDURE — U0003 INFECTIOUS AGENT DETECTION BY NUCLEIC ACID (DNA OR RNA); SEVERE ACUTE RESPIRATORY SYNDROME CORONAVIRUS 2 (SARS-COV-2) (CORONAVIRUS DISEASE [COVID-19]), AMPLIFIED PROBE TECHNIQUE, MAKING USE OF HIGH THROUGHPUT TECHNOLOGIES AS DESCRIBED BY CMS-2020-01-R: HCPCS | Mod: HCNC

## 2020-07-04 LAB — SARS-COV-2 RNA RESP QL NAA+PROBE: NOT DETECTED

## 2020-07-15 ENCOUNTER — LAB VISIT (OUTPATIENT)
Dept: LAB | Facility: OTHER | Age: 65
End: 2020-07-15
Payer: MEDICARE

## 2020-07-15 DIAGNOSIS — Z20.822 SUSPECTED COVID-19 VIRUS INFECTION: ICD-10-CM

## 2020-07-15 DIAGNOSIS — Z03.818 ENCOUNTER FOR OBSERVATION FOR SUSPECTED EXPOSURE TO OTHER BIOLOGICAL AGENTS RULED OUT: ICD-10-CM

## 2020-07-15 PROCEDURE — U0003 INFECTIOUS AGENT DETECTION BY NUCLEIC ACID (DNA OR RNA); SEVERE ACUTE RESPIRATORY SYNDROME CORONAVIRUS 2 (SARS-COV-2) (CORONAVIRUS DISEASE [COVID-19]), AMPLIFIED PROBE TECHNIQUE, MAKING USE OF HIGH THROUGHPUT TECHNOLOGIES AS DESCRIBED BY CMS-2020-01-R: HCPCS | Mod: HCNC

## 2020-07-19 LAB — SARS-COV-2 RNA RESP QL NAA+PROBE: NEGATIVE

## 2020-07-22 ENCOUNTER — LAB VISIT (OUTPATIENT)
Dept: LAB | Facility: OTHER | Age: 65
End: 2020-07-22
Payer: MEDICARE

## 2020-07-22 DIAGNOSIS — Z03.818 ENCOUNTER FOR OBSERVATION FOR SUSPECTED EXPOSURE TO OTHER BIOLOGICAL AGENTS RULED OUT: ICD-10-CM

## 2020-07-22 DIAGNOSIS — Z20.822 SUSPECTED COVID-19 VIRUS INFECTION: ICD-10-CM

## 2020-07-22 PROCEDURE — U0003 INFECTIOUS AGENT DETECTION BY NUCLEIC ACID (DNA OR RNA); SEVERE ACUTE RESPIRATORY SYNDROME CORONAVIRUS 2 (SARS-COV-2) (CORONAVIRUS DISEASE [COVID-19]), AMPLIFIED PROBE TECHNIQUE, MAKING USE OF HIGH THROUGHPUT TECHNOLOGIES AS DESCRIBED BY CMS-2020-01-R: HCPCS | Mod: HCNC

## 2020-07-26 LAB — SARS-COV-2 RNA RESP QL NAA+PROBE: NEGATIVE

## 2020-07-27 ENCOUNTER — OFFICE VISIT (OUTPATIENT)
Dept: PSYCHIATRY | Facility: CLINIC | Age: 65
End: 2020-07-27
Payer: MEDICARE

## 2020-07-27 DIAGNOSIS — F31.0 BIPOLAR AFFECTIVE DISORDER, CURRENT EPISODE HYPOMANIC: Primary | ICD-10-CM

## 2020-07-27 PROCEDURE — 99999 PR PBB SHADOW E&M-EST. PATIENT-LVL I: ICD-10-PCS | Mod: PBBFAC,HCNC,, | Performed by: SOCIAL WORKER

## 2020-07-27 PROCEDURE — 99999 PR PBB SHADOW E&M-EST. PATIENT-LVL I: CPT | Mod: PBBFAC,HCNC,, | Performed by: SOCIAL WORKER

## 2020-07-27 PROCEDURE — 90834 PR PSYCHOTHERAPY W/PATIENT, 45 MIN: ICD-10-PCS | Mod: HCNC,S$GLB,, | Performed by: SOCIAL WORKER

## 2020-07-27 PROCEDURE — 90834 PSYTX W PT 45 MINUTES: CPT | Mod: HCNC,S$GLB,, | Performed by: SOCIAL WORKER
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
## 2020-08-19 ENCOUNTER — LAB VISIT (OUTPATIENT)
Dept: LAB | Facility: OTHER | Age: 65
End: 2020-08-19
Payer: MEDICARE

## 2020-08-19 DIAGNOSIS — Z03.818 ENCOUNTER FOR OBSERVATION FOR SUSPECTED EXPOSURE TO OTHER BIOLOGICAL AGENTS RULED OUT: ICD-10-CM

## 2020-08-19 PROCEDURE — U0003 INFECTIOUS AGENT DETECTION BY NUCLEIC ACID (DNA OR RNA); SEVERE ACUTE RESPIRATORY SYNDROME CORONAVIRUS 2 (SARS-COV-2) (CORONAVIRUS DISEASE [COVID-19]), AMPLIFIED PROBE TECHNIQUE, MAKING USE OF HIGH THROUGHPUT TECHNOLOGIES AS DESCRIBED BY CMS-2020-01-R: HCPCS | Mod: HCNC

## 2020-08-20 LAB — SARS-COV-2 RNA RESP QL NAA+PROBE: NOT DETECTED

## 2020-09-16 ENCOUNTER — LAB VISIT (OUTPATIENT)
Dept: LAB | Facility: OTHER | Age: 65
End: 2020-09-16
Payer: MEDICARE

## 2020-09-16 DIAGNOSIS — Z03.818 ENCOUNTER FOR OBSERVATION FOR SUSPECTED EXPOSURE TO OTHER BIOLOGICAL AGENTS RULED OUT: ICD-10-CM

## 2020-09-16 PROCEDURE — U0003 INFECTIOUS AGENT DETECTION BY NUCLEIC ACID (DNA OR RNA); SEVERE ACUTE RESPIRATORY SYNDROME CORONAVIRUS 2 (SARS-COV-2) (CORONAVIRUS DISEASE [COVID-19]), AMPLIFIED PROBE TECHNIQUE, MAKING USE OF HIGH THROUGHPUT TECHNOLOGIES AS DESCRIBED BY CMS-2020-01-R: HCPCS | Mod: HCNC

## 2020-09-17 LAB — SARS-COV-2 RNA RESP QL NAA+PROBE: NOT DETECTED

## 2020-09-21 ENCOUNTER — LAB VISIT (OUTPATIENT)
Dept: LAB | Facility: OTHER | Age: 65
End: 2020-09-21
Payer: MEDICARE

## 2020-09-21 DIAGNOSIS — Z03.818 ENCOUNTER FOR OBSERVATION FOR SUSPECTED EXPOSURE TO OTHER BIOLOGICAL AGENTS RULED OUT: ICD-10-CM

## 2020-09-21 PROCEDURE — U0003 INFECTIOUS AGENT DETECTION BY NUCLEIC ACID (DNA OR RNA); SEVERE ACUTE RESPIRATORY SYNDROME CORONAVIRUS 2 (SARS-COV-2) (CORONAVIRUS DISEASE [COVID-19]), AMPLIFIED PROBE TECHNIQUE, MAKING USE OF HIGH THROUGHPUT TECHNOLOGIES AS DESCRIBED BY CMS-2020-01-R: HCPCS | Mod: HCNC

## 2020-09-22 LAB — SARS-COV-2 RNA RESP QL NAA+PROBE: NOT DETECTED

## 2020-09-24 ENCOUNTER — LAB VISIT (OUTPATIENT)
Dept: LAB | Facility: OTHER | Age: 65
End: 2020-09-24
Payer: MEDICARE

## 2020-09-24 DIAGNOSIS — Z03.818 ENCOUNTER FOR OBSERVATION FOR SUSPECTED EXPOSURE TO OTHER BIOLOGICAL AGENTS RULED OUT: ICD-10-CM

## 2020-09-24 PROCEDURE — U0003 INFECTIOUS AGENT DETECTION BY NUCLEIC ACID (DNA OR RNA); SEVERE ACUTE RESPIRATORY SYNDROME CORONAVIRUS 2 (SARS-COV-2) (CORONAVIRUS DISEASE [COVID-19]), AMPLIFIED PROBE TECHNIQUE, MAKING USE OF HIGH THROUGHPUT TECHNOLOGIES AS DESCRIBED BY CMS-2020-01-R: HCPCS | Mod: HCNC

## 2020-09-25 ENCOUNTER — OFFICE VISIT (OUTPATIENT)
Dept: PSYCHIATRY | Facility: CLINIC | Age: 65
End: 2020-09-25
Payer: MEDICARE

## 2020-09-25 VITALS
SYSTOLIC BLOOD PRESSURE: 126 MMHG | BODY MASS INDEX: 37.71 KG/M2 | WEIGHT: 226.63 LBS | HEART RATE: 76 BPM | DIASTOLIC BLOOD PRESSURE: 66 MMHG

## 2020-09-25 DIAGNOSIS — F25.1 SCHIZOAFFECTIVE DISORDER, DEPRESSIVE TYPE: Primary | ICD-10-CM

## 2020-09-25 DIAGNOSIS — Z79.899 LONG TERM CURRENT USE OF ANTIPSYCHOTIC MEDICATION: ICD-10-CM

## 2020-09-25 DIAGNOSIS — G47.00 INSOMNIA DISORDER WITH NON-SLEEP DISORDER MENTAL COMORBIDITY: ICD-10-CM

## 2020-09-25 LAB — SARS-COV-2 RNA RESP QL NAA+PROBE: NOT DETECTED

## 2020-09-25 PROCEDURE — 99213 PR OFFICE/OUTPT VISIT, EST, LEVL III, 20-29 MIN: ICD-10-PCS | Mod: HCNC,S$GLB,, | Performed by: NURSE PRACTITIONER

## 2020-09-25 PROCEDURE — 99999 PR PBB SHADOW E&M-EST. PATIENT-LVL III: ICD-10-PCS | Mod: PBBFAC,HCNC,, | Performed by: NURSE PRACTITIONER

## 2020-09-25 PROCEDURE — 99499 UNLISTED E&M SERVICE: CPT | Mod: S$PBB,,, | Performed by: NURSE PRACTITIONER

## 2020-09-25 PROCEDURE — 90833 PSYTX W PT W E/M 30 MIN: CPT | Mod: HCNC,S$GLB,, | Performed by: NURSE PRACTITIONER

## 2020-09-25 PROCEDURE — 99999 PR PBB SHADOW E&M-EST. PATIENT-LVL III: CPT | Mod: PBBFAC,HCNC,, | Performed by: NURSE PRACTITIONER

## 2020-09-25 PROCEDURE — 99499 RISK ADDL DX/OHS AUDIT: ICD-10-PCS | Mod: S$PBB,,, | Performed by: NURSE PRACTITIONER

## 2020-09-25 PROCEDURE — 99213 OFFICE O/P EST LOW 20 MIN: CPT | Mod: HCNC,S$GLB,, | Performed by: NURSE PRACTITIONER

## 2020-09-25 PROCEDURE — 90833 PR PSYCHOTHERAPY W/PATIENT W/E&M, 30 MIN (ADD ON): ICD-10-PCS | Mod: HCNC,S$GLB,, | Performed by: NURSE PRACTITIONER

## 2020-09-25 RX ORDER — PROPRANOLOL HYDROCHLORIDE 40 MG/1
40 TABLET ORAL 2 TIMES DAILY
Qty: 60 TABLET | Refills: 11 | Status: SHIPPED | OUTPATIENT
Start: 2020-09-25 | End: 2021-03-09 | Stop reason: SDUPTHER

## 2020-09-25 RX ORDER — TRAZODONE HYDROCHLORIDE 100 MG/1
100 TABLET ORAL NIGHTLY PRN
Qty: 30 TABLET | Refills: 11 | Status: SHIPPED | OUTPATIENT
Start: 2020-09-25 | End: 2021-03-09

## 2020-09-25 RX ORDER — QUETIAPINE FUMARATE 400 MG/1
400 TABLET, FILM COATED ORAL 2 TIMES DAILY
Qty: 60 TABLET | Refills: 11 | Status: SHIPPED | OUTPATIENT
Start: 2020-09-25 | End: 2021-03-09 | Stop reason: SDUPTHER

## 2020-09-25 RX ORDER — DIVALPROEX SODIUM 500 MG/1
1000 TABLET, FILM COATED, EXTENDED RELEASE ORAL DAILY
Qty: 60 TABLET | Refills: 11 | Status: SHIPPED | OUTPATIENT
Start: 2020-09-25 | End: 2021-03-09 | Stop reason: SDUPTHER

## 2020-09-25 NOTE — PROGRESS NOTES
Outpatient Psychiatry Follow-Up Visit (MD/NP)    9/25/2020    Clinical Status of Patient:  Outpatient (Ambulatory)    Chief Complaint:  Joseluis Triplett is a 65 y.o. female who presents today for follow-up of mood disorder and anxiety.  Met with patient.      Last visit was: 6/26/2020  Chart and  reviewed.     Interval History and Content of Current Session:  Current Psychiatric Medications/changes  · Depakote to  mg po BID  · Seroquel 400 mg po BID  · Inderal 40 mg po BID  · DC Prozac   · Trazodone 100 mg po q hs PRN insomnia    Pt presents tangential thought processes.  Mood appears stable at baseline.  Pt has not obtained Depakote level and nursing home records she provided appears outdated from June. Seroquel appears to be only administered at bedtime. Appears to have lithium ordered - not ordered by me. Will DC as pt was last prescribed Depakote. Pt has not provided DC records from [facility] inpatient stay. Sleep managed with Trazodone.  Last Depakote level elevated in August. Will adjust to once daily dose.  Denies SI/HIAVH.     Psychotherapy:  · Target symptoms: anxiety , mood disorder  · Why chosen therapy is appropriate versus another modality: relevant to diagnosis  · Outcome monitoring methods: self-report  · Therapeutic intervention type: insight oriented psychotherapy  · Topics discussed/themes: building skills sets for symptom management, symptom recognition  · The patient's response to the intervention is accepting. The patient's progress toward treatment goals is limited.   · Duration of intervention: 18 minutes.    Review of Systems   · PSYCHIATRIC: Pertinant items are noted in the narrative.  · CONSTITUTIONAL: No weight gain or loss.   · MUSCULOSKELETAL: No pain or stiffness of the joints.  · NEUROLOGIC: No weakness, sensory changes, seizures, confusion, memory loss, tremor or other abnormal movements.  · ENDOCRINE: No polydipsia or polyuria.  · INTEGUMENTARY: No rashes or  lacerations.  · EYES: No exophthalmos, jaundice or blindness.  · ENT: No dizziness, tinnitus or hearing loss.  · RESPIRATORY: No shortness of breath.  · CARDIOVASCULAR: No tachycardia or chest pain.  · GASTROINTESTINAL: No nausea, vomiting, pain, constipation or diarrhea.  · GENITOURINARY: No frequency, dysuria or sexual dysfunction.  · HEMATOLOGIC/LYMPHATIC: No excessive bleeding, prolonged or excessive bleeding after dental extraction/injury.  · ALLERGIC/IMMUNOLOGIC: No allergic response to materials, foods or animals at this time.    Past Medical, Family and Social History: The patient's past medical, family and social history have been reviewed and updated as appropriate within the electronic medical record - see encounter notes.    Compliance: yes    Side effects: None    Risk Parameters:  Patient reports no suicidal ideation  Patient reports no homicidal ideation  Patient reports no self-injurious behavior  Patient reports no violent behavior    Exam (detailed: at least 9 elements; comprehensive: all 15 elements)   Constitutional  Vitals:  Most recent vital signs, dated less than 90 days prior to this appointment, were reviewed.   Vitals:    09/25/20 1330   BP: 126/66   Pulse: 76   Weight: 102.8 kg (226 lb 10.1 oz)        General:  unremarkable, age appropriate     Musculoskeletal  Muscle Strength/Tone:  no tremor, no tic   Gait & Station:  non-ataxic     Psychiatric  Speech:  no latency; no press   Mood & Affect:  euthymic  congruent and appropriate   Thought Process:  normal and logical   Associations:  tangential   Thought Content:  normal, no suicidality, no homicidality, delusions, or paranoia   Insight:  has awareness of illness   Judgement: behavior is adequate to circumstances   Orientation:  grossly intact   Memory: intact for content of interview   Language: grossly intact   Attention Span & Concentration:  able to focus   Fund of Knowledge:  intact and appropriate to age and level of education      Assessment and Diagnosis   Status/Progress: Based on the examination today, the patient's problem(s) is/are adequately but not ideally controlled.  New problems have not been presented today.   Co-morbidities and Lack of compliance are not complicating management of the primary condition.  There are no active rule-out diagnoses for this patient at this time.     General Impression:       ICD-10-CM ICD-9-CM   1. Schizoaffective disorder, depressive type  F25.1 295.70   2. Insomnia disorder with non-sleep disorder mental comorbidity  G47.00 780.52   3. Long term current use of antipsychotic medication  Z79.899 V58.69       Intervention/Counseling/Treatment Plan   · Medication Management: Continue current medications. The risks and benefits of medication were discussed with the patient.   · Ordered labs:  Valproic level   · Depakote to ER 1000 mg po daily  · Seroquel 400 mg po BID  · Inderal 40 mg po BID  · Trazodone 100 mg po q hs PRN insomnia  · Completed AIMS scale  · Continue therapy with Dr. Lobo    Return to Clinic: 3 months     Risks, benefits, side effects and alternative treatments discussed with patient. Patient agrees with the current plan as documented.  Encouraged Patient to keep future appointments.  Take medications as prescribed and abstain from substance abuse.  Pt to present to ED for thoughts to harm herself or others

## 2020-09-28 ENCOUNTER — LAB VISIT (OUTPATIENT)
Dept: LAB | Facility: OTHER | Age: 65
End: 2020-09-28
Attending: INTERNAL MEDICINE
Payer: MEDICARE

## 2020-09-28 DIAGNOSIS — Z03.818 ENCOUNTER FOR OBSERVATION FOR SUSPECTED EXPOSURE TO OTHER BIOLOGICAL AGENTS RULED OUT: ICD-10-CM

## 2020-09-28 PROCEDURE — U0003 INFECTIOUS AGENT DETECTION BY NUCLEIC ACID (DNA OR RNA); SEVERE ACUTE RESPIRATORY SYNDROME CORONAVIRUS 2 (SARS-COV-2) (CORONAVIRUS DISEASE [COVID-19]), AMPLIFIED PROBE TECHNIQUE, MAKING USE OF HIGH THROUGHPUT TECHNOLOGIES AS DESCRIBED BY CMS-2020-01-R: HCPCS | Mod: HCNC

## 2020-10-01 ENCOUNTER — LAB VISIT (OUTPATIENT)
Dept: LAB | Facility: OTHER | Age: 65
End: 2020-10-01
Payer: MEDICARE

## 2020-10-01 DIAGNOSIS — Z03.818 ENCOUNTER FOR OBSERVATION FOR SUSPECTED EXPOSURE TO OTHER BIOLOGICAL AGENTS RULED OUT: ICD-10-CM

## 2020-10-01 LAB — SARS-COV-2 RNA RESP QL NAA+PROBE: NOT DETECTED

## 2020-10-01 PROCEDURE — U0003 INFECTIOUS AGENT DETECTION BY NUCLEIC ACID (DNA OR RNA); SEVERE ACUTE RESPIRATORY SYNDROME CORONAVIRUS 2 (SARS-COV-2) (CORONAVIRUS DISEASE [COVID-19]), AMPLIFIED PROBE TECHNIQUE, MAKING USE OF HIGH THROUGHPUT TECHNOLOGIES AS DESCRIBED BY CMS-2020-01-R: HCPCS | Mod: HCNC

## 2020-10-02 LAB — SARS-COV-2 RNA RESP QL NAA+PROBE: NOT DETECTED

## 2020-10-07 ENCOUNTER — LAB VISIT (OUTPATIENT)
Dept: LAB | Facility: OTHER | Age: 65
End: 2020-10-07
Attending: INTERNAL MEDICINE
Payer: MEDICARE

## 2020-10-07 DIAGNOSIS — Z03.818 ENCOUNTER FOR OBSERVATION FOR SUSPECTED EXPOSURE TO OTHER BIOLOGICAL AGENTS RULED OUT: ICD-10-CM

## 2020-10-07 PROCEDURE — U0003 INFECTIOUS AGENT DETECTION BY NUCLEIC ACID (DNA OR RNA); SEVERE ACUTE RESPIRATORY SYNDROME CORONAVIRUS 2 (SARS-COV-2) (CORONAVIRUS DISEASE [COVID-19]), AMPLIFIED PROBE TECHNIQUE, MAKING USE OF HIGH THROUGHPUT TECHNOLOGIES AS DESCRIBED BY CMS-2020-01-R: HCPCS | Mod: HCNC

## 2020-10-08 LAB — SARS-COV-2 RNA RESP QL NAA+PROBE: NOT DETECTED

## 2020-10-14 ENCOUNTER — LAB VISIT (OUTPATIENT)
Dept: LAB | Facility: OTHER | Age: 65
End: 2020-10-14
Attending: INTERNAL MEDICINE
Payer: MEDICARE

## 2020-10-14 DIAGNOSIS — Z03.818 ENCOUNTER FOR OBSERVATION FOR SUSPECTED EXPOSURE TO OTHER BIOLOGICAL AGENTS RULED OUT: ICD-10-CM

## 2020-10-14 PROCEDURE — U0003 INFECTIOUS AGENT DETECTION BY NUCLEIC ACID (DNA OR RNA); SEVERE ACUTE RESPIRATORY SYNDROME CORONAVIRUS 2 (SARS-COV-2) (CORONAVIRUS DISEASE [COVID-19]), AMPLIFIED PROBE TECHNIQUE, MAKING USE OF HIGH THROUGHPUT TECHNOLOGIES AS DESCRIBED BY CMS-2020-01-R: HCPCS | Mod: HCNC

## 2020-10-15 LAB — SARS-COV-2 RNA RESP QL NAA+PROBE: NOT DETECTED

## 2020-10-21 ENCOUNTER — LAB VISIT (OUTPATIENT)
Dept: LAB | Facility: OTHER | Age: 65
End: 2020-10-21
Payer: MEDICARE

## 2020-10-21 DIAGNOSIS — Z03.818 ENCOUNTER FOR OBSERVATION FOR SUSPECTED EXPOSURE TO OTHER BIOLOGICAL AGENTS RULED OUT: ICD-10-CM

## 2020-10-21 PROCEDURE — U0003 INFECTIOUS AGENT DETECTION BY NUCLEIC ACID (DNA OR RNA); SEVERE ACUTE RESPIRATORY SYNDROME CORONAVIRUS 2 (SARS-COV-2) (CORONAVIRUS DISEASE [COVID-19]), AMPLIFIED PROBE TECHNIQUE, MAKING USE OF HIGH THROUGHPUT TECHNOLOGIES AS DESCRIBED BY CMS-2020-01-R: HCPCS | Mod: HCNC

## 2020-10-22 LAB — SARS-COV-2 RNA RESP QL NAA+PROBE: NOT DETECTED

## 2020-10-26 ENCOUNTER — LAB VISIT (OUTPATIENT)
Dept: LAB | Facility: OTHER | Age: 65
End: 2020-10-26
Payer: MEDICARE

## 2020-10-26 DIAGNOSIS — Z03.818 ENCOUNTER FOR OBSERVATION FOR SUSPECTED EXPOSURE TO OTHER BIOLOGICAL AGENTS RULED OUT: ICD-10-CM

## 2020-10-26 PROCEDURE — U0003 INFECTIOUS AGENT DETECTION BY NUCLEIC ACID (DNA OR RNA); SEVERE ACUTE RESPIRATORY SYNDROME CORONAVIRUS 2 (SARS-COV-2) (CORONAVIRUS DISEASE [COVID-19]), AMPLIFIED PROBE TECHNIQUE, MAKING USE OF HIGH THROUGHPUT TECHNOLOGIES AS DESCRIBED BY CMS-2020-01-R: HCPCS | Mod: HCNC

## 2020-10-27 LAB — SARS-COV-2 RNA RESP QL NAA+PROBE: NOT DETECTED

## 2020-10-28 ENCOUNTER — LAB VISIT (OUTPATIENT)
Dept: LAB | Facility: OTHER | Age: 65
End: 2020-10-28
Attending: INTERNAL MEDICINE
Payer: MEDICARE

## 2020-10-28 DIAGNOSIS — Z03.818 ENCOUNTER FOR OBSERVATION FOR SUSPECTED EXPOSURE TO OTHER BIOLOGICAL AGENTS RULED OUT: ICD-10-CM

## 2020-10-28 PROCEDURE — U0003 INFECTIOUS AGENT DETECTION BY NUCLEIC ACID (DNA OR RNA); SEVERE ACUTE RESPIRATORY SYNDROME CORONAVIRUS 2 (SARS-COV-2) (CORONAVIRUS DISEASE [COVID-19]), AMPLIFIED PROBE TECHNIQUE, MAKING USE OF HIGH THROUGHPUT TECHNOLOGIES AS DESCRIBED BY CMS-2020-01-R: HCPCS | Mod: HCNC

## 2020-10-29 LAB — SARS-COV-2 RNA RESP QL NAA+PROBE: NOT DETECTED

## 2020-11-02 ENCOUNTER — LAB VISIT (OUTPATIENT)
Dept: LAB | Facility: OTHER | Age: 65
End: 2020-11-02
Payer: MEDICARE

## 2020-11-02 DIAGNOSIS — Z03.818 ENCOUNTER FOR OBSERVATION FOR SUSPECTED EXPOSURE TO OTHER BIOLOGICAL AGENTS RULED OUT: ICD-10-CM

## 2020-11-02 PROCEDURE — U0003 INFECTIOUS AGENT DETECTION BY NUCLEIC ACID (DNA OR RNA); SEVERE ACUTE RESPIRATORY SYNDROME CORONAVIRUS 2 (SARS-COV-2) (CORONAVIRUS DISEASE [COVID-19]), AMPLIFIED PROBE TECHNIQUE, MAKING USE OF HIGH THROUGHPUT TECHNOLOGIES AS DESCRIBED BY CMS-2020-01-R: HCPCS | Mod: HCNC

## 2020-11-03 ENCOUNTER — OFFICE VISIT (OUTPATIENT)
Dept: GASTROENTEROLOGY | Facility: CLINIC | Age: 65
End: 2020-11-03
Payer: MEDICARE

## 2020-11-03 ENCOUNTER — LAB VISIT (OUTPATIENT)
Dept: LAB | Facility: HOSPITAL | Age: 65
End: 2020-11-03
Payer: MEDICARE

## 2020-11-03 VITALS — DIASTOLIC BLOOD PRESSURE: 68 MMHG | SYSTOLIC BLOOD PRESSURE: 124 MMHG | HEART RATE: 109 BPM

## 2020-11-03 DIAGNOSIS — K62.89 RECTAL PAIN: ICD-10-CM

## 2020-11-03 DIAGNOSIS — R14.0 ABDOMINAL DISTENSION (GASEOUS): Primary | ICD-10-CM

## 2020-11-03 DIAGNOSIS — K59.00 CONSTIPATION, UNSPECIFIED CONSTIPATION TYPE: ICD-10-CM

## 2020-11-03 DIAGNOSIS — N28.9 FUNCTION KIDNEY DECREASED: ICD-10-CM

## 2020-11-03 DIAGNOSIS — R14.0 ABDOMINAL DISTENSION (GASEOUS): ICD-10-CM

## 2020-11-03 LAB
CREAT SERPL-MCNC: 0.9 MG/DL (ref 0.5–1.4)
EST. GFR  (AFRICAN AMERICAN): >60 ML/MIN/1.73 M^2
EST. GFR  (NON AFRICAN AMERICAN): >60 ML/MIN/1.73 M^2

## 2020-11-03 PROCEDURE — 99999 PR PBB SHADOW E&M-EST. PATIENT-LVL V: CPT | Mod: PBBFAC,HCNC,, | Performed by: FAMILY MEDICINE

## 2020-11-03 PROCEDURE — 3078F PR MOST RECENT DIASTOLIC BLOOD PRESSURE < 80 MM HG: ICD-10-PCS | Mod: HCNC,CPTII,S$GLB, | Performed by: FAMILY MEDICINE

## 2020-11-03 PROCEDURE — 99214 PR OFFICE/OUTPT VISIT, EST, LEVL IV, 30-39 MIN: ICD-10-PCS | Mod: HCNC,S$GLB,, | Performed by: FAMILY MEDICINE

## 2020-11-03 PROCEDURE — 3078F DIAST BP <80 MM HG: CPT | Mod: HCNC,CPTII,S$GLB, | Performed by: FAMILY MEDICINE

## 2020-11-03 PROCEDURE — 99214 OFFICE O/P EST MOD 30 MIN: CPT | Mod: HCNC,S$GLB,, | Performed by: FAMILY MEDICINE

## 2020-11-03 PROCEDURE — 3074F PR MOST RECENT SYSTOLIC BLOOD PRESSURE < 130 MM HG: ICD-10-PCS | Mod: HCNC,CPTII,S$GLB, | Performed by: FAMILY MEDICINE

## 2020-11-03 PROCEDURE — 99999 PR PBB SHADOW E&M-EST. PATIENT-LVL V: ICD-10-PCS | Mod: PBBFAC,HCNC,, | Performed by: FAMILY MEDICINE

## 2020-11-03 PROCEDURE — 82565 ASSAY OF CREATININE: CPT | Mod: HCNC

## 2020-11-03 PROCEDURE — 3074F SYST BP LT 130 MM HG: CPT | Mod: HCNC,CPTII,S$GLB, | Performed by: FAMILY MEDICINE

## 2020-11-03 PROCEDURE — 36415 COLL VENOUS BLD VENIPUNCTURE: CPT | Mod: HCNC

## 2020-11-03 RX ORDER — MIRTAZAPINE 15 MG/1
15 TABLET, FILM COATED ORAL NIGHTLY
COMMUNITY
End: 2021-03-09 | Stop reason: SDUPTHER

## 2020-11-03 RX ORDER — LORATADINE 10 MG/1
10 TABLET ORAL
COMMUNITY
End: 2021-07-27 | Stop reason: SDUPTHER

## 2020-11-03 RX ORDER — BUDESONIDE 0.5 MG/2ML
0.5 INHALANT ORAL DAILY
COMMUNITY
End: 2021-07-28 | Stop reason: ALTCHOICE

## 2020-11-03 RX ORDER — LACTULOSE 10 G/15ML
SOLUTION ORAL; RECTAL 3 TIMES DAILY
COMMUNITY
End: 2022-01-26

## 2020-11-03 RX ORDER — MAGNESIUM OXIDE 500 MG
TABLET ORAL
COMMUNITY
End: 2022-01-26

## 2020-11-03 RX ORDER — LIOTHYRONINE SODIUM 5 UG/1
5 TABLET ORAL
COMMUNITY
End: 2022-01-26

## 2020-11-03 RX ORDER — LEVOTHYROXINE SODIUM 75 UG/1
TABLET ORAL
COMMUNITY
Start: 2020-10-07 | End: 2021-03-18

## 2020-11-03 RX ORDER — CALCIUM ACETATE 667 MG/1
667 CAPSULE ORAL
COMMUNITY
End: 2021-07-27 | Stop reason: SDUPTHER

## 2020-11-03 RX ORDER — POLYETHYLENE GLYCOL 3350 17 G/17G
17 POWDER, FOR SOLUTION ORAL DAILY
Qty: 510 G | Refills: 11 | Status: SHIPPED | OUTPATIENT
Start: 2020-11-03 | End: 2021-07-27 | Stop reason: SDUPTHER

## 2020-11-03 NOTE — PATIENT INSTRUCTIONS
1. Continue Lactulose  2. Add MiraLax daily  - start with 1 capful twice daily for 5 days;  - decrease to 1 capful daily indefinitely; mix in 8 oz of water;  3. Increase stool softeners to twice daily  4. See Colon and Rectal for rectal discomfort/ hemorrhoids  5. Recommend following-up with Neurology for complaint of headaches.

## 2020-11-03 NOTE — PROGRESS NOTES
"    [facility] Consultation Note    Reason for Consult:  The primary encounter diagnosis was Abdominal distension (gaseous). Diagnoses of Function kidney decreased, Rectal pain, and Constipation, unspecified constipation type were also pertinent to this visit.    PCP:   Primary Doctor No       Referring MD:  No referring provider defined for this encounter.    HPI:  This is a 65 y.o. female here for evaluation of abdominal pain and rectal pain. She is an established patient.  She reports rectal pain with bowel movements, endorses a hx of hemorrhoids. She has never seen CRS for this. States she has BMs every other day, sometimes these will be hard. She takes a stool softener once daily - would like to increase this to twice daily but states her living facility will not without orders from a HCP.   Does report BRB on the toilet paper with hard stools.  Also complains of abd pain at the site of her prior abdominal surgery. States when she strains, she experiences scar pain. Also states that she notices the right side of her abdomen looks uneven sometimes and appears to "poke out."    Reflux - no  Dysphagia - no  Bowel Habits - as above  Rectal Bleeding/Melena- BRB on toilet paper   NSAIDs - none  Fam Hx - No crc, no IBD, no Celiac    ROS:  Constitutional: No fevers, chills, No unintentional weight loss  CV: + chest pain, resolved with topical treatments  Pulm: No cough, No shortness of breath  GI: see HPI  : No dysuria, No hematuria  Psych: + anxiety, No depression  Neuro: +headaches    Medical History:  has a past medical history of Anxiety, Asthma, Bipolar disorder, Chronic constipation, Chronic kidney disease, COPD (chronic obstructive pulmonary disease), Depression, DVT (deep venous thrombosis), Dysphagia, GERD (gastroesophageal reflux disease), GERD (gastroesophageal reflux disease), Hard of hearing, Hearing aid worn, Hiatal hernia, History of psychiatric hospitalization, psychiatric care, " Hyperlipidemia, Katty, Migraine headache (8/26/2014), Neuropathy (8/26/2014), CLARI (obstructive sleep apnea) (11/11/2013), CLARI (obstructive sleep apnea), Parkinson disease, Perforated duodenal ulcer (5/28/2015), Psychiatric problem, Recurrent upper respiratory infection (URI), Schizo affective schizophrenia, Seizures (2018), Therapy, Thyroid disease, Traumatic injury, and Use of cane as ambulatory aid.    Surgical History:  has a past surgical history that includes Tonsillectomy; Tympanostomy tube placement; Colonoscopy (N/A, 5/17/2016); Esophagogastroduodenoscopy; Dialysis fistula creation; and Esophagogastroduodenoscopy (N/A, 5/24/2018).    Family History: family history includes Alcohol abuse in her mother; Bipolar disorder in her mother; Cancer (age of onset: 60) in her maternal grandmother; Dementia in her mother..     Social History:  reports that she quit smoking about 2 years ago. Her smoking use included cigars. She has a 60.00 pack-year smoking history. She quit smokeless tobacco use about 7 years ago. She reports that she does not drink alcohol or use drugs.    Review of patient's allergies indicates:   Allergen Reactions    Nsaids (non-steroidal anti-inflammatory drug) Other (See Comments)     History of perforated duodenal ulcer    Penicillins Rash and Other (See Comments)     Yeast infections       Current Outpatient Medications on File Prior to Visit   Medication Sig Dispense Refill    albuterol (PROVENTIL/VENTOLIN HFA) 90 mcg/actuation inhaler Inhale 2 puffs into the lungs every 6 (six) hours as needed for Wheezing. Rescue      aspirin (ECOTRIN) 81 MG EC tablet Take 81 mg by mouth once daily.      budesonide (PULMICORT) 0.5 mg/2 mL nebulizer solution Take 0.5 mg by nebulization once daily. Controller      calcium acetate,phosphat bind, (PHOSLO) 667 mg capsule Take 667 mg by mouth 3 (three) times daily with meals.      cloNIDine (CATAPRES) 0.3 MG tablet Take 0.3 mg by mouth 4 (four) times daily.  PRN      diphenhydrAMINE-acetaminophen (TYLENOL PM)  mg Tab Take 1 tablet by mouth nightly as needed.      divalproex ER (DEPAKOTE) 500 MG Tb24 Take 2 tablets (1,000 mg total) by mouth once daily. 60 tablet 11    fluticasone furoate-vilanteroL (BREO ELLIPTA) 200-25 mcg/dose DsDv diskus inhaler Inhale 1 puff into the lungs once daily. Controller      furosemide (LASIX) 20 MG tablet TAKE ONE TABLET BY MOUTH TWICE DAILY 60 tablet 11    gabapentin (NEURONTIN) 600 MG tablet Take 1 tablet (600 mg total) by mouth 3 (three) times daily. (Patient taking differently: Take 800 mg by mouth 3 (three) times daily. ) 90 tablet 4    hydrOXYzine (ATARAX) 50 MG tablet Take 50 mg by mouth every evening.      lactulose (CHRONULAC) 10 gram/15 mL solution Take by mouth 3 (three) times daily.      levothyroxine (SYNTHROID) 75 MCG tablet       levothyroxine (SYNTHROID, LEVOTHROID) 175 MCG tablet Take 175 mcg by mouth once daily.      liothyronine (CYTOMEL) 5 MCG Tab Take 5 mcg by mouth.      lisinopriL (PRINIVIL,ZESTRIL) 20 MG tablet Take 20 mg by mouth once daily.      loratadine (CLARITIN) 10 mg tablet Take 10 mg by mouth once daily.      loratadine (CLARITIN) 10 mg tablet Take 10 mg by mouth.      melatonin 5 mg TbIE Take by mouth.      mirtazapine (REMERON) 15 MG tablet Take 15 mg by mouth every evening.      omeprazole (PRILOSEC) 20 MG capsule Take 20 mg by mouth once daily.      ondansetron (ZOFRAN) 4 MG tablet Take 8 mg by mouth every 6 (six) hours as needed for Nausea.      pantoprazole (PROTONIX) 40 MG tablet Take 40 mg by mouth once daily.      propranoloL (INDERAL) 40 MG tablet Take 1 tablet (40 mg total) by mouth 2 (two) times daily. 60 tablet 11    QUEtiapine (SEROQUEL) 400 MG tablet Take 1 tablet (400 mg total) by mouth 2 (two) times daily. 60 tablet 11    spironolactone (ALDACTONE) 25 MG tablet Take 25 mg by mouth once daily.      traZODone (DESYREL) 100 MG tablet Take 1 tablet (100 mg total) by  "mouth nightly as needed for Insomnia. 30 tablet 11    atorvastatin (LIPITOR) 10 MG tablet Take 1 tablet (10 mg total) by mouth once daily. 90 tablet 3    [DISCONTINUED] fluphenazine (PROLIXIN) 5 MG tablet 5 mg every evening.        No current facility-administered medications on file prior to visit.          Objective Findings:    Vital Signs Reviewed:  /68   Pulse 109   Ht (P) 5' 5" (1.651 m)   Wt (P) 102 kg (224 lb 13.9 oz)   LMP  (LMP Unknown)   BMI (P) 37.42 kg/m²   Body mass index is 37.42 kg/m² (pended).    Physical Exam: performed from chair as patient is unable to get onto exam table  General Appearance: Well appearing in no acute distress  Head:   Normocephalic, without obvious abnormality  Eyes:    No scleral icterus  ENT: Neck supple  Lungs: CTA bilaterally in anterior and posterior fields, no wheezes, no crackles.  Heart:  Regular rate and rhythm, S1, S2 normal  Abdomen: protruding; firm, + tenderness to abd scar site, non distended with positive bowel sounds in all four quadrants.   Extremities: Trace edema  Skin: No rash; vertical midline abdominal scar from prior abd sx, healed;  Neurologic: AAO x 3      Labs Reviewed:  Lab Results   Component Value Date    WBC 6.28 05/09/2019    HGB 14.7 05/09/2019    HCT 44.3 05/09/2019     05/09/2019    .5 (H) 06/24/2015    CHOL 242 (H) 04/24/2018    TRIG 162 (H) 04/24/2018    HDL 61 04/24/2018    ALT 18 05/09/2019    AST 18 05/09/2019     05/09/2019    K 4.1 05/09/2019    CL 98 05/09/2019    CREATININE 0.9 11/03/2020    BUN 23 05/09/2019    CO2 29 05/09/2019    INR 1.1 07/03/2015    HGBA1C 5.7 (H) 09/08/2018       No results found for: FERRITIN, TIBC, TRANSFERRIN       Imaging reviewed:  8/15/2018 FL Modified Barium Swallow Speech  1. Transit: Normal oral transit time.  2. Penetration/Aspiration: No laryngeal penetration or subglottic tracheal aspiration with thin liquids, puree, solid, or a pill.  3. Other: Calcifications seen " within the anterior neck, which correlate with thyroid calcifications seen on prior CT cervical spine 09/23/2017 8/7/2018 CT Abd pelvis with contrast for RLQ and suprapubic pain  1. The conglomeration of right lower quadrant bowel loops precludes definitive exclusion of mass lesion or acute process; there is no evidence of mesenteric stranding or evidence of acute appendicitis.  Recommend further imaging with CT abdomen/pelvis with distention of the bowel with the use of oral contrast as a focal soft tissue density in the RIGHT lower quadrant may represent inflamed bowel with submucosal edema.  2. Hepatomegaly with hepatic steatosis and stable hepatic parenchymal calcifications.    5/23/2016 FL Barium Air Contrast  1. Study limited by redundant colon and poor patient tolerance.  2. No definite mass, fixed filling defect, or stricture. Small mural lesions are difficult to exclude on the basis of this study however.    Endoscopy reviewed:  5/24/2018 EGD with Dr Suero for Epigastric abd pain, heartburn, nausea  1. Multiple plaques in the lower third of the esophagus. Biopsied.   2. Normal esophagus.   3. Erythematous mucosa in the gastric body. Biopsied.   4. Normal examined duodenum    1/10/2017 EGD for Abd pain  1. Small hiatus hernia  2. Gastritis. Biopsied.  3. Normal examined duodenum.    5/17/2016 Colonoscopy with Dr Tsang for constipation  1. Advanced to transverse colon, procedure aborted due to significant looping.  2. Fair prep  3. Incomplete colon, recommend f/u with air contrast barium enema  4. Repeat in 3 years for surveillance.    65 y.o. female here for evaluation of:    Assessment:  1. Abdominal distension (gaseous)    2. Function kidney decreased    3. Rectal pain    4. Constipation, unspecified constipation type         Significant abdominal surgical history, past failed colonoscopy. C/o constipation/hard stools with rectal pain and BRBPR. Reports hx of hemorrhoids although I do not see this on  her previous colonoscopy report. Recommend increased stool softener to BID with daily MiraLax, continue lactulose as currently taking. Refer to CRS for rectal pain and BRBPR - possible fissure vs. Hemorrhoids. Obtain CT Abd Pelvis to evaluate lower abdominal pains localized to old scar site. Serum creatinine for pending IV contrast media. I will discuss with Dr Tsang the best methodology for her colon cancer screening since she had a difficult/aborted c-scope with him in the past. She is agreeable to this plan.    Recommendations:  1. CT abd Pelvis with contrast  2. Creatinine for pending IV dye  3. Miralax daily, continue with lactulose, increase stool softener to BID  4. CRS Referral  5. Recommend neurology appt for headaches.  6. Will discuss with Dr Tsang regarding colon cancer screening.    F/U pending above tests.      Order summary:  Orders Placed This Encounter    CT Abdomen Pelvis With Contrast    Creatinine, serum    Ambulatory referral/consult to Colorectal Surgery    polyethylene glycol (GLYCOLAX) 17 gram/dose powder     25 minutes total face to face >50% spent in counseling and coordination of care      Thank you so much for allowing me to participate in the care of Joseluis Wall, RAFAELP-C

## 2020-11-04 ENCOUNTER — LAB VISIT (OUTPATIENT)
Dept: LAB | Facility: OTHER | Age: 65
End: 2020-11-04
Payer: MEDICARE

## 2020-11-04 DIAGNOSIS — Z03.818 ENCOUNTER FOR OBSERVATION FOR SUSPECTED EXPOSURE TO OTHER BIOLOGICAL AGENTS RULED OUT: ICD-10-CM

## 2020-11-04 LAB — SARS-COV-2 RNA RESP QL NAA+PROBE: NOT DETECTED

## 2020-11-04 PROCEDURE — U0003 INFECTIOUS AGENT DETECTION BY NUCLEIC ACID (DNA OR RNA); SEVERE ACUTE RESPIRATORY SYNDROME CORONAVIRUS 2 (SARS-COV-2) (CORONAVIRUS DISEASE [COVID-19]), AMPLIFIED PROBE TECHNIQUE, MAKING USE OF HIGH THROUGHPUT TECHNOLOGIES AS DESCRIBED BY CMS-2020-01-R: HCPCS | Mod: HCNC

## 2020-11-05 LAB — SARS-COV-2 RNA RESP QL NAA+PROBE: NOT DETECTED

## 2020-11-09 ENCOUNTER — TELEPHONE (OUTPATIENT)
Dept: SURGERY | Facility: CLINIC | Age: 65
End: 2020-11-09

## 2020-11-09 ENCOUNTER — LAB VISIT (OUTPATIENT)
Dept: LAB | Facility: OTHER | Age: 65
End: 2020-11-09
Payer: MEDICARE

## 2020-11-09 DIAGNOSIS — Z03.818 ENCOUNTER FOR OBSERVATION FOR SUSPECTED EXPOSURE TO OTHER BIOLOGICAL AGENTS RULED OUT: ICD-10-CM

## 2020-11-09 PROCEDURE — U0003 INFECTIOUS AGENT DETECTION BY NUCLEIC ACID (DNA OR RNA); SEVERE ACUTE RESPIRATORY SYNDROME CORONAVIRUS 2 (SARS-COV-2) (CORONAVIRUS DISEASE [COVID-19]), AMPLIFIED PROBE TECHNIQUE, MAKING USE OF HIGH THROUGHPUT TECHNOLOGIES AS DESCRIBED BY CMS-2020-01-R: HCPCS | Mod: HCNC

## 2020-11-10 ENCOUNTER — TELEPHONE (OUTPATIENT)
Dept: GASTROENTEROLOGY | Facility: CLINIC | Age: 65
End: 2020-11-10

## 2020-11-10 LAB — SARS-COV-2 RNA RESP QL NAA+PROBE: NOT DETECTED

## 2020-11-10 NOTE — TELEPHONE ENCOUNTER
MA called pt back. Pt number was not a working number. ----- Message from Rubi Patel MA sent at 11/6/2020  4:59 PM CST -----  Contact: self    ----- Message -----  From: Johnny Pereira  Sent: 11/6/2020   3:01 PM CST  To: [facility] Staff    Pt would like the nurse to give her a call     Please Call     Contact  [phone]

## 2020-11-11 ENCOUNTER — HOSPITAL ENCOUNTER (OUTPATIENT)
Dept: RADIOLOGY | Facility: HOSPITAL | Age: 65
Discharge: HOME | End: 2020-11-11
Attending: FAMILY MEDICINE
Payer: MEDICARE

## 2020-11-11 ENCOUNTER — LAB VISIT (OUTPATIENT)
Dept: LAB | Facility: OTHER | Age: 65
End: 2020-11-11
Payer: MEDICARE

## 2020-11-11 DIAGNOSIS — Z03.818 ENCOUNTER FOR OBSERVATION FOR SUSPECTED EXPOSURE TO OTHER BIOLOGICAL AGENTS RULED OUT: ICD-10-CM

## 2020-11-11 DIAGNOSIS — R14.0 ABDOMINAL DISTENSION (GASEOUS): ICD-10-CM

## 2020-11-11 PROCEDURE — 74177 CT ABD & PELVIS W/CONTRAST: CPT | Mod: 26,HCNC,, | Performed by: RADIOLOGY

## 2020-11-11 PROCEDURE — 25500020 PHARM REV CODE 255: Mod: HCNC | Performed by: FAMILY MEDICINE

## 2020-11-11 PROCEDURE — 74177 CT ABDOMEN PELVIS WITH CONTRAST: ICD-10-PCS | Mod: 26,HCNC,, | Performed by: RADIOLOGY

## 2020-11-11 PROCEDURE — 74177 CT ABD & PELVIS W/CONTRAST: CPT | Mod: TC,HCNC

## 2020-11-11 PROCEDURE — U0003 INFECTIOUS AGENT DETECTION BY NUCLEIC ACID (DNA OR RNA); SEVERE ACUTE RESPIRATORY SYNDROME CORONAVIRUS 2 (SARS-COV-2) (CORONAVIRUS DISEASE [COVID-19]), AMPLIFIED PROBE TECHNIQUE, MAKING USE OF HIGH THROUGHPUT TECHNOLOGIES AS DESCRIBED BY CMS-2020-01-R: HCPCS | Mod: HCNC

## 2020-11-11 RX ADMIN — IOHEXOL 15 ML: 350 INJECTION, SOLUTION INTRAVENOUS at 01:11

## 2020-11-11 RX ADMIN — IOHEXOL 100 ML: 350 INJECTION, SOLUTION INTRAVENOUS at 02:11

## 2020-11-12 LAB — SARS-COV-2 RNA RESP QL NAA+PROBE: NOT DETECTED

## 2020-11-13 ENCOUNTER — OFFICE VISIT (OUTPATIENT)
Dept: SURGERY | Facility: CLINIC | Age: 65
End: 2020-11-13
Payer: MEDICARE

## 2020-11-13 VITALS
HEART RATE: 76 BPM | SYSTOLIC BLOOD PRESSURE: 134 MMHG | WEIGHT: 227.75 LBS | HEIGHT: 65 IN | BODY MASS INDEX: 37.95 KG/M2 | DIASTOLIC BLOOD PRESSURE: 73 MMHG

## 2020-11-13 DIAGNOSIS — F09 COGNITIVE DISORDER: Primary | ICD-10-CM

## 2020-11-13 DIAGNOSIS — F31.31 BIPOLAR AFFECTIVE DISORDER, CURRENTLY DEPRESSED, MILD: ICD-10-CM

## 2020-11-13 DIAGNOSIS — Z12.12 ENCOUNTER FOR SCREENING FOR COLORECTAL MALIGNANT NEOPLASM: Primary | ICD-10-CM

## 2020-11-13 DIAGNOSIS — K64.9 HEMORRHOIDS, UNSPECIFIED HEMORRHOID TYPE: ICD-10-CM

## 2020-11-13 DIAGNOSIS — Z12.11 ENCOUNTER FOR SCREENING FOR COLORECTAL MALIGNANT NEOPLASM: Primary | ICD-10-CM

## 2020-11-13 PROCEDURE — 3008F PR BODY MASS INDEX (BMI) DOCUMENTED: ICD-10-PCS | Mod: HCNC,CPTII,S$GLB, | Performed by: NURSE PRACTITIONER

## 2020-11-13 PROCEDURE — 1126F AMNT PAIN NOTED NONE PRSNT: CPT | Mod: HCNC,S$GLB,, | Performed by: NURSE PRACTITIONER

## 2020-11-13 PROCEDURE — 99999 PR PBB SHADOW E&M-EST. PATIENT-LVL III: CPT | Mod: PBBFAC,HCNC,, | Performed by: NURSE PRACTITIONER

## 2020-11-13 PROCEDURE — 99203 PR OFFICE/OUTPT VISIT, NEW, LEVL III, 30-44 MIN: ICD-10-PCS | Mod: HCNC,S$GLB,, | Performed by: NURSE PRACTITIONER

## 2020-11-13 PROCEDURE — 3078F DIAST BP <80 MM HG: CPT | Mod: HCNC,CPTII,S$GLB, | Performed by: NURSE PRACTITIONER

## 2020-11-13 PROCEDURE — 3075F PR MOST RECENT SYSTOLIC BLOOD PRESS GE 130-139MM HG: ICD-10-PCS | Mod: HCNC,CPTII,S$GLB, | Performed by: NURSE PRACTITIONER

## 2020-11-13 PROCEDURE — 1126F PR PAIN SEVERITY QUANTIFIED, NO PAIN PRESENT: ICD-10-PCS | Mod: HCNC,S$GLB,, | Performed by: NURSE PRACTITIONER

## 2020-11-13 PROCEDURE — 3008F BODY MASS INDEX DOCD: CPT | Mod: HCNC,CPTII,S$GLB, | Performed by: NURSE PRACTITIONER

## 2020-11-13 PROCEDURE — 99203 OFFICE O/P NEW LOW 30 MIN: CPT | Mod: HCNC,S$GLB,, | Performed by: NURSE PRACTITIONER

## 2020-11-13 PROCEDURE — 3075F SYST BP GE 130 - 139MM HG: CPT | Mod: HCNC,CPTII,S$GLB, | Performed by: NURSE PRACTITIONER

## 2020-11-13 PROCEDURE — 3078F PR MOST RECENT DIASTOLIC BLOOD PRESSURE < 80 MM HG: ICD-10-PCS | Mod: HCNC,CPTII,S$GLB, | Performed by: NURSE PRACTITIONER

## 2020-11-13 PROCEDURE — 99999 PR PBB SHADOW E&M-EST. PATIENT-LVL III: ICD-10-PCS | Mod: PBBFAC,HCNC,, | Performed by: NURSE PRACTITIONER

## 2020-11-13 RX ORDER — FUROSEMIDE 40 MG/1
40 TABLET ORAL
COMMUNITY
Start: 2020-08-06 | End: 2021-07-27 | Stop reason: SDUPTHER

## 2020-11-13 RX ORDER — MELATONIN 5 MG
5 CAPSULE ORAL
COMMUNITY
Start: 2020-08-05 | End: 2021-03-18 | Stop reason: SDUPTHER

## 2020-11-13 RX ORDER — DIVALPROEX SODIUM 250 MG/1
1250 TABLET, DELAYED RELEASE ORAL
COMMUNITY
Start: 2020-08-05 | End: 2021-03-09

## 2020-11-13 RX ORDER — CLOTRIMAZOLE AND BETAMETHASONE DIPROPIONATE 10; .64 MG/G; MG/G
CREAM TOPICAL
COMMUNITY
Start: 2020-11-04 | End: 2022-01-26

## 2020-11-13 RX ORDER — TRAZODONE HYDROCHLORIDE 100 MG/1
100 TABLET ORAL
COMMUNITY
End: 2021-03-09

## 2020-11-13 RX ORDER — ALBUTEROL SULFATE 0.63 MG/3ML
1 SOLUTION RESPIRATORY (INHALATION)
COMMUNITY
End: 2021-07-28

## 2020-11-13 RX ORDER — LEVOTHYROXINE SODIUM 100 UG/1
TABLET ORAL
COMMUNITY
Start: 2020-11-06 | End: 2021-03-18

## 2020-11-13 RX ORDER — DOXYCYCLINE 100 MG/1
TABLET ORAL
COMMUNITY
Start: 2020-10-23 | End: 2021-03-18

## 2020-11-13 RX ORDER — FLUCONAZOLE 150 MG/1
TABLET ORAL
COMMUNITY
Start: 2020-11-04 | End: 2022-01-26

## 2020-11-13 RX ORDER — FLUTICASONE PROPIONATE 50 MCG
SPRAY, SUSPENSION (ML) NASAL
COMMUNITY
Start: 2020-11-10 | End: 2021-03-23 | Stop reason: SDUPTHER

## 2020-11-13 RX ORDER — CALCIUM ACETATE 667 MG/1
667 CAPSULE ORAL
COMMUNITY
Start: 2020-08-05 | End: 2021-07-27 | Stop reason: SDUPTHER

## 2020-11-13 RX ORDER — MIRTAZAPINE 15 MG/1
15 TABLET, FILM COATED ORAL
COMMUNITY
End: 2021-10-11 | Stop reason: SDUPTHER

## 2020-11-13 RX ORDER — DIVALPROEX SODIUM 500 MG/1
TABLET, DELAYED RELEASE ORAL
COMMUNITY
Start: 2020-09-01 | End: 2021-03-18 | Stop reason: SDUPTHER

## 2020-11-13 RX ORDER — AMOXICILLIN AND CLAVULANATE POTASSIUM 500; 125 MG/1; MG/1
TABLET, FILM COATED ORAL
COMMUNITY
Start: 2020-10-19 | End: 2021-03-18

## 2020-11-13 RX ORDER — CARBOXYMETHYLCELLULOSE SODIUM 5 MG/ML
1 SOLUTION/ DROPS OPHTHALMIC
COMMUNITY
Start: 2020-04-30

## 2020-11-13 RX ORDER — PROPRANOLOL HYDROCHLORIDE 20 MG/1
20 TABLET ORAL
COMMUNITY
Start: 2020-08-05 | End: 2020-12-21

## 2020-11-13 RX ORDER — AMMONIUM LACTATE 12 G/100G
LOTION TOPICAL
COMMUNITY
Start: 2020-08-06 | End: 2022-01-26

## 2020-11-13 RX ORDER — QUETIAPINE FUMARATE 100 MG/1
TABLET, FILM COATED ORAL
COMMUNITY
Start: 2020-09-14 | End: 2021-03-09

## 2020-11-13 RX ORDER — ATORVASTATIN CALCIUM 20 MG/1
TABLET, FILM COATED ORAL
COMMUNITY
Start: 2020-10-30 | End: 2021-03-23 | Stop reason: SDUPTHER

## 2020-11-13 RX ORDER — FUROSEMIDE 40 MG/1
TABLET ORAL
COMMUNITY
Start: 2020-10-22 | End: 2021-07-27 | Stop reason: SDUPTHER

## 2020-11-13 RX ORDER — PANTOPRAZOLE SODIUM 40 MG/1
40 TABLET, DELAYED RELEASE ORAL
COMMUNITY
Start: 2020-08-06 | End: 2021-03-18 | Stop reason: SDUPTHER

## 2020-11-13 RX ORDER — HYDROCODONE BITARTRATE AND ACETAMINOPHEN 5; 325 MG/1; MG/1
TABLET ORAL
COMMUNITY
Start: 2020-09-24 | End: 2022-01-26

## 2020-11-13 RX ORDER — ALPHA-D-GALACTOSIDASE 600 UNIT
1 CAPSULE ORAL DAILY
Qty: 538 G | Refills: 2 | Status: SHIPPED | OUTPATIENT
Start: 2020-11-13 | End: 2020-11-16 | Stop reason: SDUPTHER

## 2020-11-13 RX ORDER — LITHIUM CARBONATE 300 MG/1
CAPSULE ORAL
COMMUNITY
Start: 2020-09-14 | End: 2020-12-21

## 2020-11-13 RX ORDER — DIVALPROEX SODIUM 250 MG/1
TABLET, DELAYED RELEASE ORAL
COMMUNITY
Start: 2020-09-01 | End: 2021-03-09

## 2020-11-13 RX ORDER — HYDROCORTISONE ACETATE 25 MG/1
25 SUPPOSITORY RECTAL 2 TIMES DAILY
Qty: 20 SUPPOSITORY | Refills: 0 | Status: SHIPPED | OUTPATIENT
Start: 2020-11-13 | End: 2020-11-16 | Stop reason: SDUPTHER

## 2020-11-13 RX ORDER — AMMONIUM LACTATE 12 G/100G
1 LOTION TOPICAL
COMMUNITY
End: 2021-07-27 | Stop reason: SDUPTHER

## 2020-11-13 RX ORDER — IPRATROPIUM BROMIDE AND ALBUTEROL SULFATE 2.5; .5 MG/3ML; MG/3ML
SOLUTION RESPIRATORY (INHALATION)
COMMUNITY
Start: 2020-10-23 | End: 2021-07-28 | Stop reason: SDUPTHER

## 2020-11-13 RX ORDER — PROPRANOLOL HYDROCHLORIDE 20 MG/1
TABLET ORAL
COMMUNITY
Start: 2020-09-14 | End: 2020-12-21

## 2020-11-13 RX ORDER — ATORVASTATIN CALCIUM 20 MG/1
20 TABLET, FILM COATED ORAL
COMMUNITY
End: 2021-03-18 | Stop reason: SDUPTHER

## 2020-11-13 RX ORDER — LISINOPRIL 10 MG/1
TABLET ORAL
COMMUNITY
Start: 2020-08-29 | End: 2022-01-26

## 2020-11-13 RX ORDER — HYDROCODONE BITARTRATE AND ACETAMINOPHEN 5; 325 MG/1; MG/1
1 TABLET ORAL
COMMUNITY
End: 2021-07-27 | Stop reason: SDUPTHER

## 2020-11-13 NOTE — LETTER
November 13, 2020      Melia Wall DNP  [address]  [facility] [address]           Kevin Rodriguez  [facility] [address]  [address]  [facility] [address]  Phone: [phone]          Patient: Joseluis Triplett   MR Number: [id number]   YOB: 1955   Date of Visit: 11/13/2020       Dear Melia Wall:    Thank you for referring Joseluis Triplett to me for evaluation. Attached you will find relevant portions of my assessment and plan of care.    If you have questions, please do not hesitate to call me. I look forward to following Joseluis Triplett along with you.    Sincerely,    Kimi Pollack, NP    Enclosure  CC:  No Recipients    If you would like to receive this communication electronically, please contact [email or web address] or [phone] to request more information on [employer] Link access.    For providers and/or their staff who would like to refer a patient to [facility], please contact us through our one-stop-shop provider referral line, [facility] , at [phone].    If you feel you have received this communication in error or would no longer like to receive these types of communications, please e-mail [email or web address]
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
## 2020-11-15 ENCOUNTER — NURSE TRIAGE (OUTPATIENT)
Dept: ADMINISTRATIVE | Facility: CLINIC | Age: 65
End: 2020-11-15

## 2020-11-16 ENCOUNTER — TELEPHONE (OUTPATIENT)
Dept: PSYCHIATRY | Facility: CLINIC | Age: 65
End: 2020-11-16

## 2020-11-16 ENCOUNTER — LAB VISIT (OUTPATIENT)
Dept: LAB | Facility: OTHER | Age: 65
End: 2020-11-16
Payer: MEDICARE

## 2020-11-16 ENCOUNTER — NURSE TRIAGE (OUTPATIENT)
Dept: ADMINISTRATIVE | Facility: CLINIC | Age: 65
End: 2020-11-16

## 2020-11-16 DIAGNOSIS — Z03.818 ENCOUNTER FOR OBSERVATION FOR SUSPECTED EXPOSURE TO OTHER BIOLOGICAL AGENTS RULED OUT: ICD-10-CM

## 2020-11-16 PROCEDURE — U0003 INFECTIOUS AGENT DETECTION BY NUCLEIC ACID (DNA OR RNA); SEVERE ACUTE RESPIRATORY SYNDROME CORONAVIRUS 2 (SARS-COV-2) (CORONAVIRUS DISEASE [COVID-19]), AMPLIFIED PROBE TECHNIQUE, MAKING USE OF HIGH THROUGHPUT TECHNOLOGIES AS DESCRIBED BY CMS-2020-01-R: HCPCS | Mod: HCNC

## 2020-11-16 RX ORDER — ALPHA-D-GALACTOSIDASE 600 UNIT
1 CAPSULE ORAL DAILY
Qty: 538 G | Refills: 2 | Status: SHIPPED | OUTPATIENT
Start: 2020-11-16

## 2020-11-16 RX ORDER — HYDROCORTISONE ACETATE 25 MG/1
25 SUPPOSITORY RECTAL 2 TIMES DAILY
Qty: 20 SUPPOSITORY | Refills: 0 | Status: SHIPPED | OUTPATIENT
Start: 2020-11-16 | End: 2020-11-26

## 2020-11-16 NOTE — TELEPHONE ENCOUNTER
Received call from Dr. Guillermina Fowler whom states she cannot advice on situation due to patient not currently being in an [facility].
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
## 2020-11-16 NOTE — TELEPHONE ENCOUNTER
Attempted to contact patient to schedule a follow up appointment in the psychiatry clinic. No answer. Left voice message for patient to call the psychiatry clinic.

## 2020-11-17 ENCOUNTER — TELEPHONE (OUTPATIENT)
Dept: NEUROLOGY | Facility: CLINIC | Age: 65
End: 2020-11-17

## 2020-11-17 NOTE — TELEPHONE ENCOUNTER
----- Message from Ijeoma Carter sent at 11/17/2020  1:41 PM CST -----  Regarding: appt  Contact: pT@ [phone]  Pt calling to speak with someone in Dr. Estrada's office regarding getting an appt, says she is a previous patient of Dr. Zamudio, please call.

## 2020-11-17 NOTE — TELEPHONE ENCOUNTER
Patient calling because she needs an appointment with her psychiatrist. Will route message. Advised the patient to call back with any further questions or needs.      Reason for Disposition   [1] Follow-up call to recent contact AND [2] information only call, no triage required    Additional Information   Negative: [1] Caller is not with the adult (patient) AND [2] reporting urgent symptoms   Negative: Lab result questions   Negative: Medication questions   Negative: Caller can't be reached by phone   Negative: Caller has already spoken to PCP or another triager   Negative: RN needs further essential information from caller in order to complete triage   Negative: Requesting regular office appointment   Negative: [1] Caller requesting NON-URGENT health information AND [2] PCP's office is the best resource   Negative: Health Information question, no triage required and triager able to answer question   Negative: General information question, no triage required and triager able to answer question   Negative: Question about upcoming scheduled test, no triage required and triager able to answer question   Negative: [1] Caller is not with the adult (patient) AND [2] probable NON-URGENT symptoms    Protocols used: INFORMATION ONLY CALL - [facility]

## 2020-11-18 ENCOUNTER — TELEPHONE (OUTPATIENT)
Dept: NEUROLOGY | Facility: CLINIC | Age: 65
End: 2020-11-18

## 2020-11-18 ENCOUNTER — LAB VISIT (OUTPATIENT)
Dept: LAB | Facility: OTHER | Age: 65
End: 2020-11-18
Payer: MEDICARE

## 2020-11-18 DIAGNOSIS — Z03.818 ENCOUNTER FOR OBSERVATION FOR SUSPECTED EXPOSURE TO OTHER BIOLOGICAL AGENTS RULED OUT: ICD-10-CM

## 2020-11-18 LAB — SARS-COV-2 RNA RESP QL NAA+PROBE: NOT DETECTED

## 2020-11-18 PROCEDURE — U0003 INFECTIOUS AGENT DETECTION BY NUCLEIC ACID (DNA OR RNA); SEVERE ACUTE RESPIRATORY SYNDROME CORONAVIRUS 2 (SARS-COV-2) (CORONAVIRUS DISEASE [COVID-19]), AMPLIFIED PROBE TECHNIQUE, MAKING USE OF HIGH THROUGHPUT TECHNOLOGIES AS DESCRIBED BY CMS-2020-01-R: HCPCS | Mod: HCNC

## 2020-11-18 NOTE — TELEPHONE ENCOUNTER
----- Message from Felicita Mujica sent at 11/18/2020  4:21 PM CST -----  Contact: self @ [phone]  Pt would like someone to call her with an appt to see Dr Estrada.  She says she left 1 message on yesterday and this is her 2nd call today.  Pls call.

## 2020-11-19 ENCOUNTER — TELEPHONE (OUTPATIENT)
Dept: NEUROLOGY | Facility: CLINIC | Age: 65
End: 2020-11-19

## 2020-11-19 NOTE — TELEPHONE ENCOUNTER
----- Message from Bridgette Crensahw PA-C sent at 11/19/2020  9:50 AM CST -----  Regarding: RE: pt  Yes I can see her   ----- Message -----  From: Gwen Hoyos RN  Sent: 11/18/2020   3:43 PM CST  To: Bridgette Crenshaw PA-C  Subject: FW: pt                                           Bakari Angeles, former pt of Dr. Quincy Rushing. Would you like to see her?  ----- Message -----  From: Nichol Turpin  Sent: 11/18/2020   1:04 PM CST  To: [facility] Staff  Subject: pt                                               Pt is returning the nurses phone call about scheduling an  appt can you please call Justa at [phone] ask for Justa SCOTT

## 2020-11-20 ENCOUNTER — TELEPHONE (OUTPATIENT)
Dept: NEUROLOGY | Facility: CLINIC | Age: 65
End: 2020-11-20
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
## 2020-11-20 NOTE — TELEPHONE ENCOUNTER
"Called and spoke Ms. Triplett, she was informed she has an appt scheduled for 11/23/2020 at 8 am, appt cannot be scheduled with Dr. Estrada. Ms. Triplett states she "was aware of the appt, I want to cancel the appt and rescheduled with Dr. Estrada". appt scheduled for 2/8/2021 at 2:40.   "

## 2020-11-23 ENCOUNTER — TELEPHONE (OUTPATIENT)
Dept: ENDOSCOPY | Facility: HOSPITAL | Age: 65
End: 2020-11-23

## 2020-11-23 ENCOUNTER — LAB VISIT (OUTPATIENT)
Dept: LAB | Facility: OTHER | Age: 65
End: 2020-11-23
Payer: MEDICARE

## 2020-11-23 DIAGNOSIS — Z03.818 ENCOUNTER FOR OBSERVATION FOR SUSPECTED EXPOSURE TO OTHER BIOLOGICAL AGENTS RULED OUT: ICD-10-CM

## 2020-11-23 LAB — SARS-COV-2 RNA RESP QL NAA+PROBE: NOT DETECTED

## 2020-11-23 PROCEDURE — U0003 INFECTIOUS AGENT DETECTION BY NUCLEIC ACID (DNA OR RNA); SEVERE ACUTE RESPIRATORY SYNDROME CORONAVIRUS 2 (SARS-COV-2) (CORONAVIRUS DISEASE [COVID-19]), AMPLIFIED PROBE TECHNIQUE, MAKING USE OF HIGH THROUGHPUT TECHNOLOGIES AS DESCRIBED BY CMS-2020-01-R: HCPCS | Mod: HCNC

## 2020-11-23 NOTE — TELEPHONE ENCOUNTER
----- Message from Jennifer Margaret sent at 11/23/2020  4:17 PM CST -----  Contact: [phone]  Joseluis   says she is waiting for a call back.    Thought she missed  your call.  Wants to discuss other  problems with you.   Asking if you will pls call her again.  She is at the   [phone] number.  As per caller.

## 2020-11-24 ENCOUNTER — TELEPHONE (OUTPATIENT)
Dept: ENDOSCOPY | Facility: HOSPITAL | Age: 65
End: 2020-11-24

## 2020-11-24 LAB — SARS-COV-2 RNA RESP QL NAA+PROBE: NOT DETECTED

## 2020-11-24 NOTE — TELEPHONE ENCOUNTER
----- Message from Johnny Pereira sent at 11/24/2020 11:02 AM CST -----  Contact: self  Pt calling to inform Dr she had a good bowel movement and she need a phone call concerning her colonoscopy    Please call    Contact

## 2020-11-25 ENCOUNTER — LAB VISIT (OUTPATIENT)
Dept: LAB | Facility: OTHER | Age: 65
End: 2020-11-25
Payer: MEDICARE

## 2020-11-25 DIAGNOSIS — Z03.818 ENCOUNTER FOR OBSERVATION FOR SUSPECTED EXPOSURE TO OTHER BIOLOGICAL AGENTS RULED OUT: ICD-10-CM

## 2020-11-25 PROCEDURE — U0003 INFECTIOUS AGENT DETECTION BY NUCLEIC ACID (DNA OR RNA); SEVERE ACUTE RESPIRATORY SYNDROME CORONAVIRUS 2 (SARS-COV-2) (CORONAVIRUS DISEASE [COVID-19]), AMPLIFIED PROBE TECHNIQUE, MAKING USE OF HIGH THROUGHPUT TECHNOLOGIES AS DESCRIBED BY CMS-2020-01-R: HCPCS | Mod: HCNC

## 2020-11-27 LAB — SARS-COV-2 RNA RESP QL NAA+PROBE: NOT DETECTED

## 2020-11-29 ENCOUNTER — NURSE TRIAGE (OUTPATIENT)
Dept: ADMINISTRATIVE | Facility: CLINIC | Age: 65
End: 2020-11-29

## 2020-11-30 ENCOUNTER — LAB VISIT (OUTPATIENT)
Dept: LAB | Facility: OTHER | Age: 65
End: 2020-11-30
Payer: MEDICARE

## 2020-11-30 ENCOUNTER — NURSE TRIAGE (OUTPATIENT)
Dept: ADMINISTRATIVE | Facility: CLINIC | Age: 65
End: 2020-11-30

## 2020-11-30 DIAGNOSIS — Z03.818 ENCOUNTER FOR OBSERVATION FOR SUSPECTED EXPOSURE TO OTHER BIOLOGICAL AGENTS RULED OUT: ICD-10-CM

## 2020-11-30 PROCEDURE — U0003 INFECTIOUS AGENT DETECTION BY NUCLEIC ACID (DNA OR RNA); SEVERE ACUTE RESPIRATORY SYNDROME CORONAVIRUS 2 (SARS-COV-2) (CORONAVIRUS DISEASE [COVID-19]), AMPLIFIED PROBE TECHNIQUE, MAKING USE OF HIGH THROUGHPUT TECHNOLOGIES AS DESCRIBED BY CMS-2020-01-R: HCPCS | Mod: HCNC

## 2020-11-30 NOTE — TELEPHONE ENCOUNTER
Pt is calling to talk to her Psychiatrist, Dr. Lobo.   Pt is complaining of chest pains, heart palpitations, and high blood pressure. She reports that the nursing home she lives in has run out of Propanolol.   Pt is currently staying in [facility].  Pt advised to call 911 due to symptoms.   She reports that facility will not let her call 911 or leave the building. Pt states she is being kept in a box and prescribed too much Depakote.   Pt called triage line earlier and spoke to SYED Elizabeth RN. SYED Elizabeth RN called patient's nurse, Arleen Schofield on 11/29/20 at 11:28 PM. Arleen stated that patient is being cared for. Pt was assessed earlier during her shift. Pt's behavior is due to her schizophrenia.    Have made several attempts to contact nurse at [facility]. Left message for patient's nurse, Arleen, to call back.        Reason for Disposition   [1] Chest pain lasts > 5 minutes AND [2] age > 45    Additional Information   Negative: Severe difficulty breathing (e.g., struggling for each breath, speaks in single words)   Negative: Difficult to awaken or acting confused (e.g., disoriented, slurred speech)   Negative: Shock suspected (e.g., cold/pale/clammy skin, too weak to stand, low BP, rapid pulse)    Protocols used: CHEST PAIN-A-

## 2020-11-30 NOTE — TELEPHONE ENCOUNTER
Pt is in [facility]. Having chest pains. Pt advised to call #[phone] now. Pt states facility won't let her call. [facility] contacted and spoke with Arleen who states she is the RN in charge of care for patient this evening and the patient has been assessed. Arleen states patient is schizophrenic and is being cared for. Pt advised to contact Arleen for any further complaints.    Reason for Disposition   [1] Chest pain lasts > 5 minutes AND [2] age > 45    Additional Information   Negative: Severe difficulty breathing (e.g., struggling for each breath, speaks in single words)   Negative: Difficult to awaken or acting confused (e.g., disoriented, slurred speech)   Negative: Shock suspected (e.g., cold/pale/clammy skin, too weak to stand, low BP, rapid pulse)    Protocols used: CHEST PAIN-A-AH

## 2020-12-02 LAB — SARS-COV-2 RNA RESP QL NAA+PROBE: NOT DETECTED

## 2020-12-03 ENCOUNTER — TELEPHONE (OUTPATIENT)
Dept: ENDOSCOPY | Facility: HOSPITAL | Age: 65
End: 2020-12-03

## 2020-12-07 ENCOUNTER — LAB VISIT (OUTPATIENT)
Dept: LAB | Facility: OTHER | Age: 65
End: 2020-12-07
Payer: MEDICARE

## 2020-12-07 DIAGNOSIS — Z03.818 ENCOUNTER FOR OBSERVATION FOR SUSPECTED EXPOSURE TO OTHER BIOLOGICAL AGENTS RULED OUT: ICD-10-CM

## 2020-12-07 PROCEDURE — U0003 INFECTIOUS AGENT DETECTION BY NUCLEIC ACID (DNA OR RNA); SEVERE ACUTE RESPIRATORY SYNDROME CORONAVIRUS 2 (SARS-COV-2) (CORONAVIRUS DISEASE [COVID-19]), AMPLIFIED PROBE TECHNIQUE, MAKING USE OF HIGH THROUGHPUT TECHNOLOGIES AS DESCRIBED BY CMS-2020-01-R: HCPCS | Mod: HCNC

## 2020-12-08 ENCOUNTER — OFFICE VISIT (OUTPATIENT)
Dept: ORTHOPEDICS | Facility: CLINIC | Age: 65
End: 2020-12-08
Payer: MEDICARE

## 2020-12-08 ENCOUNTER — HOSPITAL ENCOUNTER (OUTPATIENT)
Dept: RADIOLOGY | Facility: HOSPITAL | Age: 65
Discharge: HOME | End: 2020-12-08
Attending: PHYSICIAN ASSISTANT
Payer: MEDICARE

## 2020-12-08 VITALS — WEIGHT: 228.31 LBS | BODY MASS INDEX: 38.04 KG/M2 | HEIGHT: 65 IN

## 2020-12-08 DIAGNOSIS — M25.512 CHRONIC PAIN OF BOTH SHOULDERS: ICD-10-CM

## 2020-12-08 DIAGNOSIS — M25.511 CHRONIC PAIN OF BOTH SHOULDERS: Primary | ICD-10-CM

## 2020-12-08 DIAGNOSIS — M25.511 CHRONIC PAIN OF BOTH SHOULDERS: ICD-10-CM

## 2020-12-08 DIAGNOSIS — G89.29 CHRONIC THORACIC SPINE PAIN: Primary | ICD-10-CM

## 2020-12-08 DIAGNOSIS — M25.512 ACUTE PAIN OF LEFT SHOULDER: Primary | ICD-10-CM

## 2020-12-08 DIAGNOSIS — G89.29 CHRONIC PAIN OF BOTH SHOULDERS: ICD-10-CM

## 2020-12-08 DIAGNOSIS — G89.29 CHRONIC PAIN OF BOTH SHOULDERS: Primary | ICD-10-CM

## 2020-12-08 DIAGNOSIS — M25.512 CHRONIC PAIN OF BOTH SHOULDERS: Primary | ICD-10-CM

## 2020-12-08 DIAGNOSIS — M25.512 ACUTE PAIN OF LEFT SHOULDER: ICD-10-CM

## 2020-12-08 DIAGNOSIS — M54.6 CHRONIC THORACIC SPINE PAIN: Primary | ICD-10-CM

## 2020-12-08 PROCEDURE — 3008F PR BODY MASS INDEX (BMI) DOCUMENTED: ICD-10-PCS | Mod: HCNC,CPTII,S$GLB, | Performed by: PHYSICIAN ASSISTANT

## 2020-12-08 PROCEDURE — 1125F AMNT PAIN NOTED PAIN PRSNT: CPT | Mod: HCNC,S$GLB,, | Performed by: PHYSICIAN ASSISTANT

## 2020-12-08 PROCEDURE — 73030 X-RAY EXAM OF SHOULDER: CPT | Mod: 26,50,HCNC, | Performed by: RADIOLOGY

## 2020-12-08 PROCEDURE — 99999 PR PBB SHADOW E&M-EST. PATIENT-LVL IV: ICD-10-PCS | Mod: PBBFAC,HCNC,, | Performed by: PHYSICIAN ASSISTANT

## 2020-12-08 PROCEDURE — 99203 PR OFFICE/OUTPT VISIT, NEW, LEVL III, 30-44 MIN: ICD-10-PCS | Mod: HCNC,S$GLB,, | Performed by: PHYSICIAN ASSISTANT

## 2020-12-08 PROCEDURE — 73030 X-RAY EXAM OF SHOULDER: CPT | Mod: TC,50,HCNC

## 2020-12-08 PROCEDURE — 99203 OFFICE O/P NEW LOW 30 MIN: CPT | Mod: HCNC,S$GLB,, | Performed by: PHYSICIAN ASSISTANT

## 2020-12-08 PROCEDURE — 99999 PR PBB SHADOW E&M-EST. PATIENT-LVL IV: CPT | Mod: PBBFAC,HCNC,, | Performed by: PHYSICIAN ASSISTANT

## 2020-12-08 PROCEDURE — 1125F PR PAIN SEVERITY QUANTIFIED, PAIN PRESENT: ICD-10-PCS | Mod: HCNC,S$GLB,, | Performed by: PHYSICIAN ASSISTANT

## 2020-12-08 PROCEDURE — 3008F BODY MASS INDEX DOCD: CPT | Mod: HCNC,CPTII,S$GLB, | Performed by: PHYSICIAN ASSISTANT

## 2020-12-08 PROCEDURE — 73030 XR SHOULDER COMPLETE 2 OR MORE VIEWS BILATERAL: ICD-10-PCS | Mod: 26,50,HCNC, | Performed by: RADIOLOGY

## 2020-12-08 RX ORDER — TIZANIDINE 2 MG/1
4 TABLET ORAL EVERY 8 HOURS PRN
Qty: 20 TABLET | Refills: 0 | Status: SHIPPED | OUTPATIENT
Start: 2020-12-08 | End: 2020-12-18
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
## 2020-12-09 ENCOUNTER — LAB VISIT (OUTPATIENT)
Dept: LAB | Facility: OTHER | Age: 65
End: 2020-12-09
Payer: MEDICARE

## 2020-12-09 DIAGNOSIS — Z03.818 ENCOUNTER FOR OBSERVATION FOR SUSPECTED EXPOSURE TO OTHER BIOLOGICAL AGENTS RULED OUT: ICD-10-CM

## 2020-12-09 LAB — SARS-COV-2 RNA RESP QL NAA+PROBE: NOT DETECTED

## 2020-12-09 PROCEDURE — U0003 INFECTIOUS AGENT DETECTION BY NUCLEIC ACID (DNA OR RNA); SEVERE ACUTE RESPIRATORY SYNDROME CORONAVIRUS 2 (SARS-COV-2) (CORONAVIRUS DISEASE [COVID-19]), AMPLIFIED PROBE TECHNIQUE, MAKING USE OF HIGH THROUGHPUT TECHNOLOGIES AS DESCRIBED BY CMS-2020-01-R: HCPCS | Mod: HCNC

## 2020-12-11 LAB — SARS-COV-2 RNA RESP QL NAA+PROBE: NOT DETECTED

## 2020-12-14 ENCOUNTER — LAB VISIT (OUTPATIENT)
Dept: LAB | Facility: OTHER | Age: 65
End: 2020-12-14
Payer: MEDICARE

## 2020-12-14 DIAGNOSIS — Z03.818 ENCOUNTER FOR OBSERVATION FOR SUSPECTED EXPOSURE TO OTHER BIOLOGICAL AGENTS RULED OUT: ICD-10-CM

## 2020-12-14 PROCEDURE — U0003 INFECTIOUS AGENT DETECTION BY NUCLEIC ACID (DNA OR RNA); SEVERE ACUTE RESPIRATORY SYNDROME CORONAVIRUS 2 (SARS-COV-2) (CORONAVIRUS DISEASE [COVID-19]), AMPLIFIED PROBE TECHNIQUE, MAKING USE OF HIGH THROUGHPUT TECHNOLOGIES AS DESCRIBED BY CMS-2020-01-R: HCPCS | Mod: HCNC

## 2020-12-16 LAB — SARS-COV-2 RNA RESP QL NAA+PROBE: NOT DETECTED

## 2020-12-21 ENCOUNTER — OFFICE VISIT (OUTPATIENT)
Dept: PSYCHIATRY | Facility: CLINIC | Age: 65
End: 2020-12-21
Payer: MEDICARE

## 2020-12-21 VITALS
WEIGHT: 226.63 LBS | HEART RATE: 84 BPM | SYSTOLIC BLOOD PRESSURE: 161 MMHG | DIASTOLIC BLOOD PRESSURE: 85 MMHG | BODY MASS INDEX: 37.71 KG/M2

## 2020-12-21 DIAGNOSIS — F41.9 ANXIETY: ICD-10-CM

## 2020-12-21 DIAGNOSIS — G47.00 INSOMNIA DISORDER WITH NON-SLEEP DISORDER MENTAL COMORBIDITY: ICD-10-CM

## 2020-12-21 DIAGNOSIS — F25.1 SCHIZOAFFECTIVE DISORDER, DEPRESSIVE TYPE: Primary | ICD-10-CM

## 2020-12-21 PROCEDURE — 99213 OFFICE O/P EST LOW 20 MIN: CPT | Mod: S$GLB,,, | Performed by: NURSE PRACTITIONER

## 2020-12-21 PROCEDURE — 99212 OFFICE O/P EST SF 10 MIN: CPT | Mod: PBBFAC | Performed by: NURSE PRACTITIONER

## 2020-12-21 PROCEDURE — 90833 PR PSYCHOTHERAPY W/PATIENT W/E&M, 30 MIN (ADD ON): ICD-10-PCS | Mod: S$GLB,,, | Performed by: NURSE PRACTITIONER

## 2020-12-21 PROCEDURE — 99213 PR OFFICE/OUTPT VISIT, EST, LEVL III, 20-29 MIN: ICD-10-PCS | Mod: S$GLB,,, | Performed by: NURSE PRACTITIONER

## 2020-12-21 PROCEDURE — 90833 PSYTX W PT W E/M 30 MIN: CPT | Mod: S$GLB,,, | Performed by: NURSE PRACTITIONER

## 2020-12-21 PROCEDURE — 99999 PR PBB SHADOW E&M-EST. PATIENT-LVL II: CPT | Mod: PBBFAC,,, | Performed by: NURSE PRACTITIONER

## 2020-12-21 PROCEDURE — 99999 PR PBB SHADOW E&M-EST. PATIENT-LVL II: ICD-10-PCS | Mod: PBBFAC,,, | Performed by: NURSE PRACTITIONER

## 2020-12-21 NOTE — PROGRESS NOTES
Outpatient Psychiatry Follow-Up Visit (MD/NP)    12/21/2020    Clinical Status of Patient:  Outpatient (Ambulatory)    Chief Complaint:  Joseluis Triplett is a 65 y.o. female who presents today for follow-up of mood disorder and anxiety.  Met with patient.      Last visit was: 9/25/2020  Chart and  reviewed.     Interval History and Content of Current Session:  Current Psychiatric Medications/changes  · Depakote to  mg po BID  · Seroquel 400 mg po BID  · Inderal 40 mg po BID  · DC Prozac   · Trazodone 100 mg po q hs PRN insomnia    Pt has been residing at [facility].  Appeasrs stable and at baseline. Denies any unmanaged mood, anxiety, depression, or insomnia symptoms. Denies SI/HI/AVH. Reports better response to medications.     Psychotherapy:  · Target symptoms: anxiety , mood disorder  · Why chosen therapy is appropriate versus another modality: relevant to diagnosis  · Outcome monitoring methods: self-report  · Therapeutic intervention type: insight oriented psychotherapy  · Topics discussed/themes: building skills sets for symptom management, symptom recognition  · The patient's response to the intervention is accepting. The patient's progress toward treatment goals is limited.   · Duration of intervention: 18 minutes.    Review of Systems   · PSYCHIATRIC: Pertinant items are noted in the narrative.  · CONSTITUTIONAL: No weight gain or loss.   · MUSCULOSKELETAL: No pain or stiffness of the joints.  · NEUROLOGIC: No weakness, sensory changes, seizures, confusion, memory loss, tremor or other abnormal movements.  · ENDOCRINE: No polydipsia or polyuria.  · INTEGUMENTARY: No rashes or lacerations.  · EYES: No exophthalmos, jaundice or blindness.  · ENT: No dizziness, tinnitus or hearing loss.  · RESPIRATORY: No shortness of breath.  · CARDIOVASCULAR: No tachycardia or chest pain.  · GASTROINTESTINAL: No nausea, vomiting, pain, constipation or diarrhea.  · GENITOURINARY: No frequency, dysuria  or sexual dysfunction.  · HEMATOLOGIC/LYMPHATIC: No excessive bleeding, prolonged or excessive bleeding after dental extraction/injury.  · ALLERGIC/IMMUNOLOGIC: No allergic response to materials, foods or animals at this time.    Past Medical, Family and Social History: The patient's past medical, family and social history have been reviewed and updated as appropriate within the electronic medical record - see encounter notes.    Compliance: yes    Side effects: None    Risk Parameters:  Patient reports no suicidal ideation  Patient reports no homicidal ideation  Patient reports no self-injurious behavior  Patient reports no violent behavior    Exam (detailed: at least 9 elements; comprehensive: all 15 elements)   Constitutional  Vitals:  Most recent vital signs, dated less than 90 days prior to this appointment, were reviewed.   Vitals:    12/21/20 1406   BP: (!) 161/85   Pulse: 84   Weight: 102.8 kg (226 lb 10.1 oz)        General:  unremarkable, age appropriate     Musculoskeletal  Muscle Strength/Tone:  no tremor, no tic   Gait & Station:  non-ataxic     Psychiatric  Speech:  no latency; no press   Mood & Affect:  euthymic  congruent and appropriate   Thought Process:  normal and logical   Associations:  tangential   Thought Content:  normal, no suicidality, no homicidality, delusions, or paranoia   Insight:  has awareness of illness   Judgement: behavior is adequate to circumstances   Orientation:  grossly intact   Memory: intact for content of interview   Language: grossly intact   Attention Span & Concentration:  able to focus   Fund of Knowledge:  intact and appropriate to age and level of education     Assessment and Diagnosis   Status/Progress: Based on the examination today, the patient's problem(s) is/are adequately but not ideally controlled.  New problems have not been presented today.   Co-morbidities and Lack of compliance are not complicating management of the primary condition.  There are no active  rule-out diagnoses for this patient at this time.     General Impression:       ICD-10-CM ICD-9-CM   1. Schizoaffective disorder, depressive type  F25.1 295.70   2. Anxiety  F41.9 300.00   3. Insomnia disorder with non-sleep disorder mental comorbidity  G47.00 780.52       Intervention/Counseling/Treatment Plan   · Medication Management: Continue current medications. The risks and benefits of medication were discussed with the patient.   · Ordered labs:  Valproic level   · Depakote to ER 1000 mg po daily  · Seroquel 400 mg po BID  · Inderal 40 mg po BID  · Trazodone 100 mg po q hs PRN insomnia  · Completed AIMS scale  · Continue therapy with Dr. Lobo    Return to Clinic: 6 months     Risks, benefits, side effects and alternative treatments discussed with patient. Patient agrees with the current plan as documented.  Encouraged Patient to keep future appointments.  Take medications as prescribed and abstain from substance abuse.  Pt to present to ED for thoughts to harm herself or others

## 2020-12-22 ENCOUNTER — OFFICE VISIT (OUTPATIENT)
Dept: PSYCHIATRY | Facility: CLINIC | Age: 65
End: 2020-12-22
Payer: MEDICARE

## 2020-12-22 DIAGNOSIS — F31.11 BIPOLAR AFFECTIVE DISORDER, CURRENTLY MANIC, MILD: Primary | ICD-10-CM

## 2020-12-22 PROCEDURE — 90832 PR PSYCHOTHERAPY W/PATIENT, 30 MIN: ICD-10-PCS | Mod: HP,HB,S$PBB, | Performed by: SOCIAL WORKER

## 2020-12-22 PROCEDURE — 99999 PR PBB SHADOW E&M-EST. PATIENT-LVL I: CPT | Mod: PBBFAC,,, | Performed by: SOCIAL WORKER

## 2020-12-22 PROCEDURE — 90832 PSYTX W PT 30 MINUTES: CPT | Mod: HP,HB,S$PBB, | Performed by: SOCIAL WORKER

## 2020-12-22 PROCEDURE — 99211 OFF/OP EST MAY X REQ PHY/QHP: CPT | Mod: PBBFAC | Performed by: SOCIAL WORKER

## 2020-12-22 PROCEDURE — 99999 PR PBB SHADOW E&M-EST. PATIENT-LVL I: ICD-10-PCS | Mod: PBBFAC,,, | Performed by: SOCIAL WORKER
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
## 2020-12-28 ENCOUNTER — TELEPHONE (OUTPATIENT)
Dept: ORTHOPEDICS | Facility: CLINIC | Age: 65
End: 2020-12-28

## 2020-12-28 DIAGNOSIS — M51.34 DDD (DEGENERATIVE DISC DISEASE), THORACIC: Primary | ICD-10-CM

## 2020-12-30 ENCOUNTER — LAB VISIT (OUTPATIENT)
Dept: LAB | Facility: OTHER | Age: 65
End: 2020-12-30
Payer: MEDICARE

## 2020-12-30 DIAGNOSIS — Z03.818 ENCOUNTER FOR OBSERVATION FOR SUSPECTED EXPOSURE TO OTHER BIOLOGICAL AGENTS RULED OUT: ICD-10-CM

## 2020-12-30 PROCEDURE — U0003 INFECTIOUS AGENT DETECTION BY NUCLEIC ACID (DNA OR RNA); SEVERE ACUTE RESPIRATORY SYNDROME CORONAVIRUS 2 (SARS-COV-2) (CORONAVIRUS DISEASE [COVID-19]), AMPLIFIED PROBE TECHNIQUE, MAKING USE OF HIGH THROUGHPUT TECHNOLOGIES AS DESCRIBED BY CMS-2020-01-R: HCPCS | Mod: HCNC

## 2020-12-31 LAB — SARS-COV-2 RNA RESP QL NAA+PROBE: NOT DETECTED

## 2021-01-06 ENCOUNTER — LAB VISIT (OUTPATIENT)
Dept: LAB | Facility: OTHER | Age: 66
End: 2021-01-06
Payer: MEDICARE

## 2021-01-06 ENCOUNTER — TELEPHONE (OUTPATIENT)
Dept: GASTROENTEROLOGY | Facility: CLINIC | Age: 66
End: 2021-01-06

## 2021-01-06 DIAGNOSIS — Z03.818 ENCOUNTER FOR OBSERVATION FOR SUSPECTED EXPOSURE TO OTHER BIOLOGICAL AGENTS RULED OUT: ICD-10-CM

## 2021-01-06 PROCEDURE — U0003 INFECTIOUS AGENT DETECTION BY NUCLEIC ACID (DNA OR RNA); SEVERE ACUTE RESPIRATORY SYNDROME CORONAVIRUS 2 (SARS-COV-2) (CORONAVIRUS DISEASE [COVID-19]), AMPLIFIED PROBE TECHNIQUE, MAKING USE OF HIGH THROUGHPUT TECHNOLOGIES AS DESCRIBED BY CMS-2020-01-R: HCPCS | Mod: HCNC

## 2021-01-07 ENCOUNTER — TELEPHONE (OUTPATIENT)
Dept: ENDOSCOPY | Facility: HOSPITAL | Age: 66
End: 2021-01-07

## 2021-01-07 DIAGNOSIS — Z12.11 COLON CANCER SCREENING: ICD-10-CM

## 2021-01-08 LAB — SARS-COV-2 RNA RESP QL NAA+PROBE: NOT DETECTED

## 2021-01-20 ENCOUNTER — LAB VISIT (OUTPATIENT)
Dept: LAB | Facility: OTHER | Age: 66
End: 2021-01-20
Payer: MEDICARE

## 2021-01-20 DIAGNOSIS — Z20.822 ENCOUNTER FOR LABORATORY TESTING FOR COVID-19 VIRUS: ICD-10-CM

## 2021-01-20 PROCEDURE — U0003 INFECTIOUS AGENT DETECTION BY NUCLEIC ACID (DNA OR RNA); SEVERE ACUTE RESPIRATORY SYNDROME CORONAVIRUS 2 (SARS-COV-2) (CORONAVIRUS DISEASE [COVID-19]), AMPLIFIED PROBE TECHNIQUE, MAKING USE OF HIGH THROUGHPUT TECHNOLOGIES AS DESCRIBED BY CMS-2020-01-R: HCPCS | Mod: HCNC

## 2021-01-22 ENCOUNTER — TELEPHONE (OUTPATIENT)
Dept: GASTROENTEROLOGY | Facility: CLINIC | Age: 66
End: 2021-01-22

## 2021-01-22 LAB — SARS-COV-2 RNA RESP QL NAA+PROBE: NOT DETECTED

## 2021-01-27 ENCOUNTER — LAB VISIT (OUTPATIENT)
Dept: LAB | Facility: OTHER | Age: 66
End: 2021-01-27
Payer: MEDICARE

## 2021-01-27 DIAGNOSIS — Z20.822 ENCOUNTER FOR LABORATORY TESTING FOR COVID-19 VIRUS: ICD-10-CM

## 2021-01-27 PROCEDURE — U0003 INFECTIOUS AGENT DETECTION BY NUCLEIC ACID (DNA OR RNA); SEVERE ACUTE RESPIRATORY SYNDROME CORONAVIRUS 2 (SARS-COV-2) (CORONAVIRUS DISEASE [COVID-19]), AMPLIFIED PROBE TECHNIQUE, MAKING USE OF HIGH THROUGHPUT TECHNOLOGIES AS DESCRIBED BY CMS-2020-01-R: HCPCS

## 2021-01-28 LAB — SARS-COV-2 RNA RESP QL NAA+PROBE: NOT DETECTED

## 2021-02-08 ENCOUNTER — OFFICE VISIT (OUTPATIENT)
Dept: NEUROLOGY | Facility: CLINIC | Age: 66
End: 2021-02-08
Payer: MEDICARE

## 2021-02-08 ENCOUNTER — TELEPHONE (OUTPATIENT)
Dept: NEUROLOGY | Facility: CLINIC | Age: 66
End: 2021-02-08

## 2021-02-08 DIAGNOSIS — F25.1 SCHIZOAFFECTIVE DISORDER, DEPRESSIVE TYPE: Primary | ICD-10-CM

## 2021-02-08 DIAGNOSIS — R25.8 INVOLUNTARY JERKY MOVEMENTS: ICD-10-CM

## 2021-02-08 PROCEDURE — 99214 OFFICE O/P EST MOD 30 MIN: CPT | Mod: S$GLB,,, | Performed by: PSYCHIATRY & NEUROLOGY

## 2021-02-08 PROCEDURE — 99214 PR OFFICE/OUTPT VISIT, EST, LEVL IV, 30-39 MIN: ICD-10-PCS | Mod: S$GLB,,, | Performed by: PSYCHIATRY & NEUROLOGY

## 2021-02-10 ENCOUNTER — LAB VISIT (OUTPATIENT)
Dept: LAB | Facility: OTHER | Age: 66
End: 2021-02-10
Payer: MEDICARE

## 2021-02-10 DIAGNOSIS — Z20.822 ENCOUNTER FOR LABORATORY TESTING FOR COVID-19 VIRUS: ICD-10-CM

## 2021-02-10 PROCEDURE — U0003 INFECTIOUS AGENT DETECTION BY NUCLEIC ACID (DNA OR RNA); SEVERE ACUTE RESPIRATORY SYNDROME CORONAVIRUS 2 (SARS-COV-2) (CORONAVIRUS DISEASE [COVID-19]), AMPLIFIED PROBE TECHNIQUE, MAKING USE OF HIGH THROUGHPUT TECHNOLOGIES AS DESCRIBED BY CMS-2020-01-R: HCPCS

## 2021-02-11 LAB — SARS-COV-2 RNA RESP QL NAA+PROBE: NOT DETECTED

## 2021-02-22 ENCOUNTER — HOSPITAL ENCOUNTER (EMERGENCY)
Facility: HOSPITAL | Age: 66
Discharge: HOME | End: 2021-02-22
Attending: EMERGENCY MEDICINE
Payer: MEDICARE

## 2021-02-22 VITALS
OXYGEN SATURATION: 100 % | SYSTOLIC BLOOD PRESSURE: 121 MMHG | HEIGHT: 65 IN | DIASTOLIC BLOOD PRESSURE: 78 MMHG | RESPIRATION RATE: 18 BRPM | BODY MASS INDEX: 37.65 KG/M2 | TEMPERATURE: 98 F | HEART RATE: 66 BPM | WEIGHT: 226 LBS

## 2021-02-22 DIAGNOSIS — K59.00 CONSTIPATION, UNSPECIFIED CONSTIPATION TYPE: Primary | ICD-10-CM

## 2021-02-22 PROCEDURE — 99284 EMERGENCY DEPT VISIT MOD MDM: CPT | Mod: ,,, | Performed by: EMERGENCY MEDICINE

## 2021-02-22 PROCEDURE — 99284 PR EMERGENCY DEPT VISIT,LEVEL IV: ICD-10-PCS | Mod: ,,, | Performed by: EMERGENCY MEDICINE

## 2021-02-22 PROCEDURE — 25000003 PHARM REV CODE 250: Performed by: PHYSICIAN ASSISTANT

## 2021-02-22 PROCEDURE — 99283 EMERGENCY DEPT VISIT LOW MDM: CPT

## 2021-02-22 RX ORDER — AMOXICILLIN 250 MG
1 CAPSULE ORAL DAILY
Qty: 14 TABLET | Refills: 0 | Status: SHIPPED | OUTPATIENT
Start: 2021-02-22

## 2021-02-22 RX ORDER — PSEUDOEPHEDRINE/ACETAMINOPHEN 30MG-500MG
100 TABLET ORAL
Status: COMPLETED | OUTPATIENT
Start: 2021-02-22 | End: 2021-02-22

## 2021-02-22 RX ORDER — SYRING-NEEDL,DISP,INSUL,0.3 ML 29 G X1/2"
296 SYRINGE, EMPTY DISPOSABLE MISCELLANEOUS
Status: COMPLETED | OUTPATIENT
Start: 2021-02-22 | End: 2021-02-22

## 2021-02-22 RX ADMIN — MAGNESIUM CITRATE 296 ML: 1.75 LIQUID ORAL at 04:02

## 2021-02-22 RX ADMIN — SODIUM CHLORIDE 500 ML: 0.9 INJECTION, SOLUTION INTRAVENOUS at 04:02

## 2021-02-22 RX ADMIN — Medication 100 ML: at 04:02

## 2021-02-25 ENCOUNTER — TELEPHONE (OUTPATIENT)
Dept: ENDOSCOPY | Facility: HOSPITAL | Age: 66
End: 2021-02-25

## 2021-02-27 ENCOUNTER — NURSE TRIAGE (OUTPATIENT)
Dept: ADMINISTRATIVE | Facility: CLINIC | Age: 66
End: 2021-02-27

## 2021-03-04 DIAGNOSIS — R14.0 ABDOMINAL DISTENSION (GASEOUS): ICD-10-CM

## 2021-03-04 DIAGNOSIS — K59.00 CONSTIPATION, UNSPECIFIED CONSTIPATION TYPE: Primary | ICD-10-CM

## 2021-03-05 ENCOUNTER — TELEPHONE (OUTPATIENT)
Dept: GASTROENTEROLOGY | Facility: CLINIC | Age: 66
End: 2021-03-05

## 2021-03-08 ENCOUNTER — HOSPITAL ENCOUNTER (EMERGENCY)
Facility: HOSPITAL | Age: 66
Discharge: HOME | End: 2021-03-08
Attending: EMERGENCY MEDICINE
Payer: MEDICARE

## 2021-03-08 VITALS
SYSTOLIC BLOOD PRESSURE: 134 MMHG | DIASTOLIC BLOOD PRESSURE: 92 MMHG | HEART RATE: 76 BPM | TEMPERATURE: 99 F | OXYGEN SATURATION: 96 % | RESPIRATION RATE: 16 BRPM

## 2021-03-08 DIAGNOSIS — R10.13 EPIGASTRIC ABDOMINAL PAIN: ICD-10-CM

## 2021-03-08 DIAGNOSIS — R07.9 CHEST PAIN: ICD-10-CM

## 2021-03-08 LAB
ALBUMIN SERPL BCP-MCNC: 3.2 G/DL (ref 3.5–5.2)
ALP SERPL-CCNC: 64 U/L (ref 55–135)
ALT SERPL W/O P-5'-P-CCNC: 14 U/L (ref 10–44)
ANION GAP SERPL CALC-SCNC: 11 MMOL/L (ref 8–16)
AST SERPL-CCNC: 15 U/L (ref 10–40)
BASOPHILS # BLD AUTO: 0.04 K/UL (ref 0–0.2)
BASOPHILS NFR BLD: 0.5 % (ref 0–1.9)
BILIRUB SERPL-MCNC: 0.2 MG/DL (ref 0.1–1)
BILIRUB UR QL STRIP: NEGATIVE
BUN SERPL-MCNC: 30 MG/DL (ref 8–23)
CALCIUM SERPL-MCNC: 9.8 MG/DL (ref 8.7–10.5)
CHLORIDE SERPL-SCNC: 102 MMOL/L (ref 95–110)
CLARITY UR REFRACT.AUTO: CLEAR
CO2 SERPL-SCNC: 27 MMOL/L (ref 23–29)
COLOR UR AUTO: NORMAL
CREAT SERPL-MCNC: 1.1 MG/DL (ref 0.5–1.4)
CTP QC/QA: YES
CTP QC/QA: YES
DIFFERENTIAL METHOD: NORMAL
EOSINOPHIL # BLD AUTO: 0.1 K/UL (ref 0–0.5)
EOSINOPHIL NFR BLD: 1.2 % (ref 0–8)
ERYTHROCYTE [DISTWIDTH] IN BLOOD BY AUTOMATED COUNT: 14.4 % (ref 11.5–14.5)
EST. GFR  (AFRICAN AMERICAN): >60 ML/MIN/1.73 M^2
EST. GFR  (NON AFRICAN AMERICAN): 53.2 ML/MIN/1.73 M^2
GIANT PLATELETS BLD QL SMEAR: PRESENT
GLUCOSE SERPL-MCNC: 109 MG/DL (ref 70–110)
GLUCOSE UR QL STRIP: NEGATIVE
HCT VFR BLD AUTO: 45.4 % (ref 37–48.5)
HGB BLD-MCNC: 15.2 G/DL (ref 12–16)
HGB UR QL STRIP: NEGATIVE
IMM GRANULOCYTES # BLD AUTO: 0.03 K/UL (ref 0–0.04)
IMM GRANULOCYTES NFR BLD AUTO: 0.4 % (ref 0–0.5)
KETONES UR QL STRIP: NEGATIVE
LACTATE SERPL-SCNC: 1 MMOL/L (ref 0.5–2.2)
LEUKOCYTE ESTERASE UR QL STRIP: NEGATIVE
LIPASE SERPL-CCNC: 53 U/L (ref 4–60)
LYMPHOCYTES # BLD AUTO: 3.8 K/UL (ref 1–4.8)
LYMPHOCYTES NFR BLD: 46.6 % (ref 18–48)
MAGNESIUM SERPL-MCNC: 2.2 MG/DL (ref 1.6–2.6)
MCH RBC QN AUTO: 31 PG (ref 27–31)
MCHC RBC AUTO-ENTMCNC: 33.5 G/DL (ref 32–36)
MCV RBC AUTO: 93 FL (ref 82–98)
MONOCYTES # BLD AUTO: 0.9 K/UL (ref 0.3–1)
MONOCYTES NFR BLD: 11.2 % (ref 4–15)
NEUTROPHILS # BLD AUTO: 3.3 K/UL (ref 1.8–7.7)
NEUTROPHILS NFR BLD: 40.1 % (ref 38–73)
NITRITE UR QL STRIP: NEGATIVE
NRBC BLD-RTO: 0 /100 WBC
PH UR STRIP: 6 [PH] (ref 5–8)
PLATELET # BLD AUTO: 222 K/UL (ref 150–350)
PLATELET BLD QL SMEAR: NORMAL
PMV BLD AUTO: 9.7 FL (ref 9.2–12.9)
POC MOLECULAR INFLUENZA A AGN: NEGATIVE
POC MOLECULAR INFLUENZA B AGN: NEGATIVE
POTASSIUM SERPL-SCNC: 4 MMOL/L (ref 3.5–5.1)
PROT SERPL-MCNC: 6.5 G/DL (ref 6–8.4)
PROT UR QL STRIP: NEGATIVE
RBC # BLD AUTO: 4.91 M/UL (ref 4–5.4)
SARS-COV-2 RDRP RESP QL NAA+PROBE: NEGATIVE
SODIUM SERPL-SCNC: 140 MMOL/L (ref 136–145)
SP GR UR STRIP: 1.01 (ref 1–1.03)
TROPONIN I SERPL DL<=0.01 NG/ML-MCNC: 0.01 NG/ML (ref 0–0.03)
TROPONIN I SERPL DL<=0.01 NG/ML-MCNC: <0.006 NG/ML (ref 0–0.03)
TSH SERPL DL<=0.005 MIU/L-ACNC: 1.31 UIU/ML (ref 0.4–4)
URN SPEC COLLECT METH UR: NORMAL
WBC # BLD AUTO: 8.24 K/UL (ref 3.9–12.7)

## 2021-03-08 PROCEDURE — 25000003 PHARM REV CODE 250: Performed by: PHYSICIAN ASSISTANT

## 2021-03-08 PROCEDURE — 93005 ELECTROCARDIOGRAM TRACING: CPT

## 2021-03-08 PROCEDURE — 84484 ASSAY OF TROPONIN QUANT: CPT | Performed by: PHYSICIAN ASSISTANT

## 2021-03-08 PROCEDURE — 83690 ASSAY OF LIPASE: CPT | Performed by: PHYSICIAN ASSISTANT

## 2021-03-08 PROCEDURE — 87502 INFLUENZA DNA AMP PROBE: CPT

## 2021-03-08 PROCEDURE — 86703 HIV-1/HIV-2 1 RESULT ANTBDY: CPT | Performed by: EMERGENCY MEDICINE

## 2021-03-08 PROCEDURE — 96360 HYDRATION IV INFUSION INIT: CPT

## 2021-03-08 PROCEDURE — 93010 ELECTROCARDIOGRAM REPORT: CPT | Mod: ,,, | Performed by: INTERNAL MEDICINE

## 2021-03-08 PROCEDURE — 93010 EKG 12-LEAD: ICD-10-PCS | Mod: ,,, | Performed by: INTERNAL MEDICINE

## 2021-03-08 PROCEDURE — 84484 ASSAY OF TROPONIN QUANT: CPT | Mod: 91 | Performed by: PHYSICIAN ASSISTANT

## 2021-03-08 PROCEDURE — 83735 ASSAY OF MAGNESIUM: CPT | Performed by: PHYSICIAN ASSISTANT

## 2021-03-08 PROCEDURE — 85025 COMPLETE CBC W/AUTO DIFF WBC: CPT | Performed by: PHYSICIAN ASSISTANT

## 2021-03-08 PROCEDURE — 99285 EMERGENCY DEPT VISIT HI MDM: CPT | Mod: CS,,, | Performed by: PHYSICIAN ASSISTANT

## 2021-03-08 PROCEDURE — 99284 EMERGENCY DEPT VISIT MOD MDM: CPT | Mod: 25

## 2021-03-08 PROCEDURE — 83605 ASSAY OF LACTIC ACID: CPT | Performed by: PHYSICIAN ASSISTANT

## 2021-03-08 PROCEDURE — U0002 COVID-19 LAB TEST NON-CDC: HCPCS | Performed by: PHYSICIAN ASSISTANT

## 2021-03-08 PROCEDURE — 84443 ASSAY THYROID STIM HORMONE: CPT | Performed by: PHYSICIAN ASSISTANT

## 2021-03-08 PROCEDURE — 81003 URINALYSIS AUTO W/O SCOPE: CPT | Performed by: PHYSICIAN ASSISTANT

## 2021-03-08 PROCEDURE — 96361 HYDRATE IV INFUSION ADD-ON: CPT

## 2021-03-08 PROCEDURE — 99285 PR EMERGENCY DEPT VISIT,LEVEL V: ICD-10-PCS | Mod: CS,,, | Performed by: PHYSICIAN ASSISTANT

## 2021-03-08 PROCEDURE — 86803 HEPATITIS C AB TEST: CPT | Performed by: EMERGENCY MEDICINE

## 2021-03-08 PROCEDURE — 80053 COMPREHEN METABOLIC PANEL: CPT | Performed by: PHYSICIAN ASSISTANT

## 2021-03-08 RX ADMIN — SODIUM CHLORIDE 500 ML: 0.9 INJECTION, SOLUTION INTRAVENOUS at 07:03

## 2021-03-09 ENCOUNTER — DOCUMENTATION ONLY (OUTPATIENT)
Dept: PRIMARY CARE CLINIC | Facility: CLINIC | Age: 66
End: 2021-03-09

## 2021-03-09 ENCOUNTER — LAB VISIT (OUTPATIENT)
Dept: LAB | Facility: HOSPITAL | Age: 66
End: 2021-03-09
Payer: MEDICARE

## 2021-03-09 ENCOUNTER — OFFICE VISIT (OUTPATIENT)
Dept: PSYCHIATRY | Facility: CLINIC | Age: 66
End: 2021-03-09
Payer: MEDICARE

## 2021-03-09 VITALS
BODY MASS INDEX: 37.35 KG/M2 | HEART RATE: 84 BPM | DIASTOLIC BLOOD PRESSURE: 77 MMHG | WEIGHT: 224.44 LBS | SYSTOLIC BLOOD PRESSURE: 143 MMHG

## 2021-03-09 DIAGNOSIS — G47.00 INSOMNIA DISORDER WITH NON-SLEEP DISORDER MENTAL COMORBIDITY: ICD-10-CM

## 2021-03-09 DIAGNOSIS — F25.1 SCHIZOAFFECTIVE DISORDER, DEPRESSIVE TYPE: ICD-10-CM

## 2021-03-09 LAB
HCV AB SERPL QL IA: NEGATIVE
HIV 1+2 AB+HIV1 P24 AG SERPL QL IA: NEGATIVE
VALPROATE SERPL-MCNC: 106 UG/ML (ref 50–100)

## 2021-03-09 PROCEDURE — 3077F PR MOST RECENT SYSTOLIC BLOOD PRESSURE >= 140 MM HG: ICD-10-PCS | Mod: CPTII,S$GLB,, | Performed by: NURSE PRACTITIONER

## 2021-03-09 PROCEDURE — 3008F PR BODY MASS INDEX (BMI) DOCUMENTED: ICD-10-PCS | Mod: CPTII,S$GLB,, | Performed by: NURSE PRACTITIONER

## 2021-03-09 PROCEDURE — 3078F PR MOST RECENT DIASTOLIC BLOOD PRESSURE < 80 MM HG: ICD-10-PCS | Mod: CPTII,S$GLB,, | Performed by: NURSE PRACTITIONER

## 2021-03-09 PROCEDURE — 90833 PR PSYCHOTHERAPY W/PATIENT W/E&M, 30 MIN (ADD ON): ICD-10-PCS | Mod: S$GLB,,, | Performed by: NURSE PRACTITIONER

## 2021-03-09 PROCEDURE — 3077F SYST BP >= 140 MM HG: CPT | Mod: CPTII,S$GLB,, | Performed by: NURSE PRACTITIONER

## 2021-03-09 PROCEDURE — 3008F BODY MASS INDEX DOCD: CPT | Mod: CPTII,S$GLB,, | Performed by: NURSE PRACTITIONER

## 2021-03-09 PROCEDURE — 80164 ASSAY DIPROPYLACETIC ACD TOT: CPT | Performed by: NURSE PRACTITIONER

## 2021-03-09 PROCEDURE — 99213 OFFICE O/P EST LOW 20 MIN: CPT | Mod: S$GLB,,, | Performed by: NURSE PRACTITIONER

## 2021-03-09 PROCEDURE — 99999 PR PBB SHADOW E&M-EST. PATIENT-LVL III: ICD-10-PCS | Mod: PBBFAC,,, | Performed by: NURSE PRACTITIONER

## 2021-03-09 PROCEDURE — 99213 PR OFFICE/OUTPT VISIT, EST, LEVL III, 20-29 MIN: ICD-10-PCS | Mod: S$GLB,,, | Performed by: NURSE PRACTITIONER

## 2021-03-09 PROCEDURE — 90833 PSYTX W PT W E/M 30 MIN: CPT | Mod: S$GLB,,, | Performed by: NURSE PRACTITIONER

## 2021-03-09 PROCEDURE — 99999 PR PBB SHADOW E&M-EST. PATIENT-LVL III: CPT | Mod: PBBFAC,,, | Performed by: NURSE PRACTITIONER

## 2021-03-09 PROCEDURE — 3078F DIAST BP <80 MM HG: CPT | Mod: CPTII,S$GLB,, | Performed by: NURSE PRACTITIONER

## 2021-03-09 PROCEDURE — 36415 COLL VENOUS BLD VENIPUNCTURE: CPT | Performed by: NURSE PRACTITIONER

## 2021-03-09 RX ORDER — MIRTAZAPINE 15 MG/1
15 TABLET, FILM COATED ORAL NIGHTLY
Qty: 90 TABLET | Refills: 3 | Status: SHIPPED | OUTPATIENT
Start: 2021-03-09 | End: 2021-07-27 | Stop reason: SDUPTHER

## 2021-03-09 RX ORDER — DIVALPROEX SODIUM 500 MG/1
1000 TABLET, FILM COATED, EXTENDED RELEASE ORAL DAILY
Qty: 60 TABLET | Refills: 11 | Status: SHIPPED | OUTPATIENT
Start: 2021-03-09 | End: 2021-10-11 | Stop reason: SDUPTHER

## 2021-03-09 RX ORDER — QUETIAPINE FUMARATE 400 MG/1
400 TABLET, FILM COATED ORAL 2 TIMES DAILY
Qty: 60 TABLET | Refills: 11 | Status: SHIPPED | OUTPATIENT
Start: 2021-03-09 | End: 2021-10-11 | Stop reason: SDUPTHER

## 2021-03-09 RX ORDER — PROPRANOLOL HYDROCHLORIDE 40 MG/1
40 TABLET ORAL 2 TIMES DAILY
Qty: 60 TABLET | Refills: 11 | Status: SHIPPED | OUTPATIENT
Start: 2021-03-09 | End: 2021-09-07

## 2021-03-16 ENCOUNTER — DOCUMENTATION ONLY (OUTPATIENT)
Dept: PRIMARY CARE CLINIC | Facility: CLINIC | Age: 66
End: 2021-03-16

## 2021-03-18 ENCOUNTER — OFFICE VISIT (OUTPATIENT)
Dept: INTERNAL MEDICINE | Facility: CLINIC | Age: 66
End: 2021-03-18
Payer: MEDICARE

## 2021-03-18 VITALS
OXYGEN SATURATION: 97 % | BODY MASS INDEX: 38.02 KG/M2 | DIASTOLIC BLOOD PRESSURE: 88 MMHG | HEIGHT: 65 IN | TEMPERATURE: 97 F | HEART RATE: 72 BPM | RESPIRATION RATE: 16 BRPM | SYSTOLIC BLOOD PRESSURE: 128 MMHG | WEIGHT: 228.19 LBS

## 2021-03-18 DIAGNOSIS — E03.9 HYPOTHYROIDISM, UNSPECIFIED TYPE: ICD-10-CM

## 2021-03-18 DIAGNOSIS — F31.0 BIPOLAR AFFECTIVE DISORDER, CURRENT EPISODE HYPOMANIC: Primary | ICD-10-CM

## 2021-03-18 DIAGNOSIS — F41.9 ANXIETY: ICD-10-CM

## 2021-03-18 DIAGNOSIS — K21.9 GASTROESOPHAGEAL REFLUX DISEASE, UNSPECIFIED WHETHER ESOPHAGITIS PRESENT: ICD-10-CM

## 2021-03-18 PROCEDURE — 3008F PR BODY MASS INDEX (BMI) DOCUMENTED: ICD-10-PCS | Mod: CPTII,S$GLB,, | Performed by: NURSE PRACTITIONER

## 2021-03-18 PROCEDURE — 99214 PR OFFICE/OUTPT VISIT, EST, LEVL IV, 30-39 MIN: ICD-10-PCS | Mod: S$GLB,,, | Performed by: NURSE PRACTITIONER

## 2021-03-18 PROCEDURE — 1126F PR PAIN SEVERITY QUANTIFIED, NO PAIN PRESENT: ICD-10-PCS | Mod: S$GLB,,, | Performed by: NURSE PRACTITIONER

## 2021-03-18 PROCEDURE — 1126F AMNT PAIN NOTED NONE PRSNT: CPT | Mod: S$GLB,,, | Performed by: NURSE PRACTITIONER

## 2021-03-18 PROCEDURE — 99214 OFFICE O/P EST MOD 30 MIN: CPT | Mod: S$GLB,,, | Performed by: NURSE PRACTITIONER

## 2021-03-18 PROCEDURE — 3008F BODY MASS INDEX DOCD: CPT | Mod: CPTII,S$GLB,, | Performed by: NURSE PRACTITIONER

## 2021-03-18 PROCEDURE — 99999 PR PBB SHADOW E&M-EST. PATIENT-LVL V: ICD-10-PCS | Mod: PBBFAC,,, | Performed by: NURSE PRACTITIONER

## 2021-03-18 PROCEDURE — 99999 PR PBB SHADOW E&M-EST. PATIENT-LVL V: CPT | Mod: PBBFAC,,, | Performed by: NURSE PRACTITIONER

## 2021-03-18 RX ORDER — LEVOTHYROXINE SODIUM 175 UG/1
175 TABLET ORAL DAILY
Qty: 30 TABLET | Refills: 3 | Status: SHIPPED | OUTPATIENT
Start: 2021-03-18 | End: 2021-05-03 | Stop reason: SDUPTHER

## 2021-03-18 RX ORDER — TRIAMTERENE/HYDROCHLOROTHIAZID 37.5-25 MG
1 TABLET ORAL DAILY
COMMUNITY
End: 2022-01-26

## 2021-03-18 RX ORDER — TIZANIDINE 4 MG/1
4 TABLET ORAL EVERY 8 HOURS PRN
COMMUNITY
End: 2021-05-03 | Stop reason: SDUPTHER

## 2021-03-18 RX ORDER — PANTOPRAZOLE SODIUM 40 MG/1
40 TABLET, DELAYED RELEASE ORAL DAILY
Qty: 30 TABLET | Refills: 0 | Status: SHIPPED | OUTPATIENT
Start: 2021-03-18 | End: 2021-05-03 | Stop reason: SDUPTHER

## 2021-03-22 ENCOUNTER — TELEPHONE (OUTPATIENT)
Dept: INTERNAL MEDICINE | Facility: CLINIC | Age: 66
End: 2021-03-22

## 2021-03-23 DIAGNOSIS — J30.2 SEASONAL ALLERGIES: ICD-10-CM

## 2021-03-23 DIAGNOSIS — E78.5 HYPERLIPIDEMIA, UNSPECIFIED HYPERLIPIDEMIA TYPE: Primary | ICD-10-CM

## 2021-03-23 RX ORDER — ATORVASTATIN CALCIUM 20 MG/1
20 TABLET, FILM COATED ORAL NIGHTLY
Qty: 90 TABLET | Refills: 1 | Status: SHIPPED | OUTPATIENT
Start: 2021-03-23 | End: 2021-03-24 | Stop reason: SDUPTHER

## 2021-03-23 RX ORDER — FLUTICASONE PROPIONATE 50 MCG
1 SPRAY, SUSPENSION (ML) NASAL 2 TIMES DAILY PRN
Qty: 16 G | Refills: 1 | Status: SHIPPED | OUTPATIENT
Start: 2021-03-23 | End: 2021-03-24 | Stop reason: SDUPTHER

## 2021-03-24 ENCOUNTER — TELEPHONE (OUTPATIENT)
Dept: INTERNAL MEDICINE | Facility: CLINIC | Age: 66
End: 2021-03-24

## 2021-03-24 DIAGNOSIS — J30.2 SEASONAL ALLERGIES: ICD-10-CM

## 2021-03-24 DIAGNOSIS — E78.5 HYPERLIPIDEMIA, UNSPECIFIED HYPERLIPIDEMIA TYPE: ICD-10-CM

## 2021-03-24 RX ORDER — FLUTICASONE PROPIONATE 50 MCG
1 SPRAY, SUSPENSION (ML) NASAL 2 TIMES DAILY PRN
Qty: 16 G | Refills: 1 | Status: SHIPPED | OUTPATIENT
Start: 2021-03-24 | End: 2021-07-28 | Stop reason: SDUPTHER

## 2021-03-24 RX ORDER — ATORVASTATIN CALCIUM 20 MG/1
20 TABLET, FILM COATED ORAL NIGHTLY
Qty: 90 TABLET | Refills: 1 | Status: SHIPPED | OUTPATIENT
Start: 2021-03-24 | End: 2021-04-16

## 2021-03-24 RX ORDER — FLUTICASONE PROPIONATE 50 MCG
2 SPRAY, SUSPENSION (ML) NASAL 2 TIMES DAILY PRN
Qty: 16 G | Refills: 1 | Status: CANCELLED | OUTPATIENT
Start: 2021-03-24

## 2021-03-25 ENCOUNTER — HOSPITAL ENCOUNTER (EMERGENCY)
Facility: HOSPITAL | Age: 66
Discharge: PSYCHIATRIC OR SUBSTANCE USE FACILITY | End: 2021-03-26
Attending: EMERGENCY MEDICINE
Payer: MEDICARE

## 2021-03-25 ENCOUNTER — TELEPHONE (OUTPATIENT)
Dept: INTERNAL MEDICINE | Facility: CLINIC | Age: 66
End: 2021-03-25

## 2021-03-25 DIAGNOSIS — R46.89 AGGRESSIVE BEHAVIOR: Primary | ICD-10-CM

## 2021-03-25 LAB
ANISOCYTOSIS BLD QL SMEAR: SLIGHT
APAP SERPL-MCNC: <3 UG/ML (ref 10–20)
BASOPHILS # BLD AUTO: 0.07 K/UL (ref 0–0.2)
BASOPHILS NFR BLD: 0.8 % (ref 0–1.9)
CTP QC/QA: YES
DIFFERENTIAL METHOD: ABNORMAL
EOSINOPHIL # BLD AUTO: 0.3 K/UL (ref 0–0.5)
EOSINOPHIL NFR BLD: 3.5 % (ref 0–8)
ERYTHROCYTE [DISTWIDTH] IN BLOOD BY AUTOMATED COUNT: 13.7 % (ref 11.5–14.5)
ETHANOL SERPL-MCNC: <10 MG/DL
HCT VFR BLD AUTO: 41.7 % (ref 37–48.5)
HGB BLD-MCNC: 14.3 G/DL (ref 12–16)
HYPOCHROMIA BLD QL SMEAR: ABNORMAL
IMM GRANULOCYTES # BLD AUTO: 0.02 K/UL (ref 0–0.04)
IMM GRANULOCYTES NFR BLD AUTO: 0.2 % (ref 0–0.5)
LYMPHOCYTES # BLD AUTO: 4.6 K/UL (ref 1–4.8)
LYMPHOCYTES NFR BLD: 53.1 % (ref 18–48)
MCH RBC QN AUTO: 31 PG (ref 27–31)
MCHC RBC AUTO-ENTMCNC: 34.3 G/DL (ref 32–36)
MCV RBC AUTO: 90 FL (ref 82–98)
MONOCYTES # BLD AUTO: 0.7 K/UL (ref 0.3–1)
MONOCYTES NFR BLD: 7.8 % (ref 4–15)
NEUTROPHILS # BLD AUTO: 3 K/UL (ref 1.8–7.7)
NEUTROPHILS NFR BLD: 34.6 % (ref 38–73)
NRBC BLD-RTO: 0 /100 WBC
PLATELET # BLD AUTO: 227 K/UL (ref 150–350)
PLATELET BLD QL SMEAR: ABNORMAL
PMV BLD AUTO: 10.1 FL (ref 9.2–12.9)
RBC # BLD AUTO: 4.62 M/UL (ref 4–5.4)
SARS-COV-2 RDRP RESP QL NAA+PROBE: NEGATIVE
WBC # BLD AUTO: 8.74 K/UL (ref 3.9–12.7)

## 2021-03-25 PROCEDURE — 96372 THER/PROPH/DIAG INJ SC/IM: CPT

## 2021-03-25 PROCEDURE — 80143 DRUG ASSAY ACETAMINOPHEN: CPT | Performed by: PHYSICIAN ASSISTANT

## 2021-03-25 PROCEDURE — 99285 EMERGENCY DEPT VISIT HI MDM: CPT | Mod: 25

## 2021-03-25 PROCEDURE — 82077 ASSAY SPEC XCP UR&BREATH IA: CPT | Performed by: PHYSICIAN ASSISTANT

## 2021-03-25 PROCEDURE — 80053 COMPREHEN METABOLIC PANEL: CPT | Performed by: PHYSICIAN ASSISTANT

## 2021-03-25 PROCEDURE — 99291 CRITICAL CARE FIRST HOUR: CPT | Mod: ,,, | Performed by: PHYSICIAN ASSISTANT

## 2021-03-25 PROCEDURE — U0002 COVID-19 LAB TEST NON-CDC: HCPCS | Performed by: PHYSICIAN ASSISTANT

## 2021-03-25 PROCEDURE — 85025 COMPLETE CBC W/AUTO DIFF WBC: CPT | Performed by: PHYSICIAN ASSISTANT

## 2021-03-25 PROCEDURE — 99291 PR CRITICAL CARE, E/M 30-74 MINUTES: ICD-10-PCS | Mod: ,,, | Performed by: PHYSICIAN ASSISTANT

## 2021-03-25 RX ORDER — HALOPERIDOL 5 MG/1
5 TABLET ORAL EVERY 4 HOURS PRN
Status: DISCONTINUED | OUTPATIENT
Start: 2021-03-25 | End: 2021-03-26 | Stop reason: HOSPADM

## 2021-03-25 RX ORDER — HALOPERIDOL 5 MG/ML
5 INJECTION INTRAMUSCULAR EVERY 4 HOURS PRN
Status: DISCONTINUED | OUTPATIENT
Start: 2021-03-25 | End: 2021-03-26 | Stop reason: HOSPADM

## 2021-03-26 VITALS
HEART RATE: 78 BPM | BODY MASS INDEX: 37.94 KG/M2 | SYSTOLIC BLOOD PRESSURE: 141 MMHG | OXYGEN SATURATION: 98 % | DIASTOLIC BLOOD PRESSURE: 82 MMHG | TEMPERATURE: 99 F | WEIGHT: 228 LBS | RESPIRATION RATE: 18 BRPM

## 2021-03-26 LAB
ALBUMIN SERPL BCP-MCNC: 3.5 G/DL (ref 3.5–5.2)
ALP SERPL-CCNC: 66 U/L (ref 55–135)
ALT SERPL W/O P-5'-P-CCNC: 20 U/L (ref 10–44)
AMPHET+METHAMPHET UR QL: NEGATIVE
ANION GAP SERPL CALC-SCNC: 15 MMOL/L (ref 8–16)
AST SERPL-CCNC: 33 U/L (ref 10–40)
BARBITURATES UR QL SCN>200 NG/ML: NEGATIVE
BENZODIAZ UR QL SCN>200 NG/ML: NEGATIVE
BILIRUB SERPL-MCNC: 0.2 MG/DL (ref 0.1–1)
BILIRUB UR QL STRIP: NEGATIVE
BUN SERPL-MCNC: 20 MG/DL (ref 8–23)
BZE UR QL SCN: NEGATIVE
CALCIUM SERPL-MCNC: 9.3 MG/DL (ref 8.7–10.5)
CANNABINOIDS UR QL SCN: NEGATIVE
CHLORIDE SERPL-SCNC: 109 MMOL/L (ref 95–110)
CLARITY UR REFRACT.AUTO: CLEAR
CO2 SERPL-SCNC: 14 MMOL/L (ref 23–29)
COLOR UR AUTO: NORMAL
CREAT SERPL-MCNC: 1.3 MG/DL (ref 0.5–1.4)
CREAT UR-MCNC: 61 MG/DL (ref 15–325)
EST. GFR  (AFRICAN AMERICAN): 50.1 ML/MIN/1.73 M^2
EST. GFR  (NON AFRICAN AMERICAN): 43.5 ML/MIN/1.73 M^2
GLUCOSE SERPL-MCNC: 133 MG/DL (ref 70–110)
GLUCOSE UR QL STRIP: NEGATIVE
HGB UR QL STRIP: NEGATIVE
KETONES UR QL STRIP: NEGATIVE
LEUKOCYTE ESTERASE UR QL STRIP: NEGATIVE
METHADONE UR QL SCN>300 NG/ML: NEGATIVE
NITRITE UR QL STRIP: NEGATIVE
OPIATES UR QL SCN: NEGATIVE
PCP UR QL SCN>25 NG/ML: NEGATIVE
PH UR STRIP: 6 [PH] (ref 5–8)
POTASSIUM SERPL-SCNC: 4.7 MMOL/L (ref 3.5–5.1)
PROT SERPL-MCNC: 6.9 G/DL (ref 6–8.4)
PROT UR QL STRIP: NEGATIVE
SODIUM SERPL-SCNC: 138 MMOL/L (ref 136–145)
SP GR UR STRIP: 1.01 (ref 1–1.03)
T4 FREE SERPL-MCNC: 0.94 NG/DL (ref 0.71–1.51)
TOXICOLOGY INFORMATION: NORMAL
TSH SERPL DL<=0.005 MIU/L-ACNC: 4.71 UIU/ML (ref 0.4–4)
URN SPEC COLLECT METH UR: NORMAL

## 2021-03-26 PROCEDURE — 93010 ELECTROCARDIOGRAM REPORT: CPT | Mod: ,,, | Performed by: INTERNAL MEDICINE

## 2021-03-26 PROCEDURE — 81003 URINALYSIS AUTO W/O SCOPE: CPT | Mod: 59 | Performed by: PHYSICIAN ASSISTANT

## 2021-03-26 PROCEDURE — 63600175 PHARM REV CODE 636 W HCPCS: Performed by: PHYSICIAN ASSISTANT

## 2021-03-26 PROCEDURE — 84443 ASSAY THYROID STIM HORMONE: CPT | Performed by: PHYSICIAN ASSISTANT

## 2021-03-26 PROCEDURE — 93005 ELECTROCARDIOGRAM TRACING: CPT

## 2021-03-26 PROCEDURE — 93010 EKG 12-LEAD: ICD-10-PCS | Mod: ,,, | Performed by: INTERNAL MEDICINE

## 2021-03-26 PROCEDURE — 25000003 PHARM REV CODE 250: Performed by: EMERGENCY MEDICINE

## 2021-03-26 PROCEDURE — 80307 DRUG TEST PRSMV CHEM ANLYZR: CPT | Performed by: PHYSICIAN ASSISTANT

## 2021-03-26 PROCEDURE — 84439 ASSAY OF FREE THYROXINE: CPT | Performed by: PHYSICIAN ASSISTANT

## 2021-03-26 RX ORDER — MIRTAZAPINE 15 MG/1
15 TABLET, FILM COATED ORAL NIGHTLY
Status: DISCONTINUED | OUTPATIENT
Start: 2021-03-26 | End: 2021-03-26 | Stop reason: HOSPADM

## 2021-03-26 RX ORDER — DIVALPROEX SODIUM 500 MG/1
1000 TABLET, FILM COATED, EXTENDED RELEASE ORAL DAILY
Status: DISCONTINUED | OUTPATIENT
Start: 2021-03-26 | End: 2021-03-26 | Stop reason: HOSPADM

## 2021-03-26 RX ORDER — BUDESONIDE 0.5 MG/2ML
0.5 INHALANT ORAL DAILY
Status: DISCONTINUED | OUTPATIENT
Start: 2021-03-26 | End: 2021-03-26 | Stop reason: HOSPADM

## 2021-03-26 RX ORDER — FUROSEMIDE 20 MG/1
20 TABLET ORAL 2 TIMES DAILY
Status: DISCONTINUED | OUTPATIENT
Start: 2021-03-26 | End: 2021-03-26 | Stop reason: HOSPADM

## 2021-03-26 RX ORDER — PANTOPRAZOLE SODIUM 40 MG/1
40 TABLET, DELAYED RELEASE ORAL DAILY
Status: DISCONTINUED | OUTPATIENT
Start: 2021-03-26 | End: 2021-03-26 | Stop reason: HOSPADM

## 2021-03-26 RX ORDER — LISINOPRIL 20 MG/1
20 TABLET ORAL DAILY
Status: DISCONTINUED | OUTPATIENT
Start: 2021-03-26 | End: 2021-03-26 | Stop reason: HOSPADM

## 2021-03-26 RX ORDER — ALBUTEROL SULFATE 90 UG/1
2 AEROSOL, METERED RESPIRATORY (INHALATION) EVERY 6 HOURS PRN
Status: DISCONTINUED | OUTPATIENT
Start: 2021-03-26 | End: 2021-03-26 | Stop reason: HOSPADM

## 2021-03-26 RX ORDER — FLUTICASONE FUROATE AND VILANTEROL 200; 25 UG/1; UG/1
1 POWDER RESPIRATORY (INHALATION) DAILY
Status: DISCONTINUED | OUTPATIENT
Start: 2021-03-26 | End: 2021-03-26 | Stop reason: HOSPADM

## 2021-03-26 RX ORDER — CLONIDINE HYDROCHLORIDE 0.3 MG/1
0.3 TABLET ORAL 4 TIMES DAILY
Status: DISCONTINUED | OUTPATIENT
Start: 2021-03-26 | End: 2021-03-26 | Stop reason: HOSPADM

## 2021-03-26 RX ORDER — LACTULOSE 10 G/15ML
15 SOLUTION ORAL 3 TIMES DAILY
Status: DISCONTINUED | OUTPATIENT
Start: 2021-03-26 | End: 2021-03-26 | Stop reason: HOSPADM

## 2021-03-26 RX ORDER — PROPRANOLOL HYDROCHLORIDE 10 MG/1
40 TABLET ORAL 2 TIMES DAILY
Status: DISCONTINUED | OUTPATIENT
Start: 2021-03-26 | End: 2021-03-26 | Stop reason: HOSPADM

## 2021-03-26 RX ORDER — QUETIAPINE FUMARATE 200 MG/1
400 TABLET, FILM COATED ORAL 2 TIMES DAILY
Status: DISCONTINUED | OUTPATIENT
Start: 2021-03-26 | End: 2021-03-26 | Stop reason: HOSPADM

## 2021-03-26 RX ORDER — ASPIRIN 81 MG/1
81 TABLET ORAL DAILY
Status: DISCONTINUED | OUTPATIENT
Start: 2021-03-26 | End: 2021-03-26 | Stop reason: HOSPADM

## 2021-03-26 RX ORDER — ATORVASTATIN CALCIUM 10 MG/1
20 TABLET, FILM COATED ORAL NIGHTLY
Status: DISCONTINUED | OUTPATIENT
Start: 2021-03-26 | End: 2021-03-26 | Stop reason: HOSPADM

## 2021-03-26 RX ORDER — GABAPENTIN 300 MG/1
600 CAPSULE ORAL 3 TIMES DAILY PRN
Status: DISCONTINUED | OUTPATIENT
Start: 2021-03-26 | End: 2021-03-26 | Stop reason: HOSPADM

## 2021-03-26 RX ORDER — LORAZEPAM 2 MG/ML
2 INJECTION INTRAMUSCULAR
Status: COMPLETED | OUTPATIENT
Start: 2021-03-26 | End: 2021-03-26

## 2021-03-26 RX ADMIN — LACTULOSE 15 G: 20 SOLUTION ORAL at 10:03

## 2021-03-26 RX ADMIN — PROPRANOLOL HYDROCHLORIDE 40 MG: 10 TABLET ORAL at 10:03

## 2021-03-26 RX ADMIN — LEVOTHYROXINE SODIUM 175 MCG: 75 TABLET ORAL at 10:03

## 2021-03-26 RX ADMIN — LISINOPRIL 20 MG: 20 TABLET ORAL at 10:03

## 2021-03-26 RX ADMIN — LORAZEPAM 2 MG: 2 INJECTION INTRAMUSCULAR; INTRAVENOUS at 01:03

## 2021-03-26 RX ADMIN — PANTOPRAZOLE SODIUM 40 MG: 40 TABLET, DELAYED RELEASE ORAL at 10:03

## 2021-03-26 RX ADMIN — CLONIDINE HYDROCHLORIDE 0.3 MG: 0.3 TABLET ORAL at 10:03

## 2021-03-26 RX ADMIN — FUROSEMIDE 20 MG: 20 TABLET ORAL at 10:03

## 2021-03-26 RX ADMIN — ASPIRIN 81 MG: 81 TABLET, COATED ORAL at 10:03

## 2021-03-26 RX ADMIN — HALOPERIDOL LACTATE 5 MG: 5 INJECTION, SOLUTION INTRAMUSCULAR at 12:03

## 2021-04-19 ENCOUNTER — OFFICE VISIT (OUTPATIENT)
Dept: INTERNAL MEDICINE | Facility: CLINIC | Age: 66
End: 2021-04-19
Payer: MEDICARE

## 2021-04-19 VITALS
DIASTOLIC BLOOD PRESSURE: 72 MMHG | TEMPERATURE: 97 F | BODY MASS INDEX: 38.57 KG/M2 | WEIGHT: 231.5 LBS | OXYGEN SATURATION: 96 % | SYSTOLIC BLOOD PRESSURE: 112 MMHG | HEART RATE: 72 BPM | RESPIRATION RATE: 12 BRPM | HEIGHT: 65 IN

## 2021-04-19 DIAGNOSIS — Z76.89 ESTABLISHING CARE WITH NEW DOCTOR, ENCOUNTER FOR: ICD-10-CM

## 2021-04-19 DIAGNOSIS — Z79.899 POLYPHARMACY: ICD-10-CM

## 2021-04-19 DIAGNOSIS — Z92.89 HOSPITALIZATION WITHIN LAST 30 DAYS: ICD-10-CM

## 2021-04-19 DIAGNOSIS — K21.9 GASTROESOPHAGEAL REFLUX DISEASE WITHOUT ESOPHAGITIS: ICD-10-CM

## 2021-04-19 DIAGNOSIS — G21.19 DRUG-INDUCED PARKINSONISM: ICD-10-CM

## 2021-04-19 DIAGNOSIS — Z72.0 TOBACCO ABUSE: Primary | ICD-10-CM

## 2021-04-19 DIAGNOSIS — Z12.2 ENCOUNTER FOR SCREENING FOR MALIGNANT NEOPLASM OF RESPIRATORY ORGANS: ICD-10-CM

## 2021-04-19 DIAGNOSIS — K43.2 VENTRAL INCISIONAL HERNIA WITHOUT OBSTRUCTION OR GANGRENE: ICD-10-CM

## 2021-04-19 DIAGNOSIS — F31.11 BIPOLAR AFFECTIVE DISORDER, CURRENTLY MANIC, MILD: ICD-10-CM

## 2021-04-19 DIAGNOSIS — F31.0 BIPOLAR AFFECTIVE DISORDER, CURRENT EPISODE HYPOMANIC: ICD-10-CM

## 2021-04-19 DIAGNOSIS — K59.09 CHRONIC CONSTIPATION: ICD-10-CM

## 2021-04-19 DIAGNOSIS — F09 COGNITIVE DISORDER: ICD-10-CM

## 2021-04-19 PROCEDURE — 99215 OFFICE O/P EST HI 40 MIN: CPT | Mod: PBBFAC,PO | Performed by: STUDENT IN AN ORGANIZED HEALTH CARE EDUCATION/TRAINING PROGRAM

## 2021-04-19 PROCEDURE — 99214 PR OFFICE/OUTPT VISIT, EST, LEVL IV, 30-39 MIN: ICD-10-PCS | Mod: S$GLB,,, | Performed by: STUDENT IN AN ORGANIZED HEALTH CARE EDUCATION/TRAINING PROGRAM

## 2021-04-19 PROCEDURE — 99499 RISK ADDL DX/OHS AUDIT: ICD-10-PCS | Mod: S$GLB,,, | Performed by: STUDENT IN AN ORGANIZED HEALTH CARE EDUCATION/TRAINING PROGRAM

## 2021-04-19 PROCEDURE — 99214 OFFICE O/P EST MOD 30 MIN: CPT | Mod: S$GLB,,, | Performed by: STUDENT IN AN ORGANIZED HEALTH CARE EDUCATION/TRAINING PROGRAM

## 2021-04-19 PROCEDURE — 99999 PR PBB SHADOW E&M-EST. PATIENT-LVL V: CPT | Mod: PBBFAC,,, | Performed by: STUDENT IN AN ORGANIZED HEALTH CARE EDUCATION/TRAINING PROGRAM

## 2021-04-19 PROCEDURE — 99499 UNLISTED E&M SERVICE: CPT | Mod: S$GLB,,, | Performed by: STUDENT IN AN ORGANIZED HEALTH CARE EDUCATION/TRAINING PROGRAM

## 2021-04-19 PROCEDURE — 99999 PR PBB SHADOW E&M-EST. PATIENT-LVL V: ICD-10-PCS | Mod: PBBFAC,,, | Performed by: STUDENT IN AN ORGANIZED HEALTH CARE EDUCATION/TRAINING PROGRAM

## 2021-04-20 ENCOUNTER — TELEPHONE (OUTPATIENT)
Dept: INTERNAL MEDICINE | Facility: CLINIC | Age: 66
End: 2021-04-20

## 2021-04-20 ENCOUNTER — OUTPATIENT CASE MANAGEMENT (OUTPATIENT)
Dept: ADMINISTRATIVE | Facility: OTHER | Age: 66
End: 2021-04-20

## 2021-04-21 ENCOUNTER — TELEPHONE (OUTPATIENT)
Dept: GASTROENTEROLOGY | Facility: CLINIC | Age: 66
End: 2021-04-21

## 2021-04-21 ENCOUNTER — OUTPATIENT CASE MANAGEMENT (OUTPATIENT)
Dept: ADMINISTRATIVE | Facility: OTHER | Age: 66
End: 2021-04-21

## 2021-04-21 ENCOUNTER — SSC ENCOUNTER (OUTPATIENT)
Dept: ADMINISTRATIVE | Facility: OTHER | Age: 66
End: 2021-04-21

## 2021-04-22 ENCOUNTER — OUTPATIENT CASE MANAGEMENT (OUTPATIENT)
Dept: ADMINISTRATIVE | Facility: OTHER | Age: 66
End: 2021-04-22

## 2021-04-22 ENCOUNTER — TELEPHONE (OUTPATIENT)
Dept: INTERNAL MEDICINE | Facility: CLINIC | Age: 66
End: 2021-04-22

## 2021-04-23 ENCOUNTER — HOSPITAL ENCOUNTER (OUTPATIENT)
Dept: RADIOLOGY | Facility: HOSPITAL | Age: 66
Discharge: HOME | End: 2021-04-23
Attending: STUDENT IN AN ORGANIZED HEALTH CARE EDUCATION/TRAINING PROGRAM
Payer: MEDICARE

## 2021-04-23 DIAGNOSIS — Z12.2 ENCOUNTER FOR SCREENING FOR MALIGNANT NEOPLASM OF RESPIRATORY ORGANS: ICD-10-CM

## 2021-04-23 PROCEDURE — 71271 CT THORAX LUNG CANCER SCR C-: CPT | Mod: TC

## 2021-04-23 PROCEDURE — 71271 CT CHEST LUNG SCREENING LOW DOSE: ICD-10-PCS | Mod: 26,,, | Performed by: RADIOLOGY

## 2021-04-23 PROCEDURE — 71271 CT THORAX LUNG CANCER SCR C-: CPT | Mod: 26,,, | Performed by: RADIOLOGY

## 2021-04-23 RX ORDER — ALBUTEROL SULFATE 90 UG/1
2 AEROSOL, METERED RESPIRATORY (INHALATION) EVERY 6 HOURS PRN
Qty: 18 G | Refills: 11 | Status: SHIPPED | OUTPATIENT
Start: 2021-04-23 | End: 2021-07-28 | Stop reason: SDUPTHER

## 2021-04-26 ENCOUNTER — NURSE TRIAGE (OUTPATIENT)
Dept: ADMINISTRATIVE | Facility: CLINIC | Age: 66
End: 2021-04-26

## 2021-04-27 ENCOUNTER — TELEPHONE (OUTPATIENT)
Dept: INTERNAL MEDICINE | Facility: CLINIC | Age: 66
End: 2021-04-27

## 2021-04-28 ENCOUNTER — TELEPHONE (OUTPATIENT)
Dept: INTERNAL MEDICINE | Facility: CLINIC | Age: 66
End: 2021-04-28

## 2021-04-29 ENCOUNTER — TELEPHONE (OUTPATIENT)
Dept: GASTROENTEROLOGY | Facility: CLINIC | Age: 66
End: 2021-04-29

## 2021-05-03 ENCOUNTER — TELEPHONE (OUTPATIENT)
Dept: INTERNAL MEDICINE | Facility: CLINIC | Age: 66
End: 2021-05-03

## 2021-05-03 DIAGNOSIS — K21.9 GASTROESOPHAGEAL REFLUX DISEASE, UNSPECIFIED WHETHER ESOPHAGITIS PRESENT: ICD-10-CM

## 2021-05-03 DIAGNOSIS — E03.9 HYPOTHYROIDISM, UNSPECIFIED TYPE: ICD-10-CM

## 2021-05-04 RX ORDER — LIOTHYRONINE SODIUM 5 UG/1
5 TABLET ORAL
Status: CANCELLED | OUTPATIENT
Start: 2021-05-04

## 2021-05-04 RX ORDER — TIZANIDINE 4 MG/1
4 TABLET ORAL EVERY 8 HOURS PRN
Qty: 90 TABLET | Refills: 1 | Status: SHIPPED | OUTPATIENT
Start: 2021-05-04 | End: 2021-05-05

## 2021-05-04 RX ORDER — PANTOPRAZOLE SODIUM 40 MG/1
40 TABLET, DELAYED RELEASE ORAL DAILY
Qty: 30 TABLET | Refills: 0 | Status: SHIPPED | OUTPATIENT
Start: 2021-05-04 | End: 2021-06-02

## 2021-05-04 RX ORDER — FUROSEMIDE 20 MG/1
20 TABLET ORAL 2 TIMES DAILY
Qty: 60 TABLET | Refills: 11 | Status: SHIPPED | OUTPATIENT
Start: 2021-05-04 | End: 2022-01-26 | Stop reason: SDUPTHER

## 2021-05-04 RX ORDER — FLUTICASONE FUROATE AND VILANTEROL TRIFENATATE 200; 25 UG/1; UG/1
1 POWDER RESPIRATORY (INHALATION) DAILY
Qty: 1 EACH | Refills: 11 | Status: SHIPPED | OUTPATIENT
Start: 2021-05-04

## 2021-05-04 RX ORDER — LEVOTHYROXINE SODIUM 175 UG/1
175 TABLET ORAL DAILY
Qty: 30 TABLET | Refills: 3 | Status: SHIPPED | OUTPATIENT
Start: 2021-05-04 | End: 2021-11-04 | Stop reason: SDUPTHER

## 2021-05-05 ENCOUNTER — TELEPHONE (OUTPATIENT)
Dept: INTERNAL MEDICINE | Facility: CLINIC | Age: 66
End: 2021-05-05

## 2021-05-06 ENCOUNTER — PATIENT OUTREACH (OUTPATIENT)
Dept: ADMINISTRATIVE | Facility: HOSPITAL | Age: 66
End: 2021-05-06

## 2021-05-07 ENCOUNTER — PATIENT OUTREACH (OUTPATIENT)
Dept: ADMINISTRATIVE | Facility: HOSPITAL | Age: 66
End: 2021-05-07

## 2021-05-11 ENCOUNTER — TELEPHONE (OUTPATIENT)
Dept: INTERNAL MEDICINE | Facility: CLINIC | Age: 66
End: 2021-05-11

## 2021-05-14 ENCOUNTER — TELEPHONE (OUTPATIENT)
Dept: INTERNAL MEDICINE | Facility: CLINIC | Age: 66
End: 2021-05-14

## 2021-05-18 ENCOUNTER — TELEPHONE (OUTPATIENT)
Dept: INTERNAL MEDICINE | Facility: CLINIC | Age: 66
End: 2021-05-18

## 2021-05-24 ENCOUNTER — TELEPHONE (OUTPATIENT)
Dept: INTERNAL MEDICINE | Facility: CLINIC | Age: 66
End: 2021-05-24

## 2021-06-03 ENCOUNTER — OFFICE VISIT (OUTPATIENT)
Dept: INTERNAL MEDICINE | Facility: CLINIC | Age: 66
End: 2021-06-03
Payer: MEDICARE

## 2021-06-03 VITALS
HEART RATE: 62 BPM | HEIGHT: 65 IN | RESPIRATION RATE: 16 BRPM | SYSTOLIC BLOOD PRESSURE: 110 MMHG | WEIGHT: 229.25 LBS | OXYGEN SATURATION: 96 % | TEMPERATURE: 98 F | DIASTOLIC BLOOD PRESSURE: 72 MMHG | BODY MASS INDEX: 38.2 KG/M2

## 2021-06-03 DIAGNOSIS — R73.03 PREDIABETES: ICD-10-CM

## 2021-06-03 DIAGNOSIS — G21.19 DRUG-INDUCED PARKINSONISM: ICD-10-CM

## 2021-06-03 DIAGNOSIS — R26.9 GAIT DISORDER: ICD-10-CM

## 2021-06-03 DIAGNOSIS — E11.51 TYPE 2 DIABETES MELLITUS WITH DIABETIC PERIPHERAL ANGIOPATHY WITHOUT GANGRENE, WITHOUT LONG-TERM CURRENT USE OF INSULIN: ICD-10-CM

## 2021-06-03 DIAGNOSIS — G47.33 OSA (OBSTRUCTIVE SLEEP APNEA): Primary | ICD-10-CM

## 2021-06-03 DIAGNOSIS — R56.9 CONVULSIONS, UNSPECIFIED CONVULSION TYPE: ICD-10-CM

## 2021-06-03 PROCEDURE — 99999 PR PBB SHADOW E&M-EST. PATIENT-LVL V: ICD-10-PCS | Mod: PBBFAC,,, | Performed by: STUDENT IN AN ORGANIZED HEALTH CARE EDUCATION/TRAINING PROGRAM

## 2021-06-03 PROCEDURE — 99214 OFFICE O/P EST MOD 30 MIN: CPT | Mod: S$GLB,,, | Performed by: STUDENT IN AN ORGANIZED HEALTH CARE EDUCATION/TRAINING PROGRAM

## 2021-06-03 PROCEDURE — 1125F PR PAIN SEVERITY QUANTIFIED, PAIN PRESENT: ICD-10-PCS | Mod: S$GLB,,, | Performed by: STUDENT IN AN ORGANIZED HEALTH CARE EDUCATION/TRAINING PROGRAM

## 2021-06-03 PROCEDURE — 99214 PR OFFICE/OUTPT VISIT, EST, LEVL IV, 30-39 MIN: ICD-10-PCS | Mod: S$GLB,,, | Performed by: STUDENT IN AN ORGANIZED HEALTH CARE EDUCATION/TRAINING PROGRAM

## 2021-06-03 PROCEDURE — 3008F PR BODY MASS INDEX (BMI) DOCUMENTED: ICD-10-PCS | Mod: CPTII,S$GLB,, | Performed by: STUDENT IN AN ORGANIZED HEALTH CARE EDUCATION/TRAINING PROGRAM

## 2021-06-03 PROCEDURE — 99999 PR PBB SHADOW E&M-EST. PATIENT-LVL V: CPT | Mod: PBBFAC,,, | Performed by: STUDENT IN AN ORGANIZED HEALTH CARE EDUCATION/TRAINING PROGRAM

## 2021-06-03 PROCEDURE — 3008F BODY MASS INDEX DOCD: CPT | Mod: CPTII,S$GLB,, | Performed by: STUDENT IN AN ORGANIZED HEALTH CARE EDUCATION/TRAINING PROGRAM

## 2021-06-03 PROCEDURE — 99499 UNLISTED E&M SERVICE: CPT | Mod: S$GLB,,, | Performed by: STUDENT IN AN ORGANIZED HEALTH CARE EDUCATION/TRAINING PROGRAM

## 2021-06-03 PROCEDURE — 1125F AMNT PAIN NOTED PAIN PRSNT: CPT | Mod: S$GLB,,, | Performed by: STUDENT IN AN ORGANIZED HEALTH CARE EDUCATION/TRAINING PROGRAM

## 2021-06-03 PROCEDURE — 99499 RISK ADDL DX/OHS AUDIT: ICD-10-PCS | Mod: S$GLB,,, | Performed by: STUDENT IN AN ORGANIZED HEALTH CARE EDUCATION/TRAINING PROGRAM

## 2021-06-04 ENCOUNTER — LAB VISIT (OUTPATIENT)
Dept: LAB | Facility: HOSPITAL | Age: 66
End: 2021-06-04
Attending: STUDENT IN AN ORGANIZED HEALTH CARE EDUCATION/TRAINING PROGRAM
Payer: MEDICARE

## 2021-06-04 ENCOUNTER — TELEPHONE (OUTPATIENT)
Dept: INTERNAL MEDICINE | Facility: CLINIC | Age: 66
End: 2021-06-04

## 2021-06-04 DIAGNOSIS — R73.03 PREDIABETES: ICD-10-CM

## 2021-06-04 DIAGNOSIS — J44.9 COPD WITHOUT EXACERBATION: Primary | ICD-10-CM

## 2021-06-04 LAB
ESTIMATED AVG GLUCOSE: 131 MG/DL (ref 68–131)
HBA1C MFR BLD: 6.2 % (ref 4–5.6)

## 2021-06-04 PROCEDURE — 83036 HEMOGLOBIN GLYCOSYLATED A1C: CPT | Performed by: STUDENT IN AN ORGANIZED HEALTH CARE EDUCATION/TRAINING PROGRAM

## 2021-06-04 PROCEDURE — 36415 COLL VENOUS BLD VENIPUNCTURE: CPT | Mod: PO | Performed by: STUDENT IN AN ORGANIZED HEALTH CARE EDUCATION/TRAINING PROGRAM

## 2021-06-07 ENCOUNTER — TELEPHONE (OUTPATIENT)
Dept: INTERNAL MEDICINE | Facility: CLINIC | Age: 66
End: 2021-06-07

## 2021-06-08 ENCOUNTER — TELEPHONE (OUTPATIENT)
Dept: SURGERY | Facility: CLINIC | Age: 66
End: 2021-06-08

## 2021-06-08 ENCOUNTER — OFFICE VISIT (OUTPATIENT)
Dept: SLEEP MEDICINE | Facility: CLINIC | Age: 66
End: 2021-06-08
Payer: MEDICARE

## 2021-06-08 ENCOUNTER — TELEPHONE (OUTPATIENT)
Dept: INTERNAL MEDICINE | Facility: CLINIC | Age: 66
End: 2021-06-08

## 2021-06-08 VITALS
HEIGHT: 65 IN | HEART RATE: 73 BPM | SYSTOLIC BLOOD PRESSURE: 109 MMHG | BODY MASS INDEX: 38.15 KG/M2 | DIASTOLIC BLOOD PRESSURE: 67 MMHG

## 2021-06-08 DIAGNOSIS — G47.33 OSA (OBSTRUCTIVE SLEEP APNEA): ICD-10-CM

## 2021-06-08 PROCEDURE — 3008F PR BODY MASS INDEX (BMI) DOCUMENTED: ICD-10-PCS | Mod: CPTII,S$GLB,, | Performed by: INTERNAL MEDICINE

## 2021-06-08 PROCEDURE — 99204 OFFICE O/P NEW MOD 45 MIN: CPT | Mod: S$GLB,,, | Performed by: INTERNAL MEDICINE

## 2021-06-08 PROCEDURE — 99204 PR OFFICE/OUTPT VISIT, NEW, LEVL IV, 45-59 MIN: ICD-10-PCS | Mod: S$GLB,,, | Performed by: INTERNAL MEDICINE

## 2021-06-08 PROCEDURE — 99999 PR PBB SHADOW E&M-EST. PATIENT-LVL II: CPT | Mod: PBBFAC,,, | Performed by: INTERNAL MEDICINE

## 2021-06-08 PROCEDURE — 99999 PR PBB SHADOW E&M-EST. PATIENT-LVL II: ICD-10-PCS | Mod: PBBFAC,,, | Performed by: INTERNAL MEDICINE

## 2021-06-08 PROCEDURE — 3008F BODY MASS INDEX DOCD: CPT | Mod: CPTII,S$GLB,, | Performed by: INTERNAL MEDICINE

## 2021-06-09 ENCOUNTER — TELEPHONE (OUTPATIENT)
Dept: SLEEP MEDICINE | Facility: OTHER | Age: 66
End: 2021-06-09

## 2021-06-09 ENCOUNTER — TELEPHONE (OUTPATIENT)
Dept: INTERNAL MEDICINE | Facility: CLINIC | Age: 66
End: 2021-06-09

## 2021-06-09 ENCOUNTER — TELEPHONE (OUTPATIENT)
Dept: SLEEP MEDICINE | Facility: CLINIC | Age: 66
End: 2021-06-09

## 2021-06-09 DIAGNOSIS — F51.09 OTHER INSOMNIA NOT DUE TO A SUBSTANCE OR KNOWN PHYSIOLOGICAL CONDITION: ICD-10-CM

## 2021-06-09 DIAGNOSIS — G47.10 HYPERSOMNOLENCE: ICD-10-CM

## 2021-06-09 DIAGNOSIS — G21.19 DRUG-INDUCED PARKINSONISM: ICD-10-CM

## 2021-06-09 DIAGNOSIS — G47.33 OSA (OBSTRUCTIVE SLEEP APNEA): Primary | ICD-10-CM

## 2021-06-09 DIAGNOSIS — R06.83 SNORING: ICD-10-CM

## 2021-06-10 ENCOUNTER — TELEPHONE (OUTPATIENT)
Dept: INTERNAL MEDICINE | Facility: CLINIC | Age: 66
End: 2021-06-10

## 2021-06-11 ENCOUNTER — TELEPHONE (OUTPATIENT)
Dept: SLEEP MEDICINE | Facility: OTHER | Age: 66
End: 2021-06-11

## 2021-06-14 ENCOUNTER — TELEPHONE (OUTPATIENT)
Dept: INTERNAL MEDICINE | Facility: CLINIC | Age: 66
End: 2021-06-14

## 2021-06-15 ENCOUNTER — TELEPHONE (OUTPATIENT)
Dept: INTERNAL MEDICINE | Facility: CLINIC | Age: 66
End: 2021-06-15

## 2021-06-15 RX ORDER — POLYETHYLENE GLYCOL 3350 17 G/17G
POWDER, FOR SOLUTION ORAL
Qty: 100 PACKET | Refills: 5 | Status: SHIPPED | OUTPATIENT
Start: 2021-06-15 | End: 2022-01-26

## 2021-06-15 RX ORDER — NYSTATIN 100000 [USP'U]/G
POWDER TOPICAL 2 TIMES DAILY
Qty: 60 G | Refills: 0 | Status: SHIPPED | OUTPATIENT
Start: 2021-06-15 | End: 2021-07-16 | Stop reason: SDUPTHER

## 2021-06-25 ENCOUNTER — DOCUMENTATION ONLY (OUTPATIENT)
Dept: PSYCHIATRY | Facility: CLINIC | Age: 66
End: 2021-06-25

## 2021-06-25 ENCOUNTER — TELEPHONE (OUTPATIENT)
Dept: PSYCHIATRY | Facility: CLINIC | Age: 66
End: 2021-06-25

## 2021-06-29 ENCOUNTER — LAB VISIT (OUTPATIENT)
Dept: LAB | Facility: HOSPITAL | Age: 66
End: 2021-06-29
Payer: MEDICARE

## 2021-06-29 ENCOUNTER — OFFICE VISIT (OUTPATIENT)
Dept: PSYCHIATRY | Facility: CLINIC | Age: 66
End: 2021-06-29
Payer: MEDICARE

## 2021-06-29 VITALS
BODY MASS INDEX: 37.53 KG/M2 | WEIGHT: 225.5 LBS | DIASTOLIC BLOOD PRESSURE: 80 MMHG | SYSTOLIC BLOOD PRESSURE: 132 MMHG | HEART RATE: 82 BPM

## 2021-06-29 DIAGNOSIS — G47.00 INSOMNIA DISORDER WITH NON-SLEEP DISORDER MENTAL COMORBIDITY: ICD-10-CM

## 2021-06-29 DIAGNOSIS — Z79.899 LONG TERM CURRENT USE OF ANTIPSYCHOTIC MEDICATION: ICD-10-CM

## 2021-06-29 DIAGNOSIS — F25.1 SCHIZOAFFECTIVE DISORDER, DEPRESSIVE TYPE: Primary | ICD-10-CM

## 2021-06-29 DIAGNOSIS — F41.9 ANXIETY: ICD-10-CM

## 2021-06-29 LAB
ALBUMIN SERPL BCP-MCNC: 4 G/DL (ref 3.5–5.2)
ALP SERPL-CCNC: 78 U/L (ref 55–135)
ALT SERPL W/O P-5'-P-CCNC: 23 U/L (ref 10–44)
AST SERPL-CCNC: 22 U/L (ref 10–40)
BILIRUB DIRECT SERPL-MCNC: <0.1 MG/DL (ref 0.1–0.3)
BILIRUB SERPL-MCNC: 0.2 MG/DL (ref 0.1–1)
PROT SERPL-MCNC: 7.6 G/DL (ref 6–8.4)
VALPROATE SERPL-MCNC: 53.1 UG/ML (ref 50–100)

## 2021-06-29 PROCEDURE — 90833 PSYTX W PT W E/M 30 MIN: CPT | Mod: S$GLB,,, | Performed by: NURSE PRACTITIONER

## 2021-06-29 PROCEDURE — 99999 PR PBB SHADOW E&M-EST. PATIENT-LVL III: CPT | Mod: PBBFAC,,, | Performed by: NURSE PRACTITIONER

## 2021-06-29 PROCEDURE — 3008F BODY MASS INDEX DOCD: CPT | Mod: CPTII,S$GLB,, | Performed by: NURSE PRACTITIONER

## 2021-06-29 PROCEDURE — 99213 OFFICE O/P EST LOW 20 MIN: CPT | Mod: S$GLB,,, | Performed by: NURSE PRACTITIONER

## 2021-06-29 PROCEDURE — 36415 COLL VENOUS BLD VENIPUNCTURE: CPT | Performed by: NURSE PRACTITIONER

## 2021-06-29 PROCEDURE — 80164 ASSAY DIPROPYLACETIC ACD TOT: CPT | Performed by: NURSE PRACTITIONER

## 2021-06-29 PROCEDURE — 99499 RISK ADDL DX/OHS AUDIT: ICD-10-PCS | Mod: HCNC,S$GLB,, | Performed by: NURSE PRACTITIONER

## 2021-06-29 PROCEDURE — 99213 PR OFFICE/OUTPT VISIT, EST, LEVL III, 20-29 MIN: ICD-10-PCS | Mod: S$GLB,,, | Performed by: NURSE PRACTITIONER

## 2021-06-29 PROCEDURE — 99999 PR PBB SHADOW E&M-EST. PATIENT-LVL III: ICD-10-PCS | Mod: PBBFAC,,, | Performed by: NURSE PRACTITIONER

## 2021-06-29 PROCEDURE — 90833 PR PSYCHOTHERAPY W/PATIENT W/E&M, 30 MIN (ADD ON): ICD-10-PCS | Mod: S$GLB,,, | Performed by: NURSE PRACTITIONER

## 2021-06-29 PROCEDURE — 80076 HEPATIC FUNCTION PANEL: CPT | Performed by: NURSE PRACTITIONER

## 2021-06-29 PROCEDURE — 3008F PR BODY MASS INDEX (BMI) DOCUMENTED: ICD-10-PCS | Mod: CPTII,S$GLB,, | Performed by: NURSE PRACTITIONER

## 2021-06-29 PROCEDURE — 99499 UNLISTED E&M SERVICE: CPT | Mod: HCNC,S$GLB,, | Performed by: NURSE PRACTITIONER

## 2021-06-30 ENCOUNTER — TELEPHONE (OUTPATIENT)
Dept: INTERNAL MEDICINE | Facility: CLINIC | Age: 66
End: 2021-06-30

## 2021-07-12 ENCOUNTER — TELEPHONE (OUTPATIENT)
Dept: SLEEP MEDICINE | Facility: CLINIC | Age: 66
End: 2021-07-12

## 2021-07-13 ENCOUNTER — TELEPHONE (OUTPATIENT)
Dept: SLEEP MEDICINE | Facility: CLINIC | Age: 66
End: 2021-07-13

## 2021-07-13 ENCOUNTER — HOSPITAL ENCOUNTER (OUTPATIENT)
Dept: SLEEP MEDICINE | Facility: OTHER | Age: 66
Discharge: HOME | End: 2021-07-13
Attending: INTERNAL MEDICINE
Payer: MEDICARE

## 2021-07-13 DIAGNOSIS — F51.09 OTHER INSOMNIA NOT DUE TO A SUBSTANCE OR KNOWN PHYSIOLOGICAL CONDITION: ICD-10-CM

## 2021-07-13 DIAGNOSIS — G47.33 OSA (OBSTRUCTIVE SLEEP APNEA): ICD-10-CM

## 2021-07-13 DIAGNOSIS — G21.19 DRUG-INDUCED PARKINSONISM: ICD-10-CM

## 2021-07-13 DIAGNOSIS — G47.10 HYPERSOMNOLENCE: ICD-10-CM

## 2021-07-13 DIAGNOSIS — R06.83 SNORING: ICD-10-CM

## 2021-07-13 PROCEDURE — 95810 PR POLYSOMNOGRAPHY, 4 OR MORE: ICD-10-PCS | Mod: 26,,, | Performed by: INTERNAL MEDICINE

## 2021-07-13 PROCEDURE — 95810 POLYSOM 6/> YRS 4/> PARAM: CPT

## 2021-07-13 PROCEDURE — 95810 POLYSOM 6/> YRS 4/> PARAM: CPT | Mod: 26,,, | Performed by: INTERNAL MEDICINE

## 2021-07-16 ENCOUNTER — TELEPHONE (OUTPATIENT)
Dept: INTERNAL MEDICINE | Facility: CLINIC | Age: 66
End: 2021-07-16

## 2021-07-16 ENCOUNTER — TELEPHONE (OUTPATIENT)
Dept: SLEEP MEDICINE | Facility: CLINIC | Age: 66
End: 2021-07-16

## 2021-07-16 RX ORDER — NYSTATIN 100000 [USP'U]/G
POWDER TOPICAL 2 TIMES DAILY
Qty: 60 G | Refills: 3 | Status: SHIPPED | OUTPATIENT
Start: 2021-07-16 | End: 2022-01-26

## 2021-07-19 ENCOUNTER — TELEPHONE (OUTPATIENT)
Dept: INTERNAL MEDICINE | Facility: CLINIC | Age: 66
End: 2021-07-19

## 2021-07-19 DIAGNOSIS — G47.33 OSA (OBSTRUCTIVE SLEEP APNEA): Primary | ICD-10-CM

## 2021-07-21 ENCOUNTER — TELEPHONE (OUTPATIENT)
Dept: SLEEP MEDICINE | Facility: CLINIC | Age: 66
End: 2021-07-21

## 2021-07-21 ENCOUNTER — TELEPHONE (OUTPATIENT)
Dept: PULMONOLOGY | Facility: CLINIC | Age: 66
End: 2021-07-21

## 2021-07-21 DIAGNOSIS — J44.9 COPD WITHOUT EXACERBATION: Primary | ICD-10-CM

## 2021-07-22 ENCOUNTER — PATIENT OUTREACH (OUTPATIENT)
Dept: ADMINISTRATIVE | Facility: OTHER | Age: 66
End: 2021-07-22

## 2021-07-23 ENCOUNTER — TELEPHONE (OUTPATIENT)
Dept: PULMONOLOGY | Facility: CLINIC | Age: 66
End: 2021-07-23

## 2021-07-27 ENCOUNTER — OFFICE VISIT (OUTPATIENT)
Dept: PULMONOLOGY | Facility: CLINIC | Age: 66
End: 2021-07-27
Payer: MEDICARE

## 2021-07-27 ENCOUNTER — HOSPITAL ENCOUNTER (OUTPATIENT)
Dept: PULMONOLOGY | Facility: CLINIC | Age: 66
Discharge: HOME | End: 2021-07-27
Payer: MEDICARE

## 2021-07-27 VITALS
HEART RATE: 75 BPM | HEIGHT: 64 IN | OXYGEN SATURATION: 95 % | SYSTOLIC BLOOD PRESSURE: 116 MMHG | WEIGHT: 231.5 LBS | BODY MASS INDEX: 39.52 KG/M2 | DIASTOLIC BLOOD PRESSURE: 76 MMHG

## 2021-07-27 DIAGNOSIS — J44.9 COPD WITHOUT EXACERBATION: ICD-10-CM

## 2021-07-27 DIAGNOSIS — F17.218 CIGARETTE NICOTINE DEPENDENCE WITH OTHER NICOTINE-INDUCED DISORDER: ICD-10-CM

## 2021-07-27 DIAGNOSIS — Z72.0 TOBACCO ABUSE: Primary | ICD-10-CM

## 2021-07-27 LAB
FEF 25 75 LLN: 1
FEF 25 75 PRE REF: 53.7 %
FEF 25 75 REF: 2.06
FEV05 LLN: 0.92
FEV05 REF: 1.77
FEV1 FVC LLN: 66
FEV1 FVC PRE REF: 92.7 %
FEV1 FVC REF: 79
FEV1 LLN: 1.75
FEV1 PRE REF: 71.7 %
FEV1 REF: 2.35
FVC LLN: 2.24
FVC PRE REF: 76.8 %
FVC REF: 3.01
PEF LLN: 4.28
PEF PRE REF: 97.1 %
PEF REF: 6
PHYSICIAN COMMENT: ABNORMAL
PRE FEF 25 75: 1.11 L/S (ref 1–3.52)
PRE FET 100: 8.37 SEC
PRE FEV05 REF: 80.6 %
PRE FEV1 FVC: 73.01 % (ref 66.12–89.55)
PRE FEV1: 1.69 L (ref 1.75–2.94)
PRE FEV5: 1.43 L (ref 0.92–2.63)
PRE FVC: 2.31 L (ref 2.24–3.81)
PRE PEF: 5.82 L/S (ref 4.28–7.71)

## 2021-07-27 PROCEDURE — 99999 PR PBB SHADOW E&M-EST. PATIENT-LVL V: CPT | Mod: PBBFAC,,, | Performed by: INTERNAL MEDICINE

## 2021-07-27 PROCEDURE — 1101F PR PT FALLS ASSESS DOC 0-1 FALLS W/OUT INJ PAST YR: ICD-10-PCS | Mod: CPTII,S$GLB,, | Performed by: INTERNAL MEDICINE

## 2021-07-27 PROCEDURE — 3288F PR FALLS RISK ASSESSMENT DOCUMENTED: ICD-10-PCS | Mod: CPTII,S$GLB,, | Performed by: INTERNAL MEDICINE

## 2021-07-27 PROCEDURE — 3044F HG A1C LEVEL LT 7.0%: CPT | Mod: CPTII,S$GLB,, | Performed by: INTERNAL MEDICINE

## 2021-07-27 PROCEDURE — 94010 BREATHING CAPACITY TEST: CPT | Mod: S$GLB,,, | Performed by: INTERNAL MEDICINE

## 2021-07-27 PROCEDURE — 3078F PR MOST RECENT DIASTOLIC BLOOD PRESSURE < 80 MM HG: ICD-10-PCS | Mod: CPTII,S$GLB,, | Performed by: INTERNAL MEDICINE

## 2021-07-27 PROCEDURE — 1159F PR MEDICATION LIST DOCUMENTED IN MEDICAL RECORD: ICD-10-PCS | Mod: CPTII,S$GLB,, | Performed by: INTERNAL MEDICINE

## 2021-07-27 PROCEDURE — 1157F PR ADVANCE CARE PLAN OR EQUIV PRESENT IN MEDICAL RECORD: ICD-10-PCS | Mod: CPTII,S$GLB,, | Performed by: INTERNAL MEDICINE

## 2021-07-27 PROCEDURE — 1126F PR PAIN SEVERITY QUANTIFIED, NO PAIN PRESENT: ICD-10-PCS | Mod: CPTII,S$GLB,, | Performed by: INTERNAL MEDICINE

## 2021-07-27 PROCEDURE — 3008F BODY MASS INDEX DOCD: CPT | Mod: CPTII,S$GLB,, | Performed by: INTERNAL MEDICINE

## 2021-07-27 PROCEDURE — 3074F PR MOST RECENT SYSTOLIC BLOOD PRESSURE < 130 MM HG: ICD-10-PCS | Mod: CPTII,S$GLB,, | Performed by: INTERNAL MEDICINE

## 2021-07-27 PROCEDURE — 99499 UNLISTED E&M SERVICE: CPT | Mod: S$PBB,HCNC,, | Performed by: INTERNAL MEDICINE

## 2021-07-27 PROCEDURE — 1157F ADVNC CARE PLAN IN RCRD: CPT | Mod: CPTII,S$GLB,, | Performed by: INTERNAL MEDICINE

## 2021-07-27 PROCEDURE — 3288F FALL RISK ASSESSMENT DOCD: CPT | Mod: CPTII,S$GLB,, | Performed by: INTERNAL MEDICINE

## 2021-07-27 PROCEDURE — 94010 BREATHING CAPACITY TEST: ICD-10-PCS | Mod: S$GLB,,, | Performed by: INTERNAL MEDICINE

## 2021-07-27 PROCEDURE — 99204 PR OFFICE/OUTPT VISIT, NEW, LEVL IV, 45-59 MIN: ICD-10-PCS | Mod: S$GLB,,, | Performed by: INTERNAL MEDICINE

## 2021-07-27 PROCEDURE — 3078F DIAST BP <80 MM HG: CPT | Mod: CPTII,S$GLB,, | Performed by: INTERNAL MEDICINE

## 2021-07-27 PROCEDURE — 1159F MED LIST DOCD IN RCRD: CPT | Mod: CPTII,S$GLB,, | Performed by: INTERNAL MEDICINE

## 2021-07-27 PROCEDURE — 99499 RISK ADDL DX/OHS AUDIT: ICD-10-PCS | Mod: S$PBB,HCNC,, | Performed by: INTERNAL MEDICINE

## 2021-07-27 PROCEDURE — 99204 OFFICE O/P NEW MOD 45 MIN: CPT | Mod: S$GLB,,, | Performed by: INTERNAL MEDICINE

## 2021-07-27 PROCEDURE — 99999 PR PBB SHADOW E&M-EST. PATIENT-LVL V: ICD-10-PCS | Mod: PBBFAC,,, | Performed by: INTERNAL MEDICINE

## 2021-07-27 PROCEDURE — 1126F AMNT PAIN NOTED NONE PRSNT: CPT | Mod: CPTII,S$GLB,, | Performed by: INTERNAL MEDICINE

## 2021-07-27 PROCEDURE — 3074F SYST BP LT 130 MM HG: CPT | Mod: CPTII,S$GLB,, | Performed by: INTERNAL MEDICINE

## 2021-07-27 PROCEDURE — 3044F PR MOST RECENT HEMOGLOBIN A1C LEVEL <7.0%: ICD-10-PCS | Mod: CPTII,S$GLB,, | Performed by: INTERNAL MEDICINE

## 2021-07-27 PROCEDURE — 1101F PT FALLS ASSESS-DOCD LE1/YR: CPT | Mod: CPTII,S$GLB,, | Performed by: INTERNAL MEDICINE

## 2021-07-27 PROCEDURE — 3008F PR BODY MASS INDEX (BMI) DOCUMENTED: ICD-10-PCS | Mod: CPTII,S$GLB,, | Performed by: INTERNAL MEDICINE

## 2021-07-27 RX ORDER — ARIPIPRAZOLE 15 MG/1
1 TABLET ORAL DAILY
COMMUNITY
Start: 2021-06-24 | End: 2021-09-07 | Stop reason: SDUPTHER

## 2021-07-27 RX ORDER — NEOMYCIN SULFATE, POLYMYXIN B SULFATE, HYDROCORTISONE 3.5; 10000; 1 MG/ML; [USP'U]/ML; MG/ML
1 SOLUTION/ DROPS AURICULAR (OTIC) 4 TIMES DAILY
COMMUNITY
Start: 2021-04-09

## 2021-07-28 ENCOUNTER — TELEPHONE (OUTPATIENT)
Dept: PULMONOLOGY | Facility: CLINIC | Age: 66
End: 2021-07-28

## 2021-07-28 DIAGNOSIS — J30.2 SEASONAL ALLERGIES: ICD-10-CM

## 2021-07-28 RX ORDER — FLUTICASONE PROPIONATE 50 MCG
1 SPRAY, SUSPENSION (ML) NASAL 2 TIMES DAILY PRN
Qty: 16 G | Refills: 1 | Status: SHIPPED | OUTPATIENT
Start: 2021-07-28 | End: 2021-09-24

## 2021-07-28 RX ORDER — ALBUTEROL SULFATE 90 UG/1
2 AEROSOL, METERED RESPIRATORY (INHALATION) EVERY 6 HOURS PRN
Qty: 18 G | Refills: 11 | Status: SHIPPED | OUTPATIENT
Start: 2021-07-28 | End: 2021-11-17 | Stop reason: SDUPTHER

## 2021-07-28 RX ORDER — IPRATROPIUM BROMIDE AND ALBUTEROL SULFATE 2.5; .5 MG/3ML; MG/3ML
3 SOLUTION RESPIRATORY (INHALATION) 2 TIMES DAILY
Qty: 1 BOX | Refills: 11 | Status: SHIPPED | OUTPATIENT
Start: 2021-07-28 | End: 2021-11-17 | Stop reason: SDUPTHER

## 2021-07-29 ENCOUNTER — TELEPHONE (OUTPATIENT)
Dept: PULMONOLOGY | Facility: CLINIC | Age: 66
End: 2021-07-29

## 2021-08-03 ENCOUNTER — TELEPHONE (OUTPATIENT)
Dept: INTERNAL MEDICINE | Facility: CLINIC | Age: 66
End: 2021-08-03

## 2021-08-05 ENCOUNTER — TELEPHONE (OUTPATIENT)
Dept: INTERNAL MEDICINE | Facility: CLINIC | Age: 66
End: 2021-08-05

## 2021-08-06 ENCOUNTER — TELEPHONE (OUTPATIENT)
Dept: INTERNAL MEDICINE | Facility: CLINIC | Age: 66
End: 2021-08-06

## 2021-08-09 ENCOUNTER — TELEPHONE (OUTPATIENT)
Dept: SLEEP MEDICINE | Facility: CLINIC | Age: 66
End: 2021-08-09

## 2021-08-12 ENCOUNTER — TELEPHONE (OUTPATIENT)
Dept: INTERNAL MEDICINE | Facility: CLINIC | Age: 66
End: 2021-08-12

## 2021-08-13 ENCOUNTER — LAB VISIT (OUTPATIENT)
Dept: LAB | Facility: HOSPITAL | Age: 66
End: 2021-08-13
Attending: STUDENT IN AN ORGANIZED HEALTH CARE EDUCATION/TRAINING PROGRAM
Payer: MEDICARE

## 2021-08-13 ENCOUNTER — TELEPHONE (OUTPATIENT)
Dept: INTERNAL MEDICINE | Facility: CLINIC | Age: 66
End: 2021-08-13

## 2021-08-13 DIAGNOSIS — R30.0 DYSURIA: Primary | ICD-10-CM

## 2021-08-13 DIAGNOSIS — R30.0 DYSURIA: ICD-10-CM

## 2021-08-13 LAB
BILIRUB UR QL STRIP: NEGATIVE
CLARITY UR REFRACT.AUTO: CLEAR
COLOR UR AUTO: NORMAL
GLUCOSE UR QL STRIP: NEGATIVE
HGB UR QL STRIP: NEGATIVE
KETONES UR QL STRIP: NEGATIVE
LEUKOCYTE ESTERASE UR QL STRIP: NEGATIVE
NITRITE UR QL STRIP: NEGATIVE
PH UR STRIP: 7 [PH] (ref 5–8)
PROT UR QL STRIP: NEGATIVE
SP GR UR STRIP: 1 (ref 1–1.03)
URN SPEC COLLECT METH UR: NORMAL

## 2021-08-13 PROCEDURE — 81003 URINALYSIS AUTO W/O SCOPE: CPT | Performed by: STUDENT IN AN ORGANIZED HEALTH CARE EDUCATION/TRAINING PROGRAM

## 2021-08-13 PROCEDURE — 87086 URINE CULTURE/COLONY COUNT: CPT | Performed by: STUDENT IN AN ORGANIZED HEALTH CARE EDUCATION/TRAINING PROGRAM

## 2021-08-14 LAB
BACTERIA UR CULT: NORMAL
BACTERIA UR CULT: NORMAL

## 2021-08-16 ENCOUNTER — TELEPHONE (OUTPATIENT)
Dept: INTERNAL MEDICINE | Facility: CLINIC | Age: 66
End: 2021-08-16

## 2021-08-18 ENCOUNTER — TELEPHONE (OUTPATIENT)
Dept: SLEEP MEDICINE | Facility: CLINIC | Age: 66
End: 2021-08-18

## 2021-08-18 ENCOUNTER — TELEPHONE (OUTPATIENT)
Dept: ADMINISTRATIVE | Facility: OTHER | Age: 66
End: 2021-08-18

## 2021-08-19 ENCOUNTER — TELEPHONE (OUTPATIENT)
Dept: ADMINISTRATIVE | Facility: OTHER | Age: 66
End: 2021-08-19

## 2021-08-19 ENCOUNTER — TELEPHONE (OUTPATIENT)
Dept: INTERNAL MEDICINE | Facility: CLINIC | Age: 66
End: 2021-08-19

## 2021-08-23 DIAGNOSIS — G47.33 OSA (OBSTRUCTIVE SLEEP APNEA): Primary | ICD-10-CM

## 2021-08-24 ENCOUNTER — TELEPHONE (OUTPATIENT)
Dept: SLEEP MEDICINE | Facility: CLINIC | Age: 66
End: 2021-08-24

## 2021-08-31 ENCOUNTER — HOSPITAL ENCOUNTER (EMERGENCY)
Facility: HOSPITAL | Age: 66
Discharge: HOME | End: 2021-08-31
Attending: EMERGENCY MEDICINE
Payer: MEDICARE

## 2021-08-31 VITALS
BODY MASS INDEX: 39.44 KG/M2 | HEIGHT: 64 IN | TEMPERATURE: 99 F | RESPIRATION RATE: 20 BRPM | OXYGEN SATURATION: 96 % | WEIGHT: 231 LBS | HEART RATE: 84 BPM | DIASTOLIC BLOOD PRESSURE: 80 MMHG | SYSTOLIC BLOOD PRESSURE: 160 MMHG

## 2021-08-31 DIAGNOSIS — K59.00 CONSTIPATION, UNSPECIFIED CONSTIPATION TYPE: Primary | ICD-10-CM

## 2021-08-31 DIAGNOSIS — N17.9 AKI (ACUTE KIDNEY INJURY): ICD-10-CM

## 2021-08-31 LAB
ALBUMIN SERPL BCP-MCNC: 3.5 G/DL (ref 3.5–5.2)
ALP SERPL-CCNC: 59 U/L (ref 55–135)
ALT SERPL W/O P-5'-P-CCNC: 15 U/L (ref 10–44)
ANION GAP SERPL CALC-SCNC: 12 MMOL/L (ref 8–16)
AST SERPL-CCNC: 17 U/L (ref 10–40)
BASOPHILS # BLD AUTO: 0.03 K/UL (ref 0–0.2)
BASOPHILS NFR BLD: 0.2 % (ref 0–1.9)
BILIRUB SERPL-MCNC: 0.3 MG/DL (ref 0.1–1)
BUN SERPL-MCNC: 39 MG/DL (ref 6–30)
BUN SERPL-MCNC: 42 MG/DL (ref 8–23)
CALCIUM SERPL-MCNC: 10.8 MG/DL (ref 8.7–10.5)
CHLORIDE SERPL-SCNC: 101 MMOL/L (ref 95–110)
CHLORIDE SERPL-SCNC: 105 MMOL/L (ref 95–110)
CO2 SERPL-SCNC: 25 MMOL/L (ref 23–29)
CREAT SERPL-MCNC: 2.3 MG/DL (ref 0.5–1.4)
CREAT SERPL-MCNC: 2.3 MG/DL (ref 0.5–1.4)
DIFFERENTIAL METHOD: ABNORMAL
EOSINOPHIL # BLD AUTO: 0.2 K/UL (ref 0–0.5)
EOSINOPHIL NFR BLD: 1.2 % (ref 0–8)
ERYTHROCYTE [DISTWIDTH] IN BLOOD BY AUTOMATED COUNT: 12.6 % (ref 11.5–14.5)
EST. GFR  (AFRICAN AMERICAN): 25 ML/MIN/1.73 M^2
EST. GFR  (NON AFRICAN AMERICAN): 21.7 ML/MIN/1.73 M^2
GLUCOSE SERPL-MCNC: 124 MG/DL (ref 70–110)
GLUCOSE SERPL-MCNC: 130 MG/DL (ref 70–110)
HCT VFR BLD AUTO: 40.6 % (ref 37–48.5)
HCT VFR BLD CALC: 41 %PCV (ref 36–54)
HGB BLD-MCNC: 14.2 G/DL (ref 12–16)
IMM GRANULOCYTES # BLD AUTO: 0.06 K/UL (ref 0–0.04)
IMM GRANULOCYTES NFR BLD AUTO: 0.4 % (ref 0–0.5)
LACTATE SERPL-SCNC: 1.7 MMOL/L (ref 0.5–2.2)
LIPASE SERPL-CCNC: 26 U/L (ref 4–60)
LYMPHOCYTES # BLD AUTO: 1.9 K/UL (ref 1–4.8)
LYMPHOCYTES NFR BLD: 14 % (ref 18–48)
MCH RBC QN AUTO: 32 PG (ref 27–31)
MCHC RBC AUTO-ENTMCNC: 35 G/DL (ref 32–36)
MCV RBC AUTO: 91 FL (ref 82–98)
MONOCYTES # BLD AUTO: 1.6 K/UL (ref 0.3–1)
MONOCYTES NFR BLD: 11.4 % (ref 4–15)
NEUTROPHILS # BLD AUTO: 10 K/UL (ref 1.8–7.7)
NEUTROPHILS NFR BLD: 72.8 % (ref 38–73)
NRBC BLD-RTO: 0 /100 WBC
PLATELET # BLD AUTO: 216 K/UL (ref 150–450)
PMV BLD AUTO: 10.5 FL (ref 9.2–12.9)
POC IONIZED CALCIUM: 1.29 MMOL/L (ref 1.06–1.42)
POC TCO2 (MEASURED): 25 MMOL/L (ref 23–29)
POTASSIUM BLD-SCNC: 4.3 MMOL/L (ref 3.5–5.1)
POTASSIUM SERPL-SCNC: 4.3 MMOL/L (ref 3.5–5.1)
PROT SERPL-MCNC: 6.5 G/DL (ref 6–8.4)
RBC # BLD AUTO: 4.44 M/UL (ref 4–5.4)
SAMPLE: ABNORMAL
SODIUM BLD-SCNC: 137 MMOL/L (ref 136–145)
SODIUM SERPL-SCNC: 138 MMOL/L (ref 136–145)
WBC # BLD AUTO: 13.72 K/UL (ref 3.9–12.7)

## 2021-08-31 PROCEDURE — 99284 EMERGENCY DEPT VISIT MOD MDM: CPT | Mod: 25

## 2021-08-31 PROCEDURE — 99284 PR EMERGENCY DEPT VISIT,LEVEL IV: ICD-10-PCS | Mod: ,,, | Performed by: EMERGENCY MEDICINE

## 2021-08-31 PROCEDURE — 99284 EMERGENCY DEPT VISIT MOD MDM: CPT | Mod: ,,, | Performed by: EMERGENCY MEDICINE

## 2021-08-31 PROCEDURE — 83605 ASSAY OF LACTIC ACID: CPT | Performed by: EMERGENCY MEDICINE

## 2021-08-31 PROCEDURE — 85025 COMPLETE CBC W/AUTO DIFF WBC: CPT | Performed by: EMERGENCY MEDICINE

## 2021-08-31 PROCEDURE — 80047 BASIC METABLC PNL IONIZED CA: CPT

## 2021-08-31 PROCEDURE — 83690 ASSAY OF LIPASE: CPT | Performed by: EMERGENCY MEDICINE

## 2021-08-31 PROCEDURE — 80053 COMPREHEN METABOLIC PANEL: CPT | Performed by: EMERGENCY MEDICINE

## 2021-08-31 PROCEDURE — 82330 ASSAY OF CALCIUM: CPT

## 2021-08-31 RX ORDER — PSEUDOEPHEDRINE/ACETAMINOPHEN 30MG-500MG
100 TABLET ORAL
Status: DISCONTINUED | OUTPATIENT
Start: 2021-08-31 | End: 2021-08-31

## 2021-08-31 RX ORDER — SYRING-NEEDL,DISP,INSUL,0.3 ML 29 G X1/2"
296 SYRINGE, EMPTY DISPOSABLE MISCELLANEOUS
Status: DISCONTINUED | OUTPATIENT
Start: 2021-08-31 | End: 2021-08-31

## 2021-09-02 ENCOUNTER — TELEPHONE (OUTPATIENT)
Dept: INTERNAL MEDICINE | Facility: CLINIC | Age: 66
End: 2021-09-02

## 2021-09-03 ENCOUNTER — TELEPHONE (OUTPATIENT)
Dept: INTERNAL MEDICINE | Facility: CLINIC | Age: 66
End: 2021-09-03

## 2021-09-07 ENCOUNTER — OFFICE VISIT (OUTPATIENT)
Dept: PSYCHIATRY | Facility: CLINIC | Age: 66
End: 2021-09-07
Payer: MEDICARE

## 2021-09-07 DIAGNOSIS — F25.1 SCHIZOAFFECTIVE DISORDER, DEPRESSIVE TYPE: Primary | ICD-10-CM

## 2021-09-07 DIAGNOSIS — G43.109 MIGRAINE WITH AURA AND WITHOUT STATUS MIGRAINOSUS, NOT INTRACTABLE: ICD-10-CM

## 2021-09-07 PROCEDURE — 99214 PR OFFICE/OUTPT VISIT, EST, LEVL IV, 30-39 MIN: ICD-10-PCS | Mod: S$GLB,,, | Performed by: PSYCHIATRY & NEUROLOGY

## 2021-09-07 PROCEDURE — 99214 OFFICE O/P EST MOD 30 MIN: CPT | Mod: S$GLB,,, | Performed by: PSYCHIATRY & NEUROLOGY

## 2021-09-07 PROCEDURE — 1157F PR ADVANCE CARE PLAN OR EQUIV PRESENT IN MEDICAL RECORD: ICD-10-PCS | Mod: CPTII,S$GLB,, | Performed by: PSYCHIATRY & NEUROLOGY

## 2021-09-07 PROCEDURE — 3044F PR MOST RECENT HEMOGLOBIN A1C LEVEL <7.0%: ICD-10-PCS | Mod: CPTII,S$GLB,, | Performed by: PSYCHIATRY & NEUROLOGY

## 2021-09-07 PROCEDURE — 3044F HG A1C LEVEL LT 7.0%: CPT | Mod: CPTII,S$GLB,, | Performed by: PSYCHIATRY & NEUROLOGY

## 2021-09-07 PROCEDURE — 1157F ADVNC CARE PLAN IN RCRD: CPT | Mod: CPTII,S$GLB,, | Performed by: PSYCHIATRY & NEUROLOGY

## 2021-09-07 PROCEDURE — 4010F PR ACE/ARB THEARPY RXD/TAKEN: ICD-10-PCS | Mod: CPTII,S$GLB,, | Performed by: PSYCHIATRY & NEUROLOGY

## 2021-09-07 PROCEDURE — 4010F ACE/ARB THERAPY RXD/TAKEN: CPT | Mod: CPTII,S$GLB,, | Performed by: PSYCHIATRY & NEUROLOGY

## 2021-09-07 RX ORDER — GABAPENTIN 600 MG/1
600 TABLET ORAL 3 TIMES DAILY
Qty: 90 TABLET | Refills: 3 | Status: SHIPPED | OUTPATIENT
Start: 2021-09-07 | End: 2021-10-11 | Stop reason: SDUPTHER

## 2021-09-07 RX ORDER — ARIPIPRAZOLE 15 MG/1
15 TABLET ORAL DAILY
Qty: 30 TABLET | Refills: 3 | Status: SHIPPED | OUTPATIENT
Start: 2021-09-07 | End: 2021-10-11

## 2021-09-08 ENCOUNTER — TELEPHONE (OUTPATIENT)
Dept: INTERNAL MEDICINE | Facility: CLINIC | Age: 66
End: 2021-09-08

## 2021-09-14 ENCOUNTER — TELEPHONE (OUTPATIENT)
Dept: INTERNAL MEDICINE | Facility: CLINIC | Age: 66
End: 2021-09-14

## 2021-09-14 DIAGNOSIS — E11.51 TYPE 2 DIABETES MELLITUS WITH DIABETIC PERIPHERAL ANGIOPATHY WITHOUT GANGRENE, WITHOUT LONG-TERM CURRENT USE OF INSULIN: Primary | ICD-10-CM

## 2021-09-16 ENCOUNTER — OFFICE VISIT (OUTPATIENT)
Dept: PRIMARY CARE CLINIC | Facility: CLINIC | Age: 66
End: 2021-09-16
Payer: MEDICARE

## 2021-09-16 ENCOUNTER — TELEPHONE (OUTPATIENT)
Dept: INTERNAL MEDICINE | Facility: CLINIC | Age: 66
End: 2021-09-16

## 2021-09-16 ENCOUNTER — LAB VISIT (OUTPATIENT)
Dept: LAB | Facility: HOSPITAL | Age: 66
End: 2021-09-16
Attending: STUDENT IN AN ORGANIZED HEALTH CARE EDUCATION/TRAINING PROGRAM
Payer: MEDICARE

## 2021-09-16 VITALS
WEIGHT: 226.63 LBS | SYSTOLIC BLOOD PRESSURE: 130 MMHG | DIASTOLIC BLOOD PRESSURE: 80 MMHG | HEART RATE: 100 BPM | RESPIRATION RATE: 16 BRPM | BODY MASS INDEX: 38.9 KG/M2

## 2021-09-16 DIAGNOSIS — N17.9 AKI (ACUTE KIDNEY INJURY): Primary | ICD-10-CM

## 2021-09-16 DIAGNOSIS — R30.0 DYSURIA: ICD-10-CM

## 2021-09-16 DIAGNOSIS — N17.9 AKI (ACUTE KIDNEY INJURY): ICD-10-CM

## 2021-09-16 DIAGNOSIS — E86.0 DEHYDRATION: ICD-10-CM

## 2021-09-16 LAB
ALBUMIN SERPL BCP-MCNC: 3.7 G/DL (ref 3.5–5.2)
ALP SERPL-CCNC: 67 U/L (ref 55–135)
ALT SERPL W/O P-5'-P-CCNC: 20 U/L (ref 10–44)
ANION GAP SERPL CALC-SCNC: 13 MMOL/L (ref 8–16)
AST SERPL-CCNC: 18 U/L (ref 10–40)
BILIRUB SERPL-MCNC: 0.3 MG/DL (ref 0.1–1)
BUN SERPL-MCNC: 22 MG/DL (ref 8–23)
CALCIUM SERPL-MCNC: 10.3 MG/DL (ref 8.7–10.5)
CHLORIDE SERPL-SCNC: 101 MMOL/L (ref 95–110)
CO2 SERPL-SCNC: 28 MMOL/L (ref 23–29)
CREAT SERPL-MCNC: 1.7 MG/DL (ref 0.5–1.4)
EST. GFR  (AFRICAN AMERICAN): 36 ML/MIN/1.73 M^2
EST. GFR  (NON AFRICAN AMERICAN): 31.2 ML/MIN/1.73 M^2
GLUCOSE SERPL-MCNC: 111 MG/DL (ref 70–110)
POTASSIUM SERPL-SCNC: 4.8 MMOL/L (ref 3.5–5.1)
PROT SERPL-MCNC: 6.9 G/DL (ref 6–8.4)
SODIUM SERPL-SCNC: 142 MMOL/L (ref 136–145)

## 2021-09-16 PROCEDURE — 3079F DIAST BP 80-89 MM HG: CPT | Mod: CPTII,S$GLB,, | Performed by: STUDENT IN AN ORGANIZED HEALTH CARE EDUCATION/TRAINING PROGRAM

## 2021-09-16 PROCEDURE — 36415 COLL VENOUS BLD VENIPUNCTURE: CPT | Mod: PN | Performed by: STUDENT IN AN ORGANIZED HEALTH CARE EDUCATION/TRAINING PROGRAM

## 2021-09-16 PROCEDURE — 3075F SYST BP GE 130 - 139MM HG: CPT | Mod: CPTII,S$GLB,, | Performed by: STUDENT IN AN ORGANIZED HEALTH CARE EDUCATION/TRAINING PROGRAM

## 2021-09-16 PROCEDURE — 3044F PR MOST RECENT HEMOGLOBIN A1C LEVEL <7.0%: ICD-10-PCS | Mod: CPTII,S$GLB,, | Performed by: STUDENT IN AN ORGANIZED HEALTH CARE EDUCATION/TRAINING PROGRAM

## 2021-09-16 PROCEDURE — 4010F ACE/ARB THERAPY RXD/TAKEN: CPT | Mod: CPTII,S$GLB,, | Performed by: STUDENT IN AN ORGANIZED HEALTH CARE EDUCATION/TRAINING PROGRAM

## 2021-09-16 PROCEDURE — 99214 OFFICE O/P EST MOD 30 MIN: CPT | Mod: S$GLB,,, | Performed by: STUDENT IN AN ORGANIZED HEALTH CARE EDUCATION/TRAINING PROGRAM

## 2021-09-16 PROCEDURE — 1157F ADVNC CARE PLAN IN RCRD: CPT | Mod: CPTII,S$GLB,, | Performed by: STUDENT IN AN ORGANIZED HEALTH CARE EDUCATION/TRAINING PROGRAM

## 2021-09-16 PROCEDURE — 3044F HG A1C LEVEL LT 7.0%: CPT | Mod: CPTII,S$GLB,, | Performed by: STUDENT IN AN ORGANIZED HEALTH CARE EDUCATION/TRAINING PROGRAM

## 2021-09-16 PROCEDURE — 99999 PR PBB SHADOW E&M-EST. PATIENT-LVL I: CPT | Mod: PBBFAC,,, | Performed by: STUDENT IN AN ORGANIZED HEALTH CARE EDUCATION/TRAINING PROGRAM

## 2021-09-16 PROCEDURE — 3008F BODY MASS INDEX DOCD: CPT | Mod: CPTII,S$GLB,, | Performed by: STUDENT IN AN ORGANIZED HEALTH CARE EDUCATION/TRAINING PROGRAM

## 2021-09-16 PROCEDURE — 99999 PR PBB SHADOW E&M-EST. PATIENT-LVL I: ICD-10-PCS | Mod: PBBFAC,,, | Performed by: STUDENT IN AN ORGANIZED HEALTH CARE EDUCATION/TRAINING PROGRAM

## 2021-09-16 PROCEDURE — 99499 UNLISTED E&M SERVICE: CPT | Mod: S$PBB,HCNC,, | Performed by: STUDENT IN AN ORGANIZED HEALTH CARE EDUCATION/TRAINING PROGRAM

## 2021-09-16 PROCEDURE — 80053 COMPREHEN METABOLIC PANEL: CPT | Performed by: STUDENT IN AN ORGANIZED HEALTH CARE EDUCATION/TRAINING PROGRAM

## 2021-09-16 PROCEDURE — 3008F PR BODY MASS INDEX (BMI) DOCUMENTED: ICD-10-PCS | Mod: CPTII,S$GLB,, | Performed by: STUDENT IN AN ORGANIZED HEALTH CARE EDUCATION/TRAINING PROGRAM

## 2021-09-16 PROCEDURE — 99499 RISK ADDL DX/OHS AUDIT: ICD-10-PCS | Mod: S$PBB,HCNC,, | Performed by: STUDENT IN AN ORGANIZED HEALTH CARE EDUCATION/TRAINING PROGRAM

## 2021-09-16 PROCEDURE — 99214 PR OFFICE/OUTPT VISIT, EST, LEVL IV, 30-39 MIN: ICD-10-PCS | Mod: S$GLB,,, | Performed by: STUDENT IN AN ORGANIZED HEALTH CARE EDUCATION/TRAINING PROGRAM

## 2021-09-16 PROCEDURE — 3079F PR MOST RECENT DIASTOLIC BLOOD PRESSURE 80-89 MM HG: ICD-10-PCS | Mod: CPTII,S$GLB,, | Performed by: STUDENT IN AN ORGANIZED HEALTH CARE EDUCATION/TRAINING PROGRAM

## 2021-09-16 PROCEDURE — 1157F PR ADVANCE CARE PLAN OR EQUIV PRESENT IN MEDICAL RECORD: ICD-10-PCS | Mod: CPTII,S$GLB,, | Performed by: STUDENT IN AN ORGANIZED HEALTH CARE EDUCATION/TRAINING PROGRAM

## 2021-09-16 PROCEDURE — 3075F PR MOST RECENT SYSTOLIC BLOOD PRESS GE 130-139MM HG: ICD-10-PCS | Mod: CPTII,S$GLB,, | Performed by: STUDENT IN AN ORGANIZED HEALTH CARE EDUCATION/TRAINING PROGRAM

## 2021-09-16 PROCEDURE — 4010F PR ACE/ARB THEARPY RXD/TAKEN: ICD-10-PCS | Mod: CPTII,S$GLB,, | Performed by: STUDENT IN AN ORGANIZED HEALTH CARE EDUCATION/TRAINING PROGRAM

## 2021-09-17 ENCOUNTER — TELEPHONE (OUTPATIENT)
Dept: INTERNAL MEDICINE | Facility: CLINIC | Age: 66
End: 2021-09-17

## 2021-09-17 DIAGNOSIS — N17.9 AKI (ACUTE KIDNEY INJURY): Primary | ICD-10-CM

## 2021-09-23 ENCOUNTER — TELEPHONE (OUTPATIENT)
Dept: INTERNAL MEDICINE | Facility: CLINIC | Age: 66
End: 2021-09-23

## 2021-09-24 ENCOUNTER — TELEPHONE (OUTPATIENT)
Dept: INTERNAL MEDICINE | Facility: CLINIC | Age: 66
End: 2021-09-24

## 2021-09-27 ENCOUNTER — TELEPHONE (OUTPATIENT)
Dept: INTERNAL MEDICINE | Facility: CLINIC | Age: 66
End: 2021-09-27

## 2021-09-27 ENCOUNTER — TELEPHONE (OUTPATIENT)
Dept: PULMONOLOGY | Facility: CLINIC | Age: 66
End: 2021-09-27

## 2021-09-30 ENCOUNTER — TELEPHONE (OUTPATIENT)
Dept: INTERNAL MEDICINE | Facility: CLINIC | Age: 66
End: 2021-09-30

## 2021-10-01 DIAGNOSIS — F25.1 SCHIZOAFFECTIVE DISORDER, DEPRESSIVE TYPE: Primary | ICD-10-CM

## 2021-10-04 ENCOUNTER — TELEPHONE (OUTPATIENT)
Dept: INTERNAL MEDICINE | Facility: CLINIC | Age: 66
End: 2021-10-04

## 2021-10-07 ENCOUNTER — TELEPHONE (OUTPATIENT)
Dept: PULMONOLOGY | Facility: CLINIC | Age: 66
End: 2021-10-07

## 2021-10-07 ENCOUNTER — TELEPHONE (OUTPATIENT)
Dept: INTERNAL MEDICINE | Facility: CLINIC | Age: 66
End: 2021-10-07

## 2021-10-08 ENCOUNTER — TELEPHONE (OUTPATIENT)
Dept: NEPHROLOGY | Facility: CLINIC | Age: 66
End: 2021-10-08

## 2021-10-10 ENCOUNTER — HOSPITAL ENCOUNTER (EMERGENCY)
Facility: HOSPITAL | Age: 66
Discharge: HOME | End: 2021-10-10
Attending: EMERGENCY MEDICINE
Payer: MEDICARE

## 2021-10-10 VITALS
SYSTOLIC BLOOD PRESSURE: 173 MMHG | DIASTOLIC BLOOD PRESSURE: 82 MMHG | TEMPERATURE: 99 F | OXYGEN SATURATION: 99 % | HEART RATE: 74 BPM | RESPIRATION RATE: 20 BRPM

## 2021-10-10 DIAGNOSIS — Z20.822 CLOSE EXPOSURE TO COVID-19 VIRUS: ICD-10-CM

## 2021-10-10 DIAGNOSIS — N17.9 ACUTE KIDNEY INJURY: Primary | ICD-10-CM

## 2021-10-10 LAB
BACTERIA #/AREA URNS AUTO: NORMAL /HPF
BILIRUB UR QL STRIP: NEGATIVE
BUN SERPL-MCNC: 30 MG/DL (ref 6–30)
BUN SERPL-MCNC: 31 MG/DL (ref 6–30)
CHLORIDE SERPL-SCNC: 102 MMOL/L (ref 95–110)
CHLORIDE SERPL-SCNC: 102 MMOL/L (ref 95–110)
CLARITY UR REFRACT.AUTO: CLEAR
COLOR UR AUTO: ABNORMAL
CREAT SERPL-MCNC: 2 MG/DL (ref 0.5–1.4)
CREAT SERPL-MCNC: 2.1 MG/DL (ref 0.5–1.4)
CTP QC/QA: YES
GLUCOSE SERPL-MCNC: 108 MG/DL (ref 70–110)
GLUCOSE SERPL-MCNC: 169 MG/DL (ref 70–110)
GLUCOSE UR QL STRIP: ABNORMAL
HCT VFR BLD CALC: 37 %PCV (ref 36–54)
HCT VFR BLD CALC: 38 %PCV (ref 36–54)
HGB UR QL STRIP: NEGATIVE
HYALINE CASTS UR QL AUTO: 1 /LPF
KETONES UR QL STRIP: NEGATIVE
LEUKOCYTE ESTERASE UR QL STRIP: NEGATIVE
MICROSCOPIC COMMENT: NORMAL
NITRITE UR QL STRIP: NEGATIVE
PH UR STRIP: 7 [PH] (ref 5–8)
POC IONIZED CALCIUM: 1.27 MMOL/L (ref 1.06–1.42)
POC IONIZED CALCIUM: 1.28 MMOL/L (ref 1.06–1.42)
POC TCO2 (MEASURED): 28 MMOL/L (ref 23–29)
POC TCO2 (MEASURED): 31 MMOL/L (ref 23–29)
POTASSIUM BLD-SCNC: 4.1 MMOL/L (ref 3.5–5.1)
POTASSIUM BLD-SCNC: 4.2 MMOL/L (ref 3.5–5.1)
PROT UR QL STRIP: NEGATIVE
RBC #/AREA URNS AUTO: 0 /HPF (ref 0–4)
SAMPLE: ABNORMAL
SAMPLE: ABNORMAL
SARS-COV-2 RDRP RESP QL NAA+PROBE: NEGATIVE
SODIUM BLD-SCNC: 138 MMOL/L (ref 136–145)
SODIUM BLD-SCNC: 141 MMOL/L (ref 136–145)
SP GR UR STRIP: 1.01 (ref 1–1.03)
SQUAMOUS #/AREA URNS AUTO: 0 /HPF
URN SPEC COLLECT METH UR: ABNORMAL
VALPROATE SERPL-MCNC: 51.6 UG/ML (ref 50–100)
WBC #/AREA URNS AUTO: 1 /HPF (ref 0–5)

## 2021-10-10 PROCEDURE — U0002 COVID-19 LAB TEST NON-CDC: HCPCS | Mod: HCNC | Performed by: EMERGENCY MEDICINE

## 2021-10-10 PROCEDURE — 99284 EMERGENCY DEPT VISIT MOD MDM: CPT | Mod: 25,HCNC

## 2021-10-10 PROCEDURE — 99284 PR EMERGENCY DEPT VISIT,LEVEL IV: ICD-10-PCS | Mod: CS,,, | Performed by: NURSE PRACTITIONER

## 2021-10-10 PROCEDURE — 25000003 PHARM REV CODE 250: Mod: HCNC | Performed by: NURSE PRACTITIONER

## 2021-10-10 PROCEDURE — 96360 HYDRATION IV INFUSION INIT: CPT | Mod: HCNC

## 2021-10-10 PROCEDURE — 80164 ASSAY DIPROPYLACETIC ACD TOT: CPT | Mod: HCNC | Performed by: NURSE PRACTITIONER

## 2021-10-10 PROCEDURE — 80047 BASIC METABLC PNL IONIZED CA: CPT | Mod: 59,HCNC

## 2021-10-10 PROCEDURE — 81001 URINALYSIS AUTO W/SCOPE: CPT | Mod: HCNC | Performed by: NURSE PRACTITIONER

## 2021-10-10 PROCEDURE — 99284 EMERGENCY DEPT VISIT MOD MDM: CPT | Mod: CS,,, | Performed by: NURSE PRACTITIONER

## 2021-10-10 RX ADMIN — SODIUM CHLORIDE 1000 ML: 0.9 INJECTION, SOLUTION INTRAVENOUS at 04:10

## 2021-10-11 ENCOUNTER — OFFICE VISIT (OUTPATIENT)
Dept: PSYCHIATRY | Facility: CLINIC | Age: 66
End: 2021-10-11
Payer: MEDICARE

## 2021-10-11 ENCOUNTER — TELEPHONE (OUTPATIENT)
Dept: INTERNAL MEDICINE | Facility: CLINIC | Age: 66
End: 2021-10-11

## 2021-10-11 VITALS
WEIGHT: 229.38 LBS | DIASTOLIC BLOOD PRESSURE: 71 MMHG | BODY MASS INDEX: 39.37 KG/M2 | HEART RATE: 74 BPM | SYSTOLIC BLOOD PRESSURE: 160 MMHG

## 2021-10-11 DIAGNOSIS — Z79.899 LONG TERM CURRENT USE OF ANTIPSYCHOTIC MEDICATION: ICD-10-CM

## 2021-10-11 DIAGNOSIS — F25.1 SCHIZOAFFECTIVE DISORDER, DEPRESSIVE TYPE: Primary | ICD-10-CM

## 2021-10-11 DIAGNOSIS — G43.109 MIGRAINE WITH AURA AND WITHOUT STATUS MIGRAINOSUS, NOT INTRACTABLE: ICD-10-CM

## 2021-10-11 DIAGNOSIS — G47.00 INSOMNIA DISORDER WITH NON-SLEEP DISORDER MENTAL COMORBIDITY: ICD-10-CM

## 2021-10-11 DIAGNOSIS — F41.9 ANXIETY: ICD-10-CM

## 2021-10-11 PROCEDURE — 99499 RISK ADDL DX/OHS AUDIT: ICD-10-PCS | Mod: S$GLB,,, | Performed by: NURSE PRACTITIONER

## 2021-10-11 PROCEDURE — 3008F BODY MASS INDEX DOCD: CPT | Mod: HCNC,CPTII,S$GLB, | Performed by: NURSE PRACTITIONER

## 2021-10-11 PROCEDURE — 99499 UNLISTED E&M SERVICE: CPT | Mod: S$GLB,,, | Performed by: NURSE PRACTITIONER

## 2021-10-11 PROCEDURE — 99999 PR PBB SHADOW E&M-EST. PATIENT-LVL II: ICD-10-PCS | Mod: PBBFAC,HCNC,, | Performed by: NURSE PRACTITIONER

## 2021-10-11 PROCEDURE — 3008F PR BODY MASS INDEX (BMI) DOCUMENTED: ICD-10-PCS | Mod: HCNC,CPTII,S$GLB, | Performed by: NURSE PRACTITIONER

## 2021-10-11 PROCEDURE — 3044F PR MOST RECENT HEMOGLOBIN A1C LEVEL <7.0%: ICD-10-PCS | Mod: HCNC,CPTII,S$GLB, | Performed by: NURSE PRACTITIONER

## 2021-10-11 PROCEDURE — 4010F ACE/ARB THERAPY RXD/TAKEN: CPT | Mod: HCNC,CPTII,S$GLB, | Performed by: NURSE PRACTITIONER

## 2021-10-11 PROCEDURE — 3077F PR MOST RECENT SYSTOLIC BLOOD PRESSURE >= 140 MM HG: ICD-10-PCS | Mod: HCNC,CPTII,S$GLB, | Performed by: NURSE PRACTITIONER

## 2021-10-11 PROCEDURE — 3078F DIAST BP <80 MM HG: CPT | Mod: HCNC,CPTII,S$GLB, | Performed by: NURSE PRACTITIONER

## 2021-10-11 PROCEDURE — 3066F PR DOCUMENTATION OF TREATMENT FOR NEPHROPATHY: ICD-10-PCS | Mod: HCNC,CPTII,S$GLB, | Performed by: NURSE PRACTITIONER

## 2021-10-11 PROCEDURE — 99999 PR PBB SHADOW E&M-EST. PATIENT-LVL II: CPT | Mod: PBBFAC,HCNC,, | Performed by: NURSE PRACTITIONER

## 2021-10-11 PROCEDURE — 90833 PR PSYCHOTHERAPY W/PATIENT W/E&M, 30 MIN (ADD ON): ICD-10-PCS | Mod: HCNC,S$GLB,, | Performed by: NURSE PRACTITIONER

## 2021-10-11 PROCEDURE — 99214 PR OFFICE/OUTPT VISIT, EST, LEVL IV, 30-39 MIN: ICD-10-PCS | Mod: HCNC,S$GLB,, | Performed by: NURSE PRACTITIONER

## 2021-10-11 PROCEDURE — 4010F PR ACE/ARB THEARPY RXD/TAKEN: ICD-10-PCS | Mod: HCNC,CPTII,S$GLB, | Performed by: NURSE PRACTITIONER

## 2021-10-11 PROCEDURE — 99214 OFFICE O/P EST MOD 30 MIN: CPT | Mod: HCNC,S$GLB,, | Performed by: NURSE PRACTITIONER

## 2021-10-11 PROCEDURE — 3044F HG A1C LEVEL LT 7.0%: CPT | Mod: HCNC,CPTII,S$GLB, | Performed by: NURSE PRACTITIONER

## 2021-10-11 PROCEDURE — 3066F NEPHROPATHY DOC TX: CPT | Mod: HCNC,CPTII,S$GLB, | Performed by: NURSE PRACTITIONER

## 2021-10-11 PROCEDURE — 1157F ADVNC CARE PLAN IN RCRD: CPT | Mod: HCNC,CPTII,S$GLB, | Performed by: NURSE PRACTITIONER

## 2021-10-11 PROCEDURE — 3077F SYST BP >= 140 MM HG: CPT | Mod: HCNC,CPTII,S$GLB, | Performed by: NURSE PRACTITIONER

## 2021-10-11 PROCEDURE — 90833 PSYTX W PT W E/M 30 MIN: CPT | Mod: HCNC,S$GLB,, | Performed by: NURSE PRACTITIONER

## 2021-10-11 PROCEDURE — 3078F PR MOST RECENT DIASTOLIC BLOOD PRESSURE < 80 MM HG: ICD-10-PCS | Mod: HCNC,CPTII,S$GLB, | Performed by: NURSE PRACTITIONER

## 2021-10-11 PROCEDURE — 1157F PR ADVANCE CARE PLAN OR EQUIV PRESENT IN MEDICAL RECORD: ICD-10-PCS | Mod: HCNC,CPTII,S$GLB, | Performed by: NURSE PRACTITIONER

## 2021-10-11 RX ORDER — QUETIAPINE FUMARATE 400 MG/1
400 TABLET, FILM COATED ORAL 2 TIMES DAILY
Qty: 60 TABLET | Refills: 11 | Status: SHIPPED | OUTPATIENT
Start: 2021-10-11 | End: 2021-11-29

## 2021-10-11 RX ORDER — MIRTAZAPINE 15 MG/1
15 TABLET, FILM COATED ORAL NIGHTLY
Qty: 30 TABLET | Refills: 11 | Status: SHIPPED | OUTPATIENT
Start: 2021-10-11 | End: 2021-11-29 | Stop reason: SDUPTHER

## 2021-10-11 RX ORDER — DIVALPROEX SODIUM 500 MG/1
1000 TABLET, FILM COATED, EXTENDED RELEASE ORAL DAILY
Qty: 60 TABLET | Refills: 11 | Status: SHIPPED | OUTPATIENT
Start: 2021-10-11 | End: 2021-11-23 | Stop reason: SDUPTHER

## 2021-10-11 RX ORDER — PROPRANOLOL HYDROCHLORIDE 40 MG/1
40 TABLET ORAL 2 TIMES DAILY
Qty: 60 TABLET | Refills: 11 | Status: SHIPPED | OUTPATIENT
Start: 2021-10-11 | End: 2022-01-26 | Stop reason: SDUPTHER

## 2021-10-11 RX ORDER — GABAPENTIN 600 MG/1
600 TABLET ORAL 3 TIMES DAILY
Qty: 90 TABLET | Refills: 11 | Status: SHIPPED | OUTPATIENT
Start: 2021-10-11 | End: 2022-01-04 | Stop reason: SDUPTHER

## 2021-10-12 ENCOUNTER — PES CALL (OUTPATIENT)
Dept: ADMINISTRATIVE | Facility: CLINIC | Age: 66
End: 2021-10-12

## 2021-10-12 ENCOUNTER — TELEPHONE (OUTPATIENT)
Dept: INTERNAL MEDICINE | Facility: CLINIC | Age: 66
End: 2021-10-12

## 2021-10-13 ENCOUNTER — TELEPHONE (OUTPATIENT)
Dept: INTERNAL MEDICINE | Facility: CLINIC | Age: 66
End: 2021-10-13

## 2021-10-13 ENCOUNTER — OFFICE VISIT (OUTPATIENT)
Dept: INTERNAL MEDICINE | Facility: CLINIC | Age: 66
End: 2021-10-13
Payer: MEDICARE

## 2021-10-13 ENCOUNTER — LAB VISIT (OUTPATIENT)
Dept: LAB | Facility: HOSPITAL | Age: 66
End: 2021-10-13
Attending: STUDENT IN AN ORGANIZED HEALTH CARE EDUCATION/TRAINING PROGRAM
Payer: MEDICARE

## 2021-10-13 VITALS
DIASTOLIC BLOOD PRESSURE: 70 MMHG | HEIGHT: 64 IN | OXYGEN SATURATION: 97 % | WEIGHT: 229.5 LBS | RESPIRATION RATE: 18 BRPM | SYSTOLIC BLOOD PRESSURE: 124 MMHG | TEMPERATURE: 98 F | HEART RATE: 82 BPM | BODY MASS INDEX: 39.18 KG/M2

## 2021-10-13 DIAGNOSIS — F25.0 SCHIZOAFFECTIVE DISORDER, BIPOLAR TYPE: ICD-10-CM

## 2021-10-13 DIAGNOSIS — N17.9 AKI (ACUTE KIDNEY INJURY): ICD-10-CM

## 2021-10-13 DIAGNOSIS — F31.0 BIPOLAR AFFECTIVE DISORDER, CURRENT EPISODE HYPOMANIC: Primary | ICD-10-CM

## 2021-10-13 DIAGNOSIS — F20.0 PARANOID SCHIZOPHRENIA: ICD-10-CM

## 2021-10-13 LAB
ANION GAP SERPL CALC-SCNC: 18 MMOL/L (ref 8–16)
BUN SERPL-MCNC: 29 MG/DL (ref 8–23)
CALCIUM SERPL-MCNC: 10.5 MG/DL (ref 8.7–10.5)
CHLORIDE SERPL-SCNC: 98 MMOL/L (ref 95–110)
CO2 SERPL-SCNC: 23 MMOL/L (ref 23–29)
CREAT SERPL-MCNC: 1.6 MG/DL (ref 0.5–1.4)
EST. GFR  (AFRICAN AMERICAN): 38.7 ML/MIN/1.73 M^2
EST. GFR  (NON AFRICAN AMERICAN): 33.6 ML/MIN/1.73 M^2
GLUCOSE SERPL-MCNC: 97 MG/DL (ref 70–110)
POTASSIUM SERPL-SCNC: 4.1 MMOL/L (ref 3.5–5.1)
SODIUM SERPL-SCNC: 139 MMOL/L (ref 136–145)

## 2021-10-13 PROCEDURE — 99214 PR OFFICE/OUTPT VISIT, EST, LEVL IV, 30-39 MIN: ICD-10-PCS | Mod: HCNC,S$GLB,, | Performed by: STUDENT IN AN ORGANIZED HEALTH CARE EDUCATION/TRAINING PROGRAM

## 2021-10-13 PROCEDURE — 36415 COLL VENOUS BLD VENIPUNCTURE: CPT | Mod: HCNC,PO | Performed by: STUDENT IN AN ORGANIZED HEALTH CARE EDUCATION/TRAINING PROGRAM

## 2021-10-13 PROCEDURE — 1159F MED LIST DOCD IN RCRD: CPT | Mod: HCNC,CPTII,S$GLB, | Performed by: STUDENT IN AN ORGANIZED HEALTH CARE EDUCATION/TRAINING PROGRAM

## 2021-10-13 PROCEDURE — 1159F PR MEDICATION LIST DOCUMENTED IN MEDICAL RECORD: ICD-10-PCS | Mod: HCNC,CPTII,S$GLB, | Performed by: STUDENT IN AN ORGANIZED HEALTH CARE EDUCATION/TRAINING PROGRAM

## 2021-10-13 PROCEDURE — 99214 OFFICE O/P EST MOD 30 MIN: CPT | Mod: HCNC,S$GLB,, | Performed by: STUDENT IN AN ORGANIZED HEALTH CARE EDUCATION/TRAINING PROGRAM

## 2021-10-13 PROCEDURE — 4010F PR ACE/ARB THEARPY RXD/TAKEN: ICD-10-PCS | Mod: HCNC,CPTII,S$GLB, | Performed by: STUDENT IN AN ORGANIZED HEALTH CARE EDUCATION/TRAINING PROGRAM

## 2021-10-13 PROCEDURE — 99499 RISK ADDL DX/OHS AUDIT: ICD-10-PCS | Mod: S$GLB,,, | Performed by: STUDENT IN AN ORGANIZED HEALTH CARE EDUCATION/TRAINING PROGRAM

## 2021-10-13 PROCEDURE — 3074F SYST BP LT 130 MM HG: CPT | Mod: HCNC,CPTII,S$GLB, | Performed by: STUDENT IN AN ORGANIZED HEALTH CARE EDUCATION/TRAINING PROGRAM

## 2021-10-13 PROCEDURE — 80048 BASIC METABOLIC PNL TOTAL CA: CPT | Mod: HCNC | Performed by: STUDENT IN AN ORGANIZED HEALTH CARE EDUCATION/TRAINING PROGRAM

## 2021-10-13 PROCEDURE — 1101F PR PT FALLS ASSESS DOC 0-1 FALLS W/OUT INJ PAST YR: ICD-10-PCS | Mod: HCNC,CPTII,S$GLB, | Performed by: STUDENT IN AN ORGANIZED HEALTH CARE EDUCATION/TRAINING PROGRAM

## 2021-10-13 PROCEDURE — 3044F PR MOST RECENT HEMOGLOBIN A1C LEVEL <7.0%: ICD-10-PCS | Mod: HCNC,CPTII,S$GLB, | Performed by: STUDENT IN AN ORGANIZED HEALTH CARE EDUCATION/TRAINING PROGRAM

## 2021-10-13 PROCEDURE — 1101F PT FALLS ASSESS-DOCD LE1/YR: CPT | Mod: HCNC,CPTII,S$GLB, | Performed by: STUDENT IN AN ORGANIZED HEALTH CARE EDUCATION/TRAINING PROGRAM

## 2021-10-13 PROCEDURE — 99499 UNLISTED E&M SERVICE: CPT | Mod: S$GLB,,, | Performed by: STUDENT IN AN ORGANIZED HEALTH CARE EDUCATION/TRAINING PROGRAM

## 2021-10-13 PROCEDURE — 99999 PR PBB SHADOW E&M-EST. PATIENT-LVL V: CPT | Mod: PBBFAC,HCNC,, | Performed by: STUDENT IN AN ORGANIZED HEALTH CARE EDUCATION/TRAINING PROGRAM

## 2021-10-13 PROCEDURE — 1157F ADVNC CARE PLAN IN RCRD: CPT | Mod: HCNC,CPTII,S$GLB, | Performed by: STUDENT IN AN ORGANIZED HEALTH CARE EDUCATION/TRAINING PROGRAM

## 2021-10-13 PROCEDURE — 3288F PR FALLS RISK ASSESSMENT DOCUMENTED: ICD-10-PCS | Mod: HCNC,CPTII,S$GLB, | Performed by: STUDENT IN AN ORGANIZED HEALTH CARE EDUCATION/TRAINING PROGRAM

## 2021-10-13 PROCEDURE — 4010F ACE/ARB THERAPY RXD/TAKEN: CPT | Mod: HCNC,CPTII,S$GLB, | Performed by: STUDENT IN AN ORGANIZED HEALTH CARE EDUCATION/TRAINING PROGRAM

## 2021-10-13 PROCEDURE — 3008F PR BODY MASS INDEX (BMI) DOCUMENTED: ICD-10-PCS | Mod: HCNC,CPTII,S$GLB, | Performed by: STUDENT IN AN ORGANIZED HEALTH CARE EDUCATION/TRAINING PROGRAM

## 2021-10-13 PROCEDURE — 3078F PR MOST RECENT DIASTOLIC BLOOD PRESSURE < 80 MM HG: ICD-10-PCS | Mod: HCNC,CPTII,S$GLB, | Performed by: STUDENT IN AN ORGANIZED HEALTH CARE EDUCATION/TRAINING PROGRAM

## 2021-10-13 PROCEDURE — 99999 PR PBB SHADOW E&M-EST. PATIENT-LVL V: ICD-10-PCS | Mod: PBBFAC,HCNC,, | Performed by: STUDENT IN AN ORGANIZED HEALTH CARE EDUCATION/TRAINING PROGRAM

## 2021-10-13 PROCEDURE — 3078F DIAST BP <80 MM HG: CPT | Mod: HCNC,CPTII,S$GLB, | Performed by: STUDENT IN AN ORGANIZED HEALTH CARE EDUCATION/TRAINING PROGRAM

## 2021-10-13 PROCEDURE — 1157F PR ADVANCE CARE PLAN OR EQUIV PRESENT IN MEDICAL RECORD: ICD-10-PCS | Mod: HCNC,CPTII,S$GLB, | Performed by: STUDENT IN AN ORGANIZED HEALTH CARE EDUCATION/TRAINING PROGRAM

## 2021-10-13 PROCEDURE — 3288F FALL RISK ASSESSMENT DOCD: CPT | Mod: HCNC,CPTII,S$GLB, | Performed by: STUDENT IN AN ORGANIZED HEALTH CARE EDUCATION/TRAINING PROGRAM

## 2021-10-13 PROCEDURE — 3074F PR MOST RECENT SYSTOLIC BLOOD PRESSURE < 130 MM HG: ICD-10-PCS | Mod: HCNC,CPTII,S$GLB, | Performed by: STUDENT IN AN ORGANIZED HEALTH CARE EDUCATION/TRAINING PROGRAM

## 2021-10-13 PROCEDURE — 3044F HG A1C LEVEL LT 7.0%: CPT | Mod: HCNC,CPTII,S$GLB, | Performed by: STUDENT IN AN ORGANIZED HEALTH CARE EDUCATION/TRAINING PROGRAM

## 2021-10-13 PROCEDURE — 3008F BODY MASS INDEX DOCD: CPT | Mod: HCNC,CPTII,S$GLB, | Performed by: STUDENT IN AN ORGANIZED HEALTH CARE EDUCATION/TRAINING PROGRAM

## 2021-10-14 ENCOUNTER — OFFICE VISIT (OUTPATIENT)
Dept: NEPHROLOGY | Facility: CLINIC | Age: 66
End: 2021-10-14
Payer: MEDICARE

## 2021-10-14 VITALS — BODY MASS INDEX: 39.06 KG/M2 | OXYGEN SATURATION: 96 % | WEIGHT: 228.81 LBS | HEART RATE: 75 BPM | HEIGHT: 64 IN

## 2021-10-14 DIAGNOSIS — N17.9 AKI (ACUTE KIDNEY INJURY): ICD-10-CM

## 2021-10-14 DIAGNOSIS — N18.2 STAGE 2 CHRONIC KIDNEY DISEASE: ICD-10-CM

## 2021-10-14 DIAGNOSIS — E11.51 TYPE 2 DIABETES MELLITUS WITH DIABETIC PERIPHERAL ANGIOPATHY WITHOUT GANGRENE, WITHOUT LONG-TERM CURRENT USE OF INSULIN: Primary | ICD-10-CM

## 2021-10-14 PROCEDURE — 99204 OFFICE O/P NEW MOD 45 MIN: CPT | Mod: HCNC,GC,S$GLB, | Performed by: HOSPITALIST

## 2021-10-14 PROCEDURE — 99999 PR PBB SHADOW E&M-EST. PATIENT-LVL V: CPT | Mod: PBBFAC,HCNC,GC, | Performed by: GENERAL PRACTICE

## 2021-10-14 PROCEDURE — 99999 PR PBB SHADOW E&M-EST. PATIENT-LVL V: ICD-10-PCS | Mod: PBBFAC,HCNC,GC, | Performed by: GENERAL PRACTICE

## 2021-10-14 PROCEDURE — 99204 PR OFFICE/OUTPT VISIT, NEW, LEVL IV, 45-59 MIN: ICD-10-PCS | Mod: HCNC,GC,S$GLB, | Performed by: HOSPITALIST

## 2021-10-14 PROCEDURE — 99499 UNLISTED E&M SERVICE: CPT | Mod: S$GLB,,, | Performed by: HOSPITALIST

## 2021-10-14 PROCEDURE — 99499 RISK ADDL DX/OHS AUDIT: ICD-10-PCS | Mod: S$GLB,,, | Performed by: HOSPITALIST

## 2021-10-15 ENCOUNTER — TELEPHONE (OUTPATIENT)
Dept: INTERNAL MEDICINE | Facility: CLINIC | Age: 66
End: 2021-10-15

## 2021-10-18 ENCOUNTER — TELEPHONE (OUTPATIENT)
Dept: INTERNAL MEDICINE | Facility: CLINIC | Age: 66
End: 2021-10-18

## 2021-10-18 ENCOUNTER — OUTPATIENT CASE MANAGEMENT (OUTPATIENT)
Dept: ADMINISTRATIVE | Facility: OTHER | Age: 66
End: 2021-10-18
Payer: MEDICARE

## 2021-10-20 ENCOUNTER — TELEPHONE (OUTPATIENT)
Dept: INTERNAL MEDICINE | Facility: CLINIC | Age: 66
End: 2021-10-20

## 2021-10-22 ENCOUNTER — TELEPHONE (OUTPATIENT)
Dept: INTERNAL MEDICINE | Facility: CLINIC | Age: 66
End: 2021-10-22

## 2021-10-25 ENCOUNTER — TELEPHONE (OUTPATIENT)
Dept: INTERNAL MEDICINE | Facility: CLINIC | Age: 66
End: 2021-10-25
Payer: MEDICARE

## 2021-10-26 ENCOUNTER — TELEPHONE (OUTPATIENT)
Dept: ALLERGY | Facility: CLINIC | Age: 66
End: 2021-10-26
Payer: MEDICARE

## 2021-10-26 ENCOUNTER — TELEPHONE (OUTPATIENT)
Dept: INTERNAL MEDICINE | Facility: CLINIC | Age: 66
End: 2021-10-26
Payer: MEDICARE

## 2021-10-27 ENCOUNTER — OUTPATIENT CASE MANAGEMENT (OUTPATIENT)
Dept: ADMINISTRATIVE | Facility: OTHER | Age: 66
End: 2021-10-27
Payer: MEDICARE

## 2021-10-28 ENCOUNTER — OUTPATIENT CASE MANAGEMENT (OUTPATIENT)
Dept: ADMINISTRATIVE | Facility: OTHER | Age: 66
End: 2021-10-28
Payer: MEDICARE

## 2021-10-29 ENCOUNTER — OUTPATIENT CASE MANAGEMENT (OUTPATIENT)
Dept: ADMINISTRATIVE | Facility: OTHER | Age: 66
End: 2021-10-29
Payer: MEDICARE

## 2021-11-03 ENCOUNTER — OUTPATIENT CASE MANAGEMENT (OUTPATIENT)
Dept: ADMINISTRATIVE | Facility: OTHER | Age: 66
End: 2021-11-03
Payer: MEDICARE

## 2021-11-03 ENCOUNTER — TELEPHONE (OUTPATIENT)
Dept: INTERNAL MEDICINE | Facility: CLINIC | Age: 66
End: 2021-11-03
Payer: MEDICARE

## 2021-11-04 DIAGNOSIS — E03.9 HYPOTHYROIDISM, UNSPECIFIED TYPE: ICD-10-CM

## 2021-11-04 RX ORDER — LEVOTHYROXINE SODIUM 175 UG/1
175 TABLET ORAL DAILY
Qty: 30 TABLET | Refills: 3 | Status: SHIPPED | OUTPATIENT
Start: 2021-11-04 | End: 2022-01-26 | Stop reason: SDUPTHER

## 2021-11-10 ENCOUNTER — OUTPATIENT CASE MANAGEMENT (OUTPATIENT)
Dept: ADMINISTRATIVE | Facility: OTHER | Age: 66
End: 2021-11-10
Payer: MEDICARE

## 2021-11-12 ENCOUNTER — OUTPATIENT CASE MANAGEMENT (OUTPATIENT)
Dept: ADMINISTRATIVE | Facility: OTHER | Age: 66
End: 2021-11-12
Payer: MEDICARE

## 2021-11-15 ENCOUNTER — OUTPATIENT CASE MANAGEMENT (OUTPATIENT)
Dept: ADMINISTRATIVE | Facility: OTHER | Age: 66
End: 2021-11-15
Payer: MEDICARE

## 2021-11-17 DIAGNOSIS — J44.9 COPD WITHOUT EXACERBATION: ICD-10-CM

## 2021-11-17 RX ORDER — ALBUTEROL SULFATE 90 UG/1
2 AEROSOL, METERED RESPIRATORY (INHALATION) EVERY 6 HOURS PRN
Qty: 18 G | Refills: 11 | Status: SHIPPED | OUTPATIENT
Start: 2021-11-17 | End: 2022-01-26

## 2021-11-17 RX ORDER — IPRATROPIUM BROMIDE AND ALBUTEROL SULFATE 2.5; .5 MG/3ML; MG/3ML
3 SOLUTION RESPIRATORY (INHALATION) 2 TIMES DAILY
Qty: 1 EACH | Refills: 11 | Status: SHIPPED | OUTPATIENT
Start: 2021-11-17 | End: 2022-06-23

## 2021-11-18 ENCOUNTER — TELEPHONE (OUTPATIENT)
Dept: INTERNAL MEDICINE | Facility: CLINIC | Age: 66
End: 2021-11-18
Payer: MEDICARE

## 2021-11-18 ENCOUNTER — OUTPATIENT CASE MANAGEMENT (OUTPATIENT)
Dept: ADMINISTRATIVE | Facility: OTHER | Age: 66
End: 2021-11-18
Payer: MEDICARE

## 2021-11-19 ENCOUNTER — TELEPHONE (OUTPATIENT)
Dept: INTERNAL MEDICINE | Facility: CLINIC | Age: 66
End: 2021-11-19
Payer: MEDICARE

## 2021-11-22 ENCOUNTER — OUTPATIENT CASE MANAGEMENT (OUTPATIENT)
Dept: ADMINISTRATIVE | Facility: OTHER | Age: 66
End: 2021-11-22
Payer: MEDICARE

## 2021-11-22 ENCOUNTER — TELEPHONE (OUTPATIENT)
Dept: PSYCHIATRY | Facility: CLINIC | Age: 66
End: 2021-11-22
Payer: MEDICARE

## 2021-11-22 DIAGNOSIS — F25.1 SCHIZOAFFECTIVE DISORDER, DEPRESSIVE TYPE: ICD-10-CM

## 2021-11-22 RX ORDER — DIVALPROEX SODIUM 250 MG/1
TABLET, FILM COATED, EXTENDED RELEASE ORAL
Qty: 30 TABLET | OUTPATIENT
Start: 2021-11-22

## 2021-11-23 RX ORDER — DIVALPROEX SODIUM 500 MG/1
1000 TABLET, FILM COATED, EXTENDED RELEASE ORAL DAILY
Qty: 60 TABLET | Refills: 11 | Status: SHIPPED | OUTPATIENT
Start: 2021-11-23 | End: 2021-11-29 | Stop reason: SDUPTHER

## 2021-11-29 ENCOUNTER — LAB VISIT (OUTPATIENT)
Dept: LAB | Facility: HOSPITAL | Age: 66
End: 2021-11-29
Payer: MEDICARE

## 2021-11-29 ENCOUNTER — OFFICE VISIT (OUTPATIENT)
Dept: PSYCHIATRY | Facility: CLINIC | Age: 66
End: 2021-11-29
Payer: MEDICARE

## 2021-11-29 ENCOUNTER — TELEPHONE (OUTPATIENT)
Dept: INTERNAL MEDICINE | Facility: CLINIC | Age: 66
End: 2021-11-29
Payer: MEDICARE

## 2021-11-29 VITALS
SYSTOLIC BLOOD PRESSURE: 156 MMHG | DIASTOLIC BLOOD PRESSURE: 85 MMHG | HEART RATE: 81 BPM | BODY MASS INDEX: 39.53 KG/M2 | WEIGHT: 230.25 LBS

## 2021-11-29 DIAGNOSIS — F25.1 SCHIZOAFFECTIVE DISORDER, DEPRESSIVE TYPE: Primary | ICD-10-CM

## 2021-11-29 DIAGNOSIS — F25.1 SCHIZOAFFECTIVE DISORDER, DEPRESSIVE TYPE: ICD-10-CM

## 2021-11-29 DIAGNOSIS — G47.00 INSOMNIA DISORDER WITH NON-SLEEP DISORDER MENTAL COMORBIDITY: ICD-10-CM

## 2021-11-29 DIAGNOSIS — F41.9 ANXIETY: ICD-10-CM

## 2021-11-29 LAB — VALPROATE SERPL-MCNC: 35.5 UG/ML (ref 50–100)

## 2021-11-29 PROCEDURE — 80164 ASSAY DIPROPYLACETIC ACD TOT: CPT | Mod: HCNC | Performed by: NURSE PRACTITIONER

## 2021-11-29 PROCEDURE — 99213 PR OFFICE/OUTPT VISIT, EST, LEVL III, 20-29 MIN: ICD-10-PCS | Mod: HCNC,S$GLB,, | Performed by: NURSE PRACTITIONER

## 2021-11-29 PROCEDURE — 3066F NEPHROPATHY DOC TX: CPT | Mod: HCNC,CPTII,S$GLB, | Performed by: NURSE PRACTITIONER

## 2021-11-29 PROCEDURE — 3066F PR DOCUMENTATION OF TREATMENT FOR NEPHROPATHY: ICD-10-PCS | Mod: HCNC,CPTII,S$GLB, | Performed by: NURSE PRACTITIONER

## 2021-11-29 PROCEDURE — 4010F ACE/ARB THERAPY RXD/TAKEN: CPT | Mod: HCNC,CPTII,S$GLB, | Performed by: NURSE PRACTITIONER

## 2021-11-29 PROCEDURE — 90833 PR PSYCHOTHERAPY W/PATIENT W/E&M, 30 MIN (ADD ON): ICD-10-PCS | Mod: HCNC,S$GLB,, | Performed by: NURSE PRACTITIONER

## 2021-11-29 PROCEDURE — 99999 PR PBB SHADOW E&M-EST. PATIENT-LVL IV: CPT | Mod: PBBFAC,HCNC,, | Performed by: NURSE PRACTITIONER

## 2021-11-29 PROCEDURE — 90833 PSYTX W PT W E/M 30 MIN: CPT | Mod: HCNC,S$GLB,, | Performed by: NURSE PRACTITIONER

## 2021-11-29 PROCEDURE — 99213 OFFICE O/P EST LOW 20 MIN: CPT | Mod: HCNC,S$GLB,, | Performed by: NURSE PRACTITIONER

## 2021-11-29 PROCEDURE — 36415 COLL VENOUS BLD VENIPUNCTURE: CPT | Mod: HCNC | Performed by: NURSE PRACTITIONER

## 2021-11-29 PROCEDURE — 4010F PR ACE/ARB THEARPY RXD/TAKEN: ICD-10-PCS | Mod: HCNC,CPTII,S$GLB, | Performed by: NURSE PRACTITIONER

## 2021-11-29 PROCEDURE — 1157F PR ADVANCE CARE PLAN OR EQUIV PRESENT IN MEDICAL RECORD: ICD-10-PCS | Mod: HCNC,CPTII,S$GLB, | Performed by: NURSE PRACTITIONER

## 2021-11-29 PROCEDURE — 1157F ADVNC CARE PLAN IN RCRD: CPT | Mod: HCNC,CPTII,S$GLB, | Performed by: NURSE PRACTITIONER

## 2021-11-29 PROCEDURE — 99999 PR PBB SHADOW E&M-EST. PATIENT-LVL IV: ICD-10-PCS | Mod: PBBFAC,HCNC,, | Performed by: NURSE PRACTITIONER

## 2021-11-29 RX ORDER — QUETIAPINE FUMARATE 200 MG/1
TABLET, FILM COATED ORAL
Qty: 270 TABLET | Refills: 3 | Status: SHIPPED | OUTPATIENT
Start: 2021-11-29

## 2021-11-29 RX ORDER — MIRTAZAPINE 15 MG/1
15 TABLET, FILM COATED ORAL NIGHTLY
Qty: 90 TABLET | Refills: 3 | Status: SHIPPED | OUTPATIENT
Start: 2021-11-29 | End: 2022-01-26

## 2021-11-29 RX ORDER — DIVALPROEX SODIUM 500 MG/1
1000 TABLET, FILM COATED, EXTENDED RELEASE ORAL DAILY
Qty: 180 TABLET | Refills: 3 | Status: SHIPPED | OUTPATIENT
Start: 2021-11-29

## 2021-11-30 ENCOUNTER — PATIENT OUTREACH (OUTPATIENT)
Dept: ADMINISTRATIVE | Facility: OTHER | Age: 66
End: 2021-11-30
Payer: MEDICARE

## 2021-11-30 ENCOUNTER — TELEPHONE (OUTPATIENT)
Dept: INTERNAL MEDICINE | Facility: CLINIC | Age: 66
End: 2021-11-30
Payer: MEDICARE

## 2021-11-30 ENCOUNTER — TELEPHONE (OUTPATIENT)
Dept: OBSTETRICS AND GYNECOLOGY | Facility: CLINIC | Age: 66
End: 2021-11-30
Payer: MEDICARE

## 2021-11-30 DIAGNOSIS — Z12.31 SCREENING MAMMOGRAM FOR BREAST CANCER: Primary | ICD-10-CM

## 2021-11-30 DIAGNOSIS — E11.51 TYPE 2 DIABETES MELLITUS WITH DIABETIC PERIPHERAL ANGIOPATHY WITHOUT GANGRENE, WITHOUT LONG-TERM CURRENT USE OF INSULIN: ICD-10-CM

## 2021-11-30 DIAGNOSIS — E11.9 TYPE 2 DIABETES MELLITUS WITHOUT COMPLICATION, UNSPECIFIED WHETHER LONG TERM INSULIN USE: Primary | ICD-10-CM

## 2021-12-02 ENCOUNTER — TELEPHONE (OUTPATIENT)
Dept: INTERNAL MEDICINE | Facility: CLINIC | Age: 66
End: 2021-12-02
Payer: MEDICARE

## 2021-12-03 ENCOUNTER — OUTPATIENT CASE MANAGEMENT (OUTPATIENT)
Dept: ADMINISTRATIVE | Facility: OTHER | Age: 66
End: 2021-12-03
Payer: MEDICARE

## 2021-12-07 ENCOUNTER — OUTPATIENT CASE MANAGEMENT (OUTPATIENT)
Dept: ADMINISTRATIVE | Facility: OTHER | Age: 66
End: 2021-12-07
Payer: MEDICARE

## 2021-12-08 ENCOUNTER — OUTPATIENT CASE MANAGEMENT (OUTPATIENT)
Dept: ADMINISTRATIVE | Facility: OTHER | Age: 66
End: 2021-12-08
Payer: MEDICARE

## 2021-12-09 ENCOUNTER — TELEPHONE (OUTPATIENT)
Dept: INTERNAL MEDICINE | Facility: CLINIC | Age: 66
End: 2021-12-09
Payer: MEDICARE

## 2021-12-14 ENCOUNTER — OUTPATIENT CASE MANAGEMENT (OUTPATIENT)
Dept: ADMINISTRATIVE | Facility: OTHER | Age: 66
End: 2021-12-14
Payer: MEDICARE

## 2021-12-21 ENCOUNTER — TELEPHONE (OUTPATIENT)
Dept: OBSTETRICS AND GYNECOLOGY | Facility: CLINIC | Age: 66
End: 2021-12-21
Payer: MEDICARE

## 2021-12-22 ENCOUNTER — OUTPATIENT CASE MANAGEMENT (OUTPATIENT)
Dept: ADMINISTRATIVE | Facility: OTHER | Age: 66
End: 2021-12-22
Payer: MEDICARE

## 2022-01-04 ENCOUNTER — HOSPITAL ENCOUNTER (EMERGENCY)
Facility: HOSPITAL | Age: 67
Discharge: PSYCHIATRIC OR SUBSTANCE USE FACILITY | End: 2022-01-05
Attending: EMERGENCY MEDICINE
Payer: MEDICARE

## 2022-01-04 ENCOUNTER — TELEPHONE (OUTPATIENT)
Dept: INTERNAL MEDICINE | Facility: CLINIC | Age: 67
End: 2022-01-04
Payer: MEDICARE

## 2022-01-04 DIAGNOSIS — Z86.59 HISTORY OF BIPOLAR DISORDER: ICD-10-CM

## 2022-01-04 DIAGNOSIS — I10 HYPERTENSION: ICD-10-CM

## 2022-01-04 DIAGNOSIS — R46.89 AGGRESSIVE BEHAVIOR OF ADULT: ICD-10-CM

## 2022-01-04 DIAGNOSIS — G43.109 MIGRAINE WITH AURA AND WITHOUT STATUS MIGRAINOSUS, NOT INTRACTABLE: ICD-10-CM

## 2022-01-04 DIAGNOSIS — F29 PSYCHOSIS, UNSPECIFIED PSYCHOSIS TYPE: Primary | ICD-10-CM

## 2022-01-04 LAB
ALBUMIN SERPL BCP-MCNC: 4.1 G/DL (ref 3.5–5.2)
ALP SERPL-CCNC: 72 U/L (ref 55–135)
ALT SERPL W/O P-5'-P-CCNC: 22 U/L (ref 10–44)
AMPHET+METHAMPHET UR QL: NEGATIVE
ANION GAP SERPL CALC-SCNC: 13 MMOL/L (ref 8–16)
APAP SERPL-MCNC: 3 UG/ML (ref 10–20)
AST SERPL-CCNC: 16 U/L (ref 10–40)
BARBITURATES UR QL SCN>200 NG/ML: NEGATIVE
BASOPHILS # BLD AUTO: 0.04 K/UL (ref 0–0.2)
BASOPHILS NFR BLD: 0.4 % (ref 0–1.9)
BENZODIAZ UR QL SCN>200 NG/ML: NEGATIVE
BILIRUB SERPL-MCNC: 0.2 MG/DL (ref 0.1–1)
BILIRUB UR QL STRIP: NEGATIVE
BUN SERPL-MCNC: 29 MG/DL (ref 8–23)
BZE UR QL SCN: NEGATIVE
CALCIUM SERPL-MCNC: 11.8 MG/DL (ref 8.7–10.5)
CANNABINOIDS UR QL SCN: NEGATIVE
CHLORIDE SERPL-SCNC: 99 MMOL/L (ref 95–110)
CLARITY UR: CLEAR
CO2 SERPL-SCNC: 27 MMOL/L (ref 23–29)
COLOR UR: YELLOW
CREAT SERPL-MCNC: 1.5 MG/DL (ref 0.5–1.4)
CREAT UR-MCNC: 51 MG/DL (ref 15–325)
DIFFERENTIAL METHOD: ABNORMAL
EOSINOPHIL # BLD AUTO: 0.2 K/UL (ref 0–0.5)
EOSINOPHIL NFR BLD: 1.5 % (ref 0–8)
ERYTHROCYTE [DISTWIDTH] IN BLOOD BY AUTOMATED COUNT: 12.5 % (ref 11.5–14.5)
EST. GFR  (AFRICAN AMERICAN): 42 ML/MIN/1.73 M^2
EST. GFR  (NON AFRICAN AMERICAN): 36 ML/MIN/1.73 M^2
ETHANOL SERPL-MCNC: <10 MG/DL
GLUCOSE SERPL-MCNC: 122 MG/DL (ref 70–110)
GLUCOSE UR QL STRIP: ABNORMAL
HCT VFR BLD AUTO: 41.3 % (ref 37–48.5)
HGB BLD-MCNC: 14.2 G/DL (ref 12–16)
HGB UR QL STRIP: NEGATIVE
IMM GRANULOCYTES # BLD AUTO: 0.05 K/UL (ref 0–0.04)
IMM GRANULOCYTES NFR BLD AUTO: 0.5 % (ref 0–0.5)
KETONES UR QL STRIP: NEGATIVE
LEUKOCYTE ESTERASE UR QL STRIP: NEGATIVE
LIPASE SERPL-CCNC: 47 U/L (ref 4–60)
LYMPHOCYTES # BLD AUTO: 4.2 K/UL (ref 1–4.8)
LYMPHOCYTES NFR BLD: 41 % (ref 18–48)
MCH RBC QN AUTO: 32.3 PG (ref 27–31)
MCHC RBC AUTO-ENTMCNC: 34.4 G/DL (ref 32–36)
MCV RBC AUTO: 94 FL (ref 82–98)
METHADONE UR QL SCN>300 NG/ML: NEGATIVE
MONOCYTES # BLD AUTO: 1.2 K/UL (ref 0.3–1)
MONOCYTES NFR BLD: 11.5 % (ref 4–15)
NEUTROPHILS # BLD AUTO: 4.6 K/UL (ref 1.8–7.7)
NEUTROPHILS NFR BLD: 45.1 % (ref 38–73)
NITRITE UR QL STRIP: NEGATIVE
NRBC BLD-RTO: 0 /100 WBC
OPIATES UR QL SCN: NEGATIVE
PCP UR QL SCN>25 NG/ML: NEGATIVE
PH UR STRIP: 7 [PH] (ref 5–8)
PLATELET # BLD AUTO: 275 K/UL (ref 150–450)
PMV BLD AUTO: 9.6 FL (ref 9.2–12.9)
POTASSIUM SERPL-SCNC: 3.8 MMOL/L (ref 3.5–5.1)
PROT SERPL-MCNC: 7.3 G/DL (ref 6–8.4)
PROT UR QL STRIP: NEGATIVE
RBC # BLD AUTO: 4.39 M/UL (ref 4–5.4)
SALICYLATES SERPL-MCNC: <5 MG/DL (ref 15–30)
SARS-COV-2 RDRP RESP QL NAA+PROBE: NEGATIVE
SODIUM SERPL-SCNC: 139 MMOL/L (ref 136–145)
SP GR UR STRIP: 1.01 (ref 1–1.03)
TOXICOLOGY INFORMATION: NORMAL
TROPONIN I SERPL DL<=0.01 NG/ML-MCNC: 0.01 NG/ML (ref 0–0.03)
TSH SERPL DL<=0.005 MIU/L-ACNC: 2.02 UIU/ML (ref 0.4–4)
URN SPEC COLLECT METH UR: ABNORMAL
UROBILINOGEN UR STRIP-ACNC: NEGATIVE EU/DL
VALPROATE SERPL-MCNC: 44.5 UG/ML (ref 50–100)
WBC # BLD AUTO: 10.21 K/UL (ref 3.9–12.7)

## 2022-01-04 PROCEDURE — 25000003 PHARM REV CODE 250: Performed by: EMERGENCY MEDICINE

## 2022-01-04 PROCEDURE — 80179 DRUG ASSAY SALICYLATE: CPT | Performed by: EMERGENCY MEDICINE

## 2022-01-04 PROCEDURE — 80307 DRUG TEST PRSMV CHEM ANLYZR: CPT | Performed by: EMERGENCY MEDICINE

## 2022-01-04 PROCEDURE — 93010 EKG 12-LEAD: ICD-10-PCS | Mod: ,,, | Performed by: INTERNAL MEDICINE

## 2022-01-04 PROCEDURE — 93010 ELECTROCARDIOGRAM REPORT: CPT | Mod: ,,, | Performed by: INTERNAL MEDICINE

## 2022-01-04 PROCEDURE — 84484 ASSAY OF TROPONIN QUANT: CPT | Performed by: EMERGENCY MEDICINE

## 2022-01-04 PROCEDURE — 83690 ASSAY OF LIPASE: CPT | Performed by: EMERGENCY MEDICINE

## 2022-01-04 PROCEDURE — 36415 COLL VENOUS BLD VENIPUNCTURE: CPT | Performed by: EMERGENCY MEDICINE

## 2022-01-04 PROCEDURE — 99285 EMERGENCY DEPT VISIT HI MDM: CPT

## 2022-01-04 PROCEDURE — 80053 COMPREHEN METABOLIC PANEL: CPT | Performed by: EMERGENCY MEDICINE

## 2022-01-04 PROCEDURE — 80164 ASSAY DIPROPYLACETIC ACD TOT: CPT | Performed by: EMERGENCY MEDICINE

## 2022-01-04 PROCEDURE — 81003 URINALYSIS AUTO W/O SCOPE: CPT | Mod: 59 | Performed by: EMERGENCY MEDICINE

## 2022-01-04 PROCEDURE — 84443 ASSAY THYROID STIM HORMONE: CPT | Performed by: EMERGENCY MEDICINE

## 2022-01-04 PROCEDURE — 82077 ASSAY SPEC XCP UR&BREATH IA: CPT | Performed by: EMERGENCY MEDICINE

## 2022-01-04 PROCEDURE — 93005 ELECTROCARDIOGRAM TRACING: CPT

## 2022-01-04 PROCEDURE — 85025 COMPLETE CBC W/AUTO DIFF WBC: CPT | Performed by: EMERGENCY MEDICINE

## 2022-01-04 PROCEDURE — U0002 COVID-19 LAB TEST NON-CDC: HCPCS | Performed by: EMERGENCY MEDICINE

## 2022-01-04 PROCEDURE — 80143 DRUG ASSAY ACETAMINOPHEN: CPT | Performed by: EMERGENCY MEDICINE

## 2022-01-04 RX ORDER — QUETIAPINE FUMARATE 100 MG/1
200 TABLET, FILM COATED ORAL NIGHTLY
Status: DISCONTINUED | OUTPATIENT
Start: 2022-01-04 | End: 2022-01-05 | Stop reason: HOSPADM

## 2022-01-04 RX ADMIN — QUETIAPINE FUMARATE 200 MG: 100 TABLET, FILM COATED ORAL at 09:01

## 2022-01-04 NOTE — TELEPHONE ENCOUNTER
Care Due:                  Date            Visit Type   Department     Provider  --------------------------------------------------------------------------------                                             [facility]  Last Visit: 10-      None         MEDICINE       Parvin Reno  Next Visit: None Scheduled  None         None Found                                                            Last  Test          Frequency    Reason                     Performed    Due Date  --------------------------------------------------------------------------------    TSH.........  12 months..  levothyroxine............  03- 03-    Powered by [employer]. Reference number: [id number].   1/04/2022 2:37:15 PM CST

## 2022-01-04 NOTE — TELEPHONE ENCOUNTER
----- Message from Rajani Tidwell sent at 1/4/2022  1:15 PM CST -----  Contact: self/[phone]  Requesting an RX refill or new RX.  Is this a refill or new RX: Refill 1  RX name and strength (copy/paste from chart): gabapentin (NEURONTIN) 600 MG tablet  Is this a 30 day or 90 day RX:   Patient advised that in the future they can use their [facility] account to request a refill?:  phone  Pharmacy name and phone # (copy/paste from chart): [facility] [place] [facility]. Ph # [phone]. Fax # [phone].    Comments: patient need the medication today. Recently moved to . Thank you

## 2022-01-04 NOTE — TELEPHONE ENCOUNTER
----- Message from Yaneth Borrego sent at 1/4/2022  4:28 PM CST -----  Contact: [phone] Joseluis  Patient is following up on refill request.     [id number] - [place] - [address]  [address] [place] [address]  Phone: [phone] Fax: [phone]  Hours: Not open 24 hours

## 2022-01-05 VITALS
SYSTOLIC BLOOD PRESSURE: 141 MMHG | HEIGHT: 64 IN | RESPIRATION RATE: 18 BRPM | OXYGEN SATURATION: 97 % | DIASTOLIC BLOOD PRESSURE: 80 MMHG | HEART RATE: 72 BPM | BODY MASS INDEX: 41.77 KG/M2 | WEIGHT: 244.69 LBS | TEMPERATURE: 99 F

## 2022-01-05 PROCEDURE — 25000003 PHARM REV CODE 250: Performed by: EMERGENCY MEDICINE

## 2022-01-05 RX ORDER — GABAPENTIN 600 MG/1
600 TABLET ORAL 3 TIMES DAILY
Qty: 90 TABLET | Refills: 11 | Status: SHIPPED | OUTPATIENT
Start: 2022-01-05 | End: 2022-01-26

## 2022-01-05 RX ORDER — MIRTAZAPINE 15 MG/1
15 TABLET, FILM COATED ORAL NIGHTLY
Status: DISCONTINUED | OUTPATIENT
Start: 2022-01-05 | End: 2022-01-05 | Stop reason: HOSPADM

## 2022-01-05 RX ORDER — LISINOPRIL 20 MG/1
20 TABLET ORAL DAILY
Status: DISCONTINUED | OUTPATIENT
Start: 2022-01-05 | End: 2022-01-05 | Stop reason: HOSPADM

## 2022-01-05 RX ORDER — LORAZEPAM 1 MG/1
1 TABLET ORAL
Status: COMPLETED | OUTPATIENT
Start: 2022-01-05 | End: 2022-01-05

## 2022-01-05 RX ORDER — DIPHENHYDRAMINE HCL 50 MG
50 CAPSULE ORAL
Status: COMPLETED | OUTPATIENT
Start: 2022-01-05 | End: 2022-01-05

## 2022-01-05 RX ORDER — QUETIAPINE FUMARATE 100 MG/1
200 TABLET, FILM COATED ORAL NIGHTLY
Status: DISCONTINUED | OUTPATIENT
Start: 2022-01-05 | End: 2022-01-05

## 2022-01-05 RX ORDER — FUROSEMIDE 20 MG/1
20 TABLET ORAL 2 TIMES DAILY
Status: DISCONTINUED | OUTPATIENT
Start: 2022-01-05 | End: 2022-01-05 | Stop reason: HOSPADM

## 2022-01-05 RX ORDER — ATORVASTATIN CALCIUM 10 MG/1
20 TABLET, FILM COATED ORAL NIGHTLY
Status: DISCONTINUED | OUTPATIENT
Start: 2022-01-05 | End: 2022-01-05 | Stop reason: HOSPADM

## 2022-01-05 RX ORDER — DIVALPROEX SODIUM 500 MG/1
1000 TABLET, FILM COATED, EXTENDED RELEASE ORAL DAILY
Status: DISCONTINUED | OUTPATIENT
Start: 2022-01-05 | End: 2022-01-05 | Stop reason: HOSPADM

## 2022-01-05 RX ADMIN — LORAZEPAM 1 MG: 1 TABLET ORAL at 12:01

## 2022-01-05 RX ADMIN — LISINOPRIL 20 MG: 20 TABLET ORAL at 09:01

## 2022-01-05 RX ADMIN — DIPHENHYDRAMINE HYDROCHLORIDE 50 MG: 50 CAPSULE ORAL at 09:01

## 2022-01-05 RX ADMIN — FUROSEMIDE 20 MG: 20 TABLET ORAL at 09:01

## 2022-01-05 RX ADMIN — LEVOTHYROXINE SODIUM 175 MCG: 25 TABLET ORAL at 09:01

## 2022-01-05 RX ADMIN — LORAZEPAM 1 MG: 1 TABLET ORAL at 09:01
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
## 2022-01-05 NOTE — ED NOTES
Patient belongings under nurse's station:  -medications  - adelfo's bag  -roll of tissue  -nebulizer and treatments  -Breo pump  -eyedrops (2)   - Cell phone (no cracks)  -pink shirt  -white shirt  -tan wallet w/ $2.71  - rosary  -brush and comb  -2 lightrs  -andrey

## 2022-01-05 NOTE — ED NOTES
T/c from pt's personal care home anna Rodriguez, [phone].  She provided contact information for  Pt's brother, Margaret Triplett at [phone]   And   Pt's mother, Mallory Triplett at [phone].  ED physician was notified.

## 2022-01-05 NOTE — TELEPHONE ENCOUNTER
I tried calling pt to notify her requested Rx gabapentin was sent to her pharmacy.  No answer. Left  taht Rx was sent to her pharmacy

## 2022-01-24 ENCOUNTER — PATIENT OUTREACH (OUTPATIENT)
Dept: ADMINISTRATIVE | Facility: OTHER | Age: 67
End: 2022-01-24
Payer: MEDICARE

## 2022-01-24 NOTE — PROGRESS NOTES
Care Everywhere: updated  Immunization: updated  Health Maintenance: updated   Media Review: review for outside eye exam, mammogram and colon cancer report   Legacy Review:   DIS:no mammogram report on file   Order placed:   Upcoming appts:  EFAX:  Task Tickets:  Referrals:

## 2022-01-26 DIAGNOSIS — E03.9 HYPOTHYROIDISM, UNSPECIFIED TYPE: ICD-10-CM

## 2022-02-10 RX ORDER — LEVOTHYROXINE SODIUM 175 UG/1
TABLET ORAL
Qty: 30 TABLET | Refills: 5 | Status: SHIPPED | OUTPATIENT
Start: 2022-02-10

## 2022-02-13 ENCOUNTER — HOSPITAL ENCOUNTER (EMERGENCY)
Facility: HOSPITAL | Age: 67
Discharge: PSYCHIATRIC OR SUBSTANCE USE FACILITY | End: 2022-02-14
Attending: FAMILY MEDICINE
Payer: MEDICARE

## 2022-02-13 DIAGNOSIS — F23 ACUTE PSYCHOSIS: Primary | ICD-10-CM

## 2022-02-13 LAB — SARS-COV-2 RDRP RESP QL NAA+PROBE: NEGATIVE

## 2022-02-13 PROCEDURE — G0426 PR INPT TELEHEALTH CONSULT 50M: ICD-10-PCS | Mod: 95,,, | Performed by: PSYCHIATRY & NEUROLOGY

## 2022-02-13 PROCEDURE — 99285 EMERGENCY DEPT VISIT HI MDM: CPT | Mod: 25

## 2022-02-13 PROCEDURE — 80307 DRUG TEST PRSMV CHEM ANLYZR: CPT | Performed by: FAMILY MEDICINE

## 2022-02-13 PROCEDURE — 96372 THER/PROPH/DIAG INJ SC/IM: CPT

## 2022-02-13 PROCEDURE — U0002 COVID-19 LAB TEST NON-CDC: HCPCS | Performed by: FAMILY MEDICINE

## 2022-02-13 PROCEDURE — 81003 URINALYSIS AUTO W/O SCOPE: CPT | Mod: 59 | Performed by: FAMILY MEDICINE

## 2022-02-13 PROCEDURE — G0426 INPT/ED TELECONSULT50: HCPCS | Mod: 95,,, | Performed by: PSYCHIATRY & NEUROLOGY

## 2022-02-14 VITALS
OXYGEN SATURATION: 100 % | BODY MASS INDEX: 38.11 KG/M2 | WEIGHT: 222 LBS | TEMPERATURE: 98 F | SYSTOLIC BLOOD PRESSURE: 131 MMHG | DIASTOLIC BLOOD PRESSURE: 73 MMHG | HEART RATE: 76 BPM | RESPIRATION RATE: 18 BRPM

## 2022-02-14 DIAGNOSIS — J44.9 COPD WITHOUT EXACERBATION: Primary | ICD-10-CM

## 2022-02-14 LAB
ALBUMIN SERPL BCP-MCNC: 4.1 G/DL (ref 3.5–5.2)
ALP SERPL-CCNC: 65 U/L (ref 55–135)
ALT SERPL W/O P-5'-P-CCNC: 17 U/L (ref 10–44)
AMPHET+METHAMPHET UR QL: NEGATIVE
ANION GAP SERPL CALC-SCNC: 12 MMOL/L (ref 8–16)
APAP SERPL-MCNC: <3 UG/ML (ref 10–20)
AST SERPL-CCNC: 18 U/L (ref 10–40)
BARBITURATES UR QL SCN>200 NG/ML: NEGATIVE
BASOPHILS # BLD AUTO: 0.08 K/UL (ref 0–0.2)
BASOPHILS NFR BLD: 0.8 % (ref 0–1.9)
BENZODIAZ UR QL SCN>200 NG/ML: NEGATIVE
BILIRUB SERPL-MCNC: 0.2 MG/DL (ref 0.1–1)
BILIRUB UR QL STRIP: NEGATIVE
BUN SERPL-MCNC: 41 MG/DL (ref 8–23)
BZE UR QL SCN: NEGATIVE
CALCIUM SERPL-MCNC: 9.5 MG/DL (ref 8.7–10.5)
CANNABINOIDS UR QL SCN: NEGATIVE
CHLORIDE SERPL-SCNC: 102 MMOL/L (ref 95–110)
CLARITY UR: CLEAR
CO2 SERPL-SCNC: 23 MMOL/L (ref 23–29)
COLOR UR: YELLOW
CREAT SERPL-MCNC: 1.9 MG/DL (ref 0.5–1.4)
CREAT UR-MCNC: 22.1 MG/DL (ref 15–325)
DIFFERENTIAL METHOD: ABNORMAL
EOSINOPHIL # BLD AUTO: 0.4 K/UL (ref 0–0.5)
EOSINOPHIL NFR BLD: 4.3 % (ref 0–8)
ERYTHROCYTE [DISTWIDTH] IN BLOOD BY AUTOMATED COUNT: 13.4 % (ref 11.5–14.5)
EST. GFR  (AFRICAN AMERICAN): 31 ML/MIN/1.73 M^2
EST. GFR  (NON AFRICAN AMERICAN): 27 ML/MIN/1.73 M^2
ETHANOL SERPL-MCNC: <10 MG/DL
GLUCOSE SERPL-MCNC: 106 MG/DL (ref 70–110)
GLUCOSE UR QL STRIP: ABNORMAL
HCT VFR BLD AUTO: 42.1 % (ref 37–48.5)
HGB BLD-MCNC: 14.4 G/DL (ref 12–16)
HGB UR QL STRIP: NEGATIVE
IMM GRANULOCYTES # BLD AUTO: 0.07 K/UL (ref 0–0.04)
IMM GRANULOCYTES NFR BLD AUTO: 0.7 % (ref 0–0.5)
KETONES UR QL STRIP: NEGATIVE
LEUKOCYTE ESTERASE UR QL STRIP: NEGATIVE
LYMPHOCYTES # BLD AUTO: 5.3 K/UL (ref 1–4.8)
LYMPHOCYTES NFR BLD: 52.1 % (ref 18–48)
MCH RBC QN AUTO: 33 PG (ref 27–31)
MCHC RBC AUTO-ENTMCNC: 34.2 G/DL (ref 32–36)
MCV RBC AUTO: 97 FL (ref 82–98)
METHADONE UR QL SCN>300 NG/ML: NEGATIVE
MONOCYTES # BLD AUTO: 0.9 K/UL (ref 0.3–1)
MONOCYTES NFR BLD: 8.4 % (ref 4–15)
NEUTROPHILS # BLD AUTO: 3.5 K/UL (ref 1.8–7.7)
NEUTROPHILS NFR BLD: 33.7 % (ref 38–73)
NITRITE UR QL STRIP: NEGATIVE
NRBC BLD-RTO: 0 /100 WBC
OPIATES UR QL SCN: NEGATIVE
PCP UR QL SCN>25 NG/ML: NEGATIVE
PH UR STRIP: 7 [PH] (ref 5–8)
PLATELET # BLD AUTO: 255 K/UL (ref 150–450)
PMV BLD AUTO: 9.3 FL (ref 9.2–12.9)
POTASSIUM SERPL-SCNC: 4.6 MMOL/L (ref 3.5–5.1)
PROT SERPL-MCNC: 7 G/DL (ref 6–8.4)
PROT UR QL STRIP: NEGATIVE
RBC # BLD AUTO: 4.36 M/UL (ref 4–5.4)
SODIUM SERPL-SCNC: 137 MMOL/L (ref 136–145)
SP GR UR STRIP: <=1.005 (ref 1–1.03)
TOXICOLOGY INFORMATION: NORMAL
TSH SERPL DL<=0.005 MIU/L-ACNC: 2.71 UIU/ML (ref 0.4–4)
URN SPEC COLLECT METH UR: ABNORMAL
UROBILINOGEN UR STRIP-ACNC: NEGATIVE EU/DL
WBC # BLD AUTO: 10.22 K/UL (ref 3.9–12.7)

## 2022-02-14 PROCEDURE — 25000003 PHARM REV CODE 250: Performed by: EMERGENCY MEDICINE

## 2022-02-14 PROCEDURE — 85025 COMPLETE CBC W/AUTO DIFF WBC: CPT | Performed by: FAMILY MEDICINE

## 2022-02-14 PROCEDURE — 63600175 PHARM REV CODE 636 W HCPCS: Performed by: EMERGENCY MEDICINE

## 2022-02-14 PROCEDURE — 82077 ASSAY SPEC XCP UR&BREATH IA: CPT | Performed by: FAMILY MEDICINE

## 2022-02-14 PROCEDURE — 84443 ASSAY THYROID STIM HORMONE: CPT | Performed by: FAMILY MEDICINE

## 2022-02-14 PROCEDURE — 80053 COMPREHEN METABOLIC PANEL: CPT | Performed by: FAMILY MEDICINE

## 2022-02-14 PROCEDURE — 80143 DRUG ASSAY ACETAMINOPHEN: CPT | Performed by: FAMILY MEDICINE

## 2022-02-14 RX ORDER — TRAZODONE HYDROCHLORIDE 50 MG/1
100 TABLET ORAL NIGHTLY
Status: DISCONTINUED | OUTPATIENT
Start: 2022-02-14 | End: 2022-02-14 | Stop reason: HOSPADM

## 2022-02-14 RX ORDER — LORAZEPAM 2 MG/ML
1 INJECTION INTRAMUSCULAR
Status: COMPLETED | OUTPATIENT
Start: 2022-02-14 | End: 2022-02-14

## 2022-02-14 RX ORDER — LORAZEPAM 2 MG/ML
1 INJECTION INTRAMUSCULAR
Status: DISCONTINUED | OUTPATIENT
Start: 2022-02-14 | End: 2022-02-14

## 2022-02-14 RX ORDER — ZIPRASIDONE MESYLATE 20 MG/ML
20 INJECTION, POWDER, LYOPHILIZED, FOR SOLUTION INTRAMUSCULAR
Status: COMPLETED | OUTPATIENT
Start: 2022-02-14 | End: 2022-02-14

## 2022-02-14 RX ORDER — DIPHENHYDRAMINE HYDROCHLORIDE 50 MG/ML
25 INJECTION INTRAMUSCULAR; INTRAVENOUS
Status: DISCONTINUED | OUTPATIENT
Start: 2022-02-14 | End: 2022-02-14

## 2022-02-14 RX ORDER — QUETIAPINE FUMARATE 100 MG/1
200 TABLET, FILM COATED ORAL 2 TIMES DAILY
Status: DISCONTINUED | OUTPATIENT
Start: 2022-02-14 | End: 2022-02-14 | Stop reason: HOSPADM

## 2022-02-14 RX ORDER — DIPHENHYDRAMINE HYDROCHLORIDE 50 MG/ML
25 INJECTION INTRAMUSCULAR; INTRAVENOUS
Status: COMPLETED | OUTPATIENT
Start: 2022-02-14 | End: 2022-02-14

## 2022-02-14 RX ADMIN — LORAZEPAM 1 MG: 2 INJECTION INTRAMUSCULAR; INTRAVENOUS at 03:02

## 2022-02-14 RX ADMIN — QUETIAPINE 200 MG: 100 TABLET ORAL at 09:02

## 2022-02-14 RX ADMIN — QUETIAPINE 200 MG: 100 TABLET ORAL at 08:02

## 2022-02-14 RX ADMIN — ZIPRASIDONE MESYLATE 20 MG: 20 INJECTION, POWDER, LYOPHILIZED, FOR SOLUTION INTRAMUSCULAR at 03:02

## 2022-02-14 RX ADMIN — LEVOTHYROXINE SODIUM 175 MCG: 25 TABLET ORAL at 09:02

## 2022-02-14 RX ADMIN — DIPHENHYDRAMINE HYDROCHLORIDE 25 MG: 50 INJECTION, SOLUTION INTRAMUSCULAR; INTRAVENOUS at 03:02

## 2022-02-14 RX ADMIN — TRAZODONE HYDROCHLORIDE 100 MG: 50 TABLET ORAL at 08:02

## 2022-02-14 NOTE — PROVIDER PROGRESS NOTES - EMERGENCY DEPT.
Joseluis Triplett has been medically cleared and is awaiting placement in a psych facility.  They are currently resting comfortably in bed and in no acute distress following administration of psych meds.  No complaints at present.    Vitals:    02/14/22 0559   BP: (!) 112/97   Pulse: 89   Resp: 16   Temp: 98.5 °F (36.9 °C)

## 2022-02-14 NOTE — ED NOTES
Received report from MICHELLE Chun., introduced self to pt, and updated whiteboard    Pt is calm and cooperative, resting in ED bed with eyes closed, airway is open and patent, respirations are even and unlabored. Pt is wearing facility approved blue paper scrubs and yellow nonskid socks. All potentially harmful items have been removed from room. Sitter is at bedside with direct visualization of pt and performing q15min assessments. Pt has been medically cleared and is waiting on placement. PT in NAD. Will continue to monitor.

## 2022-02-14 NOTE — ED NOTES
Medication recently delivered by pharmacy, Pt calm and sleeping at this time. Will hold medication.

## 2022-02-14 NOTE — ED NOTES
"Made aware by yohana (nino) that patients family was in the lobby and requesting to speak with someone. Went out to lobby and called for "family of MsMusa" several times with no response from anyone in the lobby. CN aware  "
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
## 2022-02-15 NOTE — ED NOTES
Report received from MICHELLE Agosto noted, pt resting comfortably, VSS, sitter at bedside, will continue to monitor.  Original PEC in pt chart and accounted for.

## 2022-02-16 NOTE — PLAN OF CARE
Received call from Cinthia Rodriguez, pt's . She requested to know what psych facility pt went to. Murray advised that pt DCed to [facility].    Frank Loomis LMSW 2/16/2022 12:43 PM

## 2022-02-22 ENCOUNTER — PATIENT OUTREACH (OUTPATIENT)
Dept: ADMINISTRATIVE | Facility: CLINIC | Age: 67
End: 2022-02-22
Payer: MEDICARE

## 2022-02-28 ENCOUNTER — TELEPHONE (OUTPATIENT)
Dept: PULMONOLOGY | Facility: CLINIC | Age: 67
End: 2022-02-28
Payer: MEDICARE

## 2022-02-28 NOTE — TELEPHONE ENCOUNTER
I called and spoke to Mrs Triplett who tells me her nebulizer is barely working and she would like a new one with 2 masks and tubing also. She now lives in a group home in [place]. She has the Albuterol medicine, she just needs the equipment. I will ask Dr Caldwell and ask [facility] to mail it to [place]. Soila Rosado LPN.

## 2022-02-28 NOTE — TELEPHONE ENCOUNTER
----- Message from Favio Stephens sent at 2/28/2022  2:10 PM CST -----  Regarding: Call Back  Contact: patient  Pt. Requesting an urgent call back in regards to nebulizer.            Pt. @[phone]

## 2022-03-18 ENCOUNTER — TELEPHONE (OUTPATIENT)
Dept: PULMONOLOGY | Facility: CLINIC | Age: 67
End: 2022-03-18
Payer: MEDICARE

## 2022-03-18 NOTE — TELEPHONE ENCOUNTER
----- Message from Ryder Garcia sent at 3/18/2022  4:48 PM CDT -----  Contact: Patient  Patient requesting call back in regards to nebulizer.      Patient @[phone] (home)

## 2022-03-18 NOTE — TELEPHONE ENCOUNTER
Mrs Triplett called to tell me about her 17 day stay in the hospital in [place], and she is interested in a new Nebulizer. I told her I will speak to Dr Caldwell and get that arranged for her and call bach on Monday 3-21-22. Soila Rosado LPN

## 2022-03-28 ENCOUNTER — TELEPHONE (OUTPATIENT)
Dept: PULMONOLOGY | Facility: CLINIC | Age: 67
End: 2022-03-28
Payer: MEDICARE

## 2022-03-28 DIAGNOSIS — J44.9 COPD WITHOUT EXACERBATION: ICD-10-CM

## 2022-03-28 DIAGNOSIS — J44.1 COPD EXACERBATION: Primary | ICD-10-CM

## 2022-03-28 NOTE — TELEPHONE ENCOUNTER
I spoke to Mrs Triplett on 3-21-22 about a new nebulizer.I told her I would ask Dr Caldwell to write a new RX and send it to [facility]. Today I left a message on her answering machine letting her know it will be done tomorrow and I am sorry for the delay. Soila Rosado LPN

## 2022-03-28 NOTE — TELEPHONE ENCOUNTER
----- Message from Nichol Turpin sent at 3/28/2022  1:42 PM CDT -----  Regarding: pt  Pt is calling to speak with Soila pt said she has been waiting three weeks to call her back pt said she will further discuss when the nurse calls her back can you please call pt at [phone].    SONIA

## 2022-04-11 NOTE — TELEPHONE ENCOUNTER
1st attempt patient was not available to talk at this time regarding smoking cessation quit 1 episode.   Called and spoke with patient in regards to post surgery questions, left a detailed voicemail.

## 2022-06-23 ENCOUNTER — TELEPHONE (OUTPATIENT)
Dept: PULMONOLOGY | Facility: CLINIC | Age: 67
End: 2022-06-23
Payer: MEDICARE

## 2022-06-23 RX ORDER — IPRATROPIUM BROMIDE AND ALBUTEROL SULFATE 2.5; .5 MG/3ML; MG/3ML
3 SOLUTION RESPIRATORY (INHALATION) EVERY 6 HOURS PRN
Qty: 25 EACH | Refills: 11 | Status: SHIPPED | OUTPATIENT
Start: 2022-06-23 | End: 2023-06-23

## 2022-10-21 ENCOUNTER — TELEPHONE (OUTPATIENT)
Dept: PULMONOLOGY | Facility: CLINIC | Age: 67
End: 2022-10-21
Payer: MEDICARE

## 2022-10-21 NOTE — TELEPHONE ENCOUNTER
Spoke with patient, informed her that I have reviewed her message. Patient states that she will like to speak with Ms Cm in regards to some equipment patient needs. I verbalized to patient that Ms Cm is not here in clinic and that I will forward her message to Ms Cm to review/advise and upon her arrival on Monday morning, I will speak with Ms Cm in regards to contacting patient.  Patient verbalized that she understand.

## 2022-10-21 NOTE — TELEPHONE ENCOUNTER
----- Message from Franko Hendricks sent at 10/21/2022  4:09 PM CDT -----  Contact: pt  Pt requesting call back RE: would like to speak regarding her equipment. Please call        Confirmed contact below:  Contact Name:Joseluis Ioana  Phone Number: [phone]

## 2022-10-24 NOTE — TELEPHONE ENCOUNTER
I called Mrs Triplett today and she is requesting new tubing and a face mask for her nebulizer machine. I told her I will send Dr Caldwell's order to [facility] and ask them to mail it to her in [place]. She is pleased. Soila Rosado LPN

## 2022-11-04 ENCOUNTER — TELEPHONE (OUTPATIENT)
Dept: PULMONOLOGY | Facility: CLINIC | Age: 67
End: 2022-11-04
Payer: MEDICARE

## 2022-11-04 NOTE — TELEPHONE ENCOUNTER
I called Mrs Triplett and let her know [facility] will mail new nebulizer equipment, including a mask to her home in [place]. She was pleased. Soila Rosado LPN

## 2022-11-04 NOTE — TELEPHONE ENCOUNTER
----- Message from Merry Byrd MA sent at 11/4/2022  5:02 PM CDT -----  Regarding: FW: Status  Contact: [phone]    ----- Message -----  From: Dianelys Vanegas  Sent: 11/4/2022   4:14 PM CDT  To: Javi Birch Staff  Subject: Status                                           Pt is requesting a call back from Soila Regarding Orders for Nebulizer Equipment  ,  Pt states that she has never Received Yet please call to discuss further

## 2022-11-08 DIAGNOSIS — J44.1 ASTHMA EXACERBATION IN COPD: Primary | ICD-10-CM

## 2022-11-08 DIAGNOSIS — J44.9 COPD WITHOUT EXACERBATION: ICD-10-CM

## 2022-11-08 DIAGNOSIS — J45.901 ASTHMA EXACERBATION IN COPD: Primary | ICD-10-CM

## 2022-11-13 NOTE — LETTER
"Anesthesia Transfer of Care Note    Patient: Dariel Urbina    Procedure(s) Performed: Procedure(s) (LRB):  PHACOEMULSIFICATION, CATARACT (Left)  INSERTION, IOL PROSTHESIS (Left)    Patient location: PACU    Anesthesia Type: MAC    Transport from OR: Transported from OR on room air with adequate spontaneous ventilation    Post pain: adequate analgesia    Post assessment: no apparent anesthetic complications    Post vital signs: stable    Level of consciousness: awake    Nausea/Vomiting: no nausea/vomiting    Complications: none    Transfer of care protocol was followed      Last vitals:   Visit Vitals  BP (!) 124/55 (BP Location: Left arm, Patient Position: Lying)   Pulse (!) 56   Temp 36.5 °C (97.7 °F) (Oral)   Resp 18   Ht 5' 6" (1.676 m)   Wt 76.7 kg (169 lb)   SpO2 98%   BMI 27.28 kg/m²     " July 16, 2018      Eladio Mera MD  [address]  [place] [address]           [facility]  [address]  [place] [address]  Phone: [phone]  Fax: [phone]          Patient: Joseluis Triplett   MR Number: [id number]   YOB: 1955   Date of Visit: 7/16/2018       Dear Dr. Eladio Mera:    Thank you for referring Joseluis Triplett to me for evaluation. Attached you will find relevant portions of my assessment and plan of care.    If you have questions, please do not hesitate to call me. I look forward to following Joselusi Triplett along with you.    Sincerely,    Chasidy Golden PA-C    Enclosure  CC:  No Recipients    If you would like to receive this communication electronically, please contact [email or web address] or [phone] to request more information on [employer] Link access.    For providers and/or their staff who would like to refer a patient to [facility], please contact us through our one-stop-shop provider referral line, [facility], at [phone].    If you feel you have received this communication in error or would no longer like to receive these types of communications, please e-mail [email or web address]

## 2023-05-24 ENCOUNTER — TELEPHONE (OUTPATIENT)
Dept: PULMONOLOGY | Facility: CLINIC | Age: 68
End: 2023-05-24
Payer: MEDICARE

## 2023-05-24 NOTE — TELEPHONE ENCOUNTER
----- Message from Dann Christensen sent at 5/23/2023  8:14 AM CDT -----  Regarding: New mask  Contact: [phone]  Hi, pt is calling to [facility] with the nurse Soila. Pt is asking for a call back to spk about getting a new mask for her machine,. Pls call her  at

## 2023-05-24 NOTE — TELEPHONE ENCOUNTER
I called Mrs Triplett and she is requesting new tubing and facemask for her Nebulizer machine. [facility] is the provider. I told Mrs Triplett I will fax the order to [facility]  and ask that it be mailed to Mrs Triplett's apartment in [place]. Soila Rosado LPN

## 2023-06-08 ENCOUNTER — TELEPHONE (OUTPATIENT)
Dept: PULMONOLOGY | Facility: CLINIC | Age: 68
End: 2023-06-08
Payer: MEDICARE

## 2023-06-08 NOTE — TELEPHONE ENCOUNTER
I called Mrs Triplett back to tell her the nebulizer mouthpiece is in the mail and reminded her Dr Caldwell has retired and she will have to ask her PCP for future DME equipment. She understands. Soila Rosado LPN

## 2023-08-10 NOTE — TELEPHONE ENCOUNTER
"----- Message from Brandon Fontaine sent at 5/9/2018  4:23 PM CDT -----  Contact: Patient [phone]  Patient is checking to see if the "Z Pack" Rx was sent over to the "Nursing home" for patient, [phone], requesting that you give the nursing home a call, stating glands are swollen and stating she has a bad headache, and nauseated,  Has been feeling this way sense about noon.    Please call and advise  Thank you  " [facility] Pregnancy And Lactation Text: This medication is Pregnancy Category X and contraindicated in pregnancy and in women who may become pregnant. It is unknown if this medication is excreted in breast milk.

## 2023-10-03 NOTE — LETTER
September 1, 2017      Clayton Rainey MD  [address]  [address]  [facility] [address]           [facility]  [address]  [place] [address]  Phone: [phone]  Fax: [phone]          Patient: Joseluis Triplett   MR Number: [id number]   YOB: 1955   Date of Visit: 9/1/2017       Dear Dr. Clayton Rainey:    Thank you for referring Joseluis Triplett to me for evaluation. Attached you will find relevant portions of my assessment and plan of care.    If you have questions, please do not hesitate to call me. I look forward to following Joseluis Triplett along with you.    Sincerely,    Tanisha Weiss, [facility]    Enclosure  CC:  No Recipients    If you would like to receive this communication electronically, please contact [email or web address] or [phone] to request more information on [employer] Link access.    For providers and/or their staff who would like to refer a patient to [facility], please contact us through our one-stop-shop provider referral line, [facility], at [phone].    If you feel you have received this communication in error or would no longer like to receive these types of communications, please e-mail [email or web address]          Normal vision: sees adequately in most situations; can see medication labels, newsprint

## 2024-07-17 ENCOUNTER — TELEPHONE (OUTPATIENT)
Dept: PRIMARY CARE CLINIC | Facility: CLINIC | Age: 69
End: 2024-07-17
Payer: MEDICARE

## 2024-07-18 ENCOUNTER — TELEPHONE (OUTPATIENT)
Dept: PSYCHIATRY | Facility: CLINIC | Age: 69
End: 2024-07-18
Payer: MEDICARE

## 2024-07-18 NOTE — TELEPHONE ENCOUNTER
"----- Message from Norma Bob sent at 7/18/2024  3:27 PM CDT -----  Regarding: Call  Patient called to give you a coded message as follows:  "I'd like to keep trying."    She can be reached at [phone].    Thank you.  "

## 2024-07-19 ENCOUNTER — TELEPHONE (OUTPATIENT)
Dept: PSYCHIATRY | Facility: CLINIC | Age: 69
End: 2024-07-19
Payer: MEDICARE

## 2024-07-19 ENCOUNTER — OFFICE VISIT (OUTPATIENT)
Dept: PRIMARY CARE CLINIC | Facility: CLINIC | Age: 69
End: 2024-07-19
Payer: MEDICARE

## 2024-07-19 VITALS
DIASTOLIC BLOOD PRESSURE: 76 MMHG | OXYGEN SATURATION: 96 % | BODY MASS INDEX: 35.54 KG/M2 | HEIGHT: 63 IN | SYSTOLIC BLOOD PRESSURE: 124 MMHG | HEART RATE: 84 BPM

## 2024-07-19 DIAGNOSIS — G21.19 DRUG-INDUCED PARKINSONISM: ICD-10-CM

## 2024-07-19 DIAGNOSIS — E07.9 THYROID DISORDER: ICD-10-CM

## 2024-07-19 DIAGNOSIS — E11.51 TYPE 2 DIABETES MELLITUS WITH DIABETIC PERIPHERAL ANGIOPATHY WITHOUT GANGRENE, WITHOUT LONG-TERM CURRENT USE OF INSULIN: ICD-10-CM

## 2024-07-19 DIAGNOSIS — F09 COGNITIVE DISORDER: ICD-10-CM

## 2024-07-19 DIAGNOSIS — Z72.0 TOBACCO ABUSE: ICD-10-CM

## 2024-07-19 DIAGNOSIS — Z12.31 ENCOUNTER FOR SCREENING MAMMOGRAM FOR MALIGNANT NEOPLASM OF BREAST: ICD-10-CM

## 2024-07-19 DIAGNOSIS — I10 HYPERTENSION, UNSPECIFIED TYPE: ICD-10-CM

## 2024-07-19 DIAGNOSIS — N18.32 TYPE 2 DIABETES MELLITUS WITH STAGE 3B CHRONIC KIDNEY DISEASE, WITHOUT LONG-TERM CURRENT USE OF INSULIN: ICD-10-CM

## 2024-07-19 DIAGNOSIS — E11.22 TYPE 2 DIABETES MELLITUS WITH STAGE 3B CHRONIC KIDNEY DISEASE, WITHOUT LONG-TERM CURRENT USE OF INSULIN: ICD-10-CM

## 2024-07-19 DIAGNOSIS — N18.31 STAGE 3A CHRONIC KIDNEY DISEASE: ICD-10-CM

## 2024-07-19 DIAGNOSIS — F22 PARANOID BEHAVIOR: ICD-10-CM

## 2024-07-19 DIAGNOSIS — F17.211 NICOTINE DEPENDENCE, CIGARETTES, IN REMISSION: ICD-10-CM

## 2024-07-19 DIAGNOSIS — C43.30 MALIGNANT MELANOMA OF FACE: Primary | ICD-10-CM

## 2024-07-19 DIAGNOSIS — J44.9 COPD WITHOUT EXACERBATION: ICD-10-CM

## 2024-07-19 PROCEDURE — 4010F ACE/ARB THERAPY RXD/TAKEN: CPT | Mod: CPTII,S$GLB,, | Performed by: INTERNAL MEDICINE

## 2024-07-19 PROCEDURE — 1126F AMNT PAIN NOTED NONE PRSNT: CPT | Mod: CPTII,S$GLB,, | Performed by: INTERNAL MEDICINE

## 2024-07-19 PROCEDURE — 99999 PR PBB SHADOW E&M-EST. PATIENT-LVL V: CPT | Mod: PBBFAC,,, | Performed by: INTERNAL MEDICINE

## 2024-07-19 PROCEDURE — 3074F SYST BP LT 130 MM HG: CPT | Mod: CPTII,S$GLB,, | Performed by: INTERNAL MEDICINE

## 2024-07-19 PROCEDURE — 3078F DIAST BP <80 MM HG: CPT | Mod: CPTII,S$GLB,, | Performed by: INTERNAL MEDICINE

## 2024-07-19 PROCEDURE — 1160F RVW MEDS BY RX/DR IN RCRD: CPT | Mod: CPTII,S$GLB,, | Performed by: INTERNAL MEDICINE

## 2024-07-19 PROCEDURE — 3008F BODY MASS INDEX DOCD: CPT | Mod: CPTII,S$GLB,, | Performed by: INTERNAL MEDICINE

## 2024-07-19 PROCEDURE — 1123F ACP DISCUSS/DSCN MKR DOCD: CPT | Mod: CPTII,S$GLB,, | Performed by: INTERNAL MEDICINE

## 2024-07-19 PROCEDURE — 3288F FALL RISK ASSESSMENT DOCD: CPT | Mod: CPTII,S$GLB,, | Performed by: INTERNAL MEDICINE

## 2024-07-19 PROCEDURE — 1159F MED LIST DOCD IN RCRD: CPT | Mod: CPTII,S$GLB,, | Performed by: INTERNAL MEDICINE

## 2024-07-19 PROCEDURE — 99205 OFFICE O/P NEW HI 60 MIN: CPT | Mod: S$GLB,,, | Performed by: INTERNAL MEDICINE

## 2024-07-19 PROCEDURE — 1101F PT FALLS ASSESS-DOCD LE1/YR: CPT | Mod: CPTII,S$GLB,, | Performed by: INTERNAL MEDICINE

## 2024-07-19 RX ORDER — DOCUSATE SODIUM 100 MG/1
100 CAPSULE, LIQUID FILLED ORAL 2 TIMES DAILY
COMMUNITY

## 2024-07-19 RX ORDER — LOSARTAN POTASSIUM 25 MG/1
25 TABLET ORAL DAILY
COMMUNITY

## 2024-07-19 RX ORDER — MIRTAZAPINE 30 MG/1
30 TABLET, FILM COATED ORAL NIGHTLY
COMMUNITY

## 2024-07-19 RX ORDER — METFORMIN HYDROCHLORIDE 500 MG/1
500 TABLET ORAL 2 TIMES DAILY WITH MEALS
COMMUNITY

## 2024-07-19 NOTE — TELEPHONE ENCOUNTER
----- Message from Norma Paulino sent at 7/19/2024  3:13 PM CDT -----  Regarding: Behavior  Patient is calling because she was seen today by Lali Lozoya MD in the [facility].  During the visit the Provider noticed she was experiencing paranoid behavior and informed her to provide you with that information.  In addition, patient says she is experiencing voices and anxiety.    She can be reached at [phone], or [phone].    Thank you.

## 2024-07-19 NOTE — ASSESSMENT & PLAN NOTE
Stable, continue current medication  Counseled on smoking cessation, still smokes 1/3 pack per day
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
## 2024-07-24 ENCOUNTER — HOSPITAL ENCOUNTER (OUTPATIENT)
Dept: RADIOLOGY | Facility: HOSPITAL | Age: 69
Discharge: HOME | End: 2024-07-24
Attending: INTERNAL MEDICINE
Payer: MEDICARE

## 2024-07-24 DIAGNOSIS — F17.211 NICOTINE DEPENDENCE, CIGARETTES, IN REMISSION: ICD-10-CM

## 2024-07-24 DIAGNOSIS — Z12.31 ENCOUNTER FOR SCREENING MAMMOGRAM FOR MALIGNANT NEOPLASM OF BREAST: ICD-10-CM

## 2024-07-24 DIAGNOSIS — Z72.0 TOBACCO ABUSE: ICD-10-CM

## 2024-07-24 PROCEDURE — 71271 CT THORAX LUNG CANCER SCR C-: CPT | Mod: TC

## 2024-07-24 PROCEDURE — 82274 ASSAY TEST FOR BLOOD FECAL: CPT | Performed by: INTERNAL MEDICINE

## 2024-07-24 PROCEDURE — 77067 SCR MAMMO BI INCL CAD: CPT | Mod: TC

## 2024-07-24 PROCEDURE — 71271 CT THORAX LUNG CANCER SCR C-: CPT | Mod: 26,,, | Performed by: STUDENT IN AN ORGANIZED HEALTH CARE EDUCATION/TRAINING PROGRAM

## 2024-07-25 ENCOUNTER — TELEPHONE (OUTPATIENT)
Dept: PRIMARY CARE CLINIC | Facility: CLINIC | Age: 69
End: 2024-07-25
Payer: MEDICARE

## 2024-07-25 DIAGNOSIS — C43.30 MALIGNANT MELANOMA OF FACE: Primary | ICD-10-CM

## 2024-07-25 NOTE — TELEPHONE ENCOUNTER
----- Message from Kathy Bradley sent at 7/25/2024 12:28 PM CDT -----  Contact: [phone] Patient  Patient would like to get medical advice.  Symptoms (please be specific):  Pt states she is scheduled for her  CV US ABDOMEN on 08/01/24 at [facility]. Pt also states she had her other tests done at [facility] on 07/24/25. Pt states she believes in Dr Escalera and wants to thank her for being her doctor. Pt states she is proud to have her as her doctor. Pt is also asking the doctor to look up her previous surgery she had at  with a general surgeon.   How long have you had these symptoms: N/A  Would you like a call back, or a response through your [facility] portal?:    call back    Pharmacy name and phone # (copy from chart):   N/A  Comments:

## 2024-07-25 NOTE — TELEPHONE ENCOUNTER
Called pt. Stated her stomach hurts when she's hungry or thirsty. Complaining of constipation, but said it it because she ate too much.     Pt also asked for a referral to dermatology. Asked when she could get her melanoma removed Stated she does not have a dermatologist.

## 2024-07-29 ENCOUNTER — TELEPHONE (OUTPATIENT)
Dept: PSYCHIATRY | Facility: CLINIC | Age: 69
End: 2024-07-29
Payer: MEDICARE

## 2024-07-29 NOTE — TELEPHONE ENCOUNTER
----- Message from Norma Bob sent at 7/29/2024 10:07 AM CDT -----  Regarding: Records  Patient is requesting you obtain her records from [facility] from the past two and a half years, prior to her appointment with you on 8/6/24..  The fax number is [phone] from [facility].    Patient can be reached at [phone].    Thank you.

## 2024-08-01 ENCOUNTER — HOSPITAL ENCOUNTER (OUTPATIENT)
Dept: CARDIOLOGY | Facility: HOSPITAL | Age: 69
Discharge: HOME | End: 2024-08-01
Attending: INTERNAL MEDICINE
Payer: MEDICARE

## 2024-08-01 DIAGNOSIS — Z72.0 TOBACCO ABUSE: ICD-10-CM

## 2024-08-01 LAB
ABDOMINAL IMA AP: 1.58 CM
ABDOMINAL IMA PS VEL: 105 CM/S
ABDOMINAL IMA TRANS: 1.32 CM
ABDOMINAL INFRARENAL AORTA AP: 1.2 CM
ABDOMINAL INFRARENAL AORTA PS VEL: 111 CM/S
ABDOMINAL INFRARENAL AORTA TRANS: 1.34 CM
ABDOMINAL JUXTARENAL AORTA AP: 1.1 CM
ABDOMINAL JUXTARENAL AORTA PS VEL: 103 CM/S
ABDOMINAL JUXTARENAL AORTA TRANS: 1.38 CM
ABDOMINAL LT COM ILIAC AP: 0.98 CM
ABDOMINAL LT COM ILIAC TRANS: 1 CM
ABDOMINAL LT COM ILIAC VEL: 164 CM/S
ABDOMINAL RT COM ILIAC AP: 1.18 CM
ABDOMINAL RT COM ILIAC TRANS: 1.32 CM
ABDOMINAL RT COM ILIAC VEL: 133 CM/S
ABDOMINAL SUPRARENAL AORTA AP: 2 CM
ABDOMINAL SUPRARENAL AORTA PS VEL: 113 CM/S
ABDOMINAL SUPRARENAL AORTA TRANS: 1.94 CM

## 2024-08-01 PROCEDURE — 93978 VASCULAR STUDY: CPT

## 2024-08-02 ENCOUNTER — TELEPHONE (OUTPATIENT)
Dept: PRIMARY CARE CLINIC | Facility: CLINIC | Age: 69
End: 2024-08-02
Payer: MEDICARE

## 2024-08-02 DIAGNOSIS — G62.9 NEUROPATHY: ICD-10-CM

## 2024-08-02 DIAGNOSIS — K21.9 GASTROESOPHAGEAL REFLUX DISEASE, UNSPECIFIED WHETHER ESOPHAGITIS PRESENT: ICD-10-CM

## 2024-08-02 DIAGNOSIS — J30.2 SEASONAL ALLERGIES: ICD-10-CM

## 2024-08-02 RX ORDER — ONDANSETRON 4 MG/1
4 TABLET, FILM COATED ORAL EVERY 12 HOURS PRN
Qty: 270 TABLET | Refills: 0 | Status: SHIPPED | OUTPATIENT
Start: 2024-08-02

## 2024-08-02 RX ORDER — PANTOPRAZOLE SODIUM 40 MG/1
40 TABLET, DELAYED RELEASE ORAL DAILY
Qty: 90 TABLET | Refills: 0 | Status: SHIPPED | OUTPATIENT
Start: 2024-08-02

## 2024-08-02 RX ORDER — ATORVASTATIN CALCIUM 40 MG/1
40 TABLET, FILM COATED ORAL DAILY
Qty: 90 TABLET | Refills: 0 | Status: SHIPPED | OUTPATIENT
Start: 2024-08-02

## 2024-08-02 RX ORDER — DICLOFENAC SODIUM 10 MG/G
GEL TOPICAL DAILY
Qty: 720 G | Refills: 0 | Status: SHIPPED | OUTPATIENT
Start: 2024-08-02

## 2024-08-02 RX ORDER — GABAPENTIN 100 MG/1
100 CAPSULE ORAL 3 TIMES DAILY
Qty: 270 CAPSULE | Refills: 0 | Status: SHIPPED | OUTPATIENT
Start: 2024-08-02

## 2024-08-02 RX ORDER — GABAPENTIN 600 MG/1
600 TABLET ORAL DAILY
Qty: 270 TABLET | Refills: 0 | Status: SHIPPED | OUTPATIENT
Start: 2024-08-02

## 2024-08-02 RX ORDER — ASPIRIN 81 MG/1
81 TABLET ORAL DAILY
Qty: 90 TABLET | Refills: 0 | Status: SHIPPED | OUTPATIENT
Start: 2024-08-02

## 2024-08-02 RX ORDER — FLUTICASONE PROPIONATE 50 MCG
SPRAY, SUSPENSION (ML) NASAL
Qty: 5 ML | Refills: 0 | Status: SHIPPED | OUTPATIENT
Start: 2024-08-02

## 2024-08-02 RX ORDER — FLUTICASONE FUROATE AND VILANTEROL 200; 25 UG/1; UG/1
1 POWDER RESPIRATORY (INHALATION) DAILY
Qty: 1 EACH | Refills: 0 | Status: SHIPPED | OUTPATIENT
Start: 2024-08-02

## 2024-08-02 RX ORDER — NICOTINE 7MG/24HR
1 PATCH, TRANSDERMAL 24 HOURS TRANSDERMAL DAILY
Qty: 90 PATCH | Refills: 0 | Status: SHIPPED | OUTPATIENT
Start: 2024-08-02

## 2024-08-02 RX ORDER — LOSARTAN POTASSIUM 25 MG/1
25 TABLET ORAL DAILY
Qty: 90 TABLET | Refills: 0 | Status: SHIPPED | OUTPATIENT
Start: 2024-08-02

## 2024-08-02 NOTE — TELEPHONE ENCOUNTER
----- Message from Lali Lozoya MD sent at 8/2/2024  9:43 AM CDT -----  Normal mammogram  ----- Message -----  From: Eladio Hull MD  Sent: 8/1/2024   4:40 PM CDT  To: Lali Lozoya MD

## 2024-08-05 RX ORDER — IPRATROPIUM BROMIDE AND ALBUTEROL SULFATE 2.5; .5 MG/3ML; MG/3ML
3 SOLUTION RESPIRATORY (INHALATION)
Qty: 270 ML | Refills: 0 | Status: SHIPPED | OUTPATIENT
Start: 2024-08-05

## 2024-08-06 ENCOUNTER — TELEPHONE (OUTPATIENT)
Dept: PRIMARY CARE CLINIC | Facility: CLINIC | Age: 69
End: 2024-08-06
Payer: MEDICARE

## 2024-08-06 ENCOUNTER — OFFICE VISIT (OUTPATIENT)
Dept: PSYCHIATRY | Facility: CLINIC | Age: 69
End: 2024-08-06
Payer: MEDICARE

## 2024-08-06 VITALS
WEIGHT: 240.63 LBS | HEART RATE: 95 BPM | DIASTOLIC BLOOD PRESSURE: 72 MMHG | SYSTOLIC BLOOD PRESSURE: 143 MMHG | BODY MASS INDEX: 42.63 KG/M2

## 2024-08-06 DIAGNOSIS — F25.1 SCHIZOAFFECTIVE DISORDER, DEPRESSIVE TYPE: ICD-10-CM

## 2024-08-06 DIAGNOSIS — F22 PARANOID BEHAVIOR: Primary | ICD-10-CM

## 2024-08-06 DIAGNOSIS — E11.22 TYPE 2 DIABETES MELLITUS WITH STAGE 3B CHRONIC KIDNEY DISEASE, WITHOUT LONG-TERM CURRENT USE OF INSULIN: Primary | ICD-10-CM

## 2024-08-06 DIAGNOSIS — N18.32 TYPE 2 DIABETES MELLITUS WITH STAGE 3B CHRONIC KIDNEY DISEASE, WITHOUT LONG-TERM CURRENT USE OF INSULIN: Primary | ICD-10-CM

## 2024-08-06 PROCEDURE — 3077F SYST BP >= 140 MM HG: CPT | Mod: HCNC,CPTII,S$GLB, | Performed by: NURSE PRACTITIONER

## 2024-08-06 PROCEDURE — 99214 OFFICE O/P EST MOD 30 MIN: CPT | Mod: HCNC,S$GLB,, | Performed by: NURSE PRACTITIONER

## 2024-08-06 PROCEDURE — 90833 PSYTX W PT W E/M 30 MIN: CPT | Mod: HCNC,S$GLB,, | Performed by: NURSE PRACTITIONER

## 2024-08-06 PROCEDURE — 3078F DIAST BP <80 MM HG: CPT | Mod: HCNC,CPTII,S$GLB, | Performed by: NURSE PRACTITIONER

## 2024-08-06 PROCEDURE — 3044F HG A1C LEVEL LT 7.0%: CPT | Mod: HCNC,CPTII,S$GLB, | Performed by: NURSE PRACTITIONER

## 2024-08-06 PROCEDURE — 3008F BODY MASS INDEX DOCD: CPT | Mod: HCNC,CPTII,S$GLB, | Performed by: NURSE PRACTITIONER

## 2024-08-06 PROCEDURE — 99999 PR PBB SHADOW E&M-EST. PATIENT-LVL II: CPT | Mod: PBBFAC,HCNC,, | Performed by: NURSE PRACTITIONER

## 2024-08-06 PROCEDURE — 4010F ACE/ARB THERAPY RXD/TAKEN: CPT | Mod: HCNC,CPTII,S$GLB, | Performed by: NURSE PRACTITIONER

## 2024-08-06 PROCEDURE — 1157F ADVNC CARE PLAN IN RCRD: CPT | Mod: HCNC,CPTII,S$GLB, | Performed by: NURSE PRACTITIONER

## 2024-08-06 RX ORDER — DIVALPROEX SODIUM 500 MG/1
TABLET, FILM COATED, EXTENDED RELEASE ORAL
Qty: 270 TABLET | Refills: 3 | Status: SHIPPED | OUTPATIENT
Start: 2024-08-06

## 2024-08-06 RX ORDER — FLUOXETINE HYDROCHLORIDE 20 MG/1
20 CAPSULE ORAL DAILY
Qty: 90 CAPSULE | Refills: 3 | Status: SHIPPED | OUTPATIENT
Start: 2024-08-06

## 2024-08-06 RX ORDER — QUETIAPINE FUMARATE 200 MG/1
TABLET, FILM COATED ORAL
Qty: 270 TABLET | Refills: 3 | Status: SHIPPED | OUTPATIENT
Start: 2024-08-06

## 2024-08-06 RX ORDER — MIRTAZAPINE 30 MG/1
30 TABLET, FILM COATED ORAL NIGHTLY
Qty: 90 TABLET | Refills: 3 | Status: SHIPPED | OUTPATIENT
Start: 2024-08-06

## 2024-08-06 NOTE — PROGRESS NOTES
Outpatient Psychiatry Follow-Up Visit (MD/NP)    8/6/2024    Clinical Status of Patient:  Outpatient (Ambulatory)    Chief Complaint:  Joseluis Triplett is a 69 y.o. female who presents today for follow-up of mood disorder and anxiety.  Met with patient.      Last visit was:  11/29/2021 Chart and  reviewed.     Interval History and Content of Current Session:  Current Psychiatric Medications/changes per Dr. Kelsey Walters to ER 1000 mg po daily  Seroquel 400 mg po BID  Inderal 40 mg po BID  Remeron 15 mg po qhs    Pt returned to clinic. Last visit was 3 year ago. Reports she is doing well. PCP was following her medications. [facility] processes are clear and organized. Staying at [facility] in [place].       Denies SI/HI/AVH.  Pt appears at baseline. Appearance is neatly groomed and well-kempt.  Compliant with medications and denies side effects. Denies any current unmanaged psychosis, mood, anxiety, depression, or insomnia symptoms. Denies SI/HI/AVH. Reports compliance with medications.     Valproic Acid level (57.6)    Psychotherapy:  Target symptoms: anxiety , mood disorder  Why chosen therapy is appropriate versus another modality: relevant to diagnosis  Outcome monitoring methods: self-report  Therapeutic intervention type: insight oriented psychotherapy  Topics discussed/themes: building skills sets for symptom management, symptom recognition  The patient's response to the intervention is accepting. The patient's progress toward treatment goals is limited.   Duration of intervention: 18 minutes.    Review of Systems   PSYCHIATRIC: Pertinant items are noted in the narrative.  CONSTITUTIONAL: No weight gain or loss.   MUSCULOSKELETAL: No pain or stiffness of the joints.  NEUROLOGIC: No weakness, sensory changes, seizures, confusion, memory loss, tremor or other abnormal movements.  ENDOCRINE: No polydipsia or polyuria.  INTEGUMENTARY: No rashes or lacerations.  EYES: No exophthalmos,  jaundice or blindness.  ENT: No dizziness, tinnitus or hearing loss.  RESPIRATORY: No shortness of breath.  CARDIOVASCULAR: No tachycardia or chest pain.  GASTROINTESTINAL: No nausea, vomiting, pain, constipation or diarrhea.  GENITOURINARY: No frequency, dysuria or sexual dysfunction.  HEMATOLOGIC/LYMPHATIC: No excessive bleeding, prolonged or excessive bleeding after dental extraction/injury.  ALLERGIC/IMMUNOLOGIC: No allergic response to materials, foods or animals at this time.    Past Medical, Family and Social History: The patient's past medical, family and social history have been reviewed and updated as appropriate within the electronic medical record - see encounter notes.    Compliance: yes    Side effects: None    Risk Parameters:  Patient reports no suicidal ideation  Patient reports no homicidal ideation  Patient reports no self-injurious behavior  Patient reports no violent behavior    Exam (detailed: at least 9 elements; comprehensive: all 15 elements)   Constitutional  Vitals:  Most recent vital signs, dated less than 90 days prior to this appointment, were reviewed.   Vitals:    08/06/24 0921   BP: (!) 143/72   Pulse: 95   Weight: 109.1 kg (240 lb 10.1 oz)        General:  unremarkable, age appropriate     Musculoskeletal  Muscle Strength/Tone:  no tremor, no tic   Gait & Station:  non-ataxic     Psychiatric  Speech:  no latency; no press   Mood & Affect:  euthymic  congruent and appropriate   Thought Process:  normal and logical   Associations:  tangential   Thought Content:  normal, no suicidality, no homicidality, delusions, or paranoia   Insight:  has awareness of illness   Judgement: behavior is adequate to circumstances   Orientation:  grossly intact   Memory: intact for content of interview   Language: grossly intact   Attention Span & Concentration:  able to focus   Fund of Knowledge:  intact and appropriate to age and level of education     Assessment and Diagnosis   Status/Progress: Based  on the examination today, the patient's problem(s) is/are adequately but not ideally controlled.  New problems have not been presented today.   Co-morbidities and Lack of compliance are not complicating management of the primary condition.  There are no active rule-out diagnoses for this patient at this time.     General Impression:       ICD-10-CM ICD-9-CM   1. Schizoaffective disorder, depressive type  F25.1 295.70       Intervention/Counseling/Treatment Plan   Medication Management: Continue current medications. The risks and benefits of medication were discussed with the patient.   Ordered labs:  Valproic level (57.6)  Depakote to  mg in the morning and 1000 mg bedtime  Seroquel 200 mg in the morning and 400 mg at bedtime  Remeron 30 mg po qhs    Return to Clinic: 6 months     Risks, benefits, side effects and alternative treatments discussed with patient. Patient agrees with the current plan as documented.  Encouraged Patient to keep future appointments.  Take medications as prescribed and abstain from substance abuse.  Pt to present to ED for thoughts to harm herself or others

## 2024-08-06 NOTE — PATIENT INSTRUCTIONS
Depakote to  mg in the morning and 1000 mg bedtime  Seroquel 200 mg in the morning and 400 mg at bedtime  Remeron 30 mg po qhs

## 2024-08-08 ENCOUNTER — TELEPHONE (OUTPATIENT)
Dept: PRIMARY CARE CLINIC | Facility: CLINIC | Age: 69
End: 2024-08-08
Payer: MEDICARE

## 2024-08-08 ENCOUNTER — TELEPHONE (OUTPATIENT)
Dept: PSYCHIATRY | Facility: CLINIC | Age: 69
End: 2024-08-08
Payer: MEDICARE

## 2024-08-09 ENCOUNTER — TELEPHONE (OUTPATIENT)
Dept: PRIMARY CARE CLINIC | Facility: CLINIC | Age: 69
End: 2024-08-09
Payer: MEDICARE

## 2024-08-09 DIAGNOSIS — G21.19 DRUG-INDUCED PARKINSONISM: Primary | ICD-10-CM

## 2024-08-09 RX ORDER — BUDESONIDE AND FORMOTEROL FUMARATE DIHYDRATE 80; 4.5 UG/1; UG/1
2 AEROSOL RESPIRATORY (INHALATION) DAILY
Qty: 10 G | Refills: 3 | Status: SHIPPED | OUTPATIENT
Start: 2024-08-09 | End: 2024-11-07

## 2024-08-16 ENCOUNTER — TELEPHONE (OUTPATIENT)
Dept: PRIMARY CARE CLINIC | Facility: CLINIC | Age: 69
End: 2024-08-16
Payer: MEDICARE

## 2024-08-16 NOTE — TELEPHONE ENCOUNTER
Message  Received: Today  Britt Munson Staff  Caller: [facility]/[phone] (Today, 12:37 PM)  1MEDICALADVICE    Patient is calling for Medical Advice regarding:medication    How long has patient had these symptoms:Karma    Pharmacy name and phone#: [facility] - [place], [place] - [address]  [address] [address]  Phone: [phone] Fax: [phone]       Patient wants a call back or thru [facility]:    Comments:pt said that she is calling in regards to needing to let the nurse know that [facility] Is telling her that they never received her rx for gabapentin (NEURONTIN) 600 MG tablet    Please advise patient replies from provider may take up to 48 hours.

## 2024-08-16 NOTE — TELEPHONE ENCOUNTER
Britt Munson Staff  Caller: Self/[phone] (Today,  9:21 AM)  1MEDICALADVICE    Patient is calling for Medical Advice regarding:update on bowel    How long has patient had these symptoms:NA    Pharmacy name and phone#:NA    Patient wants a call back or thru [facility]: call back    Comments: pt said that she is calling in regards to needing to let the nurse know that she passed her bowels 3 times today . Please advise    Please advise patient replies from provider may take up to 48 hours.

## 2024-08-16 NOTE — TELEPHONE ENCOUNTER
----- Message from Daisy Page sent at 8/16/2024  8:16 AM CDT -----  Contact: [phone]  1MEDICALADVICE     Patient is calling for Medical Advice regarding: pain in right ear and constipation    How long has patient had these symptoms: 1 week    Pharmacy name and phone#:     [facility] - [place], [place] - [address]  [address] [address]  Phone: [phone] Fax: [phone]        Patient wants a call back or thru [facility]: call    Comments:    Please advise patient replies from provider may take up to 48 hours.

## 2024-08-16 NOTE — TELEPHONE ENCOUNTER
Medication  gabapentin (NEURONTIN) 100 MG capsule [18949]  Outpatient Medication Detail     Disp Refills Start End WILNER   gabapentin (NEURONTIN) 100 MG capsule 270 capsule 0 8/2/2024 -- No   Sig - Route: Take 1 capsule (100 mg total) by mouth 3 (three) times daily. - Oral   Sent to pharmacy as: gabapentin (NEURONTIN) 100 MG capsule   Class: Normal   Order: [id number]   Date/Time Signed: 8/2/2024 11:59       E-Prescribing Status: Receipt confirmed by pharmacy (8/2/2024 11:59 AM CDT)     Pharmacy    [facility] - [facility] [address]

## 2024-08-16 NOTE — TELEPHONE ENCOUNTER
Spoke to patient about adequate hydration, taking medication  prescribed for constipation, and eating fiber. Made appt with patient for right ear pain on 8/23.

## 2024-08-20 ENCOUNTER — TELEPHONE (OUTPATIENT)
Dept: PRIMARY CARE CLINIC | Facility: CLINIC | Age: 69
End: 2024-08-20
Payer: MEDICARE

## 2024-08-20 NOTE — TELEPHONE ENCOUNTER
ORIONW received referral from Dr. Lozoya for behavioral health services.  LCSW completed a chart review including recent Psychiatry note from Joseph Yañez III, JASPER.  LCSW sent communication asking him if he thinks patient would be more appropriate for IOP based on diagnosis and history.  He is in agreement.  LCSW updated PCP and team that referral to IOP more appropriate for more intensive structured program.     ORIONW left [facility] with Dale at [facility]/[phone] for intake RN/Deloris to return call to collaborate on this patient.

## 2024-08-20 NOTE — TELEPHONE ENCOUNTER
LCSW spoke with Deloris Hightower/RN  for [facility]/(cell [phone] - do not give to patient) to discuss possible referral.  Patient is known to [facility] and they are willing to accept patient if she is in agreement.  LCSW will update PCP & her staff. Patient has appt with PCP 8/23 at 2pm. Referral can be made by PCP at that time if appropriate and patient in agreement.  Will need to send demographic sheet, recent psych note from 8/6, med list, recent clinic note to Deloris.   LCSW will close [facility] referral.

## 2024-08-21 ENCOUNTER — TELEPHONE (OUTPATIENT)
Dept: PSYCHIATRY | Facility: CLINIC | Age: 69
End: 2024-08-21
Payer: MEDICARE

## 2024-08-21 NOTE — TELEPHONE ENCOUNTER
Added to appointment notes. After discussion at upcoming appointment, if order is placed please send demographic sheet, recent psych note from 8/6, med list, recent clinic note to Deloris. Thank you!   Will deny prescription until we hear from the patient. Need his weight to proceed. Will await call back. Thank you for calling.

## 2024-08-22 ENCOUNTER — TELEPHONE (OUTPATIENT)
Dept: PRIMARY CARE CLINIC | Facility: CLINIC | Age: 69
End: 2024-08-22
Payer: MEDICARE

## 2024-08-22 NOTE — TELEPHONE ENCOUNTER
Britt Munson Staff  Caller: [facility]/ [phone] (Today,  1:35 PM)  Patient is returning a phone call.    Who left a message for the patient:    Does patient know what this is regarding:      Would you like a call back, or a response through your [facility] portal?:   call back    Comments: pt stated that she would like for someone to call her with her test results, first patient stated that she was returning Carmen's call , then she stated that she does not want Carmen to call her she wants the nurse to call her with all of her test results, try to clarify what pt would like she stated if there is a discrepancy in the office that they need to figure it out. Please advise

## 2024-08-22 NOTE — TELEPHONE ENCOUNTER
Britt Munson Staff  Caller: Self/ [phone] (Today,  3:39 PM)  1MEDICALADVICE    Patient is calling for Medical Advice regarding:Notification    How long has patient had these symptoms:Na    Pharmacy name and phone#:NA    Patient wants a call back or thru [facility]: call back    Comments:pt said that she is calling to let Dr Lozoya know that she is changing her pcp to a doctor at [facility] and then she hung up . Please advise    Please advise patient replies from provider may take up to 48 hours.

## 2024-08-22 NOTE — TELEPHONE ENCOUNTER
Britt Munson [facility] Staff  Caller: Self/ [phone] (Today,  2:54 PM)  1MEDICALADVICE    Patient is calling for Medical Advice regarding:test results    How long has patient had these symptoms:NA    Pharmacy name and phone#:NA    Patient wants a call back or thru [facility]:roberto back    Comments: pt said that she is calling again to get test results , she stated that Dr Lozoya does not care about her because no one has called her with her test results she Is refusing to veirfy her demographics or explain what results she is needing and keeps hanging up while I'm talking. Please advise    Please advise patient replies from provider may take up to 48 hours.

## 2024-08-23 ENCOUNTER — TELEPHONE (OUTPATIENT)
Dept: PRIMARY CARE CLINIC | Facility: CLINIC | Age: 69
End: 2024-08-23
Payer: MEDICARE

## 2024-08-23 ENCOUNTER — NURSE TRIAGE (OUTPATIENT)
Dept: ADMINISTRATIVE | Facility: CLINIC | Age: 69
End: 2024-08-23
Payer: MEDICARE

## 2024-08-23 NOTE — TELEPHONE ENCOUNTER
Called patient back, pt states she believes that she has covid-19 and she would like medication called into her pharmacy. I explained that we recommend that she be evaluated to rule out, she declined appointment. I recommended that she get a at home covid-19 test to swab herself and pt is unable to get to the pharmacy stated what I am asking her to do is impossible. Pt explained that she had been waiting to hear from our office since 8am this morning and I asked her if she spoke to our nurse Dixie (as previously documented) and she said it was Debby that called her. I was not able to hear patient over the phone well, pt became frustrated when I asked her to repeat herself. Pt ended the call stating she did not want to talk to me anymore.

## 2024-08-23 NOTE — TELEPHONE ENCOUNTER
"Pt states thinks she has covid. Pt c/o symptoms sneezing, coughing when going outside to smoke, "no energy, just blah.", diarrhea, aches and pains. .Pt reports stiff neck and headache. Per protocol, go to ED now. Pt states had appt with pcp today, it was cancelled. Pt asking for Covid prescription. Reiterated disposition.   Reason for Disposition   [1] Headache AND [2] stiff neck (can't touch chin to chest)    Additional Information   Negative: SEVERE difficulty breathing (e.g., struggling for each breath, speaks in single words)   Negative: Difficult to awaken or acting confused (e.g., disoriented, slurred speech)   Negative: Bluish (or gray) lips or face now   Negative: Shock suspected (e.g., cold/pale/clammy skin, too weak to stand, low BP, rapid pulse)   Negative: Sounds like a life-threatening emergency to the triager   Negative: SEVERE or constant chest pain or pressure  (Exception: Mild central chest pain, present only when coughing.)   Negative: MODERATE difficulty breathing (e.g., speaks in phrases, SOB even at rest, pulse 100-120)    Protocols used: Coronavirus (COVID-19) Diagnosed or [id number]-N-SS    "

## 2024-08-23 NOTE — TELEPHONE ENCOUNTER
----- Message from Daisy Page sent at 8/23/2024  1:31 PM CDT -----  Contact: [phone]  Patient states COVID test is negative.

## 2024-08-23 NOTE — TELEPHONE ENCOUNTER
----- Message from Britt Munson sent at 8/23/2024  8:08 AM CDT -----  Contact: Self/ [phone]  1MEDICALADVICE     Patient is calling for Medical Advice regarding:    How long has patient had these symptoms: 2 days     Pharmacy name and phone#: [id number] [place] - [address]  [address] [address]  Phone: [phone] Fax: [phone]     Patient wants a call back or thru [facility]:    Comments:pt said that she is calling in regards to needing to check the status of a message that the Triage nurse sent to Dr Lozoya that she might have Covid based on her symptoms and she wants to know what the doctor wants her to do pt also wants to know if she should get a Covid shot , also patient stated that she would like to apologize for the way she was acting because she was having problems with her mother   . Please advise     Please advise patient replies from provider may take up to 48 hours.

## 2024-08-23 NOTE — TELEPHONE ENCOUNTER
Spoke to patient regarding symptoms. Patient verbalized on the phone she is having diarrhea, fatigue, and weakness. Patient wanted something called in for covid. Pt declines fever, shortness of breath, or any respiratory symptoms. Pt did not mention that she was coughing. Pt advised to rest, hydrate, and eat brat diet. Patient refused advise and asked if can have covid vaccine. Patient advised that should not take vaccine if currently ill. Patient stated nurse was not knowledgeable. Patient proceeded to say goodbye and hang up.

## 2024-08-24 ENCOUNTER — NURSE TRIAGE (OUTPATIENT)
Dept: ADMINISTRATIVE | Facility: CLINIC | Age: 69
End: 2024-08-24
Payer: MEDICARE

## 2024-08-24 NOTE — TELEPHONE ENCOUNTER
"Patient states she received a call/text from her Pharmacy, [facility] regarding a prescription. Patient states she was not able to get into her prescription portal and is unsure of reason for the call or which medication her Pharmacy is calling in reference to.     Triage RN contacted the [facility] and spoke with a Representative named Aracely regarding patient's message received from [facility]. Aracely stated that patient was sent a "Refill Outreach" regarding her prescription for Albuterol-Ipratropium (Duo-Neb) 2.5 mg-0.5 mg/3 ml submitted on 8/05/24 and ordered dispense amount of 270 ml. [facility] Representative stated that the dispense amount for Albuterol submitted is for 22 days and that [facility] usually receives an order for a 90 day supply of medications. Pharmacy is requesting clarification of dispense dosage amount.     Triage RN advised [facility] Representative (Aracely) that a message will be sent to patient's PCP regarding clarification of the dispense amount for patient's prescription for Albuterol-Ipratropium.     Patient advised of the reason for the outreach made by [facility] and that a message will be sent to her PCP to provide clarification of the dispense amount ordered. Patient also advised to contact the [facility] for any additional questions. Patient states understanding of care advice.     Reason for Disposition   [1] Caller has NON-URGENT medicine question about med that PCP prescribed AND [2] triager unable to answer question    Additional Information   Negative: [1] Intentional drug overdose AND [2] suicidal thoughts or ideas   Negative: Drug overdose and triager unable to answer question   Negative: Caller requesting a renewal or refill of a medicine patient is currently taking   Negative: Caller requesting information unrelated to medicine   Negative: Caller requesting information about " COVID-19 Vaccine   Negative: Caller requesting information about Emergency Contraception   Negative: Caller requesting information about Combined Birth Control Pills   Negative: Caller requesting information about Progestin Birth Control Pills   Negative: Caller requesting information about post-op pain or medicines   Negative: Caller requesting a prescription antibiotic (such as Penicillin) for Strep throat and has a positive culture result   Negative: Caller requesting a prescription anti-viral med (such as Tamiflu) and has influenza (flu) symptoms   Negative: Immunization reaction suspected   Negative: Rash while taking a medicine or within 3 days of stopping it   Negative: [1] Asthma and [2] having symptoms of asthma (cough, wheezing, etc.)   Negative: [1] Symptom of illness (e.g., headache, abdominal pain, earache, vomiting) AND [2] more than mild   Negative: Breastfeeding questions about mother's medicines and diet   Negative: MORE THAN A DOUBLE DOSE of a prescription or over-the-counter (OTC) drug   Negative: [1] DOUBLE DOSE (an extra dose or lesser amount) of prescription drug AND [2] any symptoms (e.g., dizziness, nausea, pain, sleepiness)   Negative: [1] DOUBLE DOSE (an extra dose or lesser amount) of over-the-counter (OTC) drug AND [2] any symptoms (e.g., dizziness, nausea, pain, sleepiness)   Negative: Took another person's prescription drug   Negative: [1] DOUBLE DOSE (an extra dose or lesser amount) of prescription drug AND [2] NO symptoms  (Exception: A double dose of antibiotics.)   Negative: Diabetes drug error or overdose (e.g., took wrong type of insulin or took extra dose)   Negative: [1] Prescription not at pharmacy AND [2] was prescribed by PCP recently (Exception: Triager has access to EMR and prescription is recorded there. Go to Home Care and confirm for pharmacy.)   Negative: [1] Pharmacy calling with prescription question AND [2] triager unable to answer question   Negative: [1] Caller has  URGENT medicine question about med that PCP or specialist prescribed AND [2] triager unable to answer question   Negative: Medicine patch causing local rash or itching   Negative: [1] Caller has medicine question about med NOT prescribed by PCP AND [2] triager unable to answer question (e.g., compatibility with other med, storage)   Negative: Prescription request for new medicine (not a refill)    Protocols used: Medication Question Call-A-AH

## 2024-08-26 ENCOUNTER — TELEPHONE (OUTPATIENT)
Dept: PRIMARY CARE CLINIC | Facility: CLINIC | Age: 69
End: 2024-08-26
Payer: MEDICARE

## 2024-08-26 RX ORDER — IPRATROPIUM BROMIDE AND ALBUTEROL SULFATE 2.5; .5 MG/3ML; MG/3ML
3 SOLUTION RESPIRATORY (INHALATION)
Qty: 810 ML | Refills: 0 | Status: SHIPPED | OUTPATIENT
Start: 2024-08-26 | End: 2024-11-24

## 2024-08-26 NOTE — TELEPHONE ENCOUNTER
----- Message from Talia Mcgrathe Elier sent at 8/26/2024 10:16 AM CDT -----  Contact: [phone]  1MEDICALADVICE     Patient is calling for Medical Advice regarding:pt is calling to see if she will take the booster at her appt on 9/3/24 or [facility]  please call pt back and advise.    How long has patient had these symptoms:    Pharmacy name and phone#:    Patient wants a call back or thru [facility]:callback    Comments:    Please advise patient replies from provider may take up to 48 hours.

## 2024-08-26 NOTE — TELEPHONE ENCOUNTER
----- Message from Talia Anitakaren Thapa sent at 8/26/2024 10:11 AM CDT -----  Contact: [phone]  1MEDICALADVICE     Patient is calling for Medical Advice regarding:pt is calling to please approve the refills that Mamitoño is trying to get filled for pt.  Please call her back and advise.    How long has patient had these symptoms:    Pharmacy name and phone#:  [facility] - [place], [place] - [address]   [address]  [facility] [address]  Phone: [phone] Fax: [phone]    Patient wants a call back or thru [facility]:callback    Comments:    Please advise patient replies from provider may take up to 48 hours.

## 2024-08-27 ENCOUNTER — TELEPHONE (OUTPATIENT)
Dept: PRIMARY CARE CLINIC | Facility: CLINIC | Age: 69
End: 2024-08-27
Payer: MEDICARE

## 2024-08-27 NOTE — TELEPHONE ENCOUNTER
----- Message from Taliagurwinder Thapa sent at 8/27/2024  1:14 PM CDT -----  Contact: [phone]  1MEDICALADVICE     Patient is calling for Medical Advice regarding:pt is calling her shoulder blade/shoulder ,neck, collar bone all down her side is in pain. The pain is not getting better. Please call her back and advise.    How long has patient had these symptoms:    Pharmacy name and phone#:  [facility] - [place], [place] - [address]   [address] [facility] [address]  Phone: [phone] Fax: [phone]         Patient wants a call back or thru [facility]:callback    Comments:    Please advise patient replies from provider may take up to 48 hours.

## 2024-08-28 ENCOUNTER — TELEPHONE (OUTPATIENT)
Dept: PRIMARY CARE CLINIC | Facility: CLINIC | Age: 69
End: 2024-08-28
Payer: MEDICARE

## 2024-08-28 NOTE — TELEPHONE ENCOUNTER
Daisy Page Staff  Caller: [phone] (Today, 10:54 AM)  Patient just wants the Dr. Lozoya to know it is hard for her to get out of the bed due pain and stiffness. Please call and advise.    Thank you and have a great day.

## 2024-08-28 NOTE — TELEPHONE ENCOUNTER
----- Message from Daisy Page sent at 8/28/2024  9:28 AM CDT -----  Contact: [phone]  1MEDICALADVICE     Patient is calling for Medical Advice regarding: stiff neck and back     How long has patient had these symptoms: on going    [id number] [place] - [address]  [address] [address]  Phone: [phone] Fax: [phone]      Patient wants a call back or thru [facility]: call    Comments:    Patient states she feels better, but still feels stiff back and shoulders around neck area. Headache has not gone away.

## 2024-08-29 ENCOUNTER — OFFICE VISIT (OUTPATIENT)
Dept: PODIATRY | Facility: CLINIC | Age: 69
End: 2024-08-29
Payer: MEDICARE

## 2024-08-29 VITALS
WEIGHT: 240.5 LBS | BODY MASS INDEX: 42.61 KG/M2 | SYSTOLIC BLOOD PRESSURE: 120 MMHG | HEART RATE: 77 BPM | DIASTOLIC BLOOD PRESSURE: 77 MMHG | HEIGHT: 63 IN

## 2024-08-29 DIAGNOSIS — L84 CORN OR CALLUS: ICD-10-CM

## 2024-08-29 DIAGNOSIS — N18.32 TYPE 2 DIABETES MELLITUS WITH STAGE 3B CHRONIC KIDNEY DISEASE, WITHOUT LONG-TERM CURRENT USE OF INSULIN: Primary | ICD-10-CM

## 2024-08-29 DIAGNOSIS — E11.9 ENCOUNTER FOR DIABETIC FOOT EXAM: ICD-10-CM

## 2024-08-29 DIAGNOSIS — B35.1 DERMATOPHYTOSIS OF NAIL: ICD-10-CM

## 2024-08-29 DIAGNOSIS — E11.22 TYPE 2 DIABETES MELLITUS WITH STAGE 3B CHRONIC KIDNEY DISEASE, WITHOUT LONG-TERM CURRENT USE OF INSULIN: Primary | ICD-10-CM

## 2024-08-29 PROCEDURE — 99999 PR PBB SHADOW E&M-EST. PATIENT-LVL V: CPT | Mod: PBBFAC,HCNC,, | Performed by: PODIATRIST

## 2024-08-29 RX ORDER — BUSPIRONE HYDROCHLORIDE 5 MG/1
TABLET ORAL
COMMUNITY

## 2024-08-29 RX ORDER — AMOXICILLIN AND CLAVULANATE POTASSIUM 875; 125 MG/1; MG/1
TABLET, FILM COATED ORAL
COMMUNITY

## 2024-08-29 RX ORDER — DEXAMETHASONE 1 MG/1
TABLET ORAL
COMMUNITY

## 2024-08-29 RX ORDER — MOMETASONE FUROATE MONOHYDRATE 50 UG/1
SPRAY, METERED NASAL
COMMUNITY

## 2024-08-29 RX ORDER — AMITRIPTYLINE HYDROCHLORIDE 25 MG/1
TABLET, FILM COATED ORAL
COMMUNITY

## 2024-08-29 RX ORDER — OLANZAPINE 2.5 MG/1
TABLET ORAL
COMMUNITY

## 2024-08-29 RX ORDER — CARBIDOPA AND LEVODOPA 25; 100 MG/1; MG/1
TABLET ORAL
COMMUNITY

## 2024-08-29 RX ORDER — RIZATRIPTAN BENZOATE 10 MG/1
TABLET ORAL
COMMUNITY

## 2024-08-29 RX ORDER — BLOOD SUGAR DIAGNOSTIC
1 STRIP MISCELLANEOUS
COMMUNITY
Start: 2023-11-13

## 2024-08-29 RX ORDER — CARBIDOPA AND LEVODOPA 10; 100 MG/1; MG/1
TABLET ORAL
COMMUNITY

## 2024-08-29 RX ORDER — RISPERIDONE 25 MG/2 ML
KIT INTRAMUSCULAR
COMMUNITY

## 2024-08-29 RX ORDER — CLOTRIMAZOLE AND BETAMETHASONE DIPROPIONATE 10; .64 MG/G; MG/G
CREAM TOPICAL DAILY
Qty: 45 G | Refills: 1 | Status: SHIPPED | OUTPATIENT
Start: 2024-08-29

## 2024-08-29 RX ORDER — TOPIRAMATE 50 MG/1
TABLET, FILM COATED ORAL
COMMUNITY

## 2024-08-29 RX ORDER — BENZTROPINE MESYLATE 1 MG/1
TABLET ORAL
COMMUNITY

## 2024-08-29 RX ORDER — LANCETS
1 EACH MISCELLANEOUS
COMMUNITY
Start: 2023-11-13

## 2024-08-29 RX ORDER — DICLOFENAC POTASSIUM 50 MG/1
TABLET, FILM COATED ORAL
COMMUNITY

## 2024-08-29 RX ORDER — ALBUTEROL SULFATE 90 UG/1
INHALANT RESPIRATORY (INHALATION)
COMMUNITY

## 2024-08-29 RX ORDER — FLUTICASONE PROPIONATE AND SALMETEROL 250; 50 UG/1; UG/1
POWDER RESPIRATORY (INHALATION)
COMMUNITY

## 2024-08-29 RX ORDER — SUMATRIPTAN 50 MG/1
TABLET, FILM COATED ORAL
COMMUNITY

## 2024-08-29 RX ORDER — LUBIPROSTONE 24 UG/1
CAPSULE ORAL
COMMUNITY

## 2024-08-29 RX ORDER — NYSTATIN 100000 [USP'U]/G
POWDER TOPICAL
COMMUNITY

## 2024-08-29 RX ORDER — ACETAMINOPHEN 500 MG
500 TABLET ORAL EVERY 6 HOURS PRN
COMMUNITY

## 2024-08-29 RX ORDER — SPIRONOLACTONE 100 MG/1
TABLET, FILM COATED ORAL
COMMUNITY

## 2024-08-29 RX ORDER — SUMATRIPTAN SUCCINATE 100 MG/1
TABLET ORAL
COMMUNITY

## 2024-08-29 RX ORDER — LIDOCAINE 50 MG/G
1 PATCH TOPICAL
COMMUNITY

## 2024-08-29 RX ORDER — ATOGEPANT 60 MG/1
1 TABLET ORAL DAILY
COMMUNITY
Start: 2024-03-20

## 2024-08-29 RX ORDER — PALIPERIDONE 6 MG/1
TABLET, EXTENDED RELEASE ORAL
COMMUNITY

## 2024-08-29 RX ORDER — DEXTROSE 4 G
1 TABLET,CHEWABLE ORAL
COMMUNITY
Start: 2023-11-13

## 2024-08-29 RX ORDER — POTASSIUM CHLORIDE 20 MEQ/1
1 TABLET, EXTENDED RELEASE ORAL DAILY
COMMUNITY

## 2024-08-29 RX ORDER — DULOXETIN HYDROCHLORIDE 30 MG/1
CAPSULE, DELAYED RELEASE ORAL
COMMUNITY

## 2024-08-29 RX ORDER — GUAIFENESIN 600 MG/1
1200 TABLET, EXTENDED RELEASE ORAL 2 TIMES DAILY PRN
COMMUNITY
Start: 2024-02-19

## 2024-08-29 RX ORDER — AMOXICILLIN 500 MG/1
CAPSULE ORAL
COMMUNITY

## 2024-08-29 RX ORDER — LURASIDONE HYDROCHLORIDE 80 MG/1
TABLET, FILM COATED ORAL
COMMUNITY

## 2024-08-29 RX ORDER — BACLOFEN 10 MG/1
TABLET ORAL
COMMUNITY

## 2024-08-29 RX ORDER — DICYCLOMINE HYDROCHLORIDE 10 MG/1
CAPSULE ORAL
COMMUNITY

## 2024-08-29 RX ORDER — ALBUTEROL SULFATE 0.63 MG/3ML
1 SOLUTION RESPIRATORY (INHALATION) EVERY 4 HOURS PRN
COMMUNITY

## 2024-08-29 RX ORDER — PALIPERIDONE 9 MG/1
TABLET, EXTENDED RELEASE ORAL
COMMUNITY

## 2024-08-29 RX ORDER — ALBUTEROL SULFATE 0.63 MG/3ML
0.63 SOLUTION RESPIRATORY (INHALATION) EVERY 6 HOURS PRN
COMMUNITY
Start: 2024-03-26

## 2024-08-29 RX ORDER — OMEGA-3-ACID ETHYL ESTERS 1 G/1
CAPSULE, LIQUID FILLED ORAL
COMMUNITY

## 2024-08-29 RX ORDER — ESOMEPRAZOLE MAGNESIUM 40 MG/1
CAPSULE, DELAYED RELEASE ORAL
COMMUNITY

## 2024-08-29 RX ORDER — FLUCONAZOLE 100 MG/1
TABLET ORAL
COMMUNITY

## 2024-08-29 RX ORDER — POLYETHYLENE GLYCOL 3350, SODIUM SULFATE, SODIUM CHLORIDE, POTASSIUM CHLORIDE, SODIUM ASCORBATE, AND ASCORBIC ACID 7.5-2.691G
KIT ORAL
COMMUNITY

## 2024-08-29 RX ORDER — AZITHROMYCIN 250 MG/1
TABLET, FILM COATED ORAL
COMMUNITY

## 2024-08-29 RX ORDER — CLOTRIMAZOLE AND BETAMETHASONE DIPROPIONATE 10; .64 MG/G; MG/G
CREAM TOPICAL DAILY
Qty: 45 G | Refills: 1 | Status: SHIPPED | OUTPATIENT
Start: 2024-08-29 | End: 2024-08-29 | Stop reason: SDUPTHER

## 2024-08-29 RX ORDER — CHLORZOXAZONE 500 MG/1
TABLET ORAL
COMMUNITY

## 2024-08-29 RX ORDER — KETOPROFEN 75 MG/1
CAPSULE ORAL
COMMUNITY

## 2024-08-29 RX ORDER — PROMETHAZINE HYDROCHLORIDE 12.5 MG/1
TABLET ORAL
COMMUNITY

## 2024-08-29 RX ORDER — LORATADINE 10 MG/1
1 TABLET ORAL DAILY
COMMUNITY

## 2024-08-29 RX ORDER — MULTIVIT-MINERALS/FOLIC ACID 120 MCG
2.5 TABLET,CHEWABLE ORAL
COMMUNITY
Start: 2024-01-31

## 2024-08-29 RX ORDER — POLYETHYLENE GLYCOL 3350, SODIUM SULFATE ANHYDROUS, SODIUM BICARBONATE, SODIUM CHLORIDE, POTASSIUM CHLORIDE 236; 22.74; 6.74; 5.86; 2.97 G/4L; G/4L; G/4L; G/4L; G/4L
POWDER, FOR SOLUTION ORAL
COMMUNITY

## 2024-08-29 RX ORDER — BLOOD-GLUCOSE METER
EACH MISCELLANEOUS
COMMUNITY
Start: 2024-07-01

## 2024-08-29 RX ORDER — CLOTRIMAZOLE AND BETAMETHASONE DIPROPIONATE 10; .64 MG/G; MG/G
CREAM TOPICAL
COMMUNITY
End: 2024-08-29

## 2024-08-29 RX ORDER — MULTIVITAMIN
1 TABLET ORAL DAILY
COMMUNITY

## 2024-08-29 RX ORDER — TRAMADOL HYDROCHLORIDE 50 MG/1
TABLET ORAL
COMMUNITY

## 2024-08-29 RX ORDER — ALBUTEROL SULFATE 0.83 MG/ML
SOLUTION RESPIRATORY (INHALATION)
COMMUNITY

## 2024-08-29 RX ORDER — CHLORHEXIDINE GLUCONATE ORAL RINSE 1.2 MG/ML
SOLUTION DENTAL
COMMUNITY

## 2024-08-29 RX ORDER — ZOLPIDEM TARTRATE 10 MG/1
TABLET ORAL
COMMUNITY

## 2024-08-29 RX ORDER — ASCORBIC ACID 125 MG
TABLET,CHEWABLE ORAL
COMMUNITY

## 2024-08-29 RX ORDER — TIOTROPIUM BROMIDE 18 UG/1
CAPSULE ORAL; RESPIRATORY (INHALATION)
COMMUNITY

## 2024-08-29 RX ORDER — FLUCONAZOLE 150 MG/1
TABLET ORAL
COMMUNITY

## 2024-08-29 RX ORDER — NITROFURANTOIN 25; 75 MG/1; MG/1
CAPSULE ORAL
COMMUNITY

## 2024-08-29 RX ORDER — NYSTATIN AND TRIAMCINOLONE ACETONIDE 100000; 1 [USP'U]/G; MG/G
OINTMENT TOPICAL
COMMUNITY

## 2024-08-29 RX ORDER — ALBUTEROL SULFATE 90 UG/1
INHALANT RESPIRATORY (INHALATION)
COMMUNITY
Start: 2024-03-26

## 2024-08-29 RX ORDER — RISPERIDONE 3 MG/1
TABLET ORAL
COMMUNITY

## 2024-08-29 RX ORDER — NYSTATIN AND TRIAMCINOLONE ACETONIDE 100000; 1 [USP'U]/G; MG/G
CREAM TOPICAL
COMMUNITY

## 2024-08-29 RX ORDER — DULOXETIN HYDROCHLORIDE 60 MG/1
CAPSULE, DELAYED RELEASE ORAL
COMMUNITY

## 2024-08-29 RX ORDER — ERYTHROMYCIN 5 MG/G
OINTMENT OPHTHALMIC
COMMUNITY

## 2024-08-29 NOTE — PATIENT INSTRUCTIONS
How to Check Your Feet    Below are tips to help you look for foot problems. Try to check your feet at the same time each day, such as when you get out of bed in the morning.    Check the top of each foot. The tops of toes, back of the heel, and outer edge of the foot can get a lot of rubbing from poor-fitting shoes.    Check the bottom of each foot. Daily wear and tear often leads to problems at pressure spots.    Check the toes and nails. Fungal infections often occur between toes. Toenail problems can also be a sign of fungal infections or lead to breaks in the skin.    Check your shoes, too. Loose objects inside a shoe can injure the foot. Use your hand to feel inside your shoes for things like demond, loose stitching, or rough areas that could irritate your skin.        Diabetic Foot Care    Diabetes can lead to a number of different foot complications. Fortunately, most of these complications can be prevented with a little extra foot care. If diabetes is not well controlled, the high blood sugar can cause damage to blood vessels and result in poor circulation to the foot. When the skin does not get enough blood flow, it becomes prone to pressure sores and ulcers, which heal slowly.  High blood sugar can also damage nerves, interfering with the ability to feel pain and pressure. When you cant feel your foot normally, it is easy to injure your skin, bones and joints without knowing it. For these reasons diabetes increases the risk of fungal infections, bunions and ulcers. Deep ulcers can lead to bone infection. Gangrene is the most serious foot complication of diabetes. It usually occurs on the tips of the toes as blacked areas of skin. The black area is dead tissue. In severe cases, gangrene spreads to involve the entire toe, other toes and the entire foot. Foot or toe amputation may be required. Good foot care and blood sugar control can prevent this.    Home Care  Wear comfortable, proper fitting  shoes.  Wash your feet daily with warm water and mild soap.  After drying, apply a moisturizing cream or lotion.  Check your feet daily for skin breaks, blisters, swelling, or redness. Look between your toes also.  Wear cotton socks and change them every day.  Trim toe nails carefully and do not cut your cuticles.  Strive to keep your blood sugar under control with a combination of medicines, diet and activity.  If you smoke and have diabetes, it is very important that you stop. Smoking reduces blood flow to your foot.  Avoid activities that increase your risk of foot injury:  Do not walk barefoot.  Do not use heating pads or hot water bottles on your feet.  Do not put your foot in a hot tub without first checking the temperature with your hand.  10) Schedule yearly foot exams.    Follow Up  with your doctor or as advised by our staff. Report any cut, puncture, scrape, other injury, blister, ingrown toenail or ulcer on your foot.    Get Prompt Medical Attention  if any of the following occur:  -- Open ulcer with pus draining from the wound  -- Increasing foot or leg pain  -- New areas of redness or swelling or tender areas of the foot    © 3766-1089 [employer]. [address] [place] [address], [place] [address]. All rights reserved. This information is not intended as a substitute for professional medical care. Always follow your healthcare professional's instructions.

## 2024-08-29 NOTE — PROGRESS NOTES
"Chief Complaint   Patient presents with    Diabetic Foot Exam     Foot Exam/PCP Lali Lozoya MD  07/19/24  3b chronic kidney disease           MEDICAL DECISION MAKING:  Problem List Items Addressed This Visit       Type 2 diabetes mellitus with stage 3b chronic kidney disease, without long-term current use of insulin - Primary    Relevant Orders    Routine Foot Care     Other Visit Diagnoses       Dermatophytosis of nail        Relevant Medications    clotrimazole-betamethasone 1-0.05% (LOTRISONE) cream    Other Relevant Orders    Routine Foot Care    [place] or callus        Relevant Orders    Routine Foot Care    Encounter for diabetic foot exam                I counseled the patient on the patient's conditions, their implications and medical management.   Shoe inspection.     Continue good nutrition and blood sugar control to help prevent podiatric complications of diabetes.   Maintain proper foot hygiene.   Continue wearing proper shoe gear, daily foot inspections, never walking without protective shoe gear, never putting sharp instruments to feet.  Prescription Lotrisone.  General nail care measures for abnormal nails include:  Keeping nails trimmed short, but avoid overzealous trimming  Avoiding trauma   Avoiding contact irritants   Keeping nails dry (avoiding wet work)  Avoiding all nail cosmetics  Wearing shoes that fit well at the toe box.  Avoid tight shoes.       Routine Foot Care    Date/Time: 8/29/2024 3:00 PM    Performed by: Ledy Burton DPM  Authorized by: Ledy Burton DPM    Time out: Immediately prior to procedure a "time out" was called to verify the correct patient, procedure, equipment, support staff and site/side marked as required.    Hyperkeratotic Skin Lesions?: Yes    Number of trimmed lesions:  3  Location(s):  Right 1st Toe, Left 2nd Toe and Right 2nd Toe    Nail Care Type:  Debride  Location(s): All  (Left 1st Toe, Left 3rd Toe, Left 2nd Toe, Left 4th Toe, Left 5th Toe, Right 1st " Toe, Right 2nd Toe, Right 3rd Toe, Right 4th Toe and Right 5th Toe)  Patient tolerance:  Patient tolerated the procedure well with no immediate complications     With patient's permission, the toenails mentioned above were reduced and debrided using a nail nipper, removing offending nail and debris.   Utilizing a #15 scalpel, I trimmed the corns and calluses at the above mentioned location.      The patient will continue to monitor the areas daily, inspect the feet, wear protective shoe gear when ambulatory, and moisturizer to maintain skin integrity.    Diabetic Foot Exam (Foot Exam/PCP Lali Lozoya MD  07/19/24  3b chronic kidney disease)           HISTORY OF PRESENT ILLNESS:   The patient is a 69 y.o.  female  who presents for a diabetic foot exam.     Patient reports occasional presence of abnormal sensation to the feet .    History of diabetic foot ulcers: none   Shoes worn today:  athletic shoes.   Concerned about nails and calluses.  No self treatment yet.     The patient is under the Active Care of Lali Lozoya MD for the qualifying diagnosis of diabetes mellitus.   Last encounter on:  7/19/2024      Patient Active Problem List   Diagnosis    Polypharmacy    Schizoaffective disorder, depressive type    Bipolar affective disorder, current episode hypomanic    Tobacco abuse    Edema    COPD without exacerbation    Obesity, Class II, BMI 35-39.9, with comorbidity    Thyroid disorder    Hyperlipidemia    History of anorexia nervosa    History of bulimia nervosa    Acid reflux    Chronic rhinitis    Chronic constipation    Osteoarthritis    CLARI (obstructive sleep apnea)    Drug-induced parkinsonism    Claudication    Migraine headache    Neuropathy    Hypotension    Convulsion    Congenital hypothyroidism without goiter    Gastroesophageal reflux disease without esophagitis    Cognitive disorder    Ventral incisional hernia without obstruction or gangrene    Epigastric pain    Back muscle spasm    Pain     Insomnia disorder with non-sleep disorder mental comorbidity    Colon cancer screening, due for repeat 5/2019    Bipolar affective disorder, currently depressed, mild    Chronic kidney disease    HTN (hypertension)    Bipolar affective disorder, currently manic, mild    Anxiety    Involuntary jerky movements    Gait disorder    Type 2 diabetes mellitus with diabetic peripheral angiopathy without gangrene, without long-term current use of insulin    Malignant melanoma of face    Paranoid behavior    Type 2 diabetes mellitus with stage 3b chronic kidney disease, without long-term current use of insulin    Stage 3a chronic kidney disease         Current Outpatient Medications on File Prior to Visit   Medication Sig Dispense Refill    ACCU-CHEK GUIDE TEST STRIPS Strp 1 strip by Other route.      ACCU-CHEK SOFTCLIX LANCETS Misc 1 each by Other route.      acetaminophen (TYLENOL) 500 MG tablet Take 500 mg by mouth every 6 (six) hours as needed.      albuterol (ACCUNEB) 0.63 mg/3 mL Nebu Take 1 vial by nebulization every 4 (four) hours as needed.      albuterol (ACCUNEB) 0.63 mg/3 mL Nebu Inhale 0.63 mg into the lungs every 6 (six) hours as needed.      albuterol (PROAIR HFA) 90 mcg/actuation inhaler       albuterol (PROVENTIL) 2.5 mg /3 mL (0.083 %) nebulizer solution       albuterol (PROVENTIL/VENTOLIN HFA) 90 mcg/actuation inhaler INHALE 2 PUFFS BY MOUTH EVERY 6 HOURS AS NEEDED FOR WHEEZING AND SHORTNESS OF BREATH      albuterol-ipratropium (DUO-NEB) 2.5 mg-0.5 mg/3 mL nebulizer solution Take 3 mLs by nebulization every 6 (six) hours while awake. 810 mL 0    amitriptyline (ELAVIL) 25 MG tablet       amoxicillin (AMOXIL) 500 MG capsule       amoxicillin-clavulanate 875-125mg (AUGMENTIN) 875-125 mg per tablet       aspirin (ECOTRIN) 81 MG EC tablet Take 1 tablet (81 mg total) by mouth once daily. 90 tablet 0    atorvastatin (LIPITOR) 20 MG tablet Take 1 tablet (20 mg total) by mouth every evening. 30 tablet 11     atorvastatin (LIPITOR) 40 MG tablet Take 1 tablet (40 mg total) by mouth once daily. 90 tablet 0    azithromycin (Z-MARY) 250 MG tablet       baclofen (LIORESAL) 10 MG tablet TAKE TWO TABLETS BY MOUTH 4 TIMES DAILY      benztropine (COGENTIN) 1 MG tablet       blood-glucose meter Misc 1 each by Other route.      budesonide-formoterol 80-4.5 mcg (SYMBICORT) 80-4.5 mcg/actuation HFAA Inhale 2 puffs into the lungs once daily. Controller 10 g 3    busPIRone (BUSPAR) 5 MG Tab       carbidopa-levodopa  mg (SINEMET)  mg per tablet Take 1 tablet twice a day by oral route.      carbidopa-levodopa  mg (SINEMET)  mg per tablet Take 1 tablet twice a day by oral route.      carboxymethylcellulose (REFRESH PLUS) 0.5 % Dpet Apply 1 drop to eye.      chlorhexidine (PERIDEX) 0.12 % solution       chlorzoxazone (PARAFON FORTE) 500 mg Tab Take 1 tablet 4 times a day by oral route as needed.      dexAMETHasone (DECADRON) 1 MG Tab       diclofenac (CATAFLAM) 50 MG tablet Take 1 tablet 3 times a day by oral route as needed.      diclofenac sodium (VOLTAREN) 1 % Gel Apply topically once daily. 720 g 0    dicyclomine (BENTYL) 10 MG capsule       diphenhydrAMINE-acetaminophen (TYLENOL PM)  mg Tab Take 1 tablet by mouth nightly as needed.      diphenhydramine-calamine (CALOHIST) 1-8 % Lotn Apply topically 2 (two) times daily as needed.      divalproex ER (DEPAKOTE ER) 500 MG Tb24 24 hr tablet Give 1 tablet in the morning and 2 tablets at bedtime 270 tablet 3    docusate sodium (COLACE) 100 MG capsule Take 100 mg by mouth 2 (two) times daily.      DULoxetine (CYMBALTA) 30 MG capsule       DULoxetine (CYMBALTA) 60 MG capsule Take 1 capsule every day by oral route.      erythromycin (ROMYCIN) ophthalmic ointment       esomeprazole (NEXIUM) 40 MG capsule Take 1 capsule every day by oral route.      fluconazole (DIFLUCAN) 100 MG tablet       fluconazole (DIFLUCAN) 150 MG Tab       FLUoxetine 20 MG capsule Take 1  capsule (20 mg total) by mouth once daily. 90 capsule 3    fluticasone furoate-vilanteroL (BREO ELLIPTA) 200-25 mcg/dose DsDv diskus inhaler Inhale 1 puff into the lungs once daily. 1 each 0    fluticasone propionate (FLONASE) 50 mcg/actuation nasal spray  5 mL 0    fluticasone-salmeterol diskus inhaler 250-50 mcg Inhale 1 puff twice a day by inhalation route.      furosemide (LASIX) 20 MG tablet Take 1 tablet (20 mg total) by mouth daily as needed (for swelling / SOB). 30 tablet 3    gabapentin (NEURONTIN) 100 MG capsule Take 1 capsule (100 mg total) by mouth 3 (three) times daily. 270 capsule 0    gabapentin (NEURONTIN) 600 MG tablet Take 1 tablet (600 mg total) by mouth once daily. 270 tablet 0    guaiFENesin (MUCINEX) 600 mg 12 hr tablet Take 1,200 mg by mouth 2 (two) times daily as needed.      ketoprofen (ORUDIS) 75 MG capsule       levothyroxine (SYNTHROID, LEVOTHROID) 175 MCG tablet TAKE 1 TABLET BY MOUTH EVERY DAY 30 tablet 5    LIDOcaine (LIDODERM) 5 % Place 1 patch onto the skin.      linaCLOtide (LINZESS) 145 mcg Cap capsule       linaCLOtide (LINZESS) 290 mcg Cap capsule       loratadine (CLARITIN) 10 mg tablet Take 1 tablet by mouth once daily.      losartan (COZAAR) 25 MG tablet Take 1 tablet (25 mg total) by mouth once daily. 90 tablet 0    lubiprostone (AMITIZA) 24 MCG Cap       lurasidone (LATUDA) 80 mg Tab tablet       melatonin 2.5 mg Chew Take 2.5 mg by mouth.      melatonin 5 mg Chew Take by mouth.      metFORMIN (GLUCOPHAGE) 500 MG tablet Take 500 mg by mouth 2 (two) times daily with meals.      mirtazapine (REMERON) 30 MG tablet Take 1 tablet (30 mg total) by mouth every evening. 90 tablet 3    mometasone (NASONEX) 50 mcg/actuation nasal spray       multivitamin (THERAGRAN) per tablet Take 1 tablet by mouth once daily.      nicotine (NICODERM CQ) 7 mg/24 hr Place 1 patch onto the skin once daily. 90 patch 0    nitrofurantoin, macrocrystal-monohydrate, (MACROBID) 100 MG capsule       nystatin  (NYSTOP) powder       nystatin-triamcinolone (MYCOLOG II) cream       nystatin-triamcinolone (MYCOLOG) ointment       OLANZapine (ZYPREXA) 2.5 MG tablet       omega-3 acid ethyl esters (LOVAZA) 1 gram capsule 2 CAPUSULES BY MOUTH BISD      ondansetron (ZOFRAN) 4 MG tablet Take 1 tablet (4 mg total) by mouth every 12 (twelve) hours as needed for Nausea. 270 tablet 0    paliperidone (INVEGA) 6 MG TR24       paliperidone (INVEGA) 9 MG TR24       pantoprazole (PROTONIX) 40 MG tablet Take 1 tablet (40 mg total) by mouth once daily. 90 tablet 0    polyethylene glycol (GOLYTELY) 236-22.74-6.74 -5.86 gram suspension       polyethylene glycol (MOVIPREP) 100-7.5-2.691 gram solution       potassium chloride SA (K-DUR,KLOR-CON) 20 MEQ tablet Take 1 tablet by mouth once daily.      promethazine (PHENERGAN) 12.5 MG Tab       propranoloL (INDERAL) 40 MG tablet Take 1 tablet (40 mg total) by mouth 2 (two) times daily. 60 tablet 11    QUEtiapine (SEROQUEL) 200 MG Tab Take 1 tablet by mouth daily and 2 tablets at bedtime 270 tablet 3    QULIPTA 60 mg Tab Take 1 tablet by mouth once daily.      risperiDONE (RISPERDAL) 3 MG Tab       risperiDONE microspheres (RISPERDAL CONSTA) 25 mg/2 mL injection       rizatriptan (MAXALT) 10 MG tablet 1 tablet po at onset of headache.  May take 2nd tablet po 2 hours or more later in the day.  Maximum dose is 2 tablets per 24 hours.      sodium chloride (OCEAN) 0.65 % nasal spray 1 spray by Nasal route as needed.      spironolactone (ALDACTONE) 100 MG tablet       sumatriptan (IMITREX) 100 MG tablet 1 tablet po at onset of headache.  May take 2nd tablet po 4 hours or more later in the day.  Maximum dose is 2 tablets per 24 hours.      sumatriptan (IMITREX) 50 MG tablet       tiotropium (SPIRIVA WITH HANDIHALER) 18 mcg inhalation capsule       tiZANidine (ZANAFLEX) 4 MG tablet TAKE ONE TABLET BY MOUTH EVERY 8 HOURS AS NEEDED FOR MUSCLE SPASMS 90 tablet 11    topiramate (TOPAMAX) 50 MG tablet TAKE ONE  "TABLET BY MOUTH EVERY MORNING AND TWO AT BEDTIME      traMADoL (ULTRAM) 50 mg tablet       traZODone (DESYREL) 100 MG tablet       TRUE METRIX AIR GLUCOSE METER kit SMARTSI Kit(s) Via Meter Daily      zolpidem (AMBIEN) 10 mg Tab       [DISCONTINUED] clotrimazole-betamethasone 1-0.05% (LOTRISONE) cream       [DISCONTINUED] fluphenazine (PROLIXIN) 5 MG tablet 5 mg every evening.        No current facility-administered medications on file prior to visit.       Review of patient's allergies indicates:   Allergen Reactions    Nsaids (non-steroidal anti-inflammatory drug) Other (See Comments)     History of perforated duodenal ulcer    Penicillins Rash and Other (See Comments)     Yeast infections         ROS:  General ROS: negative  Respiratory ROS: no cough, shortness of breath, or wheezing  Cardiovascular ROS: no chest pain or dyspnea on exertion  Musculoskeletal ROS: negative  Dermatological ROS: negative      LAST HbA1c:   Lab Results   Component Value Date    HGBA1C 6.9 (H) 2024         EXAM:   Vitals:    24 1349   BP: 120/77   Pulse: 77   Weight: 109.1 kg (240 lb 8.4 oz)   Height: 5' 3" (1.6 m)       General: alert, no distress, cooperative    Vascular:   Dorsalis Pedis:  diminished     Posterior Tibial:  diminished  Capillary refill time:  3 seconds  Temperature of toes warm to touch  Edema:  1+ and pitting      Neurological:     Sharp touch:  normal  Light touch: normal  [place] Sign:  Absent  [place] Click:   Absent  [place]:  Absent deficits to sharp/dull, light touch or vibratory sensation feet, ten points tested.    Present paresthesias (Abnormal spontaneous sensations in feet)      Dermatological:   Skin: thin and atrophic  Hair growth:  decreased  Wounds/Ulcers:  Absent  Bruising:  Absent  Erythema:  Absent  Toenails:   elongated;  thickness:  thickened;   Yellowish in color,  with subungual debris.   Absent paronychia      Musculoskeletal:   Metatarsophalangeal range of motion:   diminished " range of motion  Subtalar joint range of motion: diminished range of motion  Ankle joint range of motion:  diminished range of motion

## 2024-08-30 ENCOUNTER — TELEPHONE (OUTPATIENT)
Dept: PSYCHIATRY | Facility: CLINIC | Age: 69
End: 2024-08-30
Payer: MEDICARE

## 2024-09-03 ENCOUNTER — OFFICE VISIT (OUTPATIENT)
Dept: PRIMARY CARE CLINIC | Facility: CLINIC | Age: 69
End: 2024-09-03
Payer: MEDICARE

## 2024-09-03 VITALS
DIASTOLIC BLOOD PRESSURE: 82 MMHG | OXYGEN SATURATION: 95 % | HEART RATE: 75 BPM | WEIGHT: 237.56 LBS | SYSTOLIC BLOOD PRESSURE: 136 MMHG | BODY MASS INDEX: 42.08 KG/M2

## 2024-09-03 DIAGNOSIS — I10 HYPERTENSION, UNSPECIFIED TYPE: ICD-10-CM

## 2024-09-03 DIAGNOSIS — F25.1 SCHIZOAFFECTIVE DISORDER, DEPRESSIVE TYPE: Primary | ICD-10-CM

## 2024-09-03 PROCEDURE — 3288F FALL RISK ASSESSMENT DOCD: CPT | Mod: HCNC,CPTII,S$GLB, | Performed by: INTERNAL MEDICINE

## 2024-09-03 PROCEDURE — 3044F HG A1C LEVEL LT 7.0%: CPT | Mod: HCNC,CPTII,S$GLB, | Performed by: INTERNAL MEDICINE

## 2024-09-03 PROCEDURE — 1159F MED LIST DOCD IN RCRD: CPT | Mod: HCNC,CPTII,S$GLB, | Performed by: INTERNAL MEDICINE

## 2024-09-03 PROCEDURE — 99215 OFFICE O/P EST HI 40 MIN: CPT | Mod: HCNC,S$GLB,, | Performed by: INTERNAL MEDICINE

## 2024-09-03 PROCEDURE — 1101F PT FALLS ASSESS-DOCD LE1/YR: CPT | Mod: HCNC,CPTII,S$GLB, | Performed by: INTERNAL MEDICINE

## 2024-09-03 PROCEDURE — 99999 PR PBB SHADOW E&M-EST. PATIENT-LVL IV: CPT | Mod: PBBFAC,HCNC,, | Performed by: INTERNAL MEDICINE

## 2024-09-03 PROCEDURE — 1157F ADVNC CARE PLAN IN RCRD: CPT | Mod: HCNC,CPTII,S$GLB, | Performed by: INTERNAL MEDICINE

## 2024-09-03 PROCEDURE — 3075F SYST BP GE 130 - 139MM HG: CPT | Mod: HCNC,CPTII,S$GLB, | Performed by: INTERNAL MEDICINE

## 2024-09-03 PROCEDURE — 1160F RVW MEDS BY RX/DR IN RCRD: CPT | Mod: HCNC,CPTII,S$GLB, | Performed by: INTERNAL MEDICINE

## 2024-09-03 PROCEDURE — 3079F DIAST BP 80-89 MM HG: CPT | Mod: HCNC,CPTII,S$GLB, | Performed by: INTERNAL MEDICINE

## 2024-09-03 PROCEDURE — 1126F AMNT PAIN NOTED NONE PRSNT: CPT | Mod: HCNC,CPTII,S$GLB, | Performed by: INTERNAL MEDICINE

## 2024-09-03 PROCEDURE — 4010F ACE/ARB THERAPY RXD/TAKEN: CPT | Mod: HCNC,CPTII,S$GLB, | Performed by: INTERNAL MEDICINE

## 2024-09-03 PROCEDURE — 3008F BODY MASS INDEX DOCD: CPT | Mod: HCNC,CPTII,S$GLB, | Performed by: INTERNAL MEDICINE

## 2024-09-03 NOTE — ASSESSMENT & PLAN NOTE
Patient will follow up with her Psychiatry and Neurology as needed   I discussed the situation with our clinic administration and the  and it is recommended that the patient might benefit from IOP, intense outpatient therapy for her bipolar and schizophrenia at [facility].  Patient was given instructions and information to call [facility] and register for outpatient therapy.  Patient has been there before in the past and is familiar with it.  She is happy that they will take her back and is excited about starting therapy there.  We also discussed boundaries about how many times she can call the clinic and explained to her that we deal with a high volume of phone calls and sometimes she will not be able to receive a call back on the same day if it is not an emergency situation.  Patient understands that.  She apologized for call in to many times and she feels like once she starts the outpatient therapy, she will feel much better.

## 2024-09-03 NOTE — PROGRESS NOTES
Clinic Note  9/3/2024      Subjective:       Patient ID:  Joseluis is a 69 y.o. female being seen for an established visit.    Chief Complaint: Shea Triplett is a 69 y.o. female with schizophrenia, bipolar disorder, migraines, reported hx of TBIs, asthma/COPD, HTN, DM2, CLARI, CAD, GERD with gastric ulcer, hypothyroidism, seizures, hx of DVT is here for follow-up  Her previous blood work discussed with her at the visit today   She has no complaints today.  She mentioned that she is unable to go out in the heat and smokes cigarettes because she will sweat profusely.  I counseled her to stay indoors as much as possible because of the excessive heat outside and to cut back on smoking.  It was brought to my attention by my clinic administration and staff that she calls the clinic often, sometimes more than 4 to 5 times a day wanting to discuss issues that is bothering her.  Sometimes they are medical related and sometimes they are not.  Her mood is unstable.  She sometimes appreciates when they call back and talk to her and sometimes gets angry for not being able to get back to her on the same day.  Patient does follow-up with her psychiatrist and is taking all her medications as prescribed but it is her psychiatric illness that causes her to excessively worry about minor issues.  I discussed the situation with our clinic administration and the  and it is recommended that the patient might benefit from IOP, intense outpatient therapy for her bipolar and schizophrenia at [facility].  Patient was given instructions and information to call [facility] and register for outpatient therapy.  Patient has been there before in the past and is familiar with it.  She is happy that they will take her back and is excited about starting therapy there.  We also discussed boundaries about how many times she can call the clinic and explained to her that we deal with a high volume of phone  calls and sometimes she will not be able to receive a call back on the same day if it is not an emergency situation.  Patient understands that.  She apologized for call in to many times and she feels like once she starts the outpatient therapy, she will feel much better.          Review of Systems   Constitutional:  Negative for chills and fever.   Eyes:  Negative for blurred vision.   Respiratory:  Negative for cough.    Cardiovascular:  Negative for chest pain and palpitations.   Gastrointestinal:  Negative for heartburn and nausea.   Musculoskeletal:  Positive for joint pain. Negative for myalgias.   Skin:  Negative for rash.   Neurological:  Negative for dizziness.   Psychiatric/Behavioral:  Positive for depression. The patient is nervous/anxious.        Medication List with Changes/Refills   Current Medications    ACCU-CHEK GUIDE TEST STRIPS STRP    1 strip by Other route.    ACCU-CHEK SOFTCLIX LANCETS MISC    1 each by Other route.    ACETAMINOPHEN (TYLENOL) 500 MG TABLET    Take 500 mg by mouth every 6 (six) hours as needed.    ALBUTEROL (ACCUNEB) 0.63 MG/3 ML NEBU    Take 1 vial by nebulization every 4 (four) hours as needed.    ALBUTEROL (ACCUNEB) 0.63 MG/3 ML NEBU    Inhale 0.63 mg into the lungs every 6 (six) hours as needed.    ALBUTEROL (PROAIR HFA) 90 MCG/ACTUATION INHALER        ALBUTEROL (PROVENTIL) 2.5 MG /3 ML (0.083 %) NEBULIZER SOLUTION        ALBUTEROL (PROVENTIL/VENTOLIN HFA) 90 MCG/ACTUATION INHALER    INHALE 2 PUFFS BY MOUTH EVERY 6 HOURS AS NEEDED FOR WHEEZING AND SHORTNESS OF BREATH    ALBUTEROL-IPRATROPIUM (DUO-NEB) 2.5 MG-0.5 MG/3 ML NEBULIZER SOLUTION    Take 3 mLs by nebulization every 6 (six) hours while awake.    AMITRIPTYLINE (ELAVIL) 25 MG TABLET        AMOXICILLIN (AMOXIL) 500 MG CAPSULE        AMOXICILLIN-CLAVULANATE 875-125MG (AUGMENTIN) 875-125 MG PER TABLET        ASPIRIN (ECOTRIN) 81 MG EC TABLET    Take 1 tablet (81 mg total) by mouth once daily.    ATORVASTATIN (LIPITOR) 20  MG TABLET    Take 1 tablet (20 mg total) by mouth every evening.    ATORVASTATIN (LIPITOR) 40 MG TABLET    Take 1 tablet (40 mg total) by mouth once daily.    AZITHROMYCIN (Z-MARY) 250 MG TABLET        BACLOFEN (LIORESAL) 10 MG TABLET    TAKE TWO TABLETS BY MOUTH 4 TIMES DAILY    BENZTROPINE (COGENTIN) 1 MG TABLET        BLOOD-GLUCOSE METER MISC    1 each by Other route.    BUDESONIDE-FORMOTEROL 80-4.5 MCG (SYMBICORT) 80-4.5 MCG/ACTUATION HFAA    Inhale 2 puffs into the lungs once daily. Controller    BUSPIRONE (BUSPAR) 5 MG TAB        CARBIDOPA-LEVODOPA  MG (SINEMET)  MG PER TABLET    Take 1 tablet twice a day by oral route.    CARBIDOPA-LEVODOPA  MG (SINEMET)  MG PER TABLET    Take 1 tablet twice a day by oral route.    CARBOXYMETHYLCELLULOSE (REFRESH PLUS) 0.5 % DPET    Apply 1 drop to eye.    CHLORHEXIDINE (PERIDEX) 0.12 % SOLUTION        CHLORZOXAZONE (PARAFON FORTE) 500 MG TAB    Take 1 tablet 4 times a day by oral route as needed.    CLOTRIMAZOLE-BETAMETHASONE 1-0.05% (LOTRISONE) CREAM    Apply topically once daily. To toenails    DEXAMETHASONE (DECADRON) 1 MG TAB        DICLOFENAC (CATAFLAM) 50 MG TABLET    Take 1 tablet 3 times a day by oral route as needed.    DICLOFENAC SODIUM (VOLTAREN) 1 % GEL    Apply topically once daily.    DICYCLOMINE (BENTYL) 10 MG CAPSULE        DIPHENHYDRAMINE-ACETAMINOPHEN (TYLENOL PM)  MG TAB    Take 1 tablet by mouth nightly as needed.    DIPHENHYDRAMINE-CALAMINE (CALOHIST) 1-8 % LOTN    Apply topically 2 (two) times daily as needed.    DIVALPROEX ER (DEPAKOTE ER) 500 MG TB24 24 HR TABLET    Give 1 tablet in the morning and 2 tablets at bedtime    DOCUSATE SODIUM (COLACE) 100 MG CAPSULE    Take 100 mg by mouth 2 (two) times daily.    DULOXETINE (CYMBALTA) 30 MG CAPSULE        DULOXETINE (CYMBALTA) 60 MG CAPSULE    Take 1 capsule every day by oral route.    ERYTHROMYCIN (ROMYCIN) OPHTHALMIC OINTMENT        ESOMEPRAZOLE (NEXIUM) 40 MG CAPSULE    Take  1 capsule every day by oral route.    FLUCONAZOLE (DIFLUCAN) 100 MG TABLET        FLUCONAZOLE (DIFLUCAN) 150 MG TAB        FLUOXETINE 20 MG CAPSULE    Take 1 capsule (20 mg total) by mouth once daily.    FLUTICASONE FUROATE-VILANTEROL (BREO ELLIPTA) 200-25 MCG/DOSE DSDV DISKUS INHALER    Inhale 1 puff into the lungs once daily.    FLUTICASONE PROPIONATE (FLONASE) 50 MCG/ACTUATION NASAL SPRAY        FLUTICASONE-SALMETEROL DISKUS INHALER 250-50 MCG    Inhale 1 puff twice a day by inhalation route.    FUROSEMIDE (LASIX) 20 MG TABLET    Take 1 tablet (20 mg total) by mouth daily as needed (for swelling / SOB).    GABAPENTIN (NEURONTIN) 100 MG CAPSULE    Take 1 capsule (100 mg total) by mouth 3 (three) times daily.    GABAPENTIN (NEURONTIN) 600 MG TABLET    Take 1 tablet (600 mg total) by mouth once daily.    GUAIFENESIN (MUCINEX) 600 MG 12 HR TABLET    Take 1,200 mg by mouth 2 (two) times daily as needed.    KETOPROFEN (ORUDIS) 75 MG CAPSULE        LEVOTHYROXINE (SYNTHROID, LEVOTHROID) 175 MCG TABLET    TAKE 1 TABLET BY MOUTH EVERY DAY    LIDOCAINE (LIDODERM) 5 %    Place 1 patch onto the skin.    LINACLOTIDE (LINZESS) 145 MCG CAP CAPSULE        LINACLOTIDE (LINZESS) 290 MCG CAP CAPSULE        LORATADINE (CLARITIN) 10 MG TABLET    Take 1 tablet by mouth once daily.    LOSARTAN (COZAAR) 25 MG TABLET    Take 1 tablet (25 mg total) by mouth once daily.    LUBIPROSTONE (AMITIZA) 24 MCG CAP        LURASIDONE (LATUDA) 80 MG TAB TABLET        MELATONIN 2.5 MG CHEW    Take 2.5 mg by mouth.    MELATONIN 5 MG CHEW    Take by mouth.    METFORMIN (GLUCOPHAGE) 500 MG TABLET    Take 500 mg by mouth 2 (two) times daily with meals.    MIRTAZAPINE (REMERON) 30 MG TABLET    Take 1 tablet (30 mg total) by mouth every evening.    MOMETASONE (NASONEX) 50 MCG/ACTUATION NASAL SPRAY        MULTIVITAMIN (THERAGRAN) PER TABLET    Take 1 tablet by mouth once daily.    NICOTINE (NICODERM CQ) 7 MG/24 HR    Place 1 patch onto the skin once daily.     NITROFURANTOIN, MACROCRYSTAL-MONOHYDRATE, (MACROBID) 100 MG CAPSULE        NYSTATIN (NYSTOP) POWDER        NYSTATIN-TRIAMCINOLONE (MYCOLOG II) CREAM        NYSTATIN-TRIAMCINOLONE (MYCOLOG) OINTMENT        OLANZAPINE (ZYPREXA) 2.5 MG TABLET        OMEGA-3 ACID ETHYL ESTERS (LOVAZA) 1 GRAM CAPSULE    2 CAPUSULES BY MOUTH BISD    ONDANSETRON (ZOFRAN) 4 MG TABLET    Take 1 tablet (4 mg total) by mouth every 12 (twelve) hours as needed for Nausea.    PALIPERIDONE (INVEGA) 6 MG TR24        PALIPERIDONE (INVEGA) 9 MG TR24        PANTOPRAZOLE (PROTONIX) 40 MG TABLET    Take 1 tablet (40 mg total) by mouth once daily.    POLYETHYLENE GLYCOL (GOLYTELY) 236-22.74-6.74 -5.86 GRAM SUSPENSION        POLYETHYLENE GLYCOL (MOVIPREP) 100-7.5-2.691 GRAM SOLUTION        POTASSIUM CHLORIDE SA (K-DUR,KLOR-CON) 20 MEQ TABLET    Take 1 tablet by mouth once daily.    PROMETHAZINE (PHENERGAN) 12.5 MG TAB        PROPRANOLOL (INDERAL) 40 MG TABLET    Take 1 tablet (40 mg total) by mouth 2 (two) times daily.    QUETIAPINE (SEROQUEL) 200 MG TAB    Take 1 tablet by mouth daily and 2 tablets at bedtime    QULIPTA 60 MG TAB    Take 1 tablet by mouth once daily.    RISPERIDONE (RISPERDAL) 3 MG TAB        RISPERIDONE MICROSPHERES (RISPERDAL CONSTA) 25 MG/2 ML INJECTION        RIZATRIPTAN (MAXALT) 10 MG TABLET    1 tablet po at onset of headache.  May take 2nd tablet po 2 hours or more later in the day.  Maximum dose is 2 tablets per 24 hours.    SODIUM CHLORIDE (OCEAN) 0.65 % NASAL SPRAY    1 spray by Nasal route as needed.    SPIRONOLACTONE (ALDACTONE) 100 MG TABLET        SUMATRIPTAN (IMITREX) 100 MG TABLET    1 tablet po at onset of headache.  May take 2nd tablet po 4 hours or more later in the day.  Maximum dose is 2 tablets per 24 hours.    SUMATRIPTAN (IMITREX) 50 MG TABLET        TIOTROPIUM (SPIRIVA WITH HANDIHALER) 18 MCG INHALATION CAPSULE        TIZANIDINE (ZANAFLEX) 4 MG TABLET    TAKE ONE TABLET BY MOUTH EVERY 8 HOURS AS NEEDED FOR MUSCLE  "SPASMS    TOPIRAMATE (TOPAMAX) 50 MG TABLET    TAKE ONE TABLET BY MOUTH EVERY MORNING AND TWO AT BEDTIME    TRAMADOL (ULTRAM) 50 MG TABLET        TRAZODONE (DESYREL) 100 MG TABLET        TRUE METRIX AIR GLUCOSE METER KIT    SMARTSI Kit(s) Via Meter Daily    ZOLPIDEM (AMBIEN) 10 MG TAB               Objective:      /82 (BP Location: Left arm, Patient Position: Sitting, BP Method: Medium (Manual))   Pulse 75   Wt 107.7 kg (237 lb 8.7 oz)   LMP  (LMP Unknown)   SpO2 95%   BMI 42.08 kg/m²   Estimated body mass index is 42.08 kg/m² as calculated from the following:    Height as of 24: 5' 3" (1.6 m).    Weight as of this encounter: 107.7 kg (237 lb 8.7 oz).  Physical Exam  Constitutional:       Appearance: Normal appearance. She is normal weight.   HENT:      Head: Normocephalic and atraumatic.      Nose: Nose normal.      Mouth/Throat:      Mouth: Mucous membranes are moist.   Eyes:      Pupils: Pupils are equal, round, and reactive to light.   Cardiovascular:      Rate and Rhythm: Normal rate and regular rhythm.      Pulses: Normal pulses.      Heart sounds: Normal heart sounds. No murmur heard.  Pulmonary:      Effort: Pulmonary effort is normal.      Breath sounds: Normal breath sounds.   Abdominal:      General: Bowel sounds are normal.      Palpations: Abdomen is soft.   Skin:     General: Skin is warm.   Neurological:      General: No focal deficit present.      Mental Status: She is alert and oriented to person, place, and time.      Cranial Nerves: No cranial nerve deficit.   Psychiatric:         Mood and Affect: Mood normal.           Assessment and Plan:         Problem List Items Addressed This Visit          Psychiatric    Schizoaffective disorder, depressive type - Primary    Overview     Followed by MD Prai         Current Assessment & Plan     Patient will follow up with her Psychiatry and Neurology as needed   I discussed the situation with our clinic administration and the social " worker and it is recommended that the patient might benefit from IOP, intense outpatient therapy for her bipolar and schizophrenia at [facility].  Patient was given instructions and information to call [facility] and register for outpatient therapy.  Patient has been there before in the past and is familiar with it.  She is happy that they will take her back and is excited about starting therapy there.  We also discussed boundaries about how many times she can call the clinic and explained to her that we deal with a high volume of phone calls and sometimes she will not be able to receive a call back on the same day if it is not an emergency situation.  Patient understands that.  She apologized for call in to many times and she feels like once she starts the outpatient therapy, she will feel much better.            Cardiac/Vascular    HTN (hypertension)    Current Assessment & Plan     Blood pressure at goal   Continue current medications            Follow Up:   No follow-ups on file.    Other Orders Placed This Visit:  No orders of the defined types were placed in this encounter.        Lali Lozoya

## 2024-09-05 ENCOUNTER — TELEPHONE (OUTPATIENT)
Dept: PRIMARY CARE CLINIC | Facility: CLINIC | Age: 69
End: 2024-09-05
Payer: MEDICARE

## 2024-09-05 NOTE — TELEPHONE ENCOUNTER
----- Message from Talia Anitakaren Thapa sent at 9/5/2024  3:02 PM CDT -----  Contact: [phone]  1MEDICALADVICE     Patient is calling for Medical Advice regarding:pt is calling she quiet the program [facility] she did not like the program she do not want spoiled your happiness at home.  Just wanted to know it was a very bad idea.    How long has patient had these symptoms:    Pharmacy name and phone#:    Patient wants a call back or thru [facility]:callback    Comments:    Please advise patient replies from provider may take up to 48 hours.

## 2024-09-06 ENCOUNTER — TELEPHONE (OUTPATIENT)
Dept: PRIMARY CARE CLINIC | Facility: CLINIC | Age: 69
End: 2024-09-06
Payer: MEDICARE

## 2024-09-06 NOTE — TELEPHONE ENCOUNTER
----- Message from Britt Munson sent at 9/6/2024 10:14 AM CDT -----  Contact: Self/ [phone]  2TESTRESULTS    Type: Test Results    What test was performed?Several (Cv Us abdomen , Ct Lung )    Who ordered the test?Dr Lozoya     When and where were the test performed? [facility]     Would you like a call back and or thru [facility]:call back     Comments:

## 2024-09-09 ENCOUNTER — TELEPHONE (OUTPATIENT)
Dept: PRIMARY CARE CLINIC | Facility: CLINIC | Age: 69
End: 2024-09-09
Payer: MEDICARE

## 2024-09-09 NOTE — TELEPHONE ENCOUNTER
----- Message from Daisy Page sent at 9/9/2024  1:37 PM CDT -----  Contact: [phone]  2TESTRESULTS    Type: Test Results    What test was performed? US and fecal culture    Who ordered the test? [facility]    When and where were the test performed?     Would you like a call back and or thru [facility]: call    Comments:

## 2024-09-10 ENCOUNTER — TELEPHONE (OUTPATIENT)
Dept: PRIMARY CARE CLINIC | Facility: CLINIC | Age: 69
End: 2024-09-10
Payer: MEDICARE

## 2024-09-10 NOTE — TELEPHONE ENCOUNTER
----- Message from Janet De Oliveira sent at 9/10/2024  3:11 PM CDT -----  Contact: Pt [phone]  Pt called and stated that she received a her Flu shot at [facility] yesterday on 9/10/24.    Thank you

## 2024-09-11 RX ORDER — DOCUSATE SODIUM 100 MG/1
100 CAPSULE, LIQUID FILLED ORAL 2 TIMES DAILY PRN
Qty: 180 CAPSULE | Refills: 0 | Status: SHIPPED | OUTPATIENT
Start: 2024-09-11

## 2024-09-13 ENCOUNTER — TELEPHONE (OUTPATIENT)
Dept: PSYCHIATRY | Facility: CLINIC | Age: 69
End: 2024-09-13
Payer: MEDICARE
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
## 2024-09-18 ENCOUNTER — TELEPHONE (OUTPATIENT)
Dept: PRIMARY CARE CLINIC | Facility: CLINIC | Age: 69
End: 2024-09-18
Payer: MEDICARE

## 2024-09-18 ENCOUNTER — HOSPITAL ENCOUNTER (EMERGENCY)
Facility: HOSPITAL | Age: 69
Discharge: HOME | End: 2024-09-18
Attending: EMERGENCY MEDICINE
Payer: MEDICARE

## 2024-09-18 VITALS
BODY MASS INDEX: 42.52 KG/M2 | TEMPERATURE: 98 F | HEIGHT: 63 IN | HEART RATE: 84 BPM | RESPIRATION RATE: 16 BRPM | OXYGEN SATURATION: 96 % | SYSTOLIC BLOOD PRESSURE: 112 MMHG | DIASTOLIC BLOOD PRESSURE: 69 MMHG | WEIGHT: 240 LBS

## 2024-09-18 DIAGNOSIS — H10.9 CONJUNCTIVITIS OF BOTH EYES, UNSPECIFIED CONJUNCTIVITIS TYPE: ICD-10-CM

## 2024-09-18 DIAGNOSIS — B35.3 TINEA PEDIS OF BOTH FEET: ICD-10-CM

## 2024-09-18 DIAGNOSIS — J02.9 SORE THROAT: Primary | ICD-10-CM

## 2024-09-18 PROCEDURE — 99281 EMR DPT VST MAYX REQ PHY/QHP: CPT | Mod: HCNC

## 2024-09-18 NOTE — ED NOTES
Patient arrived to ED from home via EMS with multiple medical complaints. Patient endorses bilateral eyes red and swollen. Has been using OTC eye drops. Has questions about how long to continue to use toenail fungus. Feels like she is just experiencing a whole body infection vs her Parkinson's disease is a acting up. Also endorses thrush to tongue and using OTC mouth wash. No thrush noted to tongue.   Patient hyper verbal.   Reports she does not want to be in her apartment all alone tonight.   Denies pain at this time.     APPEARANCE: Alert, oriented and in no acute distress.  CARDIAC: Normal rate and rhythm, no murmur heard.   PERIPHERAL VASCULAR: peripheral pulses present. Normal cap refill. No edema. Warm to touch.    RESPIRATORY:Normal rate and effort, breath sounds clear bilaterally throughout chest. Respirations are equal and unlabored no obvious signs of distress.  GASTRO: soft, bowel sounds normal, no tenderness, no abdominal distention.  MUSC: Full ROM. No bony tenderness or soft tissue tenderness. No obvious deformity.  SKIN: Skin is warm and dry, normal skin turgor, mucous membranes moist.  NEURO: 5/5 strength major flexors/extensors bilaterally. Sensory intact to light touch bilaterally. Hathaway coma scale: eyes open spontaneously-4, oriented & converses-5, obeys commands-6. No neurological abnormalities.   MENTAL STATUS: awake, alert and aware of environment.  EYE: PERRL, both eyes: pupils brisk and reactive to light. Normal size.  ENT: EARS: no obvious drainage. NOSE: no active bleeding.

## 2024-09-18 NOTE — DISCHARGE INSTRUCTIONS
Continue with your current treatments and follow up with your primary care doctor as soon as possible.    Thank you for coming in to see us today! It was nice to meet you, and I hope you feel better soon. Please feel free to return to the ER at any time should your symptoms get worse, or if you have different emergent concerns.    Our goal in the emergency department is to always give you outstanding care and exceptional service. You may receive a survey by mail or e-mail in the next week regarding your experience in our ED. We would greatly appreciate your completing and returning the survey. Your feedback provides us with a way to recognize our staff who give very good care and it helps us learn how to improve when your experience was below our aspiration of excellence.       Sincerely,    Luis Felipe Vogt MD  Medical Director, Emergency Department  [facility]

## 2024-09-18 NOTE — TELEPHONE ENCOUNTER
----- Message from Daisy Page sent at 9/18/2024  1:35 PM CDT -----  Contact: [phone]  Patient states she is at the emergency( [facility])  patient states she has fungal infection on her toes, thrush in her mouth. Please call and advise.    Thank you and have a great day.

## 2024-09-19 ENCOUNTER — PATIENT OUTREACH (OUTPATIENT)
Dept: EMERGENCY MEDICINE | Facility: HOSPITAL | Age: 69
End: 2024-09-19
Payer: MEDICARE

## 2024-09-19 ENCOUNTER — TELEPHONE (OUTPATIENT)
Dept: NEUROLOGY | Facility: CLINIC | Age: 69
End: 2024-09-19
Payer: MEDICARE

## 2024-09-19 ENCOUNTER — TELEPHONE (OUTPATIENT)
Dept: OPHTHALMOLOGY | Facility: CLINIC | Age: 69
End: 2024-09-19
Payer: MEDICARE

## 2024-09-19 ENCOUNTER — TELEPHONE (OUTPATIENT)
Dept: PRIMARY CARE CLINIC | Facility: CLINIC | Age: 69
End: 2024-09-19
Payer: MEDICARE

## 2024-09-19 NOTE — TELEPHONE ENCOUNTER
----- Message from Charlene Drake MA sent at 9/19/2024  2:40 PM CDT -----  Seen in ED  ----- Message -----  From: Mary Plascencia  Sent: 9/19/2024   1:57 PM CDT  To: [facility] Clinical Support Staff    Type:  Sooner Apoointment Request    Caller is requesting a sooner appointment.  Caller declined first available appointment listed below.  Caller will not accept being placed on the waitlist and is requesting a message be sent to doctor.  Name of Caller: Pt  When is the first available appointment?  Symptoms: eye infection  Would the patient rather a call back or a response via [facility]? Call  Best Call Back Number: [phone]  Additional Information: Pt would like to speak to someone in office for an appt

## 2024-09-19 NOTE — TELEPHONE ENCOUNTER
----- Message from Mary Plascencia sent at 9/19/2024  1:54 PM CDT -----  Type:  Sooner Apoointment Request    Caller is requesting a sooner appointment.  Caller declined first available appointment listed below.  Caller will not accept being placed on the waitlist and is requesting a message be sent to doctor.  Name of Caller: Pt  When is the first available appointment?  Symptoms: ECA  Would the patient rather a call back or a response via [facility]? Call  Best Call Back Number:  [phone]  Additional Information:

## 2024-09-19 NOTE — TELEPHONE ENCOUNTER
----- Message from Daisy Page sent at 9/19/2024 10:25 AM CDT -----  Contact: [phone]  Patient states she is ECA with Wan Wilkes MD, patient states thank you for your help.

## 2024-09-19 NOTE — TELEPHONE ENCOUNTER
Spoke w/patient in regards to triage message. Advised patient to continue using Pataday and artificals tears for the next week. Pt states OU feels a little better. Encourged her wash hands daily. Pt understood. kena    Post-Care Instructions: Liquid Nitrogen is an extremely cold liquid. When applied to the skin, it causes rapid freezing, producing instantaneous frostbite. Immediately following treatment you will notice redness, swelling, and maybe mild itching or tingling. This sensation will subside in a few minutes. A superficial blister may form and scabbing will follow. If extensive freezing has been done, a blister may develop which may be large and filled with blood. If the blister is uncomfortable, it is okay to puncture it with a sterile (alcohol-soaked) needle, leaving the blister top in place.\\n\\nWash the area daily with soap and water and gently pat dry. Apply a thin layer of Vaseline or Aquaphor twice daily to the wound. You may choose to use a bandage or leave it open. For several days, there may be weeping or oozing of clear or blood-tinged fluid from the treatment area. A crust or scab usually forms within 1-2 weeks. The crust should fall off on its own in 2-4 weeks. There is occasionally a loss of pigment from the area, which generally resolves in 3-4 months.\\n\\nOccasionally some mild pain will occur in the treated areas, which can be controlled with Tylenol Extra Strength. If an area near the eyelid has been treated, swelling may occur. In general, cryotherapy causes minimal scar formation and only rare post-operative infections.\\n\\nWhen should I call the office?\\nIf you see redness developing/spreading, increasing pain, drainage of pus, spreading of the incision, or have a question, call the office at [phone]. If you are calling after the office has closed, please call [phone], listen to the message in its entirety and when prompted, press \"1\" to be connected to our answering service. Post-Care Instructions: Liquid Nitrogen is an extremely cold liquid. When applied to the skin, it causes rapid freezing, producing instantaneous frostbite. Immediately following treatment you will notice redness, swelling, and maybe mild itching or tingling. This sensation will subside in a few minutes. A superficial blister may form and scabbing will follow. If extensive freezing has been done, a blister may develop which may be large and filled with blood. If the blister is uncomfortable, it is okay to puncture it with a sterile (alcohol-soaked) needle, leaving the blister top in place.\\n\\nWash the area daily with soap and water and gently pat dry. Apply a thin layer of Vaseline or Aquaphor twice daily to the wound. You may choose to use a bandage or leave it open. For several days, there may be weeping or oozing of clear or blood-tinged fluid from the treatment area. A crust or scab usually forms within 1-2 weeks. The crust should fall off on its own in 2-4 weeks. There is occasionally a loss of pigment from the area, which generally resolves in 3-4 months.\\n\\nOccasionally some mild pain will occur in the treated areas, which can be controlled with Tylenol Extra Strength. If an area near the eyelid has been treated, swelling may occur. In general, cryotherapy causes minimal scar formation and only rare post-operative infections.\\n\\nWhen should I call the office?\\nIf you see redness developing/spreading, increasing pain, drainage of pus, spreading of the incision, or have a question, call the office at [phone]. If you are calling after the office has closed, please call [phone], listen to the message in its entirety and when prompted, press \"1\" to be connected to our answering service.

## 2024-09-24 ENCOUNTER — TELEPHONE (OUTPATIENT)
Dept: PODIATRY | Facility: CLINIC | Age: 69
End: 2024-09-24
Payer: MEDICARE

## 2024-09-24 NOTE — TELEPHONE ENCOUNTER
Staff tried to contact patient, no answer, left a message on voice mail informing patient that Dr. Burton called in the :clotrimazole-betamethasone 1-0.05% (LOTRISONE) cream medication on 8/29/24 with one refill to contact her pharmacy.

## 2024-09-24 NOTE — TELEPHONE ENCOUNTER
----- Message from Fortunato Hendricks sent at 9/24/2024  1:14 PM CDT -----  Regarding: Refill  Contact: [phone]  Type:  RX Refill Request    Who Called: Joseluis Triplett     Refill or New Rx:Refill     RX Name and Strength:clotrimazole-betamethasone 1-0.05% (LOTRISONE) cream    Preferred Pharmacy with phone number:    [facility] - [facility] [address]  [address] [facility] [address]  Phone: [phone] Fax: [phone]        Local or Mail Order:Mail     Ordering Provider:Dr. Burton     Would the patient rather a call back or a response via [facility]? Call     Best Call Back Number:  [phone]

## 2024-09-26 ENCOUNTER — PATIENT OUTREACH (OUTPATIENT)
Dept: EMERGENCY MEDICINE | Facility: HOSPITAL | Age: 69
End: 2024-09-26
Payer: MEDICARE
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
## 2024-09-27 ENCOUNTER — TELEPHONE (OUTPATIENT)
Dept: INTERNAL MEDICINE | Facility: CLINIC | Age: 69
End: 2024-09-27
Payer: MEDICARE

## 2024-09-27 ENCOUNTER — OFFICE VISIT (OUTPATIENT)
Dept: INTERNAL MEDICINE | Facility: CLINIC | Age: 69
End: 2024-09-27
Payer: MEDICARE

## 2024-09-27 VITALS
DIASTOLIC BLOOD PRESSURE: 70 MMHG | WEIGHT: 248.69 LBS | SYSTOLIC BLOOD PRESSURE: 130 MMHG | BODY MASS INDEX: 44.05 KG/M2

## 2024-09-27 DIAGNOSIS — G43.109 MIGRAINE WITH AURA AND WITHOUT STATUS MIGRAINOSUS, NOT INTRACTABLE: ICD-10-CM

## 2024-09-27 DIAGNOSIS — F25.1 SCHIZOAFFECTIVE DISORDER, DEPRESSIVE TYPE: Primary | ICD-10-CM

## 2024-09-27 PROCEDURE — 99999 PR PBB SHADOW E&M-EST. PATIENT-LVL III: CPT | Mod: PBBFAC,HCNC,GC,
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
## 2024-09-27 NOTE — ASSESSMENT & PLAN NOTE
Patient follows a psychiatrist. Her speech is tangential. She worries excessively about minor issues. She was recently referred to [facility] but refused to go through with it. She is interested in a nursing facility to take care of her needs and health problems.

## 2024-09-30 ENCOUNTER — TELEPHONE (OUTPATIENT)
Dept: INTERNAL MEDICINE | Facility: CLINIC | Age: 69
End: 2024-09-30
Payer: MEDICARE

## 2024-09-30 DIAGNOSIS — N18.31 STAGE 3A CHRONIC KIDNEY DISEASE: Primary | ICD-10-CM

## 2024-09-30 NOTE — TELEPHONE ENCOUNTER
----- Message from Sheri sent at 9/30/2024  8:13 AM CDT -----  Contact: Patient   [phone]  [email or web address]     Patient is calling for Medical Advice regarding: needs a call back concerning the nephrology - please call    How long has patient had these symptoms:na    Pharmacy name and phone#:na    Patient wants a call back or thru [facility]:    Comments:    Please advise patient replies from provider may take up to 48 hours.

## 2024-09-30 NOTE — TELEPHONE ENCOUNTER
Requesting a referral to nephrology  Has stage 3 kidney disease  Also would like a call back   Please call [phone]

## 2024-10-01 ENCOUNTER — PATIENT OUTREACH (OUTPATIENT)
Dept: ADMINISTRATIVE | Facility: OTHER | Age: 69
End: 2024-10-01
Payer: MEDICARE

## 2024-10-01 NOTE — PROGRESS NOTES
CHW - Initial Contact    Adelita Plascencia Community Health Worker completed the Social Determinant of Health questionnaire with patient via telephone today.    Pt identified barriers of most importance are: Placement , food insecurity and emotional support. CHW mailed Pt an application for Long Term Care from the [place] Department of Adult and Aging Services.  CHW provided Pt with telephone number for DSNAP benefits for food.  CHW faxed referral to [employer] for Case Management from a .    Referrals to community agencies completed with patient/caregiver consent outside of [facility] include:  [place] [facility] and the [place] [facility].    Referrals were put through [facility] - no:   Other information discussed the patient needs / wants help with: None at the time.      Follow-up Outreach - Due: 10/10/2024

## 2024-10-01 NOTE — TELEPHONE ENCOUNTER
Call patient back requesting a nephrology appointment. She was last seen by Dr. Andrade in 2021. Order a nephrology followup

## 2024-10-02 ENCOUNTER — TELEPHONE (OUTPATIENT)
Dept: NEPHROLOGY | Facility: CLINIC | Age: 69
End: 2024-10-02
Payer: MEDICARE

## 2024-10-02 DIAGNOSIS — N18.31 STAGE 3A CHRONIC KIDNEY DISEASE: Primary | ICD-10-CM

## 2024-10-02 NOTE — TELEPHONE ENCOUNTER
"----- Message from Mary sent at 10/2/2024  8:44 AM CDT -----  Regarding: pt advice  Contact: [phone]  .Name Of Caller: Self     Contact Preference?: [phone]     What is the nature of the call?: Requesting orders for lab work. Pls call      Additional Notes:  "Thank you for all that you do for our patients"  "

## 2024-10-03 ENCOUNTER — TELEPHONE (OUTPATIENT)
Dept: INTERNAL MEDICINE | Facility: CLINIC | Age: 69
End: 2024-10-03
Payer: MEDICARE

## 2024-10-03 NOTE — TELEPHONE ENCOUNTER
----- Message from Reyna sent at 10/3/2024  1:48 PM CDT -----  Contact: Pt  [phone]  Patient wanted to update the doctor with a bit more history. As a young girl she was given prolixin shots at the [facility]. She stated that as a teen she was hypoglycemic. Today on her way home, she felt a sharp pain in her left ear.     She stated this was just information that she wanted to relay.     Thank you

## 2024-10-03 NOTE — TELEPHONE ENCOUNTER
----- Message from Britt sent at 10/3/2024 10:51 AM CDT -----  Contact: Self/ [phone]  1MEDICALADVICE     Patient is calling for Medical Advice regarding:update on condition     Patient wants a call back or thru [facility]: call back if needed     Comments: pt said that she is calling in regards to needing to let the doctor know that she has fought off the eye infection, the candida in her mouth and foot infection  and has no need to go to a nursing home ,pt also stated that she is mailing the doctor a letter pt also wants to know if it is normal for her to not have regular bowel movements . Please advise     Please advise patient replies from provider may take up to 48 hours.
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
## 2024-10-04 ENCOUNTER — TELEPHONE (OUTPATIENT)
Dept: NEPHROLOGY | Facility: CLINIC | Age: 69
End: 2024-10-04
Payer: MEDICARE

## 2024-10-04 NOTE — TELEPHONE ENCOUNTER
----- Message from Rajani sent at 10/4/2024  9:05 AM CDT -----  Contact: [phone]  Patient called, want to inform him that the eye infection still there, has been told by the optical division to use warm compress morning and night but is not getting better, and the nurse that she was suppose to call her has not, and [facility]. Final was able to go to the bathroom for number 2, the stool is soft, and not a lot. Would like a call back.

## 2024-10-04 NOTE — TELEPHONE ENCOUNTER
----- Message from Kathy sent at 10/4/2024  2:17 PM CDT -----  Contact: PT  [phone]  Would like to receive medical advice.     Would they like a call back or a response via [facility]:  call back    Additional information: Joseluis is calling to get [facility] this afternoon. Joseluis states she has this big wart on her brittni that she is trying to keep clean and dry, pt states she is still biting her tongue due to the thrush. Please call Joseluis back for advice    Home number    [phone]

## 2024-10-04 NOTE — TELEPHONE ENCOUNTER
----- Message from Vicki sent at 10/4/2024  4:55 PM CDT -----  Contact: [phone]  1MEDICALADVICE     Patient is calling for Medical Advice regarding: Patient is calling to notify that nothing has changed since her last visit She still feel isolated alone. Patient Decline to talk to our nurse on call and just request to send a message to her provider.     How long has patient had these symptoms: n/a    Pharmacy name and phone#: n/a    Patient wants a call back or thru [facility]: call back     Comments:    Wan Rowe MD    Please advise patient replies from provider may take up to 48 hours.

## 2024-10-04 NOTE — TELEPHONE ENCOUNTER
----- Message from Deborah sent at 10/3/2024  8:21 AM CDT -----  Regarding: call back  Contact: [phone]  Pt calling in returning call  to schedule apt for labs

## 2024-10-07 ENCOUNTER — PATIENT OUTREACH (OUTPATIENT)
Dept: ADMINISTRATIVE | Facility: OTHER | Age: 69
End: 2024-10-07
Payer: MEDICARE

## 2024-10-07 NOTE — TELEPHONE ENCOUNTER
Attempted to call patient once last week and twice this morning with no response. Advised patient to download the [facility] to contact me. Otherwise will attempt again tomorrow in the am.

## 2024-10-07 NOTE — PROGRESS NOTES
CHW - Case Closure     Adelita Plascencia, Community Health  Worker spoke to patient via telephone today.   Pt/Caregiver reported: Ms. Triplett stated that the received the information that CHW mailed her about Long Term Care; but has decided to remain in her apartment.    CHW  denied any additional needs at this time and agrees with episode closure at this time.  Adelita Plascencia, Community Health Worker, provided patient with Community Health Worker's contact information and encouraged him to contact this Community Health Worker if additional needs arise.

## 2024-10-08 ENCOUNTER — TELEPHONE (OUTPATIENT)
Dept: INTERNAL MEDICINE | Facility: CLINIC | Age: 69
End: 2024-10-08
Payer: MEDICARE
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
## 2024-10-09 NOTE — TELEPHONE ENCOUNTER
----- Message from Talia sent at 10/9/2024  9:01 AM CDT -----  Contact: [phone] .1MEDICALADVICE     Patient is calling for Medical Advice regarding:pt is calling she caught a cold overnight every time she blows her nose her right eardrum is blowing out. It hurts. Just wanted to let you know and she has several letters coming to you thru the mail over to the office.    How long has patient had these symptoms:    Pharmacy name and phone#:.    Patient wants a call back or thru [facility]:callback    Comments:    Please advise patient replies from provider may take up to 48 hours.

## 2024-10-10 ENCOUNTER — TELEPHONE (OUTPATIENT)
Dept: INTERNAL MEDICINE | Facility: CLINIC | Age: 69
End: 2024-10-10
Payer: MEDICARE

## 2024-10-10 ENCOUNTER — OFFICE VISIT (OUTPATIENT)
Dept: PODIATRY | Facility: CLINIC | Age: 69
End: 2024-10-10
Payer: MEDICARE

## 2024-10-10 ENCOUNTER — TELEPHONE (OUTPATIENT)
Dept: CARDIOLOGY | Facility: CLINIC | Age: 69
End: 2024-10-10
Payer: MEDICARE

## 2024-10-10 VITALS
HEART RATE: 83 BPM | WEIGHT: 248.69 LBS | BODY MASS INDEX: 44.06 KG/M2 | HEIGHT: 63 IN | DIASTOLIC BLOOD PRESSURE: 72 MMHG | SYSTOLIC BLOOD PRESSURE: 113 MMHG

## 2024-10-10 DIAGNOSIS — L84 CORN OR CALLUS: ICD-10-CM

## 2024-10-10 DIAGNOSIS — E11.22 TYPE 2 DIABETES MELLITUS WITH STAGE 3B CHRONIC KIDNEY DISEASE, WITHOUT LONG-TERM CURRENT USE OF INSULIN: ICD-10-CM

## 2024-10-10 DIAGNOSIS — B35.1 DERMATOPHYTOSIS OF NAIL: Primary | Chronic | ICD-10-CM

## 2024-10-10 DIAGNOSIS — N18.32 TYPE 2 DIABETES MELLITUS WITH STAGE 3B CHRONIC KIDNEY DISEASE, WITHOUT LONG-TERM CURRENT USE OF INSULIN: ICD-10-CM

## 2024-10-10 PROCEDURE — 3044F HG A1C LEVEL LT 7.0%: CPT | Mod: HCNC,CPTII,S$GLB, | Performed by: PODIATRIST

## 2024-10-10 PROCEDURE — 3008F BODY MASS INDEX DOCD: CPT | Mod: HCNC,CPTII,S$GLB, | Performed by: PODIATRIST

## 2024-10-10 PROCEDURE — 3078F DIAST BP <80 MM HG: CPT | Mod: HCNC,CPTII,S$GLB, | Performed by: PODIATRIST

## 2024-10-10 PROCEDURE — 1101F PT FALLS ASSESS-DOCD LE1/YR: CPT | Mod: HCNC,CPTII,S$GLB, | Performed by: PODIATRIST

## 2024-10-10 PROCEDURE — 3074F SYST BP LT 130 MM HG: CPT | Mod: HCNC,CPTII,S$GLB, | Performed by: PODIATRIST

## 2024-10-10 PROCEDURE — 1157F ADVNC CARE PLAN IN RCRD: CPT | Mod: HCNC,CPTII,S$GLB, | Performed by: PODIATRIST

## 2024-10-10 PROCEDURE — 99999 PR PBB SHADOW E&M-EST. PATIENT-LVL V: CPT | Mod: PBBFAC,HCNC,, | Performed by: PODIATRIST

## 2024-10-10 PROCEDURE — 4010F ACE/ARB THERAPY RXD/TAKEN: CPT | Mod: HCNC,CPTII,S$GLB, | Performed by: PODIATRIST

## 2024-10-10 PROCEDURE — 3288F FALL RISK ASSESSMENT DOCD: CPT | Mod: HCNC,CPTII,S$GLB, | Performed by: PODIATRIST

## 2024-10-10 PROCEDURE — 1159F MED LIST DOCD IN RCRD: CPT | Mod: HCNC,CPTII,S$GLB, | Performed by: PODIATRIST

## 2024-10-10 PROCEDURE — 1160F RVW MEDS BY RX/DR IN RCRD: CPT | Mod: HCNC,CPTII,S$GLB, | Performed by: PODIATRIST

## 2024-10-10 PROCEDURE — 99213 OFFICE O/P EST LOW 20 MIN: CPT | Mod: 25,HCNC,S$GLB, | Performed by: PODIATRIST

## 2024-10-10 PROCEDURE — 11721 DEBRIDE NAIL 6 OR MORE: CPT | Mod: Q9,HCNC,S$GLB, | Performed by: PODIATRIST

## 2024-10-10 PROCEDURE — 1126F AMNT PAIN NOTED NONE PRSNT: CPT | Mod: HCNC,CPTII,S$GLB, | Performed by: PODIATRIST

## 2024-10-10 NOTE — PROGRESS NOTES
"Chief Complaint   Patient presents with    Diabetic Foot Exam     Nail Care/PCP Wan Wilkes MD  09/27/24 (Stage 3a chronic kidney disease)           MEDICAL DECISION MAKING:  Problem List Items Addressed This Visit       Type 2 diabetes mellitus with stage 3b chronic kidney disease, without long-term current use of insulin    Relevant Orders    Routine Foot Care     Other Visit Diagnoses       Dermatophytosis of nail  (Chronic)   -  Primary    Relevant Medications    clotrimazole-betamethasone 1-0.05% (LOTRISONE) cream    Other Relevant Orders    Routine Foot Care    Waldorf or callus                  I counseled the patient on the patient's conditions, their implications and medical management.   Prescription Lotrisone.   Shoe inspection.     Continue good nutrition and blood sugar control to help prevent podiatric complications of diabetes.   Maintain proper foot hygiene.   Continue wearing proper shoe gear, daily foot inspections, never walking without protective shoe gear, never putting sharp instruments to feet.  Prescription Lotrisone.  General nail care measures for abnormal nails include:  Keeping nails trimmed short, but avoid overzealous trimming  Avoiding trauma   Avoiding contact irritants   Keeping nails dry (avoiding wet work)  Avoiding all nail cosmetics  Wearing shoes that fit well at the toe box.  Avoid tight shoes.       Routine Foot Care    Date/Time: 10/10/2024 2:30 PM    Performed by: Ledy Burton DPM  Authorized by: Ledy Burton DPM    Time out: Immediately prior to procedure a "time out" was called to verify the correct patient, procedure, equipment, support staff and site/side marked as required.    Consent Done?:  Yes (Verbal)    Nail Care Type:  Debride  Location(s): All  (Left 1st Toe, Left 3rd Toe, Left 2nd Toe, Left 4th Toe, Left 5th Toe, Right 1st Toe, Right 2nd Toe, Right 3rd Toe, Right 4th Toe and Right 5th Toe)  Patient tolerance:  Patient tolerated the procedure well with no " immediate complications     With patient's permission, the toenails mentioned above were reduced and debrided using a nail nipper, removing offending nail and debris. The patient will continue to monitor the areas daily, inspect the feet, wear protective shoe gear when ambulatory, and moisturizer to maintain skin integrity.     Diabetic Foot Exam (Nail Care/PCP Wan Wilkes MD  09/27/24 (Stage 3a chronic kidney disease))           HISTORY OF PRESENT ILLNESS:   The patient is a 69 y.o.  female  who presents for a diabetic foot exam.     Patient reports occasional presence of abnormal sensation to the feet .    History of diabetic foot ulcers: none   Shoes worn today:  athletic shoes.   Concerned about nails and calluses.      The patient is under the Active Care of Wan Wilkes MD for the qualifying diagnosis of diabetes mellitus.   Last encounter on:  9/27/2024      Patient Active Problem List   Diagnosis    Polypharmacy    Schizoaffective disorder, depressive type    Bipolar affective disorder, current episode hypomanic    Tobacco abuse    Edema    COPD without exacerbation    Obesity, Class II, BMI 35-39.9, with comorbidity    Thyroid disorder    Hyperlipidemia    History of anorexia nervosa    History of bulimia nervosa    Acid reflux    Chronic rhinitis    Chronic constipation    Osteoarthritis    CLARI (obstructive sleep apnea)    Drug-induced parkinsonism    Claudication    Migraine headache    Neuropathy    Hypotension    Convulsion    Congenital hypothyroidism without goiter    Gastroesophageal reflux disease without esophagitis    Cognitive disorder    Ventral incisional hernia without obstruction or gangrene    Epigastric pain    Back muscle spasm    Pain    Insomnia disorder with non-sleep disorder mental comorbidity    Colon cancer screening, due for repeat 5/2019    Bipolar affective disorder, currently depressed, mild    Chronic kidney disease    HTN (hypertension)    Bipolar affective  disorder, currently manic, mild    Anxiety    Involuntary jerky movements    Gait disorder    Type 2 diabetes mellitus with diabetic peripheral angiopathy without gangrene, without long-term current use of insulin    Malignant melanoma of face    Paranoid behavior    Type 2 diabetes mellitus with stage 3b chronic kidney disease, without long-term current use of insulin    Stage 3a chronic kidney disease         Current Outpatient Medications on File Prior to Visit   Medication Sig Dispense Refill    ACCU-CHEK GUIDE TEST STRIPS Strp 1 strip by Other route.      ACCU-CHEK SOFTCLIX LANCETS Misc 1 each by Other route.      acetaminophen (TYLENOL) 500 MG tablet Take 500 mg by mouth every 6 (six) hours as needed.      albuterol (ACCUNEB) 0.63 mg/3 mL Nebu Take 1 vial by nebulization every 4 (four) hours as needed.      albuterol (ACCUNEB) 0.63 mg/3 mL Nebu Inhale 0.63 mg into the lungs every 6 (six) hours as needed.      albuterol (PROAIR HFA) 90 mcg/actuation inhaler       albuterol (PROVENTIL) 2.5 mg /3 mL (0.083 %) nebulizer solution       albuterol (PROVENTIL/VENTOLIN HFA) 90 mcg/actuation inhaler INHALE 2 PUFFS BY MOUTH EVERY 6 HOURS AS NEEDED FOR WHEEZING AND SHORTNESS OF BREATH      albuterol-ipratropium (DUO-NEB) 2.5 mg-0.5 mg/3 mL nebulizer solution Take 3 mLs by nebulization every 6 (six) hours while awake. 810 mL 0    amitriptyline (ELAVIL) 25 MG tablet       amoxicillin (AMOXIL) 500 MG capsule       amoxicillin-clavulanate 875-125mg (AUGMENTIN) 875-125 mg per tablet       aspirin (ECOTRIN) 81 MG EC tablet Take 1 tablet (81 mg total) by mouth once daily. 90 tablet 0    atorvastatin (LIPITOR) 20 MG tablet Take 1 tablet (20 mg total) by mouth every evening. 30 tablet 11    atorvastatin (LIPITOR) 40 MG tablet Take 1 tablet (40 mg total) by mouth once daily. 90 tablet 0    azithromycin (Z-MARY) 250 MG tablet       baclofen (LIORESAL) 10 MG tablet TAKE TWO TABLETS BY MOUTH 4 TIMES DAILY      benztropine (COGENTIN) 1  MG tablet       blood-glucose meter Misc 1 each by Other route.      budesonide-formoterol 80-4.5 mcg (SYMBICORT) 80-4.5 mcg/actuation HFAA Inhale 2 puffs into the lungs once daily. Controller 10 g 3    busPIRone (BUSPAR) 5 MG Tab       carbidopa-levodopa  mg (SINEMET)  mg per tablet Take 1 tablet twice a day by oral route.      carbidopa-levodopa  mg (SINEMET)  mg per tablet Take 1 tablet twice a day by oral route.      carboxymethylcellulose (REFRESH PLUS) 0.5 % Dpet Apply 1 drop to eye.      chlorhexidine (PERIDEX) 0.12 % solution       chlorzoxazone (PARAFON FORTE) 500 mg Tab Take 1 tablet 4 times a day by oral route as needed.      dexAMETHasone (DECADRON) 1 MG Tab       diclofenac (CATAFLAM) 50 MG tablet Take 1 tablet 3 times a day by oral route as needed.      diclofenac sodium (VOLTAREN) 1 % Gel Apply topically once daily. 720 g 0    dicyclomine (BENTYL) 10 MG capsule       diphenhydrAMINE-acetaminophen (TYLENOL PM)  mg Tab Take 1 tablet by mouth nightly as needed.      diphenhydramine-calamine (CALOHIST) 1-8 % Lotn Apply topically 2 (two) times daily as needed.      divalproex ER (DEPAKOTE ER) 500 MG Tb24 24 hr tablet Give 1 tablet in the morning and 2 tablets at bedtime 270 tablet 3    docusate sodium (STOOL SOFTENER) 100 MG capsule Take 1 capsule (100 mg total) by mouth 2 (two) times daily as needed for Constipation. 180 capsule 0    DULoxetine (CYMBALTA) 30 MG capsule       DULoxetine (CYMBALTA) 60 MG capsule Take 1 capsule every day by oral route.      erythromycin (ROMYCIN) ophthalmic ointment       esomeprazole (NEXIUM) 40 MG capsule Take 1 capsule every day by oral route.      fluconazole (DIFLUCAN) 100 MG tablet       fluconazole (DIFLUCAN) 150 MG Tab       FLUoxetine 20 MG capsule Take 1 capsule (20 mg total) by mouth once daily. 90 capsule 3    fluticasone furoate-vilanteroL (BREO ELLIPTA) 200-25 mcg/dose DsDv diskus inhaler Inhale 1 puff into the lungs once daily. 1  each 0    fluticasone propionate (FLONASE) 50 mcg/actuation nasal spray  5 mL 0    fluticasone-salmeterol diskus inhaler 250-50 mcg Inhale 1 puff twice a day by inhalation route.      furosemide (LASIX) 20 MG tablet Take 1 tablet (20 mg total) by mouth daily as needed (for swelling / SOB). 30 tablet 3    gabapentin (NEURONTIN) 100 MG capsule Take 1 capsule (100 mg total) by mouth 3 (three) times daily. 270 capsule 0    gabapentin (NEURONTIN) 600 MG tablet Take 1 tablet (600 mg total) by mouth once daily. 270 tablet 0    guaiFENesin (MUCINEX) 600 mg 12 hr tablet Take 1,200 mg by mouth 2 (two) times daily as needed.      ketoprofen (ORUDIS) 75 MG capsule       levothyroxine (SYNTHROID, LEVOTHROID) 175 MCG tablet TAKE 1 TABLET BY MOUTH EVERY DAY 30 tablet 5    LIDOcaine (LIDODERM) 5 % Place 1 patch onto the skin.      linaCLOtide (LINZESS) 145 mcg Cap capsule       linaCLOtide (LINZESS) 290 mcg Cap capsule       loratadine (CLARITIN) 10 mg tablet Take 1 tablet by mouth once daily.      losartan (COZAAR) 25 MG tablet Take 1 tablet (25 mg total) by mouth once daily. 90 tablet 0    lubiprostone (AMITIZA) 24 MCG Cap       lurasidone (LATUDA) 80 mg Tab tablet       melatonin 2.5 mg Chew Take 2.5 mg by mouth.      melatonin 5 mg Chew Take by mouth.      metFORMIN (GLUCOPHAGE) 500 MG tablet Take 500 mg by mouth 2 (two) times daily with meals.      mirtazapine (REMERON) 30 MG tablet Take 1 tablet (30 mg total) by mouth every evening. 90 tablet 3    mometasone (NASONEX) 50 mcg/actuation nasal spray       multivitamin (THERAGRAN) per tablet Take 1 tablet by mouth once daily.      nicotine (NICODERM CQ) 7 mg/24 hr Place 1 patch onto the skin once daily. 90 patch 0    nitrofurantoin, macrocrystal-monohydrate, (MACROBID) 100 MG capsule       nystatin (NYSTOP) powder       nystatin-triamcinolone (MYCOLOG II) cream       nystatin-triamcinolone (MYCOLOG) ointment       OLANZapine (ZYPREXA) 2.5 MG tablet       omega-3 acid ethyl esters  (LOVAZA) 1 gram capsule 2 CAPUSULES BY MOUTH BISD      ondansetron (ZOFRAN) 4 MG tablet Take 1 tablet (4 mg total) by mouth every 12 (twelve) hours as needed for Nausea. 270 tablet 0    paliperidone (INVEGA) 6 MG TR24       paliperidone (INVEGA) 9 MG TR24       pantoprazole (PROTONIX) 40 MG tablet Take 1 tablet (40 mg total) by mouth once daily. 90 tablet 0    polyethylene glycol (GOLYTELY) 236-22.74-6.74 -5.86 gram suspension       polyethylene glycol (MOVIPREP) 100-7.5-2.691 gram solution       potassium chloride SA (K-DUR,KLOR-CON) 20 MEQ tablet Take 1 tablet by mouth once daily.      promethazine (PHENERGAN) 12.5 MG Tab       propranoloL (INDERAL) 40 MG tablet Take 1 tablet (40 mg total) by mouth 2 (two) times daily. 60 tablet 11    QUEtiapine (SEROQUEL) 200 MG Tab Take 1 tablet by mouth daily and 2 tablets at bedtime 270 tablet 3    QULIPTA 60 mg Tab Take 1 tablet by mouth once daily.      risperiDONE (RISPERDAL) 3 MG Tab       risperiDONE microspheres (RISPERDAL CONSTA) 25 mg/2 mL injection       rizatriptan (MAXALT) 10 MG tablet 1 tablet po at onset of headache.  May take 2nd tablet po 2 hours or more later in the day.  Maximum dose is 2 tablets per 24 hours.      sodium chloride (OCEAN) 0.65 % nasal spray 1 spray by Nasal route as needed.      spironolactone (ALDACTONE) 100 MG tablet       sumatriptan (IMITREX) 100 MG tablet 1 tablet po at onset of headache.  May take 2nd tablet po 4 hours or more later in the day.  Maximum dose is 2 tablets per 24 hours.      sumatriptan (IMITREX) 50 MG tablet       tiotropium (SPIRIVA WITH HANDIHALER) 18 mcg inhalation capsule       topiramate (TOPAMAX) 50 MG tablet TAKE ONE TABLET BY MOUTH EVERY MORNING AND TWO AT BEDTIME      traMADoL (ULTRAM) 50 mg tablet       traZODone (DESYREL) 100 MG tablet       TRUE METRIX AIR GLUCOSE METER kit SMARTSI Kit(s) Via Meter Daily      zolpidem (AMBIEN) 10 mg Tab       [DISCONTINUED] clotrimazole-betamethasone 1-0.05% (LOTRISONE)  "cream Apply topically once daily. To toenails 45 g 1    [DISCONTINUED] fluphenazine (PROLIXIN) 5 MG tablet 5 mg every evening.        No current facility-administered medications on file prior to visit.       Review of patient's allergies indicates:   Allergen Reactions    Nsaids (non-steroidal anti-inflammatory drug) Other (See Comments)     History of perforated duodenal ulcer    Penicillins Rash and Other (See Comments)     Yeast infections         ROS:  General ROS: negative  Respiratory ROS: no cough, shortness of breath, or wheezing  Cardiovascular ROS: no chest pain or dyspnea on exertion  Musculoskeletal ROS: negative  Dermatological ROS: negative      LAST HbA1c:   Lab Results   Component Value Date    HGBA1C 6.9 (H) 07/24/2024         EXAM:   Vitals:    10/10/24 1445   BP: 113/72   Pulse: 83   Weight: 112.8 kg (248 lb 10.9 oz)   Height: 5' 3" (1.6 m)       General: alert, no distress, cooperative    Vascular:   Dorsalis Pedis:  diminished     Posterior Tibial:  diminished  Capillary refill time:  3 seconds  Temperature of toes warm to touch  Edema:  1+ and pitting      Neurological:     Sharp touch:  normal  Light touch: normal  Tinels Sign:  Absent  [place]ulders Click:   Absent  [place]:  Absent deficits to sharp/dull, light touch or vibratory sensation feet, ten points tested.    Present paresthesias (Abnormal spontaneous sensations in feet)      Dermatological:   Skin: thin and atrophic  Hair growth:  decreased  Wounds/Ulcers:  Absent  Bruising:  Absent  Erythema:  Absent  Toenails:   elongated;  thickness:  thickened;   Yellowish in color,  with subungual debris.   Absent paronychia      Musculoskeletal:   Metatarsophalangeal range of motion:   diminished range of motion  Subtalar joint range of motion: diminished range of motion  Ankle joint range of motion:  diminished range of motion  "

## 2024-10-10 NOTE — TELEPHONE ENCOUNTER
----- Message from Maddie sent at 10/9/2024  6:03 PM CDT -----  Type: General Call Back     Name of Caller:[facility] pharmacy  Symptoms:  Would the patient rather a call back or a response via [facility]? call  Best Call Back Number:[phone]  Additional Information: The pharmacy is calling to let the provider know they are sending over a refill request for several medication.

## 2024-10-15 ENCOUNTER — TELEPHONE (OUTPATIENT)
Dept: PRIMARY CARE CLINIC | Facility: CLINIC | Age: 69
End: 2024-10-15
Payer: MEDICARE

## 2024-10-15 ENCOUNTER — TELEPHONE (OUTPATIENT)
Dept: PODIATRY | Facility: CLINIC | Age: 69
End: 2024-10-15
Payer: MEDICARE

## 2024-10-15 RX ORDER — TIZANIDINE 4 MG/1
4 TABLET ORAL EVERY 8 HOURS
Qty: 90 TABLET | Refills: 11 | Status: SHIPPED | OUTPATIENT
Start: 2024-10-15

## 2024-10-15 NOTE — TELEPHONE ENCOUNTER
----- Message from Blanche sent at 10/15/2024 11:19 AM CDT -----  Contact: [phone]  Prescription refill request.    RX name and strength (copy/paste from chart):   fluticasone propionate (FLONASE) 50 mcg/actuation nasal spray    albuterol-ipratropium (DUO-NEB) 2.5 mg-0.5 mg/3 mL nebulizer solution    gabapentin (NEURONTIN) 600 MG tablet    ondansetron (ZOFRAN) 4 MG tablet    diclofenac sodium (VOLTAREN) 1 % Gel    aspirin (ECOTRIN) 81 MG EC tablet    Is this a 30 day or 90 day RX:  90    Pharmacy name and phone # (copy/paste from chart):       [facility] - [place], [place] - [address]  [address] [address]  Phone: [phone] Fax: [phone]          Additional information:   Please call to advise.

## 2024-10-15 NOTE — TELEPHONE ENCOUNTER
Staff tried to contact patient, no answer, left a message on voice mail informing patient that her request has been sent to Dr. Burton and is waiting a response.

## 2024-10-15 NOTE — TELEPHONE ENCOUNTER
----- Message from Blanche sent at 10/15/2024 11:23 AM CDT -----  Contact: [phone]  Prescription refill request.    RX name and strength (copy/paste from chart):  tiZANidine (ZANAFLEX) 4 MG tablet      Is this a 30 day or 90 day RX:  90     Pharmacy name and phone # (copy/paste from chart):       [facility] - [place], [place] - [address]  [address] [address]  Phone: [phone] Fax: [phone]          Additional information:   Please call to advise.

## 2024-10-15 NOTE — TELEPHONE ENCOUNTER
----- Message from Blanche sent at 10/15/2024 11:17 AM CDT -----  Contact: [phone]  Prescription refill request.    RX name and strength (copy/paste from chart):   clotrimazole-betamethasone 1-0.05% (LOTRISONE) cream    Is this a 30 day or 90 day RX:  n/a    Pharmacy name and phone # (copy/paste from chart):       [facility] - [place], [place] - [address]  [address] [address]  Phone: [phone] Fax: [phone]          Additional information:   Please call to advise.

## 2024-10-16 RX ORDER — CLOTRIMAZOLE AND BETAMETHASONE DIPROPIONATE 10; .64 MG/G; MG/G
CREAM TOPICAL DAILY
Qty: 45 G | Refills: 1 | Status: SHIPPED | OUTPATIENT
Start: 2024-10-16

## 2024-10-22 ENCOUNTER — TELEPHONE (OUTPATIENT)
Dept: INTERNAL MEDICINE | Facility: CLINIC | Age: 69
End: 2024-10-22
Payer: MEDICARE

## 2024-10-22 NOTE — TELEPHONE ENCOUNTER
----- Message from Trista sent at 10/22/2024  9:06 AM CDT -----  Contact: [phone]  .[email or web address]     Patient is calling for Medical Advice regarding:tried to reach her psychiatry dr     How long has patient had these symptoms:    Pharmacy name and phone#:    Patient wants a call back or thru [facility]:call back     Comments:  She states she takes 400mg of Seroquel in the morning and 200 mg at night and she states she just took the one for the morning and she states she never noticed a difference and she states she did this for a month she is needing to speak to you in regards to all of her medications please advise she states she is feeling so much better but she is saying all different things of what she is taking plus the stool softener but she could not talk the psych dr yesterday she is just asking for a return call   Please advise patient replies from provider may take up to 48 hours.

## 2024-10-24 ENCOUNTER — TELEPHONE (OUTPATIENT)
Dept: CARDIOLOGY | Facility: CLINIC | Age: 69
End: 2024-10-24
Payer: MEDICARE

## 2024-10-24 NOTE — TELEPHONE ENCOUNTER
----- Message from Med Assistant Rodriguez sent at 10/24/2024  2:34 PM CDT -----  Contact: self  Pt is calling about an appt with a cardiologist.Had appt. on 11/4 with -cancelled,bookout Still has chest pain. Has appt  with  on 11/8. Said she didn't get appt in the mail. Please call [phone].

## 2024-10-28 DIAGNOSIS — J30.2 SEASONAL ALLERGIES: ICD-10-CM

## 2024-10-28 DIAGNOSIS — G62.9 NEUROPATHY: ICD-10-CM

## 2024-10-29 ENCOUNTER — OFFICE VISIT (OUTPATIENT)
Dept: PSYCHIATRY | Facility: CLINIC | Age: 69
End: 2024-10-29
Payer: MEDICARE

## 2024-10-29 ENCOUNTER — TELEPHONE (OUTPATIENT)
Dept: PRIMARY CARE CLINIC | Facility: CLINIC | Age: 69
End: 2024-10-29
Payer: MEDICARE

## 2024-10-29 ENCOUNTER — LAB VISIT (OUTPATIENT)
Dept: LAB | Facility: HOSPITAL | Age: 69
End: 2024-10-29
Attending: HOSPITALIST
Payer: MEDICARE

## 2024-10-29 VITALS
BODY MASS INDEX: 42.12 KG/M2 | HEART RATE: 83 BPM | DIASTOLIC BLOOD PRESSURE: 56 MMHG | SYSTOLIC BLOOD PRESSURE: 116 MMHG | WEIGHT: 237.75 LBS

## 2024-10-29 DIAGNOSIS — F41.9 ANXIETY: ICD-10-CM

## 2024-10-29 DIAGNOSIS — F25.1 SCHIZOAFFECTIVE DISORDER, DEPRESSIVE TYPE: Primary | ICD-10-CM

## 2024-10-29 DIAGNOSIS — N18.31 STAGE 3A CHRONIC KIDNEY DISEASE: ICD-10-CM

## 2024-10-29 DIAGNOSIS — G47.00 INSOMNIA DISORDER WITH NON-SLEEP DISORDER MENTAL COMORBIDITY: ICD-10-CM

## 2024-10-29 LAB
ALBUMIN SERPL BCP-MCNC: 3.9 G/DL (ref 3.5–5.2)
ANION GAP SERPL CALC-SCNC: 11 MMOL/L (ref 8–16)
BASOPHILS # BLD AUTO: 0.06 K/UL (ref 0–0.2)
BASOPHILS NFR BLD: 0.6 % (ref 0–1.9)
BUN SERPL-MCNC: 17 MG/DL (ref 8–23)
CALCIUM SERPL-MCNC: 10.1 MG/DL (ref 8.7–10.5)
CHLORIDE SERPL-SCNC: 98 MMOL/L (ref 95–110)
CO2 SERPL-SCNC: 29 MMOL/L (ref 23–29)
CREAT SERPL-MCNC: 1.1 MG/DL (ref 0.5–1.4)
DIFFERENTIAL METHOD BLD: ABNORMAL
EOSINOPHIL # BLD AUTO: 0.1 K/UL (ref 0–0.5)
EOSINOPHIL NFR BLD: 0.9 % (ref 0–8)
ERYTHROCYTE [DISTWIDTH] IN BLOOD BY AUTOMATED COUNT: 13.9 % (ref 11.5–14.5)
EST. GFR  (NO RACE VARIABLE): 54.7 ML/MIN/1.73 M^2
GLUCOSE SERPL-MCNC: 141 MG/DL (ref 70–110)
HCT VFR BLD AUTO: 44.9 % (ref 37–48.5)
HGB BLD-MCNC: 15.3 G/DL (ref 12–16)
IMM GRANULOCYTES # BLD AUTO: 0.06 K/UL (ref 0–0.04)
IMM GRANULOCYTES NFR BLD AUTO: 0.6 % (ref 0–0.5)
LYMPHOCYTES # BLD AUTO: 4.6 K/UL (ref 1–4.8)
LYMPHOCYTES NFR BLD: 46.7 % (ref 18–48)
MCH RBC QN AUTO: 31 PG (ref 27–31)
MCHC RBC AUTO-ENTMCNC: 34.1 G/DL (ref 32–36)
MCV RBC AUTO: 91 FL (ref 82–98)
MONOCYTES # BLD AUTO: 0.9 K/UL (ref 0.3–1)
MONOCYTES NFR BLD: 8.7 % (ref 4–15)
NEUTROPHILS # BLD AUTO: 4.1 K/UL (ref 1.8–7.7)
NEUTROPHILS NFR BLD: 42.5 % (ref 38–73)
NRBC BLD-RTO: 0 /100 WBC
PHOSPHATE SERPL-MCNC: 3.8 MG/DL (ref 2.7–4.5)
PLATELET # BLD AUTO: 271 K/UL (ref 150–450)
PMV BLD AUTO: 10.5 FL (ref 9.2–12.9)
POTASSIUM SERPL-SCNC: 4.6 MMOL/L (ref 3.5–5.1)
PTH-INTACT SERPL-MCNC: 86.1 PG/ML (ref 9–77)
RBC # BLD AUTO: 4.94 M/UL (ref 4–5.4)
SODIUM SERPL-SCNC: 138 MMOL/L (ref 136–145)
WBC # BLD AUTO: 9.75 K/UL (ref 3.9–12.7)

## 2024-10-29 PROCEDURE — 4010F ACE/ARB THERAPY RXD/TAKEN: CPT | Mod: HCNC,CPTII,S$GLB, | Performed by: NURSE PRACTITIONER

## 2024-10-29 PROCEDURE — 3074F SYST BP LT 130 MM HG: CPT | Mod: HCNC,CPTII,S$GLB, | Performed by: NURSE PRACTITIONER

## 2024-10-29 PROCEDURE — 80069 RENAL FUNCTION PANEL: CPT | Mod: HCNC | Performed by: HOSPITALIST

## 2024-10-29 PROCEDURE — 3044F HG A1C LEVEL LT 7.0%: CPT | Mod: HCNC,CPTII,S$GLB, | Performed by: NURSE PRACTITIONER

## 2024-10-29 PROCEDURE — 85025 COMPLETE CBC W/AUTO DIFF WBC: CPT | Mod: HCNC | Performed by: HOSPITALIST

## 2024-10-29 PROCEDURE — 3078F DIAST BP <80 MM HG: CPT | Mod: HCNC,CPTII,S$GLB, | Performed by: NURSE PRACTITIONER

## 2024-10-29 PROCEDURE — 83970 ASSAY OF PARATHORMONE: CPT | Mod: HCNC | Performed by: HOSPITALIST

## 2024-10-29 PROCEDURE — 90833 PSYTX W PT W E/M 30 MIN: CPT | Mod: HCNC,S$GLB,, | Performed by: NURSE PRACTITIONER

## 2024-10-29 PROCEDURE — 99999 PR PBB SHADOW E&M-EST. PATIENT-LVL II: CPT | Mod: PBBFAC,HCNC,, | Performed by: NURSE PRACTITIONER

## 2024-10-29 PROCEDURE — 36415 COLL VENOUS BLD VENIPUNCTURE: CPT | Mod: HCNC | Performed by: HOSPITALIST

## 2024-10-29 PROCEDURE — 3008F BODY MASS INDEX DOCD: CPT | Mod: HCNC,CPTII,S$GLB, | Performed by: NURSE PRACTITIONER

## 2024-10-29 PROCEDURE — 1157F ADVNC CARE PLAN IN RCRD: CPT | Mod: HCNC,CPTII,S$GLB, | Performed by: NURSE PRACTITIONER

## 2024-10-29 PROCEDURE — 99214 OFFICE O/P EST MOD 30 MIN: CPT | Mod: HCNC,S$GLB,, | Performed by: NURSE PRACTITIONER

## 2024-10-29 RX ORDER — MIRTAZAPINE 30 MG/1
30 TABLET, FILM COATED ORAL NIGHTLY
Qty: 90 TABLET | Refills: 3 | Status: SHIPPED | OUTPATIENT
Start: 2024-10-29

## 2024-10-29 RX ORDER — QUETIAPINE FUMARATE 200 MG/1
TABLET, FILM COATED ORAL
Qty: 270 TABLET | Refills: 3 | Status: SHIPPED | OUTPATIENT
Start: 2024-10-29

## 2024-10-29 RX ORDER — FLUOXETINE HYDROCHLORIDE 20 MG/1
20 CAPSULE ORAL DAILY
Qty: 90 CAPSULE | Refills: 3 | Status: SHIPPED | OUTPATIENT
Start: 2024-10-29 | End: 2024-10-29

## 2024-10-29 RX ORDER — DIVALPROEX SODIUM 500 MG/1
TABLET, FILM COATED, EXTENDED RELEASE ORAL
Qty: 270 TABLET | Refills: 3 | Status: SHIPPED | OUTPATIENT
Start: 2024-10-29

## 2024-10-30 ENCOUNTER — TELEPHONE (OUTPATIENT)
Dept: INTERNAL MEDICINE | Facility: CLINIC | Age: 69
End: 2024-10-30
Payer: MEDICARE

## 2024-10-31 RX ORDER — GABAPENTIN 600 MG/1
600 TABLET ORAL DAILY
Qty: 270 TABLET | Refills: 0 | Status: SHIPPED | OUTPATIENT
Start: 2024-10-31 | End: 2024-11-01

## 2024-10-31 RX ORDER — TIZANIDINE 4 MG/1
2 TABLET ORAL EVERY 8 HOURS
Qty: 90 TABLET | Refills: 11 | Status: SHIPPED | OUTPATIENT
Start: 2024-10-31 | End: 2024-11-01

## 2024-10-31 RX ORDER — GABAPENTIN 100 MG/1
100 CAPSULE ORAL 3 TIMES DAILY
Qty: 270 CAPSULE | Refills: 0 | Status: SHIPPED | OUTPATIENT
Start: 2024-10-31 | End: 2024-11-01

## 2024-10-31 RX ORDER — ASPIRIN 81 MG/1
81 TABLET ORAL DAILY
Qty: 90 TABLET | Refills: 0 | Status: SHIPPED | OUTPATIENT
Start: 2024-10-31 | End: 2024-11-01

## 2024-10-31 RX ORDER — FLUTICASONE PROPIONATE 50 MCG
SPRAY, SUSPENSION (ML) NASAL
Qty: 5 ML | Refills: 0 | Status: SHIPPED | OUTPATIENT
Start: 2024-10-31 | End: 2024-11-01

## 2024-10-31 RX ORDER — METFORMIN HYDROCHLORIDE 500 MG/1
500 TABLET ORAL 2 TIMES DAILY WITH MEALS
Qty: 180 TABLET | Refills: 0 | Status: SHIPPED | OUTPATIENT
Start: 2024-10-31 | End: 2024-11-01

## 2024-11-01 ENCOUNTER — TELEPHONE (OUTPATIENT)
Dept: INTERNAL MEDICINE | Facility: CLINIC | Age: 69
End: 2024-11-01
Payer: MEDICARE

## 2024-11-01 RX ORDER — GABAPENTIN 100 MG/1
100 CAPSULE ORAL 3 TIMES DAILY
Qty: 270 CAPSULE | Refills: 0 | Status: SHIPPED | OUTPATIENT
Start: 2024-11-01

## 2024-11-01 RX ORDER — METFORMIN HYDROCHLORIDE 500 MG/1
500 TABLET ORAL 2 TIMES DAILY WITH MEALS
Qty: 180 TABLET | Refills: 0 | Status: SHIPPED | OUTPATIENT
Start: 2024-11-01 | End: 2025-01-30

## 2024-11-01 RX ORDER — ASPIRIN 81 MG/1
81 TABLET ORAL DAILY
Qty: 90 TABLET | Refills: 0 | Status: SHIPPED | OUTPATIENT
Start: 2024-11-01

## 2024-11-01 RX ORDER — GABAPENTIN 600 MG/1
600 TABLET ORAL DAILY
Qty: 270 TABLET | Refills: 0 | Status: SHIPPED | OUTPATIENT
Start: 2024-11-01

## 2024-11-01 RX ORDER — FLUTICASONE PROPIONATE 50 MCG
SPRAY, SUSPENSION (ML) NASAL
Qty: 5 ML | Refills: 0 | Status: SHIPPED | OUTPATIENT
Start: 2024-11-01

## 2024-11-01 RX ORDER — TIZANIDINE 4 MG/1
2 TABLET ORAL EVERY 8 HOURS
Qty: 90 TABLET | Refills: 11 | Status: SHIPPED | OUTPATIENT
Start: 2024-11-01

## 2024-11-01 NOTE — TELEPHONE ENCOUNTER
----- Message from Ojb sent at 11/1/2024  9:06 AM CDT -----  Regarding: Wrong Pharm  Contact: Pt [phone]  .1MEDICALADVICE     Patient is calling for Medical Advice regarding: Patient says her RX was sent to the wrong pharm. She said she needed it sent to [facility] instead. She said she needed the previous RX cancelled and for Dr. Blas to call [facility] to change.    How long has patient had these symptoms:    Pharmacy name and phone#:    [facility] - [place], [place] - [address]  [address] [address]  Phone: [phone] Fax: [phone]       Patient wants a call back or thru [facility]: Call    Comments:    Please advise patient replies from provider may take up to 48 hours.

## 2024-11-05 ENCOUNTER — APPOINTMENT (OUTPATIENT)
Dept: LAB | Facility: HOSPITAL | Age: 69
End: 2024-11-05
Payer: MEDICARE

## 2024-11-05 ENCOUNTER — OFFICE VISIT (OUTPATIENT)
Dept: NEUROLOGY | Facility: CLINIC | Age: 69
End: 2024-11-05
Payer: MEDICARE

## 2024-11-05 ENCOUNTER — TELEPHONE (OUTPATIENT)
Dept: NEUROLOGY | Facility: CLINIC | Age: 69
End: 2024-11-05
Payer: MEDICARE

## 2024-11-05 VITALS
HEIGHT: 63 IN | HEART RATE: 78 BPM | BODY MASS INDEX: 19.79 KG/M2 | WEIGHT: 111.69 LBS | SYSTOLIC BLOOD PRESSURE: 117 MMHG | DIASTOLIC BLOOD PRESSURE: 77 MMHG

## 2024-11-05 DIAGNOSIS — Z79.899 LONG TERM CURRENT USE OF ANTIPSYCHOTIC MEDICATION: Primary | ICD-10-CM

## 2024-11-05 PROCEDURE — 1125F AMNT PAIN NOTED PAIN PRSNT: CPT | Mod: HCNC,CPTII,S$GLB, | Performed by: PSYCHIATRY & NEUROLOGY

## 2024-11-05 PROCEDURE — 99999 PR PBB SHADOW E&M-EST. PATIENT-LVL IV: CPT | Mod: PBBFAC,HCNC,, | Performed by: PSYCHIATRY & NEUROLOGY

## 2024-11-05 PROCEDURE — 99202 OFFICE O/P NEW SF 15 MIN: CPT | Mod: HCNC,S$GLB,, | Performed by: PSYCHIATRY & NEUROLOGY

## 2024-11-05 PROCEDURE — 3078F DIAST BP <80 MM HG: CPT | Mod: HCNC,CPTII,S$GLB, | Performed by: PSYCHIATRY & NEUROLOGY

## 2024-11-05 PROCEDURE — 3008F BODY MASS INDEX DOCD: CPT | Mod: HCNC,CPTII,S$GLB, | Performed by: PSYCHIATRY & NEUROLOGY

## 2024-11-05 PROCEDURE — 3288F FALL RISK ASSESSMENT DOCD: CPT | Mod: HCNC,CPTII,S$GLB, | Performed by: PSYCHIATRY & NEUROLOGY

## 2024-11-05 PROCEDURE — 1157F ADVNC CARE PLAN IN RCRD: CPT | Mod: HCNC,CPTII,S$GLB, | Performed by: PSYCHIATRY & NEUROLOGY

## 2024-11-05 PROCEDURE — 84156 ASSAY OF PROTEIN URINE: CPT | Mod: HCNC | Performed by: HOSPITALIST

## 2024-11-05 PROCEDURE — 3044F HG A1C LEVEL LT 7.0%: CPT | Mod: HCNC,CPTII,S$GLB, | Performed by: PSYCHIATRY & NEUROLOGY

## 2024-11-05 PROCEDURE — 1101F PT FALLS ASSESS-DOCD LE1/YR: CPT | Mod: HCNC,CPTII,S$GLB, | Performed by: PSYCHIATRY & NEUROLOGY

## 2024-11-05 PROCEDURE — 4010F ACE/ARB THERAPY RXD/TAKEN: CPT | Mod: HCNC,CPTII,S$GLB, | Performed by: PSYCHIATRY & NEUROLOGY

## 2024-11-05 PROCEDURE — 3074F SYST BP LT 130 MM HG: CPT | Mod: HCNC,CPTII,S$GLB, | Performed by: PSYCHIATRY & NEUROLOGY

## 2024-11-05 NOTE — PROGRESS NOTES
MOVEMENT DISORDERS CLINIC NEW CONSULT NOTE    PCP/Referring Provider:   Self, Aaareferral  No address on file  Date of Service: 11/12/2024    Chief Complaint: Possible drug induced parkinsonism    HPI: Joseluis Triplett is a  69 y.o. female with PMH most notable for bipolar disorder, presenting for evaluation.    Patient is a very poor historian. It appears there were concerns by PCP for drug induced parkinsonism.    According to recent psych note medications are as follows:  Depakote to  mg in the morning and 1000 mg bedtime  Seroquel 200 mg in the morning and 400 mg at bedtime  Remeron 30 mg po qhs     There appears to be an order placed for paliperidone in this note but it is unclear to me today if she is taking it.   She also has orders for sinemet, going back in system this was started around 2014.     Current Medications:  Outpatient Encounter Medications as of 11/5/2024   Medication Sig Dispense Refill    acetaminophen (TYLENOL) 500 MG tablet Take 500 mg by mouth every 6 (six) hours as needed.      albuterol (PROAIR HFA) 90 mcg/actuation inhaler       albuterol (PROVENTIL) 2.5 mg /3 mL (0.083 %) nebulizer solution       albuterol (PROVENTIL/VENTOLIN HFA) 90 mcg/actuation inhaler INHALE 2 PUFFS BY MOUTH EVERY 6 HOURS AS NEEDED FOR WHEEZING AND SHORTNESS OF BREATH      albuterol-ipratropium (DUO-NEB) 2.5 mg-0.5 mg/3 mL nebulizer solution Take 3 mLs by nebulization every 6 (six) hours while awake. 810 mL 0    ACCU-CHEK GUIDE TEST STRIPS Strp 1 strip by Other route. (Patient not taking: Reported on 11/5/2024)      amoxicillin-clavulanate 875-125mg (AUGMENTIN) 875-125 mg per tablet       aspirin (ECOTRIN) 81 MG EC tablet Take 1 tablet (81 mg total) by mouth once daily. 90 tablet 0    atorvastatin (LIPITOR) 40 MG tablet Take 1 tablet (40 mg total) by mouth once daily. 90 tablet 0    azithromycin (Z-MARY) 250 MG tablet       baclofen (LIORESAL) 10 MG tablet TAKE TWO TABLETS BY MOUTH 4 TIMES DAILY       blood-glucose meter Misc 1 each by Other route.      budesonide-formoterol 80-4.5 mcg (SYMBICORT) 80-4.5 mcg/actuation HFAA Inhale 2 puffs into the lungs once daily. Controller 10 g 3    carbidopa-levodopa  mg (SINEMET)  mg per tablet Take 1 tablet twice a day by oral route.      carbidopa-levodopa  mg (SINEMET)  mg per tablet Take 1 tablet twice a day by oral route.      carboxymethylcellulose (REFRESH PLUS) 0.5 % Dpet Apply 1 drop to eye.      chlorhexidine (PERIDEX) 0.12 % solution       chlorzoxazone (PARAFON FORTE) 500 mg Tab Take 1 tablet 4 times a day by oral route as needed.      clotrimazole-betamethasone 1-0.05% (LOTRISONE) cream Apply topically once daily. To toenails 45 g 1    dexAMETHasone (DECADRON) 1 MG Tab       diclofenac (CATAFLAM) 50 MG tablet Take 1 tablet 3 times a day by oral route as needed.      diclofenac sodium (VOLTAREN) 1 % Gel Apply topically once daily. 720 g 0    dicyclomine (BENTYL) 10 MG capsule       diphenhydrAMINE-acetaminophen (TYLENOL PM)  mg Tab Take 1 tablet by mouth nightly as needed.      diphenhydramine-calamine (CALOHIST) 1-8 % Lotn Apply topically 2 (two) times daily as needed.      divalproex ER (DEPAKOTE ER) 500 MG Tb24 24 hr tablet Give 1 tablet in the morning and 2 tablets at bedtime 270 tablet 3    docusate sodium (STOOL SOFTENER) 100 MG capsule Take 1 capsule (100 mg total) by mouth 2 (two) times daily as needed for Constipation. 180 capsule 0    erythromycin (ROMYCIN) ophthalmic ointment       esomeprazole (NEXIUM) 40 MG capsule Take 1 capsule every day by oral route.      fluconazole (DIFLUCAN) 100 MG tablet       fluconazole (DIFLUCAN) 150 MG Tab       fluticasone furoate-vilanteroL (BREO ELLIPTA) 200-25 mcg/dose DsDv diskus inhaler Inhale 1 puff into the lungs once daily. 1 each 0    fluticasone propionate (FLONASE) 50 mcg/actuation nasal spray  5 mL 0    fluticasone-salmeterol diskus inhaler 250-50 mcg Inhale 1 puff twice a day by  inhalation route.      furosemide (LASIX) 20 MG tablet Take 1 tablet (20 mg total) by mouth daily as needed (for swelling / SOB). 30 tablet 3    gabapentin (NEURONTIN) 100 MG capsule Take 1 capsule (100 mg total) by mouth 3 (three) times daily. 270 capsule 0    guaiFENesin (MUCINEX) 600 mg 12 hr tablet Take 1,200 mg by mouth 2 (two) times daily as needed.      ketoprofen (ORUDIS) 75 MG capsule       levothyroxine (SYNTHROID, LEVOTHROID) 175 MCG tablet TAKE 1 TABLET BY MOUTH EVERY DAY 30 tablet 5    LIDOcaine (LIDODERM) 5 % Place 1 patch onto the skin.      linaCLOtide (LINZESS) 145 mcg Cap capsule       linaCLOtide (LINZESS) 290 mcg Cap capsule       loratadine (CLARITIN) 10 mg tablet Take 1 tablet by mouth once daily.      losartan (COZAAR) 25 MG tablet Take 1 tablet (25 mg total) by mouth once daily. 90 tablet 0    lubiprostone (AMITIZA) 24 MCG Cap       melatonin 2.5 mg Chew Take 2.5 mg by mouth.      melatonin 5 mg Chew Take by mouth.      mirtazapine (REMERON) 30 MG tablet Take 1 tablet (30 mg total) by mouth every evening. 90 tablet 3    mometasone (NASONEX) 50 mcg/actuation nasal spray       multivitamin (THERAGRAN) per tablet Take 1 tablet by mouth once daily.      nicotine (NICODERM CQ) 7 mg/24 hr Place 1 patch onto the skin once daily. 90 patch 0    nitrofurantoin, macrocrystal-monohydrate, (MACROBID) 100 MG capsule       nystatin (NYSTOP) powder       nystatin-triamcinolone (MYCOLOG II) cream       nystatin-triamcinolone (MYCOLOG) ointment       OLANZapine (ZYPREXA) 2.5 MG tablet       omega-3 acid ethyl esters (LOVAZA) 1 gram capsule 2 CAPUSULES BY MOUTH BISD      ondansetron (ZOFRAN) 4 MG tablet Take 1 tablet (4 mg total) by mouth every 12 (twelve) hours as needed for Nausea. 270 tablet 0    paliperidone (INVEGA) 6 MG TR24       pantoprazole (PROTONIX) 40 MG tablet Take 1 tablet (40 mg total) by mouth once daily. 90 tablet 0    polyethylene glycol (GOLYTELY) 236-22.74-6.74 -5.86 gram suspension        polyethylene glycol (MOVIPREP) 100-7.5-2.691 gram solution       potassium chloride SA (K-DUR,KLOR-CON) 20 MEQ tablet Take 1 tablet by mouth once daily.      promethazine (PHENERGAN) 12.5 MG Tab       propranoloL (INDERAL) 40 MG tablet Take 1 tablet (40 mg total) by mouth 2 (two) times daily. 60 tablet 11    QUEtiapine (SEROQUEL) 200 MG Tab Take 1 tablet by mouth daily and 2 tablets at bedtime 270 tablet 3    QULIPTA 60 mg Tab Take 1 tablet by mouth once daily.      risperiDONE (RISPERDAL) 3 MG Tab       rizatriptan (MAXALT) 10 MG tablet 1 tablet po at onset of headache.  May take 2nd tablet po 2 hours or more later in the day.  Maximum dose is 2 tablets per 24 hours.      sodium chloride (OCEAN) 0.65 % nasal spray 1 spray by Nasal route as needed.      spironolactone (ALDACTONE) 100 MG tablet       sumatriptan (IMITREX) 100 MG tablet 1 tablet po at onset of headache.  May take 2nd tablet po 4 hours or more later in the day.  Maximum dose is 2 tablets per 24 hours.      sumatriptan (IMITREX) 50 MG tablet       tiotropium (SPIRIVA WITH HANDIHALER) 18 mcg inhalation capsule       tiZANidine (ZANAFLEX) 4 MG tablet Take 0.5 tablets (2 mg total) by mouth every 8 (eight) hours. 90 tablet 11    topiramate (TOPAMAX) 50 MG tablet TAKE ONE TABLET BY MOUTH EVERY MORNING AND TWO AT BEDTIME      traMADoL (ULTRAM) 50 mg tablet       TRUE METRIX AIR GLUCOSE METER kit SMARTSI Kit(s) Via Meter Daily      [DISCONTINUED] ACCU-CHEK SOFTCLIX LANCETS Misc 1 each by Other route. (Patient not taking: Reported on 2024)      [DISCONTINUED] albuterol (ACCUNEB) 0.63 mg/3 mL Nebu Take 1 vial by nebulization every 4 (four) hours as needed. (Patient not taking: Reported on 2024)      [DISCONTINUED] albuterol (ACCUNEB) 0.63 mg/3 mL Nebu Inhale 0.63 mg into the lungs every 6 (six) hours as needed. (Patient not taking: Reported on 2024)      [DISCONTINUED] amitriptyline (ELAVIL) 25 MG tablet  (Patient not taking: Reported on  11/5/2024)      [DISCONTINUED] amoxicillin (AMOXIL) 500 MG capsule  (Patient not taking: Reported on 11/5/2024)      [DISCONTINUED] aspirin (ECOTRIN) 81 MG EC tablet Take 1 tablet (81 mg total) by mouth once daily. 90 tablet 0    [DISCONTINUED] aspirin (ECOTRIN) 81 MG EC tablet Take 1 tablet (81 mg total) by mouth once daily. 90 tablet 0    [DISCONTINUED] atorvastatin (LIPITOR) 20 MG tablet Take 1 tablet (20 mg total) by mouth every evening. 30 tablet 11    [DISCONTINUED] benztropine (COGENTIN) 1 MG tablet       [DISCONTINUED] busPIRone (BUSPAR) 5 MG Tab       [DISCONTINUED] clotrimazole-betamethasone 1-0.05% (LOTRISONE) cream Apply topically once daily. To toenails 45 g 1    [DISCONTINUED] divalproex ER (DEPAKOTE ER) 500 MG Tb24 24 hr tablet Give 1 tablet in the morning and 2 tablets at bedtime 270 tablet 3    [DISCONTINUED] DULoxetine (CYMBALTA) 30 MG capsule       [DISCONTINUED] DULoxetine (CYMBALTA) 60 MG capsule Take 1 capsule every day by oral route.      [DISCONTINUED] FLUoxetine 20 MG capsule Take 1 capsule (20 mg total) by mouth once daily. 90 capsule 3    [DISCONTINUED] fluphenazine (PROLIXIN) 5 MG tablet 5 mg every evening.       [DISCONTINUED] fluticasone propionate (FLONASE) 50 mcg/actuation nasal spray  5 mL 0    [DISCONTINUED] fluticasone propionate (FLONASE) 50 mcg/actuation nasal spray  5 mL 0    [DISCONTINUED] gabapentin (NEURONTIN) 100 MG capsule Take 1 capsule (100 mg total) by mouth 3 (three) times daily. 270 capsule 0    [DISCONTINUED] gabapentin (NEURONTIN) 100 MG capsule Take 1 capsule (100 mg total) by mouth 3 (three) times daily. 270 capsule 0    [DISCONTINUED] gabapentin (NEURONTIN) 600 MG tablet Take 1 tablet (600 mg total) by mouth once daily. 270 tablet 0    [DISCONTINUED] gabapentin (NEURONTIN) 600 MG tablet Take 1 tablet (600 mg total) by mouth once daily. 270 tablet 0    [DISCONTINUED] gabapentin (NEURONTIN) 600 MG tablet Take 1 tablet (600 mg total) by mouth once daily. 270 tablet 0     [DISCONTINUED] lurasidone (LATUDA) 80 mg Tab tablet       [DISCONTINUED] metFORMIN (GLUCOPHAGE) 500 MG tablet Take 500 mg by mouth 2 (two) times daily with meals.      [DISCONTINUED] metFORMIN (GLUCOPHAGE) 500 MG tablet Take 1 tablet (500 mg total) by mouth 2 (two) times daily with meals. 180 tablet 0    [DISCONTINUED] metFORMIN (GLUCOPHAGE) 500 MG tablet Take 1 tablet (500 mg total) by mouth 2 (two) times daily with meals. 180 tablet 0    [DISCONTINUED] mirtazapine (REMERON) 30 MG tablet Take 1 tablet (30 mg total) by mouth every evening. 90 tablet 3    [DISCONTINUED] paliperidone (INVEGA) 9 MG TR24       [DISCONTINUED] QUEtiapine (SEROQUEL) 200 MG Tab Take 1 tablet by mouth daily and 2 tablets at bedtime 270 tablet 3    [DISCONTINUED] risperiDONE microspheres (RISPERDAL CONSTA) 25 mg/2 mL injection       [DISCONTINUED] tiZANidine (ZANAFLEX) 4 MG tablet TAKE ONE TABLET BY MOUTH EVERY 8 HOURS AS NEEDED FOR MUSCLE SPASMS 90 tablet 11    [DISCONTINUED] tiZANidine (ZANAFLEX) 4 MG tablet Take 1 tablet (4 mg total) by mouth every 8 (eight) hours. 90 tablet 11    [DISCONTINUED] tiZANidine (ZANAFLEX) 4 MG tablet Take 0.5 tablets (2 mg total) by mouth every 8 (eight) hours. 90 tablet 11    [DISCONTINUED] traZODone (DESYREL) 100 MG tablet       [DISCONTINUED] zolpidem (AMBIEN) 10 mg Tab        No facility-administered encounter medications on file as of 11/5/2024.       Past Medical History:  Patient Active Problem List   Diagnosis    Polypharmacy    Schizoaffective disorder, depressive type    Bipolar affective disorder, current episode hypomanic    Tobacco abuse    Edema    COPD without exacerbation    Obesity, Class II, BMI 35-39.9, with comorbidity    Thyroid disorder    Hyperlipidemia    History of anorexia nervosa    History of bulimia nervosa    Acid reflux    Chronic rhinitis    Chronic constipation    Osteoarthritis    CLARI (obstructive sleep apnea)    Drug-induced parkinsonism    Claudication    Migraine headache     Neuropathy    Hypotension    Convulsion    Congenital hypothyroidism without goiter    Gastroesophageal reflux disease without esophagitis    Cognitive disorder    Ventral incisional hernia without obstruction or gangrene    Epigastric pain    Back muscle spasm    Pain    Insomnia disorder with non-sleep disorder mental comorbidity    Colon cancer screening, due for repeat 2019    Bipolar affective disorder, currently depressed, mild    Chronic kidney disease    HTN (hypertension)    Bipolar affective disorder, currently manic, mild    Anxiety    Involuntary jerky movements    Gait disorder    Type 2 diabetes mellitus with diabetic peripheral angiopathy without gangrene, without long-term current use of insulin    Malignant melanoma of face    Paranoid behavior    Type 2 diabetes mellitus with stage 3b chronic kidney disease, without long-term current use of insulin    Stage 3a chronic kidney disease    Coronary artery disease with stable angina pectoris    Class 3 obesity       Past Surgical History:  Past Surgical History:   Procedure Laterality Date    COLONOSCOPY N/A 2016    Procedure: COLONOSCOPY;  Surgeon: Akbar Tsang MD;  Location: [facility] ([address] [place] [address]);  Service: Endoscopy;  Laterality: N/A;    DIALYSIS FISTULA CREATION      right arm, but not used    ESOPHAGOGASTRODUODENOSCOPY      ESOPHAGOGASTRODUODENOSCOPY N/A 2018    Procedure: ESOPHAGOGASTRODUODENOSCOPY (EGD);  Surgeon: Hannah Suero MD;  Location: [facility] ([address] [place] [address]);  Service: Endoscopy;  Laterality: N/A;    TONSILLECTOMY      TYMPANOSTOMY TUBE PLACEMENT         Social:  Social History     Socioeconomic History    Marital status: Single   Occupational History    Occupation: Disabled   Tobacco Use    Smoking status: Former     Current packs/day: 0.00     Average packs/day: 1.5 packs/day for 40.0 years (60.0 ttl pk-yrs)     Types: Cigars, Cigarettes     Start date: 1978     Quit date: 2018     Years since quittin.3     "Smokeless tobacco: Former     Quit date: 1/3/2013    Tobacco comments:     stopped smoking    Substance and Sexual Activity    Alcohol use: No    Drug use: No    Sexual activity: Never     Social Drivers of Health     Financial Resource Strain: High Risk (10/1/2024)    Overall Financial Resource Strain (CARDIA)     Difficulty of Paying Living Expenses: Very hard   Food Insecurity: Food Insecurity Present (10/1/2024)    Hunger Vital Sign     Worried About Running Out of Food in the Last Year: Often true     Ran Out of Food in the Last Year: Sometimes true   Transportation Needs: No Transportation Needs (10/1/2024)    TRANSPORTATION NEEDS     Transportation : No   Physical Activity: Inactive (10/1/2024)    Exercise Vital Sign     Days of Exercise per Week: 0 days     Minutes of Exercise per Session: 0 min   Stress: Patient Unable To Answer (10/1/2024)    [employer] - Occupational Stress Questionnaire     Feeling of Stress : Patient unable to answer   Housing Stability: Low Risk  (10/1/2024)    Housing Stability Vital Sign     Unable to Pay for Housing in the Last Year: No     Homeless in the Last Year: No       Family History:  Family History   Problem Relation Name Age of Onset    Alcohol abuse Mother      Bipolar disorder Mother      Dementia Mother      Breast cancer Sister      Cancer Maternal Grandmother  60        colon ca    Breast cancer Other      Allergic rhinitis Neg Hx      Allergies Neg Hx      Angioedema Neg Hx      Asthma Neg Hx      Atopy Neg Hx      Eczema Neg Hx      Immunodeficiency Neg Hx      Rhinitis Neg Hx      Urticaria Neg Hx         PHYSICAL:  /77 (BP Location: Left arm)   Pulse 78   Ht 5' 3" (1.6 m)   Wt 50.7 kg (111 lb 11.2 oz)   LMP  (LMP Unknown)   BMI 19.79 kg/m²     General Medical Examination:  General: Good hygiene, appropriate appearance.  HEENT: Normocephalic, atraumatic.   Neck: Supple.   Chest: Unlabored breathing.   Ext: No clubbing, " cyanosis, or edema.     Mental Status:  Mood/Affect: Appropriate/congruent.  Level of consciousness: Awake, alert.  Orientation: Oriented to person, place, time and situation.  Language: Normal fluency, able to name, repeat, and follow simple commands    Cranial nerves:  I: Not tested  VII: Facial muscle activation intact and symmetric over the bilateral upper and lower face    Motor:   BUE without drift  Fasciculations/Atrophy: none  Tone: normal  Bradykinesia: none      Gait:  -Arises from chair without use of hands.  Narrow based gait    Laboratory Data:    Lab Results   Component Value Date    TSH 2.228 07/24/2024    M0CRAGG 57 (L) 10/30/2018    FREET4 0.94 03/26/2021     Lab Results   Component Value Date    FDLETRWQ99 302 01/13/2020         Imaging:  No results found. However, due to the size of the patient record, not all encounters were searched. Please check Results Review for a complete set of results.    Assessment//Plan:   Problem List Items Addressed This Visit    None  Visit Diagnoses       Long term current use of antipsychotic medication    -  Primary          Patient with bipolar presenting for evaluation of drug induced parkinsonism.     No clinically significant parkinsonism on my exam today. Unclear to me precisely what she is taking since she is a very tangential and disorganized historian, but I do not think she would require changes to her medication regimen based on today's exam. She seems to be striking an appropriate balance between treatment of bipolar and movements. Will defer continued management to psychiatry and PCP, as I recommend no changes.     RTC PRN    I spent a total of 27 minutes on the day of the visit.This includes face to face time and non-face to face time preparing to see the patient (eg, review of tests), obtaining and/or reviewing separately obtained history, documenting clinical information in the electronic or other health record, independently interpreting results and  communicating results to the patient/family/caregiver, or care coordinator.     Jose Angel Dodd MD  [facility]  Department of Neurology  Movement Disorders

## 2024-11-08 ENCOUNTER — OFFICE VISIT (OUTPATIENT)
Dept: CARDIOLOGY | Facility: CLINIC | Age: 69
End: 2024-11-08
Payer: MEDICARE

## 2024-11-08 ENCOUNTER — HOSPITAL ENCOUNTER (OUTPATIENT)
Dept: CARDIOLOGY | Facility: CLINIC | Age: 69
Discharge: HOME | End: 2024-11-08
Payer: MEDICARE

## 2024-11-08 VITALS
SYSTOLIC BLOOD PRESSURE: 140 MMHG | BODY MASS INDEX: 42.97 KG/M2 | OXYGEN SATURATION: 96 % | WEIGHT: 242.5 LBS | HEART RATE: 87 BPM | HEIGHT: 63 IN | DIASTOLIC BLOOD PRESSURE: 84 MMHG

## 2024-11-08 DIAGNOSIS — I25.118 CORONARY ARTERY DISEASE OF NATIVE ARTERY OF NATIVE HEART WITH STABLE ANGINA PECTORIS: ICD-10-CM

## 2024-11-08 DIAGNOSIS — N18.31 STAGE 3A CHRONIC KIDNEY DISEASE: ICD-10-CM

## 2024-11-08 DIAGNOSIS — I10 PRIMARY HYPERTENSION: ICD-10-CM

## 2024-11-08 DIAGNOSIS — I25.10 CORONARY ARTERY DISEASE INVOLVING NATIVE CORONARY ARTERY OF NATIVE HEART, UNSPECIFIED WHETHER ANGINA PRESENT: ICD-10-CM

## 2024-11-08 DIAGNOSIS — E11.51 TYPE 2 DIABETES MELLITUS WITH DIABETIC PERIPHERAL ANGIOPATHY WITHOUT GANGRENE, WITHOUT LONG-TERM CURRENT USE OF INSULIN: ICD-10-CM

## 2024-11-08 DIAGNOSIS — R07.9 CHEST PAIN, UNSPECIFIED TYPE: ICD-10-CM

## 2024-11-08 DIAGNOSIS — E66.813 CLASS 3 OBESITY: ICD-10-CM

## 2024-11-08 DIAGNOSIS — E78.5 HYPERLIPIDEMIA, UNSPECIFIED HYPERLIPIDEMIA TYPE: ICD-10-CM

## 2024-11-08 DIAGNOSIS — J44.9 COPD WITHOUT EXACERBATION: ICD-10-CM

## 2024-11-08 DIAGNOSIS — R07.9 CHEST PAIN, UNSPECIFIED TYPE: Primary | ICD-10-CM

## 2024-11-08 PROCEDURE — 93005 ELECTROCARDIOGRAM TRACING: CPT | Mod: HCNC,S$GLB,, | Performed by: INTERNAL MEDICINE

## 2024-11-08 PROCEDURE — 99999 PR PBB SHADOW E&M-EST. PATIENT-LVL III: CPT | Mod: PBBFAC,HCNC,, | Performed by: INTERNAL MEDICINE

## 2024-11-08 PROCEDURE — 93010 ELECTROCARDIOGRAM REPORT: CPT | Mod: HCNC,S$GLB,, | Performed by: INTERNAL MEDICINE

## 2024-11-08 NOTE — ASSESSMENT & PLAN NOTE
Losartan 25 mg currently utilized.  She also takes Aldactone and propranolol.  Current therapy to continue.  Her blood pressure readings have been stable, today's reading 140/84.  No changes advised.

## 2024-11-08 NOTE — ASSESSMENT & PLAN NOTE
Lexiscan ordered.  She has not had previous stress testing.  She is reporting intermittent chest pains.  Previously was treated as musculoskeletal discomfort.  Coronary calcifications noted on imaging studies of the chest.

## 2024-11-08 NOTE — PROGRESS NOTES
[facility] Cardiology     Subjective:    Patient ID:  Joseluis Triplett is a 69 y.o. female who presents for evaluation of Coronary Artery Disease, Chest Pain, Hyperlipidemia, Diabetes Mellitus, and Hypertension    Review of patient's allergies indicates:   Allergen Reactions    Nsaids (non-steroidal anti-inflammatory drug) Other (See Comments)     History of perforated duodenal ulcer    Penicillins Rash and Other (See Comments)     Yeast infections     She has kindly been referred for chest pain to the clinic.  She states that her symptoms of chest pain have improved some.  She describes intermittent chest pains that was being treated as musculoskeletal.  She uses a walker to walk.    She has a history of hypertension and hyperlipidemia.  She is diabetic.  She takes losartan 25 mg, her blood pressure today is 140.  She also takes propranolol and Aldactone for hypertension.  She has never had a stress test.  Last hemoglobin A1c was 6.9.    Coronary calcifications noted on imaging studies of the chest.  It does not sound like she does a lot of walking on a regular basis.  She is a former smoker.  Her family history is significant for bypass in her father.         Review of Systems   Constitutional: Negative for chills, decreased appetite, diaphoresis, fever, malaise/fatigue, night sweats, weight gain and weight loss.   HENT:  Negative for congestion, ear discharge, ear pain, hearing loss, hoarse voice, nosebleeds, odynophagia, sore throat, stridor and tinnitus.    Eyes:  Negative for blurred vision, discharge, double vision, pain, photophobia, redness, vision loss in left eye, vision loss in right eye, visual disturbance and visual halos.   Cardiovascular:  Positive for chest pain. Negative for claudication, cyanosis, dyspnea on exertion, irregular heartbeat, leg swelling, near-syncope, orthopnea, palpitations, paroxysmal nocturnal dyspnea and  "syncope.   Respiratory:  Positive for shortness of breath. Negative for cough, hemoptysis, sleep disturbances due to breathing, snoring, sputum production and wheezing.    Endocrine: Negative for cold intolerance, heat intolerance, polydipsia, polyphagia and polyuria.   Hematologic/Lymphatic: Negative for adenopathy and bleeding problem. Does not bruise/bleed easily.   Skin:  Negative for color change, dry skin, flushing, itching, nail changes, poor wound healing, rash, skin cancer, suspicious lesions and unusual hair distribution.   Musculoskeletal:  Negative for arthritis, back pain, falls, gout, joint pain, joint swelling, muscle cramps, muscle weakness, myalgias, neck pain and stiffness.   Gastrointestinal:  Negative for bloating, abdominal pain, anorexia, change in bowel habit, bowel incontinence, constipation, diarrhea, dysphagia, excessive appetite, flatus, heartburn, hematemesis, hematochezia, hemorrhoids, jaundice, melena, nausea and vomiting.   Genitourinary:  Negative for bladder incontinence, decreased libido, dysuria, flank pain, frequency, genital sores, hematuria, hesitancy, incomplete emptying, nocturia and urgency.   Neurological:  Negative for aphonia, brief paralysis, difficulty with concentration, disturbances in coordination, excessive daytime sleepiness, dizziness, focal weakness, headaches, light-headedness, loss of balance, numbness, paresthesias, seizures, sensory change, tremors, vertigo and weakness.   Psychiatric/Behavioral:  Negative for altered mental status, depression, hallucinations, memory loss, substance abuse, suicidal ideas and thoughts of violence. The patient does not have insomnia and is not nervous/anxious.    Allergic/Immunologic: Negative for hives and persistent infections.        Objective:       Vitals:    11/08/24 1455   BP: (!) 140/84   Pulse: 87   SpO2: 96%   Weight: 110 kg (242 lb 8.1 oz)   Height: 5' 3" (1.6 m)    Physical Exam  Constitutional:       General: She is " not in acute distress.     Appearance: She is well-developed. She is not diaphoretic.   HENT:      Head: Normocephalic and atraumatic.      Nose: Nose normal.   Eyes:      General: No scleral icterus.        Right eye: No discharge.      Conjunctiva/sclera: Conjunctivae normal.      Pupils: Pupils are equal, round, and reactive to light.   Neck:      Thyroid: No thyromegaly.      Vascular: No JVD.      Trachea: No tracheal deviation.   Cardiovascular:      Rate and Rhythm: Normal rate and regular rhythm.      Pulses:           Carotid pulses are 2+ on the right side and 2+ on the left side.       Radial pulses are 2+ on the right side and 2+ on the left side.      Heart sounds: Normal heart sounds. No murmur heard.     No friction rub. No gallop.   Pulmonary:      Effort: Pulmonary effort is normal. No respiratory distress.      Breath sounds: Normal breath sounds. No stridor. No wheezing or rales.   Chest:      Chest wall: No tenderness.   Abdominal:      General: Bowel sounds are normal. There is no distension.      Palpations: Abdomen is soft. There is no mass.      Tenderness: There is no abdominal tenderness. There is no guarding or rebound.   Musculoskeletal:         General: No tenderness. Normal range of motion.      Cervical back: Normal range of motion and neck supple.   Lymphadenopathy:      Cervical: No cervical adenopathy.   Skin:     General: Skin is warm and dry.      Coloration: Skin is not pale.      Findings: No erythema or rash.   Neurological:      Mental Status: She is alert and oriented to person, place, and time.      Cranial Nerves: No cranial nerve deficit.      Coordination: Coordination normal.   Psychiatric:         Behavior: Behavior normal.         Thought Content: Thought content normal.         Judgment: Judgment normal.           Assessment:       1. Chest pain, unspecified type    2. Coronary artery disease involving native coronary artery of native heart, unspecified whether angina  present    3. Primary hypertension    4. Hyperlipidemia, unspecified hyperlipidemia type    5. COPD without exacerbation    6. Coronary artery disease of native artery of native heart with stable angina pectoris    7. Stage 3a chronic kidney disease    8. Class 3 obesity    9. Type 2 diabetes mellitus with diabetic peripheral angiopathy without gangrene, without long-term current use of insulin      Results for orders placed or performed in visit on 10/29/24   Urinalysis    Collection Time: 10/29/24  3:41 PM   Result Value Ref Range    Specimen UA Urine, Clean Catch     Color, UA Yellow Yellow, Straw, Jaimee    Appearance, UA Clear Clear    pH, UA 8.0 5.0 - 8.0    Specific Gravity, UA 1.010 1.005 - 1.030    Protein, UA Negative Negative    Glucose, UA Negative Negative    Ketones, UA Negative Negative    Bilirubin (UA) Negative Negative    Occult Blood UA Negative Negative    Nitrite, UA Negative Negative    Leukocytes, UA Negative Negative   Protein/Creatinine Ratio, Urine    Collection Time: 11/05/24  9:00 AM   Result Value Ref Range    Protein, Urine Random <7 0 - 15 mg/dL    Creatinine, Urine 30.0 15.0 - 325.0 mg/dL    Prot/Creat Ratio, Urine Unable to calculate 0.00 - 0.20     *Note: Due to a large number of results and/or encounters for the requested time period, some results have not been displayed. A complete set of results can be found in Results Review.         Current Outpatient Medications:     ACCU-CHEK GUIDE TEST STRIPS Strp, 1 strip by Other route. (Patient not taking: Reported on 11/5/2024), Disp: , Rfl:     acetaminophen (TYLENOL) 500 MG tablet, Take 500 mg by mouth every 6 (six) hours as needed., Disp: , Rfl:     albuterol (PROAIR HFA) 90 mcg/actuation inhaler, , Disp: , Rfl:     albuterol (PROVENTIL) 2.5 mg /3 mL (0.083 %) nebulizer solution, , Disp: , Rfl:     albuterol (PROVENTIL/VENTOLIN HFA) 90 mcg/actuation inhaler, INHALE 2 PUFFS BY MOUTH EVERY 6 HOURS AS NEEDED FOR WHEEZING AND SHORTNESS OF  BREATH, Disp: , Rfl:     albuterol-ipratropium (DUO-NEB) 2.5 mg-0.5 mg/3 mL nebulizer solution, Take 3 mLs by nebulization every 6 (six) hours while awake., Disp: 810 mL, Rfl: 0    amoxicillin-clavulanate 875-125mg (AUGMENTIN) 875-125 mg per tablet, , Disp: , Rfl:     aspirin (ECOTRIN) 81 MG EC tablet, Take 1 tablet (81 mg total) by mouth once daily., Disp: 90 tablet, Rfl: 0    atorvastatin (LIPITOR) 40 MG tablet, Take 1 tablet (40 mg total) by mouth once daily., Disp: 90 tablet, Rfl: 0    azithromycin (Z-MARY) 250 MG tablet, , Disp: , Rfl:     baclofen (LIORESAL) 10 MG tablet, TAKE TWO TABLETS BY MOUTH 4 TIMES DAILY, Disp: , Rfl:     blood-glucose meter Misc, 1 each by Other route., Disp: , Rfl:     budesonide-formoterol 80-4.5 mcg (SYMBICORT) 80-4.5 mcg/actuation HFAA, Inhale 2 puffs into the lungs once daily. Controller, Disp: 10 g, Rfl: 3    carbidopa-levodopa  mg (SINEMET)  mg per tablet, Take 1 tablet twice a day by oral route., Disp: , Rfl:     carbidopa-levodopa  mg (SINEMET)  mg per tablet, Take 1 tablet twice a day by oral route., Disp: , Rfl:     carboxymethylcellulose (REFRESH PLUS) 0.5 % Dpet, Apply 1 drop to eye., Disp: , Rfl:     chlorhexidine (PERIDEX) 0.12 % solution, , Disp: , Rfl:     chlorzoxazone (PARAFON FORTE) 500 mg Tab, Take 1 tablet 4 times a day by oral route as needed., Disp: , Rfl:     clotrimazole-betamethasone 1-0.05% (LOTRISONE) cream, Apply topically once daily. To toenails, Disp: 45 g, Rfl: 1    dexAMETHasone (DECADRON) 1 MG Tab, , Disp: , Rfl:     diclofenac (CATAFLAM) 50 MG tablet, Take 1 tablet 3 times a day by oral route as needed., Disp: , Rfl:     diclofenac sodium (VOLTAREN) 1 % Gel, Apply topically once daily., Disp: 720 g, Rfl: 0    dicyclomine (BENTYL) 10 MG capsule, , Disp: , Rfl:     diphenhydrAMINE-acetaminophen (TYLENOL PM)  mg Tab, Take 1 tablet by mouth nightly as needed., Disp: , Rfl:     diphenhydramine-calamine (CALOHIST) 1-8 % Lotn,  Apply topically 2 (two) times daily as needed., Disp: , Rfl:     divalproex ER (DEPAKOTE ER) 500 MG Tb24 24 hr tablet, Give 1 tablet in the morning and 2 tablets at bedtime, Disp: 270 tablet, Rfl: 3    docusate sodium (STOOL SOFTENER) 100 MG capsule, Take 1 capsule (100 mg total) by mouth 2 (two) times daily as needed for Constipation., Disp: 180 capsule, Rfl: 0    erythromycin (ROMYCIN) ophthalmic ointment, , Disp: , Rfl:     esomeprazole (NEXIUM) 40 MG capsule, Take 1 capsule every day by oral route., Disp: , Rfl:     fluconazole (DIFLUCAN) 100 MG tablet, , Disp: , Rfl:     fluconazole (DIFLUCAN) 150 MG Tab, , Disp: , Rfl:     fluticasone furoate-vilanteroL (BREO ELLIPTA) 200-25 mcg/dose DsDv diskus inhaler, Inhale 1 puff into the lungs once daily., Disp: 1 each, Rfl: 0    fluticasone propionate (FLONASE) 50 mcg/actuation nasal spray, , Disp: 5 mL, Rfl: 0    fluticasone-salmeterol diskus inhaler 250-50 mcg, Inhale 1 puff twice a day by inhalation route., Disp: , Rfl:     furosemide (LASIX) 20 MG tablet, Take 1 tablet (20 mg total) by mouth daily as needed (for swelling / SOB)., Disp: 30 tablet, Rfl: 3    gabapentin (NEURONTIN) 100 MG capsule, Take 1 capsule (100 mg total) by mouth 3 (three) times daily., Disp: 270 capsule, Rfl: 0    gabapentin (NEURONTIN) 600 MG tablet, Take 1 tablet (600 mg total) by mouth once daily., Disp: 270 tablet, Rfl: 0    guaiFENesin (MUCINEX) 600 mg 12 hr tablet, Take 1,200 mg by mouth 2 (two) times daily as needed., Disp: , Rfl:     ketoprofen (ORUDIS) 75 MG capsule, , Disp: , Rfl:     levothyroxine (SYNTHROID, LEVOTHROID) 175 MCG tablet, TAKE 1 TABLET BY MOUTH EVERY DAY, Disp: 30 tablet, Rfl: 5    LIDOcaine (LIDODERM) 5 %, Place 1 patch onto the skin., Disp: , Rfl:     linaCLOtide (LINZESS) 145 mcg Cap capsule, , Disp: , Rfl:     linaCLOtide (LINZESS) 290 mcg Cap capsule, , Disp: , Rfl:     loratadine (CLARITIN) 10 mg tablet, Take 1 tablet by mouth once daily., Disp: , Rfl:      losartan (COZAAR) 25 MG tablet, Take 1 tablet (25 mg total) by mouth once daily., Disp: 90 tablet, Rfl: 0    lubiprostone (AMITIZA) 24 MCG Cap, , Disp: , Rfl:     melatonin 2.5 mg Chew, Take 2.5 mg by mouth., Disp: , Rfl:     melatonin 5 mg Chew, Take by mouth., Disp: , Rfl:     metFORMIN (GLUCOPHAGE) 500 MG tablet, Take 1 tablet (500 mg total) by mouth 2 (two) times daily with meals., Disp: 180 tablet, Rfl: 0    mirtazapine (REMERON) 30 MG tablet, Take 1 tablet (30 mg total) by mouth every evening., Disp: 90 tablet, Rfl: 3    mometasone (NASONEX) 50 mcg/actuation nasal spray, , Disp: , Rfl:     multivitamin (THERAGRAN) per tablet, Take 1 tablet by mouth once daily., Disp: , Rfl:     nicotine (NICODERM CQ) 7 mg/24 hr, Place 1 patch onto the skin once daily., Disp: 90 patch, Rfl: 0    nitrofurantoin, macrocrystal-monohydrate, (MACROBID) 100 MG capsule, , Disp: , Rfl:     nystatin (NYSTOP) powder, , Disp: , Rfl:     nystatin-triamcinolone (MYCOLOG II) cream, , Disp: , Rfl:     nystatin-triamcinolone (MYCOLOG) ointment, , Disp: , Rfl:     OLANZapine (ZYPREXA) 2.5 MG tablet, , Disp: , Rfl:     omega-3 acid ethyl esters (LOVAZA) 1 gram capsule, 2 CAPUSULES BY MOUTH BISD, Disp: , Rfl:     ondansetron (ZOFRAN) 4 MG tablet, Take 1 tablet (4 mg total) by mouth every 12 (twelve) hours as needed for Nausea., Disp: 270 tablet, Rfl: 0    paliperidone (INVEGA) 6 MG TR24, , Disp: , Rfl:     pantoprazole (PROTONIX) 40 MG tablet, Take 1 tablet (40 mg total) by mouth once daily., Disp: 90 tablet, Rfl: 0    polyethylene glycol (GOLYTELY) 236-22.74-6.74 -5.86 gram suspension, , Disp: , Rfl:     polyethylene glycol (MOVIPREP) 100-7.5-2.691 gram solution, , Disp: , Rfl:     potassium chloride SA (K-DUR,KLOR-CON) 20 MEQ tablet, Take 1 tablet by mouth once daily., Disp: , Rfl:     promethazine (PHENERGAN) 12.5 MG Tab, , Disp: , Rfl:     propranoloL (INDERAL) 40 MG tablet, Take 1 tablet (40 mg total) by mouth 2 (two) times daily., Disp: 60  tablet, Rfl: 11    QUEtiapine (SEROQUEL) 200 MG Tab, Take 1 tablet by mouth daily and 2 tablets at bedtime, Disp: 270 tablet, Rfl: 3    QULIPTA 60 mg Tab, Take 1 tablet by mouth once daily., Disp: , Rfl:     risperiDONE (RISPERDAL) 3 MG Tab, , Disp: , Rfl:     rizatriptan (MAXALT) 10 MG tablet, 1 tablet po at onset of headache.  May take 2nd tablet po 2 hours or more later in the day.  Maximum dose is 2 tablets per 24 hours., Disp: , Rfl:     sodium chloride (OCEAN) 0.65 % nasal spray, 1 spray by Nasal route as needed., Disp: , Rfl:     spironolactone (ALDACTONE) 100 MG tablet, , Disp: , Rfl:     sumatriptan (IMITREX) 100 MG tablet, 1 tablet po at onset of headache.  May take 2nd tablet po 4 hours or more later in the day.  Maximum dose is 2 tablets per 24 hours., Disp: , Rfl:     sumatriptan (IMITREX) 50 MG tablet, , Disp: , Rfl:     tiotropium (SPIRIVA WITH HANDIHALER) 18 mcg inhalation capsule, , Disp: , Rfl:     tiZANidine (ZANAFLEX) 4 MG tablet, Take 0.5 tablets (2 mg total) by mouth every 8 (eight) hours., Disp: 90 tablet, Rfl: 11    topiramate (TOPAMAX) 50 MG tablet, TAKE ONE TABLET BY MOUTH EVERY MORNING AND TWO AT BEDTIME, Disp: , Rfl:     traMADoL (ULTRAM) 50 mg tablet, , Disp: , Rfl:     TRUE METRIX AIR GLUCOSE METER kit, SMARTSI Kit(s) Via Meter Daily, Disp: , Rfl:      Lab Results   Component Value Date    WBC 9.75 10/29/2024    RBC 4.94 10/29/2024    HGB 15.3 10/29/2024    HCT 44.9 10/29/2024    MCV 91 10/29/2024    MCH 31.0 10/29/2024    MCHC 34.1 10/29/2024    RDW 13.9 10/29/2024     10/29/2024    MPV 10.5 10/29/2024    GRAN 4.1 10/29/2024    GRAN 42.5 10/29/2024    LYMPH 4.6 10/29/2024    LYMPH 46.7 10/29/2024    MONO 0.9 10/29/2024    MONO 8.7 10/29/2024    EOS 0.1 10/29/2024    BASO 0.06 10/29/2024    EOSINOPHIL 0.9 10/29/2024    BASOPHIL 0.6 10/29/2024    MG 2.2 2021        CMP  Lab Results   Component Value Date     10/29/2024    K 4.6 10/29/2024    CL 98 10/29/2024    CO2  29 10/29/2024     (H) 10/29/2024    BUN 17 10/29/2024    CREATININE 1.1 10/29/2024    CALCIUM 10.1 10/29/2024    PROT 7.0 02/14/2022    ALBUMIN 3.9 10/29/2024    BILITOT 0.3 02/19/2024    ALKPHOS 74 02/19/2024    AST 16 02/19/2024    ALT 21 02/19/2024    ANIONGAP 11 10/29/2024    ESTGFRAFRICA 31 (A) 02/14/2022    EGFRNONAA 27 (A) 02/14/2022        Lab Results   Component Value Date    LABBLOO No growth after 5 days. 03/10/2019    LABURIN No growth 09/16/2021            Results for orders placed or performed during the hospital encounter of 01/04/22   EKG 12-lead    Collection Time: 01/04/22  8:30 PM    Narrative    Test Reason : I10,    Vent. Rate : 067 BPM     Atrial Rate : 067 BPM     P-R Int : 202 ms          QRS Dur : 096 ms      QT Int : 392 ms       P-R-T Axes : 051 -40 026 degrees     QTc Int : 414 ms    Normal sinus rhythm  Left axis deviation  Abnormal ECG  When compared with ECG of 26-MAR-2021 00:45,  Criteria for Septal infarct are no longer Present  Confirmed by MOHAMUD FINLEY MD ([id number]) on 1/5/2022 7:45:08 PM    Referred By: [email or web address]   SELF           Confirmed By:MOHAMUD FINLEY MD                   Plan:       Problem List Items Addressed This Visit          Pulmonary    COPD without exacerbation     A chronic condition.            Cardiac/Vascular    Hyperlipidemia     Current LDL 79 mg% utilizing a atorvastatin 40 mg.  She takes Omega fish oil therapy.  I did not make changes today.         HTN (hypertension)     Losartan 25 mg currently utilized.  She also takes Aldactone and propranolol.  Current therapy to continue.  Her blood pressure readings have been stable, today's reading 140/84.  No changes advised.         Coronary artery disease with stable angina pectoris     Lexiscan ordered.  She has not had previous stress testing.  She is reporting intermittent chest pains.  Previously was treated as musculoskeletal discomfort.  Coronary calcifications noted on imaging studies of the chest.             Renal/    Stage 3a chronic kidney disease     Condition stable.            Endocrine    Type 2 diabetes mellitus with diabetic peripheral angiopathy without gangrene, without long-term current use of insulin     Most recent hemoglobin A1c 6.9.  She takes metformin.  Condition stable.         Class 3 obesity     Weight loss encouraged.          Other Visit Diagnoses       Chest pain, unspecified type    -  Primary    Relevant Orders    IN OFFICE EKG 12-LEAD (to [facility])    Stress Echo Which stress agent will be used? Pharmacological; Color Flow Doppler? No    EKG 12-LEAD - [place]    Coronary artery disease involving native coronary artery of native heart, unspecified whether angina present        Relevant Orders    Stress Echo Which stress agent will be used? Pharmacological; Color Flow Doppler? No               Stress echo ordered.  I made a 3 month follow-up.  I did not make medication changes today.  Most recent LDL 79 mg%.    Thank you for allowing me to participate in your patient's care.              Yariel Lantigua MD  11/08/2024   2:41 PM

## 2024-11-08 NOTE — ASSESSMENT & PLAN NOTE
Current LDL 79 mg% utilizing a atorvastatin 40 mg.  She takes Omega fish oil therapy.  I did not make changes today.

## 2024-11-09 LAB
OHS QRS DURATION: 82 MS
OHS QTC CALCULATION: 426 MS

## 2024-11-11 ENCOUNTER — TELEPHONE (OUTPATIENT)
Dept: INTERNAL MEDICINE | Facility: CLINIC | Age: 69
End: 2024-11-11
Payer: MEDICARE

## 2024-11-11 ENCOUNTER — TELEPHONE (OUTPATIENT)
Dept: CARDIOLOGY | Facility: CLINIC | Age: 69
End: 2024-11-11
Payer: MEDICARE

## 2024-11-11 DIAGNOSIS — G62.9 NEUROPATHY: ICD-10-CM

## 2024-11-11 RX ORDER — METFORMIN HYDROCHLORIDE 500 MG/1
500 TABLET ORAL 2 TIMES DAILY WITH MEALS
Qty: 180 TABLET | Refills: 0 | Status: SHIPPED | OUTPATIENT
Start: 2024-11-11 | End: 2024-11-11

## 2024-11-11 RX ORDER — GABAPENTIN 600 MG/1
600 TABLET ORAL DAILY
Qty: 90 TABLET | Refills: 0 | Status: SHIPPED | OUTPATIENT
Start: 2024-11-11 | End: 2025-02-09

## 2024-11-11 RX ORDER — METFORMIN HYDROCHLORIDE 500 MG/1
1000 TABLET ORAL 2 TIMES DAILY WITH MEALS
Qty: 360 TABLET | Refills: 0 | Status: SHIPPED | OUTPATIENT
Start: 2024-11-11 | End: 2025-02-09

## 2024-11-11 NOTE — TELEPHONE ENCOUNTER
----- Message from Talia sent at 11/11/2024  4:17 PM CST -----  Contact: [phone] .1MEDICALADVICE     Patient is calling for Medical Advice regarding:pt is calling for a call back. No other info. She was very rude and hung up on me.  Please call her back and advise.    How long has patient had these symptoms:    Pharmacy name and phone#:    Patient wants a call back or thru [facility]:callback    Comments:    Please advise patient replies from provider may take up to 48 hours.

## 2024-11-11 NOTE — TELEPHONE ENCOUNTER
----- Message from Deidre sent at 11/11/2024  8:59 AM CST -----  Contact: Home  [phone]  Requesting an RX refill or new RX.    Is this a refill or new RX: Refill    RX name and strength gabapentin (NEURONTIN) 600 MG tablet    Is this a 30 day or 90 day RX: 270    Pharmacy name and phone #   [facility] - [facility] [address]  [address] [address]  Phone: [phone] Fax: [phone]      The doctors have asked that we provide their patients with the following 2 reminders -- prescription refills can take up to 72 hours, and a friendly reminder that in the future you can use your [facility] account to request refills:     Requesting an RX refill or new RX.    Is this a refill or new RX: Refill    RX name and strength metFORMIN (GLUCOPHAGE) 500 MG tablet    Is this a 30 day or 90 day RX: 180    Pharmacy name and phone # [facility] - [facility] [address]  [address] [id number]  Phone: [phone] Fax: [phone]      The doctors have asked that we provide their patients with the following 2 reminders -- prescription refills can take up to 72 hours, and a friendly reminder that in the future you can use your [facility] account to request refills:   Comment: Patient said that another provider told her that her Metformin need to be adjusted. Patient would like for you to give her a call.

## 2024-11-11 NOTE — TELEPHONE ENCOUNTER
Patient was contacted , she wanted to reschedule Echo , was not able to keep appointment scheduled for 11/12 due to transportation.

## 2024-11-11 NOTE — TELEPHONE ENCOUNTER
----- Message from Med Assistant Ligia sent at 11/11/2024  8:49 AM CST -----  I'm sorry wrong date 11-. Thank you.  ----- Message -----  From: Tasha Marques MA  Sent: 11/11/2024   8:41 AM CST  To: Ligia Montjeo MA    That's neurology .  ----- Message -----  From: Ligia Montejo MA  Sent: 11/11/2024   8:36 AM CST  To: [facility] Staff    The patient would like to talk  to you about her test that schedule on 11- she have some question the patient can be reach at  [phone]. Thank you.

## 2024-11-11 NOTE — TELEPHONE ENCOUNTER
Called patient with no response. Left a voicemail stating that I increased her metformin from 500mg to 1000mg BID. She can follow-up with me during our visit on 12/16/24

## 2024-11-11 NOTE — TELEPHONE ENCOUNTER
----- Message from Talia sent at 11/11/2024  4:17 PM CST -----  Contact: [phone] .1MEDICALADVICE     Patient is calling for Medical Advice regarding:pt is calling for a call back. No other info. She was very rude and hung up on me.  Please call her back and advise.    How long has patient had these symptoms:    Pharmacy name and phone#:    Patient wants a call back or thru [facility]:callback    Comments:    Please advise patient replies from provider may take up to 48 hours.

## 2024-11-11 NOTE — TELEPHONE ENCOUNTER
----- Message from Soledad sent at 11/11/2024 12:28 PM CST -----  Regarding: Returning call  Patient mother Curry calling to speak with staff regarding patient current condition. Please call back @ [phone]. Thank you Soledad

## 2024-11-12 ENCOUNTER — TELEPHONE (OUTPATIENT)
Dept: CARDIOLOGY | Facility: CLINIC | Age: 69
End: 2024-11-12
Payer: MEDICARE

## 2024-11-12 ENCOUNTER — TELEPHONE (OUTPATIENT)
Dept: NEPHROLOGY | Facility: CLINIC | Age: 69
End: 2024-11-12
Payer: MEDICARE

## 2024-11-12 NOTE — TELEPHONE ENCOUNTER
Mom is asking to speak to Dr. Lantigua  ----- Message from Med Assistant Ligia sent at 11/12/2024 11:02 AM CST -----  The patient mother would like  to talk  to you about her test result please call Curry at [phone]. Thank you.

## 2024-11-12 NOTE — TELEPHONE ENCOUNTER
----- Message from Nurse Palomares sent at 11/12/2024 12:20 PM CST -----  Regarding: FW: Rescheduling  Contact: [phone]    ----- Message -----  From: Job Mendez  Sent: 11/12/2024   9:15 AM CST  To: [facility] Staff  Subject: Rescheduling                                     .[email or web address]     Patient is calling for Medical Advice regarding: Patient needs to reschedule her appointment. She said she needs to reschedule asap. She has chronic kidney disease. She had labs done within the last 30 days.    How long has patient had these symptoms:    Pharmacy name and phone#:    Patient wants a call back or thru [facility]: Call    Comments:    Please advise patient replies from provider may take up to 48 hours.

## 2024-11-14 ENCOUNTER — TELEPHONE (OUTPATIENT)
Dept: NEUROLOGY | Facility: CLINIC | Age: 69
End: 2024-11-14
Payer: MEDICARE

## 2024-11-14 NOTE — TELEPHONE ENCOUNTER
I spoke to the patient to confirm 11/21 at 10:00 appointment. Patient stated she will be on a transportation bus.

## 2024-11-15 ENCOUNTER — TELEPHONE (OUTPATIENT)
Dept: INTERNAL MEDICINE | Facility: CLINIC | Age: 69
End: 2024-11-15
Payer: MEDICARE

## 2024-11-15 RX ORDER — FLUOXETINE HYDROCHLORIDE 20 MG/1
20 CAPSULE ORAL 2 TIMES DAILY
Qty: 60 CAPSULE | Refills: 11 | Status: SHIPPED | OUTPATIENT
Start: 2024-11-15

## 2024-11-15 NOTE — TELEPHONE ENCOUNTER
----- Message from Brianna sent at 11/14/2024  3:15 PM CST -----  Contact: [phone] patient  .[email or web address]     Patient is calling for Medical Advice regarding: Patient is calling because she need an appointment for her Stage 3a chronic kidney disease [N18.31]. Informed I could help. Patient need and want to talk to Dr. Patient also need Rx refill but does not have name. Please call patient     How long has patient had these symptoms:    Pharmacy name and phone#:    Patient wants a call back or thru [facility]:call     Comments:    Please advise patient replies from provider may take up to 48 hours.

## 2024-11-18 RX ORDER — LOSARTAN POTASSIUM 25 MG/1
25 TABLET ORAL DAILY
Qty: 90 TABLET | Refills: 3 | Status: SHIPPED | OUTPATIENT
Start: 2024-11-18 | End: 2025-11-13

## 2024-11-18 RX ORDER — LEVOTHYROXINE SODIUM 150 UG/1
150 TABLET ORAL
Qty: 90 TABLET | Refills: 3 | Status: SHIPPED | OUTPATIENT
Start: 2024-11-18 | End: 2025-11-13

## 2024-11-19 ENCOUNTER — TELEPHONE (OUTPATIENT)
Dept: NEPHROLOGY | Facility: CLINIC | Age: 69
End: 2024-11-19
Payer: MEDICARE

## 2024-11-19 ENCOUNTER — TELEPHONE (OUTPATIENT)
Dept: PSYCHIATRY | Facility: CLINIC | Age: 69
End: 2024-11-19
Payer: MEDICARE

## 2024-11-19 NOTE — TELEPHONE ENCOUNTER
----- Message from Celia sent at 11/18/2024 10:36 AM CST -----  Regarding: Call back  Contact: [phone]  Type:  Sooner Apoointment Request    Caller is requesting a sooner appointment.  Caller declined first available appointment listed below.  Caller will not accept being placed on the waitlist and is requesting a message be sent to doctor.  Name of Caller: EP  When is the first available appointment?   Symptoms: Stage 3a chronic kidney disease  Would the patient rather a call back or a response via [facility]? Call back   Best Call Back Number: [phone]  Additional Information:

## 2024-11-20 ENCOUNTER — TELEPHONE (OUTPATIENT)
Dept: PSYCHIATRY | Facility: CLINIC | Age: 69
End: 2024-11-20
Payer: MEDICARE

## 2024-11-20 DIAGNOSIS — G62.9 NEUROPATHY: ICD-10-CM

## 2024-11-20 NOTE — TELEPHONE ENCOUNTER
----- Message from Norma sent at 11/20/2024  9:25 AM CST -----  Regarding: Call  Patient called again requesting you return her call to [phone].    Thank you.

## 2024-11-20 NOTE — TELEPHONE ENCOUNTER
----- Message from Britt sent at 11/20/2024  2:19 PM CST -----  Contact: Self/ [phone]  .[email or web address]     Patient is calling for Medical Advice regarding:medication     Pharmacy name and phone#: [facility] - [place], [place] - [address]  [address] [address]  Phone: [phone] Fax: [phone]         Patient wants a call back or thru [facility]: call back     Comments: pt said that she is calling in regards to the doctor did not give her enough gabapentin (NEURONTIN) 600 MG tablet pt stated that she only has 2 left , pt stated that she normally gets a 90 day supply but the doctor did not give her that qty. Please advise     Please advise patient replies from provider may take up to 48 hours.

## 2024-11-21 ENCOUNTER — OFFICE VISIT (OUTPATIENT)
Dept: NEUROLOGY | Facility: CLINIC | Age: 69
End: 2024-11-21
Payer: MEDICARE

## 2024-11-21 VITALS — HEIGHT: 63 IN | WEIGHT: 242.5 LBS | BODY MASS INDEX: 42.97 KG/M2

## 2024-11-21 DIAGNOSIS — E66.813 CLASS 3 OBESITY: ICD-10-CM

## 2024-11-21 DIAGNOSIS — G47.33 OSA (OBSTRUCTIVE SLEEP APNEA): ICD-10-CM

## 2024-11-21 DIAGNOSIS — Z72.0 DECLINED SMOKING CESSATION: ICD-10-CM

## 2024-11-21 DIAGNOSIS — F31.31 BIPOLAR AFFECTIVE DISORDER, CURRENTLY DEPRESSED, MILD: ICD-10-CM

## 2024-11-21 DIAGNOSIS — I10 PRIMARY HYPERTENSION: ICD-10-CM

## 2024-11-21 DIAGNOSIS — G43.109 MIGRAINE WITH AURA AND WITHOUT STATUS MIGRAINOSUS, NOT INTRACTABLE: Primary | ICD-10-CM

## 2024-11-21 DIAGNOSIS — F17.200 SMOKER: ICD-10-CM

## 2024-11-21 PROCEDURE — 99999 PR PBB SHADOW E&M-EST. PATIENT-LVL IV: CPT | Mod: PBBFAC,HCNC,,

## 2024-11-21 RX ORDER — GABAPENTIN 600 MG/1
600 TABLET ORAL DAILY
Qty: 90 TABLET | Refills: 0 | OUTPATIENT
Start: 2024-11-21 | End: 2025-02-19

## 2024-11-21 RX ORDER — UBROGEPANT 100 MG/1
100 TABLET ORAL 2 TIMES DAILY PRN
Qty: 15 TABLET | Refills: 2 | Status: SHIPPED | OUTPATIENT
Start: 2024-11-21
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
## 2024-11-25 ENCOUNTER — HOSPITAL ENCOUNTER (OUTPATIENT)
Dept: CARDIOLOGY | Facility: HOSPITAL | Age: 69
Discharge: HOME | End: 2024-11-25
Attending: INTERNAL MEDICINE
Payer: MEDICARE

## 2024-11-25 VITALS
SYSTOLIC BLOOD PRESSURE: 115 MMHG | RESPIRATION RATE: 18 BRPM | WEIGHT: 242 LBS | BODY MASS INDEX: 42.88 KG/M2 | HEIGHT: 63 IN | DIASTOLIC BLOOD PRESSURE: 82 MMHG | HEART RATE: 68 BPM

## 2024-11-25 DIAGNOSIS — R07.9 CHEST PAIN, UNSPECIFIED TYPE: ICD-10-CM

## 2024-11-25 DIAGNOSIS — I25.10 CORONARY ARTERY DISEASE INVOLVING NATIVE CORONARY ARTERY OF NATIVE HEART, UNSPECIFIED WHETHER ANGINA PRESENT: ICD-10-CM

## 2024-11-25 LAB
ASCENDING AORTA: 2.63 CM
AV AREA BY CONTINUOUS VTI: 2.6 CM2
AV INDEX (PROSTH): 0.79
AV LVOT MEAN GRADIENT: 2 MMHG
AV LVOT PEAK GRADIENT: 4 MMHG
AV MEAN GRADIENT: 4.3 MMHG
AV PEAK GRADIENT: 7.8 MMHG
AV VALVE AREA BY VELOCITY RATIO: 2.2 CM²
AV VALVE AREA: 2.5 CM2
AV VELOCITY RATIO: 0.71
BSA FOR ECHO PROCEDURE: 2.21 M2
CV ECHO LV RWT: 0.53 CM
CV STRESS BASE HR: 68 BPM
DIASTOLIC BLOOD PRESSURE: 80 MMHG
DOP CALC AO PEAK VEL: 1.4 M/S
DOP CALC AO VTI: 25.3 CM
DOP CALC LVOT AREA: 3.1 CM2
DOP CALC LVOT DIAMETER: 2 CM
DOP CALC LVOT PEAK VEL: 1 M/S
DOP CALC LVOT STROKE VOLUME: 62.8 CM3
DOP CALCLVOT PEAK VEL VTI: 20 CM
E WAVE DECELERATION TIME: 151.62 MS
E/A RATIO: 1.06
E/E' RATIO: 11.73 M/S
ECHO EF ESTIMATED: 56 %
ECHO LV POSTERIOR WALL: 1.2 CM (ref 0.6–1.1)
EJECTION FRACTION: 63 %
FRACTIONAL SHORTENING: 28.9 % (ref 28–44)
INTERVENTRICULAR SEPTUM: 1.3 CM (ref 0.6–1.1)
LA MAJOR: 6.35 CM
LA MINOR: 6.13 CM
LA WIDTH: 3.17 CM
LEFT ATRIUM SIZE: 3.16 CM
LEFT ATRIUM VOLUME INDEX MOD: 29.4 ML/M2
LEFT ATRIUM VOLUME INDEX: 25.3 ML/M2
LEFT ATRIUM VOLUME MOD: 61.75 ML
LEFT ATRIUM VOLUME: 53.11 CM3
LEFT INTERNAL DIMENSION IN SYSTOLE: 3.2 CM (ref 2.1–4)
LEFT VENTRICLE DIASTOLIC VOLUME INDEX: 44.18 ML/M2
LEFT VENTRICLE DIASTOLIC VOLUME: 92.77 ML
LEFT VENTRICLE MASS INDEX: 100.1 G/M2
LEFT VENTRICLE SYSTOLIC VOLUME INDEX: 19.4 ML/M2
LEFT VENTRICLE SYSTOLIC VOLUME: 40.78 ML
LEFT VENTRICULAR INTERNAL DIMENSION IN DIASTOLE: 4.5 CM (ref 3.5–6)
LEFT VENTRICULAR MASS: 210.2 G
LV LATERAL E/E' RATIO: 12.57
LV SEPTAL E/E' RATIO: 11
MV PEAK A VEL: 0.83 M/S
MV PEAK E VEL: 0.88 M/S
OHS CV CPX 1 MINUTE RECOVERY HEART RATE: 126 BPM
OHS CV CPX 85 PERCENT MAX PREDICTED HEART RATE MALE: 129
OHS CV CPX MAX PREDICTED HEART RATE: 152
OHS CV CPX PATIENT IS FEMALE: 1
OHS CV CPX PATIENT IS MALE: 0
OHS CV CPX PEAK DIASTOLIC BLOOD PRESSURE: 70 MMHG
OHS CV CPX PEAK HEAR RATE: 127 BPM
OHS CV CPX PEAK RATE PRESSURE PRODUCT: ABNORMAL
OHS CV CPX PEAK SYSTOLIC BLOOD PRESSURE: 147 MMHG
OHS CV CPX PERCENT MAX PREDICTED HEART RATE ACHIEVED: 87
OHS CV CPX RATE PRESSURE PRODUCT PRESENTING: 6664
OHS CV INITIAL DOSE: 10 MCG/KG/MIN
OHS CV PEAK DOSE: 40 MCG/KG/MIN
OHS CV RV/LV RATIO: 0.82 CM
POST STRESS EJECTION FRACTION: 65 %
RA MAJOR: 4.79 CM
RA PRESSURE ESTIMATED: 3 MMHG
RA WIDTH: 3.58 CM
RIGHT ATRIAL AREA: 16.9 CM2
RIGHT VENTRICLE DIASTOLIC BASEL DIMENSION: 3.7 CM
RV TISSUE DOPPLER FREE WALL SYSTOLIC VELOCITY 1 (APICAL 4 CHAMBER VIEW): 10.7 CM/S
SINUS: 3.07 CM
STJ: 2.83 CM
SYSTOLIC BLOOD PRESSURE: 98 MMHG
TDI LATERAL: 0.07 M/S
TDI SEPTAL: 0.08 M/S
TDI: 0.08 M/S
TRICUSPID ANNULAR PLANE SYSTOLIC EXCURSION: 2.26 CM
Z-SCORE OF LEFT VENTRICULAR DIMENSION IN END DIASTOLE: -3.72
Z-SCORE OF LEFT VENTRICULAR DIMENSION IN END SYSTOLE: -1.74

## 2024-11-25 PROCEDURE — 25500020 PHARM REV CODE 255: Mod: HCNC | Performed by: INTERNAL MEDICINE

## 2024-11-25 PROCEDURE — 93351 STRESS TTE COMPLETE: CPT | Mod: HCNC

## 2024-11-25 PROCEDURE — 63600175 PHARM REV CODE 636 W HCPCS: Mod: HCNC | Performed by: INTERNAL MEDICINE

## 2024-11-25 PROCEDURE — 93351 STRESS TTE COMPLETE: CPT | Mod: 26,HCNC,, | Performed by: INTERNAL MEDICINE

## 2024-11-25 PROCEDURE — 93352 ADMIN ECG CONTRAST AGENT: CPT | Mod: HCNC,,, | Performed by: INTERNAL MEDICINE

## 2024-11-25 RX ORDER — DOBUTAMINE HYDROCHLORIDE 400 MG/100ML
40 INJECTION, SOLUTION INTRAVENOUS
Status: COMPLETED | OUTPATIENT
Start: 2024-11-25 | End: 2024-11-25

## 2024-11-25 RX ORDER — OLOPATADINE HYDROCHLORIDE 1 MG/ML
1 SOLUTION/ DROPS OPHTHALMIC 2 TIMES DAILY
COMMUNITY

## 2024-11-25 RX ORDER — CARBOXYMETHYLCELLULOSE SODIUM 5 MG/ML
1 SOLUTION/ DROPS OPHTHALMIC 3 TIMES DAILY PRN
COMMUNITY

## 2024-11-25 RX ORDER — ATROPINE SULFATE 0.1 MG/ML
0.25 INJECTION INTRAVENOUS
Status: COMPLETED | OUTPATIENT
Start: 2024-11-25 | End: 2024-11-25

## 2024-11-25 RX ADMIN — ATROPINE SULFATE 0.25 MG: 0.1 INJECTION INTRAVENOUS at 02:11

## 2024-11-25 RX ADMIN — DOBUTAMINE 40 MCG/KG/MIN: 12.5 INJECTION, SOLUTION, CONCENTRATE INTRAVENOUS at 02:11

## 2024-11-25 RX ADMIN — HUMAN ALBUMIN MICROSPHERES AND PERFLUTREN 0.66 MG: 10; .22 INJECTION, SOLUTION INTRAVENOUS at 02:11

## 2024-11-25 NOTE — NURSING NOTE
Patient verified by 2 identifiers and allergies reviewed.  22 g IV placed to Lt FA.  DSE consent obtained & pt verbalized understanding.  3cc Optison administered, echo images obtained, DSE testing completed.  IV flushed with 10cc saline pre and post optison and DSE. Pt tolerated testing well. IV removed post testing.  Post study discharge instructions reviewed with patient, patient verbalized understanding.  Patient discharged to home in stable condition.

## 2024-11-26 ENCOUNTER — TELEPHONE (OUTPATIENT)
Dept: NEUROLOGY | Facility: CLINIC | Age: 69
End: 2024-11-26
Payer: MEDICARE

## 2024-11-26 NOTE — TELEPHONE ENCOUNTER
"Called patient to inform her we received the Auth request and they're working on it. If / when something changes I"ll let her know.  "

## 2024-11-27 ENCOUNTER — TELEPHONE (OUTPATIENT)
Dept: NEUROLOGY | Facility: CLINIC | Age: 69
End: 2024-11-27
Payer: MEDICARE

## 2024-11-27 DIAGNOSIS — J30.2 SEASONAL ALLERGIES: ICD-10-CM

## 2024-11-27 NOTE — TELEPHONE ENCOUNTER
Called patient to inform her that we did receive prior auth and working on it. When we get a update I'll let her know. Patient verbalized she understood.

## 2024-11-29 ENCOUNTER — OFFICE VISIT (OUTPATIENT)
Dept: OTOLARYNGOLOGY | Facility: CLINIC | Age: 69
End: 2024-11-29
Payer: MEDICARE

## 2024-11-29 DIAGNOSIS — H61.23 BILATERAL IMPACTED CERUMEN: Primary | ICD-10-CM

## 2024-11-29 DIAGNOSIS — H90.3 BILATERAL SENSORINEURAL HEARING LOSS: ICD-10-CM

## 2024-11-29 PROCEDURE — 99999 PR PBB SHADOW E&M-EST. PATIENT-LVL II: CPT | Mod: PBBFAC,HCNC,, | Performed by: OTOLARYNGOLOGY

## 2024-11-29 NOTE — PROGRESS NOTES
.  Ear, Nose, & Throat  Otolaryngology - Head & Neck Surgery    Summary of Visit:  Joseluis Triplett is a kind patient who was  seen in ENT Clinic  for Other Misc and Otalgia      Subjective:     Chief Complaint:   Chief Complaint   Patient presents with    Other Misc    Otalgia       Joseluis Triplett is a 69 y.o. female who was  seen in ENT Clinic  for evaluation of her ears.  She has longstanding history of hearing loss and currently wears hearing aids.  She also has history of frequent accumulation of cerumen.  She is concerned that this may be the case at present.  She has mild ear fullness and no discrete pain.  She denies any otorrhea, vertigo, tinnitus..  She is also concerned about swelling at the angle of her mandible bilaterally.  She notes that this is fluctuating and symmetric.    Past Medical History  Active Ambulatory Problems     Diagnosis Date Noted    Polypharmacy 01/04/2013    Schizoaffective disorder, depressive type 01/04/2013    Bipolar affective disorder, current episode hypomanic 01/04/2013    Tobacco abuse 01/04/2013    Edema 01/04/2013    COPD without exacerbation 01/04/2013    Obesity, Class II, BMI 35-39.9, with comorbidity 01/04/2013    Thyroid disorder 01/04/2013    Hyperlipidemia 01/04/2013    History of anorexia nervosa 01/17/2013    History of bulimia nervosa 01/17/2013    Acid reflux 01/17/2013    Chronic rhinitis 03/22/2013    Chronic constipation 05/23/2013    Osteoarthritis 10/10/2013    CLARI (obstructive sleep apnea) 11/11/2013    Drug-induced parkinsonism 12/12/2013    Claudication 05/21/2014    Migraine headache 08/26/2014    Neuropathy 08/26/2014    Hypotension 05/27/2015    Convulsion 06/22/2015    Congenital hypothyroidism without goiter 06/26/2015    Gastroesophageal reflux disease without esophagitis 06/28/2015    Cognitive disorder 06/29/2015    Ventral incisional hernia without obstruction or gangrene 07/06/2016    Epigastric pain 01/10/2017    Back muscle spasm  03/28/2017    Pain 05/03/2017    Insomnia disorder with non-sleep disorder mental comorbidity 05/31/2018    Colon cancer screening, due for repeat 5/2019 07/16/2018    Bipolar affective disorder, currently depressed, mild 08/23/2018    Chronic kidney disease 01/26/2019    HTN (hypertension) 01/26/2019    Bipolar affective disorder, currently manic, mild 11/25/2019    Anxiety 01/04/2020    Involuntary jerky movements 01/13/2020    Gait disorder 01/13/2020    Type 2 diabetes mellitus with diabetic peripheral angiopathy without gangrene, without long-term current use of insulin 06/03/2021    Malignant melanoma of face 07/19/2024    Paranoid behavior 07/19/2024    Type 2 diabetes mellitus with stage 3b chronic kidney disease, without long-term current use of insulin 07/19/2024    Stage 3a chronic kidney disease 07/19/2024    Coronary artery disease with stable angina pectoris 11/08/2024    Class 3 obesity 11/08/2024    Smoker 11/21/2024     Resolved Ambulatory Problems     Diagnosis Date Noted    Wart 10/10/2013    Tinnitus 01/16/2014    Otitis media 03/28/2014    Status post abdominal surgery, follow-up exam 05/30/2015    Perforated duodenal ulcer 05/28/2015    Abdominal wall abscess at site of surgical wound 06/26/2015    C. difficile diarrhea 06/29/2015    Elevated transaminase level 06/29/2015    Delirium 07/02/2015    Clostridium difficile infection 07/07/2015    Asterixis 01/18/2017    Bipolar affective disorder, currently manic, moderate 01/03/2019     Past Medical History:   Diagnosis Date    Asthma     Bipolar disorder     Colon polyps     COPD (chronic obstructive pulmonary disease)     COVID-19 virus detected 4/14/20 & 4/24/20    Depression     DVT (deep venous thrombosis)     Dysphagia     GERD (gastroesophageal reflux disease)     GERD (gastroesophageal reflux disease)     Hard of hearing     Hearing aid worn     Hiatal hernia     History of psychiatric hospitalization     Hx of psychiatric care     Katty      Parkinson disease     Psychiatric problem     Recurrent upper respiratory infection (URI)     Schizo affective schizophrenia     Seizures 2018    Therapy     Thyroid disease     Traumatic injury     Use of cane as ambulatory aid        Past Surgical History  She has a past surgical history that includes Tonsillectomy; Tympanostomy tube placement; Colonoscopy (N/A, 5/17/2016); Esophagogastroduodenoscopy; Dialysis fistula creation; and Esophagogastroduodenoscopy (N/A, 5/24/2018).    Past Surgical History:   Procedure Laterality Date    COLONOSCOPY N/A 5/17/2016    Procedure: COLONOSCOPY;  Surgeon: Akbar Tsang MD;  Location: [facility] ([address] [place] [address]);  Service: Endoscopy;  Laterality: N/A;    DIALYSIS FISTULA CREATION      right arm, but not used    ESOPHAGOGASTRODUODENOSCOPY      ESOPHAGOGASTRODUODENOSCOPY N/A 5/24/2018    Procedure: ESOPHAGOGASTRODUODENOSCOPY (EGD);  Surgeon: Hannah Suero MD;  Location: [facility] ([address] [place] [address]);  Service: Endoscopy;  Laterality: N/A;    TONSILLECTOMY      TYMPANOSTOMY TUBE PLACEMENT          Family History  Her family history includes Alcohol abuse in her mother; Bipolar disorder in her mother; Breast cancer in her sister and another family member; Cancer (age of onset: 60) in her maternal grandmother; Dementia in her mother.    Social History  She reports that she quit smoking about 6 years ago. Her smoking use included cigars and cigarettes. She started smoking about 46 years ago. She has a 60 pack-year smoking history. She quit smokeless tobacco use about 11 years ago. She reports that she does not drink alcohol and does not use drugs.    Allergies  She is allergic to nsaids (non-steroidal anti-inflammatory drug) and penicillins.    Medications  She has a current medication list which includes the following prescription(s): accu-chek guide test strips, acetaminophen, albuterol, albuterol, albuterol, albuterol-ipratropium, amoxicillin-clavulanate 875-125mg, aspirin, atorvastatin,  azithromycin, baclofen, blood-glucose meter, budesonide-formoterol 80-4.5 mcg, carbidopa-levodopa  mg, carbidopa-levodopa  mg, carboxymethylcellulose, carboxymethylcellulose, chlorhexidine, chlorzoxazone, clotrimazole-betamethasone 1-0.05%, dexamethasone, diclofenac, diclofenac sodium, dicyclomine, diphenhydramine-acetaminophen, diphenhydramine-calamine, divalproex er, docusate sodium, erythromycin, esomeprazole, fluconazole, fluconazole, fluoxetine, fluticasone furoate-vilanterol, fluticasone propionate, fluticasone-salmeterol 250-50 mcg/dose, furosemide, gabapentin, gabapentin, galcanezumab-gnlm, guaifenesin, ketoprofen, levothyroxine, lidocaine, linaclotide, linaclotide, loratadine, losartan, lubiprostone, melatonin, melatonin, metformin, mirtazapine, mometasone, multivitamin, nicotine, nitrofurantoin (macrocrystal-monohydrate), nystatin, nystatin-triamcinolone, nystatin-triamcinolone, olanzapine, olopatadine, omega-3 acid ethyl esters, ondansetron, paliperidone, pantoprazole, polyethylene glycol, polyethylene glycol, potassium chloride sa, promethazine, propranolol, quetiapine, qulipta, risperidone, sodium chloride, spironolactone, tiotropium, tizanidine, topiramate, tramadol, true metrix air glucose meter, ubrelvy, and [DISCONTINUED] fluphenazine.    ROS:  Pertinent positive and negative review of systems as noted in HPI.     Objective:     LMP  (LMP Unknown)      General Appearance:   Awake, Alert and Oriented. NAD. Appropriate affect and appearance      Neuro:   Spontaneous eye opening, appropriate verbal responses, follows commands  Pupils equal, round & brisk. EOMI, no proptosis, no spontaneous nystagmus  Face is symmetric, HB I, non-edematous and SILT bilaterally  Vision grossly intact, Hearing grossly intact  STEPHANIE, normal tone     Head and Face:   skin is intact, facial movement symmetric     Ears:  Periauricular regions non-erythematous, non-fluctuanct non-tender  Pinna normal bilaterally, no  skin lesions  EACs with cerumen    Nose:   External nose is symmetric, no skin lesions  Septum midline, No inferior turbinate hypertrophy, No polyps or rhinorrhea     OC/OP:  Tongue midline on extension, non-edematous, soft  No labial, buccal, oral tongue or floor of mouth lesions  Soft palate symmetric, midline and without lesions or masses, tonsils symmetric  No masses or lesions of the visualized oropharynx     Neck:  Neck is symmetric, non-edematous, non-erythematous  Trachea is midline and easily palpable,  No palpable adenopathy or masses in levels I-VI     Glands:  Parotid and submandibular glands are symmetric and non-tender.  Mildly enlarged parotids bilaterally  No thyroid nodules or masses, non-tender      Respiratory:  Normal work of breathing, no accessory muscle use, no stridor     Voice:  Normal vocal quality, volume, prosody and articulation   Data Review:         Procedures:     Procedure: Cerumen removal 92260     Pre procedure Diagnosis: bilateral Cerumen Impaction     Post procedure Diagnosis: same     Instrument: Binocular Microscope     Anesthesia: None     Procedure: After verbal consent was obtained, the patient was laid supine in the small procedure room. A microscope was used to examine the ear. Instrumentation and suction were used to remove any cerumen from the EAC. If indicated, the procedure was repeated on the contralateral side. The patient tolerated the procedure well and without any acute complication. Findings below.      Findings:               Right Ear:               EAC: Normal, Cerumen filled              Tympanic membrane: Intact              Middle Ear: No effusion present and Ossicles in normal position  Left Ear:               EAC: Normal Cerumen filled              Tympanic membrane: Intact              Middle Ear: No effusion present and Ossicles in normal position     The cerumen was removed completely from both ears.       Assessment:      Bilateral cerumen  impaction  Bilateral sensorineural hearing loss      Plan:     The ears were cleaned without difficulty he.  No significant abnormalities are noted on exam of the cerumen.  She is doing reasonably well other hearing aids.  I recommended that she return to clinic in 6 months for repeat cleaning or sooner as needed.  No obvious abnormalities are noted in the parotid glands.  They are symmetrically enlarged.  No further evaluation is warranted.    No orders of the defined types were placed in this encounter.         Problem List Items Addressed This Visit    None

## 2024-11-29 NOTE — PATIENT INSTRUCTIONS
"  Age-Related Hearing Loss (Presbycusis)   The Basics   Written by the doctors and editors at [facility]   What is age-related hearing loss? -- When people talk about age-related hearing loss, they are usually referring to a condition called "presbycusis." This condition becomes more common with age. It affects more than half of all adults by the time they reach the age of 75.  Presbycusis causes you to slowly lose your hearing in both ears. It mostly affects how well you hear high-pitched sounds. That means that a person with presbycusis might have trouble hearing a bird chirping or a phone ringing but still be able to hear a truck rumbling. Also, people with presbycusis often find that they have a hard time hearing someone speak in noisy places.  Presbycusis is just one form of hearing loss that can happen as you age. There are other conditions that affect hearing that also become more common with age. The most common of these is a condition called "tinnitus," which means you hear ringing, buzzing, hissing, or roaring in one or both ears. Sometimes presbycusis and tinnitus happen together.  What causes hearing loss? -- When doctors refer to hearing loss, they often talk about:  Sensorineural hearing loss - This type of hearing loss happens when nerves in the ear that detect sounds and send signals to the brain are damaged. Presbycusis is a type of sensorineural hearing loss.  Conductive hearing loss - This type of hearing loss happens when sounds can't get to the inner ear because of problems in the middle or outer ear (figure 1). For example, conductive hearing loss can happen if you have too much wax in your ear and sounds are being muffled. It can also happen if you have an ear infection and parts of your ear are swollen or filled with fluid.  Mixed hearing loss - Mixed hearing loss happens when both types of hearing loss mentioned above occur at the same time.  Should I see a doctor or nurse? -- See a doctor or " "nurse if you realize that you are having increasing trouble hearing, or if someone close to you thinks you are having trouble hearing or understanding them.  Presbycusis occurs slowly over time, so you might not notice the changes yourself. However, if you lose hearing suddenly (over a few hours or a day), you should see a doctor or nurse right away. You might have a problem that is more serious than presbycusis. Sudden hearing loss can be caused by an infection or wax buildup in the ear. But it can also be caused by a problem in the nerve in the ear. If this is the case, you might need medicines to prevent permanent hearing loss.  Are there tests I should have? -- Your doctor or nurse will probably start by looking inside your ear using a special tool called an "otoscope." That way, they can check if anything looks swollen, blocked, damaged, or infected. Then they will decide which tests you should have based on your age, medical history, other symptoms, and physical exam. If they suspect hearing loss, you will probably need a test called an "audiogram."  A hearing specialist, called an "audiologist," will do the audiogram. To do the test, you put on a headset. Then the audiologist plays sounds in one ear at a time and asks you to indicate when you hear the sounds. This test will show what type of hearing loss you have, how severe it is, and whether both ears are affected the same way.  How is hearing loss treated? -- If you have presbycusis, your ear doctor (called an "otolaryngologist" or "ear, nose, and throat" doctor) will probably recommend hearing aids. But if you have other hearing problems, you might need different treatments. For instance, if you have too much wax in your ears, your doctor or nurse can remove it or teach you how to get the wax out. And if you have ringing or buzzing in your ears because of tinnitus, you might need to learn special skills to manage that problem.  Hearing aids - You might " not want to wear hearing aids because they make you feel embarrassed or because you think they don't work. But if your doctor recommends them, try to keep an open mind, even if you have tried them in the past. Hearing aid technology has gotten much better. There are now many different types of hearing aids to try. It might take a few tries to find the right type of hearing aid and the right settings, but they can really help. The key is to work with your ear doctor or audiologist until you find the best option for you. Always tell them if you have problems with the way your hearing aids sound or fit.  Can hearing loss be prevented? -- Yes. Some forms of hearing loss can be prevented. To protect your hearing, the most important thing you can do is avoid loud noises. If you must be around loud noises, then always wear ear protection (such as ear plugs or headphones) that will protect your hearing.  All topics are updated as new evidence becomes available and our peer review process is complete.  This topic retrieved from [employer] on: Sep 21, 2021.  Topic 57504 Version 14.0  Release: 29.4.2 - C29.263  © 2021 UpToDate, Inc. and/or its affiliates. All rights reserved.  figure 1: Normal ear     This figure shows the normal parts of the outer, middle, and inner ear.  Graphic 04249 Version 5.0     Consumer Information Use and Disclaimer   This information is not specific medical advice and does not replace information you receive from your health care provider. This is only a brief summary of general information. It does NOT include all information about conditions, illnesses, injuries, tests, procedures, treatments, therapies, discharge instructions or life-style choices that may apply to you. You must talk with your health care provider for complete information about your health and treatment options. This information should not be used to decide whether or not to accept your health care provider's advice, instructions or  recommendations. Only your health care provider has the knowledge and training to provide advice that is right for you. The use of this information is governed by the "[employer] End User License Agreement, available at https://[email or web address] [email or web address]/solutions/[email or web address]/reina.The use of [employer] content is governed by the [employer] Terms of Use. ©2021 UpToDate, Inc. All rights reserved.  Copyright   © 2021 UpToDate, Inc. and/or its affiliates. All rights reserved.

## 2024-12-02 ENCOUNTER — TELEPHONE (OUTPATIENT)
Dept: NEPHROLOGY | Facility: CLINIC | Age: 69
End: 2024-12-02
Payer: MEDICARE

## 2024-12-02 DIAGNOSIS — G43.109 MIGRAINE WITH AURA AND WITHOUT STATUS MIGRAINOSUS, NOT INTRACTABLE: ICD-10-CM

## 2024-12-02 RX ORDER — UBROGEPANT 100 MG/1
100 TABLET ORAL 2 TIMES DAILY PRN
Qty: 10 TABLET | Refills: 2 | Status: SHIPPED | OUTPATIENT
Start: 2024-12-02

## 2024-12-03 ENCOUNTER — TELEPHONE (OUTPATIENT)
Dept: CARDIOLOGY | Facility: HOSPITAL | Age: 69
End: 2024-12-03
Payer: MEDICARE

## 2024-12-03 NOTE — TELEPHONE ENCOUNTER
I have called her phone number more than once.  I am unable to leave a voicemail.  I am trying to reach out to schedule a follow-up visit given abnormal stress test.

## 2024-12-05 ENCOUNTER — TELEPHONE (OUTPATIENT)
Dept: INTERNAL MEDICINE | Facility: CLINIC | Age: 69
End: 2024-12-05
Payer: MEDICARE

## 2024-12-05 ENCOUNTER — TELEPHONE (OUTPATIENT)
Dept: CARDIOLOGY | Facility: CLINIC | Age: 69
End: 2024-12-05
Payer: MEDICARE

## 2024-12-05 NOTE — TELEPHONE ENCOUNTER
Patient is asking to have Dr. Lantigua call her with her results.  She says that she is deathly  afraid that something is very wrong   She states that she has an appointment with Dr. Hawkins on 12/13 and wants to know why.  [phone]     [facility] Counseling: I discussed with the patient the risks of tildrakizumab including but not limited to immunosuppression, malignancy, posterior leukoencephalopathy syndrome, and serious infections.  The patient understands that monitoring is required including a PPD at baseline and must alert us or the primary physician if symptoms of infection or other concerning signs are noted.

## 2024-12-05 NOTE — TELEPHONE ENCOUNTER
----- Message from Mathew sent at 12/4/2024  4:32 PM CST -----  Contact: [phone]  .[email or web address]     Patient is calling for Medical Advice regarding: PT HAS STRESS ECHO TEST BUT HAS NOT GOTTEN RESULTS from Dr. Yariel Oliveira, says she tried to call cardiology multiple times with no answer, but was told she has some abnormalities, doesn't know what to do     Patient wants a call back or thru [facility]: call    Comments:    Please advise patient replies from provider may take up to 48 hours.

## 2024-12-05 NOTE — TELEPHONE ENCOUNTER
Booked with a different cardiologist , was unable to get in with current cardiologist   Appointment was booked according to transportation   97

## 2024-12-05 NOTE — TELEPHONE ENCOUNTER
----- Message from Med Assistant Cordova sent at 12/5/2024  8:14 AM CST -----  Regarding: FW: DSE results    ----- Message -----  From: Yariel Lantigua MD  Sent: 12/5/2024   7:57 AM CST  To: Tasha Quinn MA  Subject: RE: DSE results                                  I can't reach her. I told Cristina to book [facility]t [facility]. Her stress is abnormal and Cath advised. Please inform her of the plan. A virtual visit may be better.  ----- Message -----  From: Tasha Marques MA  Sent: 12/2/2024   7:36 AM CST  To: Yariel Lantigua MD  Subject: FW: DSE results                                    ----- Message -----  From: Sabrina Vegas RT  Sent: 11/29/2024   2:39 PM CST  To: Grzegor[facility] Saldivar Staff  Subject: DSE results                                      Patient would like to be notified of her stress results done 11/25/24.  She can only be contacted at home phone number.    Please call her on Monday.

## 2024-12-09 DIAGNOSIS — G62.9 NEUROPATHY: ICD-10-CM

## 2024-12-09 NOTE — TELEPHONE ENCOUNTER
----- Message from Luisa sent at 12/9/2024  8:46 AM CST -----  Contact: Self [phone]  Requesting an RX refill or new RX.    Is this a refill or new RX: Refill    RX name and strength (copy/paste from chart):   gabapentin (NEURONTIN) 600 MG tablet, tiZANidine (ZANAFLEX) 2 MG tablet, and losartan (COZAAR) 25 MG tablet     Is this a 30 day or 90 day RX: 90    Pharmacy name and phone # (copy/paste from chart):      [facility] - [place], [place] - [address]  [address] [address]  Phone: [phone] Fax: [phone]    Caller states that she is taking her gabapentin 3 times a day. Pt states that she looking for to seeing the provider and catching up.

## 2024-12-10 DIAGNOSIS — G43.109 MIGRAINE WITH AURA AND WITHOUT STATUS MIGRAINOSUS, NOT INTRACTABLE: Primary | ICD-10-CM

## 2024-12-10 RX ORDER — UBROGEPANT 50 MG/1
50 TABLET ORAL 2 TIMES DAILY PRN
Qty: 10 TABLET | Refills: 2 | Status: SHIPPED | OUTPATIENT
Start: 2024-12-10

## 2024-12-11 DIAGNOSIS — I25.110 ATHEROSCLEROSIS OF NATIVE CORONARY ARTERY OF NATIVE HEART WITH UNSTABLE ANGINA PECTORIS: Primary | ICD-10-CM

## 2024-12-11 DIAGNOSIS — I25.110: ICD-10-CM

## 2024-12-11 RX ORDER — SODIUM CHLORIDE 0.9 % (FLUSH) 0.9 %
10 SYRINGE (ML) INJECTION
Status: SHIPPED | OUTPATIENT
Start: 2024-12-11

## 2024-12-11 RX ORDER — SODIUM CHLORIDE 9 MG/ML
INJECTION, SOLUTION INTRAVENOUS CONTINUOUS
OUTPATIENT
Start: 2024-12-11 | End: 2024-12-11

## 2024-12-16 ENCOUNTER — HOSPITAL ENCOUNTER (OUTPATIENT)
Facility: HOSPITAL | Age: 69
Discharge: OTHER INSTITUTION | End: 2024-12-18
Attending: EMERGENCY MEDICINE | Admitting: INTERNAL MEDICINE
Payer: MEDICARE

## 2024-12-16 DIAGNOSIS — R07.2 PRECORDIAL PAIN: Primary | ICD-10-CM

## 2024-12-16 DIAGNOSIS — F31.11 BIPOLAR AFFECTIVE DISORDER, CURRENTLY MANIC, MILD: ICD-10-CM

## 2024-12-16 DIAGNOSIS — R10.9 ABDOMINAL PAIN: ICD-10-CM

## 2024-12-16 DIAGNOSIS — E87.20 LACTIC ACIDOSIS: ICD-10-CM

## 2024-12-16 DIAGNOSIS — R07.9 CHEST PAIN: ICD-10-CM

## 2024-12-16 PROBLEM — R07.89 OTHER CHEST PAIN: Status: ACTIVE | Noted: 2024-12-16

## 2024-12-16 LAB
ABO + RH BLD: NORMAL
ALBUMIN SERPL BCP-MCNC: 3.5 G/DL (ref 3.5–5.2)
ALP SERPL-CCNC: 66 U/L (ref 40–150)
ALT SERPL W/O P-5'-P-CCNC: 13 U/L (ref 10–44)
ANION GAP SERPL CALC-SCNC: 20 MMOL/L (ref 8–16)
AST SERPL-CCNC: 16 U/L (ref 10–40)
BASOPHILS # BLD AUTO: 0.07 K/UL (ref 0–0.2)
BASOPHILS NFR BLD: 0.7 % (ref 0–1.9)
BILIRUB SERPL-MCNC: 0.3 MG/DL (ref 0.1–1)
BLD GP AB SCN CELLS X3 SERPL QL: NORMAL
BNP SERPL-MCNC: 49 PG/ML (ref 0–99)
BUN SERPL-MCNC: 23 MG/DL (ref 8–23)
CALCIUM SERPL-MCNC: 9.2 MG/DL (ref 8.7–10.5)
CHLORIDE SERPL-SCNC: 104 MMOL/L (ref 95–110)
CO2 SERPL-SCNC: 16 MMOL/L (ref 23–29)
CREAT SERPL-MCNC: 1.5 MG/DL (ref 0.5–1.4)
DIFFERENTIAL METHOD BLD: ABNORMAL
EOSINOPHIL # BLD AUTO: 0.1 K/UL (ref 0–0.5)
EOSINOPHIL NFR BLD: 1.1 % (ref 0–8)
ERYTHROCYTE [DISTWIDTH] IN BLOOD BY AUTOMATED COUNT: 13.8 % (ref 11.5–14.5)
EST. GFR  (NO RACE VARIABLE): 38 ML/MIN/1.73 M^2
GLUCOSE SERPL-MCNC: 342 MG/DL (ref 70–110)
HCT VFR BLD AUTO: 40.9 % (ref 37–48.5)
HGB BLD-MCNC: 13.1 G/DL (ref 12–16)
IMM GRANULOCYTES # BLD AUTO: 0.08 K/UL (ref 0–0.04)
IMM GRANULOCYTES NFR BLD AUTO: 0.8 % (ref 0–0.5)
INR PPP: 1.1 (ref 0.8–1.2)
LACTATE SERPL-SCNC: 4.2 MMOL/L (ref 0.5–2.2)
LYMPHOCYTES # BLD AUTO: 2 K/UL (ref 1–4.8)
LYMPHOCYTES NFR BLD: 21 % (ref 18–48)
MCH RBC QN AUTO: 30.6 PG (ref 27–31)
MCHC RBC AUTO-ENTMCNC: 32 G/DL (ref 32–36)
MCV RBC AUTO: 96 FL (ref 82–98)
MONOCYTES # BLD AUTO: 0.7 K/UL (ref 0.3–1)
MONOCYTES NFR BLD: 7.2 % (ref 4–15)
NEUTROPHILS # BLD AUTO: 6.7 K/UL (ref 1.8–7.7)
NEUTROPHILS NFR BLD: 69.2 % (ref 38–73)
NRBC BLD-RTO: 0 /100 WBC
PLATELET # BLD AUTO: 316 K/UL (ref 150–450)
PMV BLD AUTO: 10 FL (ref 9.2–12.9)
POTASSIUM SERPL-SCNC: 5 MMOL/L (ref 3.5–5.1)
PROT SERPL-MCNC: 6.2 G/DL (ref 6–8.4)
PROTHROMBIN TIME: 11.5 SEC (ref 9–12.5)
RBC # BLD AUTO: 4.28 M/UL (ref 4–5.4)
SODIUM SERPL-SCNC: 140 MMOL/L (ref 136–145)
SPECIMEN OUTDATE: NORMAL
TROPONIN I SERPL DL<=0.01 NG/ML-MCNC: 0.04 NG/ML (ref 0–0.03)
WBC # BLD AUTO: 9.67 K/UL (ref 3.9–12.7)

## 2024-12-16 PROCEDURE — 83880 ASSAY OF NATRIURETIC PEPTIDE: CPT | Mod: HCNC | Performed by: EMERGENCY MEDICINE

## 2024-12-16 PROCEDURE — 84484 ASSAY OF TROPONIN QUANT: CPT | Mod: HCNC | Performed by: EMERGENCY MEDICINE

## 2024-12-16 PROCEDURE — G0378 HOSPITAL OBSERVATION PER HR: HCPCS | Mod: HCNC

## 2024-12-16 PROCEDURE — 99214 OFFICE O/P EST MOD 30 MIN: CPT | Mod: 25,HCNC,, | Performed by: STUDENT IN AN ORGANIZED HEALTH CARE EDUCATION/TRAINING PROGRAM

## 2024-12-16 PROCEDURE — 96375 TX/PRO/DX INJ NEW DRUG ADDON: CPT | Mod: HCNC

## 2024-12-16 PROCEDURE — 99900035 HC TECH TIME PER 15 MIN (STAT): Mod: HCNC

## 2024-12-16 PROCEDURE — 85025 COMPLETE CBC W/AUTO DIFF WBC: CPT | Mod: HCNC | Performed by: EMERGENCY MEDICINE

## 2024-12-16 PROCEDURE — 99291 CRITICAL CARE FIRST HOUR: CPT | Mod: HCNC

## 2024-12-16 PROCEDURE — 82803 BLOOD GASES ANY COMBINATION: CPT | Mod: HCNC

## 2024-12-16 PROCEDURE — 25500020 PHARM REV CODE 255: Mod: HCNC | Performed by: STUDENT IN AN ORGANIZED HEALTH CARE EDUCATION/TRAINING PROGRAM

## 2024-12-16 PROCEDURE — 85610 PROTHROMBIN TIME: CPT | Mod: HCNC | Performed by: EMERGENCY MEDICINE

## 2024-12-16 PROCEDURE — 96361 HYDRATE IV INFUSION ADD-ON: CPT | Mod: HCNC

## 2024-12-16 PROCEDURE — 80053 COMPREHEN METABOLIC PANEL: CPT | Mod: HCNC | Performed by: EMERGENCY MEDICINE

## 2024-12-16 PROCEDURE — 83690 ASSAY OF LIPASE: CPT | Mod: HCNC | Performed by: EMERGENCY MEDICINE

## 2024-12-16 PROCEDURE — 86900 BLOOD TYPING SEROLOGIC ABO: CPT | Mod: 91,HCNC | Performed by: EMERGENCY MEDICINE

## 2024-12-16 PROCEDURE — 63600175 PHARM REV CODE 636 W HCPCS: Mod: HCNC | Performed by: EMERGENCY MEDICINE

## 2024-12-16 PROCEDURE — 96374 THER/PROPH/DIAG INJ IV PUSH: CPT | Mod: HCNC

## 2024-12-16 PROCEDURE — 83605 ASSAY OF LACTIC ACID: CPT | Mod: HCNC | Performed by: EMERGENCY MEDICINE

## 2024-12-16 PROCEDURE — 63600175 PHARM REV CODE 636 W HCPCS: Mod: HCNC | Performed by: STUDENT IN AN ORGANIZED HEALTH CARE EDUCATION/TRAINING PROGRAM

## 2024-12-16 RX ORDER — ALBUTEROL SULFATE 90 UG/1
1 INHALANT RESPIRATORY (INHALATION) EVERY 4 HOURS PRN
Qty: 18 G | Refills: 0 | Status: ON HOLD | OUTPATIENT
Start: 2024-12-16

## 2024-12-16 RX ORDER — LOSARTAN POTASSIUM 25 MG/1
25 TABLET ORAL DAILY
Qty: 90 TABLET | Refills: 3 | Status: ON HOLD | OUTPATIENT
Start: 2024-12-16 | End: 2025-12-11

## 2024-12-16 RX ORDER — GABAPENTIN 600 MG/1
600 TABLET ORAL DAILY
Qty: 90 TABLET | Refills: 0 | Status: ON HOLD | OUTPATIENT
Start: 2024-12-16 | End: 2025-03-16

## 2024-12-16 RX ORDER — CLOPIDOGREL BISULFATE 75 MG/1
75 TABLET ORAL DAILY
Status: DISCONTINUED | OUTPATIENT
Start: 2024-12-17 | End: 2024-12-18 | Stop reason: HOSPADM

## 2024-12-16 RX ORDER — INSULIN ASPART 100 [IU]/ML
0-5 INJECTION, SOLUTION INTRAVENOUS; SUBCUTANEOUS
Status: DISCONTINUED | OUTPATIENT
Start: 2024-12-17 | End: 2024-12-18 | Stop reason: HOSPADM

## 2024-12-16 RX ORDER — IBUPROFEN 200 MG
16 TABLET ORAL
Status: DISCONTINUED | OUTPATIENT
Start: 2024-12-17 | End: 2024-12-18 | Stop reason: HOSPADM

## 2024-12-16 RX ORDER — FLUTICASONE FUROATE AND VILANTEROL 100; 25 UG/1; UG/1
1 POWDER RESPIRATORY (INHALATION) DAILY
Status: DISCONTINUED | OUTPATIENT
Start: 2024-12-17 | End: 2024-12-18 | Stop reason: HOSPADM

## 2024-12-16 RX ORDER — ATORVASTATIN CALCIUM 40 MG/1
40 TABLET, FILM COATED ORAL DAILY
Status: DISCONTINUED | OUTPATIENT
Start: 2024-12-17 | End: 2024-12-18 | Stop reason: HOSPADM

## 2024-12-16 RX ORDER — FLUOXETINE HYDROCHLORIDE 20 MG/1
20 CAPSULE ORAL 2 TIMES DAILY
Status: DISCONTINUED | OUTPATIENT
Start: 2024-12-17 | End: 2024-12-18 | Stop reason: HOSPADM

## 2024-12-16 RX ORDER — HEPARIN SODIUM 5000 [USP'U]/ML
5000 INJECTION, SOLUTION INTRAVENOUS; SUBCUTANEOUS ONCE
Status: COMPLETED | OUTPATIENT
Start: 2024-12-16 | End: 2024-12-16

## 2024-12-16 RX ORDER — FLUTICASONE PROPIONATE 50 MCG
SPRAY, SUSPENSION (ML) NASAL
Qty: 11.1 ML | Refills: 0 | Status: ON HOLD | OUTPATIENT
Start: 2024-12-16

## 2024-12-16 RX ORDER — TIZANIDINE 4 MG/1
2 TABLET ORAL EVERY 8 HOURS
Qty: 90 TABLET | Refills: 11 | Status: ON HOLD | OUTPATIENT
Start: 2024-12-16

## 2024-12-16 RX ORDER — ONDANSETRON HYDROCHLORIDE 2 MG/ML
4 INJECTION, SOLUTION INTRAVENOUS ONCE
Status: COMPLETED | OUTPATIENT
Start: 2024-12-16 | End: 2024-12-16

## 2024-12-16 RX ORDER — ALBUTEROL SULFATE 90 UG/1
2 INHALANT RESPIRATORY (INHALATION) EVERY 6 HOURS PRN
Status: DISCONTINUED | OUTPATIENT
Start: 2024-12-17 | End: 2024-12-18 | Stop reason: HOSPADM

## 2024-12-16 RX ORDER — GLUCAGON 1 MG
1 KIT INJECTION
Status: DISCONTINUED | OUTPATIENT
Start: 2024-12-17 | End: 2024-12-18 | Stop reason: HOSPADM

## 2024-12-16 RX ORDER — LEVOTHYROXINE SODIUM 75 UG/1
150 TABLET ORAL
Status: DISCONTINUED | OUTPATIENT
Start: 2024-12-17 | End: 2024-12-18 | Stop reason: HOSPADM

## 2024-12-16 RX ORDER — IBUPROFEN 200 MG
24 TABLET ORAL
Status: DISCONTINUED | OUTPATIENT
Start: 2024-12-17 | End: 2024-12-18 | Stop reason: HOSPADM

## 2024-12-16 RX ORDER — FUROSEMIDE 20 MG/1
20 TABLET ORAL DAILY PRN
Status: DISCONTINUED | OUTPATIENT
Start: 2024-12-17 | End: 2024-12-18 | Stop reason: HOSPADM

## 2024-12-16 RX ORDER — LOSARTAN POTASSIUM 25 MG/1
25 TABLET ORAL DAILY
Status: DISCONTINUED | OUTPATIENT
Start: 2024-12-17 | End: 2024-12-18 | Stop reason: HOSPADM

## 2024-12-16 RX ORDER — SODIUM CHLORIDE 0.9 % (FLUSH) 0.9 %
10 SYRINGE (ML) INJECTION
Status: DISCONTINUED | OUTPATIENT
Start: 2024-12-17 | End: 2024-12-18 | Stop reason: HOSPADM

## 2024-12-16 RX ORDER — ACETAMINOPHEN 500 MG
500 TABLET ORAL EVERY 6 HOURS PRN
Status: DISCONTINUED | OUTPATIENT
Start: 2024-12-17 | End: 2024-12-18 | Stop reason: HOSPADM

## 2024-12-16 RX ORDER — ASPIRIN 81 MG/1
81 TABLET ORAL DAILY
Status: DISCONTINUED | OUTPATIENT
Start: 2024-12-17 | End: 2024-12-18 | Stop reason: HOSPADM

## 2024-12-16 RX ADMIN — ONDANSETRON 4 MG: 2 INJECTION INTRAMUSCULAR; INTRAVENOUS at 08:12

## 2024-12-16 RX ADMIN — IOHEXOL 100 ML: 350 INJECTION, SOLUTION INTRAVENOUS at 08:12

## 2024-12-16 RX ADMIN — HEPARIN SODIUM 5000 UNITS: 5000 INJECTION INTRAVENOUS; SUBCUTANEOUS at 07:12

## 2024-12-16 RX ADMIN — SODIUM CHLORIDE, POTASSIUM CHLORIDE, SODIUM LACTATE AND CALCIUM CHLORIDE 1000 ML: 600; 310; 30; 20 INJECTION, SOLUTION INTRAVENOUS at 07:12

## 2024-12-17 ENCOUNTER — TELEPHONE (OUTPATIENT)
Dept: NEUROLOGY | Facility: CLINIC | Age: 69
End: 2024-12-17
Payer: MEDICARE

## 2024-12-17 LAB
ALBUMIN SERPL BCP-MCNC: 3.4 G/DL (ref 3.5–5.2)
ALP SERPL-CCNC: 64 U/L (ref 40–150)
ALT SERPL W/O P-5'-P-CCNC: 23 U/L (ref 10–44)
ANION GAP SERPL CALC-SCNC: 13 MMOL/L (ref 8–16)
AST SERPL-CCNC: 24 U/L (ref 10–40)
BASOPHILS # BLD AUTO: 0.03 K/UL (ref 0–0.2)
BASOPHILS NFR BLD: 0.2 % (ref 0–1.9)
BILIRUB SERPL-MCNC: 0.2 MG/DL (ref 0.1–1)
BILIRUB UR QL STRIP: NEGATIVE
BUN SERPL-MCNC: 27 MG/DL (ref 8–23)
CALCIUM SERPL-MCNC: 8.9 MG/DL (ref 8.7–10.5)
CHLORIDE SERPL-SCNC: 105 MMOL/L (ref 95–110)
CLARITY UR: CLEAR
CO2 SERPL-SCNC: 21 MMOL/L (ref 23–29)
COLOR UR: YELLOW
CREAT SERPL-MCNC: 1.3 MG/DL (ref 0.5–1.4)
DIFFERENTIAL METHOD BLD: ABNORMAL
EOSINOPHIL # BLD AUTO: 0 K/UL (ref 0–0.5)
EOSINOPHIL NFR BLD: 0 % (ref 0–8)
ERYTHROCYTE [DISTWIDTH] IN BLOOD BY AUTOMATED COUNT: 13.7 % (ref 11.5–14.5)
EST. GFR  (NO RACE VARIABLE): 45 ML/MIN/1.73 M^2
ESTIMATED AVG GLUCOSE: 137 MG/DL (ref 68–131)
GLUCOSE SERPL-MCNC: 197 MG/DL (ref 70–110)
GLUCOSE UR QL STRIP: NEGATIVE
HBA1C MFR BLD: 6.4 % (ref 4–5.6)
HCT VFR BLD AUTO: 33.9 % (ref 37–48.5)
HGB BLD-MCNC: 11.2 G/DL (ref 12–16)
HGB UR QL STRIP: NEGATIVE
IMM GRANULOCYTES # BLD AUTO: 0.07 K/UL (ref 0–0.04)
IMM GRANULOCYTES NFR BLD AUTO: 0.6 % (ref 0–0.5)
KETONES UR QL STRIP: NEGATIVE
LACTATE SERPL-SCNC: 1.6 MMOL/L (ref 0.5–2.2)
LEUKOCYTE ESTERASE UR QL STRIP: NEGATIVE
LIPASE SERPL-CCNC: 29 U/L (ref 4–60)
LYMPHOCYTES # BLD AUTO: 2.5 K/UL (ref 1–4.8)
LYMPHOCYTES NFR BLD: 20.7 % (ref 18–48)
MAGNESIUM SERPL-MCNC: 2.2 MG/DL (ref 1.6–2.6)
MCH RBC QN AUTO: 30.9 PG (ref 27–31)
MCHC RBC AUTO-ENTMCNC: 33 G/DL (ref 32–36)
MCV RBC AUTO: 93 FL (ref 82–98)
MONOCYTES # BLD AUTO: 1.3 K/UL (ref 0.3–1)
MONOCYTES NFR BLD: 10.5 % (ref 4–15)
NEUTROPHILS # BLD AUTO: 8.2 K/UL (ref 1.8–7.7)
NEUTROPHILS NFR BLD: 68 % (ref 38–73)
NITRITE UR QL STRIP: NEGATIVE
NRBC BLD-RTO: 0 /100 WBC
PH UR STRIP: 7 [PH] (ref 5–8)
PHOSPHATE SERPL-MCNC: 4.6 MG/DL (ref 2.7–4.5)
PLATELET # BLD AUTO: 266 K/UL (ref 150–450)
PMV BLD AUTO: 10.2 FL (ref 9.2–12.9)
POCT GLUCOSE: 153 MG/DL (ref 70–110)
POCT GLUCOSE: 176 MG/DL (ref 70–110)
POCT GLUCOSE: 180 MG/DL (ref 70–110)
POCT GLUCOSE: 221 MG/DL (ref 70–110)
POTASSIUM SERPL-SCNC: 4.7 MMOL/L (ref 3.5–5.1)
PROT SERPL-MCNC: 5.8 G/DL (ref 6–8.4)
PROT UR QL STRIP: ABNORMAL
RBC # BLD AUTO: 3.63 M/UL (ref 4–5.4)
SODIUM SERPL-SCNC: 139 MMOL/L (ref 136–145)
SP GR UR STRIP: >1.03 (ref 1–1.03)
TROPONIN I SERPL DL<=0.01 NG/ML-MCNC: 0.09 NG/ML (ref 0–0.03)
TROPONIN I SERPL DL<=0.01 NG/ML-MCNC: 0.09 NG/ML (ref 0–0.03)
URN SPEC COLLECT METH UR: ABNORMAL
UROBILINOGEN UR STRIP-ACNC: NEGATIVE EU/DL
WBC # BLD AUTO: 12.05 K/UL (ref 3.9–12.7)

## 2024-12-17 PROCEDURE — 25000242 PHARM REV CODE 250 ALT 637 W/ HCPCS: Mod: HCNC

## 2024-12-17 PROCEDURE — 85025 COMPLETE CBC W/AUTO DIFF WBC: CPT | Mod: HCNC

## 2024-12-17 PROCEDURE — 99900035 HC TECH TIME PER 15 MIN (STAT): Mod: HCNC

## 2024-12-17 PROCEDURE — 84484 ASSAY OF TROPONIN QUANT: CPT | Mod: HCNC

## 2024-12-17 PROCEDURE — 84484 ASSAY OF TROPONIN QUANT: CPT | Mod: 91,HCNC

## 2024-12-17 PROCEDURE — 84100 ASSAY OF PHOSPHORUS: CPT | Mod: HCNC

## 2024-12-17 PROCEDURE — 94640 AIRWAY INHALATION TREATMENT: CPT | Mod: HCNC

## 2024-12-17 PROCEDURE — 83735 ASSAY OF MAGNESIUM: CPT | Mod: HCNC

## 2024-12-17 PROCEDURE — 80053 COMPREHEN METABOLIC PANEL: CPT | Mod: HCNC

## 2024-12-17 PROCEDURE — 25000003 PHARM REV CODE 250: Mod: HCNC

## 2024-12-17 PROCEDURE — 94761 N-INVAS EAR/PLS OXIMETRY MLT: CPT | Mod: HCNC

## 2024-12-17 PROCEDURE — 63600175 PHARM REV CODE 636 W HCPCS: Mod: HCNC

## 2024-12-17 PROCEDURE — G0378 HOSPITAL OBSERVATION PER HR: HCPCS | Mod: HCNC

## 2024-12-17 PROCEDURE — 81003 URINALYSIS AUTO W/O SCOPE: CPT | Mod: HCNC

## 2024-12-17 PROCEDURE — 90833 PSYTX W PT W E/M 30 MIN: CPT | Mod: HCNC,,, | Performed by: PSYCHIATRY & NEUROLOGY

## 2024-12-17 PROCEDURE — 96372 THER/PROPH/DIAG INJ SC/IM: CPT

## 2024-12-17 PROCEDURE — 83036 HEMOGLOBIN GLYCOSYLATED A1C: CPT | Mod: HCNC

## 2024-12-17 PROCEDURE — 83605 ASSAY OF LACTIC ACID: CPT | Mod: HCNC

## 2024-12-17 PROCEDURE — 96376 TX/PRO/DX INJ SAME DRUG ADON: CPT

## 2024-12-17 PROCEDURE — 36415 COLL VENOUS BLD VENIPUNCTURE: CPT | Mod: HCNC

## 2024-12-17 PROCEDURE — 99215 OFFICE O/P EST HI 40 MIN: CPT | Mod: HCNC,,, | Performed by: PSYCHIATRY & NEUROLOGY

## 2024-12-17 RX ORDER — QUETIAPINE FUMARATE 100 MG/1
200 TABLET, FILM COATED ORAL DAILY
Status: DISCONTINUED | OUTPATIENT
Start: 2024-12-17 | End: 2024-12-18 | Stop reason: HOSPADM

## 2024-12-17 RX ORDER — HEPARIN SODIUM 5000 [USP'U]/ML
5000 INJECTION, SOLUTION INTRAVENOUS; SUBCUTANEOUS EVERY 8 HOURS
Status: DISCONTINUED | OUTPATIENT
Start: 2024-12-17 | End: 2024-12-18 | Stop reason: HOSPADM

## 2024-12-17 RX ORDER — DIVALPROEX SODIUM 250 MG/1
500 TABLET, DELAYED RELEASE ORAL DAILY
Status: DISCONTINUED | OUTPATIENT
Start: 2024-12-17 | End: 2024-12-18 | Stop reason: HOSPADM

## 2024-12-17 RX ORDER — OLANZAPINE 10 MG/2ML
5 INJECTION, POWDER, FOR SOLUTION INTRAMUSCULAR ONCE AS NEEDED
Status: DISCONTINUED | OUTPATIENT
Start: 2024-12-17 | End: 2024-12-18 | Stop reason: HOSPADM

## 2024-12-17 RX ORDER — MIRTAZAPINE 7.5 MG/1
30 TABLET, FILM COATED ORAL NIGHTLY
Status: DISCONTINUED | OUTPATIENT
Start: 2024-12-17 | End: 2024-12-18 | Stop reason: HOSPADM

## 2024-12-17 RX ORDER — PANTOPRAZOLE SODIUM 40 MG/1
40 TABLET, DELAYED RELEASE ORAL DAILY
Status: DISCONTINUED | OUTPATIENT
Start: 2024-12-17 | End: 2024-12-18 | Stop reason: HOSPADM

## 2024-12-17 RX ORDER — OLANZAPINE 10 MG/2ML
5 INJECTION, POWDER, FOR SOLUTION INTRAMUSCULAR ONCE AS NEEDED
Status: DISCONTINUED | OUTPATIENT
Start: 2024-12-17 | End: 2024-12-17

## 2024-12-17 RX ORDER — QUETIAPINE FUMARATE 100 MG/1
400 TABLET, FILM COATED ORAL NIGHTLY
Status: DISCONTINUED | OUTPATIENT
Start: 2024-12-17 | End: 2024-12-18 | Stop reason: HOSPADM

## 2024-12-17 RX ORDER — ONDANSETRON HYDROCHLORIDE 2 MG/ML
4 INJECTION, SOLUTION INTRAVENOUS EVERY 8 HOURS PRN
Status: DISCONTINUED | OUTPATIENT
Start: 2024-12-17 | End: 2024-12-18 | Stop reason: HOSPADM

## 2024-12-17 RX ORDER — TIZANIDINE 4 MG/1
4 TABLET ORAL EVERY 8 HOURS PRN
Status: DISCONTINUED | OUTPATIENT
Start: 2024-12-17 | End: 2024-12-18 | Stop reason: HOSPADM

## 2024-12-17 RX ORDER — QUETIAPINE FUMARATE 100 MG/1
200 TABLET, FILM COATED ORAL DAILY
Status: DISCONTINUED | OUTPATIENT
Start: 2024-12-18 | End: 2024-12-17

## 2024-12-17 RX ORDER — DIVALPROEX SODIUM 250 MG/1
500 TABLET, DELAYED RELEASE ORAL DAILY
Status: DISCONTINUED | OUTPATIENT
Start: 2024-12-18 | End: 2024-12-17

## 2024-12-17 RX ORDER — DIVALPROEX SODIUM 250 MG/1
1000 TABLET, FILM COATED, EXTENDED RELEASE ORAL NIGHTLY
Status: DISCONTINUED | OUTPATIENT
Start: 2024-12-17 | End: 2024-12-18 | Stop reason: HOSPADM

## 2024-12-17 RX ADMIN — DIVALPROEX SODIUM 1000 MG: 250 TABLET, EXTENDED RELEASE ORAL at 02:12

## 2024-12-17 RX ADMIN — PANTOPRAZOLE SODIUM 40 MG: 40 TABLET, DELAYED RELEASE ORAL at 01:12

## 2024-12-17 RX ADMIN — ACETAMINOPHEN 500 MG: 500 TABLET ORAL at 01:12

## 2024-12-17 RX ADMIN — MIRTAZAPINE 30 MG: 7.5 TABLET, FILM COATED ORAL at 08:12

## 2024-12-17 RX ADMIN — LEVOTHYROXINE SODIUM 150 MCG: 75 TABLET ORAL at 06:12

## 2024-12-17 RX ADMIN — HEPARIN SODIUM 5000 UNITS: 5000 INJECTION INTRAVENOUS; SUBCUTANEOUS at 06:12

## 2024-12-17 RX ADMIN — QUETIAPINE FUMARATE 200 MG: 100 TABLET ORAL at 02:12

## 2024-12-17 RX ADMIN — ATORVASTATIN CALCIUM 40 MG: 40 TABLET, FILM COATED ORAL at 09:12

## 2024-12-17 RX ADMIN — DIVALPROEX SODIUM 1000 MG: 250 TABLET, EXTENDED RELEASE ORAL at 08:12

## 2024-12-17 RX ADMIN — ASPIRIN 81 MG: 81 TABLET, COATED ORAL at 09:12

## 2024-12-17 RX ADMIN — ACETAMINOPHEN 500 MG: 500 TABLET ORAL at 02:12

## 2024-12-17 RX ADMIN — CLOPIDOGREL BISULFATE 75 MG: 75 TABLET ORAL at 09:12

## 2024-12-17 RX ADMIN — MIRTAZAPINE 30 MG: 7.5 TABLET, FILM COATED ORAL at 02:12

## 2024-12-17 RX ADMIN — TIZANIDINE 4 MG: 4 TABLET ORAL at 01:12

## 2024-12-17 RX ADMIN — QUETIAPINE FUMARATE 400 MG: 100 TABLET ORAL at 02:12

## 2024-12-17 RX ADMIN — INSULIN ASPART 2 UNITS: 100 INJECTION, SOLUTION INTRAVENOUS; SUBCUTANEOUS at 06:12

## 2024-12-17 RX ADMIN — DIVALPROEX SODIUM 500 MG: 250 TABLET, DELAYED RELEASE ORAL at 02:12

## 2024-12-17 RX ADMIN — FLUTICASONE FUROATE AND VILANTEROL TRIFENATATE 1 PUFF: 100; 25 POWDER RESPIRATORY (INHALATION) at 08:12

## 2024-12-17 RX ADMIN — FLUOXETINE HYDROCHLORIDE 20 MG: 20 CAPSULE ORAL at 09:12

## 2024-12-17 RX ADMIN — ONDANSETRON 4 MG: 2 INJECTION INTRAMUSCULAR; INTRAVENOUS at 12:12

## 2024-12-17 RX ADMIN — HEPARIN SODIUM 5000 UNITS: 5000 INJECTION INTRAVENOUS; SUBCUTANEOUS at 09:12

## 2024-12-17 RX ADMIN — LOSARTAN POTASSIUM 25 MG: 25 TABLET, FILM COATED ORAL at 09:12

## 2024-12-17 RX ADMIN — QUETIAPINE FUMARATE 400 MG: 100 TABLET ORAL at 08:12

## 2024-12-17 RX ADMIN — FLUOXETINE HYDROCHLORIDE 20 MG: 20 CAPSULE ORAL at 08:12

## 2024-12-17 NOTE — CONSULTS
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 27.0 - 31.0 pg    MCHC 32.0 32.0 - 36.0 g/dL    RDW 13.8 11.5 - 14.5 %    Platelets 316 150 - 450 K/uL    MPV 10.0 9.2 - 12.9 fL    Immature Granulocytes 0.8 (H) 0.0 - 0.5 %    Gran # (ANC) 6.7 1.8 - 7.7 K/uL    Immature Grans (Abs) 0.08 (H) 0.00 - 0.04 K/uL    Lymph # 2.0 1.0 - 4.8 K/uL    Mono # 0.7 0.3 - 1.0 K/uL    Eos # 0.1 0.0 - 0.5 K/uL    Baso # 0.07 0.00 - 0.20 K/uL    nRBC 0 0 /100 WBC    Gran % 69.2 38.0 - 73.0 %    Lymph % 21.0 18.0 - 48.0 %    Mono % 7.2 4.0 - 15.0 %    Eosinophil % 1.1 0.0 - 8.0 %    Basophil % 0.7 0.0 - 1.9 %    Differential Method Automated    Comprehensive metabolic panel    Collection Time: 12/16/24  7:24 PM   Result Value Ref Range    Sodium 140 136 - 145 mmol/L    Potassium 5.0 3.5 - 5.1 mmol/L    Chloride 104 95 - 110 mmol/L    CO2 16 (L) 23 - 29 mmol/L    Glucose 342 (H) 70 - 110 mg/dL    BUN 23 8 - 23 mg/dL    Creatinine 1.5 (H) 0.5 - 1.4 mg/dL    Calcium 9.2 8.7 - 10.5 mg/dL    Total  Protein 6.2 6.0 - 8.4 g/dL    Albumin 3.5 3.5 - 5.2 g/dL    Total Bilirubin 0.3 0.1 - 1.0 mg/dL    Alkaline Phosphatase 66 40 - 150 U/L    AST 16 10 - 40 U/L    ALT 13 10 - 44 U/L    eGFR 38 (A) >60 mL/min/1.73 m^2    Anion Gap 20 (H) 8 - 16 mmol/L   Troponin I    Collection Time: 12/16/24  7:24 PM   Result Value Ref Range    Troponin I 0.039 (H) 0.000 - 0.026 ng/mL   Brain natriuretic peptide    Collection Time: 12/16/24  7:27 PM   Result Value Ref Range    BNP 49 0 - 99 pg/mL   Protime-INR    Collection Time: 12/16/24  7:49 PM   Result Value Ref Range    Prothrombin Time 11.5 9.0 - 12.5 sec    INR 1.1 0.8 - 1.2   Type & Screen    Collection Time: 12/16/24  7:49 PM   Result Value Ref Range    Group & Rh A NEG     Indirect Devyn NEG     Specimen Outdate 12/19/2024 23:59    Lactic acid, plasma    Collection Time: 12/16/24  8:08 PM   Result Value Ref Range    Lactate (Lactic Acid) 4.2 (HH) 0.5 - 2.2 mmol/L   Lipase    Collection Time: 12/16/24  8:08 PM   Result Value Ref Range    Lipase 29 4 - 60 U/L   Lactic acid, plasma    Collection Time: 12/17/24 12:16 AM   Result Value Ref Range    Lactate (Lactic Acid) 1.6 0.5 - 2.2 mmol/L   Troponin-I    Collection Time: 12/17/24 12:16 AM   Result Value Ref Range    Troponin I 0.094 (H) 0.000 - 0.026 ng/mL   Hemoglobin A1c if not done in past 3 months    Collection Time: 12/17/24 12:22 AM   Result Value Ref Range    Hemoglobin A1C 6.4 (H) 4.0 - 5.6 %    Estimated Avg Glucose 137 (H) 68 - 131 mg/dL   Troponin I    Collection Time: 12/17/24  2:56 AM   Result Value Ref Range    Troponin I 0.089 (H) 0.000 - 0.026 ng/mL   Urinalysis, Reflex to Urine Culture Urine, Clean Catch    Collection Time: 12/17/24  4:51 AM    Specimen: Urine   Result Value Ref Range    Specimen UA Urine, Clean Catch     Color, UA Yellow Yellow, Straw, Jaimee    Appearance, UA Clear Clear    pH, UA 7.0 5.0 - 8.0    Specific Gravity, UA >1.030 (A) 1.005 - 1.030    Protein, UA Trace (A) Negative    Glucose, UA  Negative Negative    Ketones, UA Negative Negative    Bilirubin (UA) Negative Negative    Occult Blood UA Negative Negative    Nitrite, UA Negative Negative    Urobilinogen, UA Negative <2.0 EU/dL    Leukocytes, UA Negative Negative   POCT glucose    Collection Time: 12/17/24  5:50 AM   Result Value Ref Range    POCT Glucose 221 (H) 70 - 110 mg/dL   Comprehensive metabolic panel    Collection Time: 12/17/24  6:12 AM   Result Value Ref Range    Sodium 139 136 - 145 mmol/L    Potassium 4.7 3.5 - 5.1 mmol/L    Chloride 105 95 - 110 mmol/L    CO2 21 (L) 23 - 29 mmol/L    Glucose 197 (H) 70 - 110 mg/dL    BUN 27 (H) 8 - 23 mg/dL    Creatinine 1.3 0.5 - 1.4 mg/dL    Calcium 8.9 8.7 - 10.5 mg/dL    Total Protein 5.8 (L) 6.0 - 8.4 g/dL    Albumin 3.4 (L) 3.5 - 5.2 g/dL    Total Bilirubin 0.2 0.1 - 1.0 mg/dL    Alkaline Phosphatase 64 40 - 150 U/L    AST 24 10 - 40 U/L    ALT 23 10 - 44 U/L    eGFR 45 (A) >60 mL/min/1.73 m^2    Anion Gap 13 8 - 16 mmol/L   Magnesium    Collection Time: 12/17/24  6:12 AM   Result Value Ref Range    Magnesium 2.2 1.6 - 2.6 mg/dL   Phosphorus    Collection Time: 12/17/24  6:12 AM   Result Value Ref Range    Phosphorus 4.6 (H) 2.7 - 4.5 mg/dL   CBC auto differential    Collection Time: 12/17/24  6:12 AM   Result Value Ref Range    WBC 12.05 3.90 - 12.70 K/uL    RBC 3.63 (L) 4.00 - 5.40 M/uL    Hemoglobin 11.2 (L) 12.0 - 16.0 g/dL    Hematocrit 33.9 (L) 37.0 - 48.5 %    MCV 93 82 - 98 fL    MCH 30.9 27.0 - 31.0 pg    MCHC 33.0 32.0 - 36.0 g/dL    RDW 13.7 11.5 - 14.5 %    Platelets 266 150 - 450 K/uL    MPV 10.2 9.2 - 12.9 fL    Immature Granulocytes 0.6 (H) 0.0 - 0.5 %    Gran # (ANC) 8.2 (H) 1.8 - 7.7 K/uL    Immature Grans (Abs) 0.07 (H) 0.00 - 0.04 K/uL    Lymph # 2.5 1.0 - 4.8 K/uL    Mono # 1.3 (H) 0.3 - 1.0 K/uL    Eos # 0.0 0.0 - 0.5 K/uL    Baso # 0.03 0.00 - 0.20 K/uL    nRBC 0 0 /100 WBC    Gran % 68.0 38.0 - 73.0 %    Lymph % 20.7 18.0 - 48.0 %    Mono % 10.5 4.0 - 15.0 %     "Eosinophil % 0.0 0.0 - 8.0 %    Basophil % 0.2 0.0 - 1.9 %    Differential Method Automated    POCT glucose    Collection Time: 12/17/24 11:55 AM   Result Value Ref Range    POCT Glucose 153 (H) 70 - 110 mg/dL      Lab Results   Component Value Date    VALPROATE 57.6 07/24/2024         EXAMINATION    VITALS   Vitals:    12/17/24 0714 12/17/24 0831 12/17/24 1120 12/17/24 1158   BP: (!) 110/59  115/69    BP Location: Left arm  Left arm    Patient Position: Lying  Lying    Pulse: 97 93 89 88   Resp: 18 20 18    Temp: 98.5 °F (36.9 °C)  96.8 °F (36 °C)    TempSrc: Oral  Oral    SpO2: (!) 94% 95% (!) 94%    Weight:       Height:            CONSTITUTIONAL  General Appearance: NAD, unremarkable, age appropriate, lying in bed, overweight, tense     MUSCULOSKELETAL  Muscle Strength and Tone: WNL    Abnormal Involuntary Movements: none observed   Gait and Station: Attempted but unable to assess due to medical acuity     PSYCHIATRIC   Behavior/Cooperation: cooperative, restless and fidgety , eye contact normal, tense / paranoid   Speech: normal tone, normal pitch, normal volume, pressured, rapid, requiring redirection   Language: grossly intact, able to name, able to repeat with spontaneous speech  Mood: "anxious"  Affect:  congruent with mood and paranoid / tense / irritable   Associations: intact; no AL  Thought Process: Circumferential with intermittent AL   Thought Content: + intermittent AH and + paranoid delusional TC ; denies SI, HI, VH, or TH  Sensorium: Awake  Alert and Oriented: to person, place, city, state, month, year, and situation   Memory: 3/3 immediate, 2/3 at 5 minutes    Recent: Intact; able to report recent events   Remote: Intact; Named 3/4 past presidents   Attention/concentration: Impaired / Limited.  Able to spell w-o-r-l-d but NOT d-l-r-o-w.   Similarities: Intact (difference between apple and orange?)  Abstract reasoning: Intact  Fund of Knowledge: Named 3/4 past presidents  Insight: Impaired / " Limited   Judgment: Impaired / Limited    CAM ICU Delirium Assessment - NEGATIVE    Is the patient aware of the biomedical complications associated with substance abuse and mental illness? yes      MEDICAL DECISION MAKING    ASSESSMENT        Schizoaffective Disorder, Bipolar Type   Unspecified Anxiety Disorder   Unspecified Insomnia        RECOMMENDATIONS       - With reasonable medical certainty, based on a present-state examination and information from the patient's longstanding Mental Health Nurse Practitioner (Joesph Yañez) at [facility], the patient currently meets PEC criteria due to being gravely disabled 2/2 mental illness at this time.      - Once medically cleared, seek inpatient psychiatric admission for further mental health treatment / stabilization.      - Restart / Continue the patient's current outpatient psychiatric medication regimen of Depakote  mg PO QAM & 1000 mg PO QHS for mood, Seroquel 200 mg PO QAM & 400 mg PO QHS for psychosis / mood / anxiety, and Remeron 30 mg PO QHS for mood / anxiety / sleep (discussed risks/benefits/alt vs no treatment with patient).     - Defer other scheduled psychiatric medication(s) to the inpatient psychiatric treatment team (discussed risks/benefits/alt vs no treatment with patient).    - Defer non-psychiatric medications / management to the Hospital Medicine Team.     - Can use Zyprexa 5 mg to 10 mg PO/IM q8 hours PRN for non-redirectable psychotic / manic agitation (discussed risks/benefits/alt vs no treatment with patient).       - Psychotherapy was performed with patient as noted above with a focus on improving mood, psychosis, sleep, and anxiety.       - Patient's most recent resulted labs, imaging, and EKG were reviewed today.     - Begin & Continue suicide / violence precautions and begin & continue to monitor the patient with a sitter while on a PEC / CEC.       - Thank you for this consult ; will continue to follow patient while at [facility]         Total time spent with patient and/or managing/coordinating patient's care today (excluding the time spent on psychotherapy): 78 minutes   Time spent on psychotherapy today (as noted above): 22 minutes   Total time for encounter today including psychotherapy: 100 minutes      More than 50% of the time was spent counseling/coordinating care.     Consulting clinician was informed of the encounter and consult note.       STAFF:  Abdirahman Montejo MD  [facility]   12/17/2024

## 2024-12-17 NOTE — ED NOTES
Pt verbalized she was brought to ED with EMS with a personal walker, pt made aware walker not present, EMS EJ being notified at this time @ [phone], vm left with pt's info per okay from pt.

## 2024-12-17 NOTE — NURSING
VIRTUAL NURSE: Pt arrived to unit. Permission received to turn camera to view patient. VIP model explained; Patient informed this VN will be working with bedside nurse and the rest of the care team.      Admission questions completed.  Plan of care reviewed with patient.  Educated patient on VTE and fall risk. Safety precautions in place. Call light within reach, side rails up x2.     Patient instucted to ask staff for assistance. Patient verbalized complete understanding.  Will continue to be available and intervene as needed.    Labs, notes, orders, and careplan reviewed.    12/17/24 0206   Admission   Initial VN Admission Questions Complete   Shift   Virtual Nurse - Rounding Complete   Virtual Nurse - Patient Verbalized Approval Of Camera Use;VN Rounding   Type of Frequent Check   Type Patient Rounds   Safety/Activity   Patient Rounds bed in low position;bed wheels locked;call light in patient/parent reach;ID band on;visualized patient   Safety Promotion/Fall Prevention assistive device/personal item within reach   Safety Precautions emergency equipment at bedside   Activity Assistance Provided assistance, 1 person   Positioning   Body Position position changed independently   Head of Bed (HOB) Positioning HOB elevated

## 2024-12-17 NOTE — ED NOTES
Unable to capture EKG on bedside momnuitor due to pt perfuse sweating. Captured with bedside portable EKG machine

## 2024-12-17 NOTE — PHARMACY MED REC
"  [facility]           Pharmacy  Admission Medication History     The home medication history was taken by Chayo Puga PharmD.      Medication history obtained from Medications listed below were obtained from: Patient/family, Medications brought from home, and Analytic software- [employer]    Based on information gathered for medication list, you may go to "Admission" then "Reconcile Home Medications" tabs to review and/or act upon those items.     The home medication list has been updated by the Pharmacy department.   Please read ALL comments highlighted in yellow.   Please address this information as you see fit.    Feel free to contact us if you have any questions or require assistance.    The current inpatient medication list has been compared to the home medication list and the following discrepancies were noted:      Patient reports he/she IS TAKING the following which was not ordered upon admit  PANTOPRAZOLE   TIZANIDINE PRN  ONDANSETRON PRN  Patient reports taking a drug DIFFERENTLY than how ordered upon admit  DIVALPROEX SODIUM  QUETIAPINE    Potential issues to be addressed PRIOR TO DISCHARGE      Current Facility-Administered Medications on File Prior to Encounter   Medication Dose Route Frequency Provider Last Rate Last Admin    [] 0.9% NaCl infusion   Intravenous Continuous Yariel Lantigua MD   Stopped at 24 1313    [DISCONTINUED] fentaNYL 50 mcg/mL injection    PRN Yariel Lantigua MD   25 mcg at 24 1010    [DISCONTINUED] heparin (porcine) injection    PRN Yariel Lantigua MD   3,000 Units at 24 1005    [DISCONTINUED] heparin infusion 1,000 units/500 ml in 0.9% NaCl (pressure line flush)    Continuous PRN Yariel Lantigua  mL/hr at 24 0900 1,000 Units/hr at 24 0900    [DISCONTINUED] LIDOcaine HCL 10 mg/ml (1%) injection    PRN Yariel Lantigua MD   10 mL at 24 0900    [DISCONTINUED] LIDOcaine HCL 10 mg/ml (1%) injection    PRN " Yariel Lantigua MD   1 mL at 12/16/24 0923    [DISCONTINUED] midazolam (VERSED) 1 mg/mL injection    PRN Yariel Lantigua MD   1 mg at 12/16/24 0923    [DISCONTINUED] nitroGLYCERIN in D5W 200 mcg/mL vial    PRN Yariel Lantigua MD   6.25 mL at 12/16/24 0900    [DISCONTINUED] verapamiL injection    PRYariel Cottrell MD   1.25 mg at 12/16/24 0900     Current Outpatient Medications on File Prior to Encounter   Medication Sig Dispense Refill    aspirin (ECOTRIN) 81 MG EC tablet Take 1 tablet (81 mg total) by mouth once daily. 90 tablet 0    atorvastatin (LIPITOR) 40 MG tablet Take 1 tablet (40 mg total) by mouth once daily. 90 tablet 0    clopidogreL (PLAVIX) 75 mg tablet Take 1 tablet (75 mg total) by mouth once daily. 90 tablet 3    divalproex ER (DEPAKOTE ER) 500 MG Tb24 24 hr tablet Give 1 tablet in the morning and 2 tablets at bedtime 270 tablet 3    docusate sodium (STOOL SOFTENER) 100 MG capsule Take 1 capsule (100 mg total) by mouth 2 (two) times daily as needed for Constipation. 180 capsule 0    FLUoxetine 20 MG capsule Take 1 capsule (20 mg total) by mouth 2 (two) times daily. 60 capsule 11    gabapentin (NEURONTIN) 100 MG capsule Take 1 capsule (100 mg total) by mouth 3 (three) times daily. 270 capsule 0    gabapentin (NEURONTIN) 600 MG tablet Take 1 tablet (600 mg total) by mouth once daily. 90 tablet 0    galcanezumab-gnlm 120 mg/mL PnIj Inject 1 mL (120 mg total) into the skin every 28 days. maintenance dose 1 mL 5    levothyroxine (SYNTHROID) 150 MCG tablet Take 1 tablet (150 mcg total) by mouth before breakfast. 90 tablet 3    losartan (COZAAR) 25 MG tablet Take 1 tablet (25 mg total) by mouth once daily. 90 tablet 3    melatonin 5 mg Chew Take by mouth.      metFORMIN (GLUCOPHAGE) 500 MG tablet Take 2 tablets (1,000 mg total) by mouth 2 (two) times daily with meals. 360 tablet 0    mirtazapine (REMERON) 30 MG tablet Take 1 tablet (30 mg total) by mouth every evening. 90 tablet 3    ondansetron  (ZOFRAN) 4 MG tablet Take 1 tablet (4 mg total) by mouth every 12 (twelve) hours as needed for Nausea. 270 tablet 0    pantoprazole (PROTONIX) 40 MG tablet Take 1 tablet (40 mg total) by mouth once daily. 90 tablet 0    QUEtiapine (SEROQUEL) 200 MG Tab Take 1 tablet by mouth daily and 2 tablets at bedtime 270 tablet 3    tiZANidine (ZANAFLEX) 4 MG tablet Take 0.5 tablets (2 mg total) by mouth every 8 (eight) hours. 90 tablet 11    ACCU-CHEK GUIDE TEST STRIPS Strp 1 strip by Other route.      acetaminophen (TYLENOL) 500 MG tablet Take 500 mg by mouth every 6 (six) hours as needed.      albuterol (PROVENTIL) 2.5 mg /3 mL (0.083 %) nebulizer solution       albuterol (PROVENTIL/VENTOLIN HFA) 90 mcg/actuation inhaler Inhale 1 puff into the lungs every 4 (four) hours as needed for Wheezing. 18 g 0    albuterol-ipratropium (DUO-NEB) 2.5 mg-0.5 mg/3 mL nebulizer solution Take 3 mLs by nebulization every 6 (six) hours while awake. 810 mL 0    baclofen (LIORESAL) 10 MG tablet TAKE TWO TABLETS BY MOUTH 4 TIMES DAILY      blood-glucose meter Misc 1 each by Other route.      budesonide-formoterol 80-4.5 mcg (SYMBICORT) 80-4.5 mcg/actuation HFAA Inhale 2 puffs into the lungs once daily. Controller 10 g 3    carbidopa-levodopa  mg (SINEMET)  mg per tablet Take 1 tablet twice a day by oral route.      carbidopa-levodopa  mg (SINEMET)  mg per tablet Take 1 tablet twice a day by oral route.      chlorhexidine (PERIDEX) 0.12 % solution       chlorzoxazone (PARAFON FORTE) 500 mg Tab Take 1 tablet 4 times a day by oral route as needed.      dexAMETHasone (DECADRON) 1 MG Tab       diclofenac sodium (VOLTAREN) 1 % Gel Apply topically once daily. 720 g 0    dicyclomine (BENTYL) 10 MG capsule       diphenhydrAMINE-acetaminophen (TYLENOL PM)  mg Tab Take 1 tablet by mouth nightly as needed.      diphenhydramine-calamine (CALOHIST) 1-8 % Lotn Apply topically 2 (two) times daily as needed.      fluconazole  (DIFLUCAN) 100 MG tablet       fluconazole (DIFLUCAN) 150 MG Tab       fluticasone propionate (FLONASE) 50 mcg/actuation nasal spray  11.1 mL 0    furosemide (LASIX) 20 MG tablet Take 1 tablet (20 mg total) by mouth daily as needed (for swelling / SOB). 30 tablet 3    guaiFENesin (MUCINEX) 600 mg 12 hr tablet Take 1,200 mg by mouth 2 (two) times daily as needed.      ketoprofen (ORUDIS) 75 MG capsule       LIDOcaine (LIDODERM) 5 % Place 1 patch onto the skin.      linaCLOtide (LINZESS) 145 mcg Cap capsule       linaCLOtide (LINZESS) 290 mcg Cap capsule       loratadine (CLARITIN) 10 mg tablet Take 1 tablet by mouth once daily.      lubiprostone (AMITIZA) 24 MCG Cap       melatonin 2.5 mg Chew Take 2.5 mg by mouth.      mometasone (NASONEX) 50 mcg/actuation nasal spray       multivitamin (THERAGRAN) per tablet Take 1 tablet by mouth once daily.      nicotine (NICODERM CQ) 7 mg/24 hr Place 1 patch onto the skin once daily. 90 patch 0    nitrofurantoin, macrocrystal-monohydrate, (MACROBID) 100 MG capsule       nystatin (NYSTOP) powder       nystatin-triamcinolone (MYCOLOG II) cream       nystatin-triamcinolone (MYCOLOG) ointment       OLANZapine (ZYPREXA) 2.5 MG tablet       olopatadine (PATANOL) 0.1 % ophthalmic solution 1 drop 2 (two) times daily.      omega-3 acid ethyl esters (LOVAZA) 1 gram capsule 2 CAPUSULES BY MOUTH BISD      paliperidone (INVEGA) 6 MG TR24       polyethylene glycol (GOLYTELY) 236-22.74-6.74 -5.86 gram suspension       polyethylene glycol (MOVIPREP) 100-7.5-2.691 gram solution       potassium chloride SA (K-DUR,KLOR-CON) 20 MEQ tablet Take 1 tablet by mouth once daily.      promethazine (PHENERGAN) 12.5 MG Tab       propranoloL (INDERAL) 40 MG tablet Take 1 tablet (40 mg total) by mouth 2 (two) times daily. 60 tablet 11    QULIPTA 60 mg Tab Take 1 tablet by mouth once daily.      risperiDONE (RISPERDAL) 3 MG Tab       sodium chloride (OCEAN) 0.65 % nasal spray 1 spray by Nasal route as needed.       spironolactone (ALDACTONE) 100 MG tablet       tiotropium (SPIRIVA WITH HANDIHALER) 18 mcg inhalation capsule       topiramate (TOPAMAX) 50 MG tablet TAKE ONE TABLET BY MOUTH EVERY MORNING AND TWO AT BEDTIME      traMADoL (ULTRAM) 50 mg tablet       TRUE METRIX AIR GLUCOSE METER kit SMARTSI Kit(s) Via Meter Daily      ubrogepant (UBRELVY) 50 mg tablet Take 1 tablet (50 mg total) by mouth 2 (two) times daily as needed for Migraine. If symptoms persist or return, may repeat dose after 2 hours. Maximum: 200 mg per 24 hours 10 tablet 2    [DISCONTINUED] albuterol (PROAIR HFA) 90 mcg/actuation inhaler       [DISCONTINUED] albuterol (PROVENTIL/VENTOLIN HFA) 90 mcg/actuation inhaler INHALE 2 PUFFS BY MOUTH EVERY 6 HOURS AS NEEDED FOR WHEEZING AND SHORTNESS OF BREATH      [DISCONTINUED] carboxymethylcellulose (REFRESH PLUS) 0.5 % Dpet Apply 1 drop to eye.      [DISCONTINUED] carboxymethylcellulose (REFRESH PLUS) 0.5 % Dpet 1 drop 3 (three) times daily as needed.      [DISCONTINUED] esomeprazole (NEXIUM) 40 MG capsule Take 1 capsule every day by oral route.      [DISCONTINUED] fluphenazine (PROLIXIN) 5 MG tablet 5 mg every evening.       [DISCONTINUED] fluticasone furoate-vilanteroL (BREO ELLIPTA) 200-25 mcg/dose DsDv diskus inhaler Inhale 1 puff into the lungs once daily. 1 each 0    [DISCONTINUED] fluticasone-salmeterol diskus inhaler 250-50 mcg Inhale 1 puff twice a day by inhalation route.         Please address this information as you see fit.  Feel free to contact us if you have any questions or require assistance.    Chayo Puga, PharmD  [phone]                .
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
## 2024-12-17 NOTE — ED NOTES
Pt requesting nighttime psych medications admit team notified via secure chat, Awaiting response at this time.
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
## 2024-12-17 NOTE — PLAN OF CARE
Problem: Adult Inpatient Plan of Care  Goal: Plan of Care Review  Outcome: Not Progressing  Goal: Patient-Specific Goal (Individualized)  Outcome: Not Progressing  Goal: Absence of Hospital-Acquired Illness or Injury  Outcome: Not Progressing  Goal: Optimal Comfort and Wellbeing  Outcome: Not Progressing  Goal: Readiness for Transition of Care  Outcome: Not Progressing     Problem: Bariatric Environmental Safety  Goal: Safety Maintained with Care  Outcome: Not Progressing     Problem: Diabetes Comorbidity  Goal: Blood Glucose Level Within Targeted Range  Outcome: Not Progressing     Problem: Chronic Kidney Disease  Goal: Optimal Coping with Chronic Illness  Outcome: Not Progressing  Goal: Electrolyte Balance  Outcome: Not Progressing  Goal: Fluid Balance  Outcome: Not Progressing  Goal: Optimal Functional Ability  Outcome: Not Progressing  Goal: Absence of Anemia Signs and Symptoms  Outcome: Not Progressing  Goal: Optimal Oral Intake  Outcome: Not Progressing  Goal: Acceptable Pain Control  Outcome: Not Progressing  Goal: Minimize Renal Failure Effects  Outcome: Not Progressing     Problem: Chest Pain  Goal: Resolution of Chest Pain Symptoms  Outcome: Not Progressing     Problem: Fall Injury Risk  Goal: Absence of Fall and Fall-Related Injury  Outcome: Not Progressing     Problem: Cardiac Catheterization (Diagnostic/Interventional)  Goal: Absence of Bleeding  Outcome: Not Progressing  Goal: Absence of Contrast-Induced Injury  Outcome: Not Progressing  Goal: Stable Heart Rate and Rhythm  Outcome: Not Progressing  Goal: Absence of Embolism Signs and Symptoms  Outcome: Not Progressing  Goal: Anesthesia/Sedation Recovery  Outcome: Not Progressing  Goal: Optimal Pain Control and Function  Outcome: Not Progressing  Goal: Absence of Vascular Access Complication  Outcome: Not Progressing     Problem: Wound  Goal: Optimal Coping  Outcome: Not Progressing  Goal: Optimal Functional Ability  Outcome: Not Progressing  Goal:  Absence of Infection Signs and Symptoms  Outcome: Not Progressing  Goal: Improved Oral Intake  Outcome: Not Progressing  Goal: Optimal Pain Control and Function  Outcome: Not Progressing  Goal: Skin Health and Integrity  Outcome: Not Progressing  Goal: Optimal Wound Healing  Outcome: Not Progressing      Vascular Surgery

## 2024-12-17 NOTE — PROGRESS NOTES
[facility]           Pharmacy        Current Drug Shortage     Due to national backorder and [facility] is critically low on inventory of Dextrose 50% (D50) Syringes and Vials, pharmacy has automatically switched from D50% to D10% IVPB at the equivalent dose until resolution of the shortage per P&T approved protocol.               Madeleine Staples, PharmD  [phone]
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
## 2024-12-17 NOTE — ED NOTES
Bed: EXAM 14  Expected date:   Expected time:   Means of arrival:   Comments:  Emergency use only
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
## 2024-12-17 NOTE — ED NOTES
Pt requesting water at this time, admit team messaged via secure chat, awaiting response at this time.

## 2024-12-17 NOTE — CONSULTS
[facility] Consult Note     Attending Physician: Rei Ibarra MD  Cardiology Attending Physician: Rupert Wharton MD  Date of Admit: 12/16/2024  Reason for Consult: Chest pain associated with hypotension      History of Present Illness:       Joseluis Triplett is a pleasant 69 y.o. female with Pmhx Anxiety, bipolar disorder, CKD, CAD s/p PCI of LAD 12/16/24 with great results. Today, she called 911 c/o chest pain and found to be SOB, hypotensive, and diaphoretic concerning PCI. However, ECG without acute changes concerning for stent thrombosis, trops minimally elevated as expected post PCI and chest pain free. Initially, cath lab activated given symptoms but I after talking with her and obtaining more lab results I decided to cancelled the STEMI.        History obtained by patient interview and supplemented by nursing documentation and chart review.   Objective   PMHx:  has a past medical history of Anxiety, Asthma, Bipolar disorder, Chronic constipation, Chronic kidney disease, Colon polyps, COPD (chronic obstructive pulmonary disease), COVID-19 virus detected (4/14/20 & 4/24/20), Depression, DVT (deep venous thrombosis), Dysphagia, GERD (gastroesophageal reflux disease), GERD (gastroesophageal reflux disease), Hard of hearing, Hearing aid worn, Hiatal hernia, History of psychiatric hospitalization, psychiatric care, Hyperlipidemia, Katty, Migraine headache (8/26/2014), Neuropathy (8/26/2014), CLARI (obstructive sleep apnea) (11/11/2013), CLARI (obstructive sleep apnea), Parkinson disease, Perforated duodenal ulcer (5/28/2015), Psychiatric problem, Recurrent upper respiratory infection (URI), Schizo affective schizophrenia, Seizures (2018), Therapy, Thyroid disease, Traumatic injury, and Use of cane as ambulatory aid.   SurgHx:  has a past surgical history that includes Tonsillectomy; Tympanostomy tube placement; Colonoscopy (N/A, 5/17/2016); Esophagogastroduodenoscopy; Dialysis fistula creation; and  Esophagogastroduodenoscopy (N/A, 5/24/2018).   FamHx: family history includes Alcohol abuse in her mother; Bipolar disorder in her mother; Breast cancer in her sister and another family member; Cancer (age of onset: 60) in her maternal grandmother; Dementia in her mother.   SocialHx:  reports that she quit smoking about 6 years ago. Her smoking use included cigars and cigarettes. She started smoking about 46 years ago. She has a 60 pack-year smoking history. She quit smokeless tobacco use about 11 years ago. She reports that she does not drink alcohol and does not use drugs.  Home Meds:  Current Outpatient Medications   Medication Instructions    ACCU-CHEK GUIDE TEST STRIPS Strp 1 strip    acetaminophen (TYLENOL) 500 mg, Every 6 hours PRN    albuterol (PROAIR HFA) 90 mcg/actuation inhaler     albuterol (PROVENTIL) 2.5 mg /3 mL (0.083 %) nebulizer solution     albuterol (PROVENTIL/VENTOLIN HFA) 90 mcg/actuation inhaler INHALE 2 PUFFS BY MOUTH EVERY 6 HOURS AS NEEDED FOR WHEEZING AND SHORTNESS OF BREATH    albuterol (PROVENTIL/VENTOLIN HFA) 90 mcg/actuation inhaler 1 puff, Inhalation, Every 4 hours PRN    albuterol-ipratropium (DUO-NEB) 2.5 mg-0.5 mg/3 mL nebulizer solution 3 mLs, Nebulization, Every 6 hours while awake    aspirin (ECOTRIN) 81 mg, Oral, Daily    atorvastatin (LIPITOR) 40 mg, Oral, Daily    baclofen (LIORESAL) 10 MG tablet TAKE TWO TABLETS BY MOUTH 4 TIMES DAILY    blood-glucose meter Misc 1 each    budesonide-formoterol 80-4.5 mcg (SYMBICORT) 80-4.5 mcg/actuation HFAA 2 puffs, Inhalation, Daily, Controller    carbidopa-levodopa  mg (SINEMET)  mg per tablet Take 1 tablet twice a day by oral route.    carbidopa-levodopa  mg (SINEMET)  mg per tablet Take 1 tablet twice a day by oral route.    carboxymethylcellulose (REFRESH PLUS) 0.5 % Dpet 1 drop    carboxymethylcellulose (REFRESH PLUS) 0.5 % Dpet 1 drop, 3 times daily PRN    chlorhexidine (PERIDEX) 0.12 % solution      chlorzoxazone (PARAFON FORTE) 500 mg Tab Take 1 tablet 4 times a day by oral route as needed.    clopidogreL (PLAVIX) 75 mg, Oral, Daily    dexAMETHasone (DECADRON) 1 MG Tab     diclofenac (CATAFLAM) 50 MG tablet Take 1 tablet 3 times a day by oral route as needed.    diclofenac sodium (VOLTAREN) 1 % Gel Topical (Top), Daily    dicyclomine (BENTYL) 10 MG capsule     diphenhydrAMINE-acetaminophen (TYLENOL PM)  mg Tab 1 tablet, Nightly PRN    diphenhydramine-calamine (CALOHIST) 1-8 % Lotn 2 times daily PRN    divalproex ER (DEPAKOTE ER) 500 MG Tb24 24 hr tablet Give 1 tablet in the morning and 2 tablets at bedtime    docusate sodium (STOOL SOFTENER) 100 mg, Oral, 2 times daily PRN    esomeprazole (NEXIUM) 40 MG capsule Take 1 capsule every day by oral route.    fluconazole (DIFLUCAN) 100 MG tablet     fluconazole (DIFLUCAN) 150 MG Tab     FLUoxetine 20 mg, Oral, 2 times daily    fluticasone furoate-vilanteroL (BREO ELLIPTA) 200-25 mcg/dose DsDv diskus inhaler 1 puff, Inhalation, Daily    fluticasone propionate (FLONASE) 50 mcg/actuation nasal spray No dose, route, or frequency recorded.    fluticasone-salmeterol diskus inhaler 250-50 mcg Inhale 1 puff twice a day by inhalation route.    furosemide (LASIX) 20 mg, Oral, Daily PRN    gabapentin (NEURONTIN) 100 mg, Oral, 3 times daily    gabapentin (NEURONTIN) 600 mg, Oral, Daily    galcanezumab-gnlm 120 mg, Subcutaneous, Every 28 days, maintenance dose    guaiFENesin (MUCINEX) 1,200 mg, 2 times daily PRN    ketoprofen (ORUDIS) 75 MG capsule     levothyroxine (SYNTHROID) 150 mcg, Oral, Before breakfast    LIDOcaine (LIDODERM) 5 % 1 patch    linaCLOtide (LINZESS) 145 mcg Cap capsule     linaCLOtide (LINZESS) 290 mcg Cap capsule     loratadine (CLARITIN) 10 mg tablet 1 tablet, Daily    losartan (COZAAR) 25 mg, Oral, Daily    lubiprostone (AMITIZA) 24 MCG Cap     melatonin 5 mg Chew Take by mouth.    melatonin 2.5 mg    metFORMIN (GLUCOPHAGE) 1,000 mg, Oral, 2 times  daily with meals    mirtazapine (REMERON) 30 mg, Oral, Nightly    mometasone (NASONEX) 50 mcg/actuation nasal spray     multivitamin (THERAGRAN) per tablet 1 tablet, Daily    nicotine (NICODERM CQ) 7 mg/24 hr 1 patch, Transdermal, Daily    nitrofurantoin, macrocrystal-monohydrate, (MACROBID) 100 MG capsule     nystatin (NYSTOP) powder     nystatin-triamcinolone (MYCOLOG II) cream     nystatin-triamcinolone (MYCOLOG) ointment     OLANZapine (ZYPREXA) 2.5 MG tablet     olopatadine (PATANOL) 0.1 % ophthalmic solution 1 drop, 2 times daily    omega-3 acid ethyl esters (LOVAZA) 1 gram capsule 2 CAPUSULES BY MOUTH BISD    ondansetron (ZOFRAN) 4 mg, Oral, Every 12 hours PRN    paliperidone (INVEGA) 6 MG TR24     pantoprazole (PROTONIX) 40 mg, Oral, Daily    polyethylene glycol (GOLYTELY) 236-22.74-6.74 -5.86 gram suspension     polyethylene glycol (MOVIPREP) 100-7.5-2.691 gram solution     potassium chloride SA (K-DUR,KLOR-CON) 20 MEQ tablet 1 tablet, Daily    promethazine (PHENERGAN) 12.5 MG Tab     propranoloL (INDERAL) 40 mg, Oral, 2 times daily    QUEtiapine (SEROQUEL) 200 MG Tab Take 1 tablet by mouth daily and 2 tablets at bedtime    QULIPTA 60 mg Tab 1 tablet, Daily    risperiDONE (RISPERDAL) 3 MG Tab     sodium chloride (OCEAN) 0.65 % nasal spray 1 spray, As needed (PRN)    spironolactone (ALDACTONE) 100 MG tablet     tiotropium (SPIRIVA WITH HANDIHALER) 18 mcg inhalation capsule     tiZANidine (ZANAFLEX) 2 mg, Oral, Every 8 hours    topiramate (TOPAMAX) 50 MG tablet TAKE ONE TABLET BY MOUTH EVERY MORNING AND TWO AT BEDTIME    traMADoL (ULTRAM) 50 mg tablet     TRUE METRIX AIR GLUCOSE METER kit SMARTSI Kit(s) Via Meter Daily    UBRELVY 50 mg, Oral, 2 times daily PRN, If symptoms persist or return, may repeat dose after 2 hours. Maximum: 200 mg per 24 hours     Review of Systems: Comprehensive ROS was performed and is negative unless otherwise noted in HPI.     Objective:   Last 24 Hour Vital Signs:  BP  Min:  "76/50  Max: 177/83  Temp  Av.1 °F (36.7 °C)  Min: 97.5 °F (36.4 °C)  Max: 98.6 °F (37 °C)  Pulse  Av.8  Min: 65  Max: 102  Resp  Av.5  Min: 14  Max: 23  SpO2  Av %  Min: 95 %  Max: 100 %  Height  Av' 3" (160 cm)  Min: 5' 3" (160 cm)  Max: 5' 3" (160 cm)  Weight  Av.9 kg (240 lb)  Min: 108.9 kg (240 lb)  Max: 108.9 kg (240 lb)  No intake/output data recorded.  Physical Examination:  Constitutional: NAD, Atraumatic   HEENT: MMM  Cardiovascular: RRR, no murmurs noted, no chest tenderness to palpation, 2+ radial pulses b/l  Pulmonary: normal respiratory effort, CTAB, no crackles, wheezes  Abdominal: S/NT/ND  Musculoskeletal: No lower extremity edema noted  Neurological: Alert & oriented to self, location, time and situation. No gross neurological deficits  Skin: W/D/I  Psych: Appropriate affect, normal mood    Laboratory:  Personally reviewed    Other Results:  TTE:  No results found for this or any previous visit.    Stress Testing:   No results found for this or any previous visit.     Coronary Angiogram:  Results for orders placed during the hospital encounter of 24    Pediatric Cardiac Catheterization (Needs Review)  This result has not been signed. Information might be incomplete.    Conclusion    Mid LAD lesion: A stent was successfully placed.    The Mid LAD lesion was 99% stenosed with 0% stenosis post-intervention.    The Dist RCA lesion was 50% stenosed.    The pre-procedure left ventricular end diastolic pressure was 18.    The estimated blood loss was <50 mL.    The procedure log was documented by Documenter: Yaneth Powell RT and verified by Yraiel Lantigua MD.    Date: 2024  Time: 11:42 AM      -Reviewing Medical records & lab results  -Independently reviewing and interpreting (if not documented by myself) EKGs, echocardiograms, catherizations   -Obtaining a history, performing a relevant exam, counseling/educating the patient/family  -Documenting clinical " information in the EMR including ordering of tests  -Care coordination and communicating with other health care providers             Assessment/Plan:     Joseluis Triplett is a pleasant 69 y.o. female with Pmhx Anxiety, bipolar disorder, CKD, CAD s/p PCI of LAD 12/16/24 with great results. Was BIBEMS for chest pain and found to be hypotensive, diaphoretic.   CAD s/p PCI of LAD - ECG with not ST-T changes, trops mildly elevated 2/2 recent PCI and she was chest pain free. Continue DAPT, statin and the rest of her medications. There is no need for acute intervention. Case discussed with ER physician and house supervisor.         Thank you for the opportunity to care for this patient. Please don't hesitate to reach out with any questions/concerns.    Rupert Wharton MD  Interventional Cardiology  [facility]

## 2024-12-18 VITALS
SYSTOLIC BLOOD PRESSURE: 117 MMHG | OXYGEN SATURATION: 93 % | HEART RATE: 78 BPM | WEIGHT: 240.94 LBS | BODY MASS INDEX: 42.69 KG/M2 | RESPIRATION RATE: 18 BRPM | DIASTOLIC BLOOD PRESSURE: 57 MMHG | TEMPERATURE: 98 F | HEIGHT: 63 IN

## 2024-12-18 LAB
ALBUMIN SERPL BCP-MCNC: 3 G/DL (ref 3.5–5.2)
ALP SERPL-CCNC: 56 U/L (ref 40–150)
ALT SERPL W/O P-5'-P-CCNC: 28 U/L (ref 10–44)
ANION GAP SERPL CALC-SCNC: 10 MMOL/L (ref 8–16)
AST SERPL-CCNC: 25 U/L (ref 10–40)
BASOPHILS # BLD AUTO: 0.03 K/UL (ref 0–0.2)
BASOPHILS NFR BLD: 0.3 % (ref 0–1.9)
BILIRUB SERPL-MCNC: 0.3 MG/DL (ref 0.1–1)
BUN SERPL-MCNC: 23 MG/DL (ref 8–23)
CALCIUM SERPL-MCNC: 8.6 MG/DL (ref 8.7–10.5)
CHLORIDE SERPL-SCNC: 105 MMOL/L (ref 95–110)
CO2 SERPL-SCNC: 25 MMOL/L (ref 23–29)
CREAT SERPL-MCNC: 1.4 MG/DL (ref 0.5–1.4)
DIFFERENTIAL METHOD BLD: ABNORMAL
EOSINOPHIL # BLD AUTO: 0.2 K/UL (ref 0–0.5)
EOSINOPHIL NFR BLD: 1.4 % (ref 0–8)
ERYTHROCYTE [DISTWIDTH] IN BLOOD BY AUTOMATED COUNT: 13.7 % (ref 11.5–14.5)
EST. GFR  (NO RACE VARIABLE): 41 ML/MIN/1.73 M^2
GLUCOSE SERPL-MCNC: 156 MG/DL (ref 70–110)
HCT VFR BLD AUTO: 26 % (ref 37–48.5)
HGB BLD-MCNC: 8.6 G/DL (ref 12–16)
IMM GRANULOCYTES # BLD AUTO: 0.06 K/UL (ref 0–0.04)
IMM GRANULOCYTES NFR BLD AUTO: 0.5 % (ref 0–0.5)
LYMPHOCYTES # BLD AUTO: 4.4 K/UL (ref 1–4.8)
LYMPHOCYTES NFR BLD: 39.4 % (ref 18–48)
MAGNESIUM SERPL-MCNC: 2.2 MG/DL (ref 1.6–2.6)
MCH RBC QN AUTO: 31 PG (ref 27–31)
MCHC RBC AUTO-ENTMCNC: 33.1 G/DL (ref 32–36)
MCV RBC AUTO: 94 FL (ref 82–98)
MONOCYTES # BLD AUTO: 1.3 K/UL (ref 0.3–1)
MONOCYTES NFR BLD: 11.3 % (ref 4–15)
NEUTROPHILS # BLD AUTO: 5.3 K/UL (ref 1.8–7.7)
NEUTROPHILS NFR BLD: 47.1 % (ref 38–73)
NRBC BLD-RTO: 0 /100 WBC
PHOSPHATE SERPL-MCNC: 3.3 MG/DL (ref 2.7–4.5)
PLATELET # BLD AUTO: 215 K/UL (ref 150–450)
PMV BLD AUTO: 10.3 FL (ref 9.2–12.9)
POCT GLUCOSE: 152 MG/DL (ref 70–110)
POCT GLUCOSE: 179 MG/DL (ref 70–110)
POTASSIUM SERPL-SCNC: 4.4 MMOL/L (ref 3.5–5.1)
PROT SERPL-MCNC: 5.3 G/DL (ref 6–8.4)
RBC # BLD AUTO: 2.77 M/UL (ref 4–5.4)
SODIUM SERPL-SCNC: 140 MMOL/L (ref 136–145)
WBC # BLD AUTO: 11.28 K/UL (ref 3.9–12.7)

## 2024-12-18 PROCEDURE — 96376 TX/PRO/DX INJ SAME DRUG ADON: CPT

## 2024-12-18 PROCEDURE — 94640 AIRWAY INHALATION TREATMENT: CPT | Mod: HCNC,XB

## 2024-12-18 PROCEDURE — 25000003 PHARM REV CODE 250: Mod: HCNC

## 2024-12-18 PROCEDURE — 90833 PSYTX W PT W E/M 30 MIN: CPT | Mod: HCNC,,, | Performed by: PSYCHIATRY & NEUROLOGY

## 2024-12-18 PROCEDURE — 63600175 PHARM REV CODE 636 W HCPCS: Mod: HCNC

## 2024-12-18 PROCEDURE — G0378 HOSPITAL OBSERVATION PER HR: HCPCS | Mod: HCNC

## 2024-12-18 PROCEDURE — 96372 THER/PROPH/DIAG INJ SC/IM: CPT

## 2024-12-18 PROCEDURE — 36415 COLL VENOUS BLD VENIPUNCTURE: CPT | Mod: HCNC

## 2024-12-18 PROCEDURE — 99215 OFFICE O/P EST HI 40 MIN: CPT | Mod: HCNC,,, | Performed by: PSYCHIATRY & NEUROLOGY

## 2024-12-18 PROCEDURE — 83735 ASSAY OF MAGNESIUM: CPT | Mod: HCNC

## 2024-12-18 PROCEDURE — 25000242 PHARM REV CODE 250 ALT 637 W/ HCPCS: Mod: HCNC

## 2024-12-18 PROCEDURE — 80053 COMPREHEN METABOLIC PANEL: CPT | Mod: HCNC

## 2024-12-18 PROCEDURE — 85025 COMPLETE CBC W/AUTO DIFF WBC: CPT | Mod: HCNC

## 2024-12-18 PROCEDURE — 94761 N-INVAS EAR/PLS OXIMETRY MLT: CPT | Mod: HCNC

## 2024-12-18 PROCEDURE — 84100 ASSAY OF PHOSPHORUS: CPT | Mod: HCNC

## 2024-12-18 RX ORDER — FLUTICASONE FUROATE AND VILANTEROL 100; 25 UG/1; UG/1
1 POWDER RESPIRATORY (INHALATION) DAILY
Start: 2024-12-19

## 2024-12-18 RX ORDER — QUETIAPINE FUMARATE 400 MG/1
400 TABLET, FILM COATED ORAL NIGHTLY
Start: 2024-12-18 | End: 2025-12-18

## 2024-12-18 RX ORDER — ALBUTEROL SULFATE 90 UG/1
2 INHALANT RESPIRATORY (INHALATION) EVERY 6 HOURS PRN
Status: ON HOLD
Start: 2024-12-18 | End: 2024-12-23 | Stop reason: HOSPADM

## 2024-12-18 RX ORDER — OLANZAPINE 10 MG/2ML
5 INJECTION, POWDER, FOR SOLUTION INTRAMUSCULAR ONCE AS NEEDED
Status: ON HOLD
Start: 2024-12-18 | End: 2024-12-23 | Stop reason: HOSPADM

## 2024-12-18 RX ORDER — QUETIAPINE FUMARATE 200 MG/1
200 TABLET, FILM COATED ORAL DAILY
Start: 2024-12-19 | End: 2025-12-19

## 2024-12-18 RX ORDER — DIVALPROEX SODIUM 500 MG/1
500 TABLET, DELAYED RELEASE ORAL DAILY
Start: 2024-12-19 | End: 2025-12-19

## 2024-12-18 RX ORDER — DIVALPROEX SODIUM 500 MG/1
1000 TABLET, FILM COATED, EXTENDED RELEASE ORAL NIGHTLY
Start: 2024-12-18 | End: 2025-12-18

## 2024-12-18 RX ORDER — ONDANSETRON HYDROCHLORIDE 2 MG/ML
4 INJECTION, SOLUTION INTRAVENOUS EVERY 8 HOURS PRN
Start: 2024-12-18

## 2024-12-18 RX ADMIN — ATORVASTATIN CALCIUM 40 MG: 40 TABLET, FILM COATED ORAL at 09:12

## 2024-12-18 RX ADMIN — HEPARIN SODIUM 5000 UNITS: 5000 INJECTION INTRAVENOUS; SUBCUTANEOUS at 06:12

## 2024-12-18 RX ADMIN — DIVALPROEX SODIUM 500 MG: 250 TABLET, DELAYED RELEASE ORAL at 09:12

## 2024-12-18 RX ADMIN — CLOPIDOGREL BISULFATE 75 MG: 75 TABLET ORAL at 09:12

## 2024-12-18 RX ADMIN — ASPIRIN 81 MG: 81 TABLET, COATED ORAL at 09:12

## 2024-12-18 RX ADMIN — QUETIAPINE FUMARATE 200 MG: 100 TABLET ORAL at 09:12

## 2024-12-18 RX ADMIN — FLUOXETINE HYDROCHLORIDE 20 MG: 20 CAPSULE ORAL at 09:12

## 2024-12-18 RX ADMIN — ONDANSETRON 4 MG: 2 INJECTION INTRAMUSCULAR; INTRAVENOUS at 10:12

## 2024-12-18 RX ADMIN — LOSARTAN POTASSIUM 25 MG: 25 TABLET, FILM COATED ORAL at 09:12

## 2024-12-18 RX ADMIN — PANTOPRAZOLE SODIUM 40 MG: 40 TABLET, DELAYED RELEASE ORAL at 09:12

## 2024-12-18 RX ADMIN — ACETAMINOPHEN 500 MG: 500 TABLET ORAL at 09:12

## 2024-12-18 RX ADMIN — ALBUTEROL SULFATE 2 PUFF: 90 AEROSOL, METERED RESPIRATORY (INHALATION) at 08:12

## 2024-12-18 RX ADMIN — LEVOTHYROXINE SODIUM 150 MCG: 75 TABLET ORAL at 06:12

## 2024-12-18 RX ADMIN — FLUTICASONE FUROATE AND VILANTEROL TRIFENATATE 1 PUFF: 100; 25 POWDER RESPIRATORY (INHALATION) at 08:12

## 2024-12-18 NOTE — PLAN OF CARE
SW placed ADT22 order and faxed PEC to the [facility]. [facility] will locate placement and arrange transport. SW placed original PEC into pt's travel packet. Rounds completed on pt. All questions addressed. Bedside nurse to discuss d/c medications. Discussed importance to attend all f/u appts and take medications as prescribed. Verbalized understanding. Case Management to sign off.     SW called pt's mother and left a VM informing her that pt will go to Destination Facility: [facility]. Report can be called to Destination Number for Report: [phone]. Transport has been set for ETA 13:45     PATRICIA Pal  [phone]    Future Appointments   Date Time Provider Department Center   1/9/2025  1:40 PM Trae Cardozo MD [facility] DERM [facility]   1/17/2025  1:00 PM Eda Dalal NP [facility] NEPHRO [facility]   2/21/2025 10:00 AM Audelia Reed FNP [facility] NEURO [facility]   2/21/2025  2:40 PM Yariel Lantigua MD [facility] CARDIO [facility]   5/30/2025 11:00 AM Derrick Sampson MD [facility] ENT [facility]        12/18/24 1109   Final Note   Assessment Type Final Discharge Note   Anticipated Discharge Disposition Home   What phone number can be called within the next 1-3 days to see how you are doing after discharge? [phone]   Post-Acute Status   Post-Acute Authorization Placement   Post-Acute Placement Status Referrals Sent   Discharge Delays (!) [facility] Arranged Transportation

## 2024-12-18 NOTE — PLAN OF CARE
[facility]    FACILITY TRANSFER ORDERS      Patient Name: Joseluis Triplett  YOB: 1955    PCP: Wan Wilkes MD   PCP Address: [facility] [address] / [place] [address]  PCP Phone Number: [phone]  PCP Fax: [phone]    Encounter Date: 12/18/2024    Admit to: Inpatient psychiatry     Vital Signs:  Routine    Diagnoses:   Active Hospital Problems    Diagnosis  POA    *Precordial pain [R07.2]  Yes    Other chest pain [R07.89]  Unknown      Resolved Hospital Problems   No resolved problems to display.       Allergies:  Review of patient's allergies indicates:   Allergen Reactions    Nsaids (non-steroidal anti-inflammatory drug) Other (See Comments)     History of perforated duodenal ulcer    Penicillins Rash and Other (See Comments)     Yeast infections       Diet: cardiac diet    Activities: Activity as tolerated    Goals of Care Treatment Preferences:  Code Status: Full Code    Living Will: Yes              Nursing: N/a     Labs: None    CONSULTS:     to evaluate for community resources/long-range planning.    MISCELLANEOUS CARE:  None    WOUND CARE ORDERS  None    Medications: Review discharge medications with patient and family and provide education.         Medication List      Some of the medications listed here do not show instructions, such as how often to take the medication. Ask your doctor or nurse how to use these medications.  Specifically ask about this and similar medications: fluticasone propionate (FLONASE) 50 mcg/actuation nasal spray       START taking these medications      fluticasone furoate-vilanteroL 100-25 mcg/dose diskus inhaler  Commonly known as: BREO  Inhale 1 puff into the lungs once daily. Controller  Start taking on: December 19, 2024     OLANZapine injection  Commonly known as: ZyPREXA  Inject 5 mg into the muscle once as needed for Agitation.  Replaces: OLANZapine 2.5 MG tablet     ondansetron 4 mg/2 mL Soln  Inject 4 mg into the  vein every 8 (eight) hours as needed.            CHANGE how you take these medications      * albuterol 2.5 mg /3 mL (0.083 %) nebulizer solution  Commonly known as: PROVENTIL  What changed: Another medication with the same name was changed. Make sure you understand how and when to take each.     * albuterol 90 mcg/actuation inhaler  Commonly known as: PROVENTIL/VENTOLIN HFA  Inhale 1 puff into the lungs every 4 (four) hours as needed for Wheezing.  What changed: Another medication with the same name was changed. Make sure you understand how and when to take each.     * albuterol 90 mcg/actuation inhaler  Commonly known as: PROVENTIL/VENTOLIN HFA  Inhale 2 puffs into the lungs every 6 (six) hours as needed for Wheezing. Rescue  What changed: See the new instructions.     * divalproex  MG Tb24 24 hr tablet  Commonly known as: DEPAKOTE ER  Take 2 tablets (1,000 mg total) by mouth every evening.  What changed:   how much to take  how to take this  when to take this  additional instructions     * divalproex 500 MG Tbec  Commonly known as: DEPAKOTE  Take 1 tablet (500 mg total) by mouth once daily.  Start taking on: December 19, 2024  What changed: You were already taking a medication with the same name, and this prescription was added. Make sure you understand how and when to take each.     gabapentin 600 MG tablet  Commonly known as: NEURONTIN  Take 1 tablet (600 mg total) by mouth once daily.  What changed: Another medication with the same name was removed. Continue taking this medication, and follow the directions you see here.     LINZESS 290 mcg Cap capsule  Generic drug: linaCLOtide  What changed: Another medication with the same name was removed. Continue taking this medication, and follow the directions you see here.     melatonin 5 mg Chew  Take by mouth.  What changed: Another medication with the same name was removed. Continue taking this medication, and follow the directions you see here.      nystatin-triamcinolone ointment  Commonly known as: MYCOLOG  What changed: Another medication with the same name was removed. Continue taking this medication, and follow the directions you see here.     * QUEtiapine 400 MG tablet  Commonly known as: SEROQUEL  Take 1 tablet (400 mg total) by mouth every evening.  What changed:   medication strength  how much to take  how to take this  when to take this  additional instructions     * QUEtiapine 200 MG Tab  Commonly known as: SEROQUEL  Take 1 tablet (200 mg total) by mouth once daily.  Start taking on: December 19, 2024  What changed: You were already taking a medication with the same name, and this prescription was added. Make sure you understand how and when to take each.           * This list has 7 medication(s) that are the same as other medications prescribed for you. Read the directions carefully, and ask your doctor or other care provider to review them with you.                CONTINUE taking these medications      ACCU-CHEK GUIDE TEST STRIPS Strp  Generic drug: blood sugar diagnostic  1 strip by Other route.     acetaminophen 500 MG tablet  Commonly known as: TYLENOL  Take 500 mg by mouth every 6 (six) hours as needed.     albuterol-ipratropium 2.5 mg-0.5 mg/3 mL nebulizer solution  Commonly known as: DUO-NEB  Take 3 mLs by nebulization every 6 (six) hours while awake.     AMITIZA 24 MCG Cap  Generic drug: lubiprostone     aspirin 81 MG EC tablet  Commonly known as: ECOTRIN  Take 1 tablet (81 mg total) by mouth once daily.     atorvastatin 40 MG tablet  Commonly known as: LIPITOR  Take 1 tablet (40 mg total) by mouth once daily.     baclofen 10 MG tablet  Commonly known as: LIORESAL  TAKE TWO TABLETS BY MOUTH 4 TIMES DAILY     budesonide-formoterol 80-4.5 mcg 80-4.5 mcg/actuation Hfaa  Commonly known as: SYMBICORT  Inhale 2 puffs into the lungs once daily. Controller     * carbidopa-levodopa  mg  mg per tablet  Commonly known as: SINEMET  Take 1  tablet twice a day by oral route.     * carbidopa-levodopa  mg  mg per tablet  Commonly known as: SINEMET  Take 1 tablet twice a day by oral route.     chlorhexidine 0.12 % solution  Commonly known as: PERIDEX     chlorzoxazone 500 mg Tab  Commonly known as: PARAFON FORTE  Take 1 tablet 4 times a day by oral route as needed.     CLARITIN 10 mg tablet  Generic drug: loratadine  Take 1 tablet by mouth once daily.     clopidogreL 75 mg tablet  Commonly known as: PLAVIX  Take 1 tablet (75 mg total) by mouth once daily.     dexAMETHasone 1 MG Tab  Commonly known as: DECADRON     diclofenac sodium 1 % Gel  Commonly known as: VOLTAREN  Apply topically once daily.     dicyclomine 10 MG capsule  Commonly known as: BENTYL     diphenhydrAMINE-acetaminophen  mg Tab  Commonly known as: TYLENOL PM  Take 1 tablet by mouth nightly as needed.     diphenhydramine-calamine 1-8 % Lotn  Commonly known as: CALOHIST  Apply topically 2 (two) times daily as needed.     docusate sodium 100 MG capsule  Commonly known as: STOOL SOFTENER  Take 1 capsule (100 mg total) by mouth 2 (two) times daily as needed for Constipation.     FLUoxetine 20 MG capsule  Take 1 capsule (20 mg total) by mouth 2 (two) times daily.     fluticasone propionate 50 mcg/actuation nasal spray  Commonly known as: FLONASE  __________________________     furosemide 20 MG tablet  Commonly known as: LASIX  Take 1 tablet (20 mg total) by mouth daily as needed (for swelling / SOB).     galcanezumab-gnlm 120 mg/mL Pnij  Inject 1 mL (120 mg total) into the skin every 28 days. maintenance dose     guaiFENesin 600 mg 12 hr tablet  Commonly known as: MUCINEX  Take 1,200 mg by mouth 2 (two) times daily as needed.     INVEGA 6 MG Tr24  Generic drug: paliperidone     levothyroxine 150 MCG tablet  Commonly known as: SYNTHROID  Take 1 tablet (150 mcg total) by mouth before breakfast.     LIDOcaine 5 %  Commonly known as: LIDODERM  Place 1 patch onto the skin.      losartan 25 MG tablet  Commonly known as: COZAAR  Take 1 tablet (25 mg total) by mouth once daily.     metFORMIN 500 MG tablet  Commonly known as: GLUCOPHAGE  Take 2 tablets (1,000 mg total) by mouth 2 (two) times daily with meals.     mirtazapine 30 MG tablet  Commonly known as: REMERON  Take 1 tablet (30 mg total) by mouth every evening.     multivitamin per tablet  Commonly known as: THERAGRAN  Take 1 tablet by mouth once daily.     NASONEX 50 mcg/actuation nasal spray  Generic drug: mometasone     nicotine 7 mg/24 hr  Commonly known as: NICODERM CQ  Place 1 patch onto the skin once daily.     omega-3 acid ethyl esters 1 gram capsule  Commonly known as: LOVAZA  2 CAPUSULES BY MOUTH BISD     ondansetron 4 MG tablet  Commonly known as: ZOFRAN  Take 1 tablet (4 mg total) by mouth every 12 (twelve) hours as needed for Nausea.     pantoprazole 40 MG tablet  Commonly known as: PROTONIX  Take 1 tablet (40 mg total) by mouth once daily.     sodium chloride 0.65 % nasal spray  Commonly known as: OCEAN  1 spray by Nasal route as needed.     SPIRIVA WITH HANDIHALER 18 mcg inhalation capsule  Generic drug: tiotropium     spironolactone 100 MG tablet  Commonly known as: ALDACTONE     tiZANidine 4 MG tablet  Commonly known as: ZANAFLEX  Take 0.5 tablets (2 mg total) by mouth every 8 (eight) hours.     topiramate 50 MG tablet  Commonly known as: TOPAMAX  TAKE ONE TABLET BY MOUTH EVERY MORNING AND TWO AT BEDTIME     traMADoL 50 mg tablet  Commonly known as: ULTRAM     * blood-glucose meter Misc  1 each by Other route.     * TRUE METRIX AIR GLUCOSE METER kit  Generic drug: blood-glucose meter  SMARTSI Kit(s) Via Meter Daily     UBRELVY 50 mg tablet  Generic drug: ubrogepant  Take 1 tablet (50 mg total) by mouth 2 (two) times daily as needed for Migraine. If symptoms persist or return, may repeat dose after 2 hours. Maximum: 200 mg per 24 hours           * This list has 4 medication(s) that are the same as other medications  prescribed for you. Read the directions carefully, and ask your doctor or other care provider to review them with you.                STOP taking these medications      fluconazole 100 MG tablet  Commonly known as: DIFLUCAN     fluconazole 150 MG Tab  Commonly known as: DIFLUCAN     ketoprofen 75 MG capsule  Commonly known as: ORUDIS     MOVIPREP 100-7.5-2.691 gram solution  Generic drug: polyethylene glycol     nitrofurantoin (macrocrystal-monohydrate) 100 MG capsule  Commonly known as: MACROBID     NYSTOP powder  Generic drug: nystatin     OLANZapine 2.5 MG tablet  Commonly known as: ZyPREXA  Replaced by: OLANZapine injection     olopatadine 0.1 % ophthalmic solution  Commonly known as: PATANOL     polyethylene glycol 236-22.74-6.74 -5.86 gram suspension  Commonly known as: GoLYTELY     potassium chloride SA 20 MEQ tablet  Commonly known as: K-DUR,KLOR-CON     promethazine 12.5 MG Tab  Commonly known as: PHENERGAN     propranoloL 40 MG tablet  Commonly known as: INDERAL     QULIPTA 60 mg Tab  Generic drug: atogepant     risperiDONE 3 MG Tab  Commonly known as: RISPERDAL                Immunizations Administered as of 12/18/2024       Name Date Dose VIS Date Route Exp Date    COVID-19, MRNA, LN-S, PF (Moderna) 9/30/2022 0.5 mL -- -- --    Lot: [id number]     External: Auto Reconciled From Outside Source     COVID-19, MRNA, LN-S, PF (Moderna) 5/14/2021 0.5 mL 3/26/2021 Intramuscular --    Site: Left arm     : Modern[employer]     Lot: [id number]     Comment: Adminis     COVID-19, MRNA, LN-S, PF (Moderna) 1/13/2021 0.5 mL 10/6/2009 Intramuscular --    Site: Left arm     : Modern[employer]     Lot: [id number]     Comment: Adminis             Some patients may experience side effects after vaccination.  These may include fever, headache, muscle or joint aches.  Most symptoms resolve with 24-48 hours and do not require urgent medical evaluation unless they persist for more than 72 hours or symptoms are  concerning for an unrelated medical condition.        Pt is medically cleared for admission to inpatient psychiatry.       _________________________________  Sania Brewer MD  12/18/2024

## 2024-12-18 NOTE — PROGRESS NOTES
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 0.039 (H) 0.000 - 0.026 ng/mL   Brain natriuretic peptide    Collection Time: 12/16/24  7:27 PM   Result Value Ref Range    BNP 49 0 - 99 pg/mL   Protime-INR    Collection Time: 12/16/24  7:49 PM   Result Value Ref Range    Prothrombin Time 11.5 9.0 - 12.5 sec    INR 1.1 0.8 - 1.2   Type & Screen    Collection Time: 12/16/24  7:49 PM   Result Value Ref Range    Group & Rh A NEG     Indirect Devyn NEG     Specimen Outdate 12/19/2024 23:59    Lactic acid, plasma    Collection Time: 12/16/24  8:08 PM   Result Value Ref Range    Lactate (Lactic Acid) 4.2 (HH) 0.5 - 2.2 mmol/L   Lipase    Collection Time: 12/16/24  8:08 PM   Result Value Ref Range    Lipase 29 4 - 60 U/L   Lactic acid, plasma    Collection Time: 12/17/24 12:16 AM   Result Value Ref Range    Lactate (Lactic Acid) 1.6 0.5 - 2.2 mmol/L   Troponin-I    Collection Time: 12/17/24 12:16 AM   Result Value Ref Range    Troponin I 0.094 (H) 0.000 - 0.026 ng/mL   Hemoglobin A1c if not done in past 3 months    Collection Time:  12/17/24 12:22 AM   Result Value Ref Range    Hemoglobin A1C 6.4 (H) 4.0 - 5.6 %    Estimated Avg Glucose 137 (H) 68 - 131 mg/dL   Troponin I    Collection Time: 12/17/24  2:56 AM   Result Value Ref Range    Troponin I 0.089 (H) 0.000 - 0.026 ng/mL   Urinalysis, Reflex to Urine Culture Urine, Clean Catch    Collection Time: 12/17/24  4:51 AM    Specimen: Urine   Result Value Ref Range    Specimen UA Urine, Clean Catch     Color, UA Yellow Yellow, Straw, Jaimee    Appearance, UA Clear Clear    pH, UA 7.0 5.0 - 8.0    Specific Gravity, UA >1.030 (A) 1.005 - 1.030    Protein, UA Trace (A) Negative    Glucose, UA Negative Negative    Ketones, UA Negative Negative    Bilirubin (UA) Negative Negative    Occult Blood UA Negative Negative    Nitrite, UA Negative Negative    Urobilinogen, UA Negative <2.0 EU/dL    Leukocytes, UA Negative Negative   POCT glucose    Collection Time: 12/17/24  5:50 AM   Result Value Ref Range    POCT Glucose 221 (H) 70 - 110 mg/dL   Comprehensive metabolic panel    Collection Time: 12/17/24  6:12 AM   Result Value Ref Range    Sodium 139 136 - 145 mmol/L    Potassium 4.7 3.5 - 5.1 mmol/L    Chloride 105 95 - 110 mmol/L    CO2 21 (L) 23 - 29 mmol/L    Glucose 197 (H) 70 - 110 mg/dL    BUN 27 (H) 8 - 23 mg/dL    Creatinine 1.3 0.5 - 1.4 mg/dL    Calcium 8.9 8.7 - 10.5 mg/dL    Total Protein 5.8 (L) 6.0 - 8.4 g/dL    Albumin 3.4 (L) 3.5 - 5.2 g/dL    Total Bilirubin 0.2 0.1 - 1.0 mg/dL    Alkaline Phosphatase 64 40 - 150 U/L    AST 24 10 - 40 U/L    ALT 23 10 - 44 U/L    eGFR 45 (A) >60 mL/min/1.73 m^2    Anion Gap 13 8 - 16 mmol/L   Magnesium    Collection Time: 12/17/24  6:12 AM   Result Value Ref Range    Magnesium 2.2 1.6 - 2.6 mg/dL   Phosphorus    Collection Time: 12/17/24  6:12 AM   Result Value Ref Range    Phosphorus 4.6 (H) 2.7 - 4.5 mg/dL   CBC auto differential    Collection Time: 12/17/24  6:12 AM   Result Value Ref Range    WBC 12.05 3.90 - 12.70 K/uL    RBC 3.63 (L) 4.00 - 5.40 M/uL     Hemoglobin 11.2 (L) 12.0 - 16.0 g/dL    Hematocrit 33.9 (L) 37.0 - 48.5 %    MCV 93 82 - 98 fL    MCH 30.9 27.0 - 31.0 pg    MCHC 33.0 32.0 - 36.0 g/dL    RDW 13.7 11.5 - 14.5 %    Platelets 266 150 - 450 K/uL    MPV 10.2 9.2 - 12.9 fL    Immature Granulocytes 0.6 (H) 0.0 - 0.5 %    Gran # (ANC) 8.2 (H) 1.8 - 7.7 K/uL    Immature Grans (Abs) 0.07 (H) 0.00 - 0.04 K/uL    Lymph # 2.5 1.0 - 4.8 K/uL    Mono # 1.3 (H) 0.3 - 1.0 K/uL    Eos # 0.0 0.0 - 0.5 K/uL    Baso # 0.03 0.00 - 0.20 K/uL    nRBC 0 0 /100 WBC    Gran % 68.0 38.0 - 73.0 %    Lymph % 20.7 18.0 - 48.0 %    Mono % 10.5 4.0 - 15.0 %    Eosinophil % 0.0 0.0 - 8.0 %    Basophil % 0.2 0.0 - 1.9 %    Differential Method Automated    POCT glucose    Collection Time: 12/17/24 11:55 AM   Result Value Ref Range    POCT Glucose 153 (H) 70 - 110 mg/dL   POCT glucose    Collection Time: 12/17/24  4:18 PM   Result Value Ref Range    POCT Glucose 180 (H) 70 - 110 mg/dL   POCT glucose    Collection Time: 12/17/24  8:47 PM   Result Value Ref Range    POCT Glucose 176 (H) 70 - 110 mg/dL   Comprehensive metabolic panel    Collection Time: 12/18/24  3:24 AM   Result Value Ref Range    Sodium 140 136 - 145 mmol/L    Potassium 4.4 3.5 - 5.1 mmol/L    Chloride 105 95 - 110 mmol/L    CO2 25 23 - 29 mmol/L    Glucose 156 (H) 70 - 110 mg/dL    BUN 23 8 - 23 mg/dL    Creatinine 1.4 0.5 - 1.4 mg/dL    Calcium 8.6 (L) 8.7 - 10.5 mg/dL    Total Protein 5.3 (L) 6.0 - 8.4 g/dL    Albumin 3.0 (L) 3.5 - 5.2 g/dL    Total Bilirubin 0.3 0.1 - 1.0 mg/dL    Alkaline Phosphatase 56 40 - 150 U/L    AST 25 10 - 40 U/L    ALT 28 10 - 44 U/L    eGFR 41 (A) >60 mL/min/1.73 m^2    Anion Gap 10 8 - 16 mmol/L   Magnesium    Collection Time: 12/18/24  3:24 AM   Result Value Ref Range    Magnesium 2.2 1.6 - 2.6 mg/dL   Phosphorus    Collection Time: 12/18/24  3:24 AM   Result Value Ref Range    Phosphorus 3.3 2.7 - 4.5 mg/dL   CBC auto differential    Collection Time: 12/18/24  3:24 AM   Result  Value Ref Range    WBC 11.28 3.90 - 12.70 K/uL    RBC 2.77 (L) 4.00 - 5.40 M/uL    Hemoglobin 8.6 (L) 12.0 - 16.0 g/dL    Hematocrit 26.0 (L) 37.0 - 48.5 %    MCV 94 82 - 98 fL    MCH 31.0 27.0 - 31.0 pg    MCHC 33.1 32.0 - 36.0 g/dL    RDW 13.7 11.5 - 14.5 %    Platelets 215 150 - 450 K/uL    MPV 10.3 9.2 - 12.9 fL    Immature Granulocytes 0.5 0.0 - 0.5 %    Gran # (ANC) 5.3 1.8 - 7.7 K/uL    Immature Grans (Abs) 0.06 (H) 0.00 - 0.04 K/uL    Lymph # 4.4 1.0 - 4.8 K/uL    Mono # 1.3 (H) 0.3 - 1.0 K/uL    Eos # 0.2 0.0 - 0.5 K/uL    Baso # 0.03 0.00 - 0.20 K/uL    nRBC 0 0 /100 WBC    Gran % 47.1 38.0 - 73.0 %    Lymph % 39.4 18.0 - 48.0 %    Mono % 11.3 4.0 - 15.0 %    Eosinophil % 1.4 0.0 - 8.0 %    Basophil % 0.3 0.0 - 1.9 %    Differential Method Automated    POCT glucose    Collection Time: 12/18/24  6:49 AM   Result Value Ref Range    POCT Glucose 152 (H) 70 - 110 mg/dL   POCT glucose    Collection Time: 12/18/24 11:03 AM   Result Value Ref Range    POCT Glucose 179 (H) 70 - 110 mg/dL      Lab Results   Component Value Date    VALPROATE 57.6 07/24/2024         EXAMINATION    VITALS   Vitals:    12/18/24 0821 12/18/24 1102 12/18/24 1214 12/18/24 1217   BP:  (!) 117/57     BP Location:  Left arm     Patient Position:  Sitting     Pulse: 85 86 78    Resp:  18     Temp:  98.3 °F (36.8 °C)     TempSrc:  Oral     SpO2:  (!) 93%  (!) 93%   Weight:       Height:            CONSTITUTIONAL  General Appearance: NAD, unremarkable, age appropriate, lying in bed, overweight, tense, restless       MUSCULOSKELETAL  Muscle Strength and Tone: WNL    Abnormal Involuntary Movements: none observed   Gait and Station: Attempted but unable to assess due to medical acuity      PSYCHIATRIC   Behavior/Cooperation: cooperative, restless and fidgety, eye contact normal, tense / paranoid   Speech: normal tone, normal pitch, normal volume, pressured, rapid, requiring redirection   Language: grossly intact, able to name, able to repeat with  "spontaneous speech  Mood: "not too good"  Affect:  congruent with mood and paranoid / tense / irritable   Associations: intact; no AL  Thought Process: Circumferential with intermittent AL   Thought Content: + paranoid / persecutory delusional TC ; denies SI, HI, AH, VH, or TH  Sensorium: Awake  Alert and Oriented: to person, place, city, state, month, year, and situation   Memory: 3/3 immediate, 3/3 at 5 minutes               Recent: Intact; able to report recent events              Remote: Intact; Named 3/4 past presidents   Attention/concentration: Impaired / Limited.  Able to spell w-[id number]-[id number] but NOT d-[id number]-r-[id number].   Similarities: Intact (difference between apple and orange?)  Abstract reasoning: Intact  Fund of Knowledge: Named 3/4 past presidents  Insight: Impaired / Limited   Judgment: Impaired / Limited     CAM ICU Delirium Assessment - NEGATIVE     Is the patient aware of the biomedical complications associated with substance abuse and mental illness? yes        MEDICAL DECISION MAKING     ASSESSMENT         Schizoaffective Disorder, Bipolar Type   Unspecified Anxiety Disorder   Unspecified Insomnia         RECOMMENDATIONS       - With reasonable medical certainty, based on a present-state examination and information from the patient's longstanding Mental Health Nurse Practitioner (Joseph Yañez) at [facility], the patient currently meets PEC criteria due to being gravely disabled 2/2 mental illness at this time.       - Once medically cleared, seek inpatient psychiatric admission for further mental health treatment / stabilization.       - Continue the patient's current outpatient psychiatric medication regimen of Depakote  mg PO QAM & 1000 mg PO QHS for mood, Seroquel 200 mg PO QAM & 400 mg PO QHS for psychosis / mood / anxiety, and Remeron 30 mg PO QHS for mood / anxiety / sleep (discussed risks/benefits/alt vs no treatment with patient).     - Defer other scheduled psychiatric medication(s) to " the inpatient psychiatric treatment team (discussed risks/benefits/alt vs no treatment with patient).     - Defer non-psychiatric medications / management to the Hospital Medicine Team.      - Can use Zyprexa 5 mg to 10 mg PO/IM q8 hours PRN for non-redirectable psychotic / manic agitation (discussed risks/benefits/alt vs no treatment with patient).       - Psychotherapy was again performed with patient as noted above with a focus on improving mood, psychosis, sleep, and anxiety.        - Patient's most recent resulted labs, imaging, and EKG were reviewed today.  Will need to order a trough VPA level after 4 days back on her current Depakote regimen.       - Continue suicide / violence precautions and continue to monitor the patient with a sitter while on a PEC / CEC.       - Thank you for this consult ; will continue to follow patient while at [facility]         Total time spent with patient and/or managing/coordinating patient's care today (excluding the time spent on psychotherapy): 58 minutes   Time spent on psychotherapy today (as noted above): 20 minutes   Total time for encounter today including psychotherapy: 78 minutes      More than 50% of the time was spent counseling/coordinating care.     Consulting clinician was informed of the encounter and consult note.      STAFF:  Abdirahman Montejo MD  [facility]  12/18/2024

## 2024-12-18 NOTE — PLAN OF CARE
MOON Message     Medicare Outpatient and Observation Notification regarding financial responsibility Explained to patient/caregiver; Signed/date by patient/caregiver   Date PENA was signed 12/18/2024   Time PENA was signed [id number]

## 2024-12-18 NOTE — NURSING
Patient discharging to [facility]. All discharge instructions placed in packet with original PEC paperwork. IV and telemetry removed, pt tolerated well. Report called to Marie RN. Awaiting transport to arrive, KEHINDE ESQUIVEL 1:45 pm.

## 2024-12-18 NOTE — PLAN OF CARE
Problem: Adult Inpatient Plan of Care  Goal: Plan of Care Review  Outcome: Progressing  Flowsheets (Taken 12/17/2024 2309)  Plan of Care Reviewed With: patient     Problem: Bariatric Environmental Safety  Goal: Safety Maintained with Care  Outcome: Progressing     Problem: Diabetes Comorbidity  Goal: Blood Glucose Level Within Targeted Range  Outcome: Progressing  Intervention: Monitor and Manage Glycemia  Flowsheets (Taken 12/17/2024 2309)  Glycemic Management: blood glucose monitored     Problem: Chronic Kidney Disease  Goal: Optimal Coping with Chronic Illness  Outcome: Progressing  Intervention: Support Psychosocial Response  Flowsheets (Taken 12/17/2024 2309)  Supportive Measures:   active listening utilized   relaxation techniques promoted   verbalization of feelings encouraged   self-care encouraged   positive reinforcement provided   self-reflection promoted   self-responsibility promoted  Family/Support System Care: involvement promoted     Problem: Fall Injury Risk  Goal: Absence of Fall and Fall-Related Injury  Outcome: Progressing  Intervention: Identify and Manage Contributors  Flowsheets (Taken 12/17/2024 2309)  Self-Care Promotion:   independence encouraged   BADL personal objects within reach   BADL personal routines maintained  Medication Review/Management:   medications reviewed   high-risk medications identified     Problem: Wound  Goal: Optimal Coping  Outcome: Progressing  Intervention: Support Patient and Family Response  Flowsheets (Taken 12/17/2024 2309)  Supportive Measures:   active listening utilized   relaxation techniques promoted   verbalization of feelings encouraged   self-care encouraged   positive reinforcement provided   self-reflection promoted   self-responsibility promoted  Family/Support System Care: involvement promoted   Plan of care progressing.
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
## 2024-12-19 NOTE — DISCHARGE SUMMARY
[facility] Hospital Medicine Discharge Summary    Primary Team: [facility] Hospitalist Team B  Attending Physician: Dr. Marcin Monterroso  Resident: Leoncio   Intern: Osman    Date of Admit: 12/16/2024  Date of Discharge: 12/19/2024    Discharge to: Inpatient psychiatric facility  Condition: Stable    Discharge Diagnoses     Patient Active Problem List   Diagnosis    Polypharmacy    Schizoaffective disorder, depressive type    Bipolar affective disorder, current episode hypomanic    Tobacco abuse    Edema    COPD without exacerbation    Obesity, Class II, BMI 35-39.9, with comorbidity    Thyroid disorder    Hyperlipidemia    History of anorexia nervosa    History of bulimia nervosa    Acid reflux    Chronic rhinitis    Chronic constipation    Osteoarthritis    CLARI (obstructive sleep apnea)    Drug-induced parkinsonism    Claudication    Migraine headache    Neuropathy    Hypotension    Convulsion    Congenital hypothyroidism without goiter    Gastroesophageal reflux disease without esophagitis    Cognitive disorder    Ventral incisional hernia without obstruction or gangrene    Epigastric pain    Back muscle spasm    Pain    Insomnia disorder with non-sleep disorder mental comorbidity    Colon cancer screening, due for repeat 5/2019    Bipolar affective disorder, currently depressed, mild    Chronic kidney disease    HTN (hypertension)    Bipolar affective disorder, currently manic, mild    Anxiety    Involuntary jerky movements    Gait disorder    Type 2 diabetes mellitus with diabetic peripheral angiopathy without gangrene, without long-term current use of insulin    Malignant melanoma of face    Paranoid behavior    Type 2 diabetes mellitus with stage 3b chronic kidney disease, without long-term current use of insulin    Stage 3a chronic kidney disease    Coronary artery disease with stable angina pectoris    Class 3 obesity    Smoker    Precordial pain    Other chest pain       Consultants and Procedures      Consultants:  Cardiology, psychiatry    Procedures:   N/a    Imaging:  N/a    Brief History of Present Illness & Summary of Hospital Course      Joseluis Triplett is a 69 y.o. female with a PMHx of bipolar disorder, CKD2, CAD s/p PCI of LAD 12/16/24. The patient presented on 12/16/2024 for evaluation of net onset hypotension, diaphoresis, and chest pain. On 12/16, pt underwent PCI w/ a JANEE in the LAD. In the ED, EKG showed no acute signs of ischemia and troponins were negative. Cardiology was consulted and recommended no interventions as EKG and troponins showed no signs of ischemia; pt continued to receive DAPT. Pt received fluids which improved her BP readings. Throughout admission, pt was voicing paranoid delusions that her aunt was trying to harm/kill her and called 911 for a missing purse. Psychiatry was consulted and recommended that the patient be PEC'd 2/2 to grave disability and be transitioned to an inpatient psychiatric facility.     On the day of discharge, patient was afebrile with stable vital signs and will be discharged in stable condition to inpatient psychiatric facility with close follow up.     For the full HPI please refer to the History & Physical from this admission.    Hospital Course By Problem with Pertinent Findings     Chest Pain  Hypotension  NSTEMI  CAD s/p PCI to LAD  - Troponin to peak of 0.094  - EKG w/ no ischemic changes  - Cardiology consulted w/ no acute intervention at this time  - Continue home aspirin and plavix   - Hypotension improved since admission      Behavior disturbances  - Pt reports that her aunt is trying to hurt her  - Pt called 911 yesterday morning for her missing purse after being told that her purse was with her in the ambulance  - Concern for paranoia and behavioral disturbances  - Psychiatry consulted yesterday; recommending pt be PEC'd 2/2 to grave disability as pt has paranoid delusions that her aunt is trying to kill her  - Pt will be transitioned to  inpatient psychiatry; medically cleared at this time  - Continue Seroquel, Depakote, and mirtazapine      Lactic Acidosis  Metabolic Acidosis w/ anion gap  -elevated lactate at 4.2 potentially secondary to hypotension  -quickly resolved to 1.6 on admit  -no further trending at this time     Type 2 Diabetes  -A1c 6.4  -diabetic diet with sliding scale insulin     Acute Kidney Injury  -potentially related to hypotension  -Cr elevated at 1.5; repeat CMP in the morning showed Cr 1.3  - Encouraged oral hydration      Hypothyroidism  - TSH 5.277, T4 0.85  - Continue home synthroid     Bipolar Disorder  - Continue home depakote (500 mg in the morning and 1000 mg QHS) and seroquel (200 mg in the morning and 400 mg QHS)    Discharge Medications        Medication List      Some of the medications listed here do not show instructions, such as how often to take the medication. Ask your doctor or nurse how to use these medications.  Specifically ask about this and similar medications: fluticasone propionate (FLONASE) 50 mcg/actuation nasal spray       START taking these medications      fluticasone furoate-vilanteroL 100-25 mcg/dose diskus inhaler  Commonly known as: BREO  Inhale 1 puff into the lungs once daily. Controller     OLANZapine injection  Commonly known as: ZyPREXA  Inject 5 mg into the muscle once as needed for Agitation.  Replaces: OLANZapine 2.5 MG tablet     ondansetron 4 mg/2 mL Soln  Inject 4 mg into the vein every 8 (eight) hours as needed.            CHANGE how you take these medications      * albuterol 2.5 mg /3 mL (0.083 %) nebulizer solution  Commonly known as: PROVENTIL  What changed: Another medication with the same name was changed. Make sure you understand how and when to take each.     * albuterol 90 mcg/actuation inhaler  Commonly known as: PROVENTIL/VENTOLIN HFA  Inhale 1 puff into the lungs every 4 (four) hours as needed for Wheezing.  What changed: Another medication with the same name was  changed. Make sure you understand how and when to take each.     * albuterol 90 mcg/actuation inhaler  Commonly known as: PROVENTIL/VENTOLIN HFA  Inhale 2 puffs into the lungs every 6 (six) hours as needed for Wheezing. Rescue  What changed: See the new instructions.     * divalproex  MG Tb24 24 hr tablet  Commonly known as: DEPAKOTE ER  Take 2 tablets (1,000 mg total) by mouth every evening.  What changed:   how much to take  how to take this  when to take this  additional instructions     * divalproex 500 MG Tbec  Commonly known as: DEPAKOTE  Take 1 tablet (500 mg total) by mouth once daily.  What changed: You were already taking a medication with the same name, and this prescription was added. Make sure you understand how and when to take each.     gabapentin 600 MG tablet  Commonly known as: NEURONTIN  Take 1 tablet (600 mg total) by mouth once daily.  What changed: Another medication with the same name was removed. Continue taking this medication, and follow the directions you see here.     LINZESS 290 mcg Cap capsule  Generic drug: linaCLOtide  What changed: Another medication with the same name was removed. Continue taking this medication, and follow the directions you see here.     melatonin 5 mg Chew  What changed: Another medication with the same name was removed. Continue taking this medication, and follow the directions you see here.     nystatin-triamcinolone ointment  Commonly known as: MYCOLOG  What changed: Another medication with the same name was removed. Continue taking this medication, and follow the directions you see here.     * QUEtiapine 400 MG tablet  Commonly known as: SEROQUEL  Take 1 tablet (400 mg total) by mouth every evening.  What changed:   medication strength  how much to take  how to take this  when to take this  additional instructions     * QUEtiapine 200 MG Tab  Commonly known as: SEROQUEL  Take 1 tablet (200 mg total) by mouth once daily.  What changed: You were already  taking a medication with the same name, and this prescription was added. Make sure you understand how and when to take each.           * This list has 7 medication(s) that are the same as other medications prescribed for you. Read the directions carefully, and ask your doctor or other care provider to review them with you.                CONTINUE taking these medications      ACCU-CHEK GUIDE TEST STRIPS Strp  Generic drug: blood sugar diagnostic     acetaminophen 500 MG tablet  Commonly known as: TYLENOL     albuterol-ipratropium 2.5 mg-0.5 mg/3 mL nebulizer solution  Commonly known as: DUO-NEB  Take 3 mLs by nebulization every 6 (six) hours while awake.     AMITIZA 24 MCG Cap  Generic drug: lubiprostone     aspirin 81 MG EC tablet  Commonly known as: ECOTRIN  Take 1 tablet (81 mg total) by mouth once daily.     atorvastatin 40 MG tablet  Commonly known as: LIPITOR  Take 1 tablet (40 mg total) by mouth once daily.     baclofen 10 MG tablet  Commonly known as: LIORESAL     budesonide-formoterol 80-4.5 mcg 80-4.5 mcg/actuation Hfaa  Commonly known as: SYMBICORT  Inhale 2 puffs into the lungs once daily. Controller     * carbidopa-levodopa  mg  mg per tablet  Commonly known as: SINEMET     * carbidopa-levodopa  mg  mg per tablet  Commonly known as: SINEMET     chlorhexidine 0.12 % solution  Commonly known as: PERIDEX     chlorzoxazone 500 mg Tab  Commonly known as: PARAFON FORTE     CLARITIN 10 mg tablet  Generic drug: loratadine     clopidogreL 75 mg tablet  Commonly known as: PLAVIX  Take 1 tablet (75 mg total) by mouth once daily.     dexAMETHasone 1 MG Tab  Commonly known as: DECADRON     diclofenac sodium 1 % Gel  Commonly known as: VOLTAREN  Apply topically once daily.     dicyclomine 10 MG capsule  Commonly known as: BENTYL     diphenhydrAMINE-acetaminophen  mg Tab  Commonly known as: TYLENOL PM     diphenhydramine-calamine 1-8 % Lotn  Commonly known as: CALOHIST     docusate sodium  100 MG capsule  Commonly known as: STOOL SOFTENER  Take 1 capsule (100 mg total) by mouth 2 (two) times daily as needed for Constipation.     FLUoxetine 20 MG capsule  Take 1 capsule (20 mg total) by mouth 2 (two) times daily.     fluticasone propionate 50 mcg/actuation nasal spray  Commonly known as: FLONASE  __________________________     furosemide 20 MG tablet  Commonly known as: LASIX  Take 1 tablet (20 mg total) by mouth daily as needed (for swelling / SOB).     galcanezumab-gnlm 120 mg/mL Pnij  Inject 1 mL (120 mg total) into the skin every 28 days. maintenance dose     guaiFENesin 600 mg 12 hr tablet  Commonly known as: MUCINEX     INVEGA 6 MG Tr24  Generic drug: paliperidone     levothyroxine 150 MCG tablet  Commonly known as: SYNTHROID  Take 1 tablet (150 mcg total) by mouth before breakfast.     LIDOcaine 5 %  Commonly known as: LIDODERM     losartan 25 MG tablet  Commonly known as: COZAAR  Take 1 tablet (25 mg total) by mouth once daily.     metFORMIN 500 MG tablet  Commonly known as: GLUCOPHAGE  Take 2 tablets (1,000 mg total) by mouth 2 (two) times daily with meals.     mirtazapine 30 MG tablet  Commonly known as: REMERON  Take 1 tablet (30 mg total) by mouth every evening.     multivitamin per tablet  Commonly known as: THERAGRAN     NASONEX 50 mcg/actuation nasal spray  Generic drug: mometasone     nicotine 7 mg/24 hr  Commonly known as: NICODERM CQ  Place 1 patch onto the skin once daily.     omega-3 acid ethyl esters 1 gram capsule  Commonly known as: LOVAZA     ondansetron 4 MG tablet  Commonly known as: ZOFRAN  Take 1 tablet (4 mg total) by mouth every 12 (twelve) hours as needed for Nausea.     pantoprazole 40 MG tablet  Commonly known as: PROTONIX  Take 1 tablet (40 mg total) by mouth once daily.     sodium chloride 0.65 % nasal spray  Commonly known as: OCEAN     SPIRIVA WITH HANDIHALER 18 mcg inhalation capsule  Generic drug: tiotropium     spironolactone 100 MG tablet  Commonly known as:  ALDACTONE     tiZANidine 4 MG tablet  Commonly known as: ZANAFLEX  Take 0.5 tablets (2 mg total) by mouth every 8 (eight) hours.     topiramate 50 MG tablet  Commonly known as: TOPAMAX     traMADoL 50 mg tablet  Commonly known as: ULTRAM     * blood-glucose meter Misc     * TRUE METRIX AIR GLUCOSE METER kit  Generic drug: blood-glucose meter     UBRELVY 50 mg tablet  Generic drug: ubrogepant  Take 1 tablet (50 mg total) by mouth 2 (two) times daily as needed for Migraine. If symptoms persist or return, may repeat dose after 2 hours. Maximum: 200 mg per 24 hours           * This list has 4 medication(s) that are the same as other medications prescribed for you. Read the directions carefully, and ask your doctor or other care provider to review them with you.                STOP taking these medications      fluconazole 100 MG tablet  Commonly known as: DIFLUCAN     fluconazole 150 MG Tab  Commonly known as: DIFLUCAN     ketoprofen 75 MG capsule  Commonly known as: ORUDIS     MOVIPREP 100-7.5-2.691 gram solution  Generic drug: polyethylene glycol     nitrofurantoin (macrocrystal-monohydrate) 100 MG capsule  Commonly known as: MACROBID     NYSTOP powder  Generic drug: nystatin     OLANZapine 2.5 MG tablet  Commonly known as: ZyPREXA  Replaced by: OLANZapine injection     olopatadine 0.1 % ophthalmic solution  Commonly known as: PATANOL     polyethylene glycol 236-22.74-6.74 -5.86 gram suspension  Commonly known as: GoLYTELY     potassium chloride SA 20 MEQ tablet  Commonly known as: K-DUR,KLOR-CON     promethazine 12.5 MG Tab  Commonly known as: PHENERGAN     propranoloL 40 MG tablet  Commonly known as: INDERAL     QULIPTA 60 mg Tab  Generic drug: atogepant     risperiDONE 3 MG Tab  Commonly known as: RISPERDAL               Where to Get Your Medications        Information about where to get these medications is not yet available    Ask your nurse or doctor about these medications  albuterol 90 mcg/actuation  inhaler  divalproex 500 MG Tbec  divalproex  MG Tb24 24 hr tablet  fluticasone furoate-vilanteroL 100-25 mcg/dose diskus inhaler  OLANZapine injection  ondansetron 4 mg/2 mL Soln  QUEtiapine 200 MG Tab  QUEtiapine 400 MG tablet          Discharge Information:   Diet:  Cardiac    Physical Activity:  As tolerated             Instructions:  1. Take all medications as prescribed  2. Keep all follow-up appointments  3. Return to the hospital or call your primary care physicians if any worsening symptoms such as fever, chest pain, shortness of breath, return of symptoms, or any other concerns.    Follow-Up Appointments:  PCP  Psychiatry     Sania Brewer MD  [facility] PGY-1

## 2024-12-21 ENCOUNTER — HOSPITAL ENCOUNTER (OUTPATIENT)
Facility: HOSPITAL | Age: 69
Discharge: PSYCHIATRIC OR SUBSTANCE USE FACILITY | End: 2024-12-24
Attending: STUDENT IN AN ORGANIZED HEALTH CARE EDUCATION/TRAINING PROGRAM | Admitting: STUDENT IN AN ORGANIZED HEALTH CARE EDUCATION/TRAINING PROGRAM
Payer: MEDICARE

## 2024-12-21 DIAGNOSIS — R07.9 CHEST PAIN: ICD-10-CM

## 2024-12-21 DIAGNOSIS — R60.9 SWELLING: ICD-10-CM

## 2024-12-21 DIAGNOSIS — M79.604 PAIN OF RIGHT LOWER EXTREMITY: Primary | ICD-10-CM

## 2024-12-21 PROBLEM — E11.59 TYPE 2 DIABETES MELLITUS WITH CIRCULATORY DISORDER, WITHOUT LONG-TERM CURRENT USE OF INSULIN: Status: ACTIVE | Noted: 2021-06-03

## 2024-12-21 PROBLEM — F31.9 BIPOLAR DISORDER, UNSPECIFIED: Status: ACTIVE | Noted: 2018-08-23

## 2024-12-21 LAB
ALBUMIN SERPL BCP-MCNC: 3.5 G/DL (ref 3.5–5.2)
ALP SERPL-CCNC: 83 U/L (ref 40–150)
ALT SERPL W/O P-5'-P-CCNC: 16 U/L (ref 10–44)
ANION GAP SERPL CALC-SCNC: 10 MMOL/L (ref 8–16)
AST SERPL-CCNC: 22 U/L (ref 10–40)
BASOPHILS # BLD AUTO: 0.04 K/UL (ref 0–0.2)
BASOPHILS NFR BLD: 0.5 % (ref 0–1.9)
BILIRUB SERPL-MCNC: 1 MG/DL (ref 0.1–1)
BUN SERPL-MCNC: 21 MG/DL (ref 8–23)
CALCIUM SERPL-MCNC: 9.6 MG/DL (ref 8.7–10.5)
CHLORIDE SERPL-SCNC: 108 MMOL/L (ref 95–110)
CO2 SERPL-SCNC: 22 MMOL/L (ref 23–29)
CREAT SERPL-MCNC: 0.9 MG/DL (ref 0.5–1.4)
DIFFERENTIAL METHOD BLD: ABNORMAL
EOSINOPHIL # BLD AUTO: 0.1 K/UL (ref 0–0.5)
EOSINOPHIL NFR BLD: 1.3 % (ref 0–8)
ERYTHROCYTE [DISTWIDTH] IN BLOOD BY AUTOMATED COUNT: 13.7 % (ref 11.5–14.5)
EST. GFR  (NO RACE VARIABLE): >60 ML/MIN/1.73 M^2
GLUCOSE SERPL-MCNC: 143 MG/DL (ref 70–110)
HCT VFR BLD AUTO: 30.1 % (ref 37–48.5)
HCV AB SERPL QL IA: NORMAL
HGB BLD-MCNC: 9.8 G/DL (ref 12–16)
HIV 1+2 AB+HIV1 P24 AG SERPL QL IA: NORMAL
IMM GRANULOCYTES # BLD AUTO: 0.13 K/UL (ref 0–0.04)
IMM GRANULOCYTES NFR BLD AUTO: 1.5 % (ref 0–0.5)
LIPASE SERPL-CCNC: 19 U/L (ref 4–60)
LYMPHOCYTES # BLD AUTO: 2.5 K/UL (ref 1–4.8)
LYMPHOCYTES NFR BLD: 29.9 % (ref 18–48)
MAGNESIUM SERPL-MCNC: 2.2 MG/DL (ref 1.6–2.6)
MCH RBC QN AUTO: 30.7 PG (ref 27–31)
MCHC RBC AUTO-ENTMCNC: 32.6 G/DL (ref 32–36)
MCV RBC AUTO: 94 FL (ref 82–98)
MONOCYTES # BLD AUTO: 0.9 K/UL (ref 0.3–1)
MONOCYTES NFR BLD: 10.8 % (ref 4–15)
NEUTROPHILS # BLD AUTO: 4.7 K/UL (ref 1.8–7.7)
NEUTROPHILS NFR BLD: 56 % (ref 38–73)
NRBC BLD-RTO: 0 /100 WBC
PLATELET # BLD AUTO: 372 K/UL (ref 150–450)
PMV BLD AUTO: 9.7 FL (ref 9.2–12.9)
POTASSIUM SERPL-SCNC: 4.2 MMOL/L (ref 3.5–5.1)
PROT SERPL-MCNC: 6.9 G/DL (ref 6–8.4)
RBC # BLD AUTO: 3.19 M/UL (ref 4–5.4)
SODIUM SERPL-SCNC: 140 MMOL/L (ref 136–145)
TROPONIN I SERPL DL<=0.01 NG/ML-MCNC: 6 NG/L (ref 0–14)
WBC # BLD AUTO: 8.39 K/UL (ref 3.9–12.7)

## 2024-12-21 PROCEDURE — 87389 HIV-1 AG W/HIV-1&-2 AB AG IA: CPT | Mod: HCNC | Performed by: PHYSICIAN ASSISTANT

## 2024-12-21 PROCEDURE — 63600175 PHARM REV CODE 636 W HCPCS: Mod: HCNC

## 2024-12-21 PROCEDURE — 25000003 PHARM REV CODE 250: Mod: HCNC

## 2024-12-21 PROCEDURE — G0378 HOSPITAL OBSERVATION PER HR: HCPCS | Mod: HCNC

## 2024-12-21 PROCEDURE — 96374 THER/PROPH/DIAG INJ IV PUSH: CPT | Mod: HCNC

## 2024-12-21 PROCEDURE — 99285 EMERGENCY DEPT VISIT HI MDM: CPT | Mod: 25,HCNC

## 2024-12-21 PROCEDURE — 93010 ELECTROCARDIOGRAM REPORT: CPT | Mod: HCNC,,, | Performed by: INTERNAL MEDICINE

## 2024-12-21 PROCEDURE — 83690 ASSAY OF LIPASE: CPT | Mod: HCNC

## 2024-12-21 PROCEDURE — 84484 ASSAY OF TROPONIN QUANT: CPT | Mod: HCNC

## 2024-12-21 PROCEDURE — 82962 GLUCOSE BLOOD TEST: CPT | Mod: HCNC

## 2024-12-21 PROCEDURE — 83735 ASSAY OF MAGNESIUM: CPT | Mod: HCNC

## 2024-12-21 PROCEDURE — 63600175 PHARM REV CODE 636 W HCPCS: Mod: HCNC | Performed by: STUDENT IN AN ORGANIZED HEALTH CARE EDUCATION/TRAINING PROGRAM

## 2024-12-21 PROCEDURE — 96375 TX/PRO/DX INJ NEW DRUG ADDON: CPT | Mod: HCNC

## 2024-12-21 PROCEDURE — 86803 HEPATITIS C AB TEST: CPT | Mod: HCNC | Performed by: PHYSICIAN ASSISTANT

## 2024-12-21 PROCEDURE — 85025 COMPLETE CBC W/AUTO DIFF WBC: CPT | Mod: HCNC

## 2024-12-21 PROCEDURE — 25000003 PHARM REV CODE 250: Mod: HCNC | Performed by: STUDENT IN AN ORGANIZED HEALTH CARE EDUCATION/TRAINING PROGRAM

## 2024-12-21 PROCEDURE — 80053 COMPREHEN METABOLIC PANEL: CPT | Mod: HCNC

## 2024-12-21 PROCEDURE — 25500020 PHARM REV CODE 255: Mod: HCNC | Performed by: STUDENT IN AN ORGANIZED HEALTH CARE EDUCATION/TRAINING PROGRAM

## 2024-12-21 PROCEDURE — 93005 ELECTROCARDIOGRAM TRACING: CPT | Mod: HCNC

## 2024-12-21 RX ORDER — CETIRIZINE HYDROCHLORIDE 5 MG/1
5 TABLET ORAL DAILY
Status: DISCONTINUED | OUTPATIENT
Start: 2024-12-22 | End: 2024-12-24 | Stop reason: HOSPADM

## 2024-12-21 RX ORDER — DICLOFENAC SODIUM 10 MG/G
2 GEL TOPICAL DAILY
Status: DISCONTINUED | OUTPATIENT
Start: 2024-12-22 | End: 2024-12-24 | Stop reason: HOSPADM

## 2024-12-21 RX ORDER — FLUOXETINE HYDROCHLORIDE 20 MG/1
20 CAPSULE ORAL 2 TIMES DAILY
Status: DISCONTINUED | OUTPATIENT
Start: 2024-12-21 | End: 2024-12-22

## 2024-12-21 RX ORDER — NALOXONE HCL 0.4 MG/ML
0.02 VIAL (ML) INJECTION
Status: DISCONTINUED | OUTPATIENT
Start: 2024-12-21 | End: 2024-12-24 | Stop reason: HOSPADM

## 2024-12-21 RX ORDER — DIVALPROEX SODIUM 500 MG/1
1000 TABLET, FILM COATED, EXTENDED RELEASE ORAL NIGHTLY
Status: DISCONTINUED | OUTPATIENT
Start: 2024-12-21 | End: 2024-12-24 | Stop reason: HOSPADM

## 2024-12-21 RX ORDER — KETOROLAC TROMETHAMINE 30 MG/ML
15 INJECTION, SOLUTION INTRAMUSCULAR; INTRAVENOUS ONCE
Status: COMPLETED | OUTPATIENT
Start: 2024-12-21 | End: 2024-12-21

## 2024-12-21 RX ORDER — ISOSORBIDE MONONITRATE 30 MG/1
30 TABLET, EXTENDED RELEASE ORAL DAILY
Status: DISCONTINUED | OUTPATIENT
Start: 2024-12-21 | End: 2024-12-24 | Stop reason: HOSPADM

## 2024-12-21 RX ORDER — FLUTICASONE FUROATE AND VILANTEROL 100; 25 UG/1; UG/1
1 POWDER RESPIRATORY (INHALATION) DAILY
Status: DISCONTINUED | OUTPATIENT
Start: 2024-12-22 | End: 2024-12-21

## 2024-12-21 RX ORDER — MIRTAZAPINE 15 MG/1
30 TABLET, FILM COATED ORAL NIGHTLY
Status: DISCONTINUED | OUTPATIENT
Start: 2024-12-21 | End: 2024-12-24 | Stop reason: HOSPADM

## 2024-12-21 RX ORDER — MORPHINE SULFATE 4 MG/ML
4 INJECTION, SOLUTION INTRAMUSCULAR; INTRAVENOUS
Status: COMPLETED | OUTPATIENT
Start: 2024-12-21 | End: 2024-12-21

## 2024-12-21 RX ORDER — TALC
6 POWDER (GRAM) TOPICAL NIGHTLY
Status: DISCONTINUED | OUTPATIENT
Start: 2024-12-21 | End: 2024-12-24 | Stop reason: HOSPADM

## 2024-12-21 RX ORDER — IBUPROFEN 200 MG
24 TABLET ORAL
Status: DISCONTINUED | OUTPATIENT
Start: 2024-12-21 | End: 2024-12-24 | Stop reason: HOSPADM

## 2024-12-21 RX ORDER — SIMETHICONE 80 MG
1 TABLET,CHEWABLE ORAL 4 TIMES DAILY PRN
Status: DISCONTINUED | OUTPATIENT
Start: 2024-12-21 | End: 2024-12-24 | Stop reason: HOSPADM

## 2024-12-21 RX ORDER — OLANZAPINE 10 MG/2ML
5 INJECTION, POWDER, FOR SOLUTION INTRAMUSCULAR ONCE AS NEEDED
Status: DISCONTINUED | OUTPATIENT
Start: 2024-12-21 | End: 2024-12-23

## 2024-12-21 RX ORDER — ACETAMINOPHEN 325 MG/1
650 TABLET ORAL EVERY 4 HOURS PRN
Status: DISCONTINUED | OUTPATIENT
Start: 2024-12-21 | End: 2024-12-24 | Stop reason: HOSPADM

## 2024-12-21 RX ORDER — ASPIRIN 81 MG/1
81 TABLET ORAL DAILY
Status: DISCONTINUED | OUTPATIENT
Start: 2024-12-22 | End: 2024-12-24 | Stop reason: HOSPADM

## 2024-12-21 RX ORDER — FLUTICASONE PROPIONATE 50 MCG
1 SPRAY, SUSPENSION (ML) NASAL DAILY
Status: DISCONTINUED | OUTPATIENT
Start: 2024-12-22 | End: 2024-12-24 | Stop reason: HOSPADM

## 2024-12-21 RX ORDER — QUETIAPINE FUMARATE 200 MG/1
400 TABLET, FILM COATED ORAL NIGHTLY
Status: DISCONTINUED | OUTPATIENT
Start: 2024-12-21 | End: 2024-12-24 | Stop reason: HOSPADM

## 2024-12-21 RX ORDER — CHLORHEXIDINE GLUCONATE ORAL RINSE 1.2 MG/ML
15 SOLUTION DENTAL 2 TIMES DAILY
Status: DISCONTINUED | OUTPATIENT
Start: 2024-12-21 | End: 2024-12-24 | Stop reason: HOSPADM

## 2024-12-21 RX ORDER — TIZANIDINE 2 MG/1
2 TABLET ORAL EVERY 8 HOURS
Status: DISCONTINUED | OUTPATIENT
Start: 2024-12-21 | End: 2024-12-24 | Stop reason: HOSPADM

## 2024-12-21 RX ORDER — DOXYCYCLINE HYCLATE 100 MG
100 TABLET ORAL EVERY 12 HOURS
Status: DISCONTINUED | OUTPATIENT
Start: 2024-12-22 | End: 2024-12-24 | Stop reason: HOSPADM

## 2024-12-21 RX ORDER — ATORVASTATIN CALCIUM 40 MG/1
40 TABLET, FILM COATED ORAL DAILY
Status: DISCONTINUED | OUTPATIENT
Start: 2024-12-22 | End: 2024-12-24 | Stop reason: HOSPADM

## 2024-12-21 RX ORDER — FLUTICASONE FUROATE AND VILANTEROL 100; 25 UG/1; UG/1
1 POWDER RESPIRATORY (INHALATION) DAILY
Status: DISCONTINUED | OUTPATIENT
Start: 2024-12-22 | End: 2024-12-24 | Stop reason: HOSPADM

## 2024-12-21 RX ORDER — POLYETHYLENE GLYCOL 3350 17 G/17G
17 POWDER, FOR SOLUTION ORAL 2 TIMES DAILY
Status: DISCONTINUED | OUTPATIENT
Start: 2024-12-21 | End: 2024-12-23

## 2024-12-21 RX ORDER — ACETAMINOPHEN 500 MG
1000 TABLET ORAL
Status: COMPLETED | OUTPATIENT
Start: 2024-12-21 | End: 2024-12-21

## 2024-12-21 RX ORDER — LEVOTHYROXINE SODIUM 150 UG/1
150 TABLET ORAL
Status: DISCONTINUED | OUTPATIENT
Start: 2024-12-22 | End: 2024-12-24 | Stop reason: HOSPADM

## 2024-12-21 RX ORDER — CLOPIDOGREL BISULFATE 75 MG/1
75 TABLET ORAL DAILY
Status: DISCONTINUED | OUTPATIENT
Start: 2024-12-22 | End: 2024-12-24 | Stop reason: HOSPADM

## 2024-12-21 RX ORDER — AMOXICILLIN 250 MG
1 CAPSULE ORAL 2 TIMES DAILY
Status: DISCONTINUED | OUTPATIENT
Start: 2024-12-21 | End: 2024-12-24 | Stop reason: HOSPADM

## 2024-12-21 RX ORDER — NICOTINE 7MG/24HR
1 PATCH, TRANSDERMAL 24 HOURS TRANSDERMAL DAILY
Status: DISCONTINUED | OUTPATIENT
Start: 2024-12-22 | End: 2024-12-24 | Stop reason: HOSPADM

## 2024-12-21 RX ORDER — ALBUTEROL SULFATE 90 UG/1
2 INHALANT RESPIRATORY (INHALATION) EVERY 6 HOURS PRN
Status: DISCONTINUED | OUTPATIENT
Start: 2024-12-21 | End: 2024-12-24 | Stop reason: HOSPADM

## 2024-12-21 RX ORDER — ALUMINUM HYDROXIDE, MAGNESIUM HYDROXIDE, AND SIMETHICONE 1200; 120; 1200 MG/30ML; MG/30ML; MG/30ML
30 SUSPENSION ORAL 4 TIMES DAILY PRN
Status: DISCONTINUED | OUTPATIENT
Start: 2024-12-21 | End: 2024-12-24 | Stop reason: HOSPADM

## 2024-12-21 RX ORDER — SPIRONOLACTONE 25 MG/1
100 TABLET ORAL DAILY
Status: DISCONTINUED | OUTPATIENT
Start: 2024-12-22 | End: 2024-12-21

## 2024-12-21 RX ORDER — BUTALBITAL, ACETAMINOPHEN AND CAFFEINE 50; 325; 40 MG/1; MG/1; MG/1
1 TABLET ORAL EVERY 6 HOURS PRN
Status: DISCONTINUED | OUTPATIENT
Start: 2024-12-21 | End: 2024-12-24 | Stop reason: HOSPADM

## 2024-12-21 RX ORDER — AMOXICILLIN 250 MG
1 CAPSULE ORAL 2 TIMES DAILY PRN
Status: DISCONTINUED | OUTPATIENT
Start: 2024-12-21 | End: 2024-12-22

## 2024-12-21 RX ORDER — BUTALBITAL, ACETAMINOPHEN AND CAFFEINE 50; 325; 40 MG/1; MG/1; MG/1
1 TABLET ORAL ONCE
Status: COMPLETED | OUTPATIENT
Start: 2024-12-22 | End: 2024-12-22

## 2024-12-21 RX ORDER — SODIUM CHLORIDE 0.9 % (FLUSH) 0.9 %
10 SYRINGE (ML) INJECTION EVERY 12 HOURS PRN
Status: DISCONTINUED | OUTPATIENT
Start: 2024-12-21 | End: 2024-12-24 | Stop reason: HOSPADM

## 2024-12-21 RX ORDER — GLUCAGON 1 MG
1 KIT INJECTION
Status: DISCONTINUED | OUTPATIENT
Start: 2024-12-21 | End: 2024-12-24 | Stop reason: HOSPADM

## 2024-12-21 RX ORDER — DIVALPROEX SODIUM 250 MG/1
500 TABLET, DELAYED RELEASE ORAL DAILY
Status: DISCONTINUED | OUTPATIENT
Start: 2024-12-22 | End: 2024-12-24 | Stop reason: HOSPADM

## 2024-12-21 RX ORDER — IBUPROFEN 200 MG
16 TABLET ORAL
Status: DISCONTINUED | OUTPATIENT
Start: 2024-12-21 | End: 2024-12-24 | Stop reason: HOSPADM

## 2024-12-21 RX ORDER — ONDANSETRON HYDROCHLORIDE 2 MG/ML
4 INJECTION, SOLUTION INTRAVENOUS EVERY 6 HOURS PRN
Status: DISCONTINUED | OUTPATIENT
Start: 2024-12-21 | End: 2024-12-24 | Stop reason: HOSPADM

## 2024-12-21 RX ORDER — INSULIN ASPART 100 [IU]/ML
0-10 INJECTION, SOLUTION INTRAVENOUS; SUBCUTANEOUS
Status: DISCONTINUED | OUTPATIENT
Start: 2024-12-21 | End: 2024-12-24 | Stop reason: HOSPADM

## 2024-12-21 RX ORDER — PANTOPRAZOLE SODIUM 40 MG/1
40 TABLET, DELAYED RELEASE ORAL DAILY
Status: DISCONTINUED | OUTPATIENT
Start: 2024-12-22 | End: 2024-12-24 | Stop reason: HOSPADM

## 2024-12-21 RX ORDER — POLYETHYLENE GLYCOL 3350 17 G/17G
17 POWDER, FOR SOLUTION ORAL DAILY
Status: DISCONTINUED | OUTPATIENT
Start: 2024-12-22 | End: 2024-12-21

## 2024-12-21 RX ORDER — LOSARTAN POTASSIUM 25 MG/1
25 TABLET ORAL DAILY
Status: DISCONTINUED | OUTPATIENT
Start: 2024-12-22 | End: 2024-12-24 | Stop reason: HOSPADM

## 2024-12-21 RX ORDER — QUETIAPINE FUMARATE 200 MG/1
200 TABLET, FILM COATED ORAL DAILY
Status: DISCONTINUED | OUTPATIENT
Start: 2024-12-22 | End: 2024-12-24 | Stop reason: HOSPADM

## 2024-12-21 RX ORDER — GABAPENTIN 300 MG/1
600 CAPSULE ORAL DAILY
Status: DISCONTINUED | OUTPATIENT
Start: 2024-12-22 | End: 2024-12-24 | Stop reason: HOSPADM

## 2024-12-21 RX ORDER — ADHESIVE BANDAGE
30 BANDAGE TOPICAL ONCE
Status: COMPLETED | OUTPATIENT
Start: 2024-12-21 | End: 2024-12-21

## 2024-12-21 RX ORDER — CEFTRIAXONE 2 G/1
2 INJECTION, POWDER, FOR SOLUTION INTRAMUSCULAR; INTRAVENOUS
Status: DISCONTINUED | OUTPATIENT
Start: 2024-12-21 | End: 2024-12-22

## 2024-12-21 RX ADMIN — DIVALPROEX SODIUM 1000 MG: 500 TABLET, FILM COATED, EXTENDED RELEASE ORAL at 11:12

## 2024-12-21 RX ADMIN — QUETIAPINE FUMARATE 400 MG: 200 TABLET ORAL at 10:12

## 2024-12-21 RX ADMIN — FLUOXETINE HYDROCHLORIDE 20 MG: 20 CAPSULE ORAL at 10:12

## 2024-12-21 RX ADMIN — CEFTRIAXONE 2 G: 2 INJECTION, POWDER, FOR SOLUTION INTRAMUSCULAR; INTRAVENOUS at 10:12

## 2024-12-21 RX ADMIN — ISOSORBIDE MONONITRATE 30 MG: 30 TABLET, EXTENDED RELEASE ORAL at 10:12

## 2024-12-21 RX ADMIN — POLYETHYLENE GLYCOL 3350 17 G: 17 POWDER, FOR SOLUTION ORAL at 10:12

## 2024-12-21 RX ADMIN — MORPHINE SULFATE 4 MG: 4 INJECTION INTRAVENOUS at 10:12

## 2024-12-21 RX ADMIN — ACETAMINOPHEN 1000 MG: 500 TABLET ORAL at 10:12

## 2024-12-21 RX ADMIN — SENNOSIDES AND DOCUSATE SODIUM 1 TABLET: 50; 8.6 TABLET ORAL at 10:12

## 2024-12-21 RX ADMIN — MAGNESIUM HYDROXIDE 2400 MG: 400 SUSPENSION ORAL at 11:12

## 2024-12-21 RX ADMIN — KETOROLAC TROMETHAMINE 15 MG: 30 INJECTION, SOLUTION INTRAMUSCULAR; INTRAVENOUS at 10:12

## 2024-12-21 RX ADMIN — MIRTAZAPINE 30 MG: 30 TABLET, FILM COATED ORAL at 10:12

## 2024-12-21 RX ADMIN — TIZANIDINE 2 MG: 2 TABLET ORAL at 10:12

## 2024-12-21 RX ADMIN — IOHEXOL 100 ML: 350 INJECTION, SOLUTION INTRAVENOUS at 12:12

## 2024-12-21 RX ADMIN — Medication 6 MG: at 10:12
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
## 2024-12-21 NOTE — ED NOTES
Patient identifiers verified and correct for Lisanne Ioana  LOC: The patient is awake, alert and aware of environment with an appropriate affect, the patient is oriented x 3 and speaking appropriately. Pt reports headache  APPEARANCE: Patient appears comfortable and in no acute distress, patient is clean and well groomed.  SKIN: The skin is warm and dry, color consistent with ethnicity, patient has normal skin turgor and moist mucus membranes, skin intact, no breakdown or bruising noted.   MUSCULOSKELETAL: Patient moving all extremities spontaneously, no swelling noted. Pt reports right leg pain  RESPIRATORY: Airway is open and patent, respirations are spontaneous, patient has a normal effort and rate, no accessory muscle use noted, O2 Sat 97% on room air.  CARDIAC: Patient has a normal rate and regular rhythm, no edema noted, capillary refill < 3 seconds.   GASTRO: Soft and non tender to palpation, no distention noted, Pt states bowel movements have been regular.  : Pt denies any pain or frequency with urination.  NEURO: Pt opens eyes spontaneously, behavior appropriate to situation, follows commands, facial expression symmetrical, bilateral hand grasp equal and even, purposeful motor response noted, normal sensation in all extremities when touched with a finger.

## 2024-12-21 NOTE — PROVIDER PROGRESS NOTES - EMERGENCY DEPT.
Encounter Date: 12/21/2024    ED Physician Progress Notes        Physician Note:   Patient presents with significant inflammation and improving hematoma in the setting of recent coronary catheterization through right femoral access.  No active bleeding on CT abdomen pelvis with runoff into lower extremities.  The limb is nonischemic.  She has an ultrasound without DVT.  Will initiate antibiotics due to significant stranding seen on CT concerning for cellulitis.  Will treat symptomatically.  Patient has very poor insight although her paranoid delusions have improved somewhat.  Will plan for admission to Hospital Medicine.  I discussed with the patient with her mother who is her power of  until she turned 89 years old recently to update her in to obtain collateral information as the patient has such poor insight and memory that she can not provide much history

## 2024-12-21 NOTE — ED NOTES
Assumed care of the patient. Report received from MICHELLE Estrella. Pt on continuous cardiac monitoring, continuous pulse oximetry, and automatic BP cuff cycling Q15 min. Pt in hospital gown, side rails up X2, bed low and locked, and call light is placed within reach. No family/visitors at bedside at this time. Pt denies any complaints or needs. Whiteboard updated with patient's plan of care.

## 2024-12-21 NOTE — ED TRIAGE NOTES
Leg Pain ([facility]/ right leg pain- swelling) PT reports having an angiogram on that side

## 2024-12-22 PROBLEM — R41.89 COGNITIVE IMPAIRMENT: Status: ACTIVE | Noted: 2024-12-22

## 2024-12-22 LAB
ANION GAP SERPL CALC-SCNC: 8 MMOL/L (ref 8–16)
BUN SERPL-MCNC: 27 MG/DL (ref 8–23)
CALCIUM SERPL-MCNC: 8.9 MG/DL (ref 8.7–10.5)
CHLORIDE SERPL-SCNC: 106 MMOL/L (ref 95–110)
CO2 SERPL-SCNC: 23 MMOL/L (ref 23–29)
CREAT SERPL-MCNC: 1.1 MG/DL (ref 0.5–1.4)
ERYTHROCYTE [DISTWIDTH] IN BLOOD BY AUTOMATED COUNT: 14.2 % (ref 11.5–14.5)
ERYTHROCYTE [DISTWIDTH] IN BLOOD BY AUTOMATED COUNT: 14.2 % (ref 11.5–14.5)
EST. GFR  (NO RACE VARIABLE): 54.7 ML/MIN/1.73 M^2
GLUCOSE SERPL-MCNC: 136 MG/DL (ref 70–110)
HCT VFR BLD AUTO: 25.4 % (ref 37–48.5)
HCT VFR BLD AUTO: 27.3 % (ref 37–48.5)
HGB BLD-MCNC: 8.1 G/DL (ref 12–16)
HGB BLD-MCNC: 8.6 G/DL (ref 12–16)
MCH RBC QN AUTO: 30.6 PG (ref 27–31)
MCH RBC QN AUTO: 31 PG (ref 27–31)
MCHC RBC AUTO-ENTMCNC: 31.5 G/DL (ref 32–36)
MCHC RBC AUTO-ENTMCNC: 31.9 G/DL (ref 32–36)
MCV RBC AUTO: 97 FL (ref 82–98)
MCV RBC AUTO: 97 FL (ref 82–98)
OHS QRS DURATION: 88 MS
OHS QRS DURATION: 88 MS
OHS QTC CALCULATION: 421 MS
OHS QTC CALCULATION: 438 MS
PLATELET # BLD AUTO: 358 K/UL (ref 150–450)
PLATELET # BLD AUTO: 365 K/UL (ref 150–450)
PMV BLD AUTO: 9.4 FL (ref 9.2–12.9)
PMV BLD AUTO: 9.5 FL (ref 9.2–12.9)
POCT GLUCOSE: 168 MG/DL (ref 70–110)
POCT GLUCOSE: 174 MG/DL (ref 70–110)
POCT GLUCOSE: 207 MG/DL (ref 70–110)
POTASSIUM SERPL-SCNC: 4.4 MMOL/L (ref 3.5–5.1)
RBC # BLD AUTO: 2.61 M/UL (ref 4–5.4)
RBC # BLD AUTO: 2.81 M/UL (ref 4–5.4)
SODIUM SERPL-SCNC: 137 MMOL/L (ref 136–145)
WBC # BLD AUTO: 10.92 K/UL (ref 3.9–12.7)
WBC # BLD AUTO: 9.43 K/UL (ref 3.9–12.7)

## 2024-12-22 PROCEDURE — 25000242 PHARM REV CODE 250 ALT 637 W/ HCPCS: Mod: HCNC | Performed by: STUDENT IN AN ORGANIZED HEALTH CARE EDUCATION/TRAINING PROGRAM

## 2024-12-22 PROCEDURE — 80048 BASIC METABOLIC PNL TOTAL CA: CPT | Mod: HCNC | Performed by: STUDENT IN AN ORGANIZED HEALTH CARE EDUCATION/TRAINING PROGRAM

## 2024-12-22 PROCEDURE — 96372 THER/PROPH/DIAG INJ SC/IM: CPT | Performed by: STUDENT IN AN ORGANIZED HEALTH CARE EDUCATION/TRAINING PROGRAM

## 2024-12-22 PROCEDURE — 94640 AIRWAY INHALATION TREATMENT: CPT | Mod: HCNC,XB

## 2024-12-22 PROCEDURE — 63600175 PHARM REV CODE 636 W HCPCS: Mod: HCNC | Performed by: STUDENT IN AN ORGANIZED HEALTH CARE EDUCATION/TRAINING PROGRAM

## 2024-12-22 PROCEDURE — 99215 OFFICE O/P EST HI 40 MIN: CPT | Mod: HCNC,GC,, | Performed by: PSYCHIATRY & NEUROLOGY

## 2024-12-22 PROCEDURE — G0378 HOSPITAL OBSERVATION PER HR: HCPCS | Mod: HCNC

## 2024-12-22 PROCEDURE — S4991 NICOTINE PATCH NONLEGEND: HCPCS | Mod: HCNC | Performed by: STUDENT IN AN ORGANIZED HEALTH CARE EDUCATION/TRAINING PROGRAM

## 2024-12-22 PROCEDURE — 94640 AIRWAY INHALATION TREATMENT: CPT | Mod: HCNC

## 2024-12-22 PROCEDURE — 94761 N-INVAS EAR/PLS OXIMETRY MLT: CPT | Mod: HCNC

## 2024-12-22 PROCEDURE — 90833 PSYTX W PT W E/M 30 MIN: CPT | Mod: HCNC,GC,, | Performed by: PSYCHIATRY & NEUROLOGY

## 2024-12-22 PROCEDURE — 85027 COMPLETE CBC AUTOMATED: CPT | Mod: HCNC | Performed by: STUDENT IN AN ORGANIZED HEALTH CARE EDUCATION/TRAINING PROGRAM

## 2024-12-22 PROCEDURE — 36415 COLL VENOUS BLD VENIPUNCTURE: CPT | Mod: HCNC | Performed by: STUDENT IN AN ORGANIZED HEALTH CARE EDUCATION/TRAINING PROGRAM

## 2024-12-22 PROCEDURE — 25000003 PHARM REV CODE 250: Mod: HCNC | Performed by: STUDENT IN AN ORGANIZED HEALTH CARE EDUCATION/TRAINING PROGRAM

## 2024-12-22 RX ORDER — IPRATROPIUM BROMIDE AND ALBUTEROL SULFATE 2.5; .5 MG/3ML; MG/3ML
3 SOLUTION RESPIRATORY (INHALATION) EVERY 6 HOURS PRN
Status: DISCONTINUED | OUTPATIENT
Start: 2024-12-22 | End: 2024-12-24 | Stop reason: HOSPADM

## 2024-12-22 RX ORDER — FLUOXETINE HYDROCHLORIDE 20 MG/1
20 CAPSULE ORAL DAILY
Status: DISCONTINUED | OUTPATIENT
Start: 2024-12-23 | End: 2024-12-24 | Stop reason: HOSPADM

## 2024-12-22 RX ORDER — TRAMADOL HYDROCHLORIDE 50 MG/1
50 TABLET ORAL EVERY 6 HOURS PRN
Status: DISCONTINUED | OUTPATIENT
Start: 2024-12-22 | End: 2024-12-24 | Stop reason: HOSPADM

## 2024-12-22 RX ORDER — ADHESIVE BANDAGE
30 BANDAGE TOPICAL DAILY PRN
Status: DISCONTINUED | OUTPATIENT
Start: 2024-12-22 | End: 2024-12-24 | Stop reason: HOSPADM

## 2024-12-22 RX ADMIN — CLOPIDOGREL BISULFATE 75 MG: 75 TABLET ORAL at 09:12

## 2024-12-22 RX ADMIN — INSULIN ASPART 4 UNITS: 100 INJECTION, SOLUTION INTRAVENOUS; SUBCUTANEOUS at 03:12

## 2024-12-22 RX ADMIN — TIZANIDINE 2 MG: 2 TABLET ORAL at 06:12

## 2024-12-22 RX ADMIN — CHLORHEXIDINE GLUCONATE 0.12% ORAL RINSE 15 ML: 1.2 LIQUID ORAL at 09:12

## 2024-12-22 RX ADMIN — CHLORHEXIDINE GLUCONATE 0.12% ORAL RINSE 15 ML: 1.2 LIQUID ORAL at 08:12

## 2024-12-22 RX ADMIN — DICLOFENAC SODIUM 2 G: 10 GEL TOPICAL at 09:12

## 2024-12-22 RX ADMIN — PANTOPRAZOLE SODIUM 40 MG: 40 TABLET, DELAYED RELEASE ORAL at 09:12

## 2024-12-22 RX ADMIN — DOXYCYCLINE HYCLATE 100 MG: 100 TABLET, COATED ORAL at 09:12

## 2024-12-22 RX ADMIN — TRAMADOL HYDROCHLORIDE 50 MG: 50 TABLET, COATED ORAL at 08:12

## 2024-12-22 RX ADMIN — CETIRIZINE HYDROCHLORIDE 5 MG: 5 TABLET, FILM COATED ORAL at 09:12

## 2024-12-22 RX ADMIN — ISOSORBIDE MONONITRATE 30 MG: 30 TABLET, EXTENDED RELEASE ORAL at 09:12

## 2024-12-22 RX ADMIN — QUETIAPINE FUMARATE 400 MG: 200 TABLET ORAL at 08:12

## 2024-12-22 RX ADMIN — MIRTAZAPINE 30 MG: 30 TABLET, FILM COATED ORAL at 08:12

## 2024-12-22 RX ADMIN — THERA TABS 1 TABLET: TAB at 09:12

## 2024-12-22 RX ADMIN — QUETIAPINE FUMARATE 200 MG: 200 TABLET ORAL at 09:12

## 2024-12-22 RX ADMIN — HYPROMELLOSE 2910 1 DROP: 5 SOLUTION/ DROPS OPHTHALMIC at 12:12

## 2024-12-22 RX ADMIN — HYPROMELLOSE 2910 1 DROP: 5 SOLUTION/ DROPS OPHTHALMIC at 04:12

## 2024-12-22 RX ADMIN — INSULIN ASPART 1 UNITS: 100 INJECTION, SOLUTION INTRAVENOUS; SUBCUTANEOUS at 08:12

## 2024-12-22 RX ADMIN — ATORVASTATIN CALCIUM 40 MG: 40 TABLET, FILM COATED ORAL at 09:12

## 2024-12-22 RX ADMIN — BUTALBITAL, ACETAMINOPHEN, AND CAFFEINE 1 TABLET: 325; 50; 40 TABLET ORAL at 12:12

## 2024-12-22 RX ADMIN — POLYETHYLENE GLYCOL 3350 17 G: 17 POWDER, FOR SOLUTION ORAL at 09:12

## 2024-12-22 RX ADMIN — ACETAMINOPHEN 650 MG: 325 TABLET ORAL at 06:12

## 2024-12-22 RX ADMIN — SENNOSIDES AND DOCUSATE SODIUM 1 TABLET: 50; 8.6 TABLET ORAL at 09:12

## 2024-12-22 RX ADMIN — DIVALPROEX SODIUM 1000 MG: 500 TABLET, FILM COATED, EXTENDED RELEASE ORAL at 08:12

## 2024-12-22 RX ADMIN — BUTALBITAL, ACETAMINOPHEN, AND CAFFEINE 1 TABLET: 325; 50; 40 TABLET ORAL at 02:12

## 2024-12-22 RX ADMIN — SENNOSIDES AND DOCUSATE SODIUM 1 TABLET: 50; 8.6 TABLET ORAL at 08:12

## 2024-12-22 RX ADMIN — ACETAMINOPHEN 650 MG: 325 TABLET ORAL at 10:12

## 2024-12-22 RX ADMIN — LOSARTAN POTASSIUM 25 MG: 25 TABLET, FILM COATED ORAL at 09:12

## 2024-12-22 RX ADMIN — FLUTICASONE FUROATE AND VILANTEROL TRIFENATATE 1 PUFF: 100; 25 POWDER RESPIRATORY (INHALATION) at 11:12

## 2024-12-22 RX ADMIN — ACETAMINOPHEN 650 MG: 325 TABLET ORAL at 02:12

## 2024-12-22 RX ADMIN — Medication 6 MG: at 08:12

## 2024-12-22 RX ADMIN — ALBUTEROL SULFATE 2 PUFF: 108 INHALANT RESPIRATORY (INHALATION) at 12:12

## 2024-12-22 RX ADMIN — SALINE NASAL SPRAY 1 SPRAY: 1.5 SOLUTION NASAL at 08:12

## 2024-12-22 RX ADMIN — TIZANIDINE 2 MG: 2 TABLET ORAL at 09:12

## 2024-12-22 RX ADMIN — LEVOTHYROXINE SODIUM 150 MCG: 150 TABLET ORAL at 06:12

## 2024-12-22 RX ADMIN — POLYETHYLENE GLYCOL 3350 17 G: 17 POWDER, FOR SOLUTION ORAL at 08:12

## 2024-12-22 RX ADMIN — FLUTICASONE PROPIONATE 50 MCG: 50 SPRAY, METERED NASAL at 09:12

## 2024-12-22 RX ADMIN — GABAPENTIN 600 MG: 300 CAPSULE ORAL at 04:12

## 2024-12-22 RX ADMIN — DOXYCYCLINE HYCLATE 100 MG: 100 TABLET, COATED ORAL at 08:12

## 2024-12-22 RX ADMIN — TIZANIDINE 2 MG: 2 TABLET ORAL at 02:12

## 2024-12-22 RX ADMIN — ASPIRIN 81 MG: 81 TABLET, COATED ORAL at 09:12

## 2024-12-22 RX ADMIN — DIVALPROEX SODIUM 500 MG: 250 TABLET, DELAYED RELEASE ORAL at 09:12

## 2024-12-22 RX ADMIN — IPRATROPIUM BROMIDE AND ALBUTEROL SULFATE 3 ML: 2.5; .5 SOLUTION RESPIRATORY (INHALATION) at 07:12

## 2024-12-22 RX ADMIN — FLUOXETINE HYDROCHLORIDE 20 MG: 20 CAPSULE ORAL at 09:12

## 2024-12-22 NOTE — ASSESSMENT & PLAN NOTE
Hx of bipolar dx w/ both jennifer & depression episodes documented, along w/ schizoaffective dx. During recent hospitalization voiced paranoid delusions for which PEC was placed & pt dc'ed to IP psych facility ([facility]). Presented from that facility. No e/o jennifer or overt depression on admission. No agitation, and pt polite & cooperative, however intermittently voices paranoid delusions.   - PEC placed by ED provider on admission.   - Psych consulted to follow while inpatient; appreciate recs  - Continue home depakote, seroquel, fluoxetine & mirtazapine   - Invega also listed on pt's home med list but no recent fills per chart review; appreciate pharmacy assistance w/ med rec

## 2024-12-22 NOTE — ED NOTES
Assumed care of patient at this time. Pt placed in hospital gown and currently lying in stretcher. Vital signs are stable, provided pt with warm blanket. Pt denied restroom use. No other complaints from pt at this time.

## 2024-12-22 NOTE — ASSESSMENT & PLAN NOTE
Hx of CAD. Underwent planned C w/ PCI & placement of JANEE x1 to LAD (that was 99% stenosed; 0% stenosed post-cath); distal RCA w/ 50% stenosis noted as well. Presented w/ recurrent chest pain. EKG w/o acute ischemic changes & high-sensitivity troponin nml. Low suspicion for ACS on presentation.   - Continue home DAPT + statin   - Continue home losartan  - Furosemide & spironolactone also listed on pt's home med list, but no recent fills per chart review; appreciate Pharmacy assistance w/ medication reconciliation   - Telemetry

## 2024-12-22 NOTE — ASSESSMENT & PLAN NOTE
Presented w/ severe headache along w/ various other complaints as above. Pt w/ known hx of migraines which is managed w/ monthly galcanezumab injections, scheduled tizanidine & PRN APAP. Ubrelvy, tramadol, baclofen, & chlorzoxazone also lited on pt's home med list, but no recent fills. CT head on admission nml.  - Continue home tizanidine & PRN APAP  - PRN Fioricet  - IV toradol 15mg x1; could consider addn'l NSAIDs (although hx of perforated duodenal ulcer in 2015)   - Could also consider triptan, reglan or compazine, IV magnesium, benadryl or other HA/migraine cocktail

## 2024-12-22 NOTE — NURSING
Patient arrived to unit at 2340. Room set up for PC case. Sitter arrived around 0040, RN in room with patient entire time doing admission/shift assessment. Set up on telemetry, continuous pulse ox and pure wic. Ice packs provided for head/right thigh but patient declined both. Complaints of right leg and head pain, no chest pain reported. Patient pleasant and cooperative but confused. Initialed and signed Notification of Patient Rights form.

## 2024-12-22 NOTE — ASSESSMENT & PLAN NOTE
"Long hx of cognitive dx- unable to determine etiology on chart review. Pt has very poor insight and impaired judgement. Pt's cognitive deficits will prove challenging in terms of providing medical care. Despite attempts to explain that pt's leg isn't infected and that it's "a bad bruise" from her PCI, pt perseverates on getting antibiotics and displays lack of insight. She will endorse chest pain, but when EKG tech came to bedside, pt stated that he was the cause of her chest pain. Even attempts to encourage pt to use cold compresses to legs ("because it will help with pain" whether it's a bruise or infection), which pt was initially amenable to, she later refused. Pt's paranoid delusions can also be contributing (asks which one of her family members gave consent for PCI, because one of them "is trying to kill me"). Either way, would not say that pt has decision making capacity at this time. Pt's mother previously was HCPOA; while mother is still mentally sharp & great insight to pt's condition, when mother turned 90 pt's brother took over as HCPOA.   "

## 2024-12-22 NOTE — PLAN OF CARE
[facility] - Stepdown Flex ([place]-14)  Initial Discharge Assessment       Primary Care Provider: Wan Wilkes MD    Admission Diagnosis: Swelling [R60.9]  Chest pain [R07.9]    Admission Date: 12/21/2024  Expected Discharge Date: 12/22/2024    Transition of Care Barriers: Mental illness    Payor: HU[employer] MANAGED MEDICARE / Plan: HU[employer] MEDICARE HMO / Product Type: Capitation /     Extended Emergency Contact Information  Primary Emergency Contact: IoanaSebastiánjoon Robles  Address: [address] [place] [address] [facility]  Home Phone: [phone]  Mobile Phone: [phone]  Relation: Brother  Secondary Emergency Contact: Lightfoot,Claude  Address: [address] [place] [address] [facility]  Home Phone: [phone]  Mobile Phone: [phone]  Relation: Brother  Mother: Curry Triplett  Home Phone: [phone]    Discharge Plan A: Psychiatric hospital  Discharge Plan B: [facility] - [facility] [address] [facility]  [address] [facility] [address]  Phone: [phone] Fax: [phone]    [id number] - [place]  [address] [facility] [address] [address]  Phone: [phone] Fax: [phone]    [id number] - [place] - [address]  [address]  [place] [address]  Phone: [phone] Fax: [phone]    [id number] - [place] - [address]  [address] [place] [address]  Phone: [phone] Fax: [phone]    [id number] - [place] - [address] AT [facility] [address]  [address]  [place] [address]  Phone: [phone] Fax: [phone]    SW attempted to meet with patient at bedside to complete discharge planning assessment without success and no family at bedside at  this time.  Patient with previous 30 re-admission. SW completed assessment from chart review.     Patient PEC and sent from [facility].  [facility] will resume care upon discharge.    Initial Assessment (most recent)       Adult Discharge Assessment - 12/22/24 1240          Discharge Assessment    Assessment Type Discharge Planning Assessment     Confirmed/corrected address, phone number and insurance Yes     Confirmed Demographics Correct on Facesheet     Source of Information health record     Communicated MIROSLAVA with patient/caregiver Date not available/Unable to determine     Do you expect to return to your current living situation? Yes     Prior to hospitilization cognitive status: Unable to Assess     Current cognitive status: Unable to Assess     Readmission within 30 days? Yes     Do you currently have service(s) that help you manage your care at home? No     Do you have any problems affording any of your prescribed medications? TBD     How do you get to doctors appointments? agency;health plan transportation     Discharge Plan A Psychiatric hospital     Discharge Plan B Home Health     DME Needed Upon Discharge  none     Transition of Care Barriers Mental illness
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
## 2024-12-22 NOTE — PROGRESS NOTES
[facility] - [facility] ([address] Medicine  Progress Note    Patient Name: Joseluis Triplett  MRN: [id number]  Patient Class: OP- Observation   Admission Date: 12/21/2024  Length of Stay: 0 days  Attending Physician: Lashae Rankin MD  Primary Care Provider: Wan Wilkes MD        Subjective     Principal Problem: Pain of right lower extremity      HPI:  Joseluis Triplett is a 69 y.o. female w/ PMHx of CAD (s/p JANEE x1 to LAD, 12/16/24), HTN, HLD, T2DM, COPD, CKD, Parkinson's (unclear if true Parkinson's dx vs drug-induced Parkinsonism), hypothyroid, GERD, bipolar dx, cognitive impairment, CLARI, severe obesity, migraines, chronic constipation, remote hx of eating disorder, osteoarthritis w/ chronic pain, & tobacco dependency, who presented to [facility] ED on 12/21/2024 from [facility] w/ multiple complaints, including R leg pain, severe headache, R-sided chest pain, & abdominal pain. Pt limited historian, and unable to provide much of a timeline, characterize pain, or give further specifics/details. Of note pt recently underwent PCI w/ JANEE placed to LAD (12/16) at [facility]. Was apparently in normal state of health on discharge that day, but then developed new chest pain, nausea & diaphoresis, for which she went to [facility] ED and was admitted (12/16-12/19). Noted to be hypotensive on admission (79/50) w/ mild tachycardia. EKG w/o acute ischemic changes. Troponin minimally elevated (expected given PCI earlier that day). Chest pain resolved in ED. Cardiology consulted who deferred repeat cath given reassuring EKG & troponin, and resolution of sx. Pt continued on DAPT & given IVF w/ resolution of hypotension. While admitted, pt displayed paranoid delusions, including that her aunt was trying to harm/kill her and also called 911 re: missing purse. Psychiatry was consulted and pt PEC'ed. Labs & VS remained stable w/o recurrence of sx. Pt discharged to inpatient psychiatric facility  ([facility]) on 12/19.     On arrival to [facility] ED, afebrile, [id number] (BP 130s-160s/60s-70s), HR 60s & saturating > 96% on RA. Exam w/ notable tenderness of RLE & abdomen, but otherwise unremarkable. Labs notable for Hgb 9.8 (improved from prior but not yet at baseline of 11-13). High-sensitivity troponin nml (6). EKG stable w/o acute ischemic changes. CXR & CT head unremarkable. CTA A/P w/ runoff negative for arterial stenosis, although w/ some SQ stranding & skin thickening along the medial aspect of the RLE extending to just above the knee w/ IM edema & swelling of the R adductor longus & gracilis muscle, and areas of hyperattenuation (possibly blood product?); no acute bleeding, soft tissue air or organized fluid collections identified. Received IV morphine 4mg & APAP 1g. Although workup negative, given pt's sx, recent PCI, & inability to provide further details re: sx, pt admitted to Hospital Medicine for observation.     Overview/Hospital Course:  No notes on file    Interval History: Admitted yesterday for observation for leg & chest pain. NAEON. Remains HDS. Labs notable for drop in Hgb 9.8 --> 8.6, however VS & labs stable and no e/o active bleeding on yesterday's CTA. Repeat CBC scheduled for this afternoon. Pt reports occasional chest pain but no longer noticeable; no active chest pain. Chest tightness & HA also resolved. Reports continued leg pain; while APAP helpful, still w/ pain. Will titrate analgesic regimen conservatively and utilize multimodals. Deferring NSAIDs for now given drop in H/H (and remote hx of perforated ulcer). Once again encouraged repeated use of ice packs/cold compresses. Pt reports that she has an eye infection, stating that her R eye is itchy and had discharge this AM. Also reports itching to inside of nares. Ocular exam unremarkable. Artificial tears & Zyrtec ordered. Pending repeat CBC, pt would be medically ready to discharge this afternoon. Dispo  "pending Psych eval. Pt doesn't want to go back to [facility] as "they didn't treat her well there." She thinks she needs to go to a nursing home as she does not feel like she'd be able to care for herself at home.     Review of Systems   Constitutional:  Negative for chills, diaphoresis and fever.   HENT:  Negative for congestion, rhinorrhea, sore throat and trouble swallowing.    Eyes:  Positive for itching. Negative for photophobia and visual disturbance.   Respiratory:  Negative for cough, shortness of breath and wheezing.    Cardiovascular:  Negative for chest pain, palpitations and leg swelling.   Gastrointestinal:  Positive for constipation. Negative for abdominal pain, diarrhea, nausea and vomiting.   Genitourinary:  Negative for difficulty urinating, dysuria and hematuria.   Musculoskeletal:  Positive for myalgias (R thigh pain). Negative for arthralgias, joint swelling and neck pain.   Skin:  Negative for rash and wound.   Neurological:  Negative for dizziness, light-headedness and headaches.   Psychiatric/Behavioral:  Negative for agitation, behavioral problems and hallucinations.      Objective:     Vital Signs (Most Recent):  Temp: 98.1 °F (36.7 °C) (12/22/24 1526)  Pulse: 81 (12/22/24 1526)  Resp: 20 (12/22/24 1526)  BP: (!) 93/53 (12/22/24 1526)  SpO2: 95 % (12/22/24 1526) Vital Signs (24h Range):  Temp:  [98.1 °F (36.7 °C)-99.2 °F (37.3 °C)] 98.1 °F (36.7 °C)  Pulse:  [] 81  Resp:  [13-24] 20  SpO2:  [95 %-99 %] 95 %  BP: ()/(53-74) 93/53     Weight: 111.1 kg (244 lb 14.9 oz)  Body mass index is 43.39 kg/m².    Intake/Output Summary (Last 24 hours) at 12/22/2024 1547  Last data filed at 12/22/2024 0035  Gross per 24 hour   Intake 120 ml   Output 1000 ml   Net -880 ml         Physical Exam  Constitutional:       General: She is not in acute distress.     Appearance: She is morbidly obese. She is not toxic-appearing or diaphoretic.   HENT:      Head: Normocephalic and atraumatic.      " Mouth/Throat:      Mouth: Mucous membranes are moist.   Eyes:      General:         Right eye: No discharge.         Left eye: No discharge.      Conjunctiva/sclera: Conjunctivae normal.      Right eye: Right conjunctiva is not injected.      Left eye: Left conjunctiva is not injected.   Cardiovascular:      Rate and Rhythm: Normal rate and regular rhythm.      Heart sounds: Normal heart sounds.   Pulmonary:      Effort: Pulmonary effort is normal. No respiratory distress.      Breath sounds: Normal breath sounds. No wheezing, rhonchi or rales.   Abdominal:      General: Bowel sounds are normal. There is no distension.      Palpations: Abdomen is soft.      Tenderness: There is no abdominal tenderness. There is no guarding or rebound.   Musculoskeletal:         General: Tenderness (& swelling of inner R thigh) present.      Right lower leg: No edema.      Left lower leg: No edema.   Skin:     General: Skin is warm and dry.      Findings: Bruising (ecchymosis of R thigh, w/ swelling of inner thigh) present.   Neurological:      General: No focal deficit present.      Mental Status: She is alert and oriented to person, place, and time. Mental status is at baseline.   Psychiatric:         Mood and Affect: Mood normal.         Speech: Speech is delayed.         Behavior: Behavior normal. Behavior is not agitated, aggressive, withdrawn or combative. Behavior is cooperative.         Cognition and Memory: Cognition is impaired.             Significant Labs: All pertinent labs within the past 24 hours have been reviewed.    Significant Imaging: I have reviewed all pertinent imaging results/findings within the past 24 hours.        Assessment and Plan     Pain of right lower extremity  Hematoma & ecchymoses of RLE  Presented w/ new RLE pain, along w/ R-sided chest pain, abdominal pain, & headache. Concern for complication 2/2 recent procedure as pt underwent PCI for 99% stenosed LAD on 12/16. TTP of RLE & abdomen. EKG, labs  (including troponin), CXR & VS unremarkable. CTA A/P w/ runoff negative for arterial stenosis, although some e/o edema & swelling noted of R inner thigh muscles, along w/ SQ stranding & thickening of overlying subcutaneous tissue; poss blood product/collection given areas of hyperattenuation, however cellulitis also possible. No active bleeding, ST/SQ air, or organized fluid collections identified. 0/4 SIRS on arrival & H/H improved from prior. Favor pain/swelling 2/2 reabsorbing hematoma w/ assoc ecchymosis. However given severity of stranding on imaging, giving abx out of precaution. LE US w/o e/o DVT (although mid & distal portion of superior femoral vein not visualized); soft tissue edema w/ anechoic region w/ internal echogenicity (poss representing abscess vs hematoma vs significant edema). Favor hematoma.   - CTX x1 dose; PO doxycycline 100mg BID out of abundance of precaution   - Supportive care: analgesics (see below), cool compresses,  etc.   - Monitor CBC      Coronary artery disease with stable angina pectoris  Hx of CAD. Underwent planned LHC w/ PCI & placement of JANEE x1 to LAD (that was 99% stenosed; 0% stenosed post-cath); distal RCA w/ 50% stenosis noted as well. Presented w/ recurrent chest pain. EKG w/o acute ischemic changes & high-sensitivity troponin nml. Low suspicion for ACS on presentation.   - Continue home DAPT + statin   - Continue home losartan  - Started low-dose Imdur (will hopefully help with anginal sx as well)  - Furosemide & spironolactone also listed on pt's home med list, but no recent fills per chart review; appreciate Pharmacy assistance w/ medication reconciliation   - Telemetry   - EKG PRN      Bipolar disorder, unspecified  Hx of bipolar dx w/ both jennifer & depression episodes documented, along w/ schizoaffective dx. During recent hospitalization voiced paranoid delusions for which PEC was placed & pt dc'ed to IP psych facility ([facility]). Presented from that facility. No e/o  jennifer or overt depression on admission. No agitation, and pt polite & cooperative, however intermittently voices paranoid delusions.   - PEC placed by ED provider on admission.   - Psych consulted to follow while inpatient; appreciate recs  - Continue home depakote, seroquel, fluoxetine & mirtazapine   - Invega also listed on pt's home med list but no recent fills per chart review; appreciate pharmacy assistance w/ med rec   - Plan to d/c back to [facility] when medically ready     Migraine headache  Presented w/ severe headache along w/ various other complaints as above. Pt w/ known hx of migraines which is managed w/ monthly galcanezumab injections, scheduled tizanidine & PRN APAP. Ubrelvy, tramadol, baclofen, & chlorzoxazone also lited on pt's home med list, but no recent fills. CT head on admission nml. Received APAP 1g, IV morphine 4mg IV toradol 15mg, & Fioricet on admission w/ resolution of HA by following AM.   - Continue home tizanidine & PRN APAP  - PRN Fioricet  - Holding addn'l NSAIDs for now given drop in H/H (& remote hx of perforated ulcer)  - Could also consider triptan, reglan or compazine, IV magnesium, benadryl or other HA/migraine cocktail     Chronic constipation  Presented w/ abdominal pain (amongst other complaints as above). Mild TTP appreciated on exam w/o guarding or rebound. CTA A/P w/o notable intraabdominal findings. Pt does have notable hx of constipation w/ amitiza, bentyl, docusate, & linzess, listed on home med list (although none w/ recent fills, however linzess only just prescribed on 12/19 discharge). Pt endorses constipation & reports she regularly has to use Miralax. Received milk of magnesia, miralax & senna on admission.   - Miralax & senna BID  - PRN milk of magnesia   - Holding home linzess as not on hospital formulary   - Escalate regimen PRN      Cognitive impairment   Long hx of cognitive dx- unable to determine etiology on chart review. Pt has very poor insight and impaired  "judgement. Pt's cognitive deficits will prove challenging in terms of providing medical care. Despite attempts to explain that pt's leg isn't infected and that it's "a bad bruise" from her PCI, pt perseverates on getting antibiotics and displays lack of insight. She will endorse chest pain, but when EKG tech came to bedside, pt stated that he was the cause of her chest pain. Even attempts to encourage pt to use cold compresses to legs ("because it will help with pain" whether it's a bruise or infection), which pt was initially amenable to, she later refused. Pt's paranoid delusions can also be contributing (asks which one of her family members gave consent for PCI, because one of them "is trying to kill me"). Either way, would not say that pt has decision making capacity on admission. Pt's mother previously was POA; while mother is still mentally sharp w/ great insight to pt's condition, when mother turned 90 pt's brother took over as POA.   - Regularly assess pt's decision-making capacity  - Psych consulted as above     Type 2 diabetes mellitus with circulatory disorder, without long-term current use of insulin  Hx of well-controlled T2DM on home regimen of metformin; last A1c 6.4% (12/17/24). Serum glucose 143 on admission.   - Holding home metformin while inpatient  - MD-SSI  - CBG AC & HS  - Hypoglycemic protocol      Congenital hypothyroidism without goiter  Last TSH nml at 2.228 (July '24).   - Continue home levothyroxine     Drug-induced parkinsonism  Parkinson's dx & possible drug-induced Parkinsonism both mentioned in pt's medical history going back to at least 2013. Overall appears Parkinsonism favored. Previously on carbidopa-levodopa, but no recent fills per chart review. Appears to be currently managed w/ gabapentin.   - Continue home gabapentin  - Appreciate pharmacy assistance w/ med rec     COPD without exacerbation  Hx of COPD, however PFTs in 2023 & 2021 were normal. Appears outpatient providers " suspect chronic vs intermittent acute bronchitis along w/ smoking to be cause of pt's intermittent sx. Few wheezes/squeaks on exam, but no SOB, hypoxia or resp distress on admission. No acute concerns.   - Continue home Symbicort & PRN albuterol  - Duo-nebs & Spiriva also listed on pt's home med list, but no recent fills per chart review; pharmacy assistance w/ med rec  - Continue home nasal spray  - Daily Zyrtec (as home Claritin not on hospital formulary)   - Supplemental O2 PRN for goal SpO2 > 90%     Polypharmacy  Has long home med list, many of which appear to not have recent fills.   - Appreciate Pharmacy assistance w/ medication reconciliation         VTE Risk Mitigation (From admission, onward)           Ordered     Reason for No Pharmacological VTE Prophylaxis  Once        Question:  Reasons:  Answer:  Patient is Ambulatory    12/21/24 1800     IP VTE HIGH RISK PATIENT  Once         12/21/24 1800     Place sequential compression device  Until discontinued         12/21/24 1800                    Discharge Planning   MIROSLAVA: 12/23/2024     Code Status: Full Code   Medical Readiness for Discharge Date:   Discharge Plan A: Psychiatric hospital            Lashae Rankin MD  Department of Hospital Medicine   [facility] - Stepdown [facility] ([place]-14)

## 2024-12-22 NOTE — ED NOTES
Pt remains resting in stretcher comfortably - with side rails up, locked, and in lowest position. Chest rise and fall noted; breathing equal, even, and unlabored. Sitter remains at bedside in direct visual contact, charting per protocol every 15 minutes. Pt aware of plan of care. No acute distress noted and no needs expressed at this time. Will continue to assess periodically.

## 2024-12-22 NOTE — ASSESSMENT & PLAN NOTE
Hx of COPD, however PFTs in 2023 & 2021 were normal. Appears outpatient providers suspect chronic vs intermittent acute bronchitis along w/ smoking to be cause of pt's intermittent sx. Few wheezes/squeaks on exam, but no SOB, hypoxia or resp distress on admission. No acute concerns.   - Continue home Symbicort & PRN albuterol  - Duo-nebs & Spiriva also listed on pt's home med list, but no recent fills per chart review; pharmacy assistance w/ med rec  - Continue home nasal spray  - Daily Zyrtec (as home Claritin not on hospital formulary)   - Supplemental O2 PRN for goal SpO2 > 90%

## 2024-12-22 NOTE — PLAN OF CARE
Sw was unable to complete assessment at this time. Pt was sleeping.        Jean Marie Williamson LMSW  Case Management  Emergency Department  [phone]

## 2024-12-22 NOTE — ASSESSMENT & PLAN NOTE
Presented w/ new RLE pain, along w/ R-sided chest pain, abdominal pain, & headache. Concern for complication 2/2 recent procedure as pt underwent PCI for 99% stenosed LAD on 12/16. TTP of RLE & abdomen. EKG, labs (including troponin), CXR & VS unremarkable. CTA A/P w/ runoff negative for arterial stenosis, although some e/o edema & swelling noted of R inner thigh muscles, along w/ SQ stranding & thickening of overlying subcutaneous tissue; poss blood product/collection given areas of hyperattenuation, however cellulitis also possible. No active bleeding, ST/SQ air, or organized fluid collections identified. 0/4 SIRS on arrival & H/H improved from prior. Favor pain/swelling 2/2 reabsorbing hematoma w/ assoc ecchymosis. However given severity of stranding on imaging, giving abx out of precaution.    - LE US pending  - CTX x1 dose; could consider PO doxycycline 100mg BID if desire to continue abx   - Supportive care: analgesics (see below), cool compresses,  etc.   - Pt education as able

## 2024-12-22 NOTE — ASSESSMENT & PLAN NOTE
Has long home med list, many of which appear to not have recent fills.   - Appreciate Pharmacy assistance w/ medication reconciliation

## 2024-12-22 NOTE — ASSESSMENT & PLAN NOTE
Parkinson's dx & possible drug-induced Parkinsonism both mentioned in pt's medical history going back to at least 2013. Overall appears Parkinsonism favored. Previously on carbidopa-levodopa, but no recent fills per chart review. Appears to be currently managed w/ gabapentin.   - Continue home gabapentin  - Appreciate pharmacy assistance w/ med rec
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
## 2024-12-22 NOTE — ASSESSMENT & PLAN NOTE
Hx of well-controlled T2DM on home regimen of metformin; last A1c 6.4% (12/17/24). Serum glucose 143 on admission.   - Holding home metformin while inpatient  - MD-SSI  - CBG AC & HS  - Hypoglycemic protocol

## 2024-12-22 NOTE — ASSESSMENT & PLAN NOTE
Presented w/ abdominal pain (amongst other complaints as above). Mild TTP appreciated on exam w/o guarding or rebound. CTA A/P w/o notable intraabdominal findings. Pt does have notable hx of constipation w/ amitiza, bentyl, docusate, & linzess, listed on home med list (although none w/ recent fills, however linzess only just prescribed on 12/19 discharge). Pt endorses constipation & reports she regularly has to use Miralax.   - Milk of magnesia x1   - Miralax & senna BID  - Escalate regimen PRN

## 2024-12-22 NOTE — SUBJECTIVE & OBJECTIVE
"Interval History: Admitted yesterday for observation for leg & chest pain. NAEON. Remains HDS. Labs notable for drop in Hgb 9.8 --> 8.6, however VS & labs stable and no e/o active bleeding on yesterday's CTA. Repeat CBC scheduled for this afternoon. Pt reports occasional chest pain but no longer noticeable; no active chest pain. Chest tightness & HA also resolved. Reports continued leg pain; while APAP helpful, still w/ pain. Will titrate analgesic regimen conservatively and utilize multimodals. Deferring NSAIDs for now given drop in H/H (and remote hx of perforated ulcer). Once again encouraged repeated use of ice packs/cold compresses. Pt reports that she has an eye infection, stating that her R eye is itchy and had discharge this AM. Also reports itching to inside of nares. Ocular exam unremarkable. Artificial tears & Zyrtec ordered. Pending repeat CBC, pt would be medically ready to discharge this afternoon. Dispo pending Psych eval. Pt doesn't want to go back to [facility] as "they didn't treat her well there." She thinks she needs to go to a nursing home as she does not feel like she'd be able to care for herself at home.     Review of Systems   Constitutional:  Negative for chills, diaphoresis and fever.   HENT:  Negative for congestion, rhinorrhea, sore throat and trouble swallowing.    Eyes:  Positive for itching. Negative for photophobia and visual disturbance.   Respiratory:  Negative for cough, shortness of breath and wheezing.    Cardiovascular:  Negative for chest pain, palpitations and leg swelling.   Gastrointestinal:  Positive for constipation. Negative for abdominal pain, diarrhea, nausea and vomiting.   Genitourinary:  Negative for difficulty urinating, dysuria and hematuria.   Musculoskeletal:  Positive for myalgias (R thigh pain). Negative for arthralgias, joint swelling and neck pain.   Skin:  Negative for rash and wound.   Neurological:  Negative for dizziness, light-headedness and headaches. "   Psychiatric/Behavioral:  Negative for agitation, behavioral problems and hallucinations.      Objective:     Vital Signs (Most Recent):  Temp: 98.1 °F (36.7 °C) (12/22/24 1526)  Pulse: 81 (12/22/24 1526)  Resp: 20 (12/22/24 1526)  BP: (!) 93/53 (12/22/24 1526)  SpO2: 95 % (12/22/24 1526) Vital Signs (24h Range):  Temp:  [98.1 °F (36.7 °C)-99.2 °F (37.3 °C)] 98.1 °F (36.7 °C)  Pulse:  [] 81  Resp:  [13-24] 20  SpO2:  [95 %-99 %] 95 %  BP: ()/(53-74) 93/53     Weight: 111.1 kg (244 lb 14.9 oz)  Body mass index is 43.39 kg/m².    Intake/Output Summary (Last 24 hours) at 12/22/2024 1547  Last data filed at 12/22/2024 0035  Gross per 24 hour   Intake 120 ml   Output 1000 ml   Net -880 ml         Physical Exam  Constitutional:       General: She is not in acute distress.     Appearance: She is morbidly obese. She is not toxic-appearing or diaphoretic.   HENT:      Head: Normocephalic and atraumatic.      Mouth/Throat:      Mouth: Mucous membranes are moist.   Eyes:      General:         Right eye: No discharge.         Left eye: No discharge.      Conjunctiva/sclera: Conjunctivae normal.      Right eye: Right conjunctiva is not injected.      Left eye: Left conjunctiva is not injected.   Cardiovascular:      Rate and Rhythm: Normal rate and regular rhythm.      Heart sounds: Normal heart sounds.   Pulmonary:      Effort: Pulmonary effort is normal. No respiratory distress.      Breath sounds: Normal breath sounds. No wheezing, rhonchi or rales.   Abdominal:      General: Bowel sounds are normal. There is no distension.      Palpations: Abdomen is soft.      Tenderness: There is no abdominal tenderness. There is no guarding or rebound.   Musculoskeletal:         General: Tenderness (& swelling of inner R thigh) present.      Right lower leg: No edema.      Left lower leg: No edema.   Skin:     General: Skin is warm and dry.      Findings: Bruising (ecchymosis of R thigh, w/ swelling of inner thigh) present.    Neurological:      General: No focal deficit present.      Mental Status: She is alert and oriented to person, place, and time. Mental status is at baseline.   Psychiatric:         Mood and Affect: Mood normal.         Speech: Speech is delayed.         Behavior: Behavior normal. Behavior is not agitated, aggressive, withdrawn or combative. Behavior is cooperative.         Cognition and Memory: Cognition is impaired.             Significant Labs: All pertinent labs within the past 24 hours have been reviewed.    Significant Imaging: I have reviewed all pertinent imaging results/findings within the past 24 hours.

## 2024-12-22 NOTE — PLAN OF CARE
Pt AAOx2-3, VSS, NSR-ST on telemetry. PEC in place. Sitter at bedside. Pt c/o headache. Fioricet given as needed. Albuterol neb tx requested. No BM this shift. Blood glucose monitored. Free from falls and injuries. Bed locked and in lowest position. Nad present at this time. Safety measures maintained.       Problem: Adult Inpatient Plan of Care  Goal: Plan of Care Review  Outcome: Progressing  Goal: Patient-Specific Goal (Individualized)  Outcome: Progressing  Goal: Absence of Hospital-Acquired Illness or Injury  Outcome: Progressing  Goal: Optimal Comfort and Wellbeing  Outcome: Progressing  Goal: Readiness for Transition of Care  Outcome: Progressing     Problem: Bariatric Environmental Safety  Goal: Safety Maintained with Care  Outcome: Progressing     Problem: Diabetes Comorbidity  Goal: Blood Glucose Level Within Targeted Range  Outcome: Progressing     Problem: Wound  Goal: Optimal Coping  Outcome: Progressing  Goal: Optimal Functional Ability  Outcome: Progressing  Goal: Absence of Infection Signs and Symptoms  Outcome: Progressing  Goal: Improved Oral Intake  Outcome: Progressing  Goal: Optimal Pain Control and Function  Outcome: Progressing  Goal: Skin Health and Integrity  Outcome: Progressing  Goal: Optimal Wound Healing  Outcome: Progressing     Problem: Skin Injury Risk Increased  Goal: Skin Health and Integrity  Outcome: Progressing     Problem: Fall Injury Risk  Goal: Absence of Fall and Fall-Related Injury  Outcome: Progressing

## 2024-12-23 LAB
AMPHET+METHAMPHET UR QL: NEGATIVE
ANION GAP SERPL CALC-SCNC: 9 MMOL/L (ref 8–16)
BARBITURATES UR QL SCN>200 NG/ML: ABNORMAL
BENZODIAZ UR QL SCN>200 NG/ML: NEGATIVE
BUN SERPL-MCNC: 31 MG/DL (ref 8–23)
BZE UR QL SCN: NEGATIVE
CALCIUM SERPL-MCNC: 9 MG/DL (ref 8.7–10.5)
CANNABINOIDS UR QL SCN: NEGATIVE
CHLORIDE SERPL-SCNC: 106 MMOL/L (ref 95–110)
CO2 SERPL-SCNC: 23 MMOL/L (ref 23–29)
CREAT SERPL-MCNC: 1.2 MG/DL (ref 0.5–1.4)
CREAT UR-MCNC: 99 MG/DL (ref 15–325)
ERYTHROCYTE [DISTWIDTH] IN BLOOD BY AUTOMATED COUNT: 14.5 % (ref 11.5–14.5)
EST. GFR  (NO RACE VARIABLE): 49.3 ML/MIN/1.73 M^2
ETHANOL UR-MCNC: <10 MG/DL
GLUCOSE SERPL-MCNC: 147 MG/DL (ref 70–110)
HCT VFR BLD AUTO: 25.2 % (ref 37–48.5)
HGB BLD-MCNC: 8.1 G/DL (ref 12–16)
MCH RBC QN AUTO: 31 PG (ref 27–31)
MCHC RBC AUTO-ENTMCNC: 32.1 G/DL (ref 32–36)
MCV RBC AUTO: 97 FL (ref 82–98)
METHADONE UR QL SCN>300 NG/ML: NEGATIVE
OPIATES UR QL SCN: NEGATIVE
PCP UR QL SCN>25 NG/ML: NEGATIVE
PLATELET # BLD AUTO: 360 K/UL (ref 150–450)
PMV BLD AUTO: 9.4 FL (ref 9.2–12.9)
POCT GLUCOSE: 147 MG/DL (ref 70–110)
POCT GLUCOSE: 161 MG/DL (ref 70–110)
POCT GLUCOSE: 196 MG/DL (ref 70–110)
POCT GLUCOSE: 202 MG/DL (ref 70–110)
POCT GLUCOSE: 215 MG/DL (ref 70–110)
POTASSIUM SERPL-SCNC: 3.9 MMOL/L (ref 3.5–5.1)
RBC # BLD AUTO: 2.61 M/UL (ref 4–5.4)
SODIUM SERPL-SCNC: 138 MMOL/L (ref 136–145)
TOXICOLOGY INFORMATION: ABNORMAL
VALPROATE SERPL-MCNC: 52.8 UG/ML (ref 50–100)
WBC # BLD AUTO: 11.91 K/UL (ref 3.9–12.7)

## 2024-12-23 PROCEDURE — 85027 COMPLETE CBC AUTOMATED: CPT | Mod: HCNC | Performed by: STUDENT IN AN ORGANIZED HEALTH CARE EDUCATION/TRAINING PROGRAM

## 2024-12-23 PROCEDURE — 94761 N-INVAS EAR/PLS OXIMETRY MLT: CPT | Mod: HCNC

## 2024-12-23 PROCEDURE — 99900035 HC TECH TIME PER 15 MIN (STAT): Mod: HCNC

## 2024-12-23 PROCEDURE — 80307 DRUG TEST PRSMV CHEM ANLYZR: CPT | Mod: HCNC | Performed by: STUDENT IN AN ORGANIZED HEALTH CARE EDUCATION/TRAINING PROGRAM

## 2024-12-23 PROCEDURE — G0378 HOSPITAL OBSERVATION PER HR: HCPCS | Mod: HCNC

## 2024-12-23 PROCEDURE — 80164 ASSAY DIPROPYLACETIC ACD TOT: CPT | Mod: HCNC | Performed by: STUDENT IN AN ORGANIZED HEALTH CARE EDUCATION/TRAINING PROGRAM

## 2024-12-23 PROCEDURE — 80048 BASIC METABOLIC PNL TOTAL CA: CPT | Mod: HCNC | Performed by: STUDENT IN AN ORGANIZED HEALTH CARE EDUCATION/TRAINING PROGRAM

## 2024-12-23 PROCEDURE — 36415 COLL VENOUS BLD VENIPUNCTURE: CPT | Mod: HCNC | Performed by: STUDENT IN AN ORGANIZED HEALTH CARE EDUCATION/TRAINING PROGRAM

## 2024-12-23 PROCEDURE — 25000003 PHARM REV CODE 250: Mod: HCNC | Performed by: STUDENT IN AN ORGANIZED HEALTH CARE EDUCATION/TRAINING PROGRAM

## 2024-12-23 PROCEDURE — 94640 AIRWAY INHALATION TREATMENT: CPT | Mod: HCNC,XB

## 2024-12-23 RX ORDER — LACTULOSE 10 G/15ML
15 SOLUTION ORAL 2 TIMES DAILY
Status: DISCONTINUED | OUTPATIENT
Start: 2024-12-23 | End: 2024-12-24 | Stop reason: HOSPADM

## 2024-12-23 RX ORDER — FLUOXETINE HYDROCHLORIDE 20 MG/1
20 CAPSULE ORAL DAILY
Qty: 30 CAPSULE | Refills: 11 | Status: SHIPPED | OUTPATIENT
Start: 2024-12-24 | End: 2025-12-24

## 2024-12-23 RX ORDER — TRAMADOL HYDROCHLORIDE 50 MG/1
50 TABLET ORAL EVERY 6 HOURS PRN
Start: 2024-12-23 | End: 2024-12-28

## 2024-12-23 RX ORDER — ISOSORBIDE MONONITRATE 30 MG/1
30 TABLET, EXTENDED RELEASE ORAL DAILY
Qty: 30 TABLET | Refills: 11 | Status: SHIPPED | OUTPATIENT
Start: 2024-12-24 | End: 2025-12-24

## 2024-12-23 RX ORDER — DOXYCYCLINE HYCLATE 100 MG
100 TABLET ORAL EVERY 12 HOURS
Start: 2024-12-23 | End: 2024-12-27

## 2024-12-23 RX ORDER — CETIRIZINE HYDROCHLORIDE 5 MG/1
5 TABLET ORAL DAILY
Start: 2024-12-24 | End: 2025-12-24

## 2024-12-23 RX ORDER — DOCUSATE SODIUM 100 MG/1
200 CAPSULE, LIQUID FILLED ORAL 2 TIMES DAILY
Status: DISCONTINUED | OUTPATIENT
Start: 2024-12-23 | End: 2024-12-24 | Stop reason: HOSPADM

## 2024-12-23 RX ORDER — BUTALBITAL, ACETAMINOPHEN AND CAFFEINE 50; 325; 40 MG/1; MG/1; MG/1
1 TABLET ORAL EVERY 6 HOURS PRN
Start: 2024-12-23 | End: 2025-01-06

## 2024-12-23 RX ORDER — OLANZAPINE 10 MG/2ML
5 INJECTION, POWDER, FOR SOLUTION INTRAMUSCULAR EVERY 8 HOURS PRN
Status: DISCONTINUED | OUTPATIENT
Start: 2024-12-23 | End: 2024-12-24 | Stop reason: HOSPADM

## 2024-12-23 RX ORDER — OLANZAPINE 10 MG/2ML
5 INJECTION, POWDER, FOR SOLUTION INTRAMUSCULAR EVERY 8 HOURS PRN
Start: 2024-12-23 | End: 2025-12-23

## 2024-12-23 RX ADMIN — LACTULOSE 15 G: 20 SOLUTION ORAL at 11:12

## 2024-12-23 RX ADMIN — INSULIN ASPART 4 UNITS: 100 INJECTION, SOLUTION INTRAVENOUS; SUBCUTANEOUS at 04:12

## 2024-12-23 RX ADMIN — CHLORHEXIDINE GLUCONATE 0.12% ORAL RINSE 15 ML: 1.2 LIQUID ORAL at 10:12

## 2024-12-23 RX ADMIN — TIZANIDINE 2 MG: 2 TABLET ORAL at 08:12

## 2024-12-23 RX ADMIN — PANTOPRAZOLE SODIUM 40 MG: 40 TABLET, DELAYED RELEASE ORAL at 09:12

## 2024-12-23 RX ADMIN — SALINE NASAL SPRAY 1 SPRAY: 1.5 SOLUTION NASAL at 06:12

## 2024-12-23 RX ADMIN — DOXYCYCLINE HYCLATE 100 MG: 100 TABLET, COATED ORAL at 08:12

## 2024-12-23 RX ADMIN — ATORVASTATIN CALCIUM 40 MG: 40 TABLET, FILM COATED ORAL at 09:12

## 2024-12-23 RX ADMIN — DOCUSATE SODIUM 200 MG: 100 CAPSULE, LIQUID FILLED ORAL at 11:12

## 2024-12-23 RX ADMIN — BUTALBITAL, ACETAMINOPHEN, AND CAFFEINE 1 TABLET: 325; 50; 40 TABLET ORAL at 06:12

## 2024-12-23 RX ADMIN — SENNOSIDES AND DOCUSATE SODIUM 1 TABLET: 50; 8.6 TABLET ORAL at 09:12

## 2024-12-23 RX ADMIN — LACTULOSE 15 G: 20 SOLUTION ORAL at 08:12

## 2024-12-23 RX ADMIN — TIZANIDINE 2 MG: 2 TABLET ORAL at 05:12

## 2024-12-23 RX ADMIN — MIRTAZAPINE 30 MG: 30 TABLET, FILM COATED ORAL at 08:12

## 2024-12-23 RX ADMIN — THERA TABS 1 TABLET: TAB at 09:12

## 2024-12-23 RX ADMIN — LOSARTAN POTASSIUM 25 MG: 25 TABLET, FILM COATED ORAL at 09:12

## 2024-12-23 RX ADMIN — HYPROMELLOSE 2910 1 DROP: 5 SOLUTION/ DROPS OPHTHALMIC at 03:12

## 2024-12-23 RX ADMIN — DOXYCYCLINE HYCLATE 100 MG: 100 TABLET, COATED ORAL at 09:12

## 2024-12-23 RX ADMIN — FLUOXETINE HYDROCHLORIDE 20 MG: 20 CAPSULE ORAL at 09:12

## 2024-12-23 RX ADMIN — ASPIRIN 81 MG: 81 TABLET, COATED ORAL at 09:12

## 2024-12-23 RX ADMIN — LEVOTHYROXINE SODIUM 150 MCG: 150 TABLET ORAL at 05:12

## 2024-12-23 RX ADMIN — BUTALBITAL, ACETAMINOPHEN, AND CAFFEINE 1 TABLET: 325; 50; 40 TABLET ORAL at 12:12

## 2024-12-23 RX ADMIN — TIZANIDINE 2 MG: 2 TABLET ORAL at 02:12

## 2024-12-23 RX ADMIN — GABAPENTIN 600 MG: 300 CAPSULE ORAL at 04:12

## 2024-12-23 RX ADMIN — Medication 6 MG: at 08:12

## 2024-12-23 RX ADMIN — ISOSORBIDE MONONITRATE 30 MG: 30 TABLET, EXTENDED RELEASE ORAL at 09:12

## 2024-12-23 RX ADMIN — SENNOSIDES AND DOCUSATE SODIUM 1 TABLET: 50; 8.6 TABLET ORAL at 08:12

## 2024-12-23 RX ADMIN — FLUTICASONE FUROATE AND VILANTEROL TRIFENATATE 1 PUFF: 100; 25 POWDER RESPIRATORY (INHALATION) at 08:12

## 2024-12-23 RX ADMIN — DOCUSATE SODIUM 200 MG: 100 CAPSULE, LIQUID FILLED ORAL at 08:12

## 2024-12-23 RX ADMIN — DIVALPROEX SODIUM 500 MG: 250 TABLET, DELAYED RELEASE ORAL at 09:12

## 2024-12-23 RX ADMIN — CLOPIDOGREL BISULFATE 75 MG: 75 TABLET ORAL at 09:12

## 2024-12-23 RX ADMIN — DIVALPROEX SODIUM 1000 MG: 500 TABLET, FILM COATED, EXTENDED RELEASE ORAL at 08:12

## 2024-12-23 RX ADMIN — INSULIN ASPART 4 UNITS: 100 INJECTION, SOLUTION INTRAVENOUS; SUBCUTANEOUS at 10:12

## 2024-12-23 RX ADMIN — FLUTICASONE PROPIONATE 50 MCG: 50 SPRAY, METERED NASAL at 10:12

## 2024-12-23 RX ADMIN — DICLOFENAC SODIUM 2 G: 10 GEL TOPICAL at 10:12

## 2024-12-23 RX ADMIN — QUETIAPINE FUMARATE 200 MG: 200 TABLET ORAL at 09:12

## 2024-12-23 RX ADMIN — CETIRIZINE HYDROCHLORIDE 5 MG: 5 TABLET, FILM COATED ORAL at 09:12

## 2024-12-23 RX ADMIN — QUETIAPINE FUMARATE 400 MG: 200 TABLET ORAL at 08:12

## 2024-12-23 RX ADMIN — TRAMADOL HYDROCHLORIDE 50 MG: 50 TABLET, COATED ORAL at 04:12

## 2024-12-23 RX ADMIN — TRAMADOL HYDROCHLORIDE 50 MG: 50 TABLET, COATED ORAL at 02:12

## 2024-12-23 RX ADMIN — HYPROMELLOSE 2910 1 DROP: 5 SOLUTION/ DROPS OPHTHALMIC at 06:12

## 2024-12-23 NOTE — ASSESSMENT & PLAN NOTE
Schizoaffective disorder  Hx of bipolar dx w/ both jennifer & depression episodes documented, along w/ schizoaffective dx. During recent hospitalization voiced paranoid delusions for which PEC was placed & pt dc'ed to IP psych facility ([facility]). Presented from that facility. No e/o jennifer or overt depression on admission. No agitation, and pt polite & cooperative, however intermittently voices paranoid delusions.   - PEC placed by ED provider on admission.   - Psych consulted to follow while inpatient; appreciate recs  - Continue home depakote, seroquel, fluoxetine & mirtazapine   - Invega also listed on pt's home med list but no recent fills per chart review; appreciate pharmacy assistance w/ med rec

## 2024-12-23 NOTE — NURSING
Patient rating high headache pain, unable to give Fiorcet due to Tylenol limit reached. Should try not to give Tylenol, wait till Fiorcet is available. Tramadol administered x2, relief per patient achieved. Remains on telemetry and continuous pulse ox. Patient states wishes to be placed in a nursing home at D/C.

## 2024-12-23 NOTE — DISCHARGE SUMMARY
[facility] ([place] [id number])  [facility] Medicine  Discharge Summary      Patient Name: Joseluis Triplett  MRN: [id number]  SHANTE: [id number]  Patient Class: OP- Observation  Admission Date: 12/21/2024  Hospital Length of Stay: 0 days  Discharge Date and Time: 12/23/2024  Attending Physician: Lashae Rankin MD   Discharging Provider: Lashae Rankin MD  Primary Care Provider: Wan Wilkes MD  [facility] Medicine Team: [facility] MED B   Primary Care Team: [facility]      HPI:   Joseluis Triplett is a 69 y.o. female w/ PMHx of CAD (s/p JANEE x1 to LAD, 12/16/24), HTN, HLD, T2DM, COPD, CKD, Parkinson's (unclear if true Parkinson's dx vs drug-induced Parkinsonism), hypothyroid, GERD, bipolar dx, cognitive impairment, CLARI, severe obesity, migraines, chronic constipation, remote hx of eating disorder, osteoarthritis w/ chronic pain, & tobacco dependency, who presented to [facility] ED on 12/21/2024 from [facility] w/ multiple complaints, including R leg pain, severe headache, R-sided chest pain, & abdominal pain. Pt limited historian, and unable to provide much of a timeline, characterize pain, or give further specifics/details. Of note pt recently underwent PCI w/ JANEE placed to LAD (12/16) at [facility]. Was apparently in normal state of health on discharge that day, but then developed new chest pain, nausea & diaphoresis, for which she went to [facility] ED and was admitted (12/16-12/19). Noted to be hypotensive on admission (79/50) w/ mild tachycardia. EKG w/o acute ischemic changes. Troponin minimally elevated (expected given PCI earlier that day). Chest pain resolved in ED. Cardiology consulted who deferred repeat cath given reassuring EKG & troponin, and resolution of sx. Pt continued on DAPT & given IVF w/ resolution of hypotension. While admitted, pt displayed paranoid delusions, including that her aunt was trying to harm/kill her and also called 911 re: missing purse. Psychiatry was  consulted and pt PEC'ed. Labs & VS remained stable w/o recurrence of sx. Pt discharged to inpatient psychiatric facility ([facility]) on 12/19.     Hospital Course:   On arrival to [facility] ED, afebrile, vergu-wk-qwduhaxvusiq (BP 130s-160s/60s-70s), HR 60s & saturating > 96% on RA. Exam w/ notable tenderness of RLE & abdomen, but otherwise unremarkable. Labs notable for Hgb 9.8 (improved from prior but not yet at baseline of 11-13). High-sensitivity troponin nml (6). EKG stable w/o acute ischemic changes. CXR & CT head unremarkable. CTA A/P w/ runoff negative for arterial stenosis, although w/ some SQ stranding & skin thickening along the medial aspect of the RLE extending to just above the knee w/ IM edema & swelling of the R adductor longus & gracilis muscle, and areas of hyperattenuation (possibly blood product?); no acute bleeding, soft tissue air or organized fluid collections identified. Received IV morphine 4mg & APAP 1g. Although workup negative, given pt's sx, recent PCI, & inability to provide further details re: sx, pt admitted to Hospital Medicine for observation. Remained HDS. Had drop in H/H on HD#1 (9.8 --> 8.6), however VS & remainder of labs stable. Per chart review, Hgb was 8.6 on 12/18 so overall stable. LE US w/o e/o DVT. Continued intermittent headache (which pt deals with chronically). Brief recurrence of chest pain, although much milder and no changes in telemetry or hemodynamics. Initiated on Imdur for BP (w/ addn'l antianginal effect). Overall improvement in leg pain/swelling w/ multimodal analgesia and supportive care (encouraging cold compresses, etc). Psychiatry consulted who noted improvement in mentation since admission, but advised continuation of PEC, continuation of current medication package w/ only adjustment being decreasing Prozac from BID to daily. Pt's various symptoms improved with supportive care. Labs and VS remained otherwise stable. Was medically ready for  discharge on 12/23/24 and discharged back to inpatient psych facility.    Problems addressed during hospitalization discussed in detail below.       Goals of Care Treatment Preferences:  Code Status: Full Code    Living Will: Yes          SDOH Screening:  The patient was unable to be screened for utility difficulties, food insecurity, transport difficulties, housing insecurity, and interpersonal safety, so no concerns could be identified this admission.     Consults:   Consults (From admission, onward)          Status Ordering Provider     [facility] Medicine PharmD Consult  Once        Provider:  (Not yet assigned)    Completed TUYET LOVE     Inpatient consult to Psychiatry  Once        Provider:  (Not yet assigned)    Completed TUYET LOVE          PROBLEMS ADDRESSED DURING ADMISSION:     * Pain of right lower extremity  Presented w/ new RLE pain, along w/ R-sided chest pain, abdominal pain, & headache. Concern for complication 2/2 recent procedure as pt underwent PCI for 99% stenosed LAD on 12/16. TTP of RLE & abdomen. EKG, labs (including troponin), CXR & VS unremarkable. CTA A/P w/ runoff negative for arterial stenosis, although some e/o edema & swelling noted of R inner thigh muscles, along w/ SQ stranding & thickening of overlying subcutaneous tissue; poss blood product/collection given areas of hyperattenuation, however cellulitis also possible. No active bleeding, ST/SQ air, or organized fluid collections identified. 0/4 SIRS on arrival & H/H stable. LE US w/o e/o DVT (however mid & distal portion of superficial femoral vein not visualized); noted anechoic region of R groin w/ internal echogenicity (representing abscess vs hematoma vs significant edema). Favor pain/swelling 2/2 reabsorbing hematoma w/ assoc ecchymosis. However given severity of stranding on imaging, giving abx out of abundance of precaution. Received single dose of CTX, followed by BID doxycycline.   - PO doxycycline 100mg BID for  5-day total course  - Supportive care: analgesics (see below), cool compresses, elevate extremity, etc.     Coronary artery disease with stable angina pectoris  Hx of CAD. Underwent planned C w/ PCI & placement of JANEE x1 to LAD (that was 99% stenosed; 0% stenosed post-cath); distal RCA w/ 50% stenosis noted as well. Presented w/ recurrent chest pain. EKG w/o acute ischemic changes & high-sensitivity troponin nml. Low suspicion for ACS on presentation.   - Continue home DAPT + statin   - Continue home losartan  - Started low-dose Imdur (will hopefully help with anginal sx as well)  - Continue home PRN furosemide (although unclear if pt even taking)  - D/c spironolactone  - Outpatient follow-up w/ cardiology    Bipolar disorder  Schizoaffective disorder  Hx of bipolar dx w/ both jennifer & depression episodes documented, along w/ schizoaffective dx. During recent hospitalization voiced paranoid delusions for which PEC was placed & pt dc'ed to IP psych facility ([facility]). Presented from that facility. No e/o jennifer or overt depression on admission. No agitation, and pt polite & cooperative, however intermittently voices paranoid delusions, along w/ tangential thoughts & rumination. PEC placed in ED on arrival & Psych consulted. No overt agitation, hallucinations, etc during admission. While mentation still improved, advised for pt to return to inpatient psych.   - Continue home depakote, seroquel, & mirtazapine   - Continue home fluoxetine at reduced dose of BID or daily  - D/c Invega (appears not to have been taking recently anyway)   - D/c back to inpatient psych      Migraine headache  Presented w/ severe headache along w/ various other complaints as above. Pt w/ known hx of migraines which is managed w/ monthly galcanezumab injections, scheduled tizanidine & PRN APAP. Ubrelvy, tramadol, baclofen, & chlorzoxazone also lited on pt's home med list, but no recent fills. CT head on admission nml. Received APAP 1g, IV  "morphine 4mg IV toradol 15mg, & Fioricet on admission w/ resolution of HA by following AM, but w/ intermittent recurrence, responsive to analgesic package.   - Continue home tizanidine & PRN APAP  - PRN APAP, Fioricet & tramadol   - Holding addn'l NSAIDs for now given recent blood loss/hematoma (along w/ remote hx of perforated ulcer)    Chronic constipation  Presented w/ abdominal pain (amongst other complaints as above). Mild TTP appreciated on exam w/o guarding or rebound. CTA A/P w/o notable intraabdominal findings. Pt does have notable hx of constipation w/ amitiza, bentyl, docusate, & linzess, listed on home med list (although none w/ recent fills, however linzess only just prescribed on 12/19 discharge). Pt endorses constipation & reports she regularly has to use Miralax. Received milk of magnesia, miralax & senna on admission. But still with constipation. Bowel regimen uptitrated as needed.   - Continue home docusate & linzess  - Consider lactulose, miralax, senna, milk of magnesia &/or suppositories   - Titrate as needed      Cognitive impairment   Long hx of cognitive dx- unable to determine etiology on chart review. Pt has very poor insight and impaired judgement. Pt's cognitive deficits will prove challenging in terms of providing medical care. Despite attempts to explain that pt's leg isn't infected and that it's "a bad bruise" from her PCI, pt perseverates on getting antibiotics and displays lack of insight. She will endorse chest pain, but when EKG tech came to bedside, pt stated that he was the cause of her chest pain. Even attempts to encourage pt to use cold compresses to legs ("because it will help with pain" whether it's a bruise or infection), which pt was initially amenable to, she later refused. Pt's paranoid delusions can also be contributing (asks which one of her family members gave consent for PCI, because one of them "is trying to kill me"). Either way, would not say that pt has decision " making capacity on admission. Pt's mother previously was POA; while mother is still mentally sharp w/ great insight to pt's condition, when mother turned 90 pt's brother took over as POA.   - Regularly assess pt's decision-making capacity  - Follow-up with Psych as above    Type 2 diabetes mellitus with circulatory disorder, without long-term current use of insulin  Hx of well-controlled T2DM on home regimen of metformin; last A1c 6.4% (12/17/24). Serum glucose 143 on admission. Home metformin held while inpatient. Glucose ranged btwn 136-215 during admission w/ minimal SSI needs.   - Resume home metformin    Congenital hypothyroidism without goiter  Last TSH nml at 2.228 (July '24).   - Continue home levothyroxine    Drug-induced parkinsonism  Parkinson's dx & possible drug-induced Parkinsonism both mentioned in pt's medical history going back to at least 2013. Overall appears Parkinsonism favored. Previously on carbidopa-levodopa, but no recent fills per chart review. Appears to be currently managed w/ gabapentin.   - Continue home gabapentin    COPD without exacerbation  Hx of COPD, however PFTs in 2023 & 2021 were normal. Appears outpatient providers suspect chronic vs intermittent acute bronchitis along w/ smoking to be cause of pt's intermittent sx. Few wheezes/squeaks on exam, but no SOB, hypoxia or resp distress on admission. No acute concerns.   - Continue home Symbicort & PRN albuterol  - Continue home nasal spray & Claritin     Polypharmacy  Has long home med list, many of which appear to not have recent fills. Appreciate pharmacy assistance w/ medication reconciliation. Pt's medication list updated best to medical team's ability on discharge.       Final Active Diagnoses:    Diagnosis Date Noted POA    PRINCIPAL PROBLEM:  Pain of right lower extremity [M79.604] 12/21/2024 Yes    Cognitive impairment [R41.89] 12/22/2024 Yes    Coronary artery disease with stable angina pectoris [I25.118] 11/08/2024 Yes     "Type 2 diabetes mellitus with circulatory disorder, without long-term current use of insulin [E11.59] 06/03/2021 Yes    Bipolar disorder, unspecified [F31.9] 08/23/2018 Yes    Congenital hypothyroidism without goiter [E03.1] 06/26/2015 Yes    Migraine headache [G43.909] 08/26/2014 Yes    Drug-induced parkinsonism [G21.19] 12/12/2013 Yes    Chronic constipation [K59.09] 05/23/2013 Yes    Polypharmacy [Z79.899] 01/04/2013 Not Applicable    COPD without exacerbation [J44.9] 01/04/2013 Yes      Problems Resolved During this Admission:       Discharged Condition: fair    Disposition: Psychiatric Hospital    Follow Up: PCP, Mental Health     Patient Instructions:   No discharge procedures on file.    Significant Diagnostic Studies: N/A ; as discussed in "Hospital Course" above      Pending Diagnostic Studies:       None           Medications:  Reconciled Home Medications:      Medication List      Some of the medications listed here do not show instructions, such as how often to take the medication. Ask your doctor or nurse how to use these medications.  Specifically ask about this and similar medications: fluticasone propionate (FLONASE) 50 mcg/actuation nasal spray       START taking these medications      artificial tears 0.5 % ophthalmic solution  Commonly known as: ISOPTO TEARS  Place 1 drop into the right eye 4 (four) times daily as needed.     butalbital-acetaminophen-caffeine -40 mg -40 mg per tablet  Commonly known as: FIORICET, ESGIC  Take 1 tablet by mouth every 6 (six) hours as needed for Headaches.     cetirizine 5 MG tablet  Commonly known as: ZYRTEC  Take 1 tablet (5 mg total) by mouth once daily.  Start taking on: December 24, 2024     doxycycline 100 MG tablet  Commonly known as: VIBRA-TABS  Take 1 tablet (100 mg total) by mouth every 12 (twelve) hours. for 4 days     isosorbide mononitrate 30 MG 24 hr tablet  Commonly known as: IMDUR  Take 1 tablet (30 mg total) by mouth once daily.  Start " taking on: December 24, 2024     traMADoL 50 mg tablet  Commonly known as: ULTRAM  Take 1 tablet (50 mg total) by mouth every 6 (six) hours as needed for Pain.            CHANGE how you take these medications      albuterol 90 mcg/actuation inhaler  Commonly known as: PROVENTIL/VENTOLIN HFA  Inhale 1 puff into the lungs every 4 (four) hours as needed for Wheezing.  What changed: Another medication with the same name was removed. Continue taking this medication, and follow the directions you see here.     diclofenac sodium 1 % Gel  Commonly known as: VOLTAREN  Apply topically once daily.  What changed: additional instructions     FLUoxetine 20 MG capsule  Take 1 capsule (20 mg total) by mouth once daily.  Start taking on: December 24, 2024  What changed: when to take this     OLANZapine injection  Commonly known as: ZyPREXA  Inject 5 mg into the muscle every 8 (eight) hours as needed for Agitation.  What changed: when to take this            CONTINUE taking these medications      ACCU-CHEK GUIDE TEST STRIPS Strp  Generic drug: blood sugar diagnostic  1 strip by Other route.     acetaminophen 500 MG tablet  Commonly known as: TYLENOL  Take 500 mg by mouth every 6 (six) hours as needed.     albuterol-ipratropium 2.5 mg-0.5 mg/3 mL nebulizer solution  Commonly known as: DUO-NEB  Take 3 mLs by nebulization every 6 (six) hours while awake.     aspirin 81 MG EC tablet  Commonly known as: ECOTRIN  Take 1 tablet (81 mg total) by mouth once daily.     atorvastatin 40 MG tablet  Commonly known as: LIPITOR  Take 1 tablet (40 mg total) by mouth once daily.     budesonide-formoterol 80-4.5 mcg 80-4.5 mcg/actuation Hfaa  Commonly known as: SYMBICORT  Inhale 2 puffs into the lungs once daily. Controller     chlorhexidine 0.12 % solution  Commonly known as: PERIDEX     CLARITIN 10 mg tablet  Generic drug: loratadine  Take 1 tablet by mouth once daily.     clopidogreL 75 mg tablet  Commonly known as: PLAVIX  Take 1 tablet (75 mg  total) by mouth once daily.     * divalproex  MG Tb24 24 hr tablet  Commonly known as: DEPAKOTE ER  Take 2 tablets (1,000 mg total) by mouth every evening.     * divalproex 500 MG Tbec  Commonly known as: DEPAKOTE  Take 1 tablet (500 mg total) by mouth once daily.     docusate sodium 100 MG capsule  Commonly known as: STOOL SOFTENER  Take 1 capsule (100 mg total) by mouth 2 (two) times daily as needed for Constipation.     fluticasone furoate-vilanteroL 100-25 mcg/dose diskus inhaler  Commonly known as: BREO  Inhale 1 puff into the lungs once daily. Controller     fluticasone propionate 50 mcg/actuation nasal spray  Commonly known as: FLONASE  __________________________     furosemide 20 MG tablet  Commonly known as: LASIX  Take 1 tablet (20 mg total) by mouth daily as needed (for swelling / SOB).     gabapentin 600 MG tablet  Commonly known as: NEURONTIN  Take 1 tablet (600 mg total) by mouth once daily.     galcanezumab-gnlm 120 mg/mL Pnij  Inject 1 mL (120 mg total) into the skin every 28 days. maintenance dose     guaiFENesin 600 mg 12 hr tablet  Commonly known as: MUCINEX  Take 1,200 mg by mouth 2 (two) times daily as needed.     levothyroxine 150 MCG tablet  Commonly known as: SYNTHROID  Take 1 tablet (150 mcg total) by mouth before breakfast.     LINZESS 290 mcg Cap capsule  Generic drug: linaCLOtide     losartan 25 MG tablet  Commonly known as: COZAAR  Take 1 tablet (25 mg total) by mouth once daily.     melatonin 5 mg Chew  Take by mouth.     metFORMIN 500 MG tablet  Commonly known as: GLUCOPHAGE  Take 2 tablets (1,000 mg total) by mouth 2 (two) times daily with meals.     mirtazapine 30 MG tablet  Commonly known as: REMERON  Take 1 tablet (30 mg total) by mouth every evening.     multivitamin per tablet  Commonly known as: THERAGRAN  Take 1 tablet by mouth once daily.     nicotine 7 mg/24 hr  Commonly known as: NICODERM CQ  Place 1 patch onto the skin once daily.     nystatin-triamcinolone  ointment  Commonly known as: MYCOLOG     ondansetron 4 MG tablet  Commonly known as: ZOFRAN  Take 1 tablet (4 mg total) by mouth every 12 (twelve) hours as needed for Nausea.     ondansetron 4 mg/2 mL Soln  Inject 4 mg into the vein every 8 (eight) hours as needed.     pantoprazole 40 MG tablet  Commonly known as: PROTONIX  Take 1 tablet (40 mg total) by mouth once daily.     * QUEtiapine 400 MG tablet  Commonly known as: SEROQUEL  Take 1 tablet (400 mg total) by mouth every evening.     * QUEtiapine 200 MG Tab  Commonly known as: SEROQUEL  Take 1 tablet (200 mg total) by mouth once daily.     tiZANidine 4 MG tablet  Commonly known as: ZANAFLEX  Take 0.5 tablets (2 mg total) by mouth every 8 (eight) hours.     * blood-glucose meter Misc  1 each by Other route.     * TRUE METRIX AIR GLUCOSE METER kit  Generic drug: blood-glucose meter  SMARTSI Kit(s) Via Meter Daily     UBRELVY 50 mg tablet  Generic drug: ubrogepant  Take 1 tablet (50 mg total) by mouth 2 (two) times daily as needed for Migraine. If symptoms persist or return, may repeat dose after 2 hours. Maximum: 200 mg per 24 hours           * This list has 6 medication(s) that are the same as other medications prescribed for you. Read the directions carefully, and ask your doctor or other care provider to review them with you.                STOP taking these medications      carbidopa-levodopa  mg  mg per tablet  Commonly known as: SINEMET     carbidopa-levodopa  mg  mg per tablet  Commonly known as: SINEMET     spironolactone 100 MG tablet  Commonly known as: ALDACTONE     topiramate 50 MG tablet  Commonly known as: TOPAMAX              Indwelling Lines/Drains at time of discharge:   Lines/Drains/Airways       Drain  Duration             Female External Urinary Catheter w/ Suction 24 1126 2 days               Time spent on the discharge of patient: 45 minutes         Lashae Rankin MD  Department of Hospital Medicine  [facility] ([place]

## 2024-12-23 NOTE — PHARMACY MED REC
"      Admission Medication History     The home medication history was taken by Mague Crooks.    You may go to "Admission" then "Reconcile Home Medications" tabs to review and/or act upon these items.     The home medication list has been updated by the Pharmacy department.   Please read ALL comments highlighted in yellow.   Please address this information as you see fit.    Feel free to contact us if you have any questions or require assistance.    Medication list was collected from outside facility and [facility] [facility] medication list    Medications listed below were obtained from: [employer]- [employer], Medical records, and SNF/Rehab/LTAC   Medications Prior to Admission   Medication Sig    aspirin (ECOTRIN) 81 MG EC tablet Take 1 tablet (81 mg total) by mouth once daily.    atorvastatin (LIPITOR) 40 MG tablet Take 1 tablet (40 mg total) by mouth once daily.    budesonide-formoterol 80-4.5 mcg (SYMBICORT) 80-4.5 mcg/actuation HFAA Inhale 2 puffs into the lungs once daily. Controller    clopidogreL (PLAVIX) 75 mg tablet Take 1 tablet (75 mg total) by mouth once daily.    diclofenac sodium (VOLTAREN) 1 % Gel Apply topically once daily. (Patient taking differently: Apply topically once daily. Knees)    divalproex (DEPAKOTE) 500 MG TbEC Take 1 tablet (500 mg total) by mouth once daily.    divalproex ER (DEPAKOTE ER) 500 MG Tb24 24 hr tablet Take 2 tablets (1,000 mg total) by mouth every evening.    gabapentin (NEURONTIN) 600 MG tablet Take 1 tablet (600 mg total) by mouth once daily.    levothyroxine (SYNTHROID) 150 MCG tablet Take 1 tablet (150 mcg total) by mouth before breakfast.    losartan (COZAAR) 25 MG tablet Take 1 tablet (25 mg total) by mouth once daily.    metFORMIN (GLUCOPHAGE) 500 MG tablet Take 2 tablets (1,000 mg total) by mouth 2 (two) times daily with meals.    multivitamin (THERAGRAN) per tablet Take 1 tablet by mouth once daily.    pantoprazole (PROTONIX) 40 MG tablet Take 1 tablet (40 mg total) by " mouth once daily.    tiZANidine (ZANAFLEX) 4 MG tablet Take 0.5 tablets (2 mg total) by mouth every 8 (eight) hours.    acetaminophen (TYLENOL) 500 MG tablet Take 500 mg by mouth every 6 (six) hours as needed.    albuterol (PROVENTIL/VENTOLIN HFA) 90 mcg/actuation inhaler Inhale 1 puff into the lungs every 4 (four) hours as needed for Wheezing.    docusate sodium (STOOL SOFTENER) 100 MG capsule Take 1 capsule (100 mg total) by mouth 2 (two) times daily as needed for Constipation.    fluticasone propionate (FLONASE) 50 mcg/actuation nasal spray     furosemide (LASIX) 20 MG tablet Take 1 tablet (20 mg total) by mouth daily as needed (for swelling / SOB).    galcanezumab-gnlm 120 mg/mL PnIj Inject 1 mL (120 mg total) into the skin every 28 days. maintenance dose    loratadine (CLARITIN) 10 mg tablet Take 1 tablet by mouth once daily.    melatonin 5 mg Chew Take by mouth.    mirtazapine (REMERON) 30 MG tablet Take 1 tablet (30 mg total) by mouth every evening.    ondansetron (ZOFRAN) 4 MG tablet Take 1 tablet (4 mg total) by mouth every 12 (twelve) hours as needed for Nausea.    ondansetron 4 mg/2 mL Soln Inject 4 mg into the vein every 8 (eight) hours as needed.    QUEtiapine (SEROQUEL) 200 MG Tab Take 1 tablet (200 mg total) by mouth once daily.    QUEtiapine (SEROQUEL) 400 MG tablet Take 1 tablet (400 mg total) by mouth every evening.    ubrogepant (UBRELVY) 50 mg tablet Take 1 tablet (50 mg total) by mouth 2 (two) times daily as needed for Migraine. If symptoms persist or return, may repeat dose after 2 hours. Maximum: 200 mg per 24 hours       Potential issues to be addressed PRIOR TO DISCHARGE      Mague Crooks  [id number]    .         .

## 2024-12-23 NOTE — CONSULTS
Hospital Medicine Pharmacy Consult Note    PharmD Consult Received For:     Medication history completed by Mague Crooks PharmD Please see the pharmacy med rec note documented on 12/23 for the updated medication list.        Pharmacy will sign-off, please re-consult as needed    Thank you for the consult,  Mague Crooks  Extension [phone]    **Note: Consults are reviewed Monday-Friday 7:00am-3:30pm. The above recommendations are only suggested. The recommendations should be considered in conjunction with all patient factors.**

## 2024-12-23 NOTE — PLAN OF CARE
Problem: Adult Inpatient Plan of Care  Goal: Plan of Care Review  Outcome: Progressing  Goal: Patient-Specific Goal (Individualized)  Outcome: Progressing  Goal: Absence of Hospital-Acquired Illness or Injury  Outcome: Progressing  Goal: Optimal Comfort and Wellbeing  Outcome: Progressing  Goal: Readiness for Transition of Care  Outcome: Progressing     Problem: Bariatric Environmental Safety  Goal: Safety Maintained with Care  Outcome: Progressing     Problem: Diabetes Comorbidity  Goal: Blood Glucose Level Within Targeted Range  Outcome: Progressing     Problem: Wound  Goal: Optimal Coping  Outcome: Progressing  Goal: Optimal Functional Ability  Outcome: Progressing  Goal: Absence of Infection Signs and Symptoms  Outcome: Progressing  Goal: Improved Oral Intake  Outcome: Progressing  Goal: Optimal Pain Control and Function  Outcome: Progressing  Goal: Skin Health and Integrity  Outcome: Progressing  Goal: Optimal Wound Healing  Outcome: Progressing     Problem: Skin Injury Risk Increased  Goal: Skin Health and Integrity  Outcome: Progressing     Problem: Fall Injury Risk  Goal: Absence of Fall and Fall-Related Injury  Outcome: Progressing

## 2024-12-23 NOTE — HOSPITAL COURSE
On arrival to [facility] ED, afebrile, [id number] (BP 130s-160s/60s-70s), HR 60s & saturating > 96% on RA. Exam w/ notable tenderness of RLE & abdomen, but otherwise unremarkable. Labs notable for Hgb 9.8 (improved from prior but not yet at baseline of 11-13). High-sensitivity troponin nml (6). EKG stable w/o acute ischemic changes. CXR & CT head unremarkable. CTA A/P w/ runoff negative for arterial stenosis, although w/ some SQ stranding & skin thickening along the medial aspect of the RLE extending to just above the knee w/ IM edema & swelling of the R adductor longus & gracilis muscle, and areas of hyperattenuation (possibly blood product?); no acute bleeding, soft tissue air or organized fluid collections identified. Received IV morphine 4mg & APAP 1g. Although workup negative, given pt's sx, recent PCI, & inability to provide further details re: sx, pt admitted to Hospital Medicine for observation. Remained HDS. Had drop in H/H on HD#1 (9.8 --> 8.6), however VS & remainder of labs stable. Per chart review, Hgb was 8.6 on 12/18 so overall stable. Continued intermittent headache (which pt deals with chronically). Brief recurrence of chest pain, although much milder and no changes in telemetry or hemodynamics. Initiated on Imdur for BP (w/ addn'l antianginal effect). Overall improvement in leg pain/swelling w/ multimodal analgesia and supportive care (encouraging cold compresses, etc). Psychiatry consulted who noted improvement in mentation since admission, but advised continuation of PEC, continuation of current medication package w/ only adjustment being decreasing Prozac from BID to daily. Pt's various symptoms improved with supportive care. Labs and VS remained otherwise stable. Was medically ready for discharge on 12/23/24 and discharged back to inpatient psych facility.

## 2024-12-23 NOTE — PLAN OF CARE
[facility] - Stepdown Flex ([place]-14)  Discharge Final Note    Primary Care Provider: Wan Wilkes MD    Expected Discharge Date: 12/23/2024  Discharge Plan A and Plan B have been determined by review of patient's clinical status, future medical and therapeutic needs, and coverage/benefits for post-acute care in coordination with multidisciplinary team members.     Final Discharge Note (most recent)       Final Note - 12/23/24 1106          Final Note    Assessment Type Final Discharge Note     Anticipated Discharge Disposition [facility]     What phone number can be called within the next 1-3 days to see how you are doing after discharge? [phone]     Hospital Resources/Appts/Education Provided Appointments scheduled and added to AVS        Post-Acute Status    Post-Acute Authorization Placement     Post-Acute Placement Status Set-up Complete/Auth obtained   Psych faclity    Discharge Delays None known at this time                     Future Appointments   Date Time Provider Department Center   1/9/2025  1:40 PM Trae Cardozo MD [facility] DERM [facility]   1/17/2025  1:00 PM Eda Dalal NP [facility] NEPHRO [facility]   2/21/2025 10:00 AM Audelia Reed FNP [facility] NEURO [facility]   2/21/2025  2:40 PM Yariel Lantigua MD [facility] CARDIO [facility]   5/30/2025 11:00 AM Derrick Sampson MD [facility] ENT [facility]              PEC and CEC uploaded via EPIC     UDS collected     CM spoke with Efrain at [facility] in [facility] [phone] and patient will need to be admitted as a new patient. Efrain did confirm that patients belongings are in inventory.     CM met with patient   and discussed discharge plans, patient to DC to behavioral health facility  medications delivered to bedside, and follow up appointment[s] scheduled.   Transportation arranged per patient flow center       Erin Do RN  Case Management  [facility]  [phone]
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
## 2024-12-23 NOTE — CARE UPDATE
"Called to bedside pt wanting nebulizer tx, BS are diminished but clear no wheezing, pt states she takes short breaths and that is why she called for nebulizer treatment.  Tx given with mask pt expressed " that feels better".  "

## 2024-12-23 NOTE — PLAN OF CARE
Pt was difficult to deal with this morning being very demanding and confrontational. Borders established and patient seems more agreeable and docile this afternoon.  Awaiting notification of availability at [facility] to discharge pt.  Problem: Adult Inpatient Plan of Care  Goal: Plan of Care Review  Outcome: Progressing  Goal: Patient-Specific Goal (Individualized)  Outcome: Progressing  Goal: Absence of Hospital-Acquired Illness or Injury  Outcome: Progressing  Goal: Optimal Comfort and Wellbeing  Outcome: Progressing  Goal: Readiness for Transition of Care  Outcome: Progressing     Problem: Bariatric Environmental Safety  Goal: Safety Maintained with Care  Outcome: Progressing     Problem: Diabetes Comorbidity  Goal: Blood Glucose Level Within Targeted Range  Outcome: Progressing     Problem: Wound  Goal: Optimal Coping  Outcome: Progressing  Goal: Optimal Functional Ability  Outcome: Progressing  Goal: Absence of Infection Signs and Symptoms  Outcome: Progressing  Goal: Improved Oral Intake  Outcome: Progressing  Goal: Optimal Pain Control and Function  Outcome: Progressing  Goal: Skin Health and Integrity  Outcome: Progressing  Goal: Optimal Wound Healing  Outcome: Progressing     Problem: Skin Injury Risk Increased  Goal: Skin Health and Integrity  Outcome: Progressing     Problem: Fall Injury Risk  Goal: Absence of Fall and Fall-Related Injury  Outcome: Progressing     Problem: Violence Risk or Actual  Goal: Anger and Impulse Control  Outcome: Progressing

## 2024-12-24 VITALS
DIASTOLIC BLOOD PRESSURE: 65 MMHG | WEIGHT: 244.94 LBS | HEART RATE: 77 BPM | HEIGHT: 63 IN | RESPIRATION RATE: 18 BRPM | BODY MASS INDEX: 43.4 KG/M2 | SYSTOLIC BLOOD PRESSURE: 143 MMHG | OXYGEN SATURATION: 96 % | TEMPERATURE: 99 F

## 2024-12-24 NOTE — PROGRESS NOTES
[facility] - Stepdown Flex ([facility]-)  Adult Nutrition  Progress Note    SUMMARY       Recommendations  --Continue Cardiac diet as tolerated and clinically indicated   --Order Boost Glucose Chocolate TID   --Encourage good intakes   --Nursing: please continue to document % meal eaten on flowsheet   --RD to monitor weight, PO intake     Goals: Meet % EEN/EPN  Nutrition Goal Status: new  Communication of RD Recs:  (POC)    Assessment and Plan    Nutrition Problem  Chewing difficulty     Related to (etiology):   Neuromuscular dysfunction     Signs and Symptoms (as evidenced by):   Preference for easy to chew      Interventions/Recommendations (treatment strategy):  --Continue Cardiac diet as tolerated and clinically indicated   --Order Boost Glucose Chocolate TID   --Encourage good intakes   --Nursing: please continue to document % meal eaten on flowsheet   --RD to monitor weight, PO intake     Nutrition Diagnosis Status:   New      Reason for Assessment    Reason For Assessment: identified at risk by screening criteria (MST 3)  Diagnosis:  (Pain of lower right extremity)  General Information Comments: 69 y.o. female w/ PMHx of CAD (s/p JANEE x1 to LAD, 12/16/24), HTN, HLD, T2DM, COPD, CKD, Parkinson's (unclear if true Parkinson's dx vs drug-induced Parkinsonism), hypothyroid, GERD, bipolar dx, cognitive impairment, CLARI, severe obesity, migraines, chronic constipation, remote hx of eating disorder, osteoarthritis w/ chronic pain, & tobacco dependency, who presented to [facility] ED on 12/21/2024 from [facility] w/ multiple complaints, including R leg pain, severe headache, R-sided chest pain, & abdominal pain. Pt limited historian, and unable to provide much of a timeline, characterize pain, or give further specifics/details. Identified at risk by screening criteria - MST 3. Spoke with pt at bedside - difficult to obtain all of information. Pt reports not being able to eat meats such as fish and  "chicken d/t toughness - wanting Soft & Bite Sized diet at this time. Noted % PO intake per chart. Weight stable at this time - no concerns for malnutrition.     Nutrition Discharge Planning: Adequate nutritional intake    Nutrition Related Social Determinants of Health: SDOH: Adequate food in home environment      Nutrition Risk Screen     Low    Nutrition/Diet History    Spiritual, Cultural Beliefs, Oriental orthodox Practices, Values that Affect Care: no  Food Allergies: NKFA    Anthropometrics    Temp: 98.6 °F (37 °C)  Height Method: Stated  Height: 5' 3" (160 cm)  Height (inches): 63 in  Weight Method: Bed Scale  Weight: 111.1 kg (244 lb 14.9 oz)  Weight (lb): 244.93 lb  Ideal Body Weight (IBW), Female: 115 lb  % Ideal Body Weight, Female (lb): 212.98 %  BMI (Calculated): 43.4  BMI Grade: greater than 40 - morbid obesity       Lab/Procedures/Meds    Pertinent Labs Reviewed: reviewed  Pertinent Medications Reviewed: reviewed    Estimated/Assessed Needs    Weight Used For Calorie Calculations: 52.3 kg (115 lb 4.8 oz)  Energy Calorie Requirements (kcal): 6341-5187 kcal/day (25-30 kcal/kg IBW)  Energy Need Method: Kcal/kg  Protein Requirements: 111-134 g/day (1.0-1.2 g/kg)  Weight Used For Protein Calculations: 111.1 kg (244 lb 14.9 oz)     Estimated Fluid Requirement Method: RDA Method  RDA Method (mL): 1308  CHO Requirement: 164-196 g/day      Nutrition Prescription Ordered    Current Diet Order: Cardiac    Evaluation of Received Nutrient/Fluid Intake       % Intake of Estimated Energy Needs: 50 - 75 %  % Meal Intake: 50 - 75 %    Nutrition Risk    Level of Risk/Frequency of Follow-up: high     Monitor and Evaluation    Food and Nutrient Intake: energy intake, food and beverage intake  Food and Nutrient Adminstration: diet order  Knowledge/Beliefs/Attitudes: food and nutrition knowledge/skill  Anthropometric Measurements: weight, weight change, body mass index  Biochemical Data, Medical Tests and Procedures: " electrolyte and renal panel, gastrointestinal profile, glucose/endocrine profile, inflammatory profile, lipid profile  Nutrition-Focused Physical Findings: overall appearance     Nutrition Follow-Up    RD Follow-up?: Yes    Priya Crawford, Registration Eligible, Provisional LDN

## 2024-12-24 NOTE — PLAN OF CARE
Recommendations  --Continue Cardiac diet as tolerated and clinically indicated   --Order Boost Glucose Chocolate TID   --Encourage good intakes   --Nursing: please continue to document % meal eaten on flowsheet   --RD to monitor weight, PO intake     Goals: Meet % EEN/EPN  Nutrition Goal Status: new  Communication of RD Recs:  (POC)

## 2024-12-24 NOTE — PLAN OF CARE
Report given to Cameron MARTINEZ @ [facility]. Verbalized understanding of report.

## 2024-12-24 NOTE — NURSING
Patient discharged via EMS transport to [facility] in [place], [place]. Report called to MICHELLE Barnes. At time of transfer, patient is alert and oriented, vital signs stable, in no acute distress. Patient belongings sent with patient via EMS tech.   normal (ped)...

## 2025-01-02 ENCOUNTER — TELEPHONE (OUTPATIENT)
Dept: PRIMARY CARE CLINIC | Facility: CLINIC | Age: 70
End: 2025-01-02
Payer: MEDICARE

## 2025-01-02 NOTE — TELEPHONE ENCOUNTER
----- Message from Brian sent at 1/2/2025 12:57 PM CST -----  Contact: [phone] .[email or web address]     Patient is calling for Medical Advice regarding: Pt said she needs to talk to he doctor all info she gave     How long has patient had these symptoms:    Pharmacy name and phone#:    Patient wants a call back or thru [facility]: call back     Comments:    Please advise patient replies from provider may take up to 48 hours.

## 2025-01-03 ENCOUNTER — TELEPHONE (OUTPATIENT)
Dept: INTERNAL MEDICINE | Facility: CLINIC | Age: 70
End: 2025-01-03
Payer: MEDICARE

## 2025-01-03 NOTE — TELEPHONE ENCOUNTER
Followed up with patient about having a heart attack. Patient stated she had the heart attack and was on the toilet. Patient informed this nurse that she needed me to call her cardiologist and inform him. Then hung up. Will forward to cardiologist staff to call and check on patient status.

## 2025-01-03 NOTE — TELEPHONE ENCOUNTER
----- Message from Mike Sesay sent at 1/3/2025  8:38 AM CST -----  Contact: [phone]    ----- Message -----  From: Shannon Colin  Sent: 1/3/2025   8:33 AM CST  To: Katrin Blas Staff    [email or web address]     Patient is calling for Medical Advice regarding:appt     Patient wants a call back or thru [facility]:call back     Comments:Pt would like for nurse to call her back due to her having a heart attack     Please advise patient replies from provider may take up to 48 hours.

## 2025-01-06 ENCOUNTER — TELEPHONE (OUTPATIENT)
Dept: PSYCHIATRY | Facility: CLINIC | Age: 70
End: 2025-01-06
Payer: MEDICARE

## 2025-01-07 ENCOUNTER — TELEPHONE (OUTPATIENT)
Dept: CARDIOLOGY | Facility: CLINIC | Age: 70
End: 2025-01-07
Payer: MEDICARE

## 2025-01-07 ENCOUNTER — TELEPHONE (OUTPATIENT)
Dept: INTERNAL MEDICINE | Facility: CLINIC | Age: 70
End: 2025-01-07
Payer: MEDICARE

## 2025-01-07 DIAGNOSIS — R53.81 DEBILITY: Primary | ICD-10-CM

## 2025-01-07 NOTE — TELEPHONE ENCOUNTER
----- Message from Britt sent at 1/7/2025  7:36 AM CST -----  Contact: Self/ [phone]  Type: Orders Request    What orders/ testing are being requested?Home Health     Is there a future appointment scheduled for the patient with PCP?Yes     When?2/11/25    Would you prefer a response via [facility]? Call back     Comments: pt stated that she was hospitalized in [facility] due to having a heart attack , pt stated that she was then transferred to [facility] , she is asking for  orders so she can get assistance with her everyday needs. Please advise

## 2025-01-07 NOTE — TELEPHONE ENCOUNTER
----- Message from Kathy sent at 1/7/2025  2:08 PM CST -----  Contact: [phone] Patient  Patient is returning a phone call.    Who left a message for the patient: Tasha Marques MA    Does patient know what this is regarding:  medication     Would you like a call back, or a response through your [facility] portal?:  call back     Comments:

## 2025-01-07 NOTE — TELEPHONE ENCOUNTER
----- Message from Yariel Lantigua MD sent at 1/7/2025  7:53 AM CST -----  yes  ----- Message -----  From: Tasha Marques MA  Sent: 1/6/2025   3:06 PM CST  To: Adriane M Toussaint, LPN; Yariel Lantigua MD      ----- Message -----  From: Stacey Rubalcava  Sent: 1/6/2025   3:04 PM CST  To: Grzegorz Saldivar Staff    Type:  Pt Advice     Who Called: Pt   Does the patient know what this is regarding?: received heart pills from [facility], would like to know does she still take losartan (COZAAR) 25 MG tablet daily   Would the patient rather a call back or a response via [facility]? Call   Best Call Back Number:[phone] / [phone] home   Additional Information: if no answer please leave VM

## 2025-01-07 NOTE — TELEPHONE ENCOUNTER
----- Message from Kathy sent at 1/7/2025  2:05 PM CST -----  Contact: [phone] Patient  Patient is returning a phone call.    Who left a message for the patient: Kimi Pack LPN    Does patient know what this is regarding:  Home Health    Would you like a call back, or a response through your [facility] portal?:   call back     Comments:

## 2025-01-08 ENCOUNTER — TELEPHONE (OUTPATIENT)
Dept: CARDIOLOGY | Facility: CLINIC | Age: 70
End: 2025-01-08
Payer: MEDICARE

## 2025-01-08 NOTE — TELEPHONE ENCOUNTER
----- Message from Deidre sent at 1/8/2025 10:15 AM CST -----  Contact: Phone# [phone]  Patient would like to get Home Health Services, patient would like for you to send the order to [facility].       [facility] phone# [phone].    Please give the patient a call.

## 2025-01-08 NOTE — TELEPHONE ENCOUNTER
Will see dr Luna on Tuesday   Needs home health   Was told by cardiologist needs pcp to order home health

## 2025-01-08 NOTE — TELEPHONE ENCOUNTER
----- Message from Yariel Lantigua MD sent at 2025 10:31 AM CST -----  Contact: PATIENT  Priyanka is supposed to make an appointment for her to be seen for next week.  I believe she was calling to find out when her appointment was.  ----- Message -----  From: Tasha Marques MA  Sent: 2025   8:07 AM CST  To: Yariel Lantigua MD    Do you know what she is referring to ? I called her and she was very rude stating I she was not going to explain to me when she had already talked to you .  ----- Message -----  From: Becki Xiong  Sent: 2025   3:44 PM CST  To: Chas Cahse    Joseluis Triplett  MRN: [id number]  : 1955  PCP: Wan Wilkes  Home Phone      [phone]  Work Phone      [phone]  Mobile          [phone]      MESSAGE: Patient has left several messages regarding her appointment.  She is needing a return call to see her appointment has been scheduled sooner than it was originally scheduled for.        Phone: [phone]

## 2025-01-09 NOTE — TELEPHONE ENCOUNTER
Called patient to discuss her recent questions.    Regarding her lost medications, she has since retrieved them and has all the meds she needs.    For her home health, I have placed the orders. She is being seen in the next 1-2 weeks in PCP clinic. I told her to follow up then regarding the status of her home health then and see if any additional documentation is required. Patient agreeable.

## 2025-01-13 DIAGNOSIS — Z00.00 ENCOUNTER FOR MEDICARE ANNUAL WELLNESS EXAM: ICD-10-CM

## 2025-01-14 ENCOUNTER — OFFICE VISIT (OUTPATIENT)
Dept: INTERNAL MEDICINE | Facility: CLINIC | Age: 70
End: 2025-01-14
Payer: MEDICARE

## 2025-01-14 VITALS
DIASTOLIC BLOOD PRESSURE: 67 MMHG | HEART RATE: 95 BPM | WEIGHT: 242.06 LBS | SYSTOLIC BLOOD PRESSURE: 138 MMHG | BODY MASS INDEX: 42.88 KG/M2 | OXYGEN SATURATION: 98 %

## 2025-01-14 DIAGNOSIS — M79.89 LEG SWELLING: Primary | ICD-10-CM

## 2025-01-14 DIAGNOSIS — R60.9 EDEMA, UNSPECIFIED TYPE: ICD-10-CM

## 2025-01-14 PROCEDURE — 3078F DIAST BP <80 MM HG: CPT | Mod: HCNC,CPTII,GC,S$GLB

## 2025-01-14 PROCEDURE — 99999 PR PBB SHADOW E&M-EST. PATIENT-LVL II: CPT | Mod: PBBFAC,HCNC,GC,

## 2025-01-14 PROCEDURE — 1157F ADVNC CARE PLAN IN RCRD: CPT | Mod: HCNC,CPTII,GC,S$GLB

## 2025-01-14 PROCEDURE — 3075F SYST BP GE 130 - 139MM HG: CPT | Mod: HCNC,CPTII,GC,S$GLB

## 2025-01-14 PROCEDURE — 99213 OFFICE O/P EST LOW 20 MIN: CPT | Mod: HCNC,GC,S$GLB,

## 2025-01-14 PROCEDURE — 3008F BODY MASS INDEX DOCD: CPT | Mod: HCNC,CPTII,GC,S$GLB

## 2025-01-14 RX ORDER — FUROSEMIDE 20 MG/1
40 TABLET ORAL DAILY
Qty: 60 TABLET | Refills: 0 | Status: SHIPPED | OUTPATIENT
Start: 2025-01-14 | End: 2025-01-14 | Stop reason: SDUPTHER

## 2025-01-14 RX ORDER — FUROSEMIDE 20 MG/1
20 TABLET ORAL DAILY
Qty: 60 TABLET | Refills: 0 | Status: SHIPPED | OUTPATIENT
Start: 2025-01-14 | End: 2025-02-03 | Stop reason: SDUPTHER
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
## 2025-01-15 ENCOUNTER — TELEPHONE (OUTPATIENT)
Dept: INTERNAL MEDICINE | Facility: CLINIC | Age: 70
End: 2025-01-15
Payer: MEDICARE

## 2025-01-16 NOTE — TELEPHONE ENCOUNTER
----- Message from Mathew sent at 1/15/2025  3:38 PM CST -----  Contact: [phone]  [email or web address]     Patient is calling for Medical Advice regarding: pt has rash under each breast, please call in something    Patient wants a call back or thru [facility]: call    Comments:    Please advise patient replies from provider may take up to 48 hours.

## 2025-01-17 ENCOUNTER — TELEPHONE (OUTPATIENT)
Dept: CARDIOLOGY | Facility: CLINIC | Age: 70
End: 2025-01-17
Payer: MEDICARE

## 2025-01-17 ENCOUNTER — HOSPITAL ENCOUNTER (OUTPATIENT)
Dept: RADIOLOGY | Facility: HOSPITAL | Age: 70
Discharge: HOME | End: 2025-01-17
Attending: INTERNAL MEDICINE
Payer: MEDICARE

## 2025-01-17 ENCOUNTER — OFFICE VISIT (OUTPATIENT)
Dept: CARDIOLOGY | Facility: CLINIC | Age: 70
End: 2025-01-17
Payer: MEDICARE

## 2025-01-17 VITALS
BODY MASS INDEX: 42.35 KG/M2 | SYSTOLIC BLOOD PRESSURE: 141 MMHG | HEIGHT: 63 IN | DIASTOLIC BLOOD PRESSURE: 74 MMHG | OXYGEN SATURATION: 97 % | HEART RATE: 80 BPM | WEIGHT: 239 LBS

## 2025-01-17 DIAGNOSIS — N18.32 TYPE 2 DIABETES MELLITUS WITH STAGE 3B CHRONIC KIDNEY DISEASE, WITHOUT LONG-TERM CURRENT USE OF INSULIN: ICD-10-CM

## 2025-01-17 DIAGNOSIS — E78.5 HYPERLIPIDEMIA, UNSPECIFIED HYPERLIPIDEMIA TYPE: Primary | ICD-10-CM

## 2025-01-17 DIAGNOSIS — I25.10 CORONARY ARTERY DISEASE INVOLVING NATIVE CORONARY ARTERY OF NATIVE HEART, UNSPECIFIED WHETHER ANGINA PRESENT: ICD-10-CM

## 2025-01-17 DIAGNOSIS — M79.89 LEG SWELLING: Primary | ICD-10-CM

## 2025-01-17 DIAGNOSIS — E78.5 HYPERLIPIDEMIA, UNSPECIFIED HYPERLIPIDEMIA TYPE: ICD-10-CM

## 2025-01-17 DIAGNOSIS — I82.431 ACUTE DEEP VEIN THROMBOSIS (DVT) OF POPLITEAL VEIN OF RIGHT LOWER EXTREMITY: ICD-10-CM

## 2025-01-17 DIAGNOSIS — I25.118 CORONARY ARTERY DISEASE OF NATIVE ARTERY OF NATIVE HEART WITH STABLE ANGINA PECTORIS: ICD-10-CM

## 2025-01-17 DIAGNOSIS — E66.813 CLASS 3 OBESITY: ICD-10-CM

## 2025-01-17 DIAGNOSIS — E66.01 SEVERE OBESITY: ICD-10-CM

## 2025-01-17 DIAGNOSIS — I10 PRIMARY HYPERTENSION: ICD-10-CM

## 2025-01-17 DIAGNOSIS — R60.9 EDEMA, UNSPECIFIED TYPE: ICD-10-CM

## 2025-01-17 DIAGNOSIS — N18.31 STAGE 3A CHRONIC KIDNEY DISEASE: ICD-10-CM

## 2025-01-17 DIAGNOSIS — E11.22 TYPE 2 DIABETES MELLITUS WITH STAGE 3B CHRONIC KIDNEY DISEASE, WITHOUT LONG-TERM CURRENT USE OF INSULIN: ICD-10-CM

## 2025-01-17 PROCEDURE — 93971 EXTREMITY STUDY: CPT | Mod: TC,HCNC,RT

## 2025-01-17 PROCEDURE — 3008F BODY MASS INDEX DOCD: CPT | Mod: HCNC,CPTII,S$GLB, | Performed by: INTERNAL MEDICINE

## 2025-01-17 PROCEDURE — 1159F MED LIST DOCD IN RCRD: CPT | Mod: HCNC,CPTII,S$GLB, | Performed by: INTERNAL MEDICINE

## 2025-01-17 PROCEDURE — 99214 OFFICE O/P EST MOD 30 MIN: CPT | Mod: HCNC,S$GLB,, | Performed by: INTERNAL MEDICINE

## 2025-01-17 PROCEDURE — 3077F SYST BP >= 140 MM HG: CPT | Mod: HCNC,CPTII,S$GLB, | Performed by: INTERNAL MEDICINE

## 2025-01-17 PROCEDURE — 99999 PR PBB SHADOW E&M-EST. PATIENT-LVL III: CPT | Mod: PBBFAC,HCNC,, | Performed by: INTERNAL MEDICINE

## 2025-01-17 PROCEDURE — 3078F DIAST BP <80 MM HG: CPT | Mod: HCNC,CPTII,S$GLB, | Performed by: INTERNAL MEDICINE

## 2025-01-17 PROCEDURE — 1160F RVW MEDS BY RX/DR IN RCRD: CPT | Mod: HCNC,CPTII,S$GLB, | Performed by: INTERNAL MEDICINE

## 2025-01-17 PROCEDURE — 1157F ADVNC CARE PLAN IN RCRD: CPT | Mod: HCNC,CPTII,S$GLB, | Performed by: INTERNAL MEDICINE

## 2025-01-17 PROCEDURE — 93971 EXTREMITY STUDY: CPT | Mod: 26,HCNC,RT, | Performed by: RADIOLOGY

## 2025-01-17 RX ORDER — EZETIMIBE 10 MG/1
10 TABLET ORAL DAILY
Qty: 90 TABLET | Refills: 3 | Status: SHIPPED | OUTPATIENT
Start: 2025-01-17 | End: 2026-01-17

## 2025-01-17 RX ORDER — METOPROLOL SUCCINATE 25 MG/1
25 TABLET, EXTENDED RELEASE ORAL DAILY
Qty: 90 TABLET | Refills: 3 | Status: SHIPPED | OUTPATIENT
Start: 2025-01-17 | End: 2026-01-17

## 2025-01-17 RX ORDER — NITROGLYCERIN 0.4 MG/1
0.4 TABLET SUBLINGUAL EVERY 5 MIN PRN
Qty: 25 TABLET | Refills: 1 | Status: SHIPPED | OUTPATIENT
Start: 2025-01-17 | End: 2026-01-17

## 2025-01-17 NOTE — PROGRESS NOTES
[facility]     Subjective:    Patient ID:  Joseluis Triplett is a 69 y.o. female who presents for follow-up of Coronary Artery Disease, Hyperlipidemia, Hypertension, Diabetes Mellitus, and Leg Swelling    Review of patient's allergies indicates:   Allergen Reactions    Nsaids (non-steroidal anti-inflammatory drug) Other (See Comments)     History of perforated duodenal ulcer    Penicillins Rash and Other (See Comments)     Yeast infections     The patient had LAD stent December 16, 2024.  There was right groin hematoma post procedure.  She was discharged on same day of procedure.  Unfortunately she presented to the ED with hypotension likely related to groin bleeding.  Her hemoglobin was down significantly into the 8 range.  She did not require transfusion.  She stabilized her hemoglobin and was ultimately discharged to psychiatry.    She had arterial CTA study of the legs.  She had venous ultrasound not confirming DVT right leg.  She still has a lot of right leg swelling.  She was placed on Lasix.  She did bump her troponin to 0.09, STEMI alert was called but she did not have a heart catheterization.    She is still having difficulty with walking due to right leg swelling.  She does not have significant right groin tenderness or pain.  She does state that the right leg swelling is a bit worse today.  She does have history of venous insufficiency with chronic compression usage.  Both legs are swollen today.    She states the stent improved her chest pain significantly.  Her blood pressure is elevated today.  He has been taking losartan 25 mg only.  She is a diabetic.  Last hemoglobin A1c 6.4.         Review of Systems   Constitutional: Negative for chills, decreased appetite, diaphoresis, fever, malaise/fatigue, night sweats, weight gain and weight loss.   HENT:  Negative for congestion, ear discharge, ear pain, hearing loss, hoarse  voice, nosebleeds, odynophagia, sore throat, stridor and tinnitus.    Eyes:  Negative for blurred vision, discharge, double vision, pain, photophobia, redness, vision loss in left eye, vision loss in right eye, visual disturbance and visual halos.   Cardiovascular:  Positive for leg swelling. Negative for chest pain, claudication, cyanosis, dyspnea on exertion, irregular heartbeat, near-syncope, orthopnea, palpitations, paroxysmal nocturnal dyspnea and syncope.   Respiratory:  Negative for cough, hemoptysis, shortness of breath, sleep disturbances due to breathing, snoring, sputum production and wheezing.    Endocrine: Negative for cold intolerance, heat intolerance, polydipsia, polyphagia and polyuria.   Hematologic/Lymphatic: Negative for adenopathy and bleeding problem. Does not bruise/bleed easily.   Skin:  Negative for color change, dry skin, flushing, itching, nail changes, poor wound healing, rash, skin cancer, suspicious lesions and unusual hair distribution.   Musculoskeletal:  Negative for arthritis, back pain, falls, gout, joint pain, joint swelling, muscle cramps, muscle weakness, myalgias, neck pain and stiffness.   Gastrointestinal:  Negative for bloating, abdominal pain, anorexia, change in bowel habit, bowel incontinence, constipation, diarrhea, dysphagia, excessive appetite, flatus, heartburn, hematemesis, hematochezia, hemorrhoids, jaundice, melena, nausea and vomiting.   Genitourinary:  Negative for bladder incontinence, decreased libido, dysuria, flank pain, frequency, genital sores, hematuria, hesitancy, incomplete emptying, nocturia and urgency.   Neurological:  Negative for aphonia, brief paralysis, difficulty with concentration, disturbances in coordination, excessive daytime sleepiness, dizziness, focal weakness, headaches, light-headedness, loss of balance, numbness, paresthesias, seizures, sensory change, tremors, vertigo and weakness.   Psychiatric/Behavioral:  Negative for altered mental  "status, depression, hallucinations, memory loss, substance abuse, suicidal ideas and thoughts of violence. The patient is nervous/anxious. The patient does not have insomnia.    Allergic/Immunologic: Negative for hives and persistent infections.        Objective:       Vitals:    01/17/25 1034   BP: (!) 141/74   Pulse: 80   SpO2: 97%   Weight: 108.4 kg (238 lb 15.7 oz)   Height: 5' 3" (1.6 m)    Physical Exam  Constitutional:       General: She is not in acute distress.     Appearance: She is well-developed. She is not diaphoretic.   HENT:      Head: Normocephalic and atraumatic.      Nose: Nose normal.   Eyes:      General: No scleral icterus.        Right eye: No discharge.      Conjunctiva/sclera: Conjunctivae normal.      Pupils: Pupils are equal, round, and reactive to light.   Neck:      Thyroid: No thyromegaly.      Vascular: No JVD.      Trachea: No tracheal deviation.   Cardiovascular:      Rate and Rhythm: Normal rate and regular rhythm.      Pulses:           Carotid pulses are 2+ on the right side and 2+ on the left side.       Radial pulses are 2+ on the right side and 2+ on the left side.      Heart sounds: Normal heart sounds. No murmur heard.     No friction rub. No gallop.   Pulmonary:      Effort: Pulmonary effort is normal. No respiratory distress.      Breath sounds: Normal breath sounds. No stridor. No wheezing or rales.   Chest:      Chest wall: No tenderness.   Abdominal:      General: Bowel sounds are normal. There is no distension.      Palpations: Abdomen is soft. There is no mass.      Tenderness: There is no abdominal tenderness. There is no guarding or rebound.   Musculoskeletal:         General: No tenderness. Normal range of motion.      Cervical back: Normal range of motion and neck supple.      Right lower leg: Edema present.      Left lower leg: Edema present.   Lymphadenopathy:      Cervical: No cervical adenopathy.   Skin:     General: Skin is warm and dry.      Coloration: Skin " is not pale.      Findings: No erythema or rash.   Neurological:      Mental Status: She is alert and oriented to person, place, and time.      Cranial Nerves: No cranial nerve deficit.      Coordination: Coordination normal.   Psychiatric:         Behavior: Behavior normal.         Thought Content: Thought content normal.         Judgment: Judgment normal.           Assessment:       1. Leg swelling    2. Acute deep vein thrombosis (DVT) of popliteal vein of right lower extremity    3. Coronary artery disease involving native coronary artery of native heart, unspecified whether angina present    4. Primary hypertension    5. Hyperlipidemia, unspecified hyperlipidemia type    6. Severe obesity    7. Coronary artery disease of native artery of native heart with stable angina pectoris    8. Stage 3a chronic kidney disease    9. Class 3 obesity    10. Type 2 diabetes mellitus with stage 3b chronic kidney disease, without long-term current use of insulin    11. Edema, unspecified type      Results for orders placed or performed during the hospital encounter of 12/21/24   Hepatitis C Antibody    Collection Time: 12/21/24 10:44 AM   Result Value Ref Range    Hepatitis C Ab Non-reactive Non-reactive   HIV 1/2 Ag/Ab (4th Gen)    Collection Time: 12/21/24 10:44 AM   Result Value Ref Range    HIV 1/2 Ag/Ab Non-reactive Non-reactive   CBC auto differential    Collection Time: 12/21/24 10:44 AM   Result Value Ref Range    WBC 8.39 3.90 - 12.70 K/uL    RBC 3.19 (L) 4.00 - 5.40 M/uL    Hemoglobin 9.8 (L) 12.0 - 16.0 g/dL    Hematocrit 30.1 (L) 37.0 - 48.5 %    MCV 94 82 - 98 fL    MCH 30.7 27.0 - 31.0 pg    MCHC 32.6 32.0 - 36.0 g/dL    RDW 13.7 11.5 - 14.5 %    Platelets 372 150 - 450 K/uL    MPV 9.7 9.2 - 12.9 fL    Immature Granulocytes 1.5 (H) 0.0 - 0.5 %    Gran # (ANC) 4.7 1.8 - 7.7 K/uL    Immature Grans (Abs) 0.13 (H) 0.00 - 0.04 K/uL    Lymph # 2.5 1.0 - 4.8 K/uL    Mono # 0.9 0.3 - 1.0 K/uL    Eos # 0.1 0.0 - 0.5 K/uL     Baso # 0.04 0.00 - 0.20 K/uL    nRBC 0 0 /100 WBC    Gran % 56.0 38.0 - 73.0 %    Lymph % 29.9 18.0 - 48.0 %    Mono % 10.8 4.0 - 15.0 %    Eosinophil % 1.3 0.0 - 8.0 %    Basophil % 0.5 0.0 - 1.9 %    Differential Method Automated    Comprehensive metabolic panel    Collection Time: 12/21/24 10:44 AM   Result Value Ref Range    Sodium 140 136 - 145 mmol/L    Potassium 4.2 3.5 - 5.1 mmol/L    Chloride 108 95 - 110 mmol/L    CO2 22 (L) 23 - 29 mmol/L    Glucose 143 (H) 70 - 110 mg/dL    BUN 21 8 - 23 mg/dL    Creatinine 0.9 0.5 - 1.4 mg/dL    Calcium 9.6 8.7 - 10.5 mg/dL    Total Protein 6.9 6.0 - 8.4 g/dL    Albumin 3.5 3.5 - 5.2 g/dL    Total Bilirubin 1.0 0.1 - 1.0 mg/dL    Alkaline Phosphatase 83 40 - 150 U/L    AST 22 10 - 40 U/L    ALT 16 10 - 44 U/L    eGFR >60.0 >60 mL/min/1.73 m^2    Anion Gap 10 8 - 16 mmol/L   Lipase    Collection Time: 12/21/24 10:44 AM   Result Value Ref Range    Lipase 19 4 - 60 U/L   Magnesium    Collection Time: 12/21/24 10:44 AM   Result Value Ref Range    Magnesium 2.2 1.6 - 2.6 mg/dL   Troponin I High Sensitivity    Collection Time: 12/21/24 10:44 AM   Result Value Ref Range    Troponin I High Sensitivity 6 0 - 14 ng/L   EKG 12-lead    Collection Time: 12/21/24  8:39 PM   Result Value Ref Range    QRS Duration 88 ms    [facility] QTC Calculation 438 ms   POCT glucose    Collection Time: 12/21/24 11:38 PM   Result Value Ref Range    POCT Glucose 174 (H) 70 - 110 mg/dL   CBC Without Differential    Collection Time: 12/22/24  5:49 AM   Result Value Ref Range    WBC 9.43 3.90 - 12.70 K/uL    RBC 2.81 (L) 4.00 - 5.40 M/uL    Hemoglobin 8.6 (L) 12.0 - 16.0 g/dL    Hematocrit 27.3 (L) 37.0 - 48.5 %    MCV 97 82 - 98 fL    MCH 30.6 27.0 - 31.0 pg    MCHC 31.5 (L) 32.0 - 36.0 g/dL    RDW 14.2 11.5 - 14.5 %    Platelets 358 150 - 450 K/uL    MPV 9.5 9.2 - 12.9 fL   Basic Metabolic Panel (BMP)    Collection Time: 12/22/24  5:49 AM   Result Value Ref Range    Sodium 137 136 - 145 mmol/L     Potassium 4.4 3.5 - 5.1 mmol/L    Chloride 106 95 - 110 mmol/L    CO2 23 23 - 29 mmol/L    Glucose 136 (H) 70 - 110 mg/dL    BUN 27 (H) 8 - 23 mg/dL    Creatinine 1.1 0.5 - 1.4 mg/dL    Calcium 8.9 8.7 - 10.5 mg/dL    Anion Gap 8 8 - 16 mmol/L    eGFR 54.7 (A) >60 mL/min/1.73 m^2   POCT glucose    Collection Time: 12/22/24  8:39 AM   Result Value Ref Range    POCT Glucose 161 (H) 70 - 110 mg/dL   POCT glucose    Collection Time: 12/22/24  3:27 PM   Result Value Ref Range    POCT Glucose 207 (H) 70 - 110 mg/dL   CBC Without Differential    Collection Time: 12/22/24  3:46 PM   Result Value Ref Range    WBC 10.92 3.90 - 12.70 K/uL    RBC 2.61 (L) 4.00 - 5.40 M/uL    Hemoglobin 8.1 (L) 12.0 - 16.0 g/dL    Hematocrit 25.4 (L) 37.0 - 48.5 %    MCV 97 82 - 98 fL    MCH 31.0 27.0 - 31.0 pg    MCHC 31.9 (L) 32.0 - 36.0 g/dL    RDW 14.2 11.5 - 14.5 %    Platelets 365 150 - 450 K/uL    MPV 9.4 9.2 - 12.9 fL   POCT glucose    Collection Time: 12/22/24  8:56 PM   Result Value Ref Range    POCT Glucose 168 (H) 70 - 110 mg/dL   CBC Without Differential    Collection Time: 12/23/24  3:51 AM   Result Value Ref Range    WBC 11.91 3.90 - 12.70 K/uL    RBC 2.61 (L) 4.00 - 5.40 M/uL    Hemoglobin 8.1 (L) 12.0 - 16.0 g/dL    Hematocrit 25.2 (L) 37.0 - 48.5 %    MCV 97 82 - 98 fL    MCH 31.0 27.0 - 31.0 pg    MCHC 32.1 32.0 - 36.0 g/dL    RDW 14.5 11.5 - 14.5 %    Platelets 360 150 - 450 K/uL    MPV 9.4 9.2 - 12.9 fL   Basic Metabolic Panel (BMP)    Collection Time: 12/23/24  3:51 AM   Result Value Ref Range    Sodium 138 136 - 145 mmol/L    Potassium 3.9 3.5 - 5.1 mmol/L    Chloride 106 95 - 110 mmol/L    CO2 23 23 - 29 mmol/L    Glucose 147 (H) 70 - 110 mg/dL    BUN 31 (H) 8 - 23 mg/dL    Creatinine 1.2 0.5 - 1.4 mg/dL    Calcium 9.0 8.7 - 10.5 mg/dL    Anion Gap 9 8 - 16 mmol/L    eGFR 49.3 (A) >60 mL/min/1.73 m^2   POCT glucose    Collection Time: 12/23/24  8:28 AM   Result Value Ref Range    POCT Glucose 215 (H) 70 - 110 mg/dL   POCT  glucose    Collection Time: 12/23/24 11:44 AM   Result Value Ref Range    POCT Glucose 147 (H) 70 - 110 mg/dL   Toxicology screen, urine    Collection Time: 12/23/24  1:52 PM   Result Value Ref Range    Alcohol, Urine <10 <10 mg/dL    Benzodiazepines Negative Negative    Methadone metabolites Negative Negative    Cocaine (Metab.) Negative Negative    Opiate Scrn, Ur Negative Negative    Barbiturate Screen, Ur Presumptive Positive (A) Negative    Amphetamine Screen, Ur Negative Negative    THC Negative Negative    Phencyclidine Negative Negative    Creatinine, Urine 99.0 15.0 - 325.0 mg/dL    Toxicology Information SEE COMMENT    POCT glucose    Collection Time: 12/23/24  4:18 PM   Result Value Ref Range    POCT Glucose 202 (H) 70 - 110 mg/dL   Valproic Acid    Collection Time: 12/23/24  7:48 PM   Result Value Ref Range    Valproic Acid Level 52.8 50.0 - 100.0 ug/mL   POCT glucose    Collection Time: 12/23/24  7:58 PM   Result Value Ref Range    POCT Glucose 196 (H) 70 - 110 mg/dL     *Note: Due to a large number of results and/or encounters for the requested time period, some results have not been displayed. A complete set of results can be found in Results Review.         Current Outpatient Medications:     ACCU-CHEK GUIDE TEST STRIPS Strp, 1 strip by Other route., Disp: , Rfl:     acetaminophen (TYLENOL) 500 MG tablet, Take 500 mg by mouth every 6 (six) hours as needed., Disp: , Rfl:     albuterol (PROVENTIL/VENTOLIN HFA) 90 mcg/actuation inhaler, Inhale 1 puff into the lungs every 4 (four) hours as needed for Wheezing., Disp: 18 g, Rfl: 0    artificial tears (ISOPTO TEARS) 0.5 % ophthalmic solution, Place 1 drop into the right eye 4 (four) times daily as needed., Disp: , Rfl:     aspirin (ECOTRIN) 81 MG EC tablet, Take 1 tablet (81 mg total) by mouth once daily., Disp: 90 tablet, Rfl: 0    atorvastatin (LIPITOR) 40 MG tablet, Take 1 tablet (40 mg total) by mouth once daily., Disp: 90 tablet, Rfl: 0     blood-glucose meter Misc, 1 each by Other route., Disp: , Rfl:     budesonide-formoterol 80-4.5 mcg (SYMBICORT) 80-4.5 mcg/actuation HFAA, Inhale 2 puffs into the lungs once daily. Controller, Disp: 10 g, Rfl: 3    cetirizine (ZYRTEC) 5 MG tablet, Take 1 tablet (5 mg total) by mouth once daily., Disp: , Rfl:     chlorhexidine (PERIDEX) 0.12 % solution, , Disp: , Rfl:     clopidogreL (PLAVIX) 75 mg tablet, Take 1 tablet (75 mg total) by mouth once daily., Disp: 90 tablet, Rfl: 3    diclofenac sodium (VOLTAREN) 1 % Gel, Apply topically once daily. (Patient taking differently: Apply topically once daily. Knees), Disp: 720 g, Rfl: 0    divalproex (DEPAKOTE) 500 MG TbEC, Take 1 tablet (500 mg total) by mouth once daily., Disp: , Rfl:     divalproex ER (DEPAKOTE ER) 500 MG Tb24 24 hr tablet, Take 2 tablets (1,000 mg total) by mouth every evening., Disp: , Rfl:     docusate sodium (STOOL SOFTENER) 100 MG capsule, Take 1 capsule (100 mg total) by mouth 2 (two) times daily as needed for Constipation., Disp: 180 capsule, Rfl: 0    FLUoxetine 20 MG capsule, Take 1 capsule (20 mg total) by mouth once daily., Disp: 30 capsule, Rfl: 11    fluticasone furoate-vilanteroL (BREO) 100-25 mcg/dose diskus inhaler, Inhale 1 puff into the lungs once daily. Controller (Patient not taking: Reported on 12/23/2024), Disp: , Rfl:     fluticasone propionate (FLONASE) 50 mcg/actuation nasal spray, , Disp: 11.1 mL, Rfl: 0    furosemide (LASIX) 20 MG tablet, Take 1 tablet (20 mg total) by mouth once daily., Disp: 60 tablet, Rfl: 0    gabapentin (NEURONTIN) 600 MG tablet, Take 1 tablet (600 mg total) by mouth once daily., Disp: 90 tablet, Rfl: 0    galcanezumab-gnlm 120 mg/mL PnIj, Inject 1 mL (120 mg total) into the skin every 28 days. maintenance dose, Disp: 1 mL, Rfl: 5    guaiFENesin (MUCINEX) 600 mg 12 hr tablet, Take 1,200 mg by mouth 2 (two) times daily as needed., Disp: , Rfl:     isosorbide mononitrate (IMDUR) 30 MG 24 hr tablet, Take 1  tablet (30 mg total) by mouth once daily., Disp: 30 tablet, Rfl: 11    levothyroxine (SYNTHROID) 150 MCG tablet, Take 1 tablet (150 mcg total) by mouth before breakfast., Disp: 90 tablet, Rfl: 3    linaCLOtide (LINZESS) 290 mcg Cap capsule, , Disp: , Rfl:     loratadine (CLARITIN) 10 mg tablet, Take 1 tablet by mouth once daily., Disp: , Rfl:     losartan (COZAAR) 25 MG tablet, Take 1 tablet (25 mg total) by mouth once daily., Disp: 90 tablet, Rfl: 3    melatonin 5 mg Chew, Take by mouth., Disp: , Rfl:     metFORMIN (GLUCOPHAGE) 500 MG tablet, Take 2 tablets (1,000 mg total) by mouth 2 (two) times daily with meals., Disp: 360 tablet, Rfl: 0    metoprolol succinate (TOPROL-XL) 25 MG 24 hr tablet, Take 1 tablet (25 mg total) by mouth once daily., Disp: 90 tablet, Rfl: 3    mirtazapine (REMERON) 30 MG tablet, Take 1 tablet (30 mg total) by mouth every evening., Disp: 90 tablet, Rfl: 3    multivitamin (THERAGRAN) per tablet, Take 1 tablet by mouth once daily., Disp: , Rfl:     nicotine (NICODERM CQ) 7 mg/24 hr, Place 1 patch onto the skin once daily., Disp: 90 patch, Rfl: 0    nystatin-triamcinolone (MYCOLOG) ointment, , Disp: , Rfl:     OLANZapine (ZYPREXA) injection, Inject 5 mg into the muscle every 8 (eight) hours as needed for Agitation., Disp: , Rfl:     ondansetron (ZOFRAN) 4 MG tablet, Take 1 tablet (4 mg total) by mouth every 12 (twelve) hours as needed for Nausea., Disp: 270 tablet, Rfl: 0    ondansetron 4 mg/2 mL Soln, Inject 4 mg into the vein every 8 (eight) hours as needed., Disp: , Rfl:     pantoprazole (PROTONIX) 40 MG tablet, Take 1 tablet (40 mg total) by mouth once daily., Disp: 90 tablet, Rfl: 0    QUEtiapine (SEROQUEL) 200 MG Tab, Take 1 tablet (200 mg total) by mouth once daily., Disp: , Rfl:     QUEtiapine (SEROQUEL) 400 MG tablet, Take 1 tablet (400 mg total) by mouth every evening., Disp: , Rfl:     tiZANidine (ZANAFLEX) 4 MG tablet, Take 0.5 tablets (2 mg total) by mouth every 8 (eight) hours.,  Disp: 90 tablet, Rfl: 11    TRUE METRIX AIR GLUCOSE METER kit, SMARTSI Kit(s) Via Meter Daily, Disp: , Rfl:     ubrogepant (UBRELVY) 50 mg tablet, Take 1 tablet (50 mg total) by mouth 2 (two) times daily as needed for Migraine. If symptoms persist or return, may repeat dose after 2 hours. Maximum: 200 mg per 24 hours, Disp: 10 tablet, Rfl: 2     Lab Results   Component Value Date    WBC 11.91 2024    RBC 2.61 (L) 2024    HGB 8.1 (L) 2024    HCT 25.2 (L) 2024    MCV 97 2024    MCH 31.0 2024    MCHC 32.1 2024    RDW 14.5 2024     2024    MPV 9.4 2024    GRAN 4.7 2024    GRAN 56.0 2024    LYMPH 2.5 2024    LYMPH 29.9 2024    MONO 0.9 2024    MONO 10.8 2024    EOS 0.1 2024    BASO 0.04 2024    EOSINOPHIL 1.3 2024    BASOPHIL 0.5 2024    MG 2.2 2024        CMP  Lab Results   Component Value Date     2024    K 3.9 2024     2024    CO2 23 2024     (H) 2024    BUN 31 (H) 2024    CREATININE 1.2 2024    CALCIUM 9.0 2024    PROT 6.9 2024    ALBUMIN 3.5 2024    BILITOT 1.0 2024    ALKPHOS 83 2024    AST 22 2024    ALT 16 2024    ANIONGAP 9 2024    ESTGFRAFRICA 31 (A) 2022    EGFRNONAA 27 (A) 2022        Lab Results   Component Value Date    LABBLOO No growth after 5 days. 03/10/2019    LABURIN No growth 2021            Results for orders placed or performed during the hospital encounter of 24   EKG 12-lead    Collection Time: 24  8:39 PM   Result Value Ref Range    QRS Duration 88 ms    [facility] QTC Calculation 438 ms    Narrative    Test Reason : R07.9,    Vent. Rate :  73 BPM     Atrial Rate :  73 BPM     P-R Int : 200 ms          QRS Dur :  88 ms      QT Int : 398 ms       P-R-T Axes :  58  65  37 degrees    QTcB Int : 438 ms    Normal sinus rhythm  Low septal  forces  Abnormal ECG  When compared with ECG of 21-Dec-2024 10:40,  Questionable change in initial forces of Septal leads  Confirmed by Alvin Jaquez ([id number]) on 12/22/2024 10:06:26 AM    Referred By: ENEIDAERRAL SELF           Confirmed By: Alvin Jaquez                   Plan:       Problem List Items Addressed This Visit          Cardiac/Vascular    Hyperlipidemia     Lipid panel ordered today.  She is on atorvastatin 40 mg.  No recent blood draws.         Relevant Orders    Lipid Panel    HTN (hypertension)     Losartan Toprol Imdur to continue.  Blood pressure mildly elevated today.         Relevant Medications    metoprolol succinate (TOPROL-XL) 25 MG 24 hr tablet    Coronary artery disease with stable angina pectoris     Successful stent to LAD December 16, 2024.  There was 50% right coronary artery stenosis.  Toprol Imdur ordered chronically.  Aspirin and Plavix to continue.  Her electrocardiograms reviewed, no changes noted post catheterization procedure on repeat hospitalization.            Renal/    Stage 3a chronic kidney disease     Condition stable.            Endocrine    Type 2 diabetes mellitus with stage 3b chronic kidney disease, without long-term current use of insulin     Condition stable.         Class 3 obesity     Weight loss encouraged.  She states she has lost weight.         Severe obesity       Other    Edema     Repeat right leg ultrasound ordered today.          Other Visit Diagnoses       Leg swelling    -  Primary    Relevant Orders    VAS US Venous Arm Right    VAS US Venous Leg Right    Acute deep vein thrombosis (DVT) of popliteal vein of right lower extremity        Relevant Orders    VAS US Venous Arm Right    VAS US Venous Leg Right    Coronary artery disease involving native coronary artery of native heart, unspecified whether angina present        Relevant Orders    CBC Without Differential               Repeat venous ultrasound right leg ordered.  I am concerned about DVT.  There  is asymmetric swelling.    She will continue aspirin and Plavix.    Lipid panel and CBC ordered.    I advised a 1 month follow-up.    All hospital records reviewed.  I think the right groin pain may be related to hematoma formation rather than DVT.  The patient's previous venous study showed equivocal findings of venous DVT acute.             Yariel Lantigua MD  01/17/2025   10:22 AM

## 2025-01-17 NOTE — ASSESSMENT & PLAN NOTE
Successful stent to LAD December 16, 2024.  There was 50% right coronary artery stenosis.  Toprol Imdur ordered chronically.  Aspirin and Plavix to continue.  Her electrocardiograms reviewed, no changes noted post catheterization procedure on repeat hospitalization.

## 2025-01-17 NOTE — TELEPHONE ENCOUNTER
Lab results discussed with the patient via telephone.  Zetia prescribed for her cholesterol lab results.  She will  the prescription.

## 2025-01-24 DIAGNOSIS — J30.2 SEASONAL ALLERGIES: ICD-10-CM

## 2025-01-24 DIAGNOSIS — K21.9 GASTROESOPHAGEAL REFLUX DISEASE, UNSPECIFIED WHETHER ESOPHAGITIS PRESENT: ICD-10-CM

## 2025-01-24 RX ORDER — PANTOPRAZOLE SODIUM 40 MG/1
40 TABLET, DELAYED RELEASE ORAL DAILY
Qty: 90 TABLET | Refills: 0 | Status: SHIPPED | OUTPATIENT
Start: 2025-01-24

## 2025-01-24 RX ORDER — ONDANSETRON 4 MG/1
4 TABLET, FILM COATED ORAL EVERY 12 HOURS PRN
Qty: 270 TABLET | Refills: 0 | Status: SHIPPED | OUTPATIENT
Start: 2025-01-24

## 2025-01-24 RX ORDER — ATORVASTATIN CALCIUM 40 MG/1
40 TABLET, FILM COATED ORAL DAILY
Qty: 90 TABLET | Refills: 0 | Status: SHIPPED | OUTPATIENT
Start: 2025-01-24

## 2025-01-24 RX ORDER — FLUTICASONE PROPIONATE 50 MCG
SPRAY, SUSPENSION (ML) NASAL
Qty: 11.1 ML | Refills: 0 | Status: SHIPPED | OUTPATIENT
Start: 2025-01-24

## 2025-01-24 NOTE — TELEPHONE ENCOUNTER
----- Message from Wiley sent at 1/23/2025  7:32 AM CST -----  Contact: Pt  [phone]  Requesting an RX refill or new RX.    Is this a refill or new RX: Refill    RX name and strength (copy/paste from chart):  atorvastatin (LIPITOR) 40 MG tablet    Is this a 30 day or 90 day RX: 90    Pharmacy name and phone # (copy/paste from chart):      The doctors have asked that we provide their patients with the following 2 reminders -- prescription refills can take up to 72 hours, and a friendly reminder that in the future you can use your [facility] account to request refills: yes    Requesting an RX refill or new RX.    Is this a refill or new RX:     RX name and strength (copy/paste from chart):  pantoprazole (PROTONIX) 40 MG tablet    Is this a 30 day or 90 day RX:     Pharmacy name and phone # (copy/paste from chart):      The doctors have asked that we provide their patients with the following 2 reminders -- prescription refills can take up to 72 hours, and a friendly reminder that in the future you can use your [facility] account to request refills: yes  Requesting an RX refill or new RX.    Is this a refill or new RX:     RX name and strength (copy/paste from chart): ondansetron (ZOFRAN) 4 MG tablet     Is this a 30 day or 90 day RX:     Pharmacy name and phone # (copy/paste from chart):      The doctors have asked that we provide their patients with the following 2 reminders -- prescription refills can take up to 72 hours, and a friendly reminder that in the future you can use your [facility] account to request refills: yes

## 2025-01-25 ENCOUNTER — NURSE TRIAGE (OUTPATIENT)
Dept: ADMINISTRATIVE | Facility: CLINIC | Age: 70
End: 2025-01-25
Payer: MEDICARE

## 2025-01-25 NOTE — TELEPHONE ENCOUNTER
Pt states that she needs a PA for Seroquel 200 mg and also calling for ultrasound of leg results. Pt states that she would like to change pharmacy from [facility] to [facility]. Advised to receive callback when office when open. VU. Encounter routed to provider.     Reason for Disposition   [1] Caller requesting NON-URGENT health information AND [2] PCP's office is the best resource    Protocols used: Information Only Call - No Triage-A-

## 2025-01-26 NOTE — TELEPHONE ENCOUNTER
Pt reports she called earlier, and wanted to call back to let this service know that she is still doing okay for now. Pt does want someone from the office to call her back on Monday to discuss her leg ultrasound results. Pt advised a message would be routed with her request. Pt encouraged to call back with any worsening symptoms or questions. She verbalized understanding.    Reason for Disposition   [1] Follow-up call to recent contact AND [2] information only call, no triage required    Protocols used: Information Only Call - No Triage-A-

## 2025-01-27 ENCOUNTER — TELEPHONE (OUTPATIENT)
Dept: CARDIOLOGY | Facility: HOSPITAL | Age: 70
End: 2025-01-27
Payer: MEDICARE

## 2025-01-27 DIAGNOSIS — G62.9 NEUROPATHY: ICD-10-CM

## 2025-01-27 RX ORDER — QUETIAPINE FUMARATE 200 MG/1
200 TABLET, FILM COATED ORAL NIGHTLY
Start: 2025-01-27 | End: 2026-01-27

## 2025-01-27 NOTE — TELEPHONE ENCOUNTER
----- Message from Vicki sent at 1/27/2025  1:41 PM CST -----  Contact: [phone]  Requesting an RX refill or new RX.    Is this a refill or new RX:     RX name and strength (gabapentin (NEURONTIN) 600 MG tablet:  Patient also states she took 100 Mg Gabapentin. 3 times a day     Is this a 30 day or 90 day RX: 90    Pharmacy name and phone #     [facility]  [address]  [facility] [address]  Phone: [phone] Fax: [phone]

## 2025-01-27 NOTE — TELEPHONE ENCOUNTER
----- Message from Vicki sent at 1/27/2025  1:50 PM CST -----  .1MEDICALADVICE     Patient is calling for Medical Advice regarding: Patient is calling to notify that she has been depress today due activity on her apartment and she is feeling better.     How long has patient had these symptoms: just today during the day. But will discuss during her upcoming appt on 01/30/2025    Pharmacy name and phone#:        [facility]  [address] [facility] [address]  Phone: [phone] Fax: [phone]      Patient wants a call back or thru [facility]: Patient does not want a call back     Comments: Thank you     Please advise patient replies from provider may take up to 48 hours.

## 2025-01-29 ENCOUNTER — HOSPITAL ENCOUNTER (EMERGENCY)
Facility: HOSPITAL | Age: 70
Discharge: HOME | End: 2025-01-29
Attending: STUDENT IN AN ORGANIZED HEALTH CARE EDUCATION/TRAINING PROGRAM
Payer: MEDICARE

## 2025-01-29 VITALS
OXYGEN SATURATION: 98 % | SYSTOLIC BLOOD PRESSURE: 118 MMHG | BODY MASS INDEX: 36.5 KG/M2 | TEMPERATURE: 98 F | WEIGHT: 206 LBS | RESPIRATION RATE: 16 BRPM | HEIGHT: 63 IN | HEART RATE: 72 BPM | DIASTOLIC BLOOD PRESSURE: 74 MMHG

## 2025-01-29 DIAGNOSIS — F25.1 SCHIZOAFFECTIVE DISORDER, DEPRESSIVE TYPE: Primary | ICD-10-CM

## 2025-01-29 DIAGNOSIS — Z72.0 TOBACCO ABUSE: ICD-10-CM

## 2025-01-29 LAB
ALBUMIN SERPL BCP-MCNC: 3.6 G/DL (ref 3.5–5.2)
ALP SERPL-CCNC: 70 U/L (ref 40–150)
ALT SERPL W/O P-5'-P-CCNC: 21 U/L (ref 10–44)
ANION GAP SERPL CALC-SCNC: 10 MMOL/L (ref 8–16)
AST SERPL-CCNC: 20 U/L (ref 10–40)
BASOPHILS # BLD AUTO: 0.05 K/UL (ref 0–0.2)
BASOPHILS NFR BLD: 0.6 % (ref 0–1.9)
BILIRUB SERPL-MCNC: 0.1 MG/DL (ref 0.1–1)
BUN SERPL-MCNC: 21 MG/DL (ref 8–23)
CALCIUM SERPL-MCNC: 9.6 MG/DL (ref 8.7–10.5)
CHLORIDE SERPL-SCNC: 102 MMOL/L (ref 95–110)
CO2 SERPL-SCNC: 26 MMOL/L (ref 23–29)
CREAT SERPL-MCNC: 1 MG/DL (ref 0.5–1.4)
DIFFERENTIAL METHOD BLD: ABNORMAL
EOSINOPHIL # BLD AUTO: 0.2 K/UL (ref 0–0.5)
EOSINOPHIL NFR BLD: 2.3 % (ref 0–8)
ERYTHROCYTE [DISTWIDTH] IN BLOOD BY AUTOMATED COUNT: 13.8 % (ref 11.5–14.5)
EST. GFR  (NO RACE VARIABLE): >60 ML/MIN/1.73 M^2
GLUCOSE SERPL-MCNC: 146 MG/DL (ref 70–110)
HCT VFR BLD AUTO: 36 % (ref 37–48.5)
HGB BLD-MCNC: 11.7 G/DL (ref 12–16)
IMM GRANULOCYTES # BLD AUTO: 0.04 K/UL (ref 0–0.04)
IMM GRANULOCYTES NFR BLD AUTO: 0.5 % (ref 0–0.5)
LYMPHOCYTES # BLD AUTO: 2.6 K/UL (ref 1–4.8)
LYMPHOCYTES NFR BLD: 32.7 % (ref 18–48)
MCH RBC QN AUTO: 30 PG (ref 27–31)
MCHC RBC AUTO-ENTMCNC: 32.5 G/DL (ref 32–36)
MCV RBC AUTO: 92 FL (ref 82–98)
MONOCYTES # BLD AUTO: 0.7 K/UL (ref 0.3–1)
MONOCYTES NFR BLD: 9.4 % (ref 4–15)
NEUTROPHILS # BLD AUTO: 4.3 K/UL (ref 1.8–7.7)
NEUTROPHILS NFR BLD: 54.5 % (ref 38–73)
NRBC BLD-RTO: 0 /100 WBC
PLATELET # BLD AUTO: 300 K/UL (ref 150–450)
PMV BLD AUTO: 9.7 FL (ref 9.2–12.9)
POTASSIUM SERPL-SCNC: 4.9 MMOL/L (ref 3.5–5.1)
PROT SERPL-MCNC: 6.9 G/DL (ref 6–8.4)
RBC # BLD AUTO: 3.9 M/UL (ref 4–5.4)
SODIUM SERPL-SCNC: 138 MMOL/L (ref 136–145)
WBC # BLD AUTO: 7.9 K/UL (ref 3.9–12.7)

## 2025-01-29 PROCEDURE — 80053 COMPREHEN METABOLIC PANEL: CPT | Mod: HCNC | Performed by: EMERGENCY MEDICINE

## 2025-01-29 PROCEDURE — 99283 EMERGENCY DEPT VISIT LOW MDM: CPT | Mod: HCNC

## 2025-01-29 PROCEDURE — 85025 COMPLETE CBC W/AUTO DIFF WBC: CPT | Mod: HCNC | Performed by: EMERGENCY MEDICINE

## 2025-01-30 DIAGNOSIS — G62.9 NEUROPATHY: ICD-10-CM

## 2025-01-30 DIAGNOSIS — M79.89 LEG SWELLING: ICD-10-CM

## 2025-01-30 DIAGNOSIS — R60.9 EDEMA, UNSPECIFIED TYPE: ICD-10-CM

## 2025-01-30 RX ORDER — GABAPENTIN 600 MG/1
600 TABLET ORAL DAILY
Qty: 90 TABLET | Refills: 0 | OUTPATIENT
Start: 2025-01-30 | End: 2025-04-30
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
## 2025-01-30 NOTE — TELEPHONE ENCOUNTER
She needs her gabapentin   States she takes a 700 mg   So she needs 600 mg gabapentin 100 mg  Also want s her Las[facility] sent to the [facility]

## 2025-01-30 NOTE — TELEPHONE ENCOUNTER
----- Message from Jose Angelpaulina sent at 1/30/2025 12:12 PM CST -----  Contact: self [phone]  Would like to receive medical advice.    Would they like a call back or a response via [facility]:  call back     Additional information:  calling to speak with the office about her medications being tansferred
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
## 2025-01-30 NOTE — TELEPHONE ENCOUNTER
----- Message from Job sent at 1/30/2025  9:32 AM CST -----  Regarding: ER Last Night  Contact: Pt [phone]  .1MEDICALADVICE     Patient is calling for Medical Advice regarding: Patient called to report that she went to the ER last night and needs to speak to office. She had to cancel the appointment with Dr. Marrero today because she is still not feeling well. She would like a callback from the office to discuss the medication ER prescribed her and to discuss her visit last night.    How long has patient had these symptoms:    Pharmacy name and phone#:    [id number] [place] - [address]  [address] [address]  Phone: [phone] Fax: [phone]      Patient wants a call back or thru [facility]: Call    Comments:    Please advise patient replies from provider may take up to 48 hours.

## 2025-01-31 NOTE — TELEPHONE ENCOUNTER
----- Message from Kathy sent at 1/31/2025  2:42 PM CST -----  Contact: [phone] Patient  Patient would like to get medical advice.  Symptoms (please be specific):   Pt is follow up on the status of her  Rxs for lasix and  gabapentin. Pt states the Rxs are not at the pharmacy.   How long have you had these symptoms: N/A  Would you like a call back, or a response through your [facility] portal?:   call back   Pharmacy name and phone # (copy from chart):       [id number] [place] - [address]  [address] [address]  Phone: [phone] Fax: [phone]    Comments:

## 2025-02-03 ENCOUNTER — OFFICE VISIT (OUTPATIENT)
Dept: PODIATRY | Facility: CLINIC | Age: 70
End: 2025-02-03
Payer: MEDICARE

## 2025-02-03 VITALS
BODY MASS INDEX: 36.49 KG/M2 | WEIGHT: 205.94 LBS | SYSTOLIC BLOOD PRESSURE: 126 MMHG | HEIGHT: 63 IN | HEART RATE: 77 BPM | DIASTOLIC BLOOD PRESSURE: 80 MMHG

## 2025-02-03 DIAGNOSIS — M79.89 LEG SWELLING: ICD-10-CM

## 2025-02-03 DIAGNOSIS — L84 CORN OR CALLUS: Chronic | ICD-10-CM

## 2025-02-03 DIAGNOSIS — M20.42 HAMMER TOES OF BOTH FEET: Chronic | ICD-10-CM

## 2025-02-03 DIAGNOSIS — M20.41 HAMMER TOES OF BOTH FEET: Chronic | ICD-10-CM

## 2025-02-03 DIAGNOSIS — R60.9 EDEMA, UNSPECIFIED TYPE: ICD-10-CM

## 2025-02-03 DIAGNOSIS — E11.22 TYPE 2 DIABETES MELLITUS WITH STAGE 3B CHRONIC KIDNEY DISEASE, WITHOUT LONG-TERM CURRENT USE OF INSULIN: Primary | ICD-10-CM

## 2025-02-03 DIAGNOSIS — N18.32 TYPE 2 DIABETES MELLITUS WITH STAGE 3B CHRONIC KIDNEY DISEASE, WITHOUT LONG-TERM CURRENT USE OF INSULIN: Primary | ICD-10-CM

## 2025-02-03 DIAGNOSIS — B35.1 DERMATOPHYTOSIS OF NAIL: Chronic | ICD-10-CM

## 2025-02-03 DIAGNOSIS — E11.9 ENCOUNTER FOR DIABETIC FOOT EXAM: ICD-10-CM

## 2025-02-03 PROCEDURE — 11057 PARNG/CUTG B9 HYPRKR LES >4: CPT | Mod: Q9,HCNC,S$GLB, | Performed by: PODIATRIST

## 2025-02-03 PROCEDURE — 3074F SYST BP LT 130 MM HG: CPT | Mod: HCNC,CPTII,S$GLB, | Performed by: PODIATRIST

## 2025-02-03 PROCEDURE — 1126F AMNT PAIN NOTED NONE PRSNT: CPT | Mod: HCNC,CPTII,S$GLB, | Performed by: PODIATRIST

## 2025-02-03 PROCEDURE — 3079F DIAST BP 80-89 MM HG: CPT | Mod: HCNC,CPTII,S$GLB, | Performed by: PODIATRIST

## 2025-02-03 PROCEDURE — 1159F MED LIST DOCD IN RCRD: CPT | Mod: HCNC,CPTII,S$GLB, | Performed by: PODIATRIST

## 2025-02-03 PROCEDURE — 1157F ADVNC CARE PLAN IN RCRD: CPT | Mod: HCNC,CPTII,S$GLB, | Performed by: PODIATRIST

## 2025-02-03 PROCEDURE — 3008F BODY MASS INDEX DOCD: CPT | Mod: HCNC,CPTII,S$GLB, | Performed by: PODIATRIST

## 2025-02-03 PROCEDURE — 99999 PR PBB SHADOW E&M-EST. PATIENT-LVL IV: CPT | Mod: PBBFAC,HCNC,, | Performed by: PODIATRIST

## 2025-02-03 PROCEDURE — 1160F RVW MEDS BY RX/DR IN RCRD: CPT | Mod: HCNC,CPTII,S$GLB, | Performed by: PODIATRIST

## 2025-02-03 PROCEDURE — 99212 OFFICE O/P EST SF 10 MIN: CPT | Mod: 25,HCNC,S$GLB, | Performed by: PODIATRIST

## 2025-02-03 PROCEDURE — 1101F PT FALLS ASSESS-DOCD LE1/YR: CPT | Mod: HCNC,CPTII,S$GLB, | Performed by: PODIATRIST

## 2025-02-03 PROCEDURE — 11721 DEBRIDE NAIL 6 OR MORE: CPT | Mod: 59,Q9,HCNC,S$GLB | Performed by: PODIATRIST

## 2025-02-03 PROCEDURE — 3288F FALL RISK ASSESSMENT DOCD: CPT | Mod: HCNC,CPTII,S$GLB, | Performed by: PODIATRIST

## 2025-02-03 RX ORDER — GABAPENTIN 600 MG/1
600 TABLET ORAL DAILY
Qty: 90 TABLET | Refills: 0 | OUTPATIENT
Start: 2025-02-03 | End: 2025-05-04

## 2025-02-03 RX ORDER — FUROSEMIDE 20 MG/1
20 TABLET ORAL DAILY
Qty: 60 TABLET | Refills: 0 | Status: SHIPPED | OUTPATIENT
Start: 2025-02-03 | End: 2025-02-14 | Stop reason: SDUPTHER

## 2025-02-03 RX ORDER — FUROSEMIDE 20 MG/1
20 TABLET ORAL DAILY
Qty: 60 TABLET | Refills: 0 | OUTPATIENT
Start: 2025-02-03 | End: 2026-02-03

## 2025-02-03 NOTE — TELEPHONE ENCOUNTER
Called pt; pt answered    Pt requesting lasix refill   Pt has been trying to get rx refilled for a few days now    Pt was last seen by Dr Luna 1/14/25

## 2025-02-03 NOTE — TELEPHONE ENCOUNTER
----- Message from Amanda sent at 2/3/2025 11:41 AM CST -----  Contact: [phone]  Pt is requesting a call as soon as possible.

## 2025-02-03 NOTE — PROGRESS NOTES
"Chief Complaint   Patient presents with    Nail Care     Nail Care and callouses       MEDICAL DECISION MAKING:  Problem List Items Addressed This Visit       Type 2 diabetes mellitus with stage 3b chronic kidney disease, without long-term current use of insulin - Primary    Relevant Orders    Routine Foot Care     Other Visit Diagnoses       Encounter for diabetic foot exam        Dermatophytosis of nail  (Chronic)       Relevant Orders    Routine Foot Care    [place] or callus  (Chronic)       Relevant Orders    Routine Foot Care    [place] toes of both feet  (Chronic)               I counseled the patient on the patient's conditions, their implications and medical management.   Shoe inspection.     Continue good nutrition and blood sugar control to help prevent podiatric complications of diabetes.   Maintain proper foot hygiene.   Continue wearing proper shoe gear, daily foot inspections, never walking without protective shoe gear, never putting sharp instruments to feet.  General nail care measures for abnormal nails include:  Keeping nails trimmed short, but avoid overzealous trimming  Avoiding trauma   Avoiding contact irritants   Keeping nails dry (avoiding wet work)  Avoiding all nail cosmetics  Wearing shoes that fit well at the toe box.  Avoid tight shoes.       Routine Foot Care    Date/Time: 2/3/2025 2:00 PM    Performed by: Ledy Burton DPM  Authorized by: Ledy Burton DPM    Time out: Immediately prior to procedure a "time out" was called to verify the correct patient, procedure, equipment, support staff and site/side marked as required.    Consent Done?:  Yes (Verbal)  Hyperkeratotic Skin Lesions?: Yes    Number of trimmed lesions:  6  Location(s):  Right 1st Toe, Right 2nd Toe, Right 3rd Toe, Right 4th Toe, Left 1st Toe and Left 2nd Toe    Nail Care Type:  Debride  Location(s): All  (Left 1st Toe, Left 3rd Toe, Left 2nd Toe, Left 4th Toe, Left 5th Toe, Right 1st Toe, Right 2nd Toe, Right 3rd Toe, " Right 4th Toe and Right 5th Toe)  Patient tolerance:  Patient tolerated the procedure well with no immediate complications     With patient's permission, the toenails mentioned above were reduced and debrided using a nail nipper, removing offending nail and debris.   Utilizing a #15 scalpel, I trimmed the corns and calluses at the above mentioned location.      The patient will continue to monitor the areas daily, inspect the feet, wear protective shoe gear when ambulatory, and moisturizer to maintain skin integrity.                 HISTORY OF PRESENT ILLNESS:   The patient is a 69 y.o.  female  who presents for a diabetic foot exam.     Patient reports occasional presence of abnormal sensation to the feet .    History of diabetic foot ulcers: none   Shoes worn today:  athletic shoes.   Concerned about nails and calluses.      The patient is under the Active Care of Wan Wilkes MD for the qualifying diagnosis of diabetes mellitus.   Last encounter on:  9/27/2024      Patient Active Problem List   Diagnosis    Polypharmacy    Schizoaffective disorder, depressive type    Bipolar affective disorder, current episode hypomanic    Tobacco abuse    Edema    COPD without exacerbation    Obesity, Class II, BMI 35-39.9, with comorbidity    Thyroid disorder    Hyperlipidemia    History of anorexia nervosa    History of bulimia nervosa    Acid reflux    Chronic rhinitis    Chronic constipation    Osteoarthritis    CLARI (obstructive sleep apnea)    Drug-induced parkinsonism    Claudication    Migraine headache    Neuropathy    Convulsion    Congenital hypothyroidism without goiter    Gastroesophageal reflux disease without esophagitis    Cognitive disorder    Ventral incisional hernia without obstruction or gangrene    Epigastric pain    Back muscle spasm    Pain    Insomnia disorder with non-sleep disorder mental comorbidity    Colon cancer screening, due for repeat 5/2019    Bipolar disorder, unspecified    Chronic  kidney disease    HTN (hypertension)    Bipolar affective disorder, currently manic, mild    Anxiety    Involuntary jerky movements    Gait disorder    Type 2 diabetes mellitus with circulatory disorder, without long-term current use of insulin    Malignant melanoma of face    Paranoid behavior    Type 2 diabetes mellitus with stage 3b chronic kidney disease, without long-term current use of insulin    Stage 3a chronic kidney disease    Coronary artery disease with stable angina pectoris    Class 3 obesity    Smoker    Precordial pain    Other chest pain    Pain of right lower extremity    Cognitive impairment    Severe obesity         Current Outpatient Medications on File Prior to Visit   Medication Sig Dispense Refill    ACCU-CHEK GUIDE TEST STRIPS Strp 1 strip by Other route.      acetaminophen (TYLENOL) 500 MG tablet Take 500 mg by mouth every 6 (six) hours as needed.      albuterol (PROVENTIL/VENTOLIN HFA) 90 mcg/actuation inhaler Inhale 1 puff into the lungs every 4 (four) hours as needed for Wheezing. 18 g 0    artificial tears (ISOPTO TEARS) 0.5 % ophthalmic solution Place 1 drop into the right eye 4 (four) times daily as needed.      aspirin (ECOTRIN) 81 MG EC tablet Take 1 tablet (81 mg total) by mouth once daily. 90 tablet 0    atorvastatin (LIPITOR) 40 MG tablet Take 1 tablet (40 mg total) by mouth once daily. 90 tablet 0    blood-glucose meter Misc 1 each by Other route.      cetirizine (ZYRTEC) 5 MG tablet Take 1 tablet (5 mg total) by mouth once daily.      chlorhexidine (PERIDEX) 0.12 % solution       clopidogreL (PLAVIX) 75 mg tablet Take 1 tablet (75 mg total) by mouth once daily. 90 tablet 3    diclofenac sodium (VOLTAREN) 1 % Gel Apply topically once daily. (Patient taking differently: Apply topically once daily. Knees) 720 g 0    divalproex (DEPAKOTE) 500 MG TbEC Take 1 tablet (500 mg total) by mouth once daily.      divalproex ER (DEPAKOTE ER) 500 MG Tb24 24 hr tablet Take 2 tablets (1,000 mg  total) by mouth every evening.      docusate sodium (STOOL SOFTENER) 100 MG capsule Take 1 capsule (100 mg total) by mouth 2 (two) times daily as needed for Constipation. 180 capsule 0    ezetimibe (ZETIA) 10 mg tablet Take 1 tablet (10 mg total) by mouth once daily. 90 tablet 3    FLUoxetine 20 MG capsule Take 1 capsule (20 mg total) by mouth once daily. 30 capsule 11    fluticasone furoate-vilanteroL (BREO) 100-25 mcg/dose diskus inhaler Inhale 1 puff into the lungs once daily. Controller      fluticasone propionate (FLONASE) 50 mcg/actuation nasal spray  11.1 mL 0    furosemide (LASIX) 20 MG tablet Take 1 tablet (20 mg total) by mouth once daily. 60 tablet 0    gabapentin (NEURONTIN) 600 MG tablet Take 1 tablet (600 mg total) by mouth once daily. 90 tablet 0    galcanezumab-gnlm 120 mg/mL PnIj Inject 1 mL (120 mg total) into the skin every 28 days. maintenance dose 1 mL 5    guaiFENesin (MUCINEX) 600 mg 12 hr tablet Take 1,200 mg by mouth 2 (two) times daily as needed.      isosorbide mononitrate (IMDUR) 30 MG 24 hr tablet Take 1 tablet (30 mg total) by mouth once daily. 30 tablet 11    levothyroxine (SYNTHROID) 150 MCG tablet Take 1 tablet (150 mcg total) by mouth before breakfast. 90 tablet 3    linaCLOtide (LINZESS) 290 mcg Cap capsule       loratadine (CLARITIN) 10 mg tablet Take 1 tablet by mouth once daily.      losartan (COZAAR) 25 MG tablet Take 1 tablet (25 mg total) by mouth once daily. 90 tablet 3    melatonin 5 mg Chew Take by mouth.      metFORMIN (GLUCOPHAGE) 500 MG tablet Take 2 tablets (1,000 mg total) by mouth 2 (two) times daily with meals. 360 tablet 0    metoprolol succinate (TOPROL-XL) 25 MG 24 hr tablet Take 1 tablet (25 mg total) by mouth once daily. 90 tablet 3    mirtazapine (REMERON) 30 MG tablet Take 1 tablet (30 mg total) by mouth every evening. 90 tablet 3    multivitamin (THERAGRAN) per tablet Take 1 tablet by mouth once daily.      nicotine (NICODERM CQ) 7 mg/24 hr Place 1 patch  onto the skin once daily. 90 patch 0    nitroGLYCERIN (NITROSTAT) 0.4 MG SL tablet Place 1 tablet (0.4 mg total) under the tongue every 5 (five) minutes as needed for Chest pain. 25 tablet 1    nystatin-triamcinolone (MYCOLOG) ointment       OLANZapine (ZYPREXA) injection Inject 5 mg into the muscle every 8 (eight) hours as needed for Agitation.      ondansetron (ZOFRAN) 4 MG tablet Take 1 tablet (4 mg total) by mouth every 12 (twelve) hours as needed for Nausea. 270 tablet 0    ondansetron 4 mg/2 mL Soln Inject 4 mg into the vein every 8 (eight) hours as needed.      pantoprazole (PROTONIX) 40 MG tablet Take 1 tablet (40 mg total) by mouth once daily. 90 tablet 0    QUEtiapine (SEROQUEL) 200 MG Tab Take 1 tablet (200 mg total) by mouth every evening.      tiZANidine (ZANAFLEX) 4 MG tablet Take 0.5 tablets (2 mg total) by mouth every 8 (eight) hours. 90 tablet 11    TRUE METRIX AIR GLUCOSE METER kit SMARTSI Kit(s) Via Meter Daily      ubrogepant (UBRELVY) 50 mg tablet Take 1 tablet (50 mg total) by mouth 2 (two) times daily as needed for Migraine. If symptoms persist or return, may repeat dose after 2 hours. Maximum: 200 mg per 24 hours 10 tablet 2    budesonide-formoterol 80-4.5 mcg (SYMBICORT) 80-4.5 mcg/actuation HFAA Inhale 2 puffs into the lungs once daily. Controller 10 g 3    [DISCONTINUED] fluphenazine (PROLIXIN) 5 MG tablet 5 mg every evening.        No current facility-administered medications on file prior to visit.       Review of patient's allergies indicates:   Allergen Reactions    Nsaids (non-steroidal anti-inflammatory drug) Other (See Comments)     History of perforated duodenal ulcer    Penicillins Rash and Other (See Comments)     Yeast infections         ROS:  General ROS: negative  Respiratory ROS: no cough, shortness of breath, or wheezing  Cardiovascular ROS: no chest pain or dyspnea on exertion  Musculoskeletal ROS: negative  Dermatological ROS: negative      LAST HbA1c:   Lab Results  "  Component Value Date    HGBA1C 6.4 (H) 12/17/2024         EXAM:   Vitals:    02/03/25 1403   BP: 126/80   Pulse: 77   Weight: 93.4 kg (205 lb 14.6 oz)   Height: 5' 3" (1.6 m)         General: alert, no distress, cooperative    Vascular:   Dorsalis Pedis:  diminished     Posterior Tibial:  diminished  Capillary refill time:  3 seconds  Temperature of toes warm to touch  Edema:  1+ and pitting      Neurological:     Sharp touch:  normal  Light touch: normal  Tinels Sign:  Absent  Mul[place] Click:   Absent  Emily:  Absent deficits to sharp/dull, light touch or vibratory sensation feet, ten points tested.    Present paresthesias (Abnormal spontaneous sensations in feet)      Dermatological:   Skin: thin and atrophic  Hair growth:  decreased  Wounds/Ulcers:  Absent  Bruising:  Absent  Erythema:  Absent  Toenails:   elongated;  thickness:  thickened;   Yellowish in color,  with subungual debris.   Absent paronychia  Calluses RIGHT 1-4, and LEFT 1-2 toes      Musculoskeletal:   Metatarsophalangeal range of motion:   diminished range of motion  Subtalar joint range of motion: diminished range of motion  Ankle joint range of motion:  diminished range of motion  "

## 2025-02-10 RX ORDER — DIVALPROEX SODIUM 500 MG/1
500 TABLET, DELAYED RELEASE ORAL DAILY
Status: CANCELLED
Start: 2025-02-10 | End: 2026-02-10

## 2025-02-10 RX ORDER — MIRTAZAPINE 30 MG/1
30 TABLET, FILM COATED ORAL NIGHTLY
Qty: 90 TABLET | Refills: 3 | Status: CANCELLED | OUTPATIENT
Start: 2025-02-10

## 2025-02-13 ENCOUNTER — TELEPHONE (OUTPATIENT)
Dept: INTERNAL MEDICINE | Facility: CLINIC | Age: 70
End: 2025-02-13

## 2025-02-13 NOTE — TELEPHONE ENCOUNTER
Home health order was placed on 1/8/25  [facility] requesting orders for PT & OT home evaluation    ----- Message from Nkechi sent at 2/11/2025  2:43 PM CST -----  Contact: [phone]@ Ms. Smith  Type: Home Health (orders, updates, clarifications, etc.) Orders     Home Health Agency/Nurse: [facility]     Phone number:[phone]    Reason for call: Get order for home PT & OT home evaluation      Comments: Ms. Smith says the orders can be left on her voicemail if she doesn't answer or fax over to them at [phone]

## 2025-02-14 ENCOUNTER — OFFICE VISIT (OUTPATIENT)
Dept: INTERNAL MEDICINE | Facility: CLINIC | Age: 70
End: 2025-02-14
Payer: MEDICARE

## 2025-02-14 VITALS — HEART RATE: 81 BPM | WEIGHT: 222.69 LBS | HEIGHT: 63 IN | OXYGEN SATURATION: 97 % | BODY MASS INDEX: 39.46 KG/M2

## 2025-02-14 DIAGNOSIS — M79.89 LEG SWELLING: ICD-10-CM

## 2025-02-14 DIAGNOSIS — G43.109 MIGRAINE WITH AURA AND WITHOUT STATUS MIGRAINOSUS, NOT INTRACTABLE: ICD-10-CM

## 2025-02-14 DIAGNOSIS — E78.5 HYPERLIPIDEMIA, UNSPECIFIED HYPERLIPIDEMIA TYPE: ICD-10-CM

## 2025-02-14 DIAGNOSIS — E11.9 ENCOUNTER FOR DIABETIC FOOT EXAM: ICD-10-CM

## 2025-02-14 DIAGNOSIS — J30.2 SEASONAL ALLERGIES: ICD-10-CM

## 2025-02-14 DIAGNOSIS — K21.9 GASTROESOPHAGEAL REFLUX DISEASE, UNSPECIFIED WHETHER ESOPHAGITIS PRESENT: ICD-10-CM

## 2025-02-14 DIAGNOSIS — R60.9 EDEMA, UNSPECIFIED TYPE: ICD-10-CM

## 2025-02-14 DIAGNOSIS — G62.9 NEUROPATHY: ICD-10-CM

## 2025-02-14 PROCEDURE — 99999 PR PBB SHADOW E&M-EST. PATIENT-LVL V: CPT | Mod: PBBFAC,HCNC,GC,

## 2025-02-14 RX ORDER — ATORVASTATIN CALCIUM 40 MG/1
40 TABLET, FILM COATED ORAL DAILY
Qty: 90 TABLET | Refills: 0 | Status: SHIPPED | OUTPATIENT
Start: 2025-02-14

## 2025-02-14 RX ORDER — ISOSORBIDE MONONITRATE 30 MG/1
30 TABLET, EXTENDED RELEASE ORAL DAILY
Qty: 30 TABLET | Refills: 11 | Status: SHIPPED | OUTPATIENT
Start: 2025-02-14 | End: 2026-02-14

## 2025-02-14 RX ORDER — FLUOXETINE HYDROCHLORIDE 20 MG/1
20 CAPSULE ORAL DAILY
Qty: 30 CAPSULE | Refills: 11 | Status: SHIPPED | OUTPATIENT
Start: 2025-02-14 | End: 2026-02-14

## 2025-02-14 RX ORDER — FLUTICASONE FUROATE AND VILANTEROL 100; 25 UG/1; UG/1
1 POWDER RESPIRATORY (INHALATION) DAILY
Start: 2025-02-14 | End: 2025-02-14 | Stop reason: SDUPTHER

## 2025-02-14 RX ORDER — FLUTICASONE PROPIONATE 50 MCG
SPRAY, SUSPENSION (ML) NASAL
Qty: 11.1 ML | Refills: 0 | Status: SHIPPED | OUTPATIENT
Start: 2025-02-14

## 2025-02-14 RX ORDER — GABAPENTIN 600 MG/1
600 TABLET ORAL DAILY
Qty: 90 TABLET | Refills: 0 | Status: SHIPPED | OUTPATIENT
Start: 2025-02-14 | End: 2025-02-14 | Stop reason: SDUPTHER

## 2025-02-14 RX ORDER — PANTOPRAZOLE SODIUM 40 MG/1
40 TABLET, DELAYED RELEASE ORAL DAILY
Qty: 90 TABLET | Refills: 0 | Status: SHIPPED | OUTPATIENT
Start: 2025-02-14

## 2025-02-14 RX ORDER — OLANZAPINE 10 MG/2ML
5 INJECTION, POWDER, FOR SOLUTION INTRAMUSCULAR EVERY 8 HOURS PRN
Start: 2025-02-14 | End: 2026-02-14

## 2025-02-14 RX ORDER — FLUTICASONE FUROATE AND VILANTEROL 100; 25 UG/1; UG/1
1 POWDER RESPIRATORY (INHALATION) DAILY
Start: 2025-02-14

## 2025-02-14 RX ORDER — CLOPIDOGREL BISULFATE 75 MG/1
75 TABLET ORAL DAILY
Qty: 90 TABLET | Refills: 3 | Status: SHIPPED | OUTPATIENT
Start: 2025-02-14 | End: 2026-02-14

## 2025-02-14 RX ORDER — ONDANSETRON 4 MG/1
4 TABLET, FILM COATED ORAL EVERY 12 HOURS PRN
Qty: 270 TABLET | Refills: 0 | Status: SHIPPED | OUTPATIENT
Start: 2025-02-14

## 2025-02-14 RX ORDER — LEVOTHYROXINE SODIUM 150 UG/1
150 TABLET ORAL
Qty: 90 TABLET | Refills: 3 | Status: SHIPPED | OUTPATIENT
Start: 2025-02-14 | End: 2026-02-09

## 2025-02-14 RX ORDER — FUROSEMIDE 20 MG/1
20 TABLET ORAL DAILY
Qty: 60 TABLET | Refills: 0 | Status: SHIPPED | OUTPATIENT
Start: 2025-02-14 | End: 2026-02-14

## 2025-02-14 RX ORDER — GABAPENTIN 600 MG/1
600 TABLET ORAL DAILY
Qty: 90 TABLET | Refills: 0 | Status: SHIPPED | OUTPATIENT
Start: 2025-02-14 | End: 2025-05-15

## 2025-02-14 RX ORDER — EZETIMIBE 10 MG/1
10 TABLET ORAL DAILY
Qty: 90 TABLET | Refills: 3 | Status: SHIPPED | OUTPATIENT
Start: 2025-02-14 | End: 2026-02-14

## 2025-02-14 RX ORDER — ALBUTEROL SULFATE 90 UG/1
1 INHALANT RESPIRATORY (INHALATION) EVERY 4 HOURS PRN
Qty: 18 G | Refills: 0 | Status: SHIPPED | OUTPATIENT
Start: 2025-02-14

## 2025-02-14 RX ORDER — CETIRIZINE HYDROCHLORIDE 5 MG/1
5 TABLET ORAL DAILY
Start: 2025-02-14 | End: 2026-02-14
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
## 2025-02-18 DIAGNOSIS — F25.1 SCHIZOAFFECTIVE DISORDER, DEPRESSIVE TYPE: Primary | ICD-10-CM

## 2025-02-18 DIAGNOSIS — F41.9 ANXIETY: ICD-10-CM

## 2025-02-18 DIAGNOSIS — G47.00 INSOMNIA DISORDER WITH NON-SLEEP DISORDER MENTAL COMORBIDITY: ICD-10-CM

## 2025-02-18 RX ORDER — DIVALPROEX SODIUM 500 MG/1
1000 TABLET, FILM COATED, EXTENDED RELEASE ORAL NIGHTLY
Qty: 180 TABLET | Refills: 1 | Status: SHIPPED | OUTPATIENT
Start: 2025-02-18

## 2025-02-18 RX ORDER — DIVALPROEX SODIUM 500 MG/1
500 TABLET, DELAYED RELEASE ORAL DAILY
Qty: 90 TABLET | Refills: 1 | Status: SHIPPED | OUTPATIENT
Start: 2025-02-18

## 2025-02-18 RX ORDER — FLUOXETINE HYDROCHLORIDE 20 MG/1
20 CAPSULE ORAL DAILY
Qty: 90 CAPSULE | Refills: 1 | Status: SHIPPED | OUTPATIENT
Start: 2025-02-18

## 2025-02-18 RX ORDER — MIRTAZAPINE 30 MG/1
30 TABLET, FILM COATED ORAL NIGHTLY
Qty: 90 TABLET | Refills: 1 | Status: SHIPPED | OUTPATIENT
Start: 2025-02-18

## 2025-02-21 ENCOUNTER — OFFICE VISIT (OUTPATIENT)
Dept: CARDIOLOGY | Facility: CLINIC | Age: 70
End: 2025-02-21
Payer: MEDICARE

## 2025-02-21 VITALS
WEIGHT: 218.69 LBS | DIASTOLIC BLOOD PRESSURE: 84 MMHG | BODY MASS INDEX: 38.75 KG/M2 | RESPIRATION RATE: 16 BRPM | OXYGEN SATURATION: 99 % | HEIGHT: 63 IN | SYSTOLIC BLOOD PRESSURE: 138 MMHG | HEART RATE: 94 BPM

## 2025-02-21 DIAGNOSIS — N18.31 STAGE 3A CHRONIC KIDNEY DISEASE: ICD-10-CM

## 2025-02-21 DIAGNOSIS — R60.9 EDEMA, UNSPECIFIED TYPE: ICD-10-CM

## 2025-02-21 DIAGNOSIS — M79.89 LEG SWELLING: ICD-10-CM

## 2025-02-21 DIAGNOSIS — E78.5 HYPERLIPIDEMIA, UNSPECIFIED HYPERLIPIDEMIA TYPE: Primary | ICD-10-CM

## 2025-02-21 DIAGNOSIS — I25.118 CORONARY ARTERY DISEASE OF NATIVE ARTERY OF NATIVE HEART WITH STABLE ANGINA PECTORIS: ICD-10-CM

## 2025-02-21 DIAGNOSIS — E11.51 TYPE 2 DIABETES MELLITUS WITH DIABETIC PERIPHERAL ANGIOPATHY WITHOUT GANGRENE, WITHOUT LONG-TERM CURRENT USE OF INSULIN: ICD-10-CM

## 2025-02-21 DIAGNOSIS — I10 PRIMARY HYPERTENSION: ICD-10-CM

## 2025-02-21 RX ORDER — LOSARTAN POTASSIUM 50 MG/1
50 TABLET ORAL DAILY
Qty: 90 TABLET | Refills: 3 | Status: SHIPPED | OUTPATIENT
Start: 2025-02-21 | End: 2026-02-16

## 2025-02-21 RX ORDER — FUROSEMIDE 20 MG/1
20 TABLET ORAL DAILY
Qty: 90 TABLET | Refills: 3 | Status: SHIPPED | OUTPATIENT
Start: 2025-02-21 | End: 2026-02-21

## 2025-02-21 RX ORDER — METOPROLOL SUCCINATE 25 MG/1
25 TABLET, EXTENDED RELEASE ORAL DAILY
Qty: 90 TABLET | Refills: 3 | Status: SHIPPED | OUTPATIENT
Start: 2025-02-21 | End: 2026-02-21

## 2025-02-21 RX ORDER — EZETIMIBE 10 MG/1
10 TABLET ORAL DAILY
Qty: 90 TABLET | Refills: 3 | Status: SHIPPED | OUTPATIENT
Start: 2025-02-21 | End: 2026-02-21

## 2025-02-21 NOTE — ASSESSMENT & PLAN NOTE
GFR 50 range.  Condition stable.  She has trace leg swelling today.  Furosemide 20 mg to continue.

## 2025-02-21 NOTE — PROGRESS NOTES
[facility] Cardiology     Subjective:    Patient ID:  Joseluis Triplett is a 69 y.o. female who presents for follow-up of Hyperlipidemia, Coronary Artery Disease, Diabetes Mellitus, and Hypertension    Review of patient's allergies indicates:   Allergen Reactions    Nsaids (non-steroidal anti-inflammatory drug) Other (See Comments)     History of perforated duodenal ulcer    Penicillins Rash and Other (See Comments)     Yeast infections     She had stent to LAD December 16th.  She was released but was readmitted for groin bleed.  Her crit dropped but she did not require transfusion.  When I last saw her she had a lot of swelling.  I repeated her right leg for DVT which was negative.  Her hemoglobins have stabilized.  She has lost about 22 lb.  She is feeling so much better.  She has no angina.    Her last LDL was 85 mg% despite high-dose statin plus Zetia.  She is on aspirin and Plavix.  She has not been checking her blood pressure today it is mildly elevated.  She is a former cigarette smoker.         Review of Systems   Constitutional: Positive for weight loss. Negative for chills, decreased appetite, diaphoresis, fever, malaise/fatigue and night sweats.   HENT:  Negative for congestion, ear discharge, ear pain, hearing loss, hoarse voice, nosebleeds, odynophagia, sore throat, stridor and tinnitus.    Eyes:  Negative for blurred vision, discharge, double vision, pain, photophobia, redness, vision loss in left eye, vision loss in right eye, visual disturbance and visual halos.   Cardiovascular:  Negative for chest pain, claudication, cyanosis, dyspnea on exertion, irregular heartbeat, leg swelling, near-syncope, orthopnea, palpitations, paroxysmal nocturnal dyspnea and syncope.   Respiratory:  Negative for cough, hemoptysis, shortness of breath, sleep disturbances due to breathing, snoring, sputum production and wheezing.    Endocrine: Negative  "for cold intolerance, heat intolerance, polydipsia, polyphagia and polyuria.   Hematologic/Lymphatic: Negative for adenopathy and bleeding problem. Does not bruise/bleed easily.   Skin:  Negative for color change, dry skin, flushing, itching, nail changes, poor wound healing, rash, skin cancer, suspicious lesions and unusual hair distribution.   Musculoskeletal:  Negative for arthritis, back pain, falls, gout, joint pain, joint swelling, muscle cramps, muscle weakness, myalgias, neck pain and stiffness.   Gastrointestinal:  Negative for bloating, abdominal pain, anorexia, change in bowel habit, bowel incontinence, constipation, diarrhea, dysphagia, excessive appetite, flatus, heartburn, hematemesis, hematochezia, hemorrhoids, jaundice, melena, nausea and vomiting.   Genitourinary:  Negative for bladder incontinence, decreased libido, dysuria, flank pain, frequency, genital sores, hematuria, hesitancy, incomplete emptying, nocturia and urgency.   Neurological:  Negative for aphonia, brief paralysis, difficulty with concentration, disturbances in coordination, excessive daytime sleepiness, dizziness, focal weakness, headaches, light-headedness, loss of balance, numbness, paresthesias, seizures, sensory change, tremors, vertigo and weakness.   Psychiatric/Behavioral:  Negative for altered mental status, depression, hallucinations, memory loss, substance abuse, suicidal ideas and thoughts of violence. The patient is nervous/anxious. The patient does not have insomnia.    Allergic/Immunologic: Negative for hives and persistent infections.        Objective:       Vitals:    02/21/25 1432   BP: 138/84   BP Location: Right arm   Patient Position: Sitting   Pulse: 94   Resp: 16   SpO2: 99%   Weight: 99.2 kg (218 lb 11.1 oz)   Height: 5' 3" (1.6 m)    Physical Exam  Constitutional:       General: She is not in acute distress.     Appearance: She is well-developed. She is not diaphoretic.   HENT:      Head: Normocephalic and " atraumatic.      Nose: Nose normal.   Eyes:      General: No scleral icterus.        Right eye: No discharge.      Conjunctiva/sclera: Conjunctivae normal.      Pupils: Pupils are equal, round, and reactive to light.   Neck:      Thyroid: No thyromegaly.      Vascular: No JVD.      Trachea: No tracheal deviation.   Cardiovascular:      Rate and Rhythm: Normal rate and regular rhythm.      Pulses:           Carotid pulses are 2+ on the right side and 2+ on the left side.       Radial pulses are 2+ on the right side and 2+ on the left side.        Dorsalis pedis pulses are 1+ on the right side and 1+ on the left side.        Posterior tibial pulses are 1+ on the right side and 1+ on the left side.      Heart sounds: Normal heart sounds. No murmur heard.     No friction rub. No gallop.   Pulmonary:      Effort: Pulmonary effort is normal. No respiratory distress.      Breath sounds: Normal breath sounds. No stridor. No wheezing or rales.   Chest:      Chest wall: No tenderness.   Abdominal:      General: Bowel sounds are normal. There is no distension.      Palpations: Abdomen is soft. There is no mass.      Tenderness: There is no abdominal tenderness. There is no guarding or rebound.   Musculoskeletal:         General: No tenderness. Normal range of motion.      Cervical back: Normal range of motion and neck supple.      Right lower leg: Edema present.      Left lower leg: Edema present.      Comments: Trace edema to the legs   Lymphadenopathy:      Cervical: No cervical adenopathy.   Skin:     General: Skin is warm and dry.      Coloration: Skin is not pale.      Findings: No erythema or rash.   Neurological:      Mental Status: She is alert and oriented to person, place, and time.      Cranial Nerves: No cranial nerve deficit.      Coordination: Coordination normal.   Psychiatric:         Behavior: Behavior normal.         Thought Content: Thought content normal.         Judgment: Judgment normal.            Assessment:       1. Hyperlipidemia, unspecified hyperlipidemia type    2. Primary hypertension    3. Leg swelling    4. Edema, unspecified type    5. Coronary artery disease of native artery of native heart with stable angina pectoris    6. Stage 3a chronic kidney disease    7. Type 2 diabetes mellitus with diabetic peripheral angiopathy without gangrene, without long-term current use of insulin      Results for orders placed or performed during the hospital encounter of 01/29/25   CBC auto differential    Collection Time: 01/29/25  4:03 PM   Result Value Ref Range    WBC 7.90 3.90 - 12.70 K/uL    RBC 3.90 (L) 4.00 - 5.40 M/uL    Hemoglobin 11.7 (L) 12.0 - 16.0 g/dL    Hematocrit 36.0 (L) 37.0 - 48.5 %    MCV 92 82 - 98 fL    MCH 30.0 27.0 - 31.0 pg    MCHC 32.5 32.0 - 36.0 g/dL    RDW 13.8 11.5 - 14.5 %    Platelets 300 150 - 450 K/uL    MPV 9.7 9.2 - 12.9 fL    Immature Granulocytes 0.5 0.0 - 0.5 %    Gran # (ANC) 4.3 1.8 - 7.7 K/uL    Immature Grans (Abs) 0.04 0.00 - 0.04 K/uL    Lymph # 2.6 1.0 - 4.8 K/uL    Mono # 0.7 0.3 - 1.0 K/uL    Eos # 0.2 0.0 - 0.5 K/uL    Baso # 0.05 0.00 - 0.20 K/uL    nRBC 0 0 /100 WBC    Gran % 54.5 38.0 - 73.0 %    Lymph % 32.7 18.0 - 48.0 %    Mono % 9.4 4.0 - 15.0 %    Eosinophil % 2.3 0.0 - 8.0 %    Basophil % 0.6 0.0 - 1.9 %    Differential Method Automated    Comprehensive metabolic panel    Collection Time: 01/29/25  4:03 PM   Result Value Ref Range    Sodium 138 136 - 145 mmol/L    Potassium 4.9 3.5 - 5.1 mmol/L    Chloride 102 95 - 110 mmol/L    CO2 26 23 - 29 mmol/L    Glucose 146 (H) 70 - 110 mg/dL    BUN 21 8 - 23 mg/dL    Creatinine 1.0 0.5 - 1.4 mg/dL    Calcium 9.6 8.7 - 10.5 mg/dL    Total Protein 6.9 6.0 - 8.4 g/dL    Albumin 3.6 3.5 - 5.2 g/dL    Total Bilirubin 0.1 0.1 - 1.0 mg/dL    Alkaline Phosphatase 70 40 - 150 U/L    AST 20 10 - 40 U/L    ALT 21 10 - 44 U/L    eGFR >60.0 >60 mL/min/1.73 m^2    Anion Gap 10 8 - 16 mmol/L     *Note: Due to a large number of  results and/or encounters for the requested time period, some results have not been displayed. A complete set of results can be found in Results Review.       Current Medications[1]     Lab Results   Component Value Date    WBC 7.90 01/29/2025    RBC 3.90 (L) 01/29/2025    HGB 11.7 (L) 01/29/2025    HCT 36.0 (L) 01/29/2025    MCV 92 01/29/2025    MCH 30.0 01/29/2025    MCHC 32.5 01/29/2025    RDW 13.8 01/29/2025     01/29/2025    MPV 9.7 01/29/2025    GRAN 4.3 01/29/2025    GRAN 54.5 01/29/2025    LYMPH 2.6 01/29/2025    LYMPH 32.7 01/29/2025    MONO 0.7 01/29/2025    MONO 9.4 01/29/2025    EOS 0.2 01/29/2025    BASO 0.05 01/29/2025    EOSINOPHIL 2.3 01/29/2025    BASOPHIL 0.6 01/29/2025    MG 2.2 12/21/2024        CMP  Lab Results   Component Value Date     01/29/2025    K 4.9 01/29/2025     01/29/2025    CO2 26 01/29/2025     (H) 01/29/2025    BUN 21 01/29/2025    CREATININE 1.0 01/29/2025    CALCIUM 9.6 01/29/2025    PROT 6.9 01/29/2025    ALBUMIN 3.6 01/29/2025    BILITOT 0.1 01/29/2025    ALKPHOS 70 01/29/2025    AST 20 01/29/2025    ALT 21 01/29/2025    ANIONGAP 10 01/29/2025    ESTGFRAFRICA 31 (A) 02/14/2022    EGFRNONAA 27 (A) 02/14/2022        Lab Results   Component Value Date    LABBLOO No growth after 5 days. 03/10/2019    LABURIN No growth 09/16/2021            Results for orders placed or performed during the hospital encounter of 12/21/24   EKG 12-lead    Collection Time: 12/21/24  8:39 PM   Result Value Ref Range    QRS Duration 88 ms    [facility] QTC Calculation 438 ms    Narrative    Test Reason : R07.9,    Vent. Rate :  73 BPM     Atrial Rate :  73 BPM     P-R Int : 200 ms          QRS Dur :  88 ms      QT Int : 398 ms       P-R-T Axes :  58  65  37 degrees    QTcB Int : 438 ms    Normal sinus rhythm  Low septal forces  Abnormal ECG  When compared with ECG of 21-Dec-2024 10:40,  Questionable change in initial forces of Septal leads  Confirmed by Alvin Jaquez ([id number]) on 12/22/2024  10:06:26 AM    Referred By: BETHANY SELF           Confirmed By: Alvin Jaquez                   Plan:       Problem List Items Addressed This Visit          Cardiac/Vascular    Hyperlipidemia - Primary    Repatha added to atorvastatin 40 and Zetia 10 mg.  Current LDL 85 mg%.         Relevant Medications    evolocumab (REPATHA SURECLICK) 140 mg/mL PnIj    ezetimibe (ZETIA) 10 mg tablet    HTN (hypertension)    Losartan Toprol to continue.  Blood pressure today 138/84 mm Hg.  I increased losartan from 25-50 mg today.         Relevant Medications    losartan (COZAAR) 50 MG tablet    metoprolol succinate (TOPROL-XL) 25 MG 24 hr tablet    Coronary artery disease with stable angina pectoris    She is on beta-blocker therapy.  I had her on nitrates but it is no longer on her list.  Condition stable.  She is on aspirin and Plavix with LAD stenting 2024 December.  She had an abnormal stress test preceding her workup.  She was having angina with activity.            Renal/    Stage 3a chronic kidney disease    GFR 50 range.  Condition stable.  She has trace leg swelling today.  Furosemide 20 mg to continue.            Endocrine    Type 2 diabetes mellitus with circulatory disorder, without long-term current use of insulin    She has atherosclerotic disease of the lower extremity vessels confirmed by CTA.  No claudication symptoms.  She has palpable pedal pulses.  No occlusions documented.  Condition stable.            Other    Edema    Relevant Medications    furosemide (LASIX) 20 MG tablet     Other Visit Diagnoses         Leg swelling        Relevant Medications    furosemide (LASIX) 20 MG tablet               Repatha prescribed.  Three-month follow-up recommended.    I increased losartan to 50 mg daily for better blood pressure control.  She is free of angina on current medical therapy.             Yariel Lantigua MD  02/21/2025   3:19 PM               [1]   Current Outpatient Medications:     ACCU-CHEK GUIDE TEST  STRIPS Strp, 1 strip by Other route., Disp: , Rfl:     acetaminophen (TYLENOL) 500 MG tablet, Take 500 mg by mouth every 6 (six) hours as needed., Disp: , Rfl:     albuterol (PROVENTIL/VENTOLIN HFA) 90 mcg/actuation inhaler, Inhale 1 puff into the lungs every 4 (four) hours as needed for Wheezing., Disp: 18 g, Rfl: 0    artificial tears (ISOPTO TEARS) 0.5 % ophthalmic solution, Place 1 drop into the right eye 4 (four) times daily as needed., Disp: , Rfl:     aspirin (ECOTRIN) 81 MG EC tablet, Take 1 tablet (81 mg total) by mouth once daily., Disp: 90 tablet, Rfl: 0    atorvastatin (LIPITOR) 40 MG tablet, Take 1 tablet (40 mg total) by mouth once daily., Disp: 90 tablet, Rfl: 0    blood-glucose meter Misc, 1 each by Other route., Disp: , Rfl:     cetirizine (ZYRTEC) 5 MG tablet, Take 1 tablet (5 mg total) by mouth once daily., Disp: , Rfl:     chlorhexidine (PERIDEX) 0.12 % solution, , Disp: , Rfl:     clopidogreL (PLAVIX) 75 mg tablet, Take 1 tablet (75 mg total) by mouth once daily., Disp: 90 tablet, Rfl: 3    diclofenac sodium (VOLTAREN) 1 % Gel, Apply topically once daily. (Patient taking differently: Apply topically once daily. Knees), Disp: 720 g, Rfl: 0    divalproex (DEPAKOTE) 500 MG TbEC, Take 1 tablet (500 mg total) by mouth once daily., Disp: 90 tablet, Rfl: 1    divalproex ER (DEPAKOTE ER) 500 MG Tb24 24 hr tablet, Take 2 tablets (1,000 mg total) by mouth every evening., Disp: 180 tablet, Rfl: 1    docusate sodium (STOOL SOFTENER) 100 MG capsule, Take 1 capsule (100 mg total) by mouth 2 (two) times daily as needed for Constipation., Disp: 180 capsule, Rfl: 0    evolocumab (REPATHA SURECLICK) 140 mg/mL PnIj, Inject 1 mL (140 mg total) into the skin every 14 (fourteen) days., Disp: 1 mL, Rfl: 3    ezetimibe (ZETIA) 10 mg tablet, Take 1 tablet (10 mg total) by mouth once daily., Disp: 90 tablet, Rfl: 3    FLUoxetine 20 MG capsule, Take 1 capsule (20 mg total) by mouth once daily., Disp: 90 capsule, Rfl: 1     fluticasone furoate-vilanteroL (BREO) 100-25 mcg/dose diskus inhaler, Inhale 1 puff into the lungs once daily. Controller, Disp: , Rfl:     fluticasone propionate (FLONASE) 50 mcg/actuation nasal spray, , Disp: 11.1 mL, Rfl: 0    furosemide (LASIX) 20 MG tablet, Take 1 tablet (20 mg total) by mouth once daily., Disp: 90 tablet, Rfl: 3    gabapentin (NEURONTIN) 600 MG tablet, Take 1 tablet (600 mg total) by mouth once daily., Disp: 90 tablet, Rfl: 0    galcanezumab-gnlm 120 mg/mL PnIj, Inject 1 mL (120 mg total) into the skin every 28 days. maintenance dose, Disp: 1 mL, Rfl: 5    guaiFENesin (MUCINEX) 600 mg 12 hr tablet, Take 1,200 mg by mouth 2 (two) times daily as needed., Disp: , Rfl:     ipratropium-albuteroL (COMBIVENT)  mcg/actuation inhaler, Inhale 1 puff into the lungs every 6 (six) hours as needed for Wheezing. Rescue, Disp: 4 g, Rfl: 11    levothyroxine (SYNTHROID) 150 MCG tablet, Take 1 tablet (150 mcg total) by mouth before breakfast., Disp: 90 tablet, Rfl: 3    linaCLOtide (LINZESS) 290 mcg Cap capsule, , Disp: , Rfl:     loratadine (CLARITIN) 10 mg tablet, Take 1 tablet by mouth once daily., Disp: , Rfl:     losartan (COZAAR) 50 MG tablet, Take 1 tablet (50 mg total) by mouth once daily., Disp: 90 tablet, Rfl: 3    melatonin 5 mg Chew, Take by mouth., Disp: , Rfl:     metFORMIN (GLUCOPHAGE) 500 MG tablet, Take 2 tablets (1,000 mg total) by mouth 2 (two) times daily with meals., Disp: 360 tablet, Rfl: 0    metoprolol succinate (TOPROL-XL) 25 MG 24 hr tablet, Take 1 tablet (25 mg total) by mouth once daily., Disp: 90 tablet, Rfl: 3    mirtazapine (REMERON) 30 MG tablet, Take 1 tablet (30 mg total) by mouth every evening., Disp: 90 tablet, Rfl: 1    multivitamin (THERAGRAN) per tablet, Take 1 tablet by mouth once daily., Disp: , Rfl:     nicotine (NICODERM CQ) 7 mg/24 hr, Place 1 patch onto the skin once daily., Disp: 90 patch, Rfl: 0    nitroGLYCERIN (NITROSTAT) 0.4 MG SL tablet, Place 1 tablet  (0.4 mg total) under the tongue every 5 (five) minutes as needed for Chest pain., Disp: 25 tablet, Rfl: 1    nystatin-triamcinolone (MYCOLOG) ointment, , Disp: , Rfl:     OLANZapine (ZYPREXA) injection, Inject 5 mg into the muscle every 8 (eight) hours as needed for Agitation., Disp: , Rfl:     ondansetron (ZOFRAN) 4 MG tablet, Take 1 tablet (4 mg total) by mouth every 12 (twelve) hours as needed for Nausea., Disp: 270 tablet, Rfl: 0    pantoprazole (PROTONIX) 40 MG tablet, Take 1 tablet (40 mg total) by mouth once daily., Disp: 90 tablet, Rfl: 0    QUEtiapine (SEROQUEL) 200 MG Tab, Take 1 tablet (200 mg total) by mouth every evening., Disp: , Rfl:     tiZANidine (ZANAFLEX) 4 MG tablet, Take 0.5 tablets (2 mg total) by mouth every 8 (eight) hours., Disp: 90 tablet, Rfl: 11    TRUE METRIX AIR GLUCOSE METER kit, SMARTSI Kit(s) Via Meter Daily, Disp: , Rfl:     ubrogepant (UBRELVY) 50 mg tablet, Take 1 tablet (50 mg total) by mouth 2 (two) times daily as needed for Migraine. If symptoms persist or return, may repeat dose after 2 hours. Maximum: 200 mg per 24 hours, Disp: 10 tablet, Rfl: 2

## 2025-02-21 NOTE — ASSESSMENT & PLAN NOTE
She has atherosclerotic disease of the lower extremity vessels confirmed by CTA.  No claudication symptoms.  She has palpable pedal pulses.  No occlusions documented.  Condition stable.

## 2025-02-21 NOTE — ASSESSMENT & PLAN NOTE
She is on beta-blocker therapy.  I had her on nitrates but it is no longer on her list.  Condition stable.  She is on aspirin and Plavix with LAD stenting 2024 December.  She had an abnormal stress test preceding her workup.  She was having angina with activity.

## 2025-02-21 NOTE — ASSESSMENT & PLAN NOTE
Losartan Toprol to continue.  Blood pressure today 138/84 mm Hg.  I increased losartan from 25-50 mg today.

## 2025-02-25 DIAGNOSIS — M79.89 LEG SWELLING: ICD-10-CM

## 2025-02-25 DIAGNOSIS — R60.9 EDEMA, UNSPECIFIED TYPE: ICD-10-CM

## 2025-02-25 DIAGNOSIS — I10 PRIMARY HYPERTENSION: ICD-10-CM

## 2025-02-25 DIAGNOSIS — E78.5 HYPERLIPIDEMIA, UNSPECIFIED HYPERLIPIDEMIA TYPE: ICD-10-CM

## 2025-02-25 RX ORDER — METOPROLOL SUCCINATE 25 MG/1
25 TABLET, EXTENDED RELEASE ORAL DAILY
Qty: 90 TABLET | Refills: 3 | Status: SHIPPED | OUTPATIENT
Start: 2025-02-25 | End: 2026-02-25

## 2025-02-25 RX ORDER — CLOPIDOGREL BISULFATE 75 MG/1
75 TABLET ORAL DAILY
Qty: 90 TABLET | Refills: 3 | Status: SHIPPED | OUTPATIENT
Start: 2025-02-25 | End: 2026-02-25

## 2025-02-25 RX ORDER — EZETIMIBE 10 MG/1
10 TABLET ORAL DAILY
Qty: 90 TABLET | Refills: 3 | Status: SHIPPED | OUTPATIENT
Start: 2025-02-25 | End: 2026-02-25

## 2025-02-25 RX ORDER — FUROSEMIDE 20 MG/1
20 TABLET ORAL DAILY
Qty: 90 TABLET | Refills: 3 | Status: SHIPPED | OUTPATIENT
Start: 2025-02-25 | End: 2026-02-25

## 2025-02-25 NOTE — TELEPHONE ENCOUNTER
----- Message from Britt sent at 2/25/2025  7:56 AM CST -----  Contact: Self/ [phone]  .1MEDICALADVICE Patient is calling for Medical Advice regarding:Update the doctor Patient wants a call back or [facility]:call back Comments:pt said that she would like to let the doctor know that she wants to keep him as her doctor and she is sorry she walked out on him during her visit Please advise patient replies from provider may take up to 48 hours.

## 2025-02-25 NOTE — TELEPHONE ENCOUNTER
----- Message from Britt sent at 2/25/2025  7:47 AM CST -----  Contact: Self/ [phone]  Requesting an RX refill or new RX.Is this a refill or new RX: NewRX name and strength :  tiZANidine (ZANAFLEX) 4 MG tabletIs this a 30 day or 90 day RX: 90Pharmacy name and phone # :  [facility] #[id number] - [place] - [id number] [place] [address]Vmedn: [phone] Fax: [phone] The doctors have asked that we provide their patients with the following 2 reminders -- prescription refills can take up to 72 hours, and a friendly reminder that in the future you can use your [facility] account to request refills: Requesting an RX refill or new RX.Is this a refill or new RX: NewRX name and strength :  metFORMIN (GLUCOPHAGE) 500 MG tabletIs this a 30 day or 90 day RX: 90Pharmacy name and phone # :  [facility] #[id number] [place] - [id number] [address]Bestb: [phone] Fax: [phone] doctors have asked that we provide their patients with the following 2 reminders -- prescription refills can take up to 72 hours, and a friendly reminder that in the future you can use your [facility] account to request refills:

## 2025-02-26 ENCOUNTER — NURSE TRIAGE (OUTPATIENT)
Dept: ADMINISTRATIVE | Facility: CLINIC | Age: 70
End: 2025-02-26
Payer: MEDICARE

## 2025-02-26 RX ORDER — TIZANIDINE 4 MG/1
2 TABLET ORAL EVERY 8 HOURS
Qty: 90 TABLET | Refills: 11 | Status: SHIPPED | OUTPATIENT
Start: 2025-02-26

## 2025-03-06 ENCOUNTER — TELEPHONE (OUTPATIENT)
Dept: CARDIOLOGY | Facility: CLINIC | Age: 70
End: 2025-03-06
Payer: MEDICARE

## 2025-03-06 NOTE — TELEPHONE ENCOUNTER
----- Message from Soledad sent at 3/6/2025  2:50 PM CST -----  Patient calling to speak with staff, she gave herself a cholesterol shot,

## 2025-03-12 RX ORDER — ALBUTEROL SULFATE 90 UG/1
1 INHALANT RESPIRATORY (INHALATION) EVERY 4 HOURS PRN
Qty: 18 G | Refills: 0 | Status: SHIPPED | OUTPATIENT
Start: 2025-03-12

## 2025-03-12 NOTE — TELEPHONE ENCOUNTER
----- Message from Trista sent at 3/12/2025  1:46 PM CDT -----  Contact: [phone]  .1MEDICALADVICE Patient is calling for Medical Advice regarding:needs a follow up appt from rehab How long has patient had these symptoms:also needs a new walker Pharmacy name and phone#:Patient wants a call back or [facility]:call back Comments:No appts until May please advise Please advise patient replies from provider may take up to 48 hours.  ----- Message -----  From: Trista Peguero  Sent: 3/12/2025   1:47 PM CDT  To: Katrin Chase    .1MEDICALADVICE Patient is calling for Medical Advice regarding:needs a follow up appt from rehab How long has patient had these symptoms:Pharmacy name and phone#:Patient wants a call back or [facility]:call back Comments:No appts until May please advise Please advise patient replies from provider may take up to 48 hours.

## 2025-03-12 NOTE — TELEPHONE ENCOUNTER
----- Message from Trista sent at 3/12/2025  1:47 PM CDT -----  Contact: [phone]  Requesting an RX refill or new RX.Is this a refill or new RX:  refillRX name and strength (copy/paste from chart):  albuterol (PROVENTIL/VENTOLIN HFA) 90 mcg/actuation inhalerIs this a 30 day or 90 day RX: 30Pharmacy name and phone # (copy/paste from chart):  [id number] - [place] - [address] [address]Wsmep: [phone] Fax: [phone] The doctors have asked that we provide their patients with the following 2 reminders -- prescription refills can take up to 72 hours, and a friendly reminder that in the future you can use your [facility] account to request refills: yes

## 2025-03-13 NOTE — TELEPHONE ENCOUNTER
----- Message from Wiley sent at 3/13/2025 12:44 PM CDT -----  Requesting an RX refill or new RX.Is this a refill or new RX: RefillRX name and strength (copy/paste from chart):  Apratropium Bromide Albuterol 0.5/3MLIs this a 30 day or 90 day RX: Pharmacy name and phone # (copy/paste from chart):  [id number] - [place] - [address] Phone: [phone]: [phone] doctors have asked that we provide their patients with the following 2 reminders -- prescription refills can take up to 72 hours, and a friendly reminder that in the future you can use your [facility] account to request refills: yesPt also states she needs a new Walker Rolator 18inches or greater

## 2025-03-13 NOTE — TELEPHONE ENCOUNTER
Pt last saw Dr Wilkes 2/14/25    Pt requesting albuterol refill  Pended the two rx's which are currently on her med list    Pt also inquiring about a rollator walker

## 2025-03-16 RX ORDER — ALBUTEROL SULFATE 90 UG/1
1 INHALANT RESPIRATORY (INHALATION) EVERY 4 HOURS PRN
Qty: 18 G | Refills: 0 | OUTPATIENT
Start: 2025-03-16

## 2025-03-17 ENCOUNTER — OFFICE VISIT (OUTPATIENT)
Dept: PSYCHIATRY | Facility: CLINIC | Age: 70
End: 2025-03-17
Payer: MEDICARE

## 2025-03-17 VITALS
BODY MASS INDEX: 39.02 KG/M2 | WEIGHT: 220.25 LBS | HEIGHT: 63 IN | HEART RATE: 96 BPM | SYSTOLIC BLOOD PRESSURE: 91 MMHG | DIASTOLIC BLOOD PRESSURE: 65 MMHG

## 2025-03-17 DIAGNOSIS — G47.00 INSOMNIA DISORDER WITH NON-SLEEP DISORDER MENTAL COMORBIDITY: ICD-10-CM

## 2025-03-17 DIAGNOSIS — F41.9 ANXIETY: ICD-10-CM

## 2025-03-17 DIAGNOSIS — F25.1 SCHIZOAFFECTIVE DISORDER, DEPRESSIVE TYPE: ICD-10-CM

## 2025-03-17 PROCEDURE — 4010F ACE/ARB THERAPY RXD/TAKEN: CPT | Mod: HCNC,CPTII,S$GLB, | Performed by: NURSE PRACTITIONER

## 2025-03-17 PROCEDURE — 1157F ADVNC CARE PLAN IN RCRD: CPT | Mod: HCNC,CPTII,S$GLB, | Performed by: NURSE PRACTITIONER

## 2025-03-17 PROCEDURE — 3078F DIAST BP <80 MM HG: CPT | Mod: HCNC,CPTII,S$GLB, | Performed by: NURSE PRACTITIONER

## 2025-03-17 PROCEDURE — 3074F SYST BP LT 130 MM HG: CPT | Mod: HCNC,CPTII,S$GLB, | Performed by: NURSE PRACTITIONER

## 2025-03-17 PROCEDURE — 3008F BODY MASS INDEX DOCD: CPT | Mod: HCNC,CPTII,S$GLB, | Performed by: NURSE PRACTITIONER

## 2025-03-17 PROCEDURE — 99999 PR PBB SHADOW E&M-EST. PATIENT-LVL II: CPT | Mod: PBBFAC,HCNC,, | Performed by: NURSE PRACTITIONER

## 2025-03-17 PROCEDURE — 90833 PSYTX W PT W E/M 30 MIN: CPT | Mod: HCNC,S$GLB,, | Performed by: NURSE PRACTITIONER

## 2025-03-17 PROCEDURE — 99214 OFFICE O/P EST MOD 30 MIN: CPT | Mod: HCNC,S$GLB,, | Performed by: NURSE PRACTITIONER

## 2025-03-17 RX ORDER — DIVALPROEX SODIUM 500 MG/1
500 TABLET, DELAYED RELEASE ORAL DAILY
Qty: 90 TABLET | Refills: 3 | Status: SHIPPED | OUTPATIENT
Start: 2025-03-17

## 2025-03-17 RX ORDER — DIVALPROEX SODIUM 500 MG/1
1000 TABLET, FILM COATED, EXTENDED RELEASE ORAL NIGHTLY
Qty: 180 TABLET | Refills: 3 | Status: SHIPPED | OUTPATIENT
Start: 2025-03-17

## 2025-03-17 RX ORDER — MIRTAZAPINE 30 MG/1
30 TABLET, FILM COATED ORAL NIGHTLY
Qty: 90 TABLET | Refills: 3 | Status: SHIPPED | OUTPATIENT
Start: 2025-03-17

## 2025-03-17 RX ORDER — FLUOXETINE HYDROCHLORIDE 20 MG/1
20 CAPSULE ORAL DAILY
Qty: 90 CAPSULE | Refills: 3 | Status: SHIPPED | OUTPATIENT
Start: 2025-03-17

## 2025-03-17 NOTE — PATIENT INSTRUCTIONS
Restart Prozac 20 mg daily  Depakote to  mg in the morning and 1000 mg bedtime  Seroquel 200 mg in the morning and 400 mg at bedtime  Remeron 30 mg po qhs

## 2025-03-17 NOTE — PROGRESS NOTES
"  Outpatient Psychiatry Follow-Up Visit (MD/NP)    3/17/2025    Clinical Status of Patient:  Outpatient (Ambulatory)    Chief Complaint:  Joseluis Triplett is a 69 y.o. female who presents today for follow-up of mood disorder and anxiety.  Met with patient.      Last visit was:   10/29/24 Chart and  reviewed.     Interval History and Content of Current Session:  Current Psychiatric Medications/changes per Dr. Cole  Ordered labs:  Most recent Valproic level (57.6)  Depakote to  mg in the morning and 1000 mg bedtime  Seroquel 200 mg in the morning and 400 mg at bedtime  Remeron 30 mg po qhs      Pt reports she has been feeling a little depressed. She stopped taking Prozac on her own because "I thought it would speed my hear up".  Will restart. Pt discussed possibly moving to a new living community because she feels uncomfortable at [facility] because one of her female neighbor is lewd and makes her feel uncomfortable.     Pt appears at baseline. Appearance is neatly groomed and well-kempt.  Compliant with medications and denies side effects. Denies any current unmanaged psychosis, mood, anxiety, depression, or insomnia symptoms. Denies SI/HI/AVH. Reports compliance with medications.     Valproic Acid level (57.6)    Psychotherapy:  Target symptoms: anxiety , mood disorder  Why chosen therapy is appropriate versus another modality: relevant to diagnosis  Outcome monitoring methods: self-report  Therapeutic intervention type: insight oriented psychotherapy  Topics discussed/themes: building skills sets for symptom management, symptom recognition  The patient's response to the intervention is accepting. The patient's progress toward treatment goals is limited.   Duration of intervention: 18 minutes.    Review of Systems   PSYCHIATRIC: Pertinant items are noted in the narrative.  CONSTITUTIONAL: No weight gain or loss.   MUSCULOSKELETAL: No pain or stiffness of the joints.  NEUROLOGIC: No weakness, sensory " "changes, seizures, confusion, memory loss, tremor or other abnormal movements.  ENDOCRINE: No polydipsia or polyuria.  INTEGUMENTARY: No rashes or lacerations.  EYES: No exophthalmos, jaundice or blindness.  ENT: No dizziness, tinnitus or hearing loss.  RESPIRATORY: No shortness of breath.  CARDIOVASCULAR: No tachycardia or chest pain.  GASTROINTESTINAL: No nausea, vomiting, pain, constipation or diarrhea.  GENITOURINARY: No frequency, dysuria or sexual dysfunction.  HEMATOLOGIC/LYMPHATIC: No excessive bleeding, prolonged or excessive bleeding after dental extraction/injury.  ALLERGIC/IMMUNOLOGIC: No allergic response to materials, foods or animals at this time.    Past Medical, Family and Social History: The patient's past medical, family and social history have been reviewed and updated as appropriate within the electronic medical record - see encounter notes.    Compliance: yes    Side effects: None    Risk Parameters:  Patient reports no suicidal ideation  Patient reports no homicidal ideation  Patient reports no self-injurious behavior  Patient reports no violent behavior    Exam (detailed: at least 9 elements; comprehensive: all 15 elements)   Constitutional  Vitals:  Most recent vital signs, dated less than 90 days prior to this appointment, were reviewed.   Vitals:    03/17/25 1344   BP: 91/65   Pulse: 96   Weight: 99.9 kg (220 lb 3.8 oz)   Height: 5' 3" (1.6 m)        General:  unremarkable, age appropriate     Musculoskeletal  Muscle Strength/Tone:  no tremor, no tic   Gait & Station:  non-ataxic     Psychiatric  Speech:  no latency; no press   Mood & Affect:  euthymic  congruent and appropriate   Thought Process:  normal and logical   Associations:  tangential   Thought Content:  normal, no suicidality, no homicidality, delusions, or paranoia   Insight:  has awareness of illness   Judgement: behavior is adequate to circumstances   Orientation:  grossly intact   Memory: intact for content of interview "   Language: grossly intact   Attention Span & Concentration:  able to focus   Fund of Knowledge:  intact and appropriate to age and level of education     Assessment and Diagnosis   Status/Progress: Based on the examination today, the patient's problem(s) is/are adequately but not ideally controlled.  New problems have not been presented today.   Co-morbidities and Lack of compliance are not complicating management of the primary condition.  There are no active rule-out diagnoses for this patient at this time.     General Impression:       ICD-10-CM ICD-9-CM   1. Anxiety  F41.9 300.00   2. Insomnia disorder with non-sleep disorder mental comorbidity  G47.00 780.52   3. Schizoaffective disorder, depressive type  F25.1 295.70       Intervention/Counseling/Treatment Plan   Medication Management: Continue current medications. The risks and benefits of medication were discussed with the patient.   Ordered labs:  Most recent Valproic level (57.6)  Restart Prozac 20 mg daily  Depakote to  mg in the morning and 1000 mg bedtime  Seroquel 200 mg in the morning and 400 mg at bedtime  Remeron 30 mg po qhs    Return to Clinic: 6 months     Risks, benefits, side effects and alternative treatments discussed with patient. Patient agrees with the current plan as documented.  Encouraged Patient to keep future appointments.  Take medications as prescribed and abstain from substance abuse.  Pt to present to ED for thoughts to harm herself or others

## 2025-03-18 DIAGNOSIS — G43.109 MIGRAINE WITH AURA AND WITHOUT STATUS MIGRAINOSUS, NOT INTRACTABLE: Primary | ICD-10-CM

## 2025-03-18 DIAGNOSIS — G62.9 NEUROPATHY: ICD-10-CM

## 2025-03-18 RX ORDER — METFORMIN HYDROCHLORIDE 500 MG/1
1000 TABLET ORAL 2 TIMES DAILY WITH MEALS
Qty: 360 TABLET | Refills: 0 | Status: SHIPPED | OUTPATIENT
Start: 2025-03-18 | End: 2025-06-16

## 2025-03-18 NOTE — PROGRESS NOTES
Patient was seen in the ED on 9/18/24 for sore throat. Patient was contacted for post ED discharge navigation. They have an appointment scheduled with Dr. Wan Wilkes on 9/27/24 at 1:15. ED navigator will remind patient of appointment.     
36.7

## 2025-03-18 NOTE — TELEPHONE ENCOUNTER
----- Message from Sindy sent at 3/18/2025  2:19 PM CDT -----  Can the clinic reply in [facility] [[id number]]  Please refill the medication(s) listed below. Please call the patient when the prescription(s) is ready for  at this phone number   Telephone Information:Mobile          [phone]  Medication #1gabapentin (NEURONTIN) 600 MG tablet   Preferred Pharmacy: [facility]/pharmacy #[id number] - [place] - [address] [address]: [phone] Fax: [phone]

## 2025-03-18 NOTE — TELEPHONE ENCOUNTER
----- Message from Shannon sent at 3/18/2025 11:37 AM CDT -----  Contact: [phone]  Requesting an RX refill or new RX. Is this a refill or new RX:Refill pt would like a call back also. RX name and strength (copy/paste from chart): metFORMIN (GLUCOPHAGE) 500 MG tablet 360 tablet  Is this a 30 day or 90 day RX: Pharmacy name and phone # (copy/paste from chart):[id number] - [place] - [address] [place] [address]j: [phone] Fax: [phone]

## 2025-03-19 RX ORDER — GABAPENTIN 600 MG/1
600 TABLET ORAL DAILY
Qty: 90 TABLET | Refills: 0 | OUTPATIENT
Start: 2025-03-19 | End: 2025-06-17

## 2025-03-20 ENCOUNTER — TELEPHONE (OUTPATIENT)
Dept: INTERNAL MEDICINE | Facility: CLINIC | Age: 70
End: 2025-03-20
Payer: MEDICARE

## 2025-03-20 NOTE — TELEPHONE ENCOUNTER
----- Message from Rosemary sent at 3/20/2025 11:39 AM CDT -----  Contact: [phone]  Type:  RX Refill RequestWho Called:  PT Joseluis Triplett Refill or New Rx:  Refill RX Name and Strength:  Gabapentin 600 MG How is the patient currently taking it? (ex. 1XDay):  EVERY MORNING Is this a 30 day or 90 day RX:  90[facility] with [facility]  Local Ordering Provider:  Best Call Back Number:   [phone] Information:  The pt is asking for a refill on her Gabapentin she is almost out.

## 2025-03-20 NOTE — TELEPHONE ENCOUNTER
Called pt; pt answered    Informed pt based on previous note that Gabapentin refill is to come from Neurology     Pt stated she will see Neurology next Friday 3/28  Pt verbalized understanding

## 2025-03-21 ENCOUNTER — TELEPHONE (OUTPATIENT)
Dept: INTERNAL MEDICINE | Facility: CLINIC | Age: 70
End: 2025-03-21
Payer: MEDICARE

## 2025-03-21 NOTE — TELEPHONE ENCOUNTER
----- Message from Wan Wilkes sent at 3/19/2025  9:51 AM CDT -----  Hello,I'm not sure how I can do this. Is this a paper form I have to fill or electronic?Wan Wilkes MD  ----- Message -----  From: Parvin Reno MD  Sent: 3/18/2025   3:45 PM CDT  To: Wan Wilkes MD      ----- Message -----  From: Philomena Cano  Sent: 3/18/2025   3:17 PM CDT  To: Parvin Reno MD    Good afternoon, I received a call from the patient stating, her rollator is in need of repair. Patient received her rollator in June of 2021 and not eligible until 5 years. Patient said, the brakes and cable wire is broke. The storage compartment bag need to be replaced. Patient said, she cannot afford the service fee or any replacement parts. In order for her insurance to cover the repairs service and parts I need a order form from you. Please list the these that need to be repaired on the rollator order form. Thank you.

## 2025-03-24 ENCOUNTER — TELEPHONE (OUTPATIENT)
Facility: CLINIC | Age: 70
End: 2025-03-24
Payer: MEDICARE

## 2025-03-24 NOTE — TELEPHONE ENCOUNTER
Called and left a detailed VM informing them that [facility] has a 15 minute batsheva period for appointments.

## 2025-03-24 NOTE — TELEPHONE ENCOUNTER
----- Message from Reyna sent at 3/24/2025 11:34 AM CDT -----  Regarding: Consult/Advisory    Name Of Caller: Self Contact Preference?:   [phone]  Provider Name:   Yousif Does patient feel the need to be seen today? No What is the nature of the call?:   Pt would like to inform clinic in advance they'll be running late for their appt on Friday 03/28. The Transportation service will be about 15 minutes late.

## 2025-03-25 ENCOUNTER — TELEPHONE (OUTPATIENT)
Facility: CLINIC | Age: 70
End: 2025-03-25
Payer: MEDICARE

## 2025-03-25 NOTE — TELEPHONE ENCOUNTER
Called patient to inform her med refill was received on 3/24/25. Informed her Faina is out today 3/25/25 will return tomorrow.     ----- Message from Ju sent at 3/25/2025 11:17 AM CDT -----  Pt calling in regards  to her cluster headaches , pt is experiencing  this now Confirmed patient's contact info below:Contact Name: Joseluis TriplettPhone Number: [phone]

## 2025-03-25 NOTE — TELEPHONE ENCOUNTER
----- Message from Ju sent at 3/25/2025 11:01 AM CDT -----  Pt requesting call back ASAPConfirmed patient's contact info below:Contact Name: Joseluis TriplettPhone Number: [phone]

## 2025-03-26 ENCOUNTER — TELEPHONE (OUTPATIENT)
Facility: CLINIC | Age: 70
End: 2025-03-26
Payer: MEDICARE

## 2025-03-26 DIAGNOSIS — G43.109 MIGRAINE WITH AURA AND WITHOUT STATUS MIGRAINOSUS, NOT INTRACTABLE: ICD-10-CM

## 2025-03-26 RX ORDER — UBROGEPANT 50 MG/1
50 TABLET ORAL 2 TIMES DAILY PRN
Qty: 16 TABLET | Refills: 2 | Status: SHIPPED | OUTPATIENT
Start: 2025-03-26 | End: 2025-06-24

## 2025-03-26 NOTE — TELEPHONE ENCOUNTER
Called patient to inform her the medications were sent to the pharmacy. They're waiting on auth. When they're receive I'll let her know.

## 2025-03-26 NOTE — TELEPHONE ENCOUNTER
----- Message from Med Assistant Alcaraz sent at 3/24/2025  3:45 PM CDT -----  Regarding: FW: Appt f/u  Contact: [phone]  Audelia patient, I scheduled her appt. Last ordered:12/10/24Ubrelvy 50mgLast seen:11/21/24Upcoming appt:6/2/25  ----- Message -----  From: Lin Castillo  Sent: 3/24/2025  11:45 AM CDT  To: Derek CASTANEDA Staff  Subject: Appt f/u                                         Pt called in to schedule an appt; no available appts in Epic. Pt is asking for a call back soon to schedule. Thanks. Reason appt not schedule: no appt avail   Patient requesting call back or [facility] msg: call back, pt is asking for medication for headaches pls advise

## 2025-03-31 RX ORDER — CLOPIDOGREL BISULFATE 75 MG/1
75 TABLET ORAL DAILY
Qty: 90 TABLET | Refills: 3 | Status: SHIPPED | OUTPATIENT
Start: 2025-03-31 | End: 2026-03-31

## 2025-04-02 ENCOUNTER — TELEPHONE (OUTPATIENT)
Dept: INTERNAL MEDICINE | Facility: CLINIC | Age: 70
End: 2025-04-02
Payer: MEDICARE

## 2025-04-02 DIAGNOSIS — I25.118 CORONARY ARTERY DISEASE OF NATIVE ARTERY OF NATIVE HEART WITH STABLE ANGINA PECTORIS: ICD-10-CM

## 2025-04-02 RX ORDER — NITROGLYCERIN 0.4 MG/1
0.4 TABLET SUBLINGUAL EVERY 5 MIN PRN
Qty: 25 TABLET | Refills: 1 | Status: SHIPPED | OUTPATIENT
Start: 2025-04-02 | End: 2026-04-02

## 2025-04-02 NOTE — TELEPHONE ENCOUNTER
----- Message from Med Assistant De Guzman sent at 4/2/2025  9:32 AM CDT -----  Contact: [phone]  Caller is Calling to Inform Provider Information.Caller:Joseluis (pt)Reason For  Call:Pt is calling to let provider know that all medications that will be needing a refill in the future or as of now will need to go to pharmacy below only.[facility] - [place], [place] [facility] [address] [facility] [address]Pdatk: [phone] Fax: [phone]: Not open 24 hoursBest Call Back: [phone]

## 2025-04-02 NOTE — TELEPHONE ENCOUNTER
----- Message from Carito sent at 4/2/2025 10:40 AM CDT -----  Regarding: Refill  Pt requesting refill for her Nitroglycerin 0.4 mg. She says when it's time for her next cardiology meds they will all be going back to [facility] - [place], [place] - [address] Phone: [phone]: [phone]

## 2025-04-02 NOTE — TELEPHONE ENCOUNTER
Pt informing us that all future Rx's and refills are to go to [facility]    Selected [facility] as pt's pharmacy     No refills requested at this time       Routed as FYI to PCP   Detail Level: Zone Statement Selected

## 2025-04-03 ENCOUNTER — TELEPHONE (OUTPATIENT)
Facility: CLINIC | Age: 70
End: 2025-04-03
Payer: MEDICARE

## 2025-04-03 NOTE — TELEPHONE ENCOUNTER
Called patient to inform her to reach out to her insurance company and or pharmacy to inquire about only getting 4 pills. 16 pills were requested.       ---- Message from Ju sent at 4/3/2025  2:27 PM CDT -----  Pt calling in regards to her ubrelvy refill, asking for more pills , says she only getting 4 pills Confirmed patient's contact info below:Contact Name: Joseluis DickrodríguezPhone Number: [phone] [address] [facility] [address]Wzbme: [phone] Fax: [phone]

## 2025-04-07 ENCOUNTER — TELEPHONE (OUTPATIENT)
Facility: CLINIC | Age: 70
End: 2025-04-07
Payer: MEDICARE

## 2025-04-07 NOTE — TELEPHONE ENCOUNTER
----- Message from ELBA Chaves sent at 4/7/2025 10:31 AM CDT -----  Regarding: RE: Medication Question  Contact: [phone]  Sure - please let her know she can add tylenol as needed. Thanks,Cynthia  ----- Message -----  From: Piedad Rudolph MA  Sent: 4/7/2025  10:25 AM CDT  To: ELBA Chaves  Subject: FW: Medication Question                          Audelia's Pt. Please advise. Thank you  ----- Message -----  From: Faheem Mcelroy  Sent: 4/7/2025   9:12 AM CDT  To: Derek CASTANEDA Staff  Subject: Medication Question                              Patient calling requesting a callback from nurse or provider in regards to finding out if she can add Tylenol incase she needs something that's not to strong. Please call back as soon as possible.

## 2025-04-09 RX ORDER — QUETIAPINE FUMARATE 200 MG/1
200 TABLET, FILM COATED ORAL NIGHTLY
Start: 2025-04-09 | End: 2026-04-09

## 2025-04-11 ENCOUNTER — TELEPHONE (OUTPATIENT)
Dept: PSYCHIATRY | Facility: CLINIC | Age: 70
End: 2025-04-11
Payer: MEDICARE

## 2025-04-11 ENCOUNTER — TELEPHONE (OUTPATIENT)
Facility: CLINIC | Age: 70
End: 2025-04-11
Payer: MEDICARE

## 2025-04-11 DIAGNOSIS — G43.109 MIGRAINE WITH AURA AND WITHOUT STATUS MIGRAINOSUS, NOT INTRACTABLE: ICD-10-CM

## 2025-04-11 NOTE — TELEPHONE ENCOUNTER
----- Message from Med Assistant Isabella sent at 4/11/2025  1:42 PM CDT -----  Good evening, Pt called requesting to speak with Nurse Vangie in regards to something she previously was helping her with. I explained that Vangie no longer works here, so she asked can she still speak to a nurse about her [place] [employer] card. She didn't want to further explain what she meant, but I told her I will still send you all a message Thank you.

## 2025-04-11 NOTE — TELEPHONE ENCOUNTER
----- Message from Med Assistant Alcaraz sent at 4/11/2025  9:15 AM CDT -----  Regarding: FW: Pt called states need a PA/refill on the below Rx  Audelia patient Last ordered:11/21/24emgalityLast seen:11/21/24Upcoming appt:6/2/25  ----- Message -----  From: Chico Longo MA  Sent: 4/11/2025   9:01 AM CDT  To: Derek CASTANEDA Staff  Subject: FW: Pt called states need a PA/refill on the#    Gm,This request was sent to Dr Trevin Reed by mistake. EJ  ----- Message -----  From: Belkis Castillo  Sent: 4/11/2025   8:53 AM CDT  To: Derek BOONE Staff  Subject: Pt called states need a PA/refill on the bel#    Can the clinic reply in [facility]:  Please refill the medication(s) listed below. Pt called states need a PA/refill on the below Rx. Please advise   Medication #1 galcanezumab-gnlm 120 mg/mL PnIjMedication #2  Preferred Pharmacy: [facility] - [facility] [address] [address] [facility] [address]Vgzsj: [phone] Fax: [phone]

## 2025-04-14 ENCOUNTER — OFFICE VISIT (OUTPATIENT)
Dept: INTERNAL MEDICINE | Facility: CLINIC | Age: 70
End: 2025-04-14
Payer: MEDICARE

## 2025-04-14 VITALS
BODY MASS INDEX: 40.16 KG/M2 | OXYGEN SATURATION: 99 % | DIASTOLIC BLOOD PRESSURE: 58 MMHG | WEIGHT: 226.63 LBS | HEIGHT: 63 IN | SYSTOLIC BLOOD PRESSURE: 96 MMHG | HEART RATE: 70 BPM

## 2025-04-14 DIAGNOSIS — E11.51 TYPE 2 DIABETES MELLITUS WITH DIABETIC PERIPHERAL ANGIOPATHY WITHOUT GANGRENE, WITHOUT LONG-TERM CURRENT USE OF INSULIN: Primary | ICD-10-CM

## 2025-04-14 DIAGNOSIS — R79.9 ABNORMAL FINDING OF BLOOD CHEMISTRY, UNSPECIFIED: ICD-10-CM

## 2025-04-14 DIAGNOSIS — E03.1 CONGENITAL HYPOTHYROIDISM WITHOUT GOITER: ICD-10-CM

## 2025-04-14 DIAGNOSIS — N18.32 TYPE 2 DIABETES MELLITUS WITH STAGE 3B CHRONIC KIDNEY DISEASE, WITHOUT LONG-TERM CURRENT USE OF INSULIN: ICD-10-CM

## 2025-04-14 DIAGNOSIS — F25.1 SCHIZOAFFECTIVE DISORDER, DEPRESSIVE TYPE: ICD-10-CM

## 2025-04-14 DIAGNOSIS — F41.9 ANXIETY: ICD-10-CM

## 2025-04-14 DIAGNOSIS — E11.22 TYPE 2 DIABETES MELLITUS WITH STAGE 3B CHRONIC KIDNEY DISEASE, WITHOUT LONG-TERM CURRENT USE OF INSULIN: ICD-10-CM

## 2025-04-14 DIAGNOSIS — K59.09 CHRONIC CONSTIPATION: ICD-10-CM

## 2025-04-14 DIAGNOSIS — I10 PRIMARY HYPERTENSION: ICD-10-CM

## 2025-04-14 PROCEDURE — 99999 PR PBB SHADOW E&M-EST. PATIENT-LVL IV: CPT | Mod: PBBFAC,HCNC,GC,

## 2025-04-14 PROCEDURE — 1101F PT FALLS ASSESS-DOCD LE1/YR: CPT | Mod: HCNC,CPTII,GC,S$GLB

## 2025-04-14 PROCEDURE — 1126F AMNT PAIN NOTED NONE PRSNT: CPT | Mod: HCNC,CPTII,GC,S$GLB

## 2025-04-14 PROCEDURE — 1157F ADVNC CARE PLAN IN RCRD: CPT | Mod: HCNC,CPTII,GC,S$GLB

## 2025-04-14 PROCEDURE — 3078F DIAST BP <80 MM HG: CPT | Mod: HCNC,CPTII,GC,S$GLB

## 2025-04-14 PROCEDURE — 1159F MED LIST DOCD IN RCRD: CPT | Mod: HCNC,CPTII,GC,S$GLB

## 2025-04-14 PROCEDURE — 3074F SYST BP LT 130 MM HG: CPT | Mod: HCNC,CPTII,GC,S$GLB

## 2025-04-14 PROCEDURE — 1160F RVW MEDS BY RX/DR IN RCRD: CPT | Mod: HCNC,CPTII,GC,S$GLB

## 2025-04-14 PROCEDURE — 3288F FALL RISK ASSESSMENT DOCD: CPT | Mod: HCNC,CPTII,GC,S$GLB

## 2025-04-14 PROCEDURE — 4010F ACE/ARB THERAPY RXD/TAKEN: CPT | Mod: HCNC,CPTII,GC,S$GLB

## 2025-04-14 PROCEDURE — 3008F BODY MASS INDEX DOCD: CPT | Mod: HCNC,CPTII,GC,S$GLB

## 2025-04-14 PROCEDURE — 99213 OFFICE O/P EST LOW 20 MIN: CPT | Mod: HCNC,GC,S$GLB,

## 2025-04-14 RX ORDER — DIVALPROEX SODIUM 500 MG/1
500 TABLET, DELAYED RELEASE ORAL DAILY
Qty: 90 TABLET | Refills: 0 | Status: SHIPPED | OUTPATIENT
Start: 2025-04-14

## 2025-04-14 RX ORDER — DIVALPROEX SODIUM 500 MG/1
1000 TABLET, FILM COATED, EXTENDED RELEASE ORAL NIGHTLY
Qty: 180 TABLET | Refills: 0 | Status: SHIPPED | OUTPATIENT
Start: 2025-04-14

## 2025-04-14 RX ORDER — LOSARTAN POTASSIUM 50 MG/1
50 TABLET ORAL DAILY
Qty: 90 TABLET | Refills: 3 | Status: SHIPPED | OUTPATIENT
Start: 2025-04-14 | End: 2026-04-09

## 2025-04-14 NOTE — TELEPHONE ENCOUNTER
----- Message from Med Assistant Cordova sent at 4/11/2025  9:18 AM CDT -----  Regarding: FW: rx    ----- Message -----  From: Mariama Sears  Sent: 4/11/2025   9:13 AM CDT  To: Grzegorz Saldivar Staff  Subject: rx                                               Pt needs a new rx for losartan (COZAAR) 50 MG tablet to be sent [facility] - [facility] [address] Phone: [phone]: [phone] you

## 2025-04-14 NOTE — PATIENT INSTRUCTIONS
Please check your blood pressure daily over the next 2 weeks. If your blood pressure is persistently low over the next several weeks please cut your losartan dose from 50 mg to 25 mg. And please inform our clinic. I would like to see you again to establish care over the next month. We will repeat labs in June to make sure your diabetes is under control.     We will refer you to gastroenterology to discuss your chronic constipation. And we will refer you to optometry for an annual exam.
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
## 2025-04-15 ENCOUNTER — TELEPHONE (OUTPATIENT)
Dept: INTERNAL MEDICINE | Facility: CLINIC | Age: 70
End: 2025-04-15
Payer: MEDICARE

## 2025-04-15 DIAGNOSIS — E78.5 HYPERLIPIDEMIA, UNSPECIFIED HYPERLIPIDEMIA TYPE: ICD-10-CM

## 2025-04-15 RX ORDER — EVOLOCUMAB 140 MG/ML
140 INJECTION, SOLUTION SUBCUTANEOUS
Qty: 2 ML | Refills: 5 | Status: ACTIVE | OUTPATIENT
Start: 2025-04-15

## 2025-04-15 NOTE — TELEPHONE ENCOUNTER
FYI      Spiritual Care Partner Volunteer visited patient in Med Surg at [facility] on 10/17/2019. Documented by: 
Saarh Negron, [facility], Spiritual Care Intern

## 2025-04-15 NOTE — TELEPHONE ENCOUNTER
----- Message from Robyn sent at 4/15/2025  8:03 AM CDT -----  Contact: [phone]  .1MEDICALADVICE Patient is calling for Medical Advice regarding:Patient is calling because she woke up through the [facility] with a toothache and an earache and she wanted the doctor to know that she did call her dentist and she is waiting on a call back this morning on a call back from the dentist. Patient bp this morning was 156/78 and pulse 80 before breakfast and meds. Patient wants a call back or thru [facility]:callComments:callPlease advise patient replies from provider may take up to 48 hours.

## 2025-04-15 NOTE — TELEPHONE ENCOUNTER
----- Message from Med Assistant De Guzman sent at 4/15/2025  9:13 AM CDT -----  Contact: [phone]  Caller is Calling to Inform Provider InformationCaller:Joseluis (pt)Reason For Call:Pt called to let provider  know that her dentist (DR Smith) called in antibiotic for Tooth ache and Right Ear ache.Best Call Back: [phone]

## 2025-04-16 ENCOUNTER — PATIENT MESSAGE (OUTPATIENT)
Facility: CLINIC | Age: 70
End: 2025-04-16
Payer: MEDICARE

## 2025-04-16 ENCOUNTER — TELEPHONE (OUTPATIENT)
Facility: CLINIC | Age: 70
End: 2025-04-16
Payer: MEDICARE

## 2025-04-16 NOTE — TELEPHONE ENCOUNTER
----- Message from Med Assistant Alcaraz sent at 4/16/2025  1:49 PM CDT -----  Regarding: FW: Callback  Contact: [phone]  Audelia patient Last ordered:3/26/25ubrelvyLast seen:11/21/24Upcoming appt:6/2/25  ----- Message -----  From: Faheem Mcelroy  Sent: 4/16/2025  10:51 AM CDT  To: Derek CASTANEDA Staff  Subject: Callback                                         Patient calling requesting a callback from nurse or provider in regards to letting the provider know she only took one shot of Emgality and not two. She said she is feeling much better and would like to know if the provider is fine with her taking one shot instead of two.  Also patient stated she is out of refills for ubrogepant (UBRELVY) 50 mg tablet. Please call back as soon as possible.

## 2025-04-17 NOTE — PROGRESS NOTES
I have reviewed the notes, assessments, and/or procedures performed by Dr. Herrera, I concur with his documentation of Joseluis Triplett.  Date of Service: 4/14/2025

## 2025-04-21 ENCOUNTER — TELEPHONE (OUTPATIENT)
Dept: INTERNAL MEDICINE | Facility: CLINIC | Age: 70
End: 2025-04-21
Payer: MEDICARE

## 2025-04-21 RX ORDER — SULFAMETHOXAZOLE AND TRIMETHOPRIM 800; 160 MG/1; MG/1
1 TABLET ORAL 2 TIMES DAILY
Qty: 6 TABLET | Refills: 0 | Status: SHIPPED | OUTPATIENT
Start: 2025-04-21 | End: 2025-04-24

## 2025-04-21 RX ORDER — FLUCONAZOLE 150 MG/1
150 TABLET ORAL DAILY
Qty: 1 TABLET | Refills: 0 | Status: SHIPPED | OUTPATIENT
Start: 2025-04-21 | End: 2025-04-22

## 2025-04-21 NOTE — TELEPHONE ENCOUNTER
----- Message from Rajani sent at 4/21/2025  7:37 AM CDT -----  Contact: [phone]  Cc: Milton Herrera DOPatient called, want to inform that she is on antibiotics clindamycinhtr 800 caps clecim.  Take it every 6 hrs 4 times per day. Started last week ago. Patient believe that she have a urinary tract infection with a mild yeast infection. Patient stated that his dental doctor he is out sick and would like to know if is possible for him to sent it out to the pharmacy [id number] [place] Phone: [phone]: [phone] call and advise. Thank you

## 2025-04-21 NOTE — TELEPHONE ENCOUNTER
----- Message from Odiliagopi sent at 4/21/2025  2:38 PM CDT -----  Contact: [phone]  .1MEDICALADVICE Patient is calling for Medical Advice regarding:Symptom: Urination PainOutcome: Schedule a same-day appointment or talk to a nurse or provider within 1 hour.Reason: Caller denied all higher acuity questionsThe caller accepted this outcome.How long has patient had these symptoms:Yesterday Pharmacy name and phone#:[id number] [place] - [address] [address]Xggus: [phone] Fax: [phone] wants a call back or thru [facility]: call back Comments:Please advise patient replies from provider may take up to 48 hours.

## 2025-04-21 NOTE — TELEPHONE ENCOUNTER
Having uti sx burning   And yeast infection   Suggested urgent care   Unable to get to clinic   Wants medication for yeast and uti

## 2025-04-22 ENCOUNTER — TELEPHONE (OUTPATIENT)
Facility: CLINIC | Age: 70
End: 2025-04-22
Payer: MEDICARE

## 2025-04-22 ENCOUNTER — PATIENT MESSAGE (OUTPATIENT)
Dept: PSYCHIATRY | Facility: CLINIC | Age: 70
End: 2025-04-22
Payer: MEDICARE

## 2025-04-22 ENCOUNTER — TELEPHONE (OUTPATIENT)
Dept: INTERNAL MEDICINE | Facility: CLINIC | Age: 70
End: 2025-04-22
Payer: MEDICARE

## 2025-04-22 DIAGNOSIS — G43.109 MIGRAINE WITH AURA AND WITHOUT STATUS MIGRAINOSUS, NOT INTRACTABLE: ICD-10-CM

## 2025-04-22 NOTE — TELEPHONE ENCOUNTER
----- Message from Ynes Marrero sent at 4/22/2025  4:11 PM CDT -----  Regarding: Refill  Contact: [phone]  Is this a Refill or New Rx (Script/Out of Refills):  Refill Name of Caller: Patient Rx Name: ubrogepant (UBRELVY) 100 mg tabletPharmacy Name: [facility] - [place] [id number] [facility] [facility] [address]Useek: [phone] Fax: [phone] Information: Pt is asking for a return call. Pt states she is in pain.
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
## 2025-04-22 NOTE — TELEPHONE ENCOUNTER
Called patient to discuss concerns. Informed her to take the Abx as prescribed and discontinue the one her dentist gave her. If she persistently has symptoms will need to present to clinic for urgent care visit.

## 2025-04-22 NOTE — TELEPHONE ENCOUNTER
Called pt; pt answered    Pt is expecting a call back from Dr Herrera regarding abx she is taking from dentist  Should pt continue taking medication ?    Unable to understand which medication pt is taking  400mg x 3 times/day

## 2025-04-22 NOTE — TELEPHONE ENCOUNTER
----- Message from Constancemarguerite sent at 4/22/2025  9:43 AM CDT -----  Contact: [phone] .1MEDICALADVICE Patient is calling for Medical Advice regarding: Pt said she needs a call back has questions about meds How long has patient had these symptoms:Pharmacy name and phone#:Patient wants a call back or [facility]: call back Comments:Please advise patient replies from provider may take up to 48 hours.

## 2025-04-22 NOTE — TELEPHONE ENCOUNTER
Called patient to inform her she has refills on this medication. Pharmacy should be able to refill.     ----- Message from Ynes Marrero sent at 4/22/2025  4:11 PM CDT -----  Regarding: Refill  Contact: [phone]  Is this a Refill or New Rx (Script/Out of Refills):  Refill Name of Caller: Patient Rx Name: ubrogepant (UBRELVY) 100 mg tabletPharmacy Name: [facility] - [place] [id number] [facility] [facility] [address]Ibfor: [phone] Fax: [phone] Information: Pt is asking for a return call. Pt states she is in pain.

## 2025-04-22 NOTE — TELEPHONE ENCOUNTER
----- Message from Ynes Marrero sent at 4/22/2025  4:11 PM CDT -----  Regarding: Refill  Contact: [phone]  Is this a Refill or New Rx (Script/Out of Refills):  Refill Name of Caller: Patient Rx Name: ubrogepant (UBRELVY) 100 mg tabletPharmacy Name: [facility] - [place] [id number] [facility] [facility] [address]Bupad: [phone] Fax: [phone] Information: Pt is asking for a return call. Pt states she is in pain.

## 2025-04-23 ENCOUNTER — PATIENT MESSAGE (OUTPATIENT)
Facility: CLINIC | Age: 70
End: 2025-04-23
Payer: MEDICARE

## 2025-04-23 RX ORDER — UBROGEPANT 50 MG/1
50 TABLET ORAL 2 TIMES DAILY PRN
Qty: 16 TABLET | Refills: 0 | Status: SHIPPED | OUTPATIENT
Start: 2025-04-23 | End: 2025-05-23

## 2025-04-25 ENCOUNTER — TELEPHONE (OUTPATIENT)
Dept: INTERNAL MEDICINE | Facility: CLINIC | Age: 70
End: 2025-04-25
Payer: MEDICARE

## 2025-04-25 NOTE — TELEPHONE ENCOUNTER
Copied from CRM #[id number]. Topic: General Inquiry - Patient Advice  >> Apr 25, 2025  7:34 AM Kathy wrote:  Patient would like to get medical advice.  Symptoms (please be specific):   Pt is calling to report the following b/p readings:   4/15/2025 156/78 HR 80  04/16/2025 126/78 HR 75  04/17/2025 149/83 HR 74  04/18/2025 136/84 HR 76  04/19/2025 136/76 HR 75  04/20/2025 152/82 HR 74  04/21/2025 no reading  04/22/2025 140/84 HR 74  04/23/2025 135/77 HR 76  04/24/2025 145/85 HR 95 pt states she was having trouble with her next door neighbor and that was why her HR was elevated  04/25/25 136/75 HR 67  Pt states these readings are before she takes her morning medications.   How long have you had these symptoms: N/A  Would you like a call back, or a response through your [facility] portal?: call back to pt   [phone]  Pharmacy name and phone # (copy from chart):   N/A  Comments:

## 2025-04-30 ENCOUNTER — TELEPHONE (OUTPATIENT)
Facility: CLINIC | Age: 70
End: 2025-04-30
Payer: MEDICARE

## 2025-04-30 NOTE — TELEPHONE ENCOUNTER
Called patient to inform her authorization dept has the request. No answer, left voicemail.    ----- Message from 6Wunderkinder sent at 4/29/2025  4:38 PM CDT -----  Regarding: Refill  Contact: [phone]  Rx Refill/RequestIs this a Refill or New Rx:  RefillRx Name and Strength:  Preferred Pharmacy with phone number:[facility] - [place], [place] - [address] [facility] [address]Ddvpn: [phone] Fax: [phone] Information:Pt call stating auth is needed for injection medication. States [facility] is charging her $400. Please call [phone]

## 2025-05-01 ENCOUNTER — TELEPHONE (OUTPATIENT)
Facility: CLINIC | Age: 70
End: 2025-05-01
Payer: MEDICARE

## 2025-05-01 NOTE — TELEPHONE ENCOUNTER
Left a detailed VM stating that their appointment is cancelled and that they would need to get scheduled with a headache specialist for their symptoms.

## 2025-05-01 NOTE — TELEPHONE ENCOUNTER
----- Message from Jose Angel Dodd MD sent at 4/29/2025 11:21 AM CDT -----  Regarding: Cancel Appt  This patient has an upcoming appt with me but it should be cancelled. She sees headaches. I do not need to see her again. I am not managing anything for her and I think this was scheduled on accident when she was calling in about headache stuffKP

## 2025-05-02 ENCOUNTER — TELEPHONE (OUTPATIENT)
Dept: INTERNAL MEDICINE | Facility: CLINIC | Age: 70
End: 2025-05-02
Payer: MEDICARE

## 2025-05-02 ENCOUNTER — OFFICE VISIT (OUTPATIENT)
Dept: PODIATRY | Facility: CLINIC | Age: 70
End: 2025-05-02
Payer: MEDICARE

## 2025-05-02 VITALS
HEIGHT: 63 IN | DIASTOLIC BLOOD PRESSURE: 77 MMHG | SYSTOLIC BLOOD PRESSURE: 124 MMHG | BODY MASS INDEX: 40.16 KG/M2 | WEIGHT: 226.63 LBS | HEART RATE: 76 BPM

## 2025-05-02 DIAGNOSIS — E11.22 TYPE 2 DIABETES MELLITUS WITH STAGE 3B CHRONIC KIDNEY DISEASE, WITHOUT LONG-TERM CURRENT USE OF INSULIN: Primary | ICD-10-CM

## 2025-05-02 DIAGNOSIS — B35.1 DERMATOPHYTOSIS OF NAIL: ICD-10-CM

## 2025-05-02 DIAGNOSIS — N18.32 TYPE 2 DIABETES MELLITUS WITH STAGE 3B CHRONIC KIDNEY DISEASE, WITHOUT LONG-TERM CURRENT USE OF INSULIN: Primary | ICD-10-CM

## 2025-05-02 DIAGNOSIS — L84 CORN OR CALLUS: ICD-10-CM

## 2025-05-02 PROCEDURE — 99999 PR PBB SHADOW E&M-EST. PATIENT-LVL IV: CPT | Mod: PBBFAC,HCNC,, | Performed by: PODIATRIST

## 2025-05-02 NOTE — TELEPHONE ENCOUNTER
----- Message from Lashae sent at 5/2/2025 12:45 PM CDT -----  Contact: patient @ [phone]  .1MEDICALADVICE Patient is calling for Medical Advice regarding:patient is having dental work next Thursday and have questions about what to do if she has complications How long has patient had these symptoms:Pharmacy name and phone#:Patient wants a call back or thru [facility]:callComments:Please advise patient replies from provider may take up to 48 hours.

## 2025-05-02 NOTE — PROGRESS NOTES
"Chief Complaint   Patient presents with    Nail Care     Nail Care/PCP Milton Herrera DO  04/14/25       MEDICAL DECISION MAKING:  Problem List Items Addressed This Visit       Type 2 diabetes mellitus with stage 3b chronic kidney disease, without long-term current use of insulin - Primary     Other Visit Diagnoses         Dermatophytosis of nail          Corn or callus                  I counseled the patient on the patient's conditions, their implications and medical management.   Shoe inspection.     Continue good nutrition and blood sugar control to help prevent podiatric complications of diabetes.   Maintain proper foot hygiene.   Continue wearing proper shoe gear, daily foot inspections, never walking without protective shoe gear, never putting sharp instruments to feet.  General nail care measures for abnormal nails include:  Keeping nails trimmed short, but avoid overzealous trimming  Avoiding trauma   Avoiding contact irritants   Keeping nails dry (avoiding wet work)  Avoiding all nail cosmetics  Wearing shoes that fit well at the toe box.  Avoid tight shoes.       Routine Foot Care    Date/Time: 5/2/2025 12:00 PM    Performed by: Ledy Burton DPM  Authorized by: Ledy Burton DPM    Time out: Immediately prior to procedure a "time out" was called to verify the correct patient, procedure, equipment, support staff and site/side marked as required.    Consent Done?:  Yes (Verbal)  Hyperkeratotic Skin Lesions?: Yes    Number of trimmed lesions:  6  Location(s):  Left 1st Toe, Left 2nd Toe, Left 3rd Toe, Right 1st Toe, Right 2nd Toe and Right 3rd Toe    Nail Care Type:  Debride  Location(s): All  (Left 1st Toe, Left 3rd Toe, Left 2nd Toe, Left 4th Toe, Left 5th Toe, Right 1st Toe, Right 2nd Toe, Right 3rd Toe, Right 4th Toe and Right 5th Toe)  Patient tolerance:  Patient tolerated the procedure well with no immediate complications     With patient's permission, the toenails mentioned above were reduced and " debrided using a nail nipper, removing offending nail and debris.   Utilizing a #15 scalpel, I trimmed the corns and calluses at the above mentioned location.      The patient will continue to monitor the areas daily, inspect the feet, wear protective shoe gear when ambulatory, and moisturizer to maintain skin integrity.                 HISTORY OF PRESENT ILLNESS:   The patient is a 69 y.o.  female  who presents for a diabetic foot exam.     Patient reports occasional presence of abnormal sensation to the feet .    History of diabetic foot ulcers: none   Shoes worn today:  athletic shoes.   Concerned about nails and calluses.      The patient is under the Active Care of Wan Wilkes DO for the qualifying diagnosis of diabetes mellitus.   Last encounter on:  2/14/2025      Patient Active Problem List   Diagnosis    Polypharmacy    Schizoaffective disorder, depressive type    Bipolar affective disorder, current episode hypomanic    Tobacco abuse    Edema    COPD without exacerbation    Obesity, Class II, BMI 35-39.9, with comorbidity    Thyroid disorder    Hyperlipidemia    History of anorexia nervosa    History of bulimia nervosa    Acid reflux    Chronic rhinitis    Chronic constipation    Osteoarthritis    CLARI (obstructive sleep apnea)    Drug-induced parkinsonism    Migraine headache    Neuropathy    Convulsion    Congenital hypothyroidism without goiter    Gastroesophageal reflux disease without esophagitis    Cognitive disorder    Ventral incisional hernia without obstruction or gangrene    Epigastric pain    Back muscle spasm    Pain    Insomnia disorder with non-sleep disorder mental comorbidity    Colon cancer screening, due for repeat 5/2019    Bipolar disorder, unspecified    Chronic kidney disease    HTN (hypertension)    Bipolar affective disorder, currently manic, mild    Anxiety    Involuntary jerky movements    Gait disorder    Type 2 diabetes mellitus with circulatory disorder, without long-term  current use of insulin    Malignant melanoma of face    Paranoid behavior    Type 2 diabetes mellitus with stage 3b chronic kidney disease, without long-term current use of insulin    Stage 3a chronic kidney disease    Coronary artery disease with stable angina pectoris    Class 3 obesity    Smoker    Pain of right lower extremity    Cognitive impairment    Severe obesity         Current Outpatient Medications on File Prior to Visit   Medication Sig Dispense Refill    ACCU-CHEK GUIDE TEST STRIPS Strp 1 strip by Other route.      acetaminophen (TYLENOL) 500 MG tablet Take 500 mg by mouth every 6 (six) hours as needed.      albuterol (PROVENTIL/VENTOLIN HFA) 90 mcg/actuation inhaler Inhale 1 puff into the lungs every 4 (four) hours as needed for Wheezing. 18 g 0    artificial tears (ISOPTO TEARS) 0.5 % ophthalmic solution Place 1 drop into the right eye 4 (four) times daily as needed.      aspirin (ECOTRIN) 81 MG EC tablet Take 1 tablet (81 mg total) by mouth once daily. 90 tablet 0    atorvastatin (LIPITOR) 40 MG tablet Take 1 tablet (40 mg total) by mouth once daily. 90 tablet 0    blood-glucose meter Misc 1 each by Other route.      cetirizine (ZYRTEC) 5 MG tablet Take 1 tablet (5 mg total) by mouth once daily.      chlorhexidine (PERIDEX) 0.12 % solution       clopidogreL (PLAVIX) 75 mg tablet Take 1 tablet (75 mg total) by mouth once daily. 90 tablet 3    diclofenac sodium (VOLTAREN) 1 % Gel Apply topically once daily. (Patient taking differently: Apply topically once daily. Knees) 720 g 0    divalproex (DEPAKOTE) 500 MG TbEC Take 1 tablet (500 mg total) by mouth once daily. 90 tablet 0    divalproex ER (DEPAKOTE ER) 500 MG Tb24 24 hr tablet Take 2 tablets (1,000 mg total) by mouth every evening. 180 tablet 0    docusate sodium (STOOL SOFTENER) 100 MG capsule Take 1 capsule (100 mg total) by mouth 2 (two) times daily as needed for Constipation. 180 capsule 0    evolocumab (REPATHA SURECLICK) 140 mg/mL PnIj Inject  1 mL (140 mg total) into the skin every 14 (fourteen) days. 2 mL 5    ezetimibe (ZETIA) 10 mg tablet Take 1 tablet (10 mg total) by mouth once daily. 90 tablet 3    FLUoxetine 20 MG capsule Take 1 capsule (20 mg total) by mouth once daily. 90 capsule 3    fluticasone furoate-vilanteroL (BREO) 100-25 mcg/dose diskus inhaler Inhale 1 puff into the lungs once daily. Controller      fluticasone propionate (FLONASE) 50 mcg/actuation nasal spray  11.1 mL 0    furosemide (LASIX) 20 MG tablet Take 1 tablet (20 mg total) by mouth once daily. 90 tablet 3    gabapentin (NEURONTIN) 600 MG tablet Take 1 tablet (600 mg total) by mouth once daily. 90 tablet 0    galcanezumab-gnlm 120 mg/mL PnIj Inject 1 mL (120 mg total) into the skin every 28 days. maintenance dose 1 mL 2    guaiFENesin (MUCINEX) 600 mg 12 hr tablet Take 1,200 mg by mouth 2 (two) times daily as needed.      levothyroxine (SYNTHROID) 150 MCG tablet Take 1 tablet (150 mcg total) by mouth before breakfast. 90 tablet 3    linaCLOtide (LINZESS) 290 mcg Cap capsule       loratadine (CLARITIN) 10 mg tablet Take 1 tablet by mouth once daily.      losartan (COZAAR) 50 MG tablet Take 1 tablet (50 mg total) by mouth once daily. 90 tablet 3    melatonin 5 mg Chew Take by mouth.      metFORMIN (GLUCOPHAGE) 500 MG tablet Take 2 tablets (1,000 mg total) by mouth 2 (two) times daily with meals. 360 tablet 0    metoprolol succinate (TOPROL-XL) 25 MG 24 hr tablet Take 1 tablet (25 mg total) by mouth once daily. 90 tablet 3    mirtazapine (REMERON) 30 MG tablet Take 1 tablet (30 mg total) by mouth every evening. 90 tablet 3    multivitamin (THERAGRAN) per tablet Take 1 tablet by mouth once daily.      nicotine (NICODERM CQ) 7 mg/24 hr Place 1 patch onto the skin once daily. 90 patch 0    nitroGLYCERIN (NITROSTAT) 0.4 MG SL tablet Place 1 tablet (0.4 mg total) under the tongue every 5 (five) minutes as needed for Chest pain. 25 tablet 1    nystatin-triamcinolone (MYCOLOG) ointment    "    OLANZapine (ZYPREXA) injection Inject 5 mg into the muscle every 8 (eight) hours as needed for Agitation.      ondansetron (ZOFRAN) 4 MG tablet Take 1 tablet (4 mg total) by mouth every 12 (twelve) hours as needed for Nausea. 270 tablet 0    pantoprazole (PROTONIX) 40 MG tablet Take 1 tablet (40 mg total) by mouth once daily. 90 tablet 0    QUEtiapine (SEROQUEL) 200 MG Tab Take 1 tablet (200 mg total) by mouth every evening.      tiZANidine (ZANAFLEX) 4 MG tablet Take 0.5 tablets (2 mg total) by mouth every 8 (eight) hours. 90 tablet 11    TRUE METRIX AIR GLUCOSE METER kit SMARTSI Kit(s) Via Meter Daily      ubrogepant (UBRELVY) 50 mg tablet Take 1 tablet (50 mg total) by mouth 2 (two) times daily as needed for Migraine. If symptoms persist or return, may repeat dose after 2 hours. Maximum: 200 mg per 24 hours 16 tablet 0    ipratropium-albuteroL (COMBIVENT)  mcg/actuation inhaler Inhale 1 puff into the lungs every 6 (six) hours as needed for Wheezing. Rescue 4 g 11    [DISCONTINUED] fluphenazine (PROLIXIN) 5 MG tablet 5 mg every evening.        No current facility-administered medications on file prior to visit.       Review of patient's allergies indicates:   Allergen Reactions    Nsaids (non-steroidal anti-inflammatory drug) Other (See Comments)     History of perforated duodenal ulcer    Penicillins Rash and Other (See Comments)     Yeast infections         ROS:  General ROS: negative  Respiratory ROS: no cough, shortness of breath, or wheezing  Cardiovascular ROS: no chest pain or dyspnea on exertion  Musculoskeletal ROS: negative  Dermatological ROS: negative      LAST HbA1c:   Lab Results   Component Value Date    HGBA1C 6.4 (H) 2024         EXAM:   Vitals:    25 1135   BP: 124/77   Pulse: 76   Weight: 102.8 kg (226 lb 10.1 oz)   Height: 5' 3" (1.6 m)         General: alert, no distress, cooperative    Vascular:   Dorsalis Pedis:  diminished     Posterior Tibial:  " diminished  Capillary refill time:  3 seconds  Temperature of toes warm to touch  Edema:  1+ and pitting      Neurological:     Sharp touch:  normal  Light touch: normal  Tinels Sign:  Absent  Mulders Click:   Absent  [place]:  Absent deficits to sharp/dull, light touch or vibratory sensation feet, ten points tested.    Present paresthesias (Abnormal spontaneous sensations in feet)      Dermatological:   Skin: thin and atrophic  Hair growth:  decreased  Wounds/Ulcers:  Absent  Bruising:  Absent  Erythema:  Absent  Toenails:   elongated;  thickness:  thickened;   Yellowish in color,  with subungual debris.   Absent paronychia  Calluses RIGHT 1-4, and LEFT 1-2 toes      Musculoskeletal:   Metatarsophalangeal range of motion:   diminished range of motion  Subtalar joint range of motion: diminished range of motion  Ankle joint range of motion:  diminished range of motion

## 2025-05-08 ENCOUNTER — TELEPHONE (OUTPATIENT)
Facility: CLINIC | Age: 70
End: 2025-05-08
Payer: MEDICARE

## 2025-05-08 DIAGNOSIS — J44.9 COPD WITHOUT EXACERBATION: Primary | ICD-10-CM

## 2025-05-08 RX ORDER — FLUTICASONE FUROATE AND VILANTEROL TRIFENATATE 100; 25 UG/1; UG/1
POWDER RESPIRATORY (INHALATION)
Refills: 0 | OUTPATIENT
Start: 2025-05-08

## 2025-05-08 RX ORDER — ALBUTEROL SULFATE 90 UG/1
1 INHALANT RESPIRATORY (INHALATION) EVERY 4 HOURS PRN
Qty: 18 G | Refills: 0 | Status: SHIPPED | OUTPATIENT
Start: 2025-05-08

## 2025-05-08 RX ORDER — FLUTICASONE FUROATE AND VILANTEROL 100; 25 UG/1; UG/1
1 POWDER RESPIRATORY (INHALATION) DAILY
Start: 2025-05-08

## 2025-05-08 RX ORDER — ALBUTEROL SULFATE 90 UG/1
INHALANT RESPIRATORY (INHALATION)
Refills: 0 | OUTPATIENT
Start: 2025-05-08

## 2025-05-08 NOTE — TELEPHONE ENCOUNTER
Called patient to schedule appt. No answer, left voicemail.    ----- Message from Ju sent at 5/8/2025 10:46 AM CDT -----  Pt calling in regards to injection she received on yesterday , pt says it didn't help and its too expensive , please don't reorder this injection , also says ubrogepant (UBRELVY) 50 mg tablet, isn't fast enough  Confirmed patient's contact info below:Contact Name: Joseluis TriplettPhone Number: [phone]

## 2025-05-09 DIAGNOSIS — I10 PRIMARY HYPERTENSION: ICD-10-CM

## 2025-05-09 DIAGNOSIS — E78.5 HYPERLIPIDEMIA, UNSPECIFIED HYPERLIPIDEMIA TYPE: ICD-10-CM

## 2025-05-09 DIAGNOSIS — J30.2 SEASONAL ALLERGIES: ICD-10-CM

## 2025-05-09 DIAGNOSIS — F41.9 ANXIETY: ICD-10-CM

## 2025-05-09 RX ORDER — METOPROLOL SUCCINATE 25 MG/1
25 TABLET, EXTENDED RELEASE ORAL DAILY
Qty: 90 TABLET | Refills: 3 | Status: SHIPPED | OUTPATIENT
Start: 2025-05-09 | End: 2026-05-09

## 2025-05-09 RX ORDER — FLUOXETINE HYDROCHLORIDE 20 MG/1
20 CAPSULE ORAL DAILY
Qty: 90 CAPSULE | Refills: 3 | Status: SHIPPED | OUTPATIENT
Start: 2025-05-09

## 2025-05-09 RX ORDER — CLOPIDOGREL BISULFATE 75 MG/1
75 TABLET ORAL DAILY
Qty: 90 TABLET | Refills: 3 | Status: SHIPPED | OUTPATIENT
Start: 2025-05-09 | End: 2026-05-09

## 2025-05-09 RX ORDER — EZETIMIBE 10 MG/1
10 TABLET ORAL DAILY
Qty: 90 TABLET | Refills: 3 | Status: SHIPPED | OUTPATIENT
Start: 2025-05-09 | End: 2026-05-09

## 2025-05-09 NOTE — TELEPHONE ENCOUNTER
----- Message from Britt sent at 5/9/2025 12:46 PM CDT -----  Contact: Self/ [phone]  Type: Rx Clarification/ Additional Information/ QuestionsMedication:albuterol (PROVENTIL/VENTOLIN HFA) 90 mcg/actuation inhaler and fluticasone propionate (FLONASE) 50 mcg/actuation nasal sprayWhat questions do you have about the medication, if any?Rx's sent to the wrong pharmacy What information is needed?Pharmacy number:[facility] - [place], [place] - [address] [facility] [facility] [address]Rdyym: [phone] Fax: [phone] Contact Name:Comments: pt said that she is calling to let the doctor know that he sent her rx's to the wrong pharmacy she stated that she does not use [facility] she only uses [facility] for her rx's . Please advise

## 2025-05-09 NOTE — TELEPHONE ENCOUNTER
----- Message from Mariama sent at 5/9/2025  1:04 PM CDT -----  Regarding: refill  clopidogreL (PLAVIX) 75 mg tabletezetimibe (ZETIA) 10 mg tabletmetoprolol succinate (TOPROL-XL) 25 MG 24 hr [facility] [address] Phone: [phone]: [phone]  ----- Message -----  From: Mariama Sears  Sent: 5/9/2025   1:09 PM CDT  To: Asher Zuñiga Staff  Subject: refill                                           clopidogreL (PLAVIX) 75 mg tabletezetimibe (ZETIA) 10 mg tabletmetoprolol succinate (TOPROL-XL) 25 MG 24 hr tabletOhio[facility] [address] Phone: [phone]: [phone]

## 2025-05-12 ENCOUNTER — TELEPHONE (OUTPATIENT)
Dept: PODIATRY | Facility: CLINIC | Age: 70
End: 2025-05-12
Payer: MEDICARE

## 2025-05-12 ENCOUNTER — TELEPHONE (OUTPATIENT)
Dept: INTERNAL MEDICINE | Facility: CLINIC | Age: 70
End: 2025-05-12
Payer: MEDICARE

## 2025-05-12 DIAGNOSIS — L98.9 SKIN ABNORMALITIES: Primary | ICD-10-CM

## 2025-05-12 RX ORDER — FLUTICASONE PROPIONATE 50 MCG
SPRAY, SUSPENSION (ML) NASAL
Qty: 11.1 ML | Refills: 0 | Status: SHIPPED | OUTPATIENT
Start: 2025-05-12

## 2025-05-12 NOTE — LETTER
[facility]  [address]  [facility] [address]  Phone: [phone]  Fax: [phone] May 12, 2025    Joseluis Triplett  [address]  [address]  [place] [address]      To Whom It May Concern:    I am writing on behalf of my patient, Joseluis Triplett,  who has been summoned for jury duty. Joselusi is under my care for a medical condition that significantly impairs their ability to serve as a juror.    Due to extensive chronic medical conditions, she is unable to participate in jury duty. Serving on a jury would impose a substantial risk to the patients health and well-being and could compromise the consistent medical care required to manage their condition. As a healthcare provider familiar with her medical history and current treatment plan, I strongly recommend that they be excused from jury duty.    Please feel free to contact me at [phone]  should you require any additional information or documentation regarding her medical situation.    Thank you for your consideration in this matter.  Sincerely,    Milton Herrera, DO

## 2025-05-12 NOTE — TELEPHONE ENCOUNTER
----- Message from Mohan sent at 5/12/2025 10:33 AM CDT -----  Contact: pt @ [phone]  Name of Who is Calling: pt  What is the request in detail: calling to request a doctors excuse for jury duty pt says she needs the letter soon as possible   Can the clinic reply by [facility]: no  What Number to Call Back [facility] not [facility]:  [phone]

## 2025-05-12 NOTE — TELEPHONE ENCOUNTER
----- Message from Vicki sent at 5/12/2025  9:05 AM CDT -----  Contact: [phone]  Patient would like to get a referral.Referral to what specialty: Dermatology   Does the patient want the referral with a specific physician:  [facility] the [facility]. Reason (be specific):  skin cancer Does the patient already have the specialty clinic appointment scheduled:  No If yes, what date is the appointment scheduled:   NoIs the insurance listed in Epic correct? (this is important for a referral):  yesAdvised patient that once provider approves this either a nurse or  will return their call?: Would the patient like a call back, or a response through their [facility] portal?:   call back Comments:  Thank you

## 2025-05-14 ENCOUNTER — LAB VISIT (OUTPATIENT)
Dept: LAB | Facility: HOSPITAL | Age: 70
End: 2025-05-14
Payer: MEDICARE

## 2025-05-14 DIAGNOSIS — R79.9 ABNORMAL FINDING OF BLOOD CHEMISTRY, UNSPECIFIED: ICD-10-CM

## 2025-05-14 DIAGNOSIS — E03.1 CONGENITAL HYPOTHYROIDISM WITHOUT GOITER: ICD-10-CM

## 2025-05-14 DIAGNOSIS — E11.51 TYPE 2 DIABETES MELLITUS WITH DIABETIC PERIPHERAL ANGIOPATHY WITHOUT GANGRENE, WITHOUT LONG-TERM CURRENT USE OF INSULIN: ICD-10-CM

## 2025-05-14 LAB
ALBUMIN SERPL BCP-MCNC: 3.9 G/DL (ref 3.5–5.2)
ALP SERPL-CCNC: 71 UNIT/L (ref 40–150)
ALT SERPL W/O P-5'-P-CCNC: 19 UNIT/L (ref 10–44)
ANION GAP (OHS): 13 MMOL/L (ref 8–16)
AST SERPL-CCNC: 18 UNIT/L (ref 11–45)
BILIRUB SERPL-MCNC: 0.2 MG/DL (ref 0.1–1)
BUN SERPL-MCNC: 27 MG/DL (ref 8–23)
CALCIUM SERPL-MCNC: 9.9 MG/DL (ref 8.7–10.5)
CHLORIDE SERPL-SCNC: 102 MMOL/L (ref 95–110)
CO2 SERPL-SCNC: 27 MMOL/L (ref 23–29)
CREAT SERPL-MCNC: 1.1 MG/DL (ref 0.5–1.4)
EAG (OHS): 140 MG/DL (ref 68–131)
GFR SERPLBLD CREATININE-BSD FMLA CKD-EPI: 55 ML/MIN/1.73/M2
GLUCOSE SERPL-MCNC: 126 MG/DL (ref 70–110)
HBA1C MFR BLD: 6.5 % (ref 4–5.6)
POTASSIUM SERPL-SCNC: 5 MMOL/L (ref 3.5–5.1)
PROT SERPL-MCNC: 6.9 GM/DL (ref 6–8.4)
SODIUM SERPL-SCNC: 142 MMOL/L (ref 136–145)

## 2025-05-14 PROCEDURE — 36415 COLL VENOUS BLD VENIPUNCTURE: CPT | Mod: HCNC

## 2025-05-14 PROCEDURE — 80053 COMPREHEN METABOLIC PANEL: CPT | Mod: HCNC

## 2025-05-14 PROCEDURE — 83036 HEMOGLOBIN GLYCOSYLATED A1C: CPT | Mod: HCNC

## 2025-05-14 PROCEDURE — 85025 COMPLETE CBC W/AUTO DIFF WBC: CPT | Mod: HCNC

## 2025-05-14 PROCEDURE — 84443 ASSAY THYROID STIM HORMONE: CPT | Mod: HCNC

## 2025-05-14 NOTE — TELEPHONE ENCOUNTER
Original letter mailed to home address   2nd letter to jury department mailed with updated information

## 2025-05-14 NOTE — TELEPHONE ENCOUNTER
----- Message from Talia sent at 5/14/2025  8:52 AM CDT -----  Contact: [phone]  Type: Returning a callWho left a message?Kimi Pack LPNWhen did the practice call?5/13/25Does patient know what this is regarding:returning your call/jury dutyWould the patient rather a call back or a response via [facility]? callbackComments:pt is calling about jury duty letter Please send to: Office [place], [place] [address] [place] [address] ( on letter)Please give a detail message/letter excuse on why she can't attendSuons for jury duty [address] [place] [address] on 6/12/2025@8:30 am Jury #[id number] #[phone]: [facility] Office Jury Commission [address] [place] [address](on envelope)

## 2025-05-15 LAB
ABSOLUTE EOSINOPHIL (OHS): 0.22 K/UL
ABSOLUTE MONOCYTE (OHS): 0.85 K/UL (ref 0.3–1)
ABSOLUTE NEUTROPHIL COUNT (OHS): 2.95 K/UL (ref 1.8–7.7)
BASOPHILS # BLD AUTO: 0.06 K/UL
BASOPHILS NFR BLD AUTO: 0.6 %
ERYTHROCYTE [DISTWIDTH] IN BLOOD BY AUTOMATED COUNT: 15.9 % (ref 11.5–14.5)
HCT VFR BLD AUTO: 42.1 % (ref 37–48.5)
HGB BLD-MCNC: 13.2 GM/DL (ref 12–16)
IMM GRANULOCYTES # BLD AUTO: 0.03 K/UL (ref 0–0.04)
IMM GRANULOCYTES NFR BLD AUTO: 0.3 % (ref 0–0.5)
LYMPHOCYTES # BLD AUTO: 5.19 K/UL (ref 1–4.8)
MCH RBC QN AUTO: 29.1 PG (ref 27–31)
MCHC RBC AUTO-ENTMCNC: 31.4 G/DL (ref 32–36)
MCV RBC AUTO: 93 FL (ref 82–98)
NUCLEATED RBC (/100WBC) (OHS): 0 /100 WBC
PLATELET # BLD AUTO: 291 K/UL (ref 150–450)
PMV BLD AUTO: 10 FL (ref 9.2–12.9)
RBC # BLD AUTO: 4.53 M/UL (ref 4–5.4)
RELATIVE EOSINOPHIL (OHS): 2.4 %
RELATIVE LYMPHOCYTE (OHS): 55.8 % (ref 18–48)
RELATIVE MONOCYTE (OHS): 9.1 % (ref 4–15)
RELATIVE NEUTROPHIL (OHS): 31.8 % (ref 38–73)
TSH SERPL-ACNC: 2.9 UIU/ML (ref 0.4–4)
WBC # BLD AUTO: 9.3 K/UL (ref 3.9–12.7)

## 2025-05-15 RX ORDER — ATORVASTATIN CALCIUM 40 MG/1
40 TABLET, FILM COATED ORAL DAILY
Qty: 90 TABLET | Refills: 3 | Status: SHIPPED | OUTPATIENT
Start: 2025-05-15

## 2025-05-16 ENCOUNTER — PATIENT MESSAGE (OUTPATIENT)
Facility: CLINIC | Age: 70
End: 2025-05-16
Payer: MEDICARE

## 2025-05-17 DIAGNOSIS — G43.109 MIGRAINE WITH AURA AND WITHOUT STATUS MIGRAINOSUS, NOT INTRACTABLE: ICD-10-CM

## 2025-05-20 ENCOUNTER — OFFICE VISIT (OUTPATIENT)
Dept: INTERNAL MEDICINE | Facility: CLINIC | Age: 70
End: 2025-05-20
Payer: MEDICARE

## 2025-05-20 VITALS
BODY MASS INDEX: 40.59 KG/M2 | SYSTOLIC BLOOD PRESSURE: 120 MMHG | HEART RATE: 87 BPM | DIASTOLIC BLOOD PRESSURE: 68 MMHG | WEIGHT: 229.06 LBS | HEIGHT: 63 IN | OXYGEN SATURATION: 97 %

## 2025-05-20 DIAGNOSIS — K59.09 CHRONIC CONSTIPATION: ICD-10-CM

## 2025-05-20 DIAGNOSIS — R41.89 COGNITIVE IMPAIRMENT: ICD-10-CM

## 2025-05-20 DIAGNOSIS — Z79.899 POLYPHARMACY: ICD-10-CM

## 2025-05-20 DIAGNOSIS — R35.0 URINARY FREQUENCY: ICD-10-CM

## 2025-05-20 DIAGNOSIS — K21.9 GASTROESOPHAGEAL REFLUX DISEASE WITHOUT ESOPHAGITIS: ICD-10-CM

## 2025-05-20 DIAGNOSIS — Z12.31 ENCOUNTER FOR SCREENING MAMMOGRAM FOR MALIGNANT NEOPLASM OF BREAST: ICD-10-CM

## 2025-05-20 DIAGNOSIS — C43.30 MALIGNANT MELANOMA OF FACE: ICD-10-CM

## 2025-05-20 DIAGNOSIS — I10 PRIMARY HYPERTENSION: Primary | ICD-10-CM

## 2025-05-20 DIAGNOSIS — J31.0 CHRONIC RHINITIS: ICD-10-CM

## 2025-05-20 DIAGNOSIS — E11.51 TYPE 2 DIABETES MELLITUS WITH DIABETIC PERIPHERAL ANGIOPATHY WITHOUT GANGRENE, WITHOUT LONG-TERM CURRENT USE OF INSULIN: ICD-10-CM

## 2025-05-20 DIAGNOSIS — Z12.11 COLON CANCER SCREENING: ICD-10-CM

## 2025-05-20 DIAGNOSIS — J44.9 COPD WITHOUT EXACERBATION: ICD-10-CM

## 2025-05-20 DIAGNOSIS — Z00.00 PREVENTATIVE HEALTH CARE: ICD-10-CM

## 2025-05-20 PROCEDURE — 99999 PR PBB SHADOW E&M-EST. PATIENT-LVL III: CPT | Mod: PBBFAC,HCNC,GC,

## 2025-05-20 RX ORDER — UBROGEPANT 50 MG/1
TABLET ORAL
Qty: 16 TABLET | Refills: 0 | Status: SHIPPED | OUTPATIENT
Start: 2025-05-20 | End: 2025-05-23

## 2025-05-20 RX ORDER — CETIRIZINE HYDROCHLORIDE 5 MG/1
5 TABLET ORAL DAILY
Qty: 30 TABLET | Refills: 1 | Status: SHIPPED | OUTPATIENT
Start: 2025-05-20
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
## 2025-05-21 ENCOUNTER — TELEPHONE (OUTPATIENT)
Dept: INTERNAL MEDICINE | Facility: CLINIC | Age: 70
End: 2025-05-21
Payer: MEDICARE

## 2025-05-21 ENCOUNTER — TELEPHONE (OUTPATIENT)
Dept: PODIATRY | Facility: CLINIC | Age: 70
End: 2025-05-21
Payer: MEDICARE

## 2025-05-21 NOTE — TELEPHONE ENCOUNTER
Staff contacted and spoke with patient regarding that she have dry blood on her toe nail.  Staff informed patient to wash her foot with soap and water until the dry blood comes off.      Patient verbalized understanding.

## 2025-05-21 NOTE — TELEPHONE ENCOUNTER
----- Message from Trista sent at 5/21/2025 10:34 AM CDT -----  Contact: [phone]  .1MEDICALADVICE Patient is calling for Medical Advice regarding:states she called in regards to the dried up blood on her toe How long has patient had these symptoms: she came on 05/02 Pharmacy name and phone#:Patient wants a call back or [facility]:call back Comments:She states she has dry blood on the toe that was cut and she can not get the blood off and she is needing to speak to you all ASAP please advise She is wanting a call Please advise patient replies from provider may take up to 48 hours.

## 2025-05-21 NOTE — TELEPHONE ENCOUNTER
----- Message from Trista sent at 5/21/2025 10:34 AM CDT -----  Contact: [phone]  .1MEDICALADVICE Patient is calling for Medical Advice regarding:states she called in regards to the dried up blood on her toe How long has patient had these symptoms: she came on 05/02 Pharmacy name and phone#:Patient wants a call back or [facility]:call back Comments:She states she has dry blood on the toe that was cut and she can not get the blood off and she is needing to speak to you all ASAP please advise She is wanting a call Please advise patient replies from provider may take up to 48 hours.

## 2025-05-21 NOTE — TELEPHONE ENCOUNTER
----- Message from Robyn sent at 5/21/2025  7:58 AM CDT -----  Contact: [phone]  Requesting an RX refill or new RX.Is this a refill or new RX: newRX name and strength   Nexium Is this a 30 day or 90 day RX: 90Pharmacy name and phone #  [facility] - [facility] [address]  Phone: [phone]: [phone] doctors have asked that we provide their patients with the following 2 reminders -- prescription refills can take up to 72 hours, and a friendly reminder that in the future you can use your [facility] account to request refills:

## 2025-05-21 NOTE — TELEPHONE ENCOUNTER
Pt requesting nexium ; med not on pt med list. Medication seems to have been discontinued    Pt also providing FYI that milk of magnesia assisted pt with going to the bathroom   Pt dental visit cancelled today

## 2025-05-21 NOTE — TELEPHONE ENCOUNTER
----- Message from Ana sent at 5/21/2025 11:38 AM CDT -----  Contact: [phone] Patient  .1MEDICALADVICE Patient is calling for Medical Advice regarding: Pt states cologuard needs to be called in order to ship to Pt. Cologuard data is wrong in system but won't allow to update (they say it hasn't been long enough for testing).How long has patient had these symptoms:Pharmacy name and phone#:Patient wants a call back or [facility]: call back Comments: Pt states [facility] Dr. Beaulieu doesn't allow tylenol with calipta 60mg (once in am). Not doing well. Please advise patient replies from provider may take up to 48 hours.

## 2025-05-21 NOTE — TELEPHONE ENCOUNTER
----- Message from Debby sent at 5/21/2025  8:38 AM CDT -----  Contact: [phone] Patient  .1MEDICALADVICE Patient is calling for Medical Advice regarding: Pt is calling in regards to letting Dr Herrera that she took the milk of magnesia and was able to go to the bathroom after her morning meds. Pts dental visit was canceled today. Please call and advise. Thank youHow long has patient had these symptoms:N/APharmacy name and phone#:N/APatient wants a call back or thru [facility]:Comments:Please advise patient replies from provider may take up to 48 hours.

## 2025-05-22 ENCOUNTER — TELEPHONE (OUTPATIENT)
Dept: INTERNAL MEDICINE | Facility: CLINIC | Age: 70
End: 2025-05-22
Payer: MEDICARE

## 2025-05-22 RX ORDER — ASPIRIN 81 MG/1
81 TABLET ORAL DAILY
Qty: 90 TABLET | Refills: 0 | Status: SHIPPED | OUTPATIENT
Start: 2025-05-22

## 2025-05-22 NOTE — TELEPHONE ENCOUNTER
----- Message from Jayne sent at 5/22/2025  1:29 PM CDT -----  Requesting an RX refill or new RX.Is this a refill or new RX: RX name and strength (copy/paste from chart):  aspirin (ECOTRIN) 81 MG EC tabletIs this a 30 day or 90 day RX: Pharmacy name and phone # (copy/paste from chart):  [facility] - [facility] [address] [facility] [address]: [phone] Fax: [phone] doctors have asked that we provide their patients with the following 2 reminders -- prescription refills can take up to 72 hours, and a friendly reminder that in the future you can use your [facility] account to request refills:

## 2025-05-22 NOTE — TELEPHONE ENCOUNTER
----- Message from Talia sent at 5/22/2025  2:53 PM CDT -----  Contact: [phone]  Patient would like to get a referral.Referral to what specialty: ortho Does the patient want the referral with a specific physician:  noIs the specialist an [facility]  Reason (be specific):  knees having problems get up and down when gettingDoes the patient already have the specialty clinic appointment scheduled:  n/aIf yes, what date is the appointment scheduled:   Is the insurance listed in Epic correct? (this is important for a referral):  E-HU[employer] MANAGED MEDICARE/HUMANA MEDICARE HMOAdvised patient that once provider approves this either a nurse or  will return their call?: yesWould the patient like a call back, or a response through their [facility] portal?:  callbackComments:

## 2025-05-23 ENCOUNTER — OFFICE VISIT (OUTPATIENT)
Dept: CARDIOLOGY | Facility: CLINIC | Age: 70
End: 2025-05-23
Payer: MEDICARE

## 2025-05-23 VITALS
WEIGHT: 227.06 LBS | SYSTOLIC BLOOD PRESSURE: 122 MMHG | HEART RATE: 86 BPM | RESPIRATION RATE: 14 BRPM | DIASTOLIC BLOOD PRESSURE: 76 MMHG | BODY MASS INDEX: 40.23 KG/M2 | HEIGHT: 63 IN | OXYGEN SATURATION: 96 %

## 2025-05-23 DIAGNOSIS — N18.32 TYPE 2 DIABETES MELLITUS WITH STAGE 3B CHRONIC KIDNEY DISEASE, WITHOUT LONG-TERM CURRENT USE OF INSULIN: ICD-10-CM

## 2025-05-23 DIAGNOSIS — I10 PRIMARY HYPERTENSION: ICD-10-CM

## 2025-05-23 DIAGNOSIS — E78.5 HYPERLIPIDEMIA, UNSPECIFIED HYPERLIPIDEMIA TYPE: Primary | ICD-10-CM

## 2025-05-23 DIAGNOSIS — E11.22 TYPE 2 DIABETES MELLITUS WITH STAGE 3B CHRONIC KIDNEY DISEASE, WITHOUT LONG-TERM CURRENT USE OF INSULIN: ICD-10-CM

## 2025-05-23 DIAGNOSIS — I25.118 CORONARY ARTERY DISEASE OF NATIVE ARTERY OF NATIVE HEART WITH STABLE ANGINA PECTORIS: ICD-10-CM

## 2025-05-23 DIAGNOSIS — N18.31 STAGE 3A CHRONIC KIDNEY DISEASE: ICD-10-CM

## 2025-05-23 DIAGNOSIS — E66.813 CLASS 3 OBESITY: ICD-10-CM

## 2025-05-23 PROCEDURE — 99999 PR PBB SHADOW E&M-EST. PATIENT-LVL III: CPT | Mod: PBBFAC,HCNC,, | Performed by: INTERNAL MEDICINE

## 2025-05-23 NOTE — ASSESSMENT & PLAN NOTE
She is chest pain-free.  Aspirin and Plavix to continue.    Stent was implanted December 2024.  It was performed for abnormal stress test and angina.

## 2025-05-23 NOTE — ASSESSMENT & PLAN NOTE
Labs ordered.  Repatha initiated recently since I last saw her.  40 mg atorvastatin and Zetia to continue.    LDL was 85 on oral therapy.

## 2025-05-23 NOTE — PROGRESS NOTES
[facility] Cardiology     Subjective:    Patient ID:  Joseluis Triplett is a 69 y.o. female who presents for follow-up of Coronary Artery Disease, Hypertension, Hyperlipidemia, and Diabetes Mellitus    Review of patient's allergies indicates:   Allergen Reactions    Nsaids (non-steroidal anti-inflammatory drug) Other (See Comments)     History of perforated duodenal ulcer    Penicillins Rash and Other (See Comments)     Yeast infections     She remains on aspirin and Plavix.  Her stent to left anterior descending artery was 2024 December.  No bleeding problems reported.  She had single-vessel disease with normal ejection fraction.  She had abnormal stress test leading to heart catheterization.    Her blood pressure is normal today.  Her GFR is in the 55 range.  She does take furosemide 20 mg daily.  She has no leg swelling.  She takes Toprol low-dose as well as losartan 50 mg for hypertension.    She recently started Repatha.  She is also on Zetia and atorvastatin 40 mg.  Unfortunately oral therapy did not get her to goal.  She only has taken 1 shot of Repatha.         Review of Systems   Constitutional: Negative for chills, decreased appetite, diaphoresis, fever, malaise/fatigue, night sweats, weight gain and weight loss.   HENT:  Negative for congestion, ear discharge, ear pain, hearing loss, hoarse voice, nosebleeds, odynophagia, sore throat, stridor and tinnitus.    Eyes:  Positive for visual disturbance. Negative for blurred vision, discharge, double vision, pain, photophobia, redness, vision loss in left eye, vision loss in right eye and visual halos.   Cardiovascular:  Negative for chest pain, claudication, cyanosis, dyspnea on exertion, irregular heartbeat, leg swelling, near-syncope, orthopnea, palpitations, paroxysmal nocturnal dyspnea and syncope.   Respiratory:  Negative for cough, hemoptysis, shortness of breath, sleep disturbances  "due to breathing, snoring, sputum production and wheezing.    Endocrine: Negative for cold intolerance, heat intolerance, polydipsia, polyphagia and polyuria.   Hematologic/Lymphatic: Negative for adenopathy and bleeding problem. Does not bruise/bleed easily.   Skin:  Negative for color change, dry skin, flushing, itching, nail changes, poor wound healing, rash, skin cancer, suspicious lesions and unusual hair distribution.   Musculoskeletal:  Negative for arthritis, back pain, falls, gout, joint pain, joint swelling, muscle cramps, muscle weakness, myalgias, neck pain and stiffness.   Gastrointestinal:  Negative for bloating, abdominal pain, anorexia, change in bowel habit, bowel incontinence, constipation, diarrhea, dysphagia, excessive appetite, flatus, heartburn, hematemesis, hematochezia, hemorrhoids, jaundice, melena, nausea and vomiting.   Genitourinary:  Negative for bladder incontinence, decreased libido, dysuria, flank pain, frequency, genital sores, hematuria, hesitancy, incomplete emptying, nocturia and urgency.   Neurological:  Positive for headaches. Negative for aphonia, brief paralysis, difficulty with concentration, disturbances in coordination, excessive daytime sleepiness, dizziness, focal weakness, light-headedness, loss of balance, numbness, paresthesias, seizures, sensory change, tremors, vertigo and weakness.   Psychiatric/Behavioral:  Negative for altered mental status, depression, hallucinations, memory loss, substance abuse, suicidal ideas and thoughts of violence. The patient does not have insomnia and is not nervous/anxious.    Allergic/Immunologic: Negative for hives and persistent infections.        Objective:       Vitals:    05/23/25 1436   BP: 122/76   BP Location: Left arm   Patient Position: Sitting   Pulse: 86   Resp: 14   SpO2: 96%   Weight: 103 kg (227 lb 1.2 oz)   Height: 5' 3" (1.6 m)    Physical Exam  Constitutional:       General: She is not in acute distress.     " Appearance: She is well-developed. She is not diaphoretic.   HENT:      Head: Normocephalic and atraumatic.      Nose: Nose normal.   Eyes:      General: No scleral icterus.        Right eye: No discharge.      Conjunctiva/sclera: Conjunctivae normal.      Pupils: Pupils are equal, round, and reactive to light.   Neck:      Thyroid: No thyromegaly.      Vascular: No JVD.      Trachea: No tracheal deviation.   Cardiovascular:      Rate and Rhythm: Normal rate and regular rhythm.      Pulses:           Carotid pulses are 2+ on the right side and 2+ on the left side.       Radial pulses are 2+ on the right side and 2+ on the left side.        Dorsalis pedis pulses are 1+ on the right side and 1+ on the left side.        Posterior tibial pulses are 1+ on the right side and 1+ on the left side.      Heart sounds: Normal heart sounds. No murmur heard.     No friction rub. No gallop.   Pulmonary:      Effort: Pulmonary effort is normal. No respiratory distress.      Breath sounds: Normal breath sounds. No stridor. No wheezing or rales.   Chest:      Chest wall: No tenderness.   Abdominal:      General: Bowel sounds are normal. There is no distension.      Palpations: Abdomen is soft. There is no mass.      Tenderness: There is no abdominal tenderness. There is no guarding or rebound.   Musculoskeletal:         General: No tenderness. Normal range of motion.      Cervical back: Normal range of motion and neck supple.   Lymphadenopathy:      Cervical: No cervical adenopathy.   Skin:     General: Skin is warm and dry.      Coloration: Skin is not pale.      Findings: No erythema or rash.   Neurological:      Mental Status: She is alert and oriented to person, place, and time.      Cranial Nerves: No cranial nerve deficit.      Coordination: Coordination normal.   Psychiatric:         Behavior: Behavior normal.         Thought Content: Thought content normal.         Judgment: Judgment normal.           Assessment:       1.  Hyperlipidemia, unspecified hyperlipidemia type    2. Primary hypertension    3. Coronary artery disease of native artery of native heart with stable angina pectoris    4. Stage 3a chronic kidney disease    5. Type 2 diabetes mellitus with stage 3b chronic kidney disease, without long-term current use of insulin    6. Class 3 obesity      Results for orders placed or performed in visit on 05/14/25   Hemoglobin A1C    Collection Time: 05/14/25  1:57 PM   Result Value Ref Range    Hemoglobin A1c 6.5 (H) 4.0 - 5.6 %    Estimated Average Glucose 140 (H) 68 - 131 mg/dL   Comprehensive Metabolic Panel    Collection Time: 05/14/25  1:57 PM   Result Value Ref Range    Sodium 142 136 - 145 mmol/L    Potassium 5.0 3.5 - 5.1 mmol/L    Chloride 102 95 - 110 mmol/L    CO2 27 23 - 29 mmol/L    Glucose 126 (H) 70 - 110 mg/dL    BUN 27 (H) 8 - 23 mg/dL    Creatinine 1.1 0.5 - 1.4 mg/dL    Calcium 9.9 8.7 - 10.5 mg/dL    Protein Total 6.9 6.0 - 8.4 gm/dL    Albumin 3.9 3.5 - 5.2 g/dL    Bilirubin Total 0.2 0.1 - 1.0 mg/dL    ALP 71 40 - 150 unit/L    AST 18 11 - 45 unit/L    ALT 19 10 - 44 unit/L    Anion Gap 13 8 - 16 mmol/L    eGFR 55 (L) >60 mL/min/1.73/m2   TSH    Collection Time: 05/14/25  1:57 PM   Result Value Ref Range    TSH 2.897 0.400 - 4.000 uIU/mL   CBC with Differential    Collection Time: 05/14/25  1:57 PM   Result Value Ref Range    WBC 9.30 3.90 - 12.70 K/uL    RBC 4.53 4.00 - 5.40 M/uL    HGB 13.2 12.0 - 16.0 gm/dL    HCT 42.1 37.0 - 48.5 %    MCV 93 82 - 98 fL    MCH 29.1 27.0 - 31.0 pg    MCHC 31.4 (L) 32.0 - 36.0 g/dL    RDW 15.9 (H) 11.5 - 14.5 %    Platelet Count 291 150 - 450 K/uL    MPV 10.0 9.2 - 12.9 fL    Nucleated RBC 0 <=0 /100 WBC    Neut % 31.8 (L) 38 - 73 %    Lymph % 55.8 (H) 18 - 48 %    Mono % 9.1 4 - 15 %    Eos % 2.4 <=8 %    Basophil % 0.6 <=1.9 %    Imm Grans % 0.3 0.0 - 0.5 %    Neut # 2.95 1.8 - 7.7 K/uL    Lymph # 5.19 (H) 1 - 4.8 K/uL    Mono # 0.85 0.3 - 1 K/uL    Eos # 0.22 <=0.5 K/uL     Baso # 0.06 <=0.2 K/uL    Imm Grans # 0.03 0.00 - 0.04 K/uL     *Note: Due to a large number of results and/or encounters for the requested time period, some results have not been displayed. A complete set of results can be found in Results Review.       Current Medications[1]     Lab Results   Component Value Date    WBC 9.30 05/14/2025    RBC 4.53 05/14/2025    HGB 13.2 05/14/2025    HCT 42.1 05/14/2025    MCV 93 05/14/2025    MCH 29.1 05/14/2025    MCHC 31.4 (L) 05/14/2025    RDW 15.9 (H) 05/14/2025     05/14/2025    MPV 10.0 05/14/2025    GRAN 4.3 01/29/2025    GRAN 54.5 01/29/2025    LYMPH 55.8 (H) 05/14/2025    LYMPH 5.19 (H) 05/14/2025    MONO 9.1 05/14/2025    MONO 0.85 05/14/2025    EOS 2.4 05/14/2025    EOS 0.22 05/14/2025    BASO 0.05 01/29/2025    EOSINOPHIL 2.3 01/29/2025    BASOPHIL 0.6 05/14/2025    BASOPHIL 0.06 05/14/2025    MG 2.2 12/21/2024        CMP  Lab Results   Component Value Date     05/14/2025    K 5.0 05/14/2025     05/14/2025    CO2 27 05/14/2025     (H) 05/14/2025    BUN 27 (H) 05/14/2025    CREATININE 1.1 05/14/2025    CALCIUM 9.9 05/14/2025    PROT 6.9 05/14/2025    ALBUMIN 3.9 05/14/2025    BILITOT 0.2 05/14/2025    ALKPHOS 71 05/14/2025    AST 18 05/14/2025    ALT 19 05/14/2025    ANIONGAP 13 05/14/2025    ESTGFRAFRICA 31 (A) 02/14/2022    EGFRNONAA 27 (A) 02/14/2022        Lab Results   Component Value Date    LABBLOO No growth after 5 days. 03/10/2019    LABURIN No growth 09/16/2021            Results for orders placed or performed during the hospital encounter of 12/21/24   EKG 12-lead    Collection Time: 12/21/24  8:39 PM   Result Value Ref Range    QRS Duration 88 ms    OHS QTC Calculation 438 ms    Narrative    Test Reason : R07.9,    Vent. Rate :  73 BPM     Atrial Rate :  73 BPM     P-R Int : 200 ms          QRS Dur :  88 ms      QT Int : 398 ms       P-R-T Axes :  58  65  37 degrees    QTcB Int : 438 ms    Normal sinus rhythm  Low septal  forces  Abnormal ECG  When compared with ECG of 21-Dec-2024 10:40,  Questionable change in initial forces of Septal leads  Confirmed by Alvin Jaquez ([id number]) on 12/22/2024 10:06:26 AM    Referred By: OLLIEREFERRAL SELF           Confirmed By: Alvin Jaquez                   Plan:       Problem List Items Addressed This Visit          Cardiac/Vascular    Hyperlipidemia - Primary    Labs ordered.  Repatha initiated recently since I last saw her.  40 mg atorvastatin and Zetia to continue.    LDL was 85 on oral therapy.         Relevant Orders    Lipid Panel    Primary hypertension    Her blood pressure today is normal.  Toprol 25 mg will be continued as well as losartan 50 mg.         Coronary artery disease with stable angina pectoris    She is chest pain-free.  Aspirin and Plavix to continue.    Stent was implanted December 2024.  It was performed for abnormal stress test and angina.            Renal/    Stage 3a chronic kidney disease    Condition stable.  Most recent estimated GFR 55.            Endocrine    Type 2 diabetes mellitus with stage 3b chronic kidney disease, without long-term current use of insulin    Current therapy to continue.  She is on metformin.  Most recent hemoglobin A1c 6.5.         Class 3 obesity    Weight loss encouraged.               I made a four-month follow-up.      She is tolerating dual anti-platelet therapy well.  She is angina free.  Blood pressures are stable.    I ordered lipid lab values to be done in about 3-4 weeks.             Yariel Lantigua MD  05/23/2025   3:00 PM                 [1]   Current Outpatient Medications:     albuterol (PROVENTIL/VENTOLIN HFA) 90 mcg/actuation inhaler, Inhale 1 puff into the lungs every 4 (four) hours as needed for Wheezing., Disp: 18 g, Rfl: 0    artificial tears (ISOPTO TEARS) 0.5 % ophthalmic solution, Place 1 drop into the right eye 4 (four) times daily as needed., Disp: , Rfl:     aspirin (ECOTRIN) 81 MG EC tablet, Take 1 tablet (81 mg total) by  mouth once daily., Disp: 90 tablet, Rfl: 0    atorvastatin (LIPITOR) 40 MG tablet, Take 1 tablet (40 mg total) by mouth once daily., Disp: 90 tablet, Rfl: 3    cetirizine (ZYRTEC) 5 MG tablet, Take 1 tablet (5 mg total) by mouth once daily., Disp: 30 tablet, Rfl: 1    chlorhexidine (PERIDEX) 0.12 % solution, , Disp: , Rfl:     clopidogreL (PLAVIX) 75 mg tablet, Take 1 tablet (75 mg total) by mouth once daily., Disp: 90 tablet, Rfl: 3    diclofenac sodium (VOLTAREN) 1 % Gel, Apply topically once daily., Disp: 720 g, Rfl: 0    divalproex ER (DEPAKOTE ER) 500 MG Tb24 24 hr tablet, Take 1 tablet in the morning and 2 tablets at bedtime, Disp: 270 tablet, Rfl: 1    evolocumab (REPATHA SURECLICK) 140 mg/mL PnIj, Inject 1 mL (140 mg total) into the skin every 14 (fourteen) days., Disp: 2 mL, Rfl: 5    ezetimibe (ZETIA) 10 mg tablet, Take 1 tablet (10 mg total) by mouth once daily., Disp: 90 tablet, Rfl: 3    FLUoxetine 20 MG capsule, Take 1 capsule (20 mg total) by mouth once daily., Disp: 90 capsule, Rfl: 3    fluticasone furoate-vilanteroL (BREO) 100-25 mcg/dose diskus inhaler, Inhale 1 puff into the lungs once daily. Controller, Disp: , Rfl:     fluticasone propionate (FLONASE) 50 mcg/actuation nasal spray, , Disp: 11.1 mL, Rfl: 0    furosemide (LASIX) 20 MG tablet, Take 1 tablet (20 mg total) by mouth once daily., Disp: 90 tablet, Rfl: 3    gabapentin (NEURONTIN) 600 MG tablet, Take 1 tablet (600 mg total) by mouth once daily., Disp: 90 tablet, Rfl: 0    guaiFENesin (MUCINEX) 600 mg 12 hr tablet, Take 1,200 mg by mouth 2 (two) times daily as needed., Disp: , Rfl:     lactulose (CHRONULAC) 20 gram/30 mL Soln, Take 23 mLs (15 g total) by mouth daily as needed (IF YOU DO NOT HAVE A BOWEL MOVEMENT FOR 36-48 HOURS)., Disp: 100 mL, Rfl: 0    levothyroxine (SYNTHROID) 150 MCG tablet, Take 1 tablet (150 mcg total) by mouth before breakfast., Disp: 90 tablet, Rfl: 3    losartan (COZAAR) 50 MG tablet, Take 1 tablet (50 mg total)  by mouth once daily., Disp: 90 tablet, Rfl: 3    metFORMIN (GLUCOPHAGE) 500 MG tablet, Take 2 tablets (1,000 mg total) by mouth 2 (two) times daily with meals., Disp: 360 tablet, Rfl: 0    metoprolol succinate (TOPROL-XL) 25 MG 24 hr tablet, Take 1 tablet (25 mg total) by mouth once daily., Disp: 90 tablet, Rfl: 3    mirtazapine (REMERON) 30 MG tablet, Take 1 tablet (30 mg total) by mouth every evening., Disp: 90 tablet, Rfl: 3    multivitamin (THERAGRAN) per tablet, Take 1 tablet by mouth once daily., Disp: , Rfl:     nitroGLYCERIN (NITROSTAT) 0.4 MG SL tablet, Place 1 tablet (0.4 mg total) under the tongue every 5 (five) minutes as needed for Chest pain., Disp: 25 tablet, Rfl: 1    nystatin-triamcinolone (MYCOLOG) ointment, , Disp: , Rfl:     ondansetron (ZOFRAN) 4 MG tablet, Take 1 tablet (4 mg total) by mouth every 12 (twelve) hours as needed for Nausea., Disp: 270 tablet, Rfl: 0    ACCU-CHEK GUIDE TEST STRIPS Strp, 1 strip by Other route., Disp: , Rfl:     acetaminophen (TYLENOL) 500 MG tablet, Take 500 mg by mouth every 6 (six) hours as needed., Disp: , Rfl:     blood-glucose meter Misc, 1 each by Other route., Disp: , Rfl:     docusate sodium (STOOL SOFTENER) 100 MG capsule, Take 1 capsule (100 mg total) by mouth 2 (two) times daily as needed for Constipation., Disp: 180 capsule, Rfl: 0    galcanezumab-gnlm 120 mg/mL PnIj, Inject 1 mL (120 mg total) into the skin every 28 days. maintenance dose, Disp: 1 mL, Rfl: 2    linaCLOtide (LINZESS) 290 mcg Cap capsule, , Disp: , Rfl:     loratadine (CLARITIN) 10 mg tablet, Take 1 tablet by mouth once daily., Disp: , Rfl:     melatonin 5 mg Chew, Take by mouth., Disp: , Rfl:     nicotine (NICODERM CQ) 7 mg/24 hr, Place 1 patch onto the skin once daily., Disp: 90 patch, Rfl: 0    OLANZapine (ZYPREXA) injection, Inject 5 mg into the muscle every 8 (eight) hours as needed for Agitation., Disp: , Rfl:     pantoprazole (PROTONIX) 40 MG tablet, Take 1 tablet (40 mg total) by  mouth once daily., Disp: 90 tablet, Rfl: 0    QUEtiapine (SEROQUEL) 200 MG Tab, Take 1 tablet (200 mg total) by mouth every evening., Disp: , Rfl:     tiZANidine (ZANAFLEX) 4 MG tablet, Take 0.5 tablets (2 mg total) by mouth every 8 (eight) hours., Disp: 90 tablet, Rfl: 11    TRUE METRIX AIR GLUCOSE METER kit, SMARTSI Kit(s) Via Meter Daily, Disp: , Rfl:     ubrogepant (UBRELVY) 50 mg tablet, TAKE 1 TABLET TWICE DAILY AS NEEDED FOR MIGRAINE, IF SYMPTOMS PERSIST OR RETURN MAY REPEAT AFTER 2 HOURS (MAX 200MG/24HRS), Disp: 16 tablet, Rfl: 0

## 2025-05-24 ENCOUNTER — NURSE TRIAGE (OUTPATIENT)
Dept: ADMINISTRATIVE | Facility: CLINIC | Age: 70
End: 2025-05-24
Payer: MEDICARE

## 2025-05-24 NOTE — TELEPHONE ENCOUNTER
Pt reports she needs her heart medications sent to [facility] pharmacy. Pt reports she is not out of her medication. Care advice to  when office is open. Message routed to pt's cardiologist. Pt verbalizes understanding.   Reason for Disposition   [1] Caller has NON-URGENT medicine question about med that PCP prescribed AND [2] triager unable to answer question    Additional Information   Negative: [1] Intentional drug overdose AND [2] suicidal thoughts or ideas   Negative: MORE THAN A DOUBLE DOSE of a prescription or over-the-counter (OTC) drug   Negative: [1] DOUBLE DOSE (an extra dose or lesser amount) of prescription drug AND [2] any symptoms (e.g., dizziness, nausea, pain, sleepiness)   Negative: [1] DOUBLE DOSE (an extra dose or lesser amount) of over-the-counter (OTC) drug AND [2] any symptoms (e.g., dizziness, nausea, pain, sleepiness)   Negative: Took another person's prescription drug   Negative: [1] DOUBLE DOSE (an extra dose or lesser amount) of prescription drug AND [2] NO symptoms  (Exception: A double dose of antibiotics.)   Negative: Diabetes drug error or overdose (e.g., took wrong type of insulin or took extra dose)   Negative: [1] Prescription not at pharmacy AND [2] was prescribed by PCP recently (Exception: Triager has access to EMR and prescription is recorded there. Go to Home Care and confirm for pharmacy.)   Negative: [1] Pharmacy calling with prescription question AND [2] triager unable to answer question   Negative: [1] Caller has URGENT medicine question about med that PCP or specialist prescribed AND [2] triager unable to answer question   Negative: Medicine patch causing local rash or itching   Negative: [1] Caller has medicine question about med NOT prescribed by PCP AND [2] triager unable to answer question (e.g., compatibility with other med, storage)   Negative: Prescription request for new medicine (not a refill)    Protocols used: Medication Question Call-A-

## 2025-05-24 NOTE — TELEPHONE ENCOUNTER
"Pt called in stating that she saw cardiology yesterday. States forgot to let cardiologist a few things she thinks he needs to know. States when she wakes up in the morning her hands and feet are numb. States has been going on for "a long time." Hx neuropathy. Numbness in hands and arms improves throughout the day.  No new or different tingling to hands and feet. No weakness. Able to get around normally. Pt advised neuropathy can cause those symptoms but assured triage nurse will route to PCP and cardiology to make sure they are aware of this problem.     Pt also has questions about what provider said about her Zetia. States she remembers cardiologist mentioning the medication but does not know if she is supposed to continue taking. Advised per chart that Dr. Lantigua documented that pt is to continue Zetia.     Care advice per protocol. Pt states she just saw both providers last week and does not think she needs a new appt to see them for this chronic issue (hands and feet tingling). States PCP is aware of it but wants to remind both providers- looking for any relative care directives related to this. Declined appt per dispo. Advised to monitor and to call back with concerns or worsening symptoms. Verbalized understanding.     Reason for Disposition   [1] Numbness or tingling in one or both feet AND [2] is a chronic symptom (recurrent or ongoing AND present > 4 weeks)   Caller has medicine question only, adult not sick, AND triager answers question    Additional Information   Negative: [1] SEVERE weakness (i.e., unable to walk or barely able to walk, requires support) AND [2] new-onset or getting worse   Negative: [1] Weakness (i.e., paralysis, loss of muscle strength) of the face, arm / hand, or leg / foot on one side of the body AND [2] sudden onset AND [3] present now  (Exception: Bell's palsy suspected [weakness on one side of the face, over hours to days].)   Negative: [1] Numbness (i.e., loss of sensation) of " the face, arm / hand, or leg / foot on one side of the body AND [2] sudden onset AND [3] present now   Negative: [1] Loss of speech or garbled speech AND [2] sudden onset AND [3] present now   Negative: Difficult to awaken or acting confused (e.g., disoriented, slurred speech)   Negative: Sounds like a life-threatening emergency to the triager   Negative: Headache  (and neurologic deficit)   Negative: [1] Back pain AND [2] numbness (loss of sensation) in groin or rectal area   Negative: [1] Unable to urinate (or only a few drops) > 4 hours AND [2] bladder feels very full (e.g., palpable bladder or strong urge to urinate)   Negative: [1] Loss of bladder or bowel control (urine or bowel incontinence; wetting self, leaking stool) AND [2] new-onset   Negative: [1] Weakness (i.e., paralysis, loss of muscle strength) of the face, arm / hand, or leg / foot on one side of the body AND [2] sudden onset AND [3] brief (now gone)   Negative: [1] Numbness (i.e., loss of sensation) of the face, arm / hand, or leg / foot on one side of the body AND [2] sudden onset AND [3] brief (now gone)   Negative: [1] Loss of speech or garbled speech AND [2] sudden onset AND [3] brief (now gone)   Negative: Bell's palsy suspected (i.e., weakness on only one side of the face, developing over hours to days, no other symptoms)   Negative: Patient sounds very sick or weak to the triager   Negative: Neck pain (and neurologic deficit)   Negative: Back pain (and neurologic deficit)   Negative: [1] Weakness of the face, arm / hand, or leg / foot on one side of the body AND [2] gradual onset (e.g., days to weeks) AND [3] present now   Negative: [1] Numbness (i.e., loss of sensation) of the face, arm / hand, or leg / foot on one side of the body AND [2] gradual onset (e.g., days to weeks) AND [3] present now   Negative: [1] Loss of speech or garbled speech AND [2] gradual onset (e.g., days to weeks) AND [3] present now   Negative: [1] Tingling (e.g.,  pins and needles) of the face, arm / hand, or leg / foot on one side of the body AND [2] present now  (Exceptions: Chronic or recurrent sx lasting > 4 weeks; or from known cause, such as: bumped elbow, carpal tunnel, pinched nerve.)   Negative: [1] Loss of speech or garbled speech AND [2] is a chronic symptom (recurrent or ongoing AND present > 4 weeks)   Negative: [1] Weakness of arm / hand, or leg / foot AND [2] is a chronic symptom (recurrent or ongoing AND present > 4 weeks)   Negative: [1] Numbness or tingling in one or both hands AND [2] is a chronic symptom (recurrent or ongoing AND present > 4 weeks)   Negative: [1] Intentional drug overdose AND [2] suicidal thoughts or ideas   Negative: MORE THAN A DOUBLE DOSE of a prescription or over-the-counter (OTC) drug   Negative: [1] DOUBLE DOSE (an extra dose or lesser amount) of prescription drug AND [2] any symptoms (e.g., dizziness, nausea, pain, sleepiness)   Negative: [1] DOUBLE DOSE (an extra dose or lesser amount) of over-the-counter (OTC) drug AND [2] any symptoms (e.g., dizziness, nausea, pain, sleepiness)   Negative: Took another person's prescription drug   Negative: [1] DOUBLE DOSE (an extra dose or lesser amount) of prescription drug AND [2] NO symptoms  (Exception: A double dose of antibiotics.)   Negative: Diabetes drug error or overdose (e.g., took wrong type of insulin or took extra dose)   Negative: [1] Prescription not at pharmacy AND [2] was prescribed by PCP recently (Exception: Triager has access to EMR and prescription is recorded there. Go to Home Care and confirm for pharmacy.)   Negative: [1] Pharmacy calling with prescription question AND [2] triager unable to answer question   Negative: [1] Caller has URGENT medicine question about med that PCP or specialist prescribed AND [2] triager unable to answer question   Negative: Medicine patch causing local rash or itching   Negative: [1] Caller has medicine question about med NOT prescribed by  PCP AND [2] triager unable to answer question (e.g., compatibility with other med, storage)   Negative: Prescription request for new medicine (not a refill)   Negative: [1] Caller has NON-URGENT medicine question about med that PCP prescribed AND [2] triager unable to answer question   Negative: Caller wants to use a complementary or alternative medicine   Negative: [1] Prescription prescribed recently is not at pharmacy AND [2] triager has access to patient's EMR AND [3] prescription is recorded in the EMR   Negative: [1] DOUBLE DOSE (an extra dose or lesser amount) of over-the-counter (OTC) drug AND [2] NO symptoms   Negative: [1] DOUBLE DOSE (an extra dose or lesser amount) of antibiotic drug AND [2] NO symptoms    Protocols used: Neurologic Deficit-A-AH, Medication Question Call-A-AH

## 2025-05-25 ENCOUNTER — NURSE TRIAGE (OUTPATIENT)
Dept: ADMINISTRATIVE | Facility: CLINIC | Age: 70
End: 2025-05-25
Payer: MEDICARE

## 2025-05-25 NOTE — TELEPHONE ENCOUNTER
Pt calling to let her cardiologist know that yesterday she had some left thigh numbness after washing dishes, that resolved after going to the restroom. Pt also stated she had to take a nitro yesterday and did fine afterwards. Pt would also like to discuss the blockages she has and see if theres anything that can be done about them. Pt requesting call back from doctors office,  Reason for Disposition   [1] Caller requests to speak ONLY to PCP AND [2] NON-URGENT question    Protocols used: PCP Call - No Triage-A-

## 2025-05-26 ENCOUNTER — TELEPHONE (OUTPATIENT)
Dept: INTERNAL MEDICINE | Facility: CLINIC | Age: 70
End: 2025-05-26
Payer: MEDICARE

## 2025-05-26 NOTE — TELEPHONE ENCOUNTER
----- Message from Wiley sent at 5/26/2025  1:52 PM CDT -----  Contact: Pt [phone]  Patient is returning a phone call.Who left a message for the patient: Dr Brock patient know what this is regarding:  YesWould you like a call back, or a response through your [facility] portal?:   CallbackComments:

## 2025-05-26 NOTE — TELEPHONE ENCOUNTER
Called pt; pt answered     Pt thought someone had called her earlier today     Pt also believed she would be receiving a phone call from Dr Herrera based on Nurse Triage note from 5/24/25      Pt has called twice today   This is the first time we have called her today

## 2025-05-26 NOTE — TELEPHONE ENCOUNTER
----- Message from Trista sent at 5/26/2025 10:42 AM CDT -----  Contact: [phone]  Type: Rx Clarification/ Additional Information/ QuestionsMedication:aspirin (ECOTRIN) 81 MG EC tabletWhat questions do you have about the medication, if any? She states the pharmacy is needing to speak to the dr What information is needed?she is waiting for this so she can get her medications sent to her Pharmacy number:[facility] - [place], [place] - [id number] [facility] [address]Bykhw: [phone] Fax: [phone] Pharmacy Contact Name:Comments:

## 2025-05-27 ENCOUNTER — NURSE TRIAGE (OUTPATIENT)
Dept: ADMINISTRATIVE | Facility: CLINIC | Age: 70
End: 2025-05-27
Payer: MEDICARE

## 2025-05-27 NOTE — TELEPHONE ENCOUNTER
Pt called the triage line stating that she would like to speak with her cardiologist. Pt reports calling the triage, 2 days ago and speaking with a nurse. The nurse told her that she would have the cardiologist reach out to her but the patient states that she still have not heard back from them. Upon reviewing the chart, the call was disconnected. Attempted to call patient back x2 no answer, message left.   Reason for Disposition   Caller hangs up    Protocols used: Difficult Caller-A-AH

## 2025-05-28 ENCOUNTER — OFFICE VISIT (OUTPATIENT)
Dept: GASTROENTEROLOGY | Facility: CLINIC | Age: 70
End: 2025-05-28
Payer: MEDICARE

## 2025-05-28 VITALS — HEIGHT: 63 IN | WEIGHT: 227.06 LBS | BODY MASS INDEX: 40.23 KG/M2

## 2025-05-28 DIAGNOSIS — R41.89 COGNITIVE IMPAIRMENT: ICD-10-CM

## 2025-05-28 DIAGNOSIS — Z87.11 HISTORY OF PEPTIC ULCER DISEASE: ICD-10-CM

## 2025-05-28 DIAGNOSIS — K59.04 CHRONIC IDIOPATHIC CONSTIPATION: Primary | ICD-10-CM

## 2025-05-28 PROCEDURE — 99999 PR PBB SHADOW E&M-EST. PATIENT-LVL V: CPT | Mod: PBBFAC,HCNC,,
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
## 2025-05-29 ENCOUNTER — TELEPHONE (OUTPATIENT)
Dept: ENDOSCOPY | Facility: HOSPITAL | Age: 70
End: 2025-05-29
Payer: MEDICAID

## 2025-05-29 ENCOUNTER — TELEPHONE (OUTPATIENT)
Dept: OTOLARYNGOLOGY | Facility: CLINIC | Age: 70
End: 2025-05-29
Payer: MEDICAID

## 2025-05-29 ENCOUNTER — TELEPHONE (OUTPATIENT)
Dept: GASTROENTEROLOGY | Facility: CLINIC | Age: 70
End: 2025-05-29
Payer: MEDICAID

## 2025-05-29 DIAGNOSIS — K21.9 GASTROESOPHAGEAL REFLUX DISEASE, UNSPECIFIED WHETHER ESOPHAGITIS PRESENT: Primary | ICD-10-CM

## 2025-05-29 RX ORDER — ESOMEPRAZOLE MAGNESIUM 40 MG/1
40 CAPSULE, DELAYED RELEASE ORAL
Qty: 90 CAPSULE | Refills: 3 | Status: SHIPPED | OUTPATIENT
Start: 2025-05-29 | End: 2026-05-29

## 2025-05-29 NOTE — TELEPHONE ENCOUNTER
She wants to know if you can give her Nexium.  She is currently Protonix. She says she was on Nexium previously. She says it worked better.

## 2025-05-29 NOTE — TELEPHONE ENCOUNTER
----- Message from Namita sent at 5/29/2025  9:36 AM CDT -----  Regarding: Medication  Contact: pt [phone]  Type:  Needs Medical AdviceWho Called: Joseluis called in wanting to change medication to Nexium Would the patient rather a call back or a response via [facility]? Call back Best Call Back Number: pt [phone] Additional Information:

## 2025-05-30 ENCOUNTER — TELEPHONE (OUTPATIENT)
Dept: ENDOSCOPY | Facility: HOSPITAL | Age: 70
End: 2025-05-30
Payer: MEDICAID

## 2025-05-30 ENCOUNTER — TELEPHONE (OUTPATIENT)
Dept: GASTROENTEROLOGY | Facility: CLINIC | Age: 70
End: 2025-05-30
Payer: MEDICARE

## 2025-05-30 VITALS — BODY MASS INDEX: 40.22 KG/M2 | WEIGHT: 227 LBS | HEIGHT: 63 IN

## 2025-05-30 DIAGNOSIS — K21.9 GASTROESOPHAGEAL REFLUX DISEASE, UNSPECIFIED WHETHER ESOPHAGITIS PRESENT: ICD-10-CM

## 2025-05-30 DIAGNOSIS — R10.9 ABDOMINAL PAIN, UNSPECIFIED ABDOMINAL LOCATION: Primary | ICD-10-CM

## 2025-05-30 NOTE — TELEPHONE ENCOUNTER
"Yaneth Moe NP  [facility] Endoscop[facility] Patients  Caller: Unspecified (2 days ago,  2:38 PM)  Procedure: EGD/Colonoscopy    Diagnosis: Screening colonoscopy, Abdominal pain, and GERD    Procedure Timing: Within 12 weeks    *If within 4 weeks selected, please sultana as high priority*    *If greater than 12 weeks, please select "5-12 weeks" and delay sending until 3 months prior to requested date*    Location: Any Site    Additional Scheduling Information: Blood thinners    Prep Specifications:Extended/Constipation prep    Is the patient taking a GLP-1 Agonist:no    Have you attached a patient to this message: yes  "

## 2025-05-30 NOTE — TELEPHONE ENCOUNTER
Patient is scheduled for a Upper Endoscopy (EGD) on 6/27/25 with Dr. SYED Nunes  Referral for procedure from  Yaneth Izaguirre NP Prevot, Kellie, RN  Yes, lets hold off. She has cologuard ordered. If that is positive I will talk to Dr. Moraes to see if he thinks he would be able to advance the scope with a double balloon. Lets just do EGD at this time.          Previous Messages       ----- Message -----  From: Nanette Garcias RN  Sent: 5/29/2025   2:05 PM CDT  To: Yaneth Moe NP    Patient called to schedule EGD and Colonoscopy. Per last message, did you want to hold off on scheduling the Colonoscopy for now and proceed with the EGD? Please advise.    Thanks,  Nanette Garcias

## 2025-06-02 ENCOUNTER — TELEPHONE (OUTPATIENT)
Dept: ENDOSCOPY | Facility: HOSPITAL | Age: 70
End: 2025-06-02
Payer: MEDICARE

## 2025-06-03 ENCOUNTER — TELEPHONE (OUTPATIENT)
Facility: CLINIC | Age: 70
End: 2025-06-03
Payer: MEDICARE

## 2025-06-04 ENCOUNTER — TELEPHONE (OUTPATIENT)
Dept: GASTROENTEROLOGY | Facility: CLINIC | Age: 70
End: 2025-06-04
Payer: MEDICARE

## 2025-06-04 ENCOUNTER — PATIENT MESSAGE (OUTPATIENT)
Dept: INTERNAL MEDICINE | Facility: CLINIC | Age: 70
End: 2025-06-04
Payer: MEDICARE

## 2025-06-04 ENCOUNTER — TELEPHONE (OUTPATIENT)
Dept: INTERNAL MEDICINE | Facility: CLINIC | Age: 70
End: 2025-06-04
Payer: MEDICARE

## 2025-06-04 ENCOUNTER — TELEPHONE (OUTPATIENT)
Facility: CLINIC | Age: 70
End: 2025-06-04
Payer: MEDICARE

## 2025-06-04 DIAGNOSIS — R60.9 EDEMA, UNSPECIFIED TYPE: ICD-10-CM

## 2025-06-04 DIAGNOSIS — M79.89 LEG SWELLING: ICD-10-CM

## 2025-06-04 DIAGNOSIS — K21.9 GASTROESOPHAGEAL REFLUX DISEASE, UNSPECIFIED WHETHER ESOPHAGITIS PRESENT: Primary | ICD-10-CM

## 2025-06-04 RX ORDER — PANTOPRAZOLE SODIUM 40 MG/1
40 TABLET, DELAYED RELEASE ORAL DAILY
Qty: 90 TABLET | Refills: 0 | Status: SHIPPED | OUTPATIENT
Start: 2025-06-04 | End: 2025-09-02

## 2025-06-04 RX ORDER — FUROSEMIDE 20 MG/1
20 TABLET ORAL DAILY
Qty: 90 TABLET | Refills: 3 | OUTPATIENT
Start: 2025-06-04 | End: 2026-06-04

## 2025-06-05 ENCOUNTER — TELEPHONE (OUTPATIENT)
Dept: INTERNAL MEDICINE | Facility: CLINIC | Age: 70
End: 2025-06-05
Payer: MEDICARE

## 2025-06-06 DIAGNOSIS — R60.9 EDEMA, UNSPECIFIED TYPE: ICD-10-CM

## 2025-06-06 DIAGNOSIS — M79.89 LEG SWELLING: ICD-10-CM

## 2025-06-06 RX ORDER — LEVOTHYROXINE SODIUM 150 UG/1
150 TABLET ORAL
Qty: 90 TABLET | Refills: 1 | Status: SHIPPED | OUTPATIENT
Start: 2025-06-06

## 2025-06-06 RX ORDER — FUROSEMIDE 20 MG/1
20 TABLET ORAL DAILY
Qty: 90 TABLET | Refills: 3 | OUTPATIENT
Start: 2025-06-06 | End: 2026-06-06

## 2025-06-09 ENCOUNTER — TELEPHONE (OUTPATIENT)
Dept: ENDOSCOPY | Facility: HOSPITAL | Age: 70
End: 2025-06-09
Payer: MEDICARE

## 2025-06-09 NOTE — TELEPHONE ENCOUNTER
Attempted contact again regarding colonoscopy and neurology clearance.  No answer-left direct line phone number to return call.

## 2025-06-10 ENCOUNTER — OFFICE VISIT (OUTPATIENT)
Dept: PSYCHIATRY | Facility: CLINIC | Age: 70
End: 2025-06-10
Payer: MEDICARE

## 2025-06-10 ENCOUNTER — TELEPHONE (OUTPATIENT)
Dept: ENDOSCOPY | Facility: HOSPITAL | Age: 70
End: 2025-06-10
Payer: MEDICARE

## 2025-06-10 VITALS
SYSTOLIC BLOOD PRESSURE: 105 MMHG | WEIGHT: 228.38 LBS | HEART RATE: 80 BPM | DIASTOLIC BLOOD PRESSURE: 72 MMHG | BODY MASS INDEX: 40.46 KG/M2

## 2025-06-10 DIAGNOSIS — G47.00 INSOMNIA DISORDER WITH NON-SLEEP DISORDER MENTAL COMORBIDITY: ICD-10-CM

## 2025-06-10 DIAGNOSIS — F41.9 ANXIETY: ICD-10-CM

## 2025-06-10 DIAGNOSIS — F25.1 SCHIZOAFFECTIVE DISORDER, DEPRESSIVE TYPE: ICD-10-CM

## 2025-06-10 PROCEDURE — 99999 PR PBB SHADOW E&M-EST. PATIENT-LVL IV: CPT | Mod: PBBFAC,HCNC,, | Performed by: NURSE PRACTITIONER

## 2025-06-10 PROCEDURE — 1160F RVW MEDS BY RX/DR IN RCRD: CPT | Mod: CPTII,HCNC,S$GLB, | Performed by: NURSE PRACTITIONER

## 2025-06-10 PROCEDURE — 99214 OFFICE O/P EST MOD 30 MIN: CPT | Mod: HCNC,S$GLB,, | Performed by: NURSE PRACTITIONER

## 2025-06-10 PROCEDURE — 3074F SYST BP LT 130 MM HG: CPT | Mod: CPTII,HCNC,S$GLB, | Performed by: NURSE PRACTITIONER

## 2025-06-10 PROCEDURE — 4010F ACE/ARB THERAPY RXD/TAKEN: CPT | Mod: CPTII,HCNC,S$GLB, | Performed by: NURSE PRACTITIONER

## 2025-06-10 PROCEDURE — 1157F ADVNC CARE PLAN IN RCRD: CPT | Mod: CPTII,HCNC,S$GLB, | Performed by: NURSE PRACTITIONER

## 2025-06-10 PROCEDURE — 1159F MED LIST DOCD IN RCRD: CPT | Mod: CPTII,HCNC,S$GLB, | Performed by: NURSE PRACTITIONER

## 2025-06-10 PROCEDURE — 3008F BODY MASS INDEX DOCD: CPT | Mod: CPTII,HCNC,S$GLB, | Performed by: NURSE PRACTITIONER

## 2025-06-10 PROCEDURE — 3066F NEPHROPATHY DOC TX: CPT | Mod: CPTII,HCNC,S$GLB, | Performed by: NURSE PRACTITIONER

## 2025-06-10 PROCEDURE — 90833 PSYTX W PT W E/M 30 MIN: CPT | Mod: HCNC,S$GLB,, | Performed by: NURSE PRACTITIONER

## 2025-06-10 PROCEDURE — 3078F DIAST BP <80 MM HG: CPT | Mod: CPTII,HCNC,S$GLB, | Performed by: NURSE PRACTITIONER

## 2025-06-10 PROCEDURE — 3061F NEG MICROALBUMINURIA REV: CPT | Mod: CPTII,HCNC,S$GLB, | Performed by: NURSE PRACTITIONER

## 2025-06-10 PROCEDURE — 3044F HG A1C LEVEL LT 7.0%: CPT | Mod: CPTII,HCNC,S$GLB, | Performed by: NURSE PRACTITIONER

## 2025-06-10 RX ORDER — DIVALPROEX SODIUM 500 MG/1
TABLET, FILM COATED, EXTENDED RELEASE ORAL
Qty: 270 TABLET | Refills: 3 | Status: SHIPPED | OUTPATIENT
Start: 2025-06-10

## 2025-06-10 RX ORDER — MIRTAZAPINE 30 MG/1
30 TABLET, FILM COATED ORAL NIGHTLY
Qty: 90 TABLET | Refills: 3 | Status: SHIPPED | OUTPATIENT
Start: 2025-06-10

## 2025-06-10 RX ORDER — QUETIAPINE FUMARATE 200 MG/1
TABLET, FILM COATED ORAL
Qty: 270 TABLET | Refills: 3 | Status: SHIPPED | OUTPATIENT
Start: 2025-06-10

## 2025-06-10 RX ORDER — FLUOXETINE 20 MG/1
20 CAPSULE ORAL DAILY
Qty: 90 CAPSULE | Refills: 3 | Status: SHIPPED | OUTPATIENT
Start: 2025-06-10

## 2025-06-10 NOTE — TELEPHONE ENCOUNTER
Received call from pt to cancel procedure, pt stated she changed her mind and does not want to have procedure. Procedure cancelled.

## 2025-06-10 NOTE — PATIENT INSTRUCTIONS
Ordered labs:   Valproic level  Prozac 20 mg daily  Depakote to  mg in the morning and 1000 mg bedtime  Seroquel 200 mg in the morning and 400 mg at bedtime  Remeron 30 mg po qhs

## 2025-06-10 NOTE — PROGRESS NOTES
"  Outpatient Psychiatry Follow-Up Visit (MD/NP)    6/10/2025    Clinical Status of Patient:  Outpatient (Ambulatory)    Chief Complaint:  Joselius Triplett is a 69 y.o. female who presents today for follow-up of mood disorder and anxiety.  Met with patient.      Last visit was:   3/17/25 Chart and  reviewed.     Interval History and Content of Current Session:  Current Psychiatric Medications/changes per Dr. Cole  Ordered labs:  Most recent Valproic level (57.6)  Restart Prozac 20 mg daily  Depakote to  mg in the morning and 1000 mg bedtime  Seroquel 200 mg in the morning and 400 mg at bedtime  Remeron 30 mg po qhs    Pt presents bright affect and euthymic mood. Pt states, "I've been doing well" .Reports effective response to medications and denies side effects. Pt appears at baseline. Appearance is neatly groomed and well-kempt.  Compliant with medications and denies side effects. Denies any current unmanaged psychosis, mood, anxiety, depression, or insomnia symptoms. Denies SI/HI/AVH. Reports compliance with medications.     Valproic Acid level (57.6)    Psychotherapy:  Target symptoms: anxiety , mood disorder  Why chosen therapy is appropriate versus another modality: relevant to diagnosis  Outcome monitoring methods: self-report  Therapeutic intervention type: insight oriented psychotherapy  Topics discussed/themes: building skills sets for symptom management, symptom recognition  The patient's response to the intervention is accepting. The patient's progress toward treatment goals is limited.   Duration of intervention: 20 minutes.    Review of Systems   PSYCHIATRIC: Pertinant items are noted in the narrative.  CONSTITUTIONAL: No weight gain or loss.   MUSCULOSKELETAL: No pain or stiffness of the joints.  NEUROLOGIC: No weakness, sensory changes, seizures, confusion, memory loss, tremor or other abnormal movements.  ENDOCRINE: No polydipsia or polyuria.  INTEGUMENTARY: No rashes or " 22-Nov-2024 lacerations.  EYES: No exophthalmos, jaundice or blindness.  ENT: No dizziness, tinnitus or hearing loss.  RESPIRATORY: No shortness of breath.  CARDIOVASCULAR: No tachycardia or chest pain.  GASTROINTESTINAL: No nausea, vomiting, pain, constipation or diarrhea.  GENITOURINARY: No frequency, dysuria or sexual dysfunction.  HEMATOLOGIC/LYMPHATIC: No excessive bleeding, prolonged or excessive bleeding after dental extraction/injury.  ALLERGIC/IMMUNOLOGIC: No allergic response to materials, foods or animals at this time.    Past Medical, Family and Social History: The patient's past medical, family and social history have been reviewed and updated as appropriate within the electronic medical record - see encounter notes.    Compliance: yes    Side effects: None    Risk Parameters:  Patient reports no suicidal ideation  Patient reports no homicidal ideation  Patient reports no self-injurious behavior  Patient reports no violent behavior    Exam (detailed: at least 9 elements; comprehensive: all 15 elements)   Constitutional  Vitals:  Most recent vital signs, dated less than 90 days prior to this appointment, were reviewed.   Vitals:    06/10/25 1411   BP: 105/72   Pulse: 80   Weight: 103.6 kg (228 lb 6.3 oz)      General:  unremarkable, age appropriate     Musculoskeletal  Muscle Strength/Tone:  no tremor, no tic   Gait & Station:  uses walker     Psychiatric  Speech:  no latency; no press   Mood & Affect:  euthymic  congruent and appropriate   Thought Process:  normal and logical   Associations:  tangential   Thought Content:  normal, no suicidality, no homicidality, delusions, or paranoia   Insight:  has awareness of illness   Judgement: behavior is adequate to circumstances   Orientation:  grossly intact   Memory: intact for content of interview   Language: grossly intact   Attention Span & Concentration:  able to focus   Fund of Knowledge:  intact and appropriate to age and level of education     Assessment and  Diagnosis   Status/Progress: Based on the examination today, the patient's problem(s) is/are improved.  New problems have not been presented today.   Co-morbidities and Lack of compliance are not complicating management of the primary condition.  There are no active rule-out diagnoses for this patient at this time.     General Impression:       ICD-10-CM ICD-9-CM   1. Schizoaffective disorder, depressive type  F25.1 295.70   2. Insomnia disorder with non-sleep disorder mental comorbidity  G47.00 780.52   3. Anxiety  F41.9 300.00     Intervention/Counseling/Treatment Plan   Medication Management: Continue current medications. The risks and benefits of medication were discussed with the patient.   Ordered labs:   Valproic level  Prozac 20 mg daily  Depakote to  mg in the morning and 1000 mg bedtime  Seroquel 200 mg in the morning and 400 mg at bedtime  Remeron 30 mg po qhss    Return to Clinic: 6 months     Risks, benefits, side effects and alternative treatments discussed with patient. Patient agrees with the current plan as documented.  Encouraged Patient to keep future appointments.  Take medications as prescribed and abstain from substance abuse.  Pt to present to ED for thoughts to harm herself or others

## 2025-06-11 ENCOUNTER — TELEPHONE (OUTPATIENT)
Facility: CLINIC | Age: 70
End: 2025-06-11
Payer: MEDICARE

## 2025-06-11 NOTE — TELEPHONE ENCOUNTER
Returned patients call regarding medication. Patient stated she received medication but didn't use it. I Instructed patient to reach to her pharmacy for instructions on that. And to reach out to insurance company about cost of medication. Patient stated she switched neuro provider as well.    Copied from CRM #[id number]. Topic: General Inquiry - Patient Advice  >> Tao 10, 2025  4:40 PM Soila wrote:  Consult/Advisory    Name Of Caller:Joseluis Triplett        Contact Preference:[phone] (home) [phone] (work)     Nature of call:  Pt is calling regarding injection that was supposed to be taken of her bill please call to advise.

## 2025-06-13 ENCOUNTER — LAB VISIT (OUTPATIENT)
Dept: LAB | Facility: HOSPITAL | Age: 70
End: 2025-06-13
Attending: INTERNAL MEDICINE
Payer: MEDICARE

## 2025-06-13 ENCOUNTER — PATIENT MESSAGE (OUTPATIENT)
Dept: CARDIOLOGY | Facility: CLINIC | Age: 70
End: 2025-06-13
Payer: MEDICARE

## 2025-06-13 DIAGNOSIS — J44.9 COPD WITHOUT EXACERBATION: ICD-10-CM

## 2025-06-13 DIAGNOSIS — F25.1 SCHIZOAFFECTIVE DISORDER, DEPRESSIVE TYPE: ICD-10-CM

## 2025-06-13 LAB — VALPROATE SERPL-MCNC: 59.4 UG/ML (ref 50–100)

## 2025-06-13 PROCEDURE — 80164 ASSAY DIPROPYLACETIC ACD TOT: CPT | Mod: HCNC

## 2025-06-13 NOTE — TELEPHONE ENCOUNTER
Copied from CRM #[id number]. Topic: Medications - Medication Refill  >> Jun 13, 2025  8:41 AM Vicki wrote:  Requesting an RX refill or new RX.    Is this a refill or new RX:  Refill     RX name and strength (fluticasone furoate-vilanteroL (BREO) 100-25 mcg/dose diskus inhaler     Is this a 30 day or 90 day RX: 90    Pharmacy name and phone # (    [facility]/pharmacy #[id number] - [place] - [address]  [address] [address]  Phone: [phone] Fax: [phone]     Who called and call back number: Ms Triplett  (Patient ) [phone]    The doctors have asked that we provide their patients with the following 2 reminders -- prescription refills can take up to 72 hours, and a friendly reminder that in the future you can use your [facility] account to request refills:

## 2025-06-14 RX ORDER — FLUTICASONE FUROATE AND VILANTEROL 100; 25 UG/1; UG/1
1 POWDER RESPIRATORY (INHALATION) DAILY
Start: 2025-06-14

## 2025-06-16 ENCOUNTER — TELEPHONE (OUTPATIENT)
Dept: ORTHOPEDICS | Facility: CLINIC | Age: 70
End: 2025-06-16
Payer: MEDICARE

## 2025-06-16 DIAGNOSIS — M25.562 PAIN IN BOTH KNEES, UNSPECIFIED CHRONICITY: Primary | ICD-10-CM

## 2025-06-16 DIAGNOSIS — M25.561 PAIN IN BOTH KNEES, UNSPECIFIED CHRONICITY: Primary | ICD-10-CM

## 2025-06-17 ENCOUNTER — TELEPHONE (OUTPATIENT)
Dept: CARDIOLOGY | Facility: CLINIC | Age: 70
End: 2025-06-17
Payer: MEDICARE

## 2025-06-17 ENCOUNTER — TELEPHONE (OUTPATIENT)
Dept: INTERNAL MEDICINE | Facility: CLINIC | Age: 70
End: 2025-06-17
Payer: MEDICARE

## 2025-06-17 NOTE — TELEPHONE ENCOUNTER
Copied from CRM #[id number]. Topic: General Inquiry - Patient Advice  >> Jun 17, 2025 12:27 PM Jayne wrote:  .1MEDICALADVICE     Patient is calling for Medical Advice regarding: pt is calling ans states that she feels much better today.     How long has patient had these symptoms:    Pharmacy name and phone#:    Patient wants a call back or thru [facility], provide patient's call back phone number:    Comments:    Please advise patient replies from provider may take up to 48 hours.

## 2025-06-17 NOTE — TELEPHONE ENCOUNTER
----- Message from Mike Gandara sent at 6/17/2025  9:36 AM CDT -----  Regarding: FW: Pain    ----- Message -----  From: Soledad Fine  Sent: 6/17/2025   9:20 AM CDT  To: [facility] Staff  Subject: Pain                                             Patient have pain around her stent yesterday and today. Please call back @ [phone]. Thank you Soledad

## 2025-06-19 DIAGNOSIS — J31.0 CHRONIC RHINITIS: ICD-10-CM

## 2025-06-19 DIAGNOSIS — I10 PRIMARY HYPERTENSION: ICD-10-CM

## 2025-06-19 DIAGNOSIS — I25.118 CORONARY ARTERY DISEASE OF NATIVE ARTERY OF NATIVE HEART WITH STABLE ANGINA PECTORIS: ICD-10-CM

## 2025-06-19 DIAGNOSIS — J30.2 SEASONAL ALLERGIES: ICD-10-CM

## 2025-06-19 DIAGNOSIS — J44.9 COPD WITHOUT EXACERBATION: ICD-10-CM

## 2025-06-19 DIAGNOSIS — F41.9 ANXIETY: ICD-10-CM

## 2025-06-19 DIAGNOSIS — K21.9 GASTROESOPHAGEAL REFLUX DISEASE, UNSPECIFIED WHETHER ESOPHAGITIS PRESENT: ICD-10-CM

## 2025-06-19 DIAGNOSIS — M79.89 LEG SWELLING: ICD-10-CM

## 2025-06-19 DIAGNOSIS — G62.9 NEUROPATHY: ICD-10-CM

## 2025-06-19 DIAGNOSIS — E78.5 HYPERLIPIDEMIA, UNSPECIFIED HYPERLIPIDEMIA TYPE: ICD-10-CM

## 2025-06-19 DIAGNOSIS — F25.1 SCHIZOAFFECTIVE DISORDER, DEPRESSIVE TYPE: ICD-10-CM

## 2025-06-19 DIAGNOSIS — G47.00 INSOMNIA DISORDER WITH NON-SLEEP DISORDER MENTAL COMORBIDITY: ICD-10-CM

## 2025-06-19 DIAGNOSIS — K59.04 CHRONIC IDIOPATHIC CONSTIPATION: ICD-10-CM

## 2025-06-19 DIAGNOSIS — R60.9 EDEMA, UNSPECIFIED TYPE: ICD-10-CM

## 2025-06-19 NOTE — TELEPHONE ENCOUNTER
----- Message from Soledad sent at 6/19/2025 12:34 PM CDT -----  Regarding: Refill  Patient calling to get a refill  for all her heart medication. Please send it to [facility]/pharmacy #[id number] - [place]   Phone: [phone]  Fax: [phone]      Thanks

## 2025-06-19 NOTE — TELEPHONE ENCOUNTER
Copied from CRM #[id number]. Topic: General Inquiry - Patient Advice  >> Jun 19, 2025 12:22 PM Brianna wrote:  .1MEDICALADVICE     Patient is calling for Medical Advice regarding:Good Afternoon, Patient need all Rx refilled. Patient state to look at portal message she sent. After today she will be deleting the patient portal    How long has patient had these symptoms:    Pharmacy name and phone#:  [facility]/pharmacy #[id number] - [place] - [address]  [address] [address]  Phone: [phone] Fax: [phone]        Patient wants a call back or thru [facility], provide patient's call back phone number:call only @ [phone]     Comments:Patient state need ASAP metFORMIN (GLUCOPHAGE) 500 MG tablet AND fluticasone furoate-vilanteroL (BREO) 100-25 mcg/dose diskus inhaler    Please advise patient replies from provider may take up to 48 hours.

## 2025-06-20 ENCOUNTER — TELEPHONE (OUTPATIENT)
Dept: INTERNAL MEDICINE | Facility: CLINIC | Age: 70
End: 2025-06-20
Payer: MEDICARE

## 2025-06-20 DIAGNOSIS — I25.118 CORONARY ARTERY DISEASE OF NATIVE ARTERY OF NATIVE HEART WITH STABLE ANGINA PECTORIS: ICD-10-CM

## 2025-06-20 DIAGNOSIS — J31.0 CHRONIC RHINITIS: ICD-10-CM

## 2025-06-20 DIAGNOSIS — K21.9 GASTROESOPHAGEAL REFLUX DISEASE, UNSPECIFIED WHETHER ESOPHAGITIS PRESENT: ICD-10-CM

## 2025-06-20 DIAGNOSIS — M79.89 LEG SWELLING: ICD-10-CM

## 2025-06-20 DIAGNOSIS — F25.1 SCHIZOAFFECTIVE DISORDER, DEPRESSIVE TYPE: ICD-10-CM

## 2025-06-20 DIAGNOSIS — E78.5 HYPERLIPIDEMIA, UNSPECIFIED HYPERLIPIDEMIA TYPE: ICD-10-CM

## 2025-06-20 DIAGNOSIS — I10 PRIMARY HYPERTENSION: ICD-10-CM

## 2025-06-20 DIAGNOSIS — F41.9 ANXIETY: ICD-10-CM

## 2025-06-20 DIAGNOSIS — G47.00 INSOMNIA DISORDER WITH NON-SLEEP DISORDER MENTAL COMORBIDITY: ICD-10-CM

## 2025-06-20 DIAGNOSIS — J30.2 SEASONAL ALLERGIES: ICD-10-CM

## 2025-06-20 DIAGNOSIS — K59.04 CHRONIC IDIOPATHIC CONSTIPATION: ICD-10-CM

## 2025-06-20 DIAGNOSIS — R60.9 EDEMA, UNSPECIFIED TYPE: ICD-10-CM

## 2025-06-20 DIAGNOSIS — G62.9 NEUROPATHY: ICD-10-CM

## 2025-06-20 DIAGNOSIS — J44.9 COPD WITHOUT EXACERBATION: ICD-10-CM

## 2025-06-20 RX ORDER — FLUTICASONE FUROATE AND VILANTEROL 100; 25 UG/1; UG/1
1 POWDER RESPIRATORY (INHALATION) DAILY
Start: 2025-06-20

## 2025-06-20 RX ORDER — FLUTICASONE PROPIONATE 50 MCG
SPRAY, SUSPENSION (ML) NASAL
Qty: 11.1 ML | Refills: 0 | OUTPATIENT
Start: 2025-06-20

## 2025-06-20 RX ORDER — ALBUTEROL SULFATE 90 UG/1
1 INHALANT RESPIRATORY (INHALATION) EVERY 4 HOURS PRN
Qty: 18 G | Refills: 0 | OUTPATIENT
Start: 2025-06-20

## 2025-06-20 RX ORDER — FLUPHENAZINE HYDROCHLORIDE 5 MG/1
5 TABLET ORAL NIGHTLY
OUTPATIENT
Start: 2025-06-20

## 2025-06-20 RX ORDER — EZETIMIBE 10 MG/1
10 TABLET ORAL DAILY
Qty: 90 TABLET | Refills: 3 | Status: SHIPPED | OUTPATIENT
Start: 2025-06-20 | End: 2026-06-20

## 2025-06-20 RX ORDER — QUETIAPINE FUMARATE 200 MG/1
TABLET, FILM COATED ORAL
Qty: 270 TABLET | Refills: 3 | OUTPATIENT
Start: 2025-06-20

## 2025-06-20 RX ORDER — EVOLOCUMAB 140 MG/ML
140 INJECTION, SOLUTION SUBCUTANEOUS
Qty: 2 ML | Refills: 5 | Status: CANCELLED | OUTPATIENT
Start: 2025-06-20

## 2025-06-20 RX ORDER — FLUOXETINE 20 MG/1
20 CAPSULE ORAL DAILY
Qty: 90 CAPSULE | Refills: 3 | OUTPATIENT
Start: 2025-06-20

## 2025-06-20 RX ORDER — ASPIRIN 81 MG/1
81 TABLET ORAL DAILY
Qty: 90 TABLET | Refills: 0 | OUTPATIENT
Start: 2025-06-20

## 2025-06-20 RX ORDER — GABAPENTIN 600 MG/1
600 TABLET ORAL DAILY
Qty: 90 TABLET | Refills: 0 | OUTPATIENT
Start: 2025-06-20 | End: 2025-09-18

## 2025-06-20 RX ORDER — NYSTATIN AND TRIAMCINOLONE ACETONIDE 100000; 1 [USP'U]/G; MG/G
OINTMENT TOPICAL
OUTPATIENT
Start: 2025-06-20

## 2025-06-20 RX ORDER — NITROGLYCERIN 0.4 MG/1
0.4 TABLET SUBLINGUAL EVERY 5 MIN PRN
Qty: 25 TABLET | Refills: 1 | OUTPATIENT
Start: 2025-06-20 | End: 2026-06-20

## 2025-06-20 RX ORDER — LOSARTAN POTASSIUM 50 MG/1
50 TABLET ORAL DAILY
Qty: 90 TABLET | Refills: 3 | Status: SHIPPED | OUTPATIENT
Start: 2025-06-20 | End: 2026-06-15

## 2025-06-20 RX ORDER — ATORVASTATIN CALCIUM 40 MG/1
40 TABLET, FILM COATED ORAL DAILY
Qty: 90 TABLET | Refills: 3 | Status: CANCELLED | OUTPATIENT
Start: 2025-06-20

## 2025-06-20 RX ORDER — ATORVASTATIN CALCIUM 40 MG/1
40 TABLET, FILM COATED ORAL DAILY
Qty: 90 TABLET | Refills: 3 | Status: SHIPPED | OUTPATIENT
Start: 2025-06-20

## 2025-06-20 RX ORDER — EZETIMIBE 10 MG/1
10 TABLET ORAL DAILY
Qty: 90 TABLET | Refills: 3 | Status: CANCELLED | OUTPATIENT
Start: 2025-06-20 | End: 2026-06-20

## 2025-06-20 RX ORDER — ONDANSETRON 4 MG/1
4 TABLET, FILM COATED ORAL EVERY 12 HOURS PRN
Qty: 270 TABLET | Refills: 0 | OUTPATIENT
Start: 2025-06-20

## 2025-06-20 RX ORDER — FUROSEMIDE 20 MG/1
20 TABLET ORAL DAILY
Qty: 90 TABLET | Refills: 3 | Status: SHIPPED | OUTPATIENT
Start: 2025-06-20 | End: 2026-06-20

## 2025-06-20 RX ORDER — METOPROLOL SUCCINATE 25 MG/1
25 TABLET, EXTENDED RELEASE ORAL DAILY
Qty: 90 TABLET | Refills: 3 | Status: CANCELLED | OUTPATIENT
Start: 2025-06-20 | End: 2026-06-20

## 2025-06-20 RX ORDER — EVOLOCUMAB 140 MG/ML
140 INJECTION, SOLUTION SUBCUTANEOUS
Qty: 2 ML | Refills: 5 | Status: SHIPPED | OUTPATIENT
Start: 2025-06-20

## 2025-06-20 RX ORDER — METOPROLOL SUCCINATE 25 MG/1
25 TABLET, EXTENDED RELEASE ORAL DAILY
Qty: 90 TABLET | Refills: 3 | Status: SHIPPED | OUTPATIENT
Start: 2025-06-20 | End: 2026-06-20

## 2025-06-20 RX ORDER — METFORMIN HYDROCHLORIDE 500 MG/1
1000 TABLET ORAL 2 TIMES DAILY WITH MEALS
Qty: 360 TABLET | Refills: 0 | OUTPATIENT
Start: 2025-06-20 | End: 2025-09-18

## 2025-06-20 RX ORDER — GUAIFENESIN 600 MG/1
1200 TABLET, EXTENDED RELEASE ORAL 2 TIMES DAILY PRN
OUTPATIENT
Start: 2025-06-20

## 2025-06-20 RX ORDER — CHLORHEXIDINE GLUCONATE ORAL RINSE 1.2 MG/ML
SOLUTION DENTAL
OUTPATIENT
Start: 2025-06-20

## 2025-06-20 RX ORDER — DICLOFENAC SODIUM 10 MG/G
GEL TOPICAL DAILY
Qty: 720 G | Refills: 0 | OUTPATIENT
Start: 2025-06-20

## 2025-06-20 RX ORDER — DIVALPROEX SODIUM 500 MG/1
TABLET, FILM COATED, EXTENDED RELEASE ORAL
Qty: 270 TABLET | Refills: 3 | OUTPATIENT
Start: 2025-06-20

## 2025-06-20 RX ORDER — PANTOPRAZOLE SODIUM 40 MG/1
40 TABLET, DELAYED RELEASE ORAL DAILY
Qty: 90 TABLET | Refills: 0 | OUTPATIENT
Start: 2025-06-20 | End: 2025-09-18

## 2025-06-20 RX ORDER — CLOPIDOGREL BISULFATE 75 MG/1
75 TABLET ORAL DAILY
Qty: 90 TABLET | Refills: 3 | Status: SHIPPED | OUTPATIENT
Start: 2025-06-20 | End: 2026-06-20

## 2025-06-20 RX ORDER — CLOPIDOGREL BISULFATE 75 MG/1
75 TABLET ORAL DAILY
Qty: 90 TABLET | Refills: 3 | Status: CANCELLED | OUTPATIENT
Start: 2025-06-20 | End: 2026-06-20

## 2025-06-20 RX ORDER — LOSARTAN POTASSIUM 50 MG/1
50 TABLET ORAL DAILY
Qty: 90 TABLET | Refills: 3 | Status: CANCELLED | OUTPATIENT
Start: 2025-06-20 | End: 2026-06-15

## 2025-06-20 RX ORDER — LEVOTHYROXINE SODIUM 150 UG/1
150 TABLET ORAL
Qty: 90 TABLET | Refills: 1 | OUTPATIENT
Start: 2025-06-20

## 2025-06-20 RX ORDER — FUROSEMIDE 20 MG/1
20 TABLET ORAL DAILY
Qty: 90 TABLET | Refills: 3 | Status: CANCELLED | OUTPATIENT
Start: 2025-06-20 | End: 2026-06-20

## 2025-06-20 RX ORDER — CETIRIZINE HYDROCHLORIDE 5 MG/1
5 TABLET ORAL DAILY
Qty: 30 TABLET | Refills: 1 | OUTPATIENT
Start: 2025-06-20

## 2025-06-20 RX ORDER — MIRTAZAPINE 30 MG/1
30 TABLET, FILM COATED ORAL NIGHTLY
Qty: 90 TABLET | Refills: 3 | OUTPATIENT
Start: 2025-06-20

## 2025-06-20 RX ORDER — MULTIVITAMIN
1 TABLET ORAL DAILY
OUTPATIENT
Start: 2025-06-20

## 2025-06-20 NOTE — TELEPHONE ENCOUNTER
Copied from CRM #[id number]. Topic: General Inquiry - Patient Advice  >> Jun 20, 2025 10:04 AM Britt wrote:  .1MEDICALADVICE     Patient is calling for Medical Advice regarding:Medication     How long has patient had these symptoms:NA    Pharmacy name and phone#:[id number] - [place] - [address]  [address] [address]  Phone: [phone] Fax: [phone]       Patient wants a call back or thru [facility], provide patient's call back phone number:call back [phone]    Comments: pt said that she is calling to check if the doctor sent in a rx for all of her medications     Please advise patient replies from provider may take up to 48 hours.

## 2025-06-20 NOTE — TELEPHONE ENCOUNTER
Copied from CRM #[id number]. Topic: General Inquiry - Return Call  >> Jun 20, 2025 11:08 AM Vicki wrote:  Type: Returning a call    Who left a message? Missed call     When did the practice call?  N/A    Does patient know what this is regarding:n/a    Who called and best call back number: Patient phone [phone]    Would the patient rather a call back or a response via [facility]? Call back     Comments: Patient did not provide detail inf. Just states she was retuning a call from Dr. Herrera staff.

## 2025-06-20 NOTE — TELEPHONE ENCOUNTER
Called pt; pt did not answer    Left message asking pt to call back     Pt's rx's were refilled earlier today by JASPER Doherty

## 2025-06-20 NOTE — TELEPHONE ENCOUNTER
Pt is requesting ALL medications be sent to new pharmacy.    Pended medications to requested pharmacy   Some medications have been refilled today by JASPER Doherty - these have been removed from pended medications

## 2025-06-21 RX ORDER — FUROSEMIDE 20 MG/1
20 TABLET ORAL DAILY
Qty: 90 TABLET | Refills: 3 | OUTPATIENT
Start: 2025-06-21 | End: 2026-06-21

## 2025-06-21 RX ORDER — METOPROLOL SUCCINATE 25 MG/1
25 TABLET, EXTENDED RELEASE ORAL DAILY
Qty: 90 TABLET | Refills: 3 | OUTPATIENT
Start: 2025-06-21 | End: 2026-06-21

## 2025-06-21 RX ORDER — DIVALPROEX SODIUM 500 MG/1
TABLET, FILM COATED, EXTENDED RELEASE ORAL
Qty: 270 TABLET | Refills: 3 | OUTPATIENT
Start: 2025-06-21

## 2025-06-21 RX ORDER — DICLOFENAC SODIUM 10 MG/G
GEL TOPICAL DAILY
Qty: 720 G | Refills: 0 | OUTPATIENT
Start: 2025-06-21

## 2025-06-21 RX ORDER — ASPIRIN 81 MG/1
81 TABLET ORAL DAILY
Qty: 90 TABLET | Refills: 0 | OUTPATIENT
Start: 2025-06-21

## 2025-06-21 RX ORDER — FLUTICASONE PROPIONATE 50 MCG
SPRAY, SUSPENSION (ML) NASAL
Qty: 11.1 ML | Refills: 0 | OUTPATIENT
Start: 2025-06-21

## 2025-06-21 RX ORDER — LEVOTHYROXINE SODIUM 150 UG/1
150 TABLET ORAL
Qty: 90 TABLET | Refills: 1 | OUTPATIENT
Start: 2025-06-21

## 2025-06-21 RX ORDER — MULTIVITAMIN
1 TABLET ORAL DAILY
OUTPATIENT
Start: 2025-06-21

## 2025-06-21 RX ORDER — CLOPIDOGREL BISULFATE 75 MG/1
75 TABLET ORAL DAILY
Qty: 90 TABLET | Refills: 3 | OUTPATIENT
Start: 2025-06-21 | End: 2026-06-21

## 2025-06-21 RX ORDER — GABAPENTIN 600 MG/1
600 TABLET ORAL DAILY
Qty: 90 TABLET | Refills: 0 | OUTPATIENT
Start: 2025-06-21 | End: 2025-09-19

## 2025-06-21 RX ORDER — ALBUTEROL SULFATE 90 UG/1
1 INHALANT RESPIRATORY (INHALATION) EVERY 4 HOURS PRN
Qty: 18 G | Refills: 0 | OUTPATIENT
Start: 2025-06-21

## 2025-06-21 RX ORDER — NITROGLYCERIN 0.4 MG/1
0.4 TABLET SUBLINGUAL EVERY 5 MIN PRN
Qty: 25 TABLET | Refills: 1 | OUTPATIENT
Start: 2025-06-21 | End: 2026-06-21

## 2025-06-21 RX ORDER — LOSARTAN POTASSIUM 50 MG/1
50 TABLET ORAL DAILY
Qty: 90 TABLET | Refills: 3 | OUTPATIENT
Start: 2025-06-21 | End: 2026-06-16

## 2025-06-21 RX ORDER — QUETIAPINE FUMARATE 200 MG/1
TABLET, FILM COATED ORAL
Qty: 270 TABLET | Refills: 3 | OUTPATIENT
Start: 2025-06-21

## 2025-06-21 RX ORDER — MIRTAZAPINE 30 MG/1
30 TABLET, FILM COATED ORAL NIGHTLY
Qty: 90 TABLET | Refills: 3 | OUTPATIENT
Start: 2025-06-21

## 2025-06-21 RX ORDER — FLUTICASONE FUROATE AND VILANTEROL 100; 25 UG/1; UG/1
1 POWDER RESPIRATORY (INHALATION) DAILY
Start: 2025-06-21

## 2025-06-21 RX ORDER — PANTOPRAZOLE SODIUM 40 MG/1
40 TABLET, DELAYED RELEASE ORAL DAILY
Qty: 90 TABLET | Refills: 0 | OUTPATIENT
Start: 2025-06-21 | End: 2025-09-19

## 2025-06-21 RX ORDER — NYSTATIN AND TRIAMCINOLONE ACETONIDE 100000; 1 [USP'U]/G; MG/G
OINTMENT TOPICAL
OUTPATIENT
Start: 2025-06-21

## 2025-06-21 RX ORDER — METFORMIN HYDROCHLORIDE 500 MG/1
1000 TABLET ORAL 2 TIMES DAILY WITH MEALS
Qty: 360 TABLET | Refills: 0 | OUTPATIENT
Start: 2025-06-21 | End: 2025-09-19

## 2025-06-21 RX ORDER — EZETIMIBE 10 MG/1
10 TABLET ORAL DAILY
Qty: 90 TABLET | Refills: 3 | OUTPATIENT
Start: 2025-06-21 | End: 2026-06-21

## 2025-06-21 RX ORDER — CETIRIZINE HYDROCHLORIDE 5 MG/1
5 TABLET ORAL DAILY
Qty: 30 TABLET | Refills: 1 | OUTPATIENT
Start: 2025-06-21

## 2025-06-21 RX ORDER — ATORVASTATIN CALCIUM 40 MG/1
40 TABLET, FILM COATED ORAL DAILY
Qty: 90 TABLET | Refills: 3 | OUTPATIENT
Start: 2025-06-21

## 2025-06-21 RX ORDER — EVOLOCUMAB 140 MG/ML
140 INJECTION, SOLUTION SUBCUTANEOUS
Qty: 2 ML | Refills: 5 | OUTPATIENT
Start: 2025-06-21

## 2025-06-21 RX ORDER — FLUOXETINE 20 MG/1
20 CAPSULE ORAL DAILY
Qty: 90 CAPSULE | Refills: 3 | OUTPATIENT
Start: 2025-06-21

## 2025-06-21 RX ORDER — ONDANSETRON 4 MG/1
4 TABLET, FILM COATED ORAL EVERY 12 HOURS PRN
Qty: 270 TABLET | Refills: 0 | OUTPATIENT
Start: 2025-06-21

## 2025-06-22 ENCOUNTER — NURSE TRIAGE (OUTPATIENT)
Dept: ADMINISTRATIVE | Facility: CLINIC | Age: 70
End: 2025-06-22
Payer: MEDICARE

## 2025-06-22 DIAGNOSIS — J30.2 SEASONAL ALLERGIES: ICD-10-CM

## 2025-06-22 DIAGNOSIS — J44.9 COPD WITHOUT EXACERBATION: ICD-10-CM

## 2025-06-22 RX ORDER — METFORMIN HYDROCHLORIDE 500 MG/1
1000 TABLET ORAL 2 TIMES DAILY WITH MEALS
Qty: 360 TABLET | Refills: 0 | Status: CANCELLED | OUTPATIENT
Start: 2025-06-22 | End: 2025-09-20

## 2025-06-22 NOTE — TELEPHONE ENCOUNTER
Pt states she is trying to find out where her medications are. [facility] and [facility] not [facility] any [facility]. She is wanting to get in touch with her internal medicine doctor to get breo lipta and metformin refill. Pt is frustrated and wants me to let Dr office know how she feels. She wants help from Dr Herrera. Dispo is call office when they are open. Pt informed. Please follow up with patient.      Reason for Disposition   [1] Caller has NON-URGENT medicine question about med that doctor (or NP/PA) prescribed AND [2] triager unable to answer question    Protocols used: Medication Refill and Renewal Call-A-

## 2025-06-23 ENCOUNTER — PATIENT MESSAGE (OUTPATIENT)
Dept: INTERNAL MEDICINE | Facility: CLINIC | Age: 70
End: 2025-06-23
Payer: MEDICARE

## 2025-06-23 ENCOUNTER — TELEPHONE (OUTPATIENT)
Dept: INTERNAL MEDICINE | Facility: CLINIC | Age: 70
End: 2025-06-23
Payer: MEDICARE

## 2025-06-23 DIAGNOSIS — J30.2 SEASONAL ALLERGIES: ICD-10-CM

## 2025-06-23 DIAGNOSIS — J44.9 COPD WITHOUT EXACERBATION: ICD-10-CM

## 2025-06-23 DIAGNOSIS — E11.51 TYPE 2 DIABETES MELLITUS WITH DIABETIC PERIPHERAL ANGIOPATHY WITHOUT GANGRENE, WITHOUT LONG-TERM CURRENT USE OF INSULIN: Primary | ICD-10-CM

## 2025-06-23 RX ORDER — FLUTICASONE FUROATE AND VILANTEROL 100; 25 UG/1; UG/1
1 POWDER RESPIRATORY (INHALATION) DAILY
Start: 2025-06-23 | End: 2025-06-23 | Stop reason: SDUPTHER

## 2025-06-23 RX ORDER — FLUTICASONE PROPIONATE 50 MCG
SPRAY, SUSPENSION (ML) NASAL
Qty: 11.1 ML | Refills: 0 | Status: SHIPPED | OUTPATIENT
Start: 2025-06-23

## 2025-06-23 RX ORDER — METFORMIN HYDROCHLORIDE 500 MG/1
1000 TABLET ORAL 2 TIMES DAILY WITH MEALS
Qty: 360 TABLET | Refills: 0 | Status: CANCELLED | OUTPATIENT
Start: 2025-06-23 | End: 2025-09-21

## 2025-06-23 RX ORDER — ALBUTEROL SULFATE 90 UG/1
1 INHALANT RESPIRATORY (INHALATION) EVERY 4 HOURS PRN
Qty: 18 G | Refills: 0 | Status: SHIPPED | OUTPATIENT
Start: 2025-06-23

## 2025-06-23 RX ORDER — FLUTICASONE FUROATE AND VILANTEROL 100; 25 UG/1; UG/1
1 POWDER RESPIRATORY (INHALATION) DAILY
Qty: 15 EACH | Refills: 3 | Status: SHIPPED | OUTPATIENT
Start: 2025-06-23

## 2025-06-23 RX ORDER — FLUTICASONE PROPIONATE 50 MCG
SPRAY, SUSPENSION (ML) NASAL
Qty: 11.1 ML | Refills: 0 | Status: SHIPPED | OUTPATIENT
Start: 2025-06-23 | End: 2025-06-23 | Stop reason: SDUPTHER

## 2025-06-23 RX ORDER — FLUTICASONE FUROATE AND VILANTEROL 100; 25 UG/1; UG/1
1 POWDER RESPIRATORY (INHALATION) DAILY
Qty: 30 EACH | Refills: 2 | Status: CANCELLED | OUTPATIENT
Start: 2025-06-23 | End: 2025-09-21

## 2025-06-23 RX ORDER — METFORMIN HYDROCHLORIDE 500 MG/1
1000 TABLET ORAL 2 TIMES DAILY WITH MEALS
Qty: 720 TABLET | Refills: 0 | Status: SHIPPED | OUTPATIENT
Start: 2025-06-23 | End: 2025-12-20

## 2025-06-23 NOTE — TELEPHONE ENCOUNTER
Please refill metformin as they are out of refills   Also Breo is not covered and was sent in a print (   There is a note attached to breo , that she is taking symbicort )  Please advise

## 2025-06-23 NOTE — TELEPHONE ENCOUNTER
Copied from CRM #[id number]. Topic: Medications - New Medication Request  >> Jun 20, 2025  3:46 PM Mohan wrote:  Requesting an RX refill or new RX.     Is this a refill or new RX: new   metformin 500 mg  Breo inhaler      Is this a 30 day or 90 day RX:     Pharmacy name and phone # (copy/paste from chart):    [facility]/pharmacy #[id number] - [place] - [address]  [address] [address]  Phone: [phone] Fax: [phone]        The doctors have asked that we provide their patients with the following 2 reminders -- prescription refills can take up to 72 hours, and a friendly reminder that in the future you can use your [facility] account to request refills: yes

## 2025-06-25 ENCOUNTER — TELEPHONE (OUTPATIENT)
Dept: INTERNAL MEDICINE | Facility: CLINIC | Age: 70
End: 2025-06-25
Payer: MEDICARE

## 2025-06-25 NOTE — TELEPHONE ENCOUNTER
Copied from CRM #[id number]. Topic: General Inquiry - Patient Advice  >> Jun 25, 2025 11:08 AM Vicki wrote:  .1MEDICALADVICE     Patient is calling for Medical Advice regarding: Patient need a call back from the staff regarding question about the test.     How long has patient had these symptoms: N/A     Pharmacy name and phone#: N/A     Patient wants a call back or thru [facility], provide patient's call back phone number: Patient [phone]    Comments: thank you     Please advise patient replies from provider may take up to 48 hours.

## 2025-06-25 NOTE — TELEPHONE ENCOUNTER
"Called pt; pt did not answer     Left message asking pt to call back    Unsure as to which "test" she is referring to per the CRM   "

## 2025-06-26 ENCOUNTER — TELEPHONE (OUTPATIENT)
Dept: GASTROENTEROLOGY | Facility: CLINIC | Age: 70
End: 2025-06-26
Payer: MEDICARE

## 2025-06-26 NOTE — TELEPHONE ENCOUNTER
Copied from CRM #[id number]. Topic: General Inquiry - Patient Advice  >> Jun 26, 2025 12:41 PM Nusrat wrote:  Type:  Needs Medical Advice    Who Called: Ms Triplett     Would the patient rather a call back or a response via [facility]? Call   Best Call Back Number: [phone]   Additional Information: She states that she needs to know if she needs to do a stool sample this year she states she is unable to do it please call to discuss state it is too difficult for her to do right now

## 2025-07-07 DIAGNOSIS — E78.5 HYPERLIPIDEMIA, UNSPECIFIED HYPERLIPIDEMIA TYPE: ICD-10-CM

## 2025-07-07 RX ORDER — EVOLOCUMAB 140 MG/ML
140 INJECTION, SOLUTION SUBCUTANEOUS
Qty: 2 ML | Refills: 5 | Status: ACTIVE | OUTPATIENT
Start: 2025-07-07

## 2025-07-08 ENCOUNTER — TELEPHONE (OUTPATIENT)
Dept: ORTHOPEDICS | Facility: CLINIC | Age: 70
End: 2025-07-08
Payer: MEDICARE

## 2025-07-08 DIAGNOSIS — M25.519 SHOULDER PAIN, UNSPECIFIED CHRONICITY, UNSPECIFIED LATERALITY: Primary | ICD-10-CM

## 2025-07-10 ENCOUNTER — HOSPITAL ENCOUNTER (OUTPATIENT)
Facility: HOSPITAL | Age: 70
Discharge: HOME | End: 2025-07-10
Attending: ORTHOPAEDIC SURGERY
Payer: MEDICARE

## 2025-07-10 ENCOUNTER — OFFICE VISIT (OUTPATIENT)
Dept: ORTHOPEDICS | Facility: CLINIC | Age: 70
End: 2025-07-10
Payer: MEDICARE

## 2025-07-10 VITALS — HEIGHT: 63 IN | BODY MASS INDEX: 40.46 KG/M2

## 2025-07-10 DIAGNOSIS — M25.511 ARTHRALGIA OF BOTH ACROMIOCLAVICULAR JOINTS: Primary | ICD-10-CM

## 2025-07-10 DIAGNOSIS — M25.512 ARTHRALGIA OF BOTH ACROMIOCLAVICULAR JOINTS: Primary | ICD-10-CM

## 2025-07-10 DIAGNOSIS — M25.519 SHOULDER PAIN, UNSPECIFIED CHRONICITY, UNSPECIFIED LATERALITY: ICD-10-CM

## 2025-07-10 PROCEDURE — 99999 PR PBB SHADOW E&M-EST. PATIENT-LVL IV: CPT | Mod: PBBFAC,HCNC,, | Performed by: ORTHOPAEDIC SURGERY

## 2025-07-10 RX ORDER — TRIAMCINOLONE ACETONIDE 40 MG/ML
40 INJECTION, SUSPENSION INTRA-ARTICULAR; INTRAMUSCULAR
Status: COMPLETED | OUTPATIENT
Start: 2025-07-10 | End: 2025-07-10

## 2025-07-10 RX ADMIN — TRIAMCINOLONE ACETONIDE 40 MG: 40 INJECTION, SUSPENSION INTRA-ARTICULAR; INTRAMUSCULAR at 09:07
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
## 2025-07-15 DIAGNOSIS — J31.0 CHRONIC RHINITIS: ICD-10-CM

## 2025-07-15 NOTE — TELEPHONE ENCOUNTER
Copied from CRM #[id number]. Topic: Medications - Medication Refill  >> Jul 15, 2025  8:43 AM Brianna wrote:  Requesting an RX refill or new RX.    Is this a refill or new RX:     RX name and strength cetirizine (ZYRTEC) 5 MG tablet  Is this a 30 day or 90 day RX: 90 with refill     Pharmacy name and phone #   [id number] - [place] - [address]  [address] [address]  Phone: [phone] Fax: [phone]        Who called and call back number:Patient @ [phone]     The doctors have asked that we provide their patients with the following 2 reminders -- prescription refills can take up to 72 hours, and a friendly reminder that in the future you can use your [facility] account to request refills: na      Comment: Patient also want to talk to Dr camacho. Patient would like Dr only to call her

## 2025-07-16 RX ORDER — CETIRIZINE HYDROCHLORIDE 5 MG/1
5 TABLET ORAL DAILY
Qty: 30 TABLET | Refills: 1 | Status: SHIPPED | OUTPATIENT
Start: 2025-07-16

## 2025-07-21 ENCOUNTER — TELEPHONE (OUTPATIENT)
Dept: DERMATOLOGY | Facility: CLINIC | Age: 70
End: 2025-07-21
Payer: MEDICARE

## 2025-07-22 ENCOUNTER — OFFICE VISIT (OUTPATIENT)
Dept: DERMATOLOGY | Facility: CLINIC | Age: 70
End: 2025-07-22
Payer: MEDICARE

## 2025-07-22 DIAGNOSIS — Z12.83 SCREENING EXAM FOR SKIN CANCER: ICD-10-CM

## 2025-07-22 DIAGNOSIS — L57.0 ACTINIC KERATOSIS: Primary | ICD-10-CM

## 2025-07-22 DIAGNOSIS — Z85.828 HISTORY OF NONMELANOMA SKIN CANCER: ICD-10-CM

## 2025-07-22 DIAGNOSIS — L98.9 SKIN EROSION: ICD-10-CM

## 2025-07-22 DIAGNOSIS — D23.9 DERMATOFIBROMA: ICD-10-CM

## 2025-07-22 DIAGNOSIS — D18.01 CHERRY ANGIOMA: ICD-10-CM

## 2025-07-22 DIAGNOSIS — L81.4 LENTIGO: ICD-10-CM

## 2025-07-22 DIAGNOSIS — D22.9 MULTIPLE BENIGN NEVI: ICD-10-CM

## 2025-07-22 PROCEDURE — 3066F NEPHROPATHY DOC TX: CPT | Mod: CPTII,HCNC,S$GLB, | Performed by: STUDENT IN AN ORGANIZED HEALTH CARE EDUCATION/TRAINING PROGRAM

## 2025-07-22 PROCEDURE — 3044F HG A1C LEVEL LT 7.0%: CPT | Mod: CPTII,HCNC,S$GLB, | Performed by: STUDENT IN AN ORGANIZED HEALTH CARE EDUCATION/TRAINING PROGRAM

## 2025-07-22 PROCEDURE — 99999 PR PBB SHADOW E&M-EST. PATIENT-LVL IV: CPT | Mod: PBBFAC,HCNC,, | Performed by: STUDENT IN AN ORGANIZED HEALTH CARE EDUCATION/TRAINING PROGRAM

## 2025-07-22 PROCEDURE — 4010F ACE/ARB THERAPY RXD/TAKEN: CPT | Mod: CPTII,HCNC,S$GLB, | Performed by: STUDENT IN AN ORGANIZED HEALTH CARE EDUCATION/TRAINING PROGRAM

## 2025-07-22 PROCEDURE — 1159F MED LIST DOCD IN RCRD: CPT | Mod: CPTII,HCNC,S$GLB, | Performed by: STUDENT IN AN ORGANIZED HEALTH CARE EDUCATION/TRAINING PROGRAM

## 2025-07-22 PROCEDURE — 99203 OFFICE O/P NEW LOW 30 MIN: CPT | Mod: 25,HCNC,S$GLB, | Performed by: STUDENT IN AN ORGANIZED HEALTH CARE EDUCATION/TRAINING PROGRAM

## 2025-07-22 PROCEDURE — 1157F ADVNC CARE PLAN IN RCRD: CPT | Mod: CPTII,HCNC,S$GLB, | Performed by: STUDENT IN AN ORGANIZED HEALTH CARE EDUCATION/TRAINING PROGRAM

## 2025-07-22 PROCEDURE — 1126F AMNT PAIN NOTED NONE PRSNT: CPT | Mod: CPTII,HCNC,S$GLB, | Performed by: STUDENT IN AN ORGANIZED HEALTH CARE EDUCATION/TRAINING PROGRAM

## 2025-07-22 PROCEDURE — 1160F RVW MEDS BY RX/DR IN RCRD: CPT | Mod: CPTII,HCNC,S$GLB, | Performed by: STUDENT IN AN ORGANIZED HEALTH CARE EDUCATION/TRAINING PROGRAM

## 2025-07-22 PROCEDURE — 3288F FALL RISK ASSESSMENT DOCD: CPT | Mod: CPTII,HCNC,S$GLB, | Performed by: STUDENT IN AN ORGANIZED HEALTH CARE EDUCATION/TRAINING PROGRAM

## 2025-07-22 PROCEDURE — 1101F PT FALLS ASSESS-DOCD LE1/YR: CPT | Mod: CPTII,HCNC,S$GLB, | Performed by: STUDENT IN AN ORGANIZED HEALTH CARE EDUCATION/TRAINING PROGRAM

## 2025-07-22 PROCEDURE — 3061F NEG MICROALBUMINURIA REV: CPT | Mod: CPTII,HCNC,S$GLB, | Performed by: STUDENT IN AN ORGANIZED HEALTH CARE EDUCATION/TRAINING PROGRAM

## 2025-07-22 PROCEDURE — 17000 DESTRUCT PREMALG LESION: CPT | Mod: HCNC,S$GLB,, | Performed by: STUDENT IN AN ORGANIZED HEALTH CARE EDUCATION/TRAINING PROGRAM

## 2025-07-22 PROCEDURE — 17003 DESTRUCT PREMALG LES 2-14: CPT | Mod: HCNC,S$GLB,, | Performed by: STUDENT IN AN ORGANIZED HEALTH CARE EDUCATION/TRAINING PROGRAM

## 2025-07-22 NOTE — PATIENT INSTRUCTIONS
Sunscreen Guidelines  Sunscreen protects your skin by absorbing and reflecting ultraviolet rays. All sunscreens have a sun protection factor (SPF) rating that indicates how long a sunscreen will remain effective on the skin.    Why protect your skin?  The sun's rays are composed of many different wavelengths, including UVA, UVB, and visible light that each affect the skin differently.    UVB: sunburn, photoaging, skin cancer (melanoma, basal cell, and squamous cell carcinomas) and modulation of the skin's immune system.    UVA: similar to above but thought to contribute more to aging; at the same dose of UVB it is less powerful however the sun has 10-20 times the levels of UVA as compared with UVB.  Visible light: implicated in causing unwanted darkening of skin, such as melasma and post-inflammatory hyperpigmentation in darker skin types     If I have dark skin, do I need to worry about the sun?    More darkly pigmented skin is more protected against UV-induced skin cancer, sunburn, and photoaging, though may still suffer from sun-related conditions, including melasma, hyperpigmentation, and other dark spots.    Sun avoidance  As a general rule, stay out of the sun as much as possible between 10 a.m. - 4 p.m.    Download the EPA UV index floyd to track the UV index by hour in your zip code.      Which sunscreen should I choose?  The best sunscreen to use varies by individual. The one that feels best on your skin and fits your lifestyle will be the one you will likely use most regularly.   Active ingredients of sunscreen vary by , and may be a chemical (such as avobenzone or oxybenzone) or physical agent (such as zinc oxide or titanium dioxide). I recommend a physical agent.  A water-resistant sunscreen is one that maintains the SPF level after 40 minutes of water exposure. A very water-resistant sunscreen maintains the SPF level after 80 minutes of water exposure.    Sunscreen: this is the last layer in  "sun protection   Be generous: 1 shot glass of sunscreen for your body, ½ teaspoon for your face/neck  Reapply every 2 hours  Broad spectrum (provides UVA/UVB protection), SPF 50 or above  Avoid spray sunscreens: less effective and have been found to contain benzene (carcinogen)    Sun protective clothing  Although sunscreen helps minimize sun damage, no sunscreen completely blocks all wavelengths of UV light. Wearing sun protective clothing such as hats, rashguards or swim shirts, and long sleeves and/or pants, as well as avoiding sun exposure from 10 a.m. to 4 p.m. will help protect your skin from overexposure and minimize sun damage. Seek shade.  Long sleeved clothing, hats, and sunglasses: makes sun protection easier, more effective, and can even be more affordable, since sunscreen needs to be reapplied frequently.    Solumbra (www.sunprectautions.Jibo)  SI-BONE (www.Mediaspectrum)  Coolibar (Quitt.ch.Promimic)  Chute's Seafile (wwwMogi)  Hats from [employer] (Quitt.ch.helenkaminski.com)    My Favorite Sunscreens:  Physical blockers: Can have a "white case" but in general are more effective  - Face: CeraVe tinted mineral sunscreen, Bare Minerals complexion rescue (20 shades), Elta MD (UV elements, UV physical, UV restore, etc), Tizo ultra zinc tinted, Cetaphil Sheer Mineral Face Liquid Sunscreen  - Body: Blue Lizard, Neutrogena Sheer Zinc, Eucerin Daily Protection, Aveeno Baby CRYOSURGERY      Your doctor has used a method called cryosurgery to treat your skin condition. Cryosurgery refers to the use of very cold substances to treat a variety of skin conditions such as warts, pre-skin cancers, molluscum contagiosum, sun spots, and several benign growths. The substance we use in cryosurgery is liquid nitrogen and is so cold (-195 degrees Celsius) that is burns when administered.     Following treatment in the office, the skin may immediately burn and become red. You may find the area around the lesion is " affected as well. It is sometimes necessary to treat not only the lesion, but a small area of the surrounding normal skin to achieve a good response.     A blister, and even a blood filled blister, may form after treatment.   This is a normal response. If the blister is painful, it is acceptable to sterilize a needle and with rubbing alcohol and gently pop the blister. It is important that you gently wash the area with soap and warm water as the blister fluid may contain wart virus if a wart was treated. Do no remove the roof of the blister.     The area treated can take anywhere from 1-3 weeks to heal. Healing time depends on the kind skin lesion treated, the location, and how aggressively the lesion was treated. It is recommended that the areas treated are covered with Vaseline or bacitracin ointment and a band-aid. If a band-aid is not practical, just ointment applied several times per day will do. Keeping these areas moist will speed the healing time.    Treatment with liquid nitrogen can leave a scar. In dark skin, it may be a light or dark scar, in light skin it may be a white or pink scar. These will generally fade with time, but may never go away completely.     If you have any concerns after your treatment, please feel free to call the office.       [facility] [facility] [address]/ [phone] FAX/ [email or web address]

## 2025-07-22 NOTE — PROGRESS NOTES
Subjective:      Patient ID:  Joseluis Triplett is a 70 y.o. female who presents for No chief complaint on file.    Pt here for TBSE     Pt has h/o skin cancer- outside of [facility]     Pt concerned about lesions on leg and forehead        Review of Systems    Objective:   Physical Exam   Constitutional: She appears well-developed and well-nourished. No distress.   Neurological: She is alert and oriented to person, place, and time. She is not disoriented.   Psychiatric: She has a normal mood and affect.   Skin:   Areas Examined (abnormalities noted in diagram):   Scalp / Hair Palpated and Inspected  Head / Face Inspection Performed  Neck Inspection Performed  Chest / Axilla Inspection Performed  Abdomen Inspection Performed  Back Inspection Performed  RUE Inspected  LUE Inspection Performed  RLE Inspected  LLE Inspection Performed  Nails and Digits Inspection Performed                 Diagram Legend     Erythematous scaling macule/papule c/w actinic keratosis       Vascular papule c/w angioma      Pigmented verrucoid papule/plaque c/w seborrheic keratosis      Yellow umbilicated papule c/w sebaceous hyperplasia      Irregularly shaped tan macule c/w lentigo     1-2 mm smooth white papules consistent with Milia      Movable subcutaneous cyst with punctum c/w epidermal inclusion cyst      Subcutaneous movable cyst c/w pilar cyst      Firm pink to brown papule c/w dermatofibroma      Pedunculated fleshy papule(s) c/w skin tag(s)      Evenly pigmented macule c/w junctional nevus     Mildly variegated pigmented, slightly irregular-bordered macule c/w mildly atypical nevus      Flesh colored to evenly pigmented papule c/w intradermal nevus       Pink pearly papule/plaque c/w basal cell carcinoma      Erythematous hyperkeratotic cursted plaque c/w SCC      Surgical scar with no sign of skin cancer recurrence      Open and closed comedones      Inflammatory papules and pustules      Verrucoid papule consistent consistent  with wart     Erythematous eczematous patches and plaques     Dystrophic onycholytic nail with subungual debris c/w onychomycosis     Umbilicated papule    Erythematous-base heme-crusted tan verrucoid plaque consistent with inflamed seborrheic keratosis     Erythematous Silvery Scaling Plaque c/w Psoriasis     See annotation      Assessment / Plan:        Actinic keratosis  Cryosurgery Procedure Note    Verbal consent from the patient is obtained including, but not limited to, risk of hypopigmentation/hyperpigmentation, scar, recurrence of lesion. The patient is aware of the precancerous quality and need for treatment of these lesions. Liquid nitrogen cryosurgery is applied to the 4 actinic keratoses, as detailed in the physical exam, to produce a freeze injury. The patient is aware that blisters may form and is instructed on wound care with gentle cleansing and use of vaseline ointment to keep moist until healed. The patient is supplied a handout on cryosurgery and is instructed to call if lesions do not completely resolve.    Multiple benign nevi  Lentigo  Cherry angioma  Dermatofibroma   - minor problem and chronic.   Reassurance given to patient. No treatment necessary.     History of nonmelanoma skin cancer  Screening exam for skin cancer  Area of previous NMSC examined. Site well healed with no signs of recurrence.    Total body skin examination performed today including at least 12 points as noted in physical examination. No lesions suspicious for malignancy noted.    Recommend daily sun protection/avoidance, use of at least SPF 30, broad spectrum sunscreen (OTC drug), skin self examinations, and routine physician surveillance to optimize early detection    Skin erosion  Erosion, left nasal ala--patient reports trauma 3 days ago. Ultimately ddx of nonhealing erosion is NMSC. Patient reports trauma 3 days ago. If mayen not heal after 6 - 8 weeks then RTC for biopsy           Follow up in about 1 year (around  7/22/2026) for TBSE.

## 2025-07-25 ENCOUNTER — HOSPITAL ENCOUNTER (OUTPATIENT)
Dept: RADIOLOGY | Facility: HOSPITAL | Age: 70
Discharge: HOME | End: 2025-07-25
Payer: MEDICARE

## 2025-07-25 DIAGNOSIS — Z00.00 PREVENTATIVE HEALTH CARE: ICD-10-CM

## 2025-07-25 DIAGNOSIS — Z12.31 ENCOUNTER FOR SCREENING MAMMOGRAM FOR MALIGNANT NEOPLASM OF BREAST: ICD-10-CM

## 2025-07-25 PROCEDURE — 77063 BREAST TOMOSYNTHESIS BI: CPT | Mod: TC,HCNC

## 2025-07-25 PROCEDURE — 77063 BREAST TOMOSYNTHESIS BI: CPT | Mod: 26,HCNC,, | Performed by: RADIOLOGY

## 2025-07-25 PROCEDURE — 77067 SCR MAMMO BI INCL CAD: CPT | Mod: 26,HCNC,, | Performed by: RADIOLOGY

## 2025-07-31 ENCOUNTER — OFFICE VISIT (OUTPATIENT)
Dept: INTERNAL MEDICINE | Facility: CLINIC | Age: 70
End: 2025-07-31
Payer: MEDICARE

## 2025-07-31 ENCOUNTER — LAB VISIT (OUTPATIENT)
Dept: LAB | Facility: HOSPITAL | Age: 70
End: 2025-07-31
Payer: MEDICARE

## 2025-07-31 VITALS
HEIGHT: 63 IN | BODY MASS INDEX: 42.03 KG/M2 | HEART RATE: 87 BPM | DIASTOLIC BLOOD PRESSURE: 74 MMHG | SYSTOLIC BLOOD PRESSURE: 108 MMHG | WEIGHT: 237.19 LBS | OXYGEN SATURATION: 98 %

## 2025-07-31 DIAGNOSIS — J44.9 COPD WITHOUT EXACERBATION: ICD-10-CM

## 2025-07-31 DIAGNOSIS — Z72.0 TOBACCO ABUSE: ICD-10-CM

## 2025-07-31 DIAGNOSIS — F25.1 SCHIZOAFFECTIVE DISORDER, DEPRESSIVE TYPE: ICD-10-CM

## 2025-07-31 DIAGNOSIS — E78.5 HYPERLIPIDEMIA, UNSPECIFIED HYPERLIPIDEMIA TYPE: ICD-10-CM

## 2025-07-31 DIAGNOSIS — K59.09 CHRONIC CONSTIPATION: ICD-10-CM

## 2025-07-31 DIAGNOSIS — R82.90 FOUL SMELLING URINE: ICD-10-CM

## 2025-07-31 DIAGNOSIS — R26.9 GAIT DISORDER: ICD-10-CM

## 2025-07-31 DIAGNOSIS — F17.210 NICOTINE DEPENDENCE, CIGARETTES, UNCOMPLICATED: ICD-10-CM

## 2025-07-31 DIAGNOSIS — E11.22 TYPE 2 DIABETES MELLITUS WITH STAGE 3B CHRONIC KIDNEY DISEASE, WITHOUT LONG-TERM CURRENT USE OF INSULIN: Primary | ICD-10-CM

## 2025-07-31 DIAGNOSIS — I10 PRIMARY HYPERTENSION: ICD-10-CM

## 2025-07-31 DIAGNOSIS — N18.32 TYPE 2 DIABETES MELLITUS WITH STAGE 3B CHRONIC KIDNEY DISEASE, WITHOUT LONG-TERM CURRENT USE OF INSULIN: Primary | ICD-10-CM

## 2025-07-31 DIAGNOSIS — K21.9 GASTROESOPHAGEAL REFLUX DISEASE WITHOUT ESOPHAGITIS: ICD-10-CM

## 2025-07-31 PROBLEM — R10.9 ABDOMINAL PAIN: Status: ACTIVE | Noted: 2022-03-05

## 2025-07-31 PROBLEM — J02.9 SORE THROAT: Status: ACTIVE | Noted: 2022-04-26

## 2025-07-31 PROBLEM — S06.9XAA TBI (TRAUMATIC BRAIN INJURY): Status: ACTIVE | Noted: 2025-07-31

## 2025-07-31 PROBLEM — J45.909 ASTHMA: Status: ACTIVE | Noted: 2023-03-06

## 2025-07-31 PROBLEM — G90.09: Status: ACTIVE | Noted: 2019-06-10

## 2025-07-31 PROBLEM — N95.2 ATROPHIC VULVOVAGINITIS: Status: ACTIVE | Noted: 2019-10-10

## 2025-07-31 PROBLEM — K25.9 GASTRIC ULCER: Status: ACTIVE | Noted: 2025-07-31

## 2025-07-31 PROBLEM — E03.9 ACQUIRED HYPOTHYROIDISM: Status: ACTIVE | Noted: 2019-08-16

## 2025-07-31 PROBLEM — H53.8 BLURRED VISION: Status: ACTIVE | Noted: 2024-03-09

## 2025-07-31 PROBLEM — G44.209 TENSION-TYPE HEADACHE, NOT INTRACTABLE: Status: ACTIVE | Noted: 2022-06-14

## 2025-07-31 PROBLEM — F43.10 PTSD (POST-TRAUMATIC STRESS DISORDER): Status: ACTIVE | Noted: 2019-09-06

## 2025-07-31 PROBLEM — R73.03 PREDIABETES: Status: ACTIVE | Noted: 2022-04-26

## 2025-07-31 PROBLEM — M17.0 OSTEOARTHRITIS OF BOTH KNEES: Status: ACTIVE | Noted: 2022-03-10

## 2025-07-31 PROBLEM — G20.C PARKINSONISM: Status: ACTIVE | Noted: 2025-07-31

## 2025-07-31 PROBLEM — J30.2 SEASONAL ALLERGIES: Status: ACTIVE | Noted: 2019-08-16

## 2025-07-31 PROBLEM — R51.9 HEADACHE: Status: ACTIVE | Noted: 2025-07-31

## 2025-07-31 PROBLEM — Z87.898 HISTORY OF EXTRAPYRAMIDAL SYMPTOMS: Status: ACTIVE | Noted: 2023-04-21

## 2025-07-31 PROBLEM — N95.0 POSTMENOPAUSAL BLEEDING: Status: ACTIVE | Noted: 2020-12-09

## 2025-07-31 PROBLEM — G43.019 REFRACTORY MIGRAINE WITHOUT AURA: Status: ACTIVE | Noted: 2025-07-31

## 2025-07-31 LAB
BILIRUB UR QL STRIP.AUTO: NEGATIVE
CLARITY UR: CLEAR
COLOR UR AUTO: YELLOW
GLUCOSE UR QL STRIP: NEGATIVE
HGB UR QL STRIP: NEGATIVE
KETONES UR QL STRIP: NEGATIVE
LEUKOCYTE ESTERASE UR QL STRIP: NEGATIVE
NITRITE UR QL STRIP: NEGATIVE
PH UR STRIP: 6 [PH]
PROT UR QL STRIP: NEGATIVE
SP GR UR STRIP: 1.01
UROBILINOGEN UR STRIP-ACNC: NEGATIVE EU/DL

## 2025-07-31 PROCEDURE — 99999 PR PBB SHADOW E&M-EST. PATIENT-LVL V: CPT | Mod: PBBFAC,HCNC,GC,

## 2025-07-31 PROCEDURE — 81003 URINALYSIS AUTO W/O SCOPE: CPT | Mod: HCNC

## 2025-07-31 RX ORDER — PREGABALIN 100 MG/1
100 CAPSULE ORAL 2 TIMES DAILY
COMMUNITY
Start: 2025-07-23 | End: 2026-07-23

## 2025-07-31 RX ORDER — ATOGEPANT 60 MG/1
1 TABLET ORAL DAILY
COMMUNITY
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
## 2025-08-01 NOTE — PROGRESS NOTES
I have reviewed and concur with the resident's history, physical, assessment, and plan. I have not personally interviewed or examined the patient.      UA for dysuria  Declined Colororectal screening   Chronic problems stable and managed by specialists    Lisseth Segundo MD  Internal Medicine  [facility]

## 2025-08-07 ENCOUNTER — PATIENT MESSAGE (OUTPATIENT)
Dept: DERMATOLOGY | Facility: CLINIC | Age: 70
End: 2025-08-07
Payer: MEDICARE

## 2025-08-08 ENCOUNTER — TELEPHONE (OUTPATIENT)
Dept: DERMATOLOGY | Facility: CLINIC | Age: 70
End: 2025-08-08
Payer: MEDICARE

## 2025-08-11 ENCOUNTER — TELEPHONE (OUTPATIENT)
Dept: INTERNAL MEDICINE | Facility: CLINIC | Age: 70
End: 2025-08-11
Payer: MEDICARE

## 2025-08-12 ENCOUNTER — TELEPHONE (OUTPATIENT)
Dept: CARDIOLOGY | Facility: CLINIC | Age: 70
End: 2025-08-12
Payer: MEDICARE

## 2025-08-12 ENCOUNTER — HOSPITAL ENCOUNTER (OUTPATIENT)
Dept: RADIOLOGY | Facility: HOSPITAL | Age: 70
Discharge: HOME | End: 2025-08-12
Payer: MEDICARE

## 2025-08-12 DIAGNOSIS — F17.210 NICOTINE DEPENDENCE, CIGARETTES, UNCOMPLICATED: ICD-10-CM

## 2025-08-12 DIAGNOSIS — Z72.0 TOBACCO ABUSE: ICD-10-CM

## 2025-08-12 PROCEDURE — 71271 CT THORAX LUNG CANCER SCR C-: CPT | Mod: 26,HCNC,, | Performed by: RADIOLOGY

## 2025-08-12 PROCEDURE — 71271 CT THORAX LUNG CANCER SCR C-: CPT | Mod: TC,HCNC

## 2025-08-14 ENCOUNTER — TELEPHONE (OUTPATIENT)
Dept: ORTHOPEDICS | Facility: CLINIC | Age: 70
End: 2025-08-14
Payer: MEDICARE

## 2025-08-15 ENCOUNTER — TELEPHONE (OUTPATIENT)
Dept: INTERNAL MEDICINE | Facility: CLINIC | Age: 70
End: 2025-08-15
Payer: MEDICARE

## 2025-08-15 ENCOUNTER — TELEPHONE (OUTPATIENT)
Dept: ORTHOPEDICS | Facility: CLINIC | Age: 70
End: 2025-08-15
Payer: MEDICARE

## 2025-08-18 ENCOUNTER — TELEPHONE (OUTPATIENT)
Dept: INTERNAL MEDICINE | Facility: CLINIC | Age: 70
End: 2025-08-18
Payer: MEDICARE

## 2025-08-20 ENCOUNTER — HOSPITAL ENCOUNTER (EMERGENCY)
Facility: HOSPITAL | Age: 70
Discharge: HOME | End: 2025-08-20
Attending: EMERGENCY MEDICINE
Payer: MEDICARE

## 2025-08-20 VITALS
RESPIRATION RATE: 16 BRPM | BODY MASS INDEX: 41.99 KG/M2 | SYSTOLIC BLOOD PRESSURE: 138 MMHG | HEART RATE: 73 BPM | HEIGHT: 63 IN | DIASTOLIC BLOOD PRESSURE: 65 MMHG | TEMPERATURE: 98 F | WEIGHT: 237 LBS | OXYGEN SATURATION: 100 %

## 2025-08-20 DIAGNOSIS — F09 COGNITIVE DISORDER: ICD-10-CM

## 2025-08-20 DIAGNOSIS — I25.118 CORONARY ARTERY DISEASE OF NATIVE ARTERY OF NATIVE HEART WITH STABLE ANGINA PECTORIS: ICD-10-CM

## 2025-08-20 DIAGNOSIS — R07.9 CHEST PAIN: ICD-10-CM

## 2025-08-20 DIAGNOSIS — R07.89 ATYPICAL CHEST PAIN: Primary | ICD-10-CM

## 2025-08-20 DIAGNOSIS — M79.89 LEG SWELLING: ICD-10-CM

## 2025-08-20 LAB
ABSOLUTE EOSINOPHIL (OHS): 0.1 K/UL
ABSOLUTE MONOCYTE (OHS): 0.99 K/UL (ref 0.3–1)
ABSOLUTE NEUTROPHIL COUNT (OHS): 3.58 K/UL (ref 1.8–7.7)
ALBUMIN SERPL BCP-MCNC: 3.9 G/DL (ref 3.5–5.2)
ALP SERPL-CCNC: 79 UNIT/L (ref 40–150)
ALT SERPL W/O P-5'-P-CCNC: 14 UNIT/L (ref 10–44)
ANION GAP (OHS): 13 MMOL/L (ref 8–16)
AST SERPL-CCNC: 18 UNIT/L (ref 11–45)
BASOPHILS # BLD AUTO: 0.05 K/UL
BASOPHILS NFR BLD AUTO: 0.6 %
BILIRUB SERPL-MCNC: 0.1 MG/DL (ref 0.1–1)
BUN SERPL-MCNC: 21 MG/DL (ref 8–23)
CALCIUM SERPL-MCNC: 9.8 MG/DL (ref 8.7–10.5)
CHLORIDE SERPL-SCNC: 102 MMOL/L (ref 95–110)
CO2 SERPL-SCNC: 28 MMOL/L (ref 23–29)
CREAT SERPL-MCNC: 0.9 MG/DL (ref 0.5–1.4)
ERYTHROCYTE [DISTWIDTH] IN BLOOD BY AUTOMATED COUNT: 13.2 % (ref 11.5–14.5)
GFR SERPLBLD CREATININE-BSD FMLA CKD-EPI: >60 ML/MIN/1.73/M2
GLUCOSE SERPL-MCNC: 142 MG/DL (ref 70–110)
HCT VFR BLD AUTO: 42.8 % (ref 37–48.5)
HGB BLD-MCNC: 14.4 GM/DL (ref 12–16)
HOLD SPECIMEN: NORMAL
IMM GRANULOCYTES # BLD AUTO: 0.04 K/UL (ref 0–0.04)
IMM GRANULOCYTES NFR BLD AUTO: 0.5 % (ref 0–0.5)
LYMPHOCYTES # BLD AUTO: 3.31 K/UL (ref 1–4.8)
MCH RBC QN AUTO: 29.6 PG (ref 27–31)
MCHC RBC AUTO-ENTMCNC: 33.6 G/DL (ref 32–36)
MCV RBC AUTO: 88 FL (ref 82–98)
NT-PROBNP SERPL-MCNC: 83 PG/ML
NUCLEATED RBC (/100WBC) (OHS): 0 /100 WBC
OHS QRS DURATION: 86 MS
OHS QTC CALCULATION: 427 MS
PLATELET # BLD AUTO: 265 K/UL (ref 150–450)
PMV BLD AUTO: 9.4 FL (ref 9.2–12.9)
POTASSIUM SERPL-SCNC: 4.4 MMOL/L (ref 3.5–5.1)
PROT SERPL-MCNC: 6.9 GM/DL (ref 6–8.4)
RBC # BLD AUTO: 4.87 M/UL (ref 4–5.4)
RELATIVE EOSINOPHIL (OHS): 1.2 %
RELATIVE LYMPHOCYTE (OHS): 41 % (ref 18–48)
RELATIVE MONOCYTE (OHS): 12.3 % (ref 4–15)
RELATIVE NEUTROPHIL (OHS): 44.4 % (ref 38–73)
SODIUM SERPL-SCNC: 143 MMOL/L (ref 136–145)
TROPONIN I SERPL HS-MCNC: <3 NG/L
TROPONIN I SERPL HS-MCNC: <3 NG/L
WBC # BLD AUTO: 8.07 K/UL (ref 3.9–12.7)

## 2025-08-20 PROCEDURE — 93005 ELECTROCARDIOGRAM TRACING: CPT | Mod: HCNC

## 2025-08-20 PROCEDURE — 82040 ASSAY OF SERUM ALBUMIN: CPT | Mod: HCNC | Performed by: PHYSICIAN ASSISTANT

## 2025-08-20 PROCEDURE — 85025 COMPLETE CBC W/AUTO DIFF WBC: CPT | Mod: HCNC | Performed by: PHYSICIAN ASSISTANT

## 2025-08-20 PROCEDURE — 96374 THER/PROPH/DIAG INJ IV PUSH: CPT | Mod: HCNC

## 2025-08-20 PROCEDURE — 63600175 PHARM REV CODE 636 W HCPCS: Mod: HCNC

## 2025-08-20 PROCEDURE — 99285 EMERGENCY DEPT VISIT HI MDM: CPT | Mod: 25,HCNC

## 2025-08-20 PROCEDURE — 84484 ASSAY OF TROPONIN QUANT: CPT | Mod: HCNC | Performed by: PHYSICIAN ASSISTANT

## 2025-08-20 PROCEDURE — 83880 ASSAY OF NATRIURETIC PEPTIDE: CPT | Mod: HCNC | Performed by: PHYSICIAN ASSISTANT

## 2025-08-20 PROCEDURE — 93010 ELECTROCARDIOGRAM REPORT: CPT | Mod: HCNC,,, | Performed by: INTERNAL MEDICINE

## 2025-08-20 RX ORDER — METOCLOPRAMIDE HYDROCHLORIDE 5 MG/ML
10 INJECTION INTRAMUSCULAR; INTRAVENOUS
Status: COMPLETED | OUTPATIENT
Start: 2025-08-20 | End: 2025-08-20

## 2025-08-20 RX ADMIN — METOCLOPRAMIDE 10 MG: 5 INJECTION, SOLUTION INTRAMUSCULAR; INTRAVENOUS at 02:08

## 2025-08-21 ENCOUNTER — PATIENT OUTREACH (OUTPATIENT)
Facility: OTHER | Age: 70
End: 2025-08-21
Payer: MEDICARE

## 2025-08-26 ENCOUNTER — TELEPHONE (OUTPATIENT)
Dept: INTERNAL MEDICINE | Facility: CLINIC | Age: 70
End: 2025-08-26
Payer: MEDICARE

## 2025-08-27 ENCOUNTER — DOCUMENT SCAN (OUTPATIENT)
Dept: HOME HEALTH SERVICES | Facility: HOSPITAL | Age: 70
End: 2025-08-27
Payer: MEDICARE

## 2025-09-01 ENCOUNTER — HOSPITAL ENCOUNTER (EMERGENCY)
Facility: HOSPITAL | Age: 70
Discharge: HOME | End: 2025-09-01
Attending: EMERGENCY MEDICINE
Payer: MEDICARE

## 2025-09-01 VITALS
DIASTOLIC BLOOD PRESSURE: 64 MMHG | OXYGEN SATURATION: 95 % | SYSTOLIC BLOOD PRESSURE: 125 MMHG | BODY MASS INDEX: 41.99 KG/M2 | RESPIRATION RATE: 20 BRPM | TEMPERATURE: 100 F | HEART RATE: 97 BPM | WEIGHT: 237 LBS | HEIGHT: 63 IN

## 2025-09-01 DIAGNOSIS — U07.1 COVID-19 VIRUS DETECTED: ICD-10-CM

## 2025-09-01 DIAGNOSIS — U07.1 COVID-19: Primary | ICD-10-CM

## 2025-09-01 LAB
CTP QC/QA: YES
CTP QC/QA: YES
POC MOLECULAR INFLUENZA A AGN: NEGATIVE
POC MOLECULAR INFLUENZA B AGN: NEGATIVE
SARS-COV-2 RDRP RESP QL NAA+PROBE: POSITIVE

## 2025-09-01 PROCEDURE — 87502 INFLUENZA DNA AMP PROBE: CPT | Mod: HCNC

## 2025-09-01 PROCEDURE — 87635 SARS-COV-2 COVID-19 AMP PRB: CPT | Mod: HCNC | Performed by: EMERGENCY MEDICINE

## 2025-09-01 PROCEDURE — 99283 EMERGENCY DEPT VISIT LOW MDM: CPT | Mod: 25,HCNC

## 2025-09-02 ENCOUNTER — PATIENT OUTREACH (OUTPATIENT)
Facility: OTHER | Age: 70
End: 2025-09-02
Payer: MEDICARE

## 2025-09-04 ENCOUNTER — TELEPHONE (OUTPATIENT)
Dept: INTERNAL MEDICINE | Facility: CLINIC | Age: 70
End: 2025-09-04
Payer: MEDICARE

## 2025-09-04 DIAGNOSIS — J31.0 CHRONIC RHINITIS: ICD-10-CM

## 2025-09-04 RX ORDER — CETIRIZINE HYDROCHLORIDE 5 MG/1
5 TABLET ORAL DAILY
Qty: 30 TABLET | Refills: 1 | Status: SHIPPED | OUTPATIENT
Start: 2025-09-04

## 2025-09-04 RX ORDER — ASPIRIN 81 MG/1
81 TABLET ORAL DAILY
Qty: 90 TABLET | Refills: 0 | Status: SHIPPED | OUTPATIENT
Start: 2025-09-04